# Patient Record
Sex: FEMALE | Race: BLACK OR AFRICAN AMERICAN | NOT HISPANIC OR LATINO | URBAN - METROPOLITAN AREA
[De-identification: names, ages, dates, MRNs, and addresses within clinical notes are randomized per-mention and may not be internally consistent; named-entity substitution may affect disease eponyms.]

---

## 2017-10-01 ENCOUNTER — OUTPATIENT (OUTPATIENT)
Dept: OUTPATIENT SERVICES | Facility: HOSPITAL | Age: 67
LOS: 1 days | End: 2017-10-01
Payer: MEDICAID

## 2017-10-01 DIAGNOSIS — N20.0 CALCULUS OF KIDNEY: Chronic | ICD-10-CM

## 2017-10-01 PROCEDURE — G9001: CPT

## 2017-10-09 ENCOUNTER — INPATIENT (INPATIENT)
Facility: HOSPITAL | Age: 67
LOS: 2 days | Discharge: HOME HEALTH | End: 2017-10-12
Attending: INTERNAL MEDICINE | Admitting: INTERNAL MEDICINE
Payer: COMMERCIAL

## 2017-10-09 VITALS
OXYGEN SATURATION: 100 % | RESPIRATION RATE: 18 BRPM | TEMPERATURE: 98 F | HEIGHT: 66 IN | HEART RATE: 65 BPM | SYSTOLIC BLOOD PRESSURE: 125 MMHG | DIASTOLIC BLOOD PRESSURE: 61 MMHG | WEIGHT: 130.07 LBS

## 2017-10-09 DIAGNOSIS — N20.0 CALCULUS OF KIDNEY: Chronic | ICD-10-CM

## 2017-10-09 LAB
BASOPHILS # BLD AUTO: 0.1 K/UL — SIGNIFICANT CHANGE UP (ref 0–0.2)
BASOPHILS NFR BLD AUTO: 0.6 % — SIGNIFICANT CHANGE UP (ref 0–2)
EOSINOPHIL # BLD AUTO: 0 K/UL — SIGNIFICANT CHANGE UP (ref 0–0.5)
EOSINOPHIL NFR BLD AUTO: 0.1 % — SIGNIFICANT CHANGE UP (ref 0–6)
HCT VFR BLD CALC: 38.1 % — SIGNIFICANT CHANGE UP (ref 34.5–45)
HGB BLD-MCNC: 12.6 G/DL — SIGNIFICANT CHANGE UP (ref 11.5–15.5)
LYMPHOCYTES # BLD AUTO: 1.2 K/UL — SIGNIFICANT CHANGE UP (ref 1–3.3)
LYMPHOCYTES # BLD AUTO: 10.4 % — LOW (ref 13–44)
MCHC RBC-ENTMCNC: 30.3 PG — SIGNIFICANT CHANGE UP (ref 27–34)
MCHC RBC-ENTMCNC: 32.9 GM/DL — SIGNIFICANT CHANGE UP (ref 32–36)
MCV RBC AUTO: 92 FL — SIGNIFICANT CHANGE UP (ref 80–100)
MONOCYTES # BLD AUTO: 0.6 K/UL — SIGNIFICANT CHANGE UP (ref 0–0.9)
MONOCYTES NFR BLD AUTO: 5.3 % — SIGNIFICANT CHANGE UP (ref 2–14)
NEUTROPHILS # BLD AUTO: 9.9 K/UL — HIGH (ref 1.8–7.4)
NEUTROPHILS NFR BLD AUTO: 83.6 % — HIGH (ref 43–77)
PLATELET # BLD AUTO: 209 K/UL — SIGNIFICANT CHANGE UP (ref 150–400)
RBC # BLD: 4.14 M/UL — SIGNIFICANT CHANGE UP (ref 3.8–5.2)
RBC # FLD: 11.6 % — SIGNIFICANT CHANGE UP (ref 11–15)
WBC # BLD: 11.8 K/UL — HIGH (ref 3.8–10.5)
WBC # FLD AUTO: 11.8 K/UL — HIGH (ref 3.8–10.5)

## 2017-10-09 PROCEDURE — 99285 EMERGENCY DEPT VISIT HI MDM: CPT

## 2017-10-09 PROCEDURE — 71010: CPT | Mod: 26

## 2017-10-09 RX ORDER — SODIUM CHLORIDE 9 MG/ML
500 INJECTION INTRAMUSCULAR; INTRAVENOUS; SUBCUTANEOUS ONCE
Qty: 0 | Refills: 0 | Status: COMPLETED | OUTPATIENT
Start: 2017-10-09 | End: 2017-10-09

## 2017-10-09 RX ADMIN — SODIUM CHLORIDE 500 MILLILITER(S): 9 INJECTION INTRAMUSCULAR; INTRAVENOUS; SUBCUTANEOUS at 23:11

## 2017-10-09 NOTE — ED ADULT NURSE NOTE - OBJECTIVE STATEMENT
pt brought in with c/o syncope episode , witnessed by  her aide, denies head trauma , now c/o generalized abd. pain tender to touch , not in any distress, pt with hx of multiple strokes with deficits, still slurred speech and slight weakness to right hand, but able to make needs known ,

## 2017-10-09 NOTE — ED PROVIDER NOTE - OBJECTIVE STATEMENT
Pertinent PMH/PSH/FHx/SHx and Review of Systems contained within:  Patient presents to the ED for   syncope at home.  No shaking or incontinence was seen.  Accompanied by sister.  Patient had been eating when she passed out for a brief period.  After event patient wanted to use the bathroom, had difficulty walking and was tremulous.  Patient is back to her baseline, but unable to provide history since she has dementia.  Per sister, patient has been off all her medications for 1 week now because of "some mix up at the pharmacy."  Patient is now awake and alert, following simple commands.      Relevant PMHx/SHx/SOCHx/FAMH:  HTN, HLD, Dementia, seizures, CHF  Patient denies EtOH/tobacco/illicit substance use.  PMD: sister marie recall

## 2017-10-09 NOTE — ED PROVIDER NOTE - PMH
Asthma    CVA (cerebral infarction)  right side weakness  Diabetes    Gout    Hypertension    OA (osteoarthritis)  bautista knees, low back  and  bautista shoulders  Seizure disorder    Urinary incontinence    Vision changes  lt eye

## 2017-10-09 NOTE — ED PROVIDER NOTE - MEDICAL DECISION MAKING DETAILS
Patient with syncope at home.  Lab and imaging studies were obtained in accordance with patient's chief complaint and reviewed.  Cause of syncope is currently unclear.  UA is still pending.  BNP elevated, increased pulmonary vascularity.  Needs cardiology and neuro.  Patient is to be admitted to the hospital and the case was discussed with the admitting physician.  Any changes in plan, additional imaging/labs, and further work up will be at the discretion of the admitting physician. Patient with syncope at home.  Lab and imaging studies were obtained in accordance with patient's chief complaint and reviewed.  Cause of syncope is currently unclear.  UA is still pending.  BNP elevated, increased pulmonary vascularity, but no worse than previous CXR.  Needs cardiology and neuro.  Patient is to be admitted to the hospital and the case was discussed with the admitting physician.  Any changes in plan, additional imaging/labs, and further work up will be at the discretion of the admitting physician.

## 2017-10-09 NOTE — ED ADULT TRIAGE NOTE - CHIEF COMPLAINT QUOTE
biba , syncope , at home , witnessed by family , (+)loc x 2minutes. denies chest pain , denies trouble breathing or any weakness   Generalized abdominal pain ,  nausea, vomiting

## 2017-10-10 DIAGNOSIS — R55 SYNCOPE AND COLLAPSE: ICD-10-CM

## 2017-10-10 DIAGNOSIS — J45.909 UNSPECIFIED ASTHMA, UNCOMPLICATED: ICD-10-CM

## 2017-10-10 DIAGNOSIS — M19.90 UNSPECIFIED OSTEOARTHRITIS, UNSPECIFIED SITE: ICD-10-CM

## 2017-10-10 DIAGNOSIS — G40.909 EPILEPSY, UNSPECIFIED, NOT INTRACTABLE, WITHOUT STATUS EPILEPTICUS: ICD-10-CM

## 2017-10-10 DIAGNOSIS — I10 ESSENTIAL (PRIMARY) HYPERTENSION: ICD-10-CM

## 2017-10-10 DIAGNOSIS — E11.9 TYPE 2 DIABETES MELLITUS WITHOUT COMPLICATIONS: ICD-10-CM

## 2017-10-10 LAB
ALBUMIN SERPL ELPH-MCNC: 3.7 G/DL — SIGNIFICANT CHANGE UP (ref 3.3–5)
ALP SERPL-CCNC: 72 U/L — SIGNIFICANT CHANGE UP (ref 40–120)
ALT FLD-CCNC: 22 U/L — SIGNIFICANT CHANGE UP (ref 12–78)
ANION GAP SERPL CALC-SCNC: 8 MMOL/L — SIGNIFICANT CHANGE UP (ref 5–17)
ANION GAP SERPL CALC-SCNC: 9 MMOL/L — SIGNIFICANT CHANGE UP (ref 5–17)
APPEARANCE UR: CLEAR — SIGNIFICANT CHANGE UP
AST SERPL-CCNC: 9 U/L — LOW (ref 15–37)
BACTERIA # UR AUTO: ABNORMAL
BILIRUB SERPL-MCNC: 0.6 MG/DL — SIGNIFICANT CHANGE UP (ref 0.2–1.2)
BILIRUB UR-MCNC: NEGATIVE — SIGNIFICANT CHANGE UP
BUN SERPL-MCNC: 22 MG/DL — SIGNIFICANT CHANGE UP (ref 7–23)
BUN SERPL-MCNC: 30 MG/DL — HIGH (ref 7–23)
CALCIUM SERPL-MCNC: 8.7 MG/DL — SIGNIFICANT CHANGE UP (ref 8.5–10.1)
CALCIUM SERPL-MCNC: 8.9 MG/DL — SIGNIFICANT CHANGE UP (ref 8.5–10.1)
CHLORIDE SERPL-SCNC: 108 MMOL/L — SIGNIFICANT CHANGE UP (ref 96–108)
CHLORIDE SERPL-SCNC: 110 MMOL/L — HIGH (ref 96–108)
CK MB BLD-MCNC: <1.1 % — SIGNIFICANT CHANGE UP (ref 0–3.5)
CK MB CFR SERPL CALC: <0.5 NG/ML — SIGNIFICANT CHANGE UP (ref 0.5–3.6)
CK SERPL-CCNC: 46 U/L — SIGNIFICANT CHANGE UP (ref 26–192)
CO2 SERPL-SCNC: 26 MMOL/L — SIGNIFICANT CHANGE UP (ref 22–31)
CO2 SERPL-SCNC: 26 MMOL/L — SIGNIFICANT CHANGE UP (ref 22–31)
COLOR SPEC: YELLOW — SIGNIFICANT CHANGE UP
CREAT SERPL-MCNC: 1.04 MG/DL — SIGNIFICANT CHANGE UP (ref 0.5–1.3)
CREAT SERPL-MCNC: 1.14 MG/DL — SIGNIFICANT CHANGE UP (ref 0.5–1.3)
DIFF PNL FLD: ABNORMAL
EPI CELLS # UR: SIGNIFICANT CHANGE UP
GLUCOSE BLDC GLUCOMTR-MCNC: 115 MG/DL — HIGH (ref 70–99)
GLUCOSE BLDC GLUCOMTR-MCNC: 173 MG/DL — HIGH (ref 70–99)
GLUCOSE SERPL-MCNC: 137 MG/DL — HIGH (ref 70–99)
GLUCOSE SERPL-MCNC: 204 MG/DL — HIGH (ref 70–99)
GLUCOSE UR QL: NEGATIVE MG/DL — SIGNIFICANT CHANGE UP
HCT VFR BLD CALC: 39.4 % — SIGNIFICANT CHANGE UP (ref 34.5–45)
HGB BLD-MCNC: 13 G/DL — SIGNIFICANT CHANGE UP (ref 11.5–15.5)
KETONES UR-MCNC: NEGATIVE — SIGNIFICANT CHANGE UP
LEUKOCYTE ESTERASE UR-ACNC: ABNORMAL
MCHC RBC-ENTMCNC: 30.5 PG — SIGNIFICANT CHANGE UP (ref 27–34)
MCHC RBC-ENTMCNC: 33 GM/DL — SIGNIFICANT CHANGE UP (ref 32–36)
MCV RBC AUTO: 92.4 FL — SIGNIFICANT CHANGE UP (ref 80–100)
NITRITE UR-MCNC: NEGATIVE — SIGNIFICANT CHANGE UP
NT-PROBNP SERPL-SCNC: 489 PG/ML — HIGH (ref 0–125)
PH UR: 6 — SIGNIFICANT CHANGE UP (ref 5–8)
PLATELET # BLD AUTO: 211 K/UL — SIGNIFICANT CHANGE UP (ref 150–400)
POTASSIUM SERPL-MCNC: 3.3 MMOL/L — LOW (ref 3.5–5.3)
POTASSIUM SERPL-MCNC: 3.8 MMOL/L — SIGNIFICANT CHANGE UP (ref 3.5–5.3)
POTASSIUM SERPL-SCNC: 3.3 MMOL/L — LOW (ref 3.5–5.3)
POTASSIUM SERPL-SCNC: 3.8 MMOL/L — SIGNIFICANT CHANGE UP (ref 3.5–5.3)
PROLACTIN SERPL-MCNC: 14.7 NG/ML — SIGNIFICANT CHANGE UP (ref 3.4–24.1)
PROT SERPL-MCNC: 7.7 GM/DL — SIGNIFICANT CHANGE UP (ref 6–8.3)
PROT UR-MCNC: 100 MG/DL
RBC # BLD: 4.26 M/UL — SIGNIFICANT CHANGE UP (ref 3.8–5.2)
RBC # FLD: 11.4 % — SIGNIFICANT CHANGE UP (ref 11–15)
RBC CASTS # UR COMP ASSIST: >50 /HPF (ref 0–4)
SODIUM SERPL-SCNC: 143 MMOL/L — SIGNIFICANT CHANGE UP (ref 135–145)
SODIUM SERPL-SCNC: 144 MMOL/L — SIGNIFICANT CHANGE UP (ref 135–145)
SP GR SPEC: 1.01 — SIGNIFICANT CHANGE UP (ref 1.01–1.02)
TROPONIN I SERPL-MCNC: <.015 NG/ML — SIGNIFICANT CHANGE UP (ref 0.01–0.04)
UROBILINOGEN FLD QL: NEGATIVE MG/DL — SIGNIFICANT CHANGE UP
WBC # BLD: 11 K/UL — HIGH (ref 3.8–10.5)
WBC # FLD AUTO: 11 K/UL — HIGH (ref 3.8–10.5)
WBC UR QL: ABNORMAL

## 2017-10-10 PROCEDURE — 70450 CT HEAD/BRAIN W/O DYE: CPT | Mod: 26

## 2017-10-10 PROCEDURE — 93010 ELECTROCARDIOGRAM REPORT: CPT

## 2017-10-10 RX ORDER — HYDRALAZINE HCL 50 MG
50 TABLET ORAL EVERY 8 HOURS
Qty: 0 | Refills: 0 | Status: DISCONTINUED | OUTPATIENT
Start: 2017-10-10 | End: 2017-10-12

## 2017-10-10 RX ORDER — SODIUM CHLORIDE 9 MG/ML
1000 INJECTION, SOLUTION INTRAVENOUS
Qty: 0 | Refills: 0 | Status: DISCONTINUED | OUTPATIENT
Start: 2017-10-10 | End: 2017-10-12

## 2017-10-10 RX ORDER — METOPROLOL TARTRATE 50 MG
100 TABLET ORAL EVERY 12 HOURS
Qty: 0 | Refills: 0 | Status: DISCONTINUED | OUTPATIENT
Start: 2017-10-10 | End: 2017-10-12

## 2017-10-10 RX ORDER — ENOXAPARIN SODIUM 100 MG/ML
40 INJECTION SUBCUTANEOUS DAILY
Qty: 0 | Refills: 0 | Status: DISCONTINUED | OUTPATIENT
Start: 2017-10-10 | End: 2017-10-12

## 2017-10-10 RX ORDER — FUROSEMIDE 40 MG
20 TABLET ORAL DAILY
Qty: 0 | Refills: 0 | Status: DISCONTINUED | OUTPATIENT
Start: 2017-10-10 | End: 2017-10-12

## 2017-10-10 RX ORDER — SIMVASTATIN 20 MG/1
20 TABLET, FILM COATED ORAL AT BEDTIME
Qty: 0 | Refills: 0 | Status: DISCONTINUED | OUTPATIENT
Start: 2017-10-10 | End: 2017-10-12

## 2017-10-10 RX ORDER — DEXTROSE 50 % IN WATER 50 %
12.5 SYRINGE (ML) INTRAVENOUS ONCE
Qty: 0 | Refills: 0 | Status: DISCONTINUED | OUTPATIENT
Start: 2017-10-10 | End: 2017-10-12

## 2017-10-10 RX ORDER — GLUCAGON INJECTION, SOLUTION 0.5 MG/.1ML
1 INJECTION, SOLUTION SUBCUTANEOUS ONCE
Qty: 0 | Refills: 0 | Status: DISCONTINUED | OUTPATIENT
Start: 2017-10-10 | End: 2017-10-12

## 2017-10-10 RX ORDER — DEXTROSE 50 % IN WATER 50 %
25 SYRINGE (ML) INTRAVENOUS ONCE
Qty: 0 | Refills: 0 | Status: DISCONTINUED | OUTPATIENT
Start: 2017-10-10 | End: 2017-10-12

## 2017-10-10 RX ORDER — LEVETIRACETAM 250 MG/1
750 TABLET, FILM COATED ORAL EVERY 12 HOURS
Qty: 0 | Refills: 0 | Status: DISCONTINUED | OUTPATIENT
Start: 2017-10-10 | End: 2017-10-12

## 2017-10-10 RX ORDER — POTASSIUM CHLORIDE 20 MEQ
40 PACKET (EA) ORAL ONCE
Qty: 0 | Refills: 0 | Status: COMPLETED | OUTPATIENT
Start: 2017-10-10 | End: 2017-10-10

## 2017-10-10 RX ORDER — INSULIN LISPRO 100/ML
VIAL (ML) SUBCUTANEOUS AT BEDTIME
Qty: 0 | Refills: 0 | Status: DISCONTINUED | OUTPATIENT
Start: 2017-10-10 | End: 2017-10-12

## 2017-10-10 RX ORDER — DEXTROSE 50 % IN WATER 50 %
1 SYRINGE (ML) INTRAVENOUS ONCE
Qty: 0 | Refills: 0 | Status: DISCONTINUED | OUTPATIENT
Start: 2017-10-10 | End: 2017-10-12

## 2017-10-10 RX ORDER — CLOPIDOGREL BISULFATE 75 MG/1
75 TABLET, FILM COATED ORAL DAILY
Qty: 0 | Refills: 0 | Status: DISCONTINUED | OUTPATIENT
Start: 2017-10-10 | End: 2017-10-12

## 2017-10-10 RX ORDER — INSULIN LISPRO 100/ML
VIAL (ML) SUBCUTANEOUS
Qty: 0 | Refills: 0 | Status: DISCONTINUED | OUTPATIENT
Start: 2017-10-10 | End: 2017-10-12

## 2017-10-10 RX ORDER — FUROSEMIDE 40 MG
20 TABLET ORAL EVERY 12 HOURS
Qty: 0 | Refills: 0 | Status: DISCONTINUED | OUTPATIENT
Start: 2017-10-10 | End: 2017-10-10

## 2017-10-10 RX ADMIN — Medication 100 MILLIGRAM(S): at 06:45

## 2017-10-10 RX ADMIN — SODIUM CHLORIDE 70 MILLILITER(S): 9 INJECTION, SOLUTION INTRAVENOUS at 10:09

## 2017-10-10 RX ADMIN — Medication 40 MILLIEQUIVALENT(S): at 22:44

## 2017-10-10 RX ADMIN — LEVETIRACETAM 750 MILLIGRAM(S): 250 TABLET, FILM COATED ORAL at 06:45

## 2017-10-10 RX ADMIN — Medication 100 MILLIGRAM(S): at 17:49

## 2017-10-10 RX ADMIN — Medication 50 MILLIGRAM(S): at 22:45

## 2017-10-10 RX ADMIN — Medication 50 MILLIGRAM(S): at 06:45

## 2017-10-10 RX ADMIN — CLOPIDOGREL BISULFATE 75 MILLIGRAM(S): 75 TABLET, FILM COATED ORAL at 11:22

## 2017-10-10 RX ADMIN — LEVETIRACETAM 750 MILLIGRAM(S): 250 TABLET, FILM COATED ORAL at 17:49

## 2017-10-10 RX ADMIN — SIMVASTATIN 20 MILLIGRAM(S): 20 TABLET, FILM COATED ORAL at 22:45

## 2017-10-10 RX ADMIN — ENOXAPARIN SODIUM 40 MILLIGRAM(S): 100 INJECTION SUBCUTANEOUS at 11:23

## 2017-10-10 RX ADMIN — Medication 20 MILLIGRAM(S): at 06:45

## 2017-10-10 RX ADMIN — Medication 50 MILLIGRAM(S): at 13:30

## 2017-10-10 NOTE — H&P ADULT - NSHPPHYSICALEXAM_GEN_ALL_CORE
Constitutional: NAD, well-groomed, well-developed  HEENT: PERRLA, EOMI, Normal Hearing, MMM  Neck: No LAD, No JVD  Back: Normal spine flexure, No CVA tenderness  Respiratory: CTAB/L   Cardiovascular: S1 and S2, RRR, no M/G/R  Gastrointestinal: BS+, soft, NT/ND  Extremities: No peripheral edema  Vascular: 2+ peripheral pulses  Neurological: A/O x 1, no focal deficits  Skin: No rashes

## 2017-10-10 NOTE — CONSULT NOTE ADULT - ASSESSMENT
consult dict s/p syncope confsed ct head no acute path encephaklopathy  for tsh b12 rpr mri brain eeg will foll ow echo doppler asa 81 mg

## 2017-10-10 NOTE — CHART NOTE - NSCHARTNOTEFT_GEN_A_CORE
House medicine PA    Called by Dr. Munroe to place admit orders for Dr. Vasquez. 65 YO F with a sig PMHx of seizures, HTN, DM, gout, CVA, OA and asthma. Patient is currently being admitted for Syncope. Patient was seen and examined at bedside. Unable to obtain ROS as patient is confused at baseline.     HPI Objective Statement:  	Patient presents to the ED for  	 syncope at home.  No shaking or incontinence was seen.  Accompanied by sister.  Patient had been eating when she passed out for a brief period.  After event patient wanted to use the bathroom, had difficulty walking and was tremulous.  Patient is back to her baseline, but unable to provide history since she has dementia.  Per sister, patient has been off all her medications for 1 week now because of "some mix up at the pharmacy."  Patient is now awake and alert, following simple commands.    Vital Signs Last 24 Hrs  T(C): 36.7 (09 Oct 2017 20:41), Max: 36.7 (09 Oct 2017 20:41)  T(F): 98 (09 Oct 2017 20:41), Max: 98 (09 Oct 2017 20:41)  HR: 65 (09 Oct 2017 20:41) (65 - 65)  BP: 125/61 (09 Oct 2017 20:41) (125/61 - 125/61)  RR: 18 (09 Oct 2017 20:41) (18 - 18)  SpO2: 100% (09 Oct 2017 20:41) (100% - 100%)    PHYSICAL EXAM:  GENERAL: NAD, well-groomed, well-developed  HEAD:  Atraumatic, Normocephalic  EYES: EOMI, PERRL, conjunctiva and sclera clear  ENMT: No tonsillar erythema, exudates, or enlargement; Moist mucous membranes, Good dentition, No lesions  NECK: Supple, No JVD, Normal thyroid  NERVOUS SYSTEM:  Alert & Oriented X1; Motor Strength 5/5 B/L upper and lower extremities  CHEST/LUNG: Clear to auscultation bilaterally; No rales, rhonchi, wheezing, or rubs  HEART: Regular rate and rhythm; No murmurs appreciated  ABDOMEN: Soft, Nontender, Nondistended; Bowel sounds present  MSK: ROM intact in all extremities, 5/5 strength in upper and lower extremities, B/L.  EXTREMITIES:  2+ Peripheral Pulses, No clubbing, cyanosis, or edema    A/P  65 YO F with a sig PMHx of seizures, HTN, DM, gout, CVA, OA and asthma. Patient is currently being admitted for Syncope  -Admit to telemetry  -Continue home medications  -Put patient on insulin sliding scale  -EEG in AM  -Continue to monitor

## 2017-10-10 NOTE — H&P ADULT - ASSESSMENT
weakness followed by vomiting  back to baseline  probable gastritis  probable seizure (off seizure meds x 1 week)  discussed with sister  probable dc in am

## 2017-10-10 NOTE — H&P ADULT - PROBLEM SELECTOR PROBLEM 2
Asthma, unspecified asthma severity, unspecified whether complicated, unspecified whether persistent

## 2017-10-10 NOTE — H&P ADULT - HISTORY OF PRESENT ILLNESS
Patient presents to the ED after syncope at home. question of shaking , no incontinence was seen.  Accompanied by sister.  Patient had been eating when she passed out for a brief period.  After event patient wanted to use the bathroom, had difficulty walking and was tremulous.  Patient is back to her baseline, but unable to provide history since she has dementia.  Per sister, patient has been off all her medications for 1 week now because of "some mix up at the pharmacy."  Patient is now awake and alert, following simple commands. Patient presents to the ED after syncope at home. question of shaking , no incontinence was seen.  Accompanied by sister.  Patient had been eating when she passed out for a brief period.  After event patient wanted to use the bathroom, had difficulty walking and was tremulous.  Patient is back to her baseline, but unable to provide history since she has dementia.  Per sister, patient has been off all her medications for 1 week now because of "some mix up at the pharmacy."  sister says patient was responsive during episode of weakness. No tonic clonic episodes., no fever. Patient is now awake and alert, following simple commands.

## 2017-10-10 NOTE — H&P ADULT - NSHPLABSRESULTS_GEN_ALL_CORE
12.6   11.8  )-----------( 209      ( 09 Oct 2017 23:47 )             38.1     10-09    143  |  108  |  30<H>  ----------------------------<  204<H>  3.8   |  26  |  1.14    Ca    8.7      09 Oct 2017 23:45    TPro  7.7  /  Alb  3.7  /  TBili  0.6  /  DBili  x   /  AST  9<L>  /  ALT  22  /  AlkPhos  72  10-09      Urinalysis Basic - ( 10 Oct 2017 07:23 )    Color: Yellow / Appearance: Clear / S.010 / pH: x  Gluc: x / Ketone: Negative  / Bili: Negative / Urobili: Negative mg/dL   Blood: x / Protein: 100 mg/dL / Nitrite: Negative   Leuk Esterase: Small / RBC: >50 /HPF / WBC 6-10   Sq Epi: x / Non Sq Epi: Few / Bacteria: Few        MICROBIOLOGY:  RECENT CULTURES:

## 2017-10-11 ENCOUNTER — TRANSCRIPTION ENCOUNTER (OUTPATIENT)
Age: 67
End: 2017-10-11

## 2017-10-11 LAB
CULTURE RESULTS: SIGNIFICANT CHANGE UP
GLUCOSE BLDC GLUCOMTR-MCNC: 124 MG/DL — HIGH (ref 70–99)
GLUCOSE BLDC GLUCOMTR-MCNC: 130 MG/DL — HIGH (ref 70–99)
GLUCOSE BLDC GLUCOMTR-MCNC: 143 MG/DL — HIGH (ref 70–99)
GLUCOSE BLDC GLUCOMTR-MCNC: 236 MG/DL — HIGH (ref 70–99)
HBA1C BLD-MCNC: 5.9 % — HIGH (ref 4–5.6)
LEVETIRACETAM SERPL-MCNC: <2 MCG/ML — LOW (ref 12–46)
SPECIMEN SOURCE: SIGNIFICANT CHANGE UP

## 2017-10-11 RX ORDER — FUROSEMIDE 40 MG
1 TABLET ORAL
Qty: 0 | Refills: 0 | COMMUNITY
Start: 2017-10-11

## 2017-10-11 RX ORDER — INFLUENZA VIRUS VACCINE 15; 15; 15; 15 UG/.5ML; UG/.5ML; UG/.5ML; UG/.5ML
0.5 SUSPENSION INTRAMUSCULAR ONCE
Qty: 0 | Refills: 0 | Status: COMPLETED | OUTPATIENT
Start: 2017-10-11 | End: 2017-10-11

## 2017-10-11 RX ORDER — LEVETIRACETAM 250 MG/1
1 TABLET, FILM COATED ORAL
Qty: 0 | Refills: 0 | COMMUNITY
Start: 2017-10-11

## 2017-10-11 RX ADMIN — Medication 50 MILLIGRAM(S): at 13:56

## 2017-10-11 RX ADMIN — Medication 50 MILLIGRAM(S): at 22:00

## 2017-10-11 RX ADMIN — CLOPIDOGREL BISULFATE 75 MILLIGRAM(S): 75 TABLET, FILM COATED ORAL at 11:38

## 2017-10-11 RX ADMIN — SIMVASTATIN 20 MILLIGRAM(S): 20 TABLET, FILM COATED ORAL at 22:00

## 2017-10-11 RX ADMIN — Medication 20 MILLIGRAM(S): at 06:06

## 2017-10-11 RX ADMIN — Medication 100 MILLIGRAM(S): at 16:58

## 2017-10-11 RX ADMIN — LEVETIRACETAM 750 MILLIGRAM(S): 250 TABLET, FILM COATED ORAL at 06:06

## 2017-10-11 RX ADMIN — ENOXAPARIN SODIUM 40 MILLIGRAM(S): 100 INJECTION SUBCUTANEOUS at 11:36

## 2017-10-11 RX ADMIN — Medication 2: at 11:39

## 2017-10-11 RX ADMIN — LEVETIRACETAM 750 MILLIGRAM(S): 250 TABLET, FILM COATED ORAL at 16:58

## 2017-10-11 RX ADMIN — INFLUENZA VIRUS VACCINE 0.5 MILLILITER(S): 15; 15; 15; 15 SUSPENSION INTRAMUSCULAR at 11:36

## 2017-10-11 NOTE — DISCHARGE NOTE ADULT - HOSPITAL COURSE
Patient presents to the ED after syncope at home. question of shaking , no incontinence was seen.  Accompanied by sister.  Patient had been eating when she passed out for a brief period.  After event patient wanted to use the bathroom, had difficulty walking and was tremulous.  Patient is back to her baseline, but unable to provide history since she has dementia.  Per sister, patient has been off all her medications for 1 week now because of "some mix up at the pharmacy."  sister says patient was responsive during episode of weakness. No tonic clonic episodes., no fever. Patient is now awake and alert, following simple commands.  Clinically stable since admit. lasix dose reduced for mild azotemia  dc home to outpatient follow up

## 2017-10-11 NOTE — DISCHARGE NOTE ADULT - MEDICATION SUMMARY - MEDICATIONS TO TAKE
I will START or STAY ON the medications listed below when I get home from the hospital:    levETIRAcetam 750 mg oral tablet  -- 1 tab(s) by mouth every 12 hours  -- Indication: For Seizure disorder    HumaLOG 100 units/mL subcutaneous solution  -- 4 unit(s) subcutaneous 3 times a day (before meals)  -- Indication: For Type 2 diabetes mellitus without complication, unspecified long term insulin use status    Zocor 20 mg oral tablet  -- 1 tab(s) by mouth once a day (at bedtime)  -- Indication: For hyperlipidemia    clopidogrel 75 mg oral tablet  -- 1 tab(s) by mouth once a day  -- Indication: For cad    Lopressor 100 mg oral tablet  -- 1 tab(s) by mouth 2 times a day  -- Indication: For Essential hypertension    furosemide 20 mg oral tablet  -- 1 tab(s) by mouth once a day  -- Indication: For Essential hypertension    hydrALAZINE 50 mg oral tablet  -- 1 tab(s) by mouth 3 times a day  -- Indication: For Essential hypertension I will START or STAY ON the medications listed below when I get home from the hospital:    levETIRAcetam 750 mg oral tablet  -- 1 tab(s) by mouth every 12 hours  -- Indication: For Seizure disorder    Zocor 20 mg oral tablet  -- 1 tab(s) by mouth once a day (at bedtime)  -- Indication: For hyperlipidemia    clopidogrel 75 mg oral tablet  -- 1 tab(s) by mouth once a day  -- Indication: For cad    Lopressor 100 mg oral tablet  -- 1 tab(s) by mouth 2 times a day  -- Indication: For Essential hypertension    furosemide 20 mg oral tablet  -- 1 tab(s) by mouth once a day  -- Indication: For Essential hypertension    hydrALAZINE 50 mg oral tablet  -- 1 tab(s) by mouth 3 times a day  -- Indication: For Essential hypertension

## 2017-10-11 NOTE — DISCHARGE NOTE ADULT - SECONDARY DIAGNOSIS.
Type 2 diabetes mellitus without complication, unspecified long term insulin use status Osteoarthritis, unspecified osteoarthritis type, unspecified site Essential hypertension Asthma, unspecified asthma severity, unspecified whether complicated, unspecified whether persistent Late onset Alzheimer's disease without behavioral disturbance

## 2017-10-11 NOTE — DISCHARGE NOTE ADULT - MEDICATION SUMMARY - MEDICATIONS TO CHANGE
I will SWITCH the dose or number of times a day I take the medications listed below when I get home from the hospital:    Lasix 20 mg oral tablet  --  by mouth 2 times a day

## 2017-10-11 NOTE — DISCHARGE NOTE ADULT - PLAN OF CARE
prevent recurrence Probably dementia related Vs vasovagal stable controlled unchanged stable, please follow up with Primary MD for continued management of DM,  At this time no insulin is prescribed

## 2017-10-11 NOTE — DISCHARGE NOTE ADULT - CARE PLAN
Principal Discharge DX:	Syncope, unspecified syncope type  Goal:	prevent recurrence  Instructions for follow-up, activity and diet:	Probably dementia related Vs vasovagal  Secondary Diagnosis:	Type 2 diabetes mellitus without complication, unspecified long term insulin use status  Instructions for follow-up, activity and diet:	stable  Secondary Diagnosis:	Osteoarthritis, unspecified osteoarthritis type, unspecified site  Instructions for follow-up, activity and diet:	stable  Secondary Diagnosis:	Essential hypertension  Instructions for follow-up, activity and diet:	controlled  Secondary Diagnosis:	Asthma, unspecified asthma severity, unspecified whether complicated, unspecified whether persistent  Instructions for follow-up, activity and diet:	stable  Secondary Diagnosis:	Late onset Alzheimer's disease without behavioral disturbance  Instructions for follow-up, activity and diet:	unchanged Principal Discharge DX:	Syncope, unspecified syncope type  Goal:	prevent recurrence  Instructions for follow-up, activity and diet:	Probably dementia related Vs vasovagal  Secondary Diagnosis:	Type 2 diabetes mellitus without complication, unspecified long term insulin use status  Instructions for follow-up, activity and diet:	stable, please follow up with Primary MD for continued management of DM,  At this time no insulin is prescribed  Secondary Diagnosis:	Osteoarthritis, unspecified osteoarthritis type, unspecified site  Instructions for follow-up, activity and diet:	stable  Secondary Diagnosis:	Essential hypertension  Instructions for follow-up, activity and diet:	controlled  Secondary Diagnosis:	Asthma, unspecified asthma severity, unspecified whether complicated, unspecified whether persistent  Instructions for follow-up, activity and diet:	stable  Secondary Diagnosis:	Late onset Alzheimer's disease without behavioral disturbance  Instructions for follow-up, activity and diet:	unchanged

## 2017-10-11 NOTE — DISCHARGE NOTE ADULT - CARE PROVIDER_API CALL
Jacob Vasquez (ROZ), Internal Medicine  [address] [place] [address]  Phone: [phone]  Fax: [phone]

## 2017-10-11 NOTE — PHYSICAL THERAPY INITIAL EVALUATION ADULT - GENERAL OBSERVATIONS, REHAB EVAL
Found supine, alert, cooperative, follow directions, not in distress, on room air, no pain , no dizziness, no lightheadedness.

## 2017-10-11 NOTE — DISCHARGE NOTE ADULT - MEDICATION SUMMARY - MEDICATIONS TO STOP TAKING
I will STOP taking the medications listed below when I get home from the hospital:  None I will STOP taking the medications listed below when I get home from the hospital:    HumaLOG 100 units/mL subcutaneous solution  -- 4 unit(s) subcutaneous 3 times a day (before meals)

## 2017-10-11 NOTE — DISCHARGE NOTE ADULT - PATIENT PORTAL LINK FT
“You can access the Follow[facility] Patient Portal, offered by [facility], by registering with the following website: [email or web address]”

## 2017-10-12 VITALS
SYSTOLIC BLOOD PRESSURE: 167 MMHG | RESPIRATION RATE: 18 BRPM | TEMPERATURE: 98 F | HEART RATE: 70 BPM | DIASTOLIC BLOOD PRESSURE: 76 MMHG | OXYGEN SATURATION: 100 %

## 2017-10-12 LAB — GLUCOSE BLDC GLUCOMTR-MCNC: 137 MG/DL — HIGH (ref 70–99)

## 2017-10-12 RX ORDER — AMLODIPINE BESYLATE 2.5 MG/1
5 TABLET ORAL ONCE
Qty: 0 | Refills: 0 | Status: COMPLETED | OUTPATIENT
Start: 2017-10-12 | End: 2017-10-12

## 2017-10-12 RX ADMIN — Medication 20 MILLIGRAM(S): at 05:54

## 2017-10-12 RX ADMIN — Medication 50 MILLIGRAM(S): at 05:54

## 2017-10-12 RX ADMIN — LEVETIRACETAM 750 MILLIGRAM(S): 250 TABLET, FILM COATED ORAL at 05:54

## 2017-10-12 RX ADMIN — Medication 100 MILLIGRAM(S): at 05:53

## 2017-10-12 RX ADMIN — AMLODIPINE BESYLATE 5 MILLIGRAM(S): 2.5 TABLET ORAL at 09:43

## 2017-10-12 NOTE — CHART NOTE - NSCHARTNOTEFT_GEN_A_CORE
per family patient no  longer takes insulin at home,   It was stopped by [patient primary MD in the community about 1 year ago.  At this time patient will be discharged with out insulin and will follow up with MD in community.  Discussed with family present at bedside

## 2017-10-12 NOTE — PROGRESS NOTE ADULT - ASSESSMENT
weakness followed by vomiting  back to baseline  probable gastritis  probable seizure (off seizure meds x 1 week)  discussed with sister  discharge today

## 2017-10-12 NOTE — PROGRESS NOTE ADULT - SUBJECTIVE AND OBJECTIVE BOX
Patient is a 66y old  Female who presents with a chief complaint of presyncope (11 Oct 2017 09:34)      INTERVAL HPI/OVERNIGHT EVENTS:  Awaiting dc home  dc papers completed yesterday  today noted with elevated bp    MEDICATIONS  (STANDING):  amLODIPine   Tablet 5 milliGRAM(s) Oral once  clopidogrel Tablet 75 milliGRAM(s) Oral daily  dextrose 5% + sodium chloride 0.45%. 1000 milliLiter(s) (70 mL/Hr) IV Continuous <Continuous>  dextrose 5%. 1000 milliLiter(s) (50 mL/Hr) IV Continuous <Continuous>  dextrose 50% Injectable 12.5 Gram(s) IV Push once  dextrose 50% Injectable 25 Gram(s) IV Push once  dextrose 50% Injectable 25 Gram(s) IV Push once  enoxaparin Injectable 40 milliGRAM(s) SubCutaneous daily  furosemide    Tablet 20 milliGRAM(s) Oral daily  hydrALAZINE 50 milliGRAM(s) Oral every 8 hours  insulin lispro (HumaLOG) corrective regimen sliding scale   SubCutaneous three times a day before meals  insulin lispro (HumaLOG) corrective regimen sliding scale   SubCutaneous at bedtime  levETIRAcetam 750 milliGRAM(s) Oral every 12 hours  metoprolol 100 milliGRAM(s) Oral every 12 hours  simvastatin 20 milliGRAM(s) Oral at bedtime    MEDICATIONS  (PRN):  dextrose Gel 1 Dose(s) Oral once PRN Blood Glucose LESS THAN 70 milliGRAM(s)/deciliter  glucagon  Injectable 1 milliGRAM(s) IntraMuscular once PRN Glucose LESS THAN 70 milligrams/deciliter        REVIEW OF SYSTEMS:  CONSTITUTIONAL: No fever, weight loss, or fatigue  EYES: No eye pain, visual disturbances, or discharge  ENMT:  No difficulty hearing, tinnitus, vertigo; No sinus or throat pain  NECK: No pain or stiffness  RESPIRATORY: No cough, wheezing, chills or hemoptysis; No shortness of breath  CARDIOVASCULAR: No chest pain, palpitations, dizziness, or leg swelling  GASTROINTESTINAL: No abdominal or epigastric pain. No nausea, vomiting, or hematemesis; No diarrhea or constipation. No melena or hematochezia.  GENITOURINARY: No dysuria, frequency, hematuria, or incontinence  NEUROLOGICAL: No headaches, memory loss, loss of strength, numbness, or tremors  SKIN: No itching, burning, rashes, or lesions      Vital Signs Last 24 Hrs  T(C): 36.4 (12 Oct 2017 05:34), Max: 37.1 (11 Oct 2017 12:21)  T(F): 97.6 (12 Oct 2017 05:34), Max: 98.8 (11 Oct 2017 12:21)  HR: 65 (12 Oct 2017 05:34) (61 - 74)  BP: 185/71 (12 Oct 2017 05:34) (125/68 - 185/71)  BP(mean): --  RR: 18 (12 Oct 2017 05:34) (17 - 18)  SpO2: 98% (12 Oct 2017 05:34) (97% - 99%)    PHYSICAL EXAM:  GENERAL: NAD, well-groomed, well-developed  HEAD:  Atraumatic, Normocephalic  EYES: EOMI, PERRLA, conjunctiva and sclera clear  ENMT: No tonsillar erythema, exudates, or enlargement; Moist mucous membranes   NECK: Supple, No JVD   NERVOUS SYSTEM:  Alert & Oriented X3, Good concentration; Motor Strength 5/5 B/L upper and lower extremities; DTRs 2+ intact and symmetric  CHEST/LUNG: Clear to percussion bilaterally; No rales, rhonchi, wheezing, or rubs  HEART: Regular rate and rhythm; No murmurs, rubs, or gallops  ABDOMEN: Soft, Nontender, Nondistended; Bowel sounds present  EXTREMITIES:  2+ Peripheral Pulses, No clubbing, cyanosis, or edema  LYMPH: No lymphadenopathy noted  SKIN: No rashes or lesions    LABS:                        13.0   11.0  )-----------( 211      ( 10 Oct 2017 10:43 )             39.4     10-10    144  |  110<H>  |  22  ----------------------------<  137<H>  3.3<L>   |  26  |  1.04    Ca    8.9      10 Oct 2017 10:43          CAPILLARY BLOOD GLUCOSE  137 (12 Oct 2017 08:24)      POCT Blood Glucose.: 124 mg/dL (11 Oct 2017 21:55)  POCT Blood Glucose.: 143 mg/dL (11 Oct 2017 16:55)  POCT Blood Glucose.: 236 mg/dL (11 Oct 2017 11:33)      RADIOLOGY & ADDITIONAL TESTS:    Imaging Personally Reviewed:  [ ] YES  [ ] NO    Consultant(s) Notes Reviewed:  [ ] YES  [ ] NO    Care Discussed with Consultants/Other Providers [ ] YES  [ ] NO

## 2017-10-13 DIAGNOSIS — R69 ILLNESS, UNSPECIFIED: ICD-10-CM

## 2017-10-16 DIAGNOSIS — R55 SYNCOPE AND COLLAPSE: ICD-10-CM

## 2017-10-16 DIAGNOSIS — M19.90 UNSPECIFIED OSTEOARTHRITIS, UNSPECIFIED SITE: ICD-10-CM

## 2017-10-16 DIAGNOSIS — E11.9 TYPE 2 DIABETES MELLITUS WITHOUT COMPLICATIONS: ICD-10-CM

## 2017-10-16 DIAGNOSIS — I10 ESSENTIAL (PRIMARY) HYPERTENSION: ICD-10-CM

## 2017-10-16 DIAGNOSIS — J45.909 UNSPECIFIED ASTHMA, UNCOMPLICATED: ICD-10-CM

## 2017-10-16 DIAGNOSIS — Z87.442 PERSONAL HISTORY OF URINARY CALCULI: ICD-10-CM

## 2017-10-16 DIAGNOSIS — I25.10 ATHEROSCLEROTIC HEART DISEASE OF NATIVE CORONARY ARTERY WITHOUT ANGINA PECTORIS: ICD-10-CM

## 2017-10-16 DIAGNOSIS — E78.5 HYPERLIPIDEMIA, UNSPECIFIED: ICD-10-CM

## 2017-10-16 DIAGNOSIS — I69.353 HEMIPLEGIA AND HEMIPARESIS FOLLOWING CEREBRAL INFARCTION AFFECTING RIGHT NON-DOMINANT SIDE: ICD-10-CM

## 2017-10-16 DIAGNOSIS — G30.1 ALZHEIMER'S DISEASE WITH LATE ONSET: ICD-10-CM

## 2017-10-16 DIAGNOSIS — G40.909 EPILEPSY, UNSPECIFIED, NOT INTRACTABLE, WITHOUT STATUS EPILEPTICUS: ICD-10-CM

## 2017-10-16 DIAGNOSIS — Z79.4 LONG TERM (CURRENT) USE OF INSULIN: ICD-10-CM

## 2017-10-16 DIAGNOSIS — F02.80 DEMENTIA IN OTHER DISEASES CLASSIFIED ELSEWHERE, UNSPECIFIED SEVERITY, WITHOUT BEHAVIORAL DISTURBANCE, PSYCHOTIC DISTURBANCE, MOOD DISTURBANCE, AND ANXIETY: ICD-10-CM

## 2017-10-16 DIAGNOSIS — G93.40 ENCEPHALOPATHY, UNSPECIFIED: ICD-10-CM

## 2018-01-21 ENCOUNTER — INPATIENT (INPATIENT)
Facility: HOSPITAL | Age: 68
LOS: 1 days | Discharge: HOME HEALTH | End: 2018-01-23
Attending: HOSPITALIST | Admitting: HOSPITALIST
Payer: COMMERCIAL

## 2018-01-21 VITALS
SYSTOLIC BLOOD PRESSURE: 150 MMHG | OXYGEN SATURATION: 100 % | HEIGHT: 62 IN | DIASTOLIC BLOOD PRESSURE: 93 MMHG | WEIGHT: 160.06 LBS | RESPIRATION RATE: 17 BRPM | TEMPERATURE: 98 F | HEART RATE: 54 BPM

## 2018-01-21 DIAGNOSIS — N20.0 CALCULUS OF KIDNEY: Chronic | ICD-10-CM

## 2018-01-21 LAB
ALBUMIN SERPL ELPH-MCNC: 3.5 G/DL — SIGNIFICANT CHANGE UP (ref 3.3–5)
ALP SERPL-CCNC: 73 U/L — SIGNIFICANT CHANGE UP (ref 40–120)
ALT FLD-CCNC: 16 U/L — SIGNIFICANT CHANGE UP (ref 12–78)
ANION GAP SERPL CALC-SCNC: 9 MMOL/L — SIGNIFICANT CHANGE UP (ref 5–17)
APPEARANCE UR: CLEAR — SIGNIFICANT CHANGE UP
APTT BLD: 27 SEC — LOW (ref 27.5–37.4)
AST SERPL-CCNC: 6 U/L — LOW (ref 15–37)
BASOPHILS # BLD AUTO: 0.1 K/UL — SIGNIFICANT CHANGE UP (ref 0–0.2)
BASOPHILS NFR BLD AUTO: 1.2 % — SIGNIFICANT CHANGE UP (ref 0–2)
BILIRUB SERPL-MCNC: 0.7 MG/DL — SIGNIFICANT CHANGE UP (ref 0.2–1.2)
BILIRUB UR-MCNC: NEGATIVE — SIGNIFICANT CHANGE UP
BUN SERPL-MCNC: 24 MG/DL — HIGH (ref 7–23)
CALCIUM SERPL-MCNC: 8.2 MG/DL — LOW (ref 8.5–10.1)
CHLORIDE SERPL-SCNC: 107 MMOL/L — SIGNIFICANT CHANGE UP (ref 96–108)
CK MB CFR SERPL CALC: <0.5 NG/ML — SIGNIFICANT CHANGE UP (ref 0.5–3.6)
CO2 SERPL-SCNC: 25 MMOL/L — SIGNIFICANT CHANGE UP (ref 22–31)
COLOR SPEC: YELLOW — SIGNIFICANT CHANGE UP
CREAT SERPL-MCNC: 1.13 MG/DL — SIGNIFICANT CHANGE UP (ref 0.5–1.3)
DIFF PNL FLD: ABNORMAL
EOSINOPHIL # BLD AUTO: 0.1 K/UL — SIGNIFICANT CHANGE UP (ref 0–0.5)
EOSINOPHIL NFR BLD AUTO: 1.7 % — SIGNIFICANT CHANGE UP (ref 0–6)
GLUCOSE BLDC GLUCOMTR-MCNC: 181 MG/DL — HIGH (ref 70–99)
GLUCOSE SERPL-MCNC: 184 MG/DL — HIGH (ref 70–99)
GLUCOSE UR QL: NEGATIVE MG/DL — SIGNIFICANT CHANGE UP
HCT VFR BLD CALC: 39.8 % — SIGNIFICANT CHANGE UP (ref 34.5–45)
HGB BLD-MCNC: 13.7 G/DL — SIGNIFICANT CHANGE UP (ref 11.5–15.5)
INR BLD: 1.05 RATIO — SIGNIFICANT CHANGE UP (ref 0.88–1.16)
KETONES UR-MCNC: NEGATIVE — SIGNIFICANT CHANGE UP
LEUKOCYTE ESTERASE UR-ACNC: ABNORMAL
LYMPHOCYTES # BLD AUTO: 2.9 K/UL — SIGNIFICANT CHANGE UP (ref 1–3.3)
LYMPHOCYTES # BLD AUTO: 40.1 % — SIGNIFICANT CHANGE UP (ref 13–44)
MCHC RBC-ENTMCNC: 30.8 PG — SIGNIFICANT CHANGE UP (ref 27–34)
MCHC RBC-ENTMCNC: 34.4 GM/DL — SIGNIFICANT CHANGE UP (ref 32–36)
MCV RBC AUTO: 89.3 FL — SIGNIFICANT CHANGE UP (ref 80–100)
MONOCYTES # BLD AUTO: 0.4 K/UL — SIGNIFICANT CHANGE UP (ref 0–0.9)
MONOCYTES NFR BLD AUTO: 5.8 % — SIGNIFICANT CHANGE UP (ref 2–14)
NEUTROPHILS # BLD AUTO: 3.7 K/UL — SIGNIFICANT CHANGE UP (ref 1.8–7.4)
NEUTROPHILS NFR BLD AUTO: 51.2 % — SIGNIFICANT CHANGE UP (ref 43–77)
NITRITE UR-MCNC: NEGATIVE — SIGNIFICANT CHANGE UP
PH UR: 6 — SIGNIFICANT CHANGE UP (ref 5–8)
PLATELET # BLD AUTO: 229 K/UL — SIGNIFICANT CHANGE UP (ref 150–400)
POTASSIUM SERPL-MCNC: 3.7 MMOL/L — SIGNIFICANT CHANGE UP (ref 3.5–5.3)
POTASSIUM SERPL-SCNC: 3.7 MMOL/L — SIGNIFICANT CHANGE UP (ref 3.5–5.3)
PROT SERPL-MCNC: 7.6 GM/DL — SIGNIFICANT CHANGE UP (ref 6–8.3)
PROT UR-MCNC: 15 MG/DL
PROTHROM AB SERPL-ACNC: 11.5 SEC — SIGNIFICANT CHANGE UP (ref 9.8–12.7)
RBC # BLD: 4.46 M/UL — SIGNIFICANT CHANGE UP (ref 3.8–5.2)
RBC # FLD: 11.8 % — SIGNIFICANT CHANGE UP (ref 11–15)
SODIUM SERPL-SCNC: 141 MMOL/L — SIGNIFICANT CHANGE UP (ref 135–145)
SP GR SPEC: 1.01 — SIGNIFICANT CHANGE UP (ref 1.01–1.02)
TROPONIN I SERPL-MCNC: <.015 NG/ML — SIGNIFICANT CHANGE UP (ref 0.01–0.04)
UROBILINOGEN FLD QL: NEGATIVE MG/DL — SIGNIFICANT CHANGE UP
WBC # BLD: 7.2 K/UL — SIGNIFICANT CHANGE UP (ref 3.8–10.5)
WBC # FLD AUTO: 7.2 K/UL — SIGNIFICANT CHANGE UP (ref 3.8–10.5)

## 2018-01-21 PROCEDURE — 99223 1ST HOSP IP/OBS HIGH 75: CPT | Mod: AI

## 2018-01-21 PROCEDURE — 99285 EMERGENCY DEPT VISIT HI MDM: CPT

## 2018-01-21 PROCEDURE — 70450 CT HEAD/BRAIN W/O DYE: CPT | Mod: 26

## 2018-01-21 PROCEDURE — 93010 ELECTROCARDIOGRAM REPORT: CPT

## 2018-01-21 PROCEDURE — 71045 X-RAY EXAM CHEST 1 VIEW: CPT | Mod: 26

## 2018-01-21 RX ORDER — SODIUM CHLORIDE 9 MG/ML
3 INJECTION INTRAMUSCULAR; INTRAVENOUS; SUBCUTANEOUS ONCE
Qty: 0 | Refills: 0 | Status: COMPLETED | OUTPATIENT
Start: 2018-01-21 | End: 2018-01-21

## 2018-01-21 NOTE — ED ADULT NURSE NOTE - CHPI ED SYMPTOMS NEG
no nausea/no numbness/no pain/no tingling/no dizziness/no decreased eating/drinking/no vomiting/no fever

## 2018-01-21 NOTE — H&P ADULT - PROBLEM SELECTOR PROBLEM 2
Asthma, unspecified asthma severity, unspecified whether complicated, unspecified whether persistent Essential hypertension

## 2018-01-21 NOTE — CONSULT NOTE ADULT - ASSESSMENT
Subjective Complaints:  Historian:       Consult requested by ER doctor:                  Attending:     HPI:    FATOU MCBRIDE.. · Chief Complaint: The patient is a 67y Female complaining of code: stroke.  · HPI Objective Statement: 66 y/o female hx seizure disorder on keppra, CVA in past (unclear residual deficits), htn, dm, with 24/7 home health aid per pt's sister Nalini who lives in apartment attached to pt's apartment brought in for AMS/?seizure with ?resolving facial droop and slightly slurred/slower speech. Per EMS, on their arrival, pt had completely drooping left side of face, thought speech was a little slurred, feels slur improved. Pt was ?starring off/not as responsive briefly per ems from home health aid. Pt poor historian. Per pt's sister who is present, pt with slower sometimes slurred speech in general, has slight facial droop at baseline, believes is compliant with meds.    	no ros given pt's dementia.      PAST MEDICAL & SURGICAL HISTORY:  CVA (cerebral infarction): right side weakness  Vision changes: lt eye  Urinary incontinence  Seizure disorder  Gout  OA (osteoarthritis): bautista knees, low back  and  bautista shoulders  Asthma  Diabetes  Hypertension  Kidney stone  67yFemale    MEDICATIONS  (STANDING):    MEDICATIONS  (PRN):      Allergies    No Known Allergies    Intolerances      FAMILY HISTORY:  No pertinent family history in first degree relatives    Vital Signs Last 24 Hrs  T(C): 36.6 (21 Jan 2018 19:43), Max: 37 (21 Jan 2018 18:49)  T(F): 97.8 (21 Jan 2018 19:43), Max: 98.6 (21 Jan 2018 18:49)  HR: 60 (21 Jan 2018 19:43) (54 - 60)  BP: 158/66 (21 Jan 2018 19:43) (141/65 - 158/66)  BP(mean): --  RR: 15 (21 Jan 2018 19:43) (14 - 17)  SpO2: 98% (21 Jan 2018 19:43) (98% - 100%)    NEUROLOGICAL EXAM:  HENT:  Normocephalic head; atraumatic head.  Neck supple.  ENT: normal looking.  Mental State:    Alert.  Fully oriented to person, place and date.  Coherent.  Speech clear and intact.  Cooperative.  Responds appropriately.    Cranial Nerves:  II-XII:   Pupils round and reactive to light and accommodation.  Extraocular movements full.  Visual fields full (no homonymous hemianopsia).  Visual acuity wnl.  Facial symmetry intact.  Tongue midline.  Motor Functions:  Moves all extremities.  No pronator drift of UE.  Claps hand well.  Hand  intact bilaterally.  Ambulatory.    Sensory Functions:   Intact to touch and pinprick to face and extremities.    Reflexes:  Deep tendon reflexes normoactive to biceps, knees and ankles.  Babinski absent (present).  Cerebellar Testing:    Finger to nose intact.  Nystagmus absent.  Neurovascular: Carotid auscultation full without bruits.      LABS:                        13.7   7.2   )-----------( 229      ( 21 Jan 2018 17:12 )             39.8     01-21    141  |  107  |  24<H>  ----------------------------<  184<H>  3.7   |  25  |  1.13    Ca    8.2<L>      21 Jan 2018 17:12    TPro  7.6  /  Alb  3.5  /  TBili  0.7  /  DBili  x   /  AST  6<L>  /  ALT  16  /  AlkPhos  73  01-21    PT/INR - ( 21 Jan 2018 17:12 )   PT: 11.5 sec;   INR: 1.05 ratio         PTT - ( 21 Jan 2018 17:12 )  PTT:27.0 sec        RADIOLOGY & ADDITIONAL STUDIES:    POCT  Blood Glucose: 16:41 (01-21 @ 16:42)  CT Brain Stroke Protocol: Urgent   Indication: hx of stroke. slurred speech  Transport: Stretcher-Crib  Exam Completed  Provider's Contact #: [phone] (01-21 @ 16:44)  Complete Blood Count + Automated Diff: STAT (01-21 @ 16:51)  Comprehensive Metabolic Panel: STAT (01-21 @ 16:51)  Prothrombin Time and INR, Plasma:  Start Date:21-Jan-2018. STAT (01-21 @ 16:51)  Activated Partial Thromboplastin Time:  Start Date:21-Jan-2018. STAT (01-21 @ 16:51)  Type + Screen: Routine (01-21 @ 16:51)  ABO Rh Confirmatory Specimen: Routine  Addl Info: Conditional: ABO Rh Confirmatory Specimen (01-21 @ 16:51)  Troponin I, Serum: STAT (01-21 @ 16:51)  CKMB Mass Assay: STAT (01-21 @ 16:51)  12 Lead ECG:   Provider's Contact #: [phone] (01-21 @ 16:51)  Cardiac Monitor Bedside:     Time/Priority:  STAT (01-21 @ 16:51)  Urinalysis: STAT (01-21 @ 16:51)  Culture - Urine: Routine  Specimen Source: Clean Catch (Midstream) (01-21 @ 16:51)  Levetiracetam Level, Serum: STAT (01-21 @ 16:51)  Antibody Screen: 17:10 (01-21 @ 17:13)  Xray Chest 1 View AP- PORTABLE-Urgent: Urgent   Indication: altered mental status? r/o pna  Transport: Portable  Exam Completed  Provider's Contact #: [phone] (01-21 @ 17:21)  Straight Cath for Residual Urine:     Time/Priority:  STAT (01-21 @ 20:21)      Assessment & Opinion:    Recommendations:  Brain MRI.  Carotid doppler.  Echocardiogram.  EEG.   DVT prophylaxis as ordered.  Medications:

## 2018-01-21 NOTE — ED ADULT TRIAGE NOTE - CHIEF COMPLAINT QUOTE
brought in by home became AMS at 15:20 on ems arrival right facial droop with slurred speech was moving all extremities.  md to triage for evaluation code stroke called patient to ct

## 2018-01-21 NOTE — H&P ADULT - NSHPPHYSICALEXAM_GEN_ALL_CORE
Vital Signs Last 24 Hrs  T(C): 36.6 (22 Jan 2018 04:26), Max: 37 (21 Jan 2018 18:49)  T(F): 97.9 (22 Jan 2018 04:26), Max: 98.6 (21 Jan 2018 18:49)  HR: 62 (22 Jan 2018 04:26) (54 - 62)  BP: 155/62 (22 Jan 2018 04:26) (141/65 - 170/72)  BP(mean): --  RR: 16 (22 Jan 2018 04:26) (14 - 17)  SpO2: 100% (22 Jan 2018 04:26) (98% - 100%)    PHYSICAL EXAM:    GENERAL: NAD, well-groomed, well-developed  HEAD:  Atraumatic, Normocephalic  EYES: EOMI, PERRLA, conjunctiva and sclera clear  ENMT: No tonsillar erythema, exudates, or enlargement; Moist mucous membranes, No lesions  NECK: Supple, No JVD, Normal thyroid  NERVOUS SYSTEM:  Alert to self, slow speech  CHEST/LUNG: Clear to percussion bilaterally; No rales, rhonchi, wheezing, or rubs  HEART: Regular rate and rhythm; No rubs, or gallops, +S1,S2  ABDOMEN: Soft, Nontender, Nondistended; Bowel sounds present  EXTREMITIES:  2+ Peripheral Pulses, No clubbing, cyanosis, or edema  LYMPH: No cervical adenopathy  RECTAL: deferred  BREAST: pt declined   : deferred  SKIN: No rashes or lesions    IMPROVE VTE Individual Risk Assessment          RISK                                                          Points  [  ] Previous VTE                                                3  [  ] Thrombophilia                                             2  [  ] Lower limb paralysis                                    2        (unable to hold up >15 seconds)    [  ] Current Cancer                                             2         (within 6 months)  [ x ] Immobilization > 24 hrs                              1  [  ] ICU/CCU stay > 24 hours                            1  [ x ] Age > 60                                                    1  IMPROVE VTE Score __2_______

## 2018-01-21 NOTE — H&P ADULT - HISTORY OF PRESENT ILLNESS
Pt is a 68 y/o female w/pmhx of cva, seizure disorder on keppra,  htn, dm2, has 24hr hha, and [facility] and ?sz pt was not seen w/sz likely activity but after when pt was not very responsive and after seen to have possible facial droop per ems and slightly slurred speech.  per sister pt has slight facial droop at baseline.  pt is a Pt is a 66 y/o female w/pmhx of cva, seizure disorder on keppra,  htn, dm2, has 24hr hha, and sister lives near [facility] for ams and ?sz pt was not seen w/sz likely activity but after when pt was not very responsive and after seen to have possible facial droop per ems and slightly slurred speech that improved over time.  per sister pt has slight facial droop at baseline.  pt is not reliable historian, but per family no fever, chills, sob, cp, palpitatios, n/v/d/c, no sick contacts or travels

## 2018-01-21 NOTE — H&P ADULT - PROBLEM SELECTOR PROBLEM 3
Essential hypertension Type 2 diabetes mellitus without complication, unspecified long term insulin use status

## 2018-01-21 NOTE — ED PROVIDER NOTE - PHYSICAL EXAMINATION
NIHSS: 2 (mild slurred speech, mild facial droop)  Gen: awake, alert, slow speech  HEENT: Mucous membranes moist, pink conjunctivae, EOMI  CV: RRR, nl s1/s2.  Resp: CTAB, normal rate and effort  GI: Abdomen soft, NT, ND. No rebound, no guarding  : No CVAT  Neuro: A&O x2, following commands. moves all extremities. very mild lose of nasolabial fold on left, slower speech with very mild dysarthria, not significantly impaired. strength and sensation othewise intact.  MSK: No spine or joint tenderness to palpation  Skin: No rashes. intact and perfused.

## 2018-01-21 NOTE — ED PROVIDER NOTE - MEDICAL DECISION MAKING DETAILS
ams/seizure/syncopal/tia/cva type episode. no drift/deficits below neck, very mild lose of left nasolabial fold and slower speech/dysarthria though not significant. Event happened 50 minutes PTA. Sister feels pt with these slight residual bases and pt is at baseline just maybe a little slower. called daughter, unable to speak through bad connection and multiple calls. Discussed with sister who is present about no TPA given no significant acute neuro deficit that would be worth risk of bleed, pt also with possible seizure during this event and slowness could be some post ictal. consult for neuro, ekg, labs, admit for mri.

## 2018-01-21 NOTE — H&P ADULT - PROBLEM SELECTOR PROBLEM 4
Type 2 diabetes mellitus without complication, unspecified long term insulin use status Seizure disorder

## 2018-01-21 NOTE — ED ADULT NURSE NOTE - OBJECTIVE STATEMENT
As per ems pt has right sided facial droop, pt  sister states  pt was unresponsive at home aid call ems  as per sister pt back to base line there is no changes or dropping to the face

## 2018-01-21 NOTE — H&P ADULT - PROBLEM SELECTOR PLAN 1
probable presyncope vs seizures  check prolactin  observe on telemetry admit to tele  neuro checks  asa  plavix  check mri brain, tte, carotid dopplers  appreciated neuro consult

## 2018-01-21 NOTE — ED ADULT TRIAGE NOTE - PAIN RATING/NUMBER SCALE (0-10): ACTIVITY
0 pt amb to ST1, A&Ox3, skin w/d/i, c/o l leg pain x 2 days worsening since onset, described as shooting pain down leg, no neruo deficits noted on presentatuion, meds as per orders, pt moved to  for further monitoring.

## 2018-01-21 NOTE — H&P ADULT - NSHPLABSRESULTS_GEN_ALL_CORE
LABS:                        13.7   7.2   )-----------( 229      ( 2018 17:12 )             39.8         141  |  107  |  24<H>  ----------------------------<  184<H>  3.7   |  25  |  1.13    Ca    8.2<L>      2018 17:12    TPro  7.6  /  Alb  3.5  /  TBili  0.7  /  DBili  x   /  AST  6<L>  /  ALT  16  /  AlkPhos  73      PT/INR - ( 2018 17:12 )   PT: 11.5 sec;   INR: 1.05 ratio         PTT - ( 2018 17:12 )  PTT:27.0 sec  Urinalysis Basic - ( 2018 21:57 )    Color: Yellow / Appearance: Clear / S.010 / pH: x  Gluc: x / Ketone: Negative  / Bili: Negative / Urobili: Negative mg/dL   Blood: x / Protein: 15 mg/dL / Nitrite: Negative   Leuk Esterase: Trace / RBC: 11-25 /HPF / WBC 6-10   Sq Epi: x / Non Sq Epi: Few / Bacteria: Occasional      CAPILLARY BLOOD GLUCOSE      POCT Blood Glucose.: 181 mg/dL (2018 16:41)      RADIOLOGY & ADDITIONAL TESTS:    Imaging Personally Reviewed:  [ X] YES  [ ] NO

## 2018-01-21 NOTE — ED PROVIDER NOTE - OBJECTIVE STATEMENT
68 y/o female hx seizure disorder on keppra, CVA in past (unclear residual deficits), htn, dm, with 24/7 home health aid per pt's sister Nalini who lives in apartment attached to pt's apartment brought in for AMS/?seizure with ?resolving facial droop and slightly slurred/slower speech. Per EMS, on their arrival, pt had completely drooping left side of face, thought speech was a little slurred, feels slur improved. Pt was ?starring off/not as responsive briefly per ems from home health aid. Pt poor historian. Per pt's sister who is present, pt with slower sometimes slurred speech in general, has slight facial droop at baseline, believes is compliant with meds.    no ros given pt's dementia.

## 2018-01-22 DIAGNOSIS — I63.9 CEREBRAL INFARCTION, UNSPECIFIED: ICD-10-CM

## 2018-01-22 DIAGNOSIS — G45.9 TRANSIENT CEREBRAL ISCHEMIC ATTACK, UNSPECIFIED: ICD-10-CM

## 2018-01-22 DIAGNOSIS — Z29.9 ENCOUNTER FOR PROPHYLACTIC MEASURES, UNSPECIFIED: ICD-10-CM

## 2018-01-22 DIAGNOSIS — G40.909 EPILEPSY, UNSPECIFIED, NOT INTRACTABLE, WITHOUT STATUS EPILEPTICUS: ICD-10-CM

## 2018-01-22 DIAGNOSIS — I10 ESSENTIAL (PRIMARY) HYPERTENSION: ICD-10-CM

## 2018-01-22 LAB
ANION GAP SERPL CALC-SCNC: 9 MMOL/L — SIGNIFICANT CHANGE UP (ref 5–17)
BUN SERPL-MCNC: 23 MG/DL — SIGNIFICANT CHANGE UP (ref 7–23)
CALCIUM SERPL-MCNC: 8.8 MG/DL — SIGNIFICANT CHANGE UP (ref 8.5–10.1)
CHLORIDE SERPL-SCNC: 109 MMOL/L — HIGH (ref 96–108)
CHOLEST SERPL-MCNC: 244 MG/DL — HIGH (ref 10–199)
CO2 SERPL-SCNC: 25 MMOL/L — SIGNIFICANT CHANGE UP (ref 22–31)
CREAT SERPL-MCNC: 1.07 MG/DL — SIGNIFICANT CHANGE UP (ref 0.5–1.3)
GLUCOSE BLDC GLUCOMTR-MCNC: 133 MG/DL — HIGH (ref 70–99)
GLUCOSE BLDC GLUCOMTR-MCNC: 154 MG/DL — HIGH (ref 70–99)
GLUCOSE BLDC GLUCOMTR-MCNC: 174 MG/DL — HIGH (ref 70–99)
GLUCOSE BLDC GLUCOMTR-MCNC: 176 MG/DL — HIGH (ref 70–99)
GLUCOSE SERPL-MCNC: 130 MG/DL — HIGH (ref 70–99)
HBA1C BLD-MCNC: 6.4 % — HIGH (ref 4–5.6)
HCT VFR BLD CALC: 34.7 % — SIGNIFICANT CHANGE UP (ref 34.5–45)
HDLC SERPL-MCNC: 43 MG/DL — SIGNIFICANT CHANGE UP (ref 40–125)
HGB BLD-MCNC: 12.1 G/DL — SIGNIFICANT CHANGE UP (ref 11.5–15.5)
LIPID PNL WITH DIRECT LDL SERPL: 180 MG/DL — HIGH
MCHC RBC-ENTMCNC: 31.2 PG — SIGNIFICANT CHANGE UP (ref 27–34)
MCHC RBC-ENTMCNC: 34.9 GM/DL — SIGNIFICANT CHANGE UP (ref 32–36)
MCV RBC AUTO: 89.3 FL — SIGNIFICANT CHANGE UP (ref 80–100)
PLATELET # BLD AUTO: 211 K/UL — SIGNIFICANT CHANGE UP (ref 150–400)
POTASSIUM SERPL-MCNC: 3.6 MMOL/L — SIGNIFICANT CHANGE UP (ref 3.5–5.3)
POTASSIUM SERPL-SCNC: 3.6 MMOL/L — SIGNIFICANT CHANGE UP (ref 3.5–5.3)
RBC # BLD: 3.88 M/UL — SIGNIFICANT CHANGE UP (ref 3.8–5.2)
RBC # FLD: 11.5 % — SIGNIFICANT CHANGE UP (ref 11–15)
SODIUM SERPL-SCNC: 143 MMOL/L — SIGNIFICANT CHANGE UP (ref 135–145)
TOTAL CHOLESTEROL/HDL RATIO MEASUREMENT: 5.7 RATIO — SIGNIFICANT CHANGE UP (ref 3.3–7.1)
TRIGL SERPL-MCNC: 105 MG/DL — SIGNIFICANT CHANGE UP (ref 10–149)
WBC # BLD: 11.4 K/UL — HIGH (ref 3.8–10.5)
WBC # FLD AUTO: 11.4 K/UL — HIGH (ref 3.8–10.5)

## 2018-01-22 PROCEDURE — 70551 MRI BRAIN STEM W/O DYE: CPT | Mod: 26

## 2018-01-22 PROCEDURE — 93880 EXTRACRANIAL BILAT STUDY: CPT | Mod: 26

## 2018-01-22 PROCEDURE — 99233 SBSQ HOSP IP/OBS HIGH 50: CPT

## 2018-01-22 PROCEDURE — 93306 TTE W/DOPPLER COMPLETE: CPT | Mod: 26

## 2018-01-22 RX ORDER — SODIUM CHLORIDE 9 MG/ML
1000 INJECTION, SOLUTION INTRAVENOUS
Qty: 0 | Refills: 0 | Status: DISCONTINUED | OUTPATIENT
Start: 2018-01-22 | End: 2018-01-23

## 2018-01-22 RX ORDER — DEXTROSE 50 % IN WATER 50 %
25 SYRINGE (ML) INTRAVENOUS ONCE
Qty: 0 | Refills: 0 | Status: DISCONTINUED | OUTPATIENT
Start: 2018-01-22 | End: 2018-01-23

## 2018-01-22 RX ORDER — HEPARIN SODIUM 5000 [USP'U]/ML
5000 INJECTION INTRAVENOUS; SUBCUTANEOUS EVERY 12 HOURS
Qty: 0 | Refills: 0 | Status: DISCONTINUED | OUTPATIENT
Start: 2018-01-22 | End: 2018-01-23

## 2018-01-22 RX ORDER — DEXTROSE 50 % IN WATER 50 %
12.5 SYRINGE (ML) INTRAVENOUS ONCE
Qty: 0 | Refills: 0 | Status: DISCONTINUED | OUTPATIENT
Start: 2018-01-22 | End: 2018-01-23

## 2018-01-22 RX ORDER — LEVETIRACETAM 250 MG/1
750 TABLET, FILM COATED ORAL
Qty: 0 | Refills: 0 | Status: DISCONTINUED | OUTPATIENT
Start: 2018-01-22 | End: 2018-01-23

## 2018-01-22 RX ORDER — DEXTROSE 50 % IN WATER 50 %
1 SYRINGE (ML) INTRAVENOUS ONCE
Qty: 0 | Refills: 0 | Status: DISCONTINUED | OUTPATIENT
Start: 2018-01-22 | End: 2018-01-23

## 2018-01-22 RX ORDER — METOPROLOL TARTRATE 50 MG
100 TABLET ORAL
Qty: 0 | Refills: 0 | Status: DISCONTINUED | OUTPATIENT
Start: 2018-01-22 | End: 2018-01-23

## 2018-01-22 RX ORDER — GLUCAGON INJECTION, SOLUTION 0.5 MG/.1ML
1 INJECTION, SOLUTION SUBCUTANEOUS ONCE
Qty: 0 | Refills: 0 | Status: DISCONTINUED | OUTPATIENT
Start: 2018-01-22 | End: 2018-01-23

## 2018-01-22 RX ORDER — SIMVASTATIN 20 MG/1
20 TABLET, FILM COATED ORAL AT BEDTIME
Qty: 0 | Refills: 0 | Status: DISCONTINUED | OUTPATIENT
Start: 2018-01-22 | End: 2018-01-23

## 2018-01-22 RX ORDER — HYDRALAZINE HCL 50 MG
50 TABLET ORAL THREE TIMES A DAY
Qty: 0 | Refills: 0 | Status: DISCONTINUED | OUTPATIENT
Start: 2018-01-22 | End: 2018-01-23

## 2018-01-22 RX ORDER — INSULIN LISPRO 100/ML
VIAL (ML) SUBCUTANEOUS
Qty: 0 | Refills: 0 | Status: DISCONTINUED | OUTPATIENT
Start: 2018-01-22 | End: 2018-01-23

## 2018-01-22 RX ORDER — CLOPIDOGREL BISULFATE 75 MG/1
75 TABLET, FILM COATED ORAL DAILY
Qty: 0 | Refills: 0 | Status: DISCONTINUED | OUTPATIENT
Start: 2018-01-22 | End: 2018-01-23

## 2018-01-22 RX ORDER — ASPIRIN/CALCIUM CARB/MAGNESIUM 324 MG
81 TABLET ORAL DAILY
Qty: 0 | Refills: 0 | Status: DISCONTINUED | OUTPATIENT
Start: 2018-01-22 | End: 2018-01-23

## 2018-01-22 RX ADMIN — Medication 100 MILLIGRAM(S): at 06:26

## 2018-01-22 RX ADMIN — SODIUM CHLORIDE 3 MILLILITER(S): 9 INJECTION INTRAMUSCULAR; INTRAVENOUS; SUBCUTANEOUS at 00:14

## 2018-01-22 RX ADMIN — SIMVASTATIN 20 MILLIGRAM(S): 20 TABLET, FILM COATED ORAL at 21:15

## 2018-01-22 RX ADMIN — Medication 2: at 16:47

## 2018-01-22 RX ADMIN — Medication 100 MILLIGRAM(S): at 18:28

## 2018-01-22 RX ADMIN — Medication 81 MILLIGRAM(S): at 12:48

## 2018-01-22 RX ADMIN — Medication 50 MILLIGRAM(S): at 21:15

## 2018-01-22 RX ADMIN — Medication 50 MILLIGRAM(S): at 06:26

## 2018-01-22 RX ADMIN — HEPARIN SODIUM 5000 UNIT(S): 5000 INJECTION INTRAVENOUS; SUBCUTANEOUS at 17:28

## 2018-01-22 RX ADMIN — CLOPIDOGREL BISULFATE 75 MILLIGRAM(S): 75 TABLET, FILM COATED ORAL at 12:47

## 2018-01-22 RX ADMIN — Medication 50 MILLIGRAM(S): at 14:39

## 2018-01-22 RX ADMIN — Medication 2: at 12:50

## 2018-01-22 RX ADMIN — LEVETIRACETAM 750 MILLIGRAM(S): 250 TABLET, FILM COATED ORAL at 18:28

## 2018-01-22 RX ADMIN — LEVETIRACETAM 750 MILLIGRAM(S): 250 TABLET, FILM COATED ORAL at 06:26

## 2018-01-22 NOTE — OCCUPATIONAL THERAPY INITIAL EVALUATION ADULT - RANGE OF MOTION EXAMINATION, LOWER EXTREMITY
Left LE Active ROM was WFL (within functional limits)/Left LE Passive ROM was WFL (w/i functional limits)/Right LE Active ROM was WNL(within normal limits)/Right LE Passive ROM was WNL (within normal limits)

## 2018-01-22 NOTE — OCCUPATIONAL THERAPY INITIAL EVALUATION ADULT - GENERAL OBSERVATIONS, REHAB EVAL
Pt was seen for initial OT consult encountered OOB to chair on telemetry monitoring; Pt's sister Nalini was at bedside ; pt  AA&Ox2, cooperative & followed commands. Pt's speech is dysartric but she is able to make needs & wants known. Pt presents with mild left side weakness due to CVA. Pt also exhibits deficits with executive function, cognition , memory, balance , ADL and functional mobility.; pt  is right hand dominant. Dexterity and fine motor skills are diminished in both hands , but is more pronounced with  left hand due to weakness

## 2018-01-22 NOTE — PROGRESS NOTE ADULT - SUBJECTIVE AND OBJECTIVE BOX
INTERVAL HPI/OVERNIGHT EVENTS:  Subjective Complaints:     RECENT RADIOLOGY & ADDITIONAL TESTS:  Brain MRI:  small infarcts at right frontal lobe; right cerebellum and right mariajose.    NEUROLOGICAL EXAM (Pertinent):  Vital Signs Last 24 Hrs  T(C): 35.6 (2018 12:41), Max: 37 (2018 18:49)  T(F): 96.1 (2018 12:41), Max: 98.6 (2018 18:49)  HR: 76 (2018 13:45) (54 - 76)  BP: 175/76 (2018 13:45) (110/83 - 175/76)  BP(mean): --  RR: 16 (2018 13:45) (14 - 17)  SpO2: 100% (2018 13:45) (98% - 100%)    MEDICATIONS  (STANDING):  aspirin enteric coated 81 milliGRAM(s) Oral daily  clopidogrel Tablet 75 milliGRAM(s) Oral daily  dextrose 5%. 1000 milliLiter(s) (50 mL/Hr) IV Continuous <Continuous>  dextrose 50% Injectable 12.5 Gram(s) IV Push once  dextrose 50% Injectable 25 Gram(s) IV Push once  dextrose 50% Injectable 25 Gram(s) IV Push once  heparin  Injectable 5000 Unit(s) SubCutaneous every 12 hours  hydrALAZINE 50 milliGRAM(s) Oral three times a day  insulin lispro (HumaLOG) corrective regimen sliding scale   SubCutaneous three times a day before meals  levETIRAcetam 750 milliGRAM(s) Oral two times a day  metoprolol     tartrate 100 milliGRAM(s) Oral two times a day  simvastatin 20 milliGRAM(s) Oral at bedtime    MEDICATIONS  (PRN):  dextrose Gel 1 Dose(s) Oral once PRN Blood Glucose LESS THAN 70 milliGRAM(s)/deciliter  glucagon  Injectable 1 milliGRAM(s) IntraMuscular once PRN Glucose LESS THAN 70 milligrams/deciliter    LABS:                        12.1   11.4  )-----------( 211      ( 2018 08:22 )             34.7         143  |  109<H>  |  23  ----------------------------<  130<H>  3.6   |  25  |  1.07    Ca    8.8      2018 08:22    TPro  7.6  /  Alb  3.5  /  TBili  0.7  /  DBili  x   /  AST  6<L>  /  ALT  16  /  AlkPhos  73  -    PT/INR - ( 2018 17:12 )   PT: 11.5 sec;   INR: 1.05 ratio         PTT - ( 2018 17:12 )  PTT:27.0 sec  Urinalysis Basic - ( 2018 21:57 )    Color: Yellow / Appearance: Clear / S.010 / pH: x  Gluc: x / Ketone: Negative  / Bili: Negative / Urobili: Negative mg/dL   Blood: x / Protein: 15 mg/dL / Nitrite: Negative   Leuk Esterase: Trace / RBC: 11-25 /HPF / WBC 6-10   Sq Epi: x / Non Sq Epi: Few / Bacteria: Occasional        ASSESSMENT & OPINION:  RECOMMENDATIONS:

## 2018-01-22 NOTE — OCCUPATIONAL THERAPY INITIAL EVALUATION ADULT - PERTINENT HX OF CURRENT PROBLEM, REHAB EVAL
Pt presented to ER on due to acute onset of neurological deficit. Pt is diagnosed with transient cerebral ischemia . MRI on1/22/18 results confirm small  acute  infarct  right  frontal lobe .

## 2018-01-22 NOTE — OCCUPATIONAL THERAPY INITIAL EVALUATION ADULT - BALANCE DISTURBANCE, IDENTIFIED IMPAIRMENT CONTRIBUTE, REHAB EVAL
impaired sensory feedback/abnormal muscle tone/decreased strength/impaired coordination/impaired motor control/decreased sensation/impaired postural control/decreased ROM

## 2018-01-22 NOTE — OCCUPATIONAL THERAPY INITIAL EVALUATION ADULT - RANGE OF MOTION EXAMINATION, UPPER EXTREMITY
Left UE Active ROM was WFL (within functional limits)/Right UE Active ROM was WNL (within normal limits)/Right UE Passive ROM was WNL (within normal limits)

## 2018-01-22 NOTE — OCCUPATIONAL THERAPY INITIAL EVALUATION ADULT - PLANNED THERAPY INTERVENTIONS, OT EVAL
parent/caregiver training.../transfer training/fine motor coordination training/joint mobilization/neuromuscular re-education/ROM/stretching/IADL retraining/bed mobility training/strengthening/balance training/cognitive, visual perceptual/ADL retraining/motor coordination training/visual scanning  and energy conservation techniques

## 2018-01-22 NOTE — OCCUPATIONAL THERAPY INITIAL EVALUATION ADULT - TRANSFER SAFETY CONCERNS NOTED: BED/CHAIR, REHAB EVAL
squat pivot/decreased weight-shifting ability/decreased safety awareness/losing balance/stand pivot/decreased balance during turns/decreased sequencing ability/decreased step length/decreased proprioception

## 2018-01-22 NOTE — OCCUPATIONAL THERAPY INITIAL EVALUATION ADULT - SOCIAL CONCERNS
Complex psychosocial needs/coping issues/Pt  is more confused due to new surroundings and caregivers .

## 2018-01-22 NOTE — OCCUPATIONAL THERAPY INITIAL EVALUATION ADULT - LIVES WITH, PROFILE
family in a private house with 2 steps to enter with bilateral  hand rails and 1 flight to  get to the  bedroom and bathroom on the second floor  with 1 hand rails. Pt's bathroom  has a tub/ combination ,standard toilet with commode, shower chair and grab bars .

## 2018-01-22 NOTE — PHYSICAL THERAPY INITIAL EVALUATION ADULT - CRITERIA FOR SKILLED THERAPEUTIC INTERVENTIONS
risk reduction/prevention/Home with home PT. Pt has rolling walker and straight cane./predicted duration of therapy intervention/anticipated discharge recommendation/impairments found/therapy frequency/functional limitations in following categories

## 2018-01-22 NOTE — PHYSICAL THERAPY INITIAL EVALUATION ADULT - GAIT DEVIATIONS NOTED, PT EVAL
decreased velocity of limb motion/decreased step length/decreased stride length/decreased bronson/increased time in double stance

## 2018-01-22 NOTE — OCCUPATIONAL THERAPY INITIAL EVALUATION ADULT - IMPAIRED TRANSFERS: BED/CHAIR, REHAB EVAL
impaired balance/cognition/decreased ROM/impaired sensory feedback/decreased strength/ataxic/impaired coordination/abnormal muscle tone/decreased sensation/impaired postural control/scissoring/impaired motor control/decreased flexibility

## 2018-01-22 NOTE — OCCUPATIONAL THERAPY INITIAL EVALUATION ADULT - ADDITIONAL COMMENTS
Prior to admission, pt was ambulating independently with a straight cane with supervision. Pt has HHA  service for 24 hours. Pt is able to reach out of base of support in sitting without loss of balance but unable to do it in standing. Pt spontaneously uses left  hand as a functional assist and maintains gross grasp on rolling walker. Pt is able to incorporate both sides of her body. The scale below depicts a picture of the pt's current level of functioning. Barthel Index: Feeding Score___5___, Bathing Score___0___, Grooming Score__0___, Dressing Score___0__, Bowel Score___0__, Bladder Score__0____, Toilet Score__0___, Transfer Score____10__, Mobility Score___10__, Stairs Score___0__, Total Score__25/100___.

## 2018-01-22 NOTE — PHYSICAL THERAPY INITIAL EVALUATION ADULT - MODIFIED CLINICAL TEST OF SENSORY INTEGRATION IN BALANCE TEST
Barthel Index: Feeding Score _10__, Bathing Score _0__, Grooming Score 0___, Dressing Score _5__, Bowels Score ___10, Bladder Score __10_, Toilet Score ___10, Transfers Score _10__, Mobility Score ___0, Stairs Score __5_,     Total Score _60__

## 2018-01-22 NOTE — OCCUPATIONAL THERAPY INITIAL EVALUATION ADULT - IMPAIRMENTS CONTRIBUTING IMPAIRED BED MOBILITY, REHAB EVAL
impaired balance/decreased ROM/decreased sensation/impaired sensory feedback/narrow base of support/decreased strength/impaired coordination/decreased flexibility/abnormal muscle tone/impaired postural control

## 2018-01-22 NOTE — PROGRESS NOTE ADULT - ASSESSMENT
68 y/o female w/pmhx of cva, seizure disorder on keppra,  htn, dm2, has 24hr hha, and [facility] and ?sz pt was not seen w/sz likely activity but after when pt was not very responsive and after seen to have possible facial droop per ems and slightly slurred speech that improved over time.  per sister pt has slight facial droop at baseline

## 2018-01-22 NOTE — OCCUPATIONAL THERAPY INITIAL EVALUATION ADULT - ANTICIPATED DISCHARGE DISPOSITION, OT EVAL
home w/ OT/with  resumption of all prior service and  to assist with return to prior level of function.

## 2018-01-22 NOTE — OCCUPATIONAL THERAPY INITIAL EVALUATION ADULT - ORIENTATION, REHAB EVAL
place/person/Pt is functioning at level 2 on the [place] Cognitive Continuum.  Pt. initiated attention but has difficulty sustaining attention. Pt. is able to follow one-step commands but inconsistently. Pt. may functional automatically in over-learned behaviors. Pt. does not initiate activities and may wander if left unsupervised.

## 2018-01-22 NOTE — PROGRESS NOTE ADULT - SUBJECTIVE AND OBJECTIVE BOX
Patient is a 67y old  Female who presents with a chief complaint of slurred speech (2018 23:54)       OVERNIGHT EVENTS:    MEDICATIONS  (STANDING):  aspirin enteric coated 81 milliGRAM(s) Oral daily  clopidogrel Tablet 75 milliGRAM(s) Oral daily  dextrose 5%. 1000 milliLiter(s) (50 mL/Hr) IV Continuous <Continuous>  dextrose 50% Injectable 12.5 Gram(s) IV Push once  dextrose 50% Injectable 25 Gram(s) IV Push once  dextrose 50% Injectable 25 Gram(s) IV Push once  heparin  Injectable 5000 Unit(s) SubCutaneous every 12 hours  hydrALAZINE 50 milliGRAM(s) Oral three times a day  insulin lispro (HumaLOG) corrective regimen sliding scale   SubCutaneous three times a day before meals  levETIRAcetam 750 milliGRAM(s) Oral two times a day  metoprolol     tartrate 100 milliGRAM(s) Oral two times a day  simvastatin 20 milliGRAM(s) Oral at bedtime    MEDICATIONS  (PRN):  dextrose Gel 1 Dose(s) Oral once PRN Blood Glucose LESS THAN 70 milliGRAM(s)/deciliter  glucagon  Injectable 1 milliGRAM(s) IntraMuscular once PRN Glucose LESS THAN 70 milligrams/deciliter      Vital Signs Last 24 Hrs  T(C): 35.6 (2018 12:41), Max: 37 (2018 18:49)  T(F): 96.1 (2018 12:41), Max: 98.6 (2018 18:49)  HR: 75 (2018 15:13) (54 - 76)  BP: 174/72 (2018 15:13) (110/83 - 175/76)  BP(mean): --  RR: 16 (2018 13:45) (14 - 17)  SpO2: 100% (2018 15:13) (98% - 100%)    PHYSICAL EXAM:  GENERAL: NAD, well-groomed, well-developed  HEAD:  Atraumatic, Normocephalic  EYES: EOMI, PERRLA, conjunctiva and sclera clear  ENMT: No tonsillar erythema, exudates, or enlargement; Moist mucous membranes   NECK: Supple, No JVD   NERVOUS SYSTEM:  Alert ,awake, oriented to herself, sister at bedside   CHEST/LUNG: Clear to auscultation  bilaterally; No rales, rhonchi, wheezing, or rubs  HEART: Regular rate and rhythm; No murmurs, rubs, or gallops  ABDOMEN: Soft, Nontender, Nondistended; Bowel sounds present  EXTREMITIES:  2+ Peripheral Pulses, No clubbing, cyanosis, or edema  LYMPH: No lymphadenopathy noted  SKIN: No rashes or lesions    LABS:                        12.1   11.4  )-----------( 211      ( 2018 08:22 )             34.7         143  |  109<H>  |  23  ----------------------------<  130<H>  3.6   |  25  |  1.07    Ca    8.8      2018 08:22    TPro  7.6  /  Alb  3.5  /  TBili  0.7  /  DBili  x   /  AST  6<L>  /  ALT  16  /  AlkPhos  73      PT/INR - ( 2018 17:12 )   PT: 11.5 sec;   INR: 1.05 ratio         PTT - ( 2018 17:12 )  PTT:27.0 sec   cardiac markers Troponin <.015  CK --    Urinalysis Basic - ( 2018 21:57 )    Color: Yellow / Appearance: Clear / S.010 / pH: x  Gluc: x / Ketone: Negative  / Bili: Negative / Urobili: Negative mg/dL   Blood: x / Protein: 15 mg/dL / Nitrite: Negative   Leuk Esterase: Trace / RBC: 11-25 /HPF / WBC 6-10   Sq Epi: x / Non Sq Epi: Few / Bacteria: Occasional      CAPILLARY BLOOD GLUCOSE      POCT Blood Glucose.: 154 mg/dL (2018 12:28)  POCT Blood Glucose.: 133 mg/dL (2018 07:49)  POCT Blood Glucose.: 181 mg/dL (2018 16:41)    Cultures    RADIOLOGY & ADDITIONAL TESTS:  < from: MR Head No Cont (18 @ 10:42) >  Small acute infarct in the right frontal lobe.    Old small infarcts, in the right frontal lobe, right cerebellum, and   right mariajose.    Nonspecific white matter signal changes likely due to small vessel   chronic microvascular ischemic changes, progressed since prior brain MRI   dated 2014.           Imaging Personally Reviewed:  [ ] YES  [ ] NO    Consultant(s) Notes Reviewed:  [ ] YES  [ ] NO    Care Discussed with Consultants/Other Providers [ ] YES  [ ] NO

## 2018-01-22 NOTE — PROGRESS NOTE ADULT - PROBLEM SELECTOR PLAN 1
- MRI brain =Small acute infarct in the right frontal lobe.  - on ASA , plavix, statin  - neuro on board  - PT/OT/ TTE   -Telemetry monitoring

## 2018-01-22 NOTE — PHYSICAL THERAPY INITIAL EVALUATION ADULT - ADDITIONAL COMMENTS
Pt has straight cane and rolling walker. Pt has 24hr HHA to assist c ADL's. Pt has 18 steps c R railing up/L railing down.

## 2018-01-23 ENCOUNTER — TRANSCRIPTION ENCOUNTER (OUTPATIENT)
Age: 68
End: 2018-01-23

## 2018-01-23 VITALS
DIASTOLIC BLOOD PRESSURE: 84 MMHG | SYSTOLIC BLOOD PRESSURE: 243 MMHG | RESPIRATION RATE: 18 BRPM | OXYGEN SATURATION: 97 % | HEART RATE: 94 BPM | TEMPERATURE: 99 F

## 2018-01-23 LAB
ANION GAP SERPL CALC-SCNC: 9 MMOL/L — SIGNIFICANT CHANGE UP (ref 5–17)
BUN SERPL-MCNC: 25 MG/DL — HIGH (ref 7–23)
CALCIUM SERPL-MCNC: 8.7 MG/DL — SIGNIFICANT CHANGE UP (ref 8.5–10.1)
CHLORIDE SERPL-SCNC: 108 MMOL/L — SIGNIFICANT CHANGE UP (ref 96–108)
CO2 SERPL-SCNC: 26 MMOL/L — SIGNIFICANT CHANGE UP (ref 22–31)
CREAT SERPL-MCNC: 1 MG/DL — SIGNIFICANT CHANGE UP (ref 0.5–1.3)
GLUCOSE BLDC GLUCOMTR-MCNC: 154 MG/DL — HIGH (ref 70–99)
GLUCOSE BLDC GLUCOMTR-MCNC: 207 MG/DL — HIGH (ref 70–99)
GLUCOSE SERPL-MCNC: 130 MG/DL — HIGH (ref 70–99)
HCT VFR BLD CALC: 33.7 % — LOW (ref 34.5–45)
HGB BLD-MCNC: 11.5 G/DL — SIGNIFICANT CHANGE UP (ref 11.5–15.5)
LEVETIRACETAM SERPL-MCNC: 23.3 MCG/ML — SIGNIFICANT CHANGE UP (ref 12–46)
LEVETIRACETAM SERPL-MCNC: 27 MCG/ML — SIGNIFICANT CHANGE UP (ref 12–46)
MCHC RBC-ENTMCNC: 30.6 PG — SIGNIFICANT CHANGE UP (ref 27–34)
MCHC RBC-ENTMCNC: 34.2 GM/DL — SIGNIFICANT CHANGE UP (ref 32–36)
MCV RBC AUTO: 89.4 FL — SIGNIFICANT CHANGE UP (ref 80–100)
PLATELET # BLD AUTO: 187 K/UL — SIGNIFICANT CHANGE UP (ref 150–400)
POTASSIUM SERPL-MCNC: 3.5 MMOL/L — SIGNIFICANT CHANGE UP (ref 3.5–5.3)
POTASSIUM SERPL-SCNC: 3.5 MMOL/L — SIGNIFICANT CHANGE UP (ref 3.5–5.3)
RBC # BLD: 3.77 M/UL — LOW (ref 3.8–5.2)
RBC # FLD: 11.9 % — SIGNIFICANT CHANGE UP (ref 11–15)
SODIUM SERPL-SCNC: 143 MMOL/L — SIGNIFICANT CHANGE UP (ref 135–145)
WBC # BLD: 12.2 K/UL — HIGH (ref 3.8–10.5)
WBC # FLD AUTO: 12.2 K/UL — HIGH (ref 3.8–10.5)

## 2018-01-23 PROCEDURE — 99239 HOSP IP/OBS DSCHRG MGMT >30: CPT

## 2018-01-23 RX ORDER — LEVETIRACETAM 250 MG/1
1 TABLET, FILM COATED ORAL
Qty: 0 | Refills: 0 | COMMUNITY
Start: 2018-01-23

## 2018-01-23 RX ORDER — ASPIRIN/CALCIUM CARB/MAGNESIUM 324 MG
1 TABLET ORAL
Qty: 30 | Refills: 0
Start: 2018-01-23 | End: 2018-02-21

## 2018-01-23 RX ADMIN — HEPARIN SODIUM 5000 UNIT(S): 5000 INJECTION INTRAVENOUS; SUBCUTANEOUS at 06:47

## 2018-01-23 RX ADMIN — Medication 50 MILLIGRAM(S): at 14:43

## 2018-01-23 RX ADMIN — CLOPIDOGREL BISULFATE 75 MILLIGRAM(S): 75 TABLET, FILM COATED ORAL at 11:44

## 2018-01-23 RX ADMIN — Medication 2: at 08:38

## 2018-01-23 RX ADMIN — LEVETIRACETAM 750 MILLIGRAM(S): 250 TABLET, FILM COATED ORAL at 06:46

## 2018-01-23 RX ADMIN — Medication 4: at 11:43

## 2018-01-23 RX ADMIN — Medication 100 MILLIGRAM(S): at 06:47

## 2018-01-23 RX ADMIN — Medication 50 MILLIGRAM(S): at 06:47

## 2018-01-23 RX ADMIN — Medication 81 MILLIGRAM(S): at 11:44

## 2018-01-23 NOTE — SWALLOW BEDSIDE ASSESSMENT ADULT - SWALLOW EVAL: DIAGNOSIS
pt presented with oropharyngeal phases of swallow grossly WNL except slightly increased mastication time- may be 2/2 pt edentulous. no overt signs of aspiration

## 2018-01-23 NOTE — SWALLOW BEDSIDE ASSESSMENT ADULT - SLP PERTINENT HISTORY OF CURRENT PROBLEM
68 y/o female w/pmhx of cva, seizure disorder on keppra,  htn, dm2, has 24hr hha, and [facility] and ?sz pt was not seen w/sz likely activity but after when pt was not very responsive and after seen to have possible facial droop per ems and slightly slurred speech that improved over time.  per sister pt has slight facial droop at baseline

## 2018-01-23 NOTE — DISCHARGE NOTE ADULT - CARE PLAN
Principal Discharge DX:	Acute CVA (cerebrovascular accident)  Goal:	stable  Assessment and plan of treatment:	continue with Aspirin, plavix, statin  Secondary Diagnosis:	Essential hypertension  Secondary Diagnosis:	Seizure disorder

## 2018-01-23 NOTE — SWALLOW BEDSIDE ASSESSMENT ADULT - SWALLOW EVAL: PATIENT/FAMILY GOALS STATEMENT
sister denied pt having difficulty swallowing and reported the pt "eats everything" prior to admission

## 2018-01-23 NOTE — SWALLOW BEDSIDE ASSESSMENT ADULT - COMMENTS
MRI head 1/22/2018 IMPRESSION:Small acute infarct in the right frontal lobe.Old small infarcts, in the right frontal lobe, right cerebellum, and right mariajose.Nonspecific white matter signal changes likely due to small vessel chronic microvascular ischemic changes, progressed since prior brain MRI   dated 6/12/2014.    CXR 1/21/2018 IMPRESSION:No focal air space opacities.

## 2018-01-23 NOTE — DISCHARGE NOTE ADULT - CARE PROVIDER_API CALL
Bassam Pak (MD), Neurology  [address] [place] [address]  Phone: [phone]  Fax: [phone]

## 2018-01-23 NOTE — SWALLOW BEDSIDE ASSESSMENT ADULT - SWALLOW EVAL: RECOMMENDED FEEDING/EATING TECHNIQUES
small sips/bites/maintain upright posture during/after eating for 30 mins/check mouth frequently for oral residue/pocketing

## 2018-01-23 NOTE — DISCHARGE NOTE ADULT - HOSPITAL COURSE
66 y/o female w/pmhx of cva, seizure disorder on keppra,  htn, dm2, has 24hr hha, and [facility] and ?sz pt was not seen w/sz likely activity but after when pt was not very responsive and after seen to have possible facial droop per ems and slightly slurred speech that improved over time.  per sister pt has slight facial droop at baseline   Acute CVA (cerebrovascular accident).  Plan: - MRI brain =Small acute infarct in the right frontal lobe.  - on ASA , plavix, statin  - neuro consulted  - PT/OT/ TTE done  -Telemetry monitoring. done    Plan discussed with pt's sister at bedside who lives in the same house w/ pt , daughter Gali [phone] in length.

## 2018-01-23 NOTE — SWALLOW BEDSIDE ASSESSMENT ADULT - SLP GENERAL OBSERVATIONS
pt seen sitting in the chair alert and oriented x1-2. pt responded to questions and verbalized wants with noted confusion. she was able to follow one step directions with repetition. speech dysarthric -marked by decreased articulatory precision

## 2018-01-23 NOTE — DISCHARGE NOTE ADULT - MEDICATION SUMMARY - MEDICATIONS TO TAKE
I will START or STAY ON the medications listed below when I get home from the hospital:    aspirin 81 mg oral delayed release tablet  -- 1 tab(s) by mouth once a day  -- Indication: For Acute CVA (cerebrovascular accident)    levETIRAcetam 750 mg oral tablet  -- 1 tab(s) by mouth 2 times a day  -- Indication: For Seizure disorder    Zocor 20 mg oral tablet  -- 1 tab(s) by mouth once a day (at bedtime)  -- Indication: For Acute CVA (cerebrovascular accident)    clopidogrel 75 mg oral tablet  -- 1 tab(s) by mouth once a day  -- Indication: For Acute CVA (cerebrovascular accident)    Lopressor 100 mg oral tablet  -- 1 tab(s) by mouth 2 times a day  -- Indication: For Essential hypertension    hydrALAZINE 50 mg oral tablet  -- 1 tab(s) by mouth 3 times a day  -- Indication: For Essential hypertension

## 2018-01-25 DIAGNOSIS — J45.909 UNSPECIFIED ASTHMA, UNCOMPLICATED: ICD-10-CM

## 2018-01-25 DIAGNOSIS — E11.9 TYPE 2 DIABETES MELLITUS WITHOUT COMPLICATIONS: ICD-10-CM

## 2018-01-25 DIAGNOSIS — I63.9 CEREBRAL INFARCTION, UNSPECIFIED: ICD-10-CM

## 2018-01-25 DIAGNOSIS — Z87.442 PERSONAL HISTORY OF URINARY CALCULI: ICD-10-CM

## 2018-01-25 DIAGNOSIS — M19.011 PRIMARY OSTEOARTHRITIS, RIGHT SHOULDER: ICD-10-CM

## 2018-01-25 DIAGNOSIS — R29.810 FACIAL WEAKNESS: ICD-10-CM

## 2018-01-25 DIAGNOSIS — G40.909 EPILEPSY, UNSPECIFIED, NOT INTRACTABLE, WITHOUT STATUS EPILEPTICUS: ICD-10-CM

## 2018-01-25 DIAGNOSIS — M19.012 PRIMARY OSTEOARTHRITIS, LEFT SHOULDER: ICD-10-CM

## 2018-01-25 DIAGNOSIS — M17.0 BILATERAL PRIMARY OSTEOARTHRITIS OF KNEE: ICD-10-CM

## 2018-01-25 DIAGNOSIS — G81.91 HEMIPLEGIA, UNSPECIFIED AFFECTING RIGHT DOMINANT SIDE: ICD-10-CM

## 2018-01-25 DIAGNOSIS — I10 ESSENTIAL (PRIMARY) HYPERTENSION: ICD-10-CM

## 2018-01-25 DIAGNOSIS — R47.81 SLURRED SPEECH: ICD-10-CM

## 2018-02-01 ENCOUNTER — OUTPATIENT (OUTPATIENT)
Dept: OUTPATIENT SERVICES | Facility: HOSPITAL | Age: 68
LOS: 1 days | End: 2018-02-01
Payer: MEDICAID

## 2018-02-01 DIAGNOSIS — N20.0 CALCULUS OF KIDNEY: Chronic | ICD-10-CM

## 2018-02-01 PROCEDURE — G9001: CPT

## 2018-02-13 DIAGNOSIS — R69 ILLNESS, UNSPECIFIED: ICD-10-CM

## 2018-04-02 ENCOUNTER — INPATIENT (INPATIENT)
Facility: HOSPITAL | Age: 68
LOS: 3 days | Discharge: NURSING, REHAB OR LONG-TERM CARE | End: 2018-04-06
Attending: HOSPITALIST | Admitting: HOSPITALIST
Payer: COMMERCIAL

## 2018-04-02 VITALS
HEIGHT: 64 IN | HEART RATE: 64 BPM | RESPIRATION RATE: 18 BRPM | DIASTOLIC BLOOD PRESSURE: 75 MMHG | SYSTOLIC BLOOD PRESSURE: 139 MMHG | WEIGHT: 149.91 LBS

## 2018-04-02 DIAGNOSIS — N20.0 CALCULUS OF KIDNEY: Chronic | ICD-10-CM

## 2018-04-02 LAB
ALBUMIN SERPL ELPH-MCNC: 3.7 G/DL — SIGNIFICANT CHANGE UP (ref 3.3–5)
ALP SERPL-CCNC: 88 U/L — SIGNIFICANT CHANGE UP (ref 40–120)
ALT FLD-CCNC: 14 U/L — SIGNIFICANT CHANGE UP (ref 12–78)
ANION GAP SERPL CALC-SCNC: 7 MMOL/L — SIGNIFICANT CHANGE UP (ref 5–17)
APTT BLD: 29 SEC — SIGNIFICANT CHANGE UP (ref 27.5–37.4)
AST SERPL-CCNC: 12 U/L — LOW (ref 15–37)
BASOPHILS # BLD AUTO: 0.04 K/UL — SIGNIFICANT CHANGE UP (ref 0–0.2)
BASOPHILS NFR BLD AUTO: 0.6 % — SIGNIFICANT CHANGE UP (ref 0–2)
BILIRUB SERPL-MCNC: 0.8 MG/DL — SIGNIFICANT CHANGE UP (ref 0.2–1.2)
BUN SERPL-MCNC: 22 MG/DL — SIGNIFICANT CHANGE UP (ref 7–23)
CALCIUM SERPL-MCNC: 8.4 MG/DL — LOW (ref 8.5–10.1)
CHLORIDE SERPL-SCNC: 109 MMOL/L — HIGH (ref 96–108)
CK MB BLD-MCNC: 1.4 % — SIGNIFICANT CHANGE UP (ref 0–3.5)
CK MB CFR SERPL CALC: 0.8 NG/ML — SIGNIFICANT CHANGE UP (ref 0.5–3.6)
CK SERPL-CCNC: 57 U/L — SIGNIFICANT CHANGE UP (ref 26–192)
CO2 SERPL-SCNC: 26 MMOL/L — SIGNIFICANT CHANGE UP (ref 22–31)
CREAT SERPL-MCNC: 1.06 MG/DL — SIGNIFICANT CHANGE UP (ref 0.5–1.3)
EOSINOPHIL # BLD AUTO: 0.01 K/UL — SIGNIFICANT CHANGE UP (ref 0–0.5)
EOSINOPHIL NFR BLD AUTO: 0.1 % — SIGNIFICANT CHANGE UP (ref 0–6)
GLUCOSE SERPL-MCNC: 202 MG/DL — HIGH (ref 70–99)
HCT VFR BLD CALC: 38.3 % — SIGNIFICANT CHANGE UP (ref 34.5–45)
HGB BLD-MCNC: 13.1 G/DL — SIGNIFICANT CHANGE UP (ref 11.5–15.5)
IMM GRANULOCYTES NFR BLD AUTO: 0.4 % — SIGNIFICANT CHANGE UP (ref 0–1.5)
INR BLD: 1.07 RATIO — SIGNIFICANT CHANGE UP (ref 0.88–1.16)
LACTATE SERPL-SCNC: 2 MMOL/L — SIGNIFICANT CHANGE UP (ref 0.7–2)
LYMPHOCYTES # BLD AUTO: 2.24 K/UL — SIGNIFICANT CHANGE UP (ref 1–3.3)
LYMPHOCYTES # BLD AUTO: 32.5 % — SIGNIFICANT CHANGE UP (ref 13–44)
MCHC RBC-ENTMCNC: 29.2 PG — SIGNIFICANT CHANGE UP (ref 27–34)
MCHC RBC-ENTMCNC: 34.2 GM/DL — SIGNIFICANT CHANGE UP (ref 32–36)
MCV RBC AUTO: 85.5 FL — SIGNIFICANT CHANGE UP (ref 80–100)
MONOCYTES # BLD AUTO: 0.58 K/UL — SIGNIFICANT CHANGE UP (ref 0–0.9)
MONOCYTES NFR BLD AUTO: 8.4 % — SIGNIFICANT CHANGE UP (ref 2–14)
NEUTROPHILS # BLD AUTO: 4 K/UL — SIGNIFICANT CHANGE UP (ref 1.8–7.4)
NEUTROPHILS NFR BLD AUTO: 58 % — SIGNIFICANT CHANGE UP (ref 43–77)
NRBC # BLD: 0 /100 WBCS — SIGNIFICANT CHANGE UP (ref 0–0)
PLATELET # BLD AUTO: 248 K/UL — SIGNIFICANT CHANGE UP (ref 150–400)
POTASSIUM SERPL-MCNC: 3.5 MMOL/L — SIGNIFICANT CHANGE UP (ref 3.5–5.3)
POTASSIUM SERPL-SCNC: 3.5 MMOL/L — SIGNIFICANT CHANGE UP (ref 3.5–5.3)
PROT SERPL-MCNC: 7.8 GM/DL — SIGNIFICANT CHANGE UP (ref 6–8.3)
PROTHROM AB SERPL-ACNC: 11.7 SEC — SIGNIFICANT CHANGE UP (ref 9.8–12.7)
RBC # BLD: 4.48 M/UL — SIGNIFICANT CHANGE UP (ref 3.8–5.2)
RBC # FLD: 12.6 % — SIGNIFICANT CHANGE UP (ref 10.3–14.5)
SODIUM SERPL-SCNC: 142 MMOL/L — SIGNIFICANT CHANGE UP (ref 135–145)
TROPONIN I SERPL-MCNC: <.015 NG/ML — SIGNIFICANT CHANGE UP (ref 0.01–0.04)
WBC # BLD: 6.9 K/UL — SIGNIFICANT CHANGE UP (ref 3.8–10.5)
WBC # FLD AUTO: 6.9 K/UL — SIGNIFICANT CHANGE UP (ref 3.8–10.5)

## 2018-04-02 PROCEDURE — 70450 CT HEAD/BRAIN W/O DYE: CPT | Mod: 26

## 2018-04-02 PROCEDURE — 99285 EMERGENCY DEPT VISIT HI MDM: CPT

## 2018-04-02 NOTE — ED ADULT NURSE NOTE - CHIEF COMPLAINT QUOTE
AS PER AID " pt was having a conversation with son when she collapsed. Pt did not hit the floor as son caught her. pt was unresponsive to voice for 15 min and right arm was shaking." pt had a mini stroke in January and has left sided deficit. pt is htn, dm, and a hx of seizures. last seizure was in 1/18.

## 2018-04-02 NOTE — ED ADULT NURSE NOTE - OBJECTIVE STATEMENT
67yr old female BIBA 2/2 s/p syncope . as per aid " the patient collapsed while having conversation with son, patient did not hit the floor as son caught her. pt was unresponsive to voice for 15 min and right arm was shaking.

## 2018-04-02 NOTE — ED ADULT TRIAGE NOTE - CHIEF COMPLAINT QUOTE
AS PER AID AS PER AID " pt was having a conversation with son when she collapsed. Pt did not hit the floor as son caught her. pt was unresponsive to voice for 15 min and right arm was shaking." pt had a mini stroke in January and has left sided deficit. pt is htn, dm, and a hx of seizures. last seizure was in 1/18.

## 2018-04-03 DIAGNOSIS — R56.9 UNSPECIFIED CONVULSIONS: ICD-10-CM

## 2018-04-03 DIAGNOSIS — R21 RASH AND OTHER NONSPECIFIC SKIN ERUPTION: ICD-10-CM

## 2018-04-03 DIAGNOSIS — I10 ESSENTIAL (PRIMARY) HYPERTENSION: ICD-10-CM

## 2018-04-03 LAB
ANION GAP SERPL CALC-SCNC: 9 MMOL/L — SIGNIFICANT CHANGE UP (ref 5–17)
BASOPHILS # BLD AUTO: 0.04 K/UL — SIGNIFICANT CHANGE UP (ref 0–0.2)
BASOPHILS NFR BLD AUTO: 0.3 % — SIGNIFICANT CHANGE UP (ref 0–2)
BUN SERPL-MCNC: 18 MG/DL — SIGNIFICANT CHANGE UP (ref 7–23)
CALCIUM SERPL-MCNC: 8.9 MG/DL — SIGNIFICANT CHANGE UP (ref 8.5–10.1)
CHLORIDE SERPL-SCNC: 110 MMOL/L — HIGH (ref 96–108)
CHOLEST SERPL-MCNC: 208 MG/DL — HIGH (ref 10–199)
CO2 SERPL-SCNC: 24 MMOL/L — SIGNIFICANT CHANGE UP (ref 22–31)
CREAT SERPL-MCNC: 0.94 MG/DL — SIGNIFICANT CHANGE UP (ref 0.5–1.3)
EOSINOPHIL # BLD AUTO: 0 K/UL — SIGNIFICANT CHANGE UP (ref 0–0.5)
EOSINOPHIL NFR BLD AUTO: 0 % — SIGNIFICANT CHANGE UP (ref 0–6)
GLUCOSE SERPL-MCNC: 183 MG/DL — HIGH (ref 70–99)
HBA1C BLD-MCNC: 6.4 % — HIGH (ref 4–5.6)
HCT VFR BLD CALC: 38.8 % — SIGNIFICANT CHANGE UP (ref 34.5–45)
HDLC SERPL-MCNC: 53 MG/DL — SIGNIFICANT CHANGE UP (ref 40–125)
HGB BLD-MCNC: 13.5 G/DL — SIGNIFICANT CHANGE UP (ref 11.5–15.5)
IMM GRANULOCYTES NFR BLD AUTO: 0.3 % — SIGNIFICANT CHANGE UP (ref 0–1.5)
LIPID PNL WITH DIRECT LDL SERPL: 142 MG/DL — HIGH
LYMPHOCYTES # BLD AUTO: 16.7 % — SIGNIFICANT CHANGE UP (ref 13–44)
LYMPHOCYTES # BLD AUTO: 2.35 K/UL — SIGNIFICANT CHANGE UP (ref 1–3.3)
MCHC RBC-ENTMCNC: 29.7 PG — SIGNIFICANT CHANGE UP (ref 27–34)
MCHC RBC-ENTMCNC: 34.8 GM/DL — SIGNIFICANT CHANGE UP (ref 32–36)
MCV RBC AUTO: 85.5 FL — SIGNIFICANT CHANGE UP (ref 80–100)
MONOCYTES # BLD AUTO: 1.09 K/UL — HIGH (ref 0–0.9)
MONOCYTES NFR BLD AUTO: 7.7 % — SIGNIFICANT CHANGE UP (ref 2–14)
NEUTROPHILS # BLD AUTO: 10.58 K/UL — HIGH (ref 1.8–7.4)
NEUTROPHILS NFR BLD AUTO: 75 % — SIGNIFICANT CHANGE UP (ref 43–77)
NRBC # BLD: 0 /100 WBCS — SIGNIFICANT CHANGE UP (ref 0–0)
PLATELET # BLD AUTO: 267 K/UL — SIGNIFICANT CHANGE UP (ref 150–400)
POTASSIUM SERPL-MCNC: 3.2 MMOL/L — LOW (ref 3.5–5.3)
POTASSIUM SERPL-SCNC: 3.2 MMOL/L — LOW (ref 3.5–5.3)
RBC # BLD: 4.54 M/UL — SIGNIFICANT CHANGE UP (ref 3.8–5.2)
RBC # FLD: 12.6 % — SIGNIFICANT CHANGE UP (ref 10.3–14.5)
SODIUM SERPL-SCNC: 143 MMOL/L — SIGNIFICANT CHANGE UP (ref 135–145)
TOTAL CHOLESTEROL/HDL RATIO MEASUREMENT: 3.9 RATIO — SIGNIFICANT CHANGE UP (ref 3.3–7.1)
TRIGL SERPL-MCNC: 63 MG/DL — SIGNIFICANT CHANGE UP (ref 10–149)
WBC # BLD: 14.1 K/UL — HIGH (ref 3.8–10.5)
WBC # FLD AUTO: 14.1 K/UL — HIGH (ref 3.8–10.5)

## 2018-04-03 PROCEDURE — 93010 ELECTROCARDIOGRAM REPORT: CPT

## 2018-04-03 PROCEDURE — 99223 1ST HOSP IP/OBS HIGH 75: CPT

## 2018-04-03 RX ORDER — METOPROLOL TARTRATE 50 MG
200 TABLET ORAL
Qty: 0 | Refills: 0 | Status: DISCONTINUED | OUTPATIENT
Start: 2018-04-03 | End: 2018-04-03

## 2018-04-03 RX ORDER — CLOPIDOGREL BISULFATE 75 MG/1
75 TABLET, FILM COATED ORAL DAILY
Qty: 0 | Refills: 0 | Status: DISCONTINUED | OUTPATIENT
Start: 2018-04-03 | End: 2018-04-06

## 2018-04-03 RX ORDER — LEVETIRACETAM 250 MG/1
750 TABLET, FILM COATED ORAL
Qty: 0 | Refills: 0 | Status: DISCONTINUED | OUTPATIENT
Start: 2018-04-03 | End: 2018-04-06

## 2018-04-03 RX ORDER — HYDRALAZINE HCL 50 MG
50 TABLET ORAL EVERY 8 HOURS
Qty: 0 | Refills: 0 | Status: DISCONTINUED | OUTPATIENT
Start: 2018-04-03 | End: 2018-04-04

## 2018-04-03 RX ORDER — SIMVASTATIN 20 MG/1
20 TABLET, FILM COATED ORAL AT BEDTIME
Qty: 0 | Refills: 0 | Status: DISCONTINUED | OUTPATIENT
Start: 2018-04-03 | End: 2018-04-06

## 2018-04-03 RX ORDER — HEPARIN SODIUM 5000 [USP'U]/ML
5000 INJECTION INTRAVENOUS; SUBCUTANEOUS EVERY 12 HOURS
Qty: 0 | Refills: 0 | Status: DISCONTINUED | OUTPATIENT
Start: 2018-04-03 | End: 2018-04-06

## 2018-04-03 RX ORDER — POTASSIUM CHLORIDE 20 MEQ
40 PACKET (EA) ORAL EVERY 4 HOURS
Qty: 0 | Refills: 0 | Status: COMPLETED | OUTPATIENT
Start: 2018-04-03 | End: 2018-04-03

## 2018-04-03 RX ORDER — METOPROLOL TARTRATE 50 MG
50 TABLET ORAL
Qty: 0 | Refills: 0 | Status: DISCONTINUED | OUTPATIENT
Start: 2018-04-03 | End: 2018-04-03

## 2018-04-03 RX ORDER — ASPIRIN/CALCIUM CARB/MAGNESIUM 324 MG
81 TABLET ORAL DAILY
Qty: 0 | Refills: 0 | Status: DISCONTINUED | OUTPATIENT
Start: 2018-04-03 | End: 2018-04-06

## 2018-04-03 RX ORDER — TRAZODONE HCL 50 MG
50 TABLET ORAL AT BEDTIME
Qty: 0 | Refills: 0 | Status: DISCONTINUED | OUTPATIENT
Start: 2018-04-03 | End: 2018-04-06

## 2018-04-03 RX ORDER — METOPROLOL TARTRATE 50 MG
100 TABLET ORAL
Qty: 0 | Refills: 0 | Status: DISCONTINUED | OUTPATIENT
Start: 2018-04-03 | End: 2018-04-06

## 2018-04-03 RX ADMIN — Medication 40 MILLIEQUIVALENT(S): at 21:00

## 2018-04-03 RX ADMIN — Medication 50 MILLIGRAM(S): at 21:01

## 2018-04-03 RX ADMIN — LEVETIRACETAM 750 MILLIGRAM(S): 250 TABLET, FILM COATED ORAL at 06:31

## 2018-04-03 RX ADMIN — CLOPIDOGREL BISULFATE 75 MILLIGRAM(S): 75 TABLET, FILM COATED ORAL at 11:12

## 2018-04-03 RX ADMIN — Medication 50 MILLIGRAM(S): at 14:15

## 2018-04-03 RX ADMIN — HEPARIN SODIUM 5000 UNIT(S): 5000 INJECTION INTRAVENOUS; SUBCUTANEOUS at 06:31

## 2018-04-03 RX ADMIN — Medication 81 MILLIGRAM(S): at 11:12

## 2018-04-03 RX ADMIN — LEVETIRACETAM 750 MILLIGRAM(S): 250 TABLET, FILM COATED ORAL at 17:12

## 2018-04-03 RX ADMIN — Medication 50 MILLIGRAM(S): at 06:31

## 2018-04-03 RX ADMIN — Medication 100 MILLIGRAM(S): at 17:13

## 2018-04-03 RX ADMIN — HEPARIN SODIUM 5000 UNIT(S): 5000 INJECTION INTRAVENOUS; SUBCUTANEOUS at 17:12

## 2018-04-03 RX ADMIN — Medication 40 MILLIEQUIVALENT(S): at 17:13

## 2018-04-03 RX ADMIN — SIMVASTATIN 20 MILLIGRAM(S): 20 TABLET, FILM COATED ORAL at 21:01

## 2018-04-03 NOTE — H&P ADULT - NSHPLABSRESULTS_GEN_ALL_CORE
LABS:                        13.1   6.90  )-----------( 248      ( 02 Apr 2018 22:26 )             38.3     04-02    142  |  109<H>  |  22  ----------------------------<  202<H>  3.5   |  26  |  1.06    Ca    8.4<L>      02 Apr 2018 22:26    TPro  7.8  /  Alb  3.7  /  TBili  0.8  /  DBili  x   /  AST  12<L>  /  ALT  14  /  AlkPhos  88  04-02    PT/INR - ( 02 Apr 2018 23:22 )   PT: 11.7 sec;   INR: 1.07 ratio         PTT - ( 02 Apr 2018 23:22 )  PTT:29.0 sec    CAPILLARY BLOOD GLUCOSE      RADIOLOGY & ADDITIONAL TESTS:  < from: CT Head No Cont (04.02.18 @ 23:00) >    No hemorrhage or mass effect. Old bilateral infarcts.  CXR: no infiltrate    < from: TTE Echo Doppler w/o Cont (01.22.18 @ 11:20) >     1. Left ventricular ejection fraction, by visual estimation, is 55 to   60%.   2. Mild mitral annular calcification.   3. Trace mitral valve regurgitation.   4. Mild aortic, pulmonic and tricuspid regurgitation.   5. Spectral Doppler shows impaired relaxation pattern of left   ventricular myocardial filling (Grade I diastolic dysfunction).    < from: MR Head No Cont (01.22.18 @ 10:42) >    Small acute infarct in the right frontal lobe.    Old small infarcts, in the right frontal lobe, right cerebellum, and   right mariajose.    Nonspecific white matter signal changes likely due to small vessel   chronic microvascular ischemic changes, progressed since prior brain MRI   dated 6/12/2014.    < end of copied text >          Imaging Personally Reviewed:  [x ] YES  [ ] NO  EKG: pending

## 2018-04-03 NOTE — H&P ADULT - NSHPPHYSICALEXAM_GEN_ALL_CORE
T(C): 36.6 (03 Apr 2018 04:01), Max: 37.1 (02 Apr 2018 22:18)  T(F): 97.8 (03 Apr 2018 04:01), Max: 98.7 (02 Apr 2018 22:18)  HR: 82 (03 Apr 2018 04:01) (64 - 82)  BP: 189/91 (03 Apr 2018 04:01) (134/69 - 189/91)  BP(mean): --  RR: 17 (03 Apr 2018 04:01) (15 - 18)  SpO2: 100% (03 Apr 2018 04:01) (97% - 100%)    PHYSICAL EXAM:  GENERAL: NAD, well-groomed, well-developed  HEAD:  Atraumatic, Normocephalic  EYES: EOMI, PERRLA, conjunctiva and sclera clear  ENMT: No tonsillar erythema, exudates, or enlargement; Moist mucous membranes, No lesions  NECK: Supple, No JVD, Normal thyroid  NERVOUS SYSTEM:  Alert & Oriented X2, CN 2-12 intact; no motor/sensory deficits  CHEST/LUNG: Clear to percussion bilaterally; No rales, rhonchi, wheezing, or rubs  HEART: Regular rate and rhythm; No murmurs, rubs, or gallops  ABDOMEN: Soft, Nontender, Nondistended; Bowel sounds present  EXTREMITIES:  2+ Peripheral Pulses, No clubbing, cyanosis, or edema  LYMPH: No lymphadenopathy noted  SKIN: plaques noted on extremities, neck and back

## 2018-04-03 NOTE — ED PROVIDER NOTE - OBJECTIVE STATEMENT
Pertinent PMH/PSH/FHx/SHx and Review of Systems contained within:  68 yo f with PMH of seizures, multiple strokes and dementia BIBEMS when found unresponsive by son with rue shaking. As per HHA, patient back to baseline mental status in ED. No aggravating or relieving factors, No fever/chills, No photophobia/eye pain/changes in vision, No ear pain/sore throat/dysphagia, No chest pain/palpitations, no SOB/cough/wheeze/stridor, No abdominal pain, No N/V/D, no dysuria/frequency/discharge, No neck/back pain, no rash

## 2018-04-03 NOTE — H&P ADULT - ASSESSMENT
66 y/o woman PMH multiple CVA and seizures presents after LOC with right sided shaking; keppra level sent; no acute findings on CT.  IMPROVE VTE Individual Risk Assessment          RISK                                                          Points    [  ] Previous VTE                                                3    [  ] Thrombophilia                                             2    [  ] Lower limb paralysis                                    2        (unable to hold up >15 seconds)      [  ] Current Cancer                                             2         (within 6 months)    [  ] Immobilization > 24 hrs                              1    [  ] ICU/CCU stay > 24 hours                            1    [ x ] Age > 60                                                    1    IMPROVE VTE Score ______1___

## 2018-04-03 NOTE — ED PROVIDER NOTE - MEDICAL DECISION MAKING DETAILS
seizure vs. syncope. labs and imaging reviewed. patient received ivfs. patient now admitted to medicine for further management.

## 2018-04-03 NOTE — H&P ADULT - NSHPREVIEWOFSYSTEMS_GEN_ALL_CORE
REVIEW OF SYSTEMS:  CONSTITUTIONAL: No fever, weight loss, or fatigue  EYES: No eye pain, visual disturbances, or discharge  ENMT:  No difficulty hearing, tinnitus, vertigo; No sinus or throat pain  NECK: No pain or stiffness  BREASTS: No pain, masses, or nipple discharge  RESPIRATORY: No cough, wheezing, chills or hemoptysis; No shortness of breath  CARDIOVASCULAR: No chest pain, palpitations, dizziness, or leg swelling  GASTROINTESTINAL: No abdominal or epigastric pain. No nausea, vomiting, or hematemesis; No diarrhea or constipation. No melena or hematochezia.  GENITOURINARY: No dysuria, frequency, hematuria, or incontinence  NEUROLOGICAL: No headaches, memory loss, loss of strength, numbness, or tremors  SKIN: developing plaques on extremities  LYMPH NODES: No enlarged glands  ENDOCRINE: No heat or cold intolerance; No hair loss  MUSCULOSKELETAL: No joint pain or swelling; No muscle, back, or extremity pain  PSYCHIATRIC: No depression, anxiety, mood swings, or difficulty sleeping  HEME/LYMPH: No easy bruising, or bleeding gums  ALLERGY AND IMMUNOLOGIC: No hives or eczema

## 2018-04-03 NOTE — H&P ADULT - HISTORY OF PRESENT ILLNESS
68 y/o female PMH of HTN, seizures, multiple strokes and dementia BIBEMS when found unresponsive by son with RUE shaking. As per HHA, patient back to baseline mental status in ED. Pt has been taking her seizure medications per Aide.  Pt denies CP, SOB, palpitation, cough, abd pain, N/V/diarrhea, no blood instools.

## 2018-04-04 DIAGNOSIS — Z29.9 ENCOUNTER FOR PROPHYLACTIC MEASURES, UNSPECIFIED: ICD-10-CM

## 2018-04-04 DIAGNOSIS — F03.90 UNSPECIFIED DEMENTIA WITHOUT BEHAVIORAL DISTURBANCE: ICD-10-CM

## 2018-04-04 LAB
ANION GAP SERPL CALC-SCNC: 8 MMOL/L — SIGNIFICANT CHANGE UP (ref 5–17)
BUN SERPL-MCNC: 25 MG/DL — HIGH (ref 7–23)
CALCIUM SERPL-MCNC: 8.6 MG/DL — SIGNIFICANT CHANGE UP (ref 8.5–10.1)
CHLORIDE SERPL-SCNC: 113 MMOL/L — HIGH (ref 96–108)
CO2 SERPL-SCNC: 23 MMOL/L — SIGNIFICANT CHANGE UP (ref 22–31)
CREAT SERPL-MCNC: 1.04 MG/DL — SIGNIFICANT CHANGE UP (ref 0.5–1.3)
GLUCOSE SERPL-MCNC: 148 MG/DL — HIGH (ref 70–99)
HCT VFR BLD CALC: 35.7 % — SIGNIFICANT CHANGE UP (ref 34.5–45)
HGB BLD-MCNC: 12.1 G/DL — SIGNIFICANT CHANGE UP (ref 11.5–15.5)
LEVETIRACETAM SERPL-MCNC: 30.5 MCG/ML — SIGNIFICANT CHANGE UP (ref 12–46)
MAGNESIUM SERPL-MCNC: 2.2 MG/DL — SIGNIFICANT CHANGE UP (ref 1.6–2.6)
MCHC RBC-ENTMCNC: 29.6 PG — SIGNIFICANT CHANGE UP (ref 27–34)
MCHC RBC-ENTMCNC: 33.9 GM/DL — SIGNIFICANT CHANGE UP (ref 32–36)
MCV RBC AUTO: 87.3 FL — SIGNIFICANT CHANGE UP (ref 80–100)
NRBC # BLD: 0 /100 WBCS — SIGNIFICANT CHANGE UP (ref 0–0)
PHOSPHATE SERPL-MCNC: 3.3 MG/DL — SIGNIFICANT CHANGE UP (ref 2.5–4.5)
PLATELET # BLD AUTO: 229 K/UL — SIGNIFICANT CHANGE UP (ref 150–400)
POTASSIUM SERPL-MCNC: 3.9 MMOL/L — SIGNIFICANT CHANGE UP (ref 3.5–5.3)
POTASSIUM SERPL-SCNC: 3.9 MMOL/L — SIGNIFICANT CHANGE UP (ref 3.5–5.3)
RBC # BLD: 4.09 M/UL — SIGNIFICANT CHANGE UP (ref 3.8–5.2)
RBC # FLD: 12.7 % — SIGNIFICANT CHANGE UP (ref 10.3–14.5)
SODIUM SERPL-SCNC: 144 MMOL/L — SIGNIFICANT CHANGE UP (ref 135–145)
WBC # BLD: 8.5 K/UL — SIGNIFICANT CHANGE UP (ref 3.8–10.5)
WBC # FLD AUTO: 8.5 K/UL — SIGNIFICANT CHANGE UP (ref 3.8–10.5)

## 2018-04-04 PROCEDURE — 99233 SBSQ HOSP IP/OBS HIGH 50: CPT

## 2018-04-04 RX ORDER — AMLODIPINE BESYLATE 2.5 MG/1
10 TABLET ORAL DAILY
Qty: 0 | Refills: 0 | Status: DISCONTINUED | OUTPATIENT
Start: 2018-04-04 | End: 2018-04-06

## 2018-04-04 RX ORDER — HYDRALAZINE HCL 50 MG
50 TABLET ORAL THREE TIMES A DAY
Qty: 0 | Refills: 0 | Status: DISCONTINUED | OUTPATIENT
Start: 2018-04-04 | End: 2018-04-06

## 2018-04-04 RX ORDER — HYDRALAZINE HCL 50 MG
50 TABLET ORAL EVERY 8 HOURS
Qty: 0 | Refills: 0 | Status: DISCONTINUED | OUTPATIENT
Start: 2018-04-04 | End: 2018-04-04

## 2018-04-04 RX ADMIN — HEPARIN SODIUM 5000 UNIT(S): 5000 INJECTION INTRAVENOUS; SUBCUTANEOUS at 17:31

## 2018-04-04 RX ADMIN — LEVETIRACETAM 750 MILLIGRAM(S): 250 TABLET, FILM COATED ORAL at 17:31

## 2018-04-04 RX ADMIN — Medication 0.1 MILLIGRAM(S): at 18:30

## 2018-04-04 RX ADMIN — Medication 50 MILLIGRAM(S): at 23:00

## 2018-04-04 RX ADMIN — Medication 100 MILLIGRAM(S): at 17:31

## 2018-04-04 RX ADMIN — SIMVASTATIN 20 MILLIGRAM(S): 20 TABLET, FILM COATED ORAL at 22:59

## 2018-04-04 RX ADMIN — AMLODIPINE BESYLATE 10 MILLIGRAM(S): 2.5 TABLET ORAL at 17:51

## 2018-04-04 RX ADMIN — Medication 100 MILLIGRAM(S): at 05:13

## 2018-04-04 RX ADMIN — CLOPIDOGREL BISULFATE 75 MILLIGRAM(S): 75 TABLET, FILM COATED ORAL at 11:32

## 2018-04-04 RX ADMIN — Medication 81 MILLIGRAM(S): at 11:32

## 2018-04-04 RX ADMIN — LEVETIRACETAM 750 MILLIGRAM(S): 250 TABLET, FILM COATED ORAL at 05:13

## 2018-04-04 RX ADMIN — HEPARIN SODIUM 5000 UNIT(S): 5000 INJECTION INTRAVENOUS; SUBCUTANEOUS at 05:12

## 2018-04-04 RX ADMIN — Medication 50 MILLIGRAM(S): at 22:59

## 2018-04-04 RX ADMIN — Medication 50 MILLIGRAM(S): at 05:13

## 2018-04-04 RX ADMIN — Medication 50 MILLIGRAM(S): at 14:19

## 2018-04-04 NOTE — PHYSICAL THERAPY INITIAL EVALUATION ADULT - CRITERIA FOR SKILLED THERAPEUTIC INTERVENTIONS
impairments found/functional limitations in following categories/risk reduction/prevention/anticipated discharge recommendation

## 2018-04-04 NOTE — PROGRESS NOTE ADULT - ASSESSMENT
68 y/o woman PMH multiple CVA and seizures presents after LOC with right sided shaking; keppra level sent; no acute findings on CT.  IMPROVE VTE Individual Risk Assessment         appears to be seizure  PT eval done will  go to rehab

## 2018-04-04 NOTE — PHYSICAL THERAPY INITIAL EVALUATION ADULT - PERTINENT HX OF CURRENT PROBLEM, REHAB EVAL
Pt admitted with episode of being unresponsive, h/o seizures recent CT: no hemorrhage or mass effect

## 2018-04-04 NOTE — PROGRESS NOTE ADULT - SUBJECTIVE AND OBJECTIVE BOX
Patient is a 67y old  Female who presents with a chief complaint of LOC probable seizure. (03 Apr 2018 05:09)      INTERVAL HPI/OVERNIGHT EVENTS: nio acute events overnight more awake today     MEDICATIONS  (STANDING):  amLODIPine   Tablet 10 milliGRAM(s) Oral daily  aspirin enteric coated 81 milliGRAM(s) Oral daily  clopidogrel Tablet 75 milliGRAM(s) Oral daily  heparin  Injectable 5000 Unit(s) SubCutaneous every 12 hours  levETIRAcetam 750 milliGRAM(s) Oral two times a day  metoprolol tartrate 100 milliGRAM(s) Oral two times a day  simvastatin 20 milliGRAM(s) Oral at bedtime  traZODone 50 milliGRAM(s) Oral at bedtime    MEDICATIONS  (PRN):      Allergies    No Known Allergies    Intolerances        REVIEW OF SYSTEMS:  CONSTITUTIONAL: No fever, weight loss, or fatigue  EYES: No eye pain, visual disturbances, or discharge  ENMT:  No difficulty hearing, tinnitus, vertigo; No sinus or throat pain  NECK: No pain or stiffness  BREASTS: No pain, masses, or nipple discharge  RESPIRATORY: No cough, wheezing, chills or hemoptysis; No shortness of breath  CARDIOVASCULAR: No chest pain, palpitations, dizziness, or leg swelling  GASTROINTESTINAL: No abdominal or epigastric pain. No nausea, vomiting, or hematemesis; No diarrhea or constipation. No melena or hematochezia.  GENITOURINARY: No dysuria, frequency, hematuria, or incontinence  NEUROLOGICAL: No headaches, memory loss, loss of strength, numbness, or tremors  SKIN: No itching, burning, rashes, or lesions   LYMPH NODES: No enlarged glands  ENDOCRINE: No heat or cold intolerance; No hair loss  MUSCULOSKELETAL: No joint pain or swelling; No muscle, back, or extremity pain  PSYCHIATRIC: No depression, anxiety, mood swings, or difficulty sleeping  HEME/LYMPH: No easy bruising, or bleeding gums  ALLERGY AND IMMUNOLOGIC: No hives or eczema    Vital Signs Last 24 Hrs  T(C): 37.2 (04 Apr 2018 16:40), Max: 37.2 (04 Apr 2018 16:40)  T(F): 98.9 (04 Apr 2018 16:40), Max: 98.9 (04 Apr 2018 16:40)  HR: 72 (04 Apr 2018 16:40) (62 - 72)  BP: 186/86 (04 Apr 2018 16:40) (144/68 - 186/86)  BP(mean): --  RR: 17 (04 Apr 2018 16:40) (17 - 18)  SpO2: 98% (04 Apr 2018 16:40) (95% - 98%)    PHYSICAL EXAM:  GENERAL: NAD, well-groomed, well-developed  HEAD:  Atraumatic, Normocephalic  EYES: EOMI, PERRLA, conjunctiva and sclera clear  ENMT: No tonsillar erythema, exudates, or enlargement; Moist mucous membranes, Good dentition, No lesions  NECK: Supple, No JVD, Normal thyroid  NERVOUS SYSTEM:  Alert & Oriented X3, Good concentration; Motor Strength 5/5 B/L upper and lower extremities; DTRs 2+ intact and symmetric  CHEST/LUNG: Clear to percussion bilaterally; No rales, rhonchi, wheezing, or rubs  HEART: Regular rate and rhythm; No murmurs, rubs, or gallops  ABDOMEN: Soft, Nontender, Nondistended; Bowel sounds present  EXTREMITIES:  2+ Peripheral Pulses, No clubbing, cyanosis, or edema  LYMPH: No lymphadenopathy noted  SKIN: No rashes or lesions    LABS:                        12.1   8.50  )-----------( 229      ( 04 Apr 2018 08:27 )             35.7     04-04    144  |  113<H>  |  25<H>  ----------------------------<  148<H>  3.9   |  23  |  1.04    Ca    8.6      04 Apr 2018 08:27  Phos  3.3     04-04  Mg     2.2     04-04    TPro  7.8  /  Alb  3.7  /  TBili  0.8  /  DBili  x   /  AST  12<L>  /  ALT  14  /  AlkPhos  88  04-02    PT/INR - ( 02 Apr 2018 23:22 )   PT: 11.7 sec;   INR: 1.07 ratio         PTT - ( 02 Apr 2018 23:22 )  PTT:29.0 sec    CAPILLARY BLOOD GLUCOSE          RADIOLOGY & ADDITIONAL TESTS:    Imaging Personally Reviewed:  [ ] YES  [ ] NO    Consultant(s) Notes Reviewed:  [ ] YES  [ ] NO    Care Discussed with Consultants/Other Providers [ ] YES  [ ] NO Patient is a 67y old  Female who presents with a chief complaint of LOC probable seizure. (03 Apr 2018 05:09)      INTERVAL HPI/OVERNIGHT EVENTS: nio acute events overnight more awake today     MEDICATIONS  (STANDING):  amLODIPine   Tablet 10 milliGRAM(s) Oral daily  aspirin enteric coated 81 milliGRAM(s) Oral daily  cloNIDine 0.1 milliGRAM(s) Oral two times a day  clopidogrel Tablet 75 milliGRAM(s) Oral daily  heparin  Injectable 5000 Unit(s) SubCutaneous every 12 hours  hydrALAZINE 50 milliGRAM(s) Oral three times a day  levETIRAcetam 750 milliGRAM(s) Oral two times a day  metoprolol tartrate 100 milliGRAM(s) Oral two times a day  simvastatin 20 milliGRAM(s) Oral at bedtime  traZODone 50 milliGRAM(s) Oral at bedtime    MEDICATIONS  (PRN):    MEDICATIONS  (PRN):      Allergies    No Known Allergies    Intolerances        REVIEW OF SYSTEMS:  unrelianble given patient condition   Vital Signs Last 24 Hrs  T(C): 37.2 (04 Apr 2018 16:40), Max: 37.2 (04 Apr 2018 16:40)  T(F): 98.9 (04 Apr 2018 16:40), Max: 98.9 (04 Apr 2018 16:40)  HR: 72 (04 Apr 2018 16:40) (62 - 72)  BP: 186/86 (04 Apr 2018 16:40) (144/68 - 186/86)  BP(mean): --  RR: 17 (04 Apr 2018 16:40) (17 - 18)  SpO2: 98% (04 Apr 2018 16:40) (95% - 98%)    PHYSICAL EXAM:  GENERAL: NAD, well-groomed, well-developed  HEAD:  Atraumatic, Normocephalic  EYES: EOMI, PERRLA, conjunctiva and sclera clear  ENMT: No tonsillar erythema, exudates, or enlargement; Moist mucous membranes, Good dentition, No lesions  NECK: Supple, No JVD, Normal thyroid  NERVOUS SYSTEM:  Alert & Oriented concentration; Motor Strength 5/5 B/L upper and lower extremities; DTRs 2+ intact and symmetric  CHEST/LUNG: Clear to percussion bilaterally; No rales, rhonchi, wheezing, or rubs  HEART: Regular rate and rhythm; No murmurs, rubs, or gallops  ABDOMEN: Soft, Nontender, Nondistended; Bowel sounds present  EXTREMITIES:  2+ Peripheral Pulses, No clubbing, cyanosis, or edema  LYMPH: No lymphadenopathy noted  SKIN:skin plaques   LABS:                        12.1   8.50  )-----------( 229      ( 04 Apr 2018 08:27 )             35.7     04-04    144  |  113<H>  |  25<H>  ----------------------------<  148<H>  3.9   |  23  |  1.04    Ca    8.6      04 Apr 2018 08:27  Phos  3.3     04-04  Mg     2.2     04-04    TPro  7.8  /  Alb  3.7  /  TBili  0.8  /  DBili  x   /  AST  12<L>  /  ALT  14  /  AlkPhos  88  04-02    PT/INR - ( 02 Apr 2018 23:22 )   PT: 11.7 sec;   INR: 1.07 ratio         PTT - ( 02 Apr 2018 23:22 )  PTT:29.0 sec    CAPILLARY BLOOD GLUCOSE          RADIOLOGY & ADDITIONAL TESTS:    Imaging Personally Reviewed:  [ X] YES  [ ] NO    Consultant(s) Notes Reviewed:  [X ] YES  [ ] NO    Care Discussed with Consultants/Other Providers [X ] YES  [ ] NO

## 2018-04-04 NOTE — PHYSICAL THERAPY INITIAL EVALUATION ADULT - PLANNED THERAPY INTERVENTIONS, PT EVAL
bed mobility training/balance training/gait training/strengthening/transfer training/postural re-education

## 2018-04-05 LAB
ANION GAP SERPL CALC-SCNC: 7 MMOL/L — SIGNIFICANT CHANGE UP (ref 5–17)
BUN SERPL-MCNC: 30 MG/DL — HIGH (ref 7–23)
CALCIUM SERPL-MCNC: 8.4 MG/DL — LOW (ref 8.5–10.1)
CHLORIDE SERPL-SCNC: 111 MMOL/L — HIGH (ref 96–108)
CO2 SERPL-SCNC: 24 MMOL/L — SIGNIFICANT CHANGE UP (ref 22–31)
CREAT SERPL-MCNC: 1.23 MG/DL — SIGNIFICANT CHANGE UP (ref 0.5–1.3)
GLUCOSE SERPL-MCNC: 173 MG/DL — HIGH (ref 70–99)
HCT VFR BLD CALC: 33.6 % — LOW (ref 34.5–45)
HGB BLD-MCNC: 11.3 G/DL — LOW (ref 11.5–15.5)
MAGNESIUM SERPL-MCNC: 2.1 MG/DL — SIGNIFICANT CHANGE UP (ref 1.6–2.6)
MCHC RBC-ENTMCNC: 29.8 PG — SIGNIFICANT CHANGE UP (ref 27–34)
MCHC RBC-ENTMCNC: 33.6 GM/DL — SIGNIFICANT CHANGE UP (ref 32–36)
MCV RBC AUTO: 88.7 FL — SIGNIFICANT CHANGE UP (ref 80–100)
NRBC # BLD: 0 /100 WBCS — SIGNIFICANT CHANGE UP (ref 0–0)
PHOSPHATE SERPL-MCNC: 3.9 MG/DL — SIGNIFICANT CHANGE UP (ref 2.5–4.5)
PLATELET # BLD AUTO: 225 K/UL — SIGNIFICANT CHANGE UP (ref 150–400)
POTASSIUM SERPL-MCNC: 3.7 MMOL/L — SIGNIFICANT CHANGE UP (ref 3.5–5.3)
POTASSIUM SERPL-SCNC: 3.7 MMOL/L — SIGNIFICANT CHANGE UP (ref 3.5–5.3)
RBC # BLD: 3.79 M/UL — LOW (ref 3.8–5.2)
RBC # FLD: 12.6 % — SIGNIFICANT CHANGE UP (ref 10.3–14.5)
SODIUM SERPL-SCNC: 142 MMOL/L — SIGNIFICANT CHANGE UP (ref 135–145)
T PALLIDUM AB TITR SER: NEGATIVE — SIGNIFICANT CHANGE UP
TSH SERPL-MCNC: 1.42 UU/ML — SIGNIFICANT CHANGE UP (ref 0.36–3.74)
WBC # BLD: 5.86 K/UL — SIGNIFICANT CHANGE UP (ref 3.8–10.5)
WBC # FLD AUTO: 5.86 K/UL — SIGNIFICANT CHANGE UP (ref 3.8–10.5)

## 2018-04-05 PROCEDURE — 99233 SBSQ HOSP IP/OBS HIGH 50: CPT

## 2018-04-05 RX ADMIN — Medication 0.1 MILLIGRAM(S): at 18:25

## 2018-04-05 RX ADMIN — Medication 0.1 MILLIGRAM(S): at 05:35

## 2018-04-05 RX ADMIN — HEPARIN SODIUM 5000 UNIT(S): 5000 INJECTION INTRAVENOUS; SUBCUTANEOUS at 18:24

## 2018-04-05 RX ADMIN — HEPARIN SODIUM 5000 UNIT(S): 5000 INJECTION INTRAVENOUS; SUBCUTANEOUS at 05:35

## 2018-04-05 RX ADMIN — SIMVASTATIN 20 MILLIGRAM(S): 20 TABLET, FILM COATED ORAL at 21:16

## 2018-04-05 RX ADMIN — Medication 100 MILLIGRAM(S): at 05:35

## 2018-04-05 RX ADMIN — Medication 100 MILLIGRAM(S): at 18:24

## 2018-04-05 RX ADMIN — CLOPIDOGREL BISULFATE 75 MILLIGRAM(S): 75 TABLET, FILM COATED ORAL at 13:06

## 2018-04-05 RX ADMIN — LEVETIRACETAM 750 MILLIGRAM(S): 250 TABLET, FILM COATED ORAL at 18:24

## 2018-04-05 RX ADMIN — LEVETIRACETAM 750 MILLIGRAM(S): 250 TABLET, FILM COATED ORAL at 05:36

## 2018-04-05 RX ADMIN — Medication 50 MILLIGRAM(S): at 21:16

## 2018-04-05 RX ADMIN — Medication 81 MILLIGRAM(S): at 13:06

## 2018-04-05 RX ADMIN — Medication 50 MILLIGRAM(S): at 05:35

## 2018-04-05 RX ADMIN — AMLODIPINE BESYLATE 10 MILLIGRAM(S): 2.5 TABLET ORAL at 05:45

## 2018-04-05 NOTE — PROGRESS NOTE ADULT - PROBLEM SELECTOR PLAN 1
possible late effect of her CVA history POA     can dc telemetry   neurology consult  eeg done
can dc telemetry   neurology consult  eeg done

## 2018-04-05 NOTE — OCCUPATIONAL THERAPY INITIAL EVALUATION ADULT - PLANNED THERAPY INTERVENTIONS, OT EVAL
ROM/strengthening/balance training/motor coordination training/ADL retraining/bed mobility training/fine motor coordination training/massage/cognitive, visual perceptual/joint mobilization/neuromuscular re-education/visual scanning and energy conservation techniques/parent/caregiver training.../stretching/transfer training/IADL retraining

## 2018-04-05 NOTE — OCCUPATIONAL THERAPY INITIAL EVALUATION ADULT - RANGE OF MOTION EXAMINATION, UPPER EXTREMITY
bilateral UE Active ROM was WFL  (within functional limits)/bilateral UE Passive ROM was WFL  (within functional limits) Pt has decreased concentric & eccentric control  noted in LUE/Left UE Passive ROM was WFL  (within functional limits)/Right UE Active ROM was WNL (within normal limits)/Right UE Passive ROM was WNL (within normal limits)/Left UE Active Assistive ROM was WFL  (within functional limits)

## 2018-04-05 NOTE — OCCUPATIONAL THERAPY INITIAL EVALUATION ADULT - IMPAIRMENTS CONTRIBUTING IMPAIRED BED MOBILITY, REHAB EVAL
impaired coordination/decreased strength/cognition/impaired balance/decreased ROM decreased ROM/narrow base of support/decreased strength/impaired balance/cognition/impaired coordination/decreased flexibility

## 2018-04-05 NOTE — PROGRESS NOTE ADULT - SUBJECTIVE AND OBJECTIVE BOX
Patient is a 67y old  Female who presents with a chief complaint of LOC probable seizure. (03 Apr 2018 05:09)      INTERVAL HPI/OVERNIGHT EVENTS: no acute events     MEDICATIONS  (STANDING):  amLODIPine   Tablet 10 milliGRAM(s) Oral daily  aspirin enteric coated 81 milliGRAM(s) Oral daily  cloNIDine 0.1 milliGRAM(s) Oral two times a day  clopidogrel Tablet 75 milliGRAM(s) Oral daily  heparin  Injectable 5000 Unit(s) SubCutaneous every 12 hours  hydrALAZINE 50 milliGRAM(s) Oral three times a day  levETIRAcetam 750 milliGRAM(s) Oral two times a day  metoprolol tartrate 100 milliGRAM(s) Oral two times a day  simvastatin 20 milliGRAM(s) Oral at bedtime  traZODone 50 milliGRAM(s) Oral at bedtime    MEDICATIONS  (PRN):      Allergies    No Known Allergies    Intolerances        REVIEW OF SYSTEMS: gives no cno complaints   CONSTITUTIONAL: No fever, weight loss, or fatigue  EYES: No eye pain, visual disturbances, or discharge  ENMT:  No difficulty hearing, tinnitus, vertigo; No sinus or throat pain  NECK: No pain or stiffness  BREASTS: No pain, masses, or nipple discharge  RESPIRATORY: No cough, wheezing, chills or hemoptysis; No shortness of breath  CARDIOVASCULAR: No chest pain, palpitations, dizziness, or leg swelling  GASTROINTESTINAL: No abdominal or epigastric pain. No nausea, vomiting, or hematemesis; No diarrhea or constipation. No melena or hematochezia.  GENITOURINARY: No dysuria, frequency, hematuria, or incontinence  NEUROLOGICAL: No headaches, memory loss, loss of strength, numbness, or tremors  SKIN: No itching, burning, rashes, or lesions   LYMPH NODES: No enlarged glands  ENDOCRINE: No heat or cold intolerance; No hair loss  MUSCULOSKELETAL: No joint pain or swelling; No muscle, back, or extremity pain  PSYCHIATRIC: No depression, anxiety, mood swings, or difficulty sleeping  HEME/LYMPH: No easy bruising, or bleeding gums  ALLERGY AND IMMUNOLOGIC: No hives or eczema    Vital Signs Last 24 Hrs  T(C): 36.6 (05 Apr 2018 16:25), Max: 37 (05 Apr 2018 05:12)  T(F): 97.9 (05 Apr 2018 16:25), Max: 98.6 (05 Apr 2018 05:12)  HR: 61 (05 Apr 2018 16:25) (53 - 67)  BP: 154/60 (05 Apr 2018 16:25) (112/61 - 164/65)  BP(mean): --  RR: 16 (05 Apr 2018 16:25) (16 - 17)  SpO2: 96% (05 Apr 2018 16:25) (96% - 100%)    PHYSICAL EXAM:  GENERAL: NAD, well-groomed, well-developed  HEAD:  Atraumatic, Normocephalic  EYES: EOMI, PERRLA, conjunctiva and sclera clear  ENMT: No tonsillar erythema, exudates, or enlargement; Moist mucous membranes, Good dentition, No lesions  NECK: Supple, No JVD, Normal thyroid  NERVOUS SYSTEM:  Alert & awake   CHEST/LUNG: Clear to percussion bilaterally; No rales, rhonchi, wheezing, or rubs  HEART: Regular rate and rhythm; No murmurs, rubs, or gallops  ABDOMEN: Soft, Nontender, Nondistended; Bowel sounds present  EXTREMITIES:  2+ Peripheral Pulses, No clubbing, cyanosis, or edema  LYMPH: No lymphadenopathy noted  SKIN: plaques    LABS:                        11.3   5.86  )-----------( 225      ( 05 Apr 2018 08:27 )             33.6     04-05    142  |  111<H>  |  30<H>  ----------------------------<  173<H>  3.7   |  24  |  1.23    Ca    8.4<L>      05 Apr 2018 08:30  Phos  3.9     04-05  Mg     2.1     04-05          CAPILLARY BLOOD GLUCOSE          RADIOLOGY & ADDITIONAL TESTS:    Imaging Personally Reviewed:  [X ] YES  [ ] NO    Consultant(s) Notes Reviewed:  [ X] YES  [ ] NO    Care Discussed with Consultants/Other Providers [ X] YES  [ ] NO

## 2018-04-05 NOTE — OCCUPATIONAL THERAPY INITIAL EVALUATION ADULT - PERSONAL SAFETY AND JUDGMENT, REHAB EVAL
impaired Pt needs verbal cues for proper hand placement during transfer and safely maneuver rolling walker/impaired/at risk behaviors demonstrated

## 2018-04-05 NOTE — OCCUPATIONAL THERAPY INITIAL EVALUATION ADULT - PERTINENT HX OF CURRENT PROBLEM, REHAB EVAL
Pt was admitted due to unresponsiveness (LOC-probable seizure). CT results confirm no hemorrhage and no mass effect. Pt was admitted due to unresponsiveness (LOC-probable seizure). CT results confirm no hemorrhage and no mass effect, oral bilateral infarcts.

## 2018-04-05 NOTE — OCCUPATIONAL THERAPY INITIAL EVALUATION ADULT - IMPAIRED TRANSFERS: BED/CHAIR, REHAB EVAL
impaired coordination/decreased strength/decreased ROM/decreased sensation/impaired balance/cognition/decreased flexibility

## 2018-04-05 NOTE — PROGRESS NOTE ADULT - PROBLEM SELECTOR PLAN 4
no official diagnosis from neurology but current activity suggests   chinyere   aimee rpr
no official diagnosis from neurology but current activity suggests   chinyere   aimee rpr

## 2018-04-05 NOTE — OCCUPATIONAL THERAPY INITIAL EVALUATION ADULT - ADDITIONAL COMMENTS
Prior to admission, pt was functioning in her roles, self sufficient & ambulating with a single-axis cane independently. Presently, pt needs assistance with functional mobility and ADL management. The scale below depicts a picture of the pt's current level of functioning. Barthel Index: Feeding score:5  Bathing Score: 0Grooming Score:0  Dressing Score:5  Bowel Score:5  Bladder Score:5  Toilet Score: 5 Transfer Score:  5 Mobility Score: 10 Stairs Score: 0 Total Score: 40/100.

## 2018-04-05 NOTE — OCCUPATIONAL THERAPY INITIAL EVALUATION ADULT - SOCIAL CONCERNS
Pt is concerned about return to home following LOC. Pt voiced concerns about loss of ability to care for herself at home.

## 2018-04-05 NOTE — SWALLOW BEDSIDE ASSESSMENT ADULT - SWALLOW EVAL: RECOMMENDED FEEDING/EATING TECHNIQUES
maintain upright posture during/after eating for 30 mins/crush medication (when feasible)/small sips/bites

## 2018-04-05 NOTE — SWALLOW BEDSIDE ASSESSMENT ADULT - COMMENTS
HEAD CT 4/2/18 IMPRESSION: No hemorrhage or mass effect. Oral bilateral infarcts. Correlate with neurologic evaluation to determine if further evaluation with MRI is clinically warranted, if there are no contraindications.

## 2018-04-05 NOTE — OCCUPATIONAL THERAPY INITIAL EVALUATION ADULT - TRANSFER SAFETY CONCERNS NOTED: BED/CHAIR, REHAB EVAL
squat pivot/decreased weight-shifting ability/decreased sequencing ability/decreased safety awareness/decreased balance during turns/stand pivot/decreased proprioception/decreased step length

## 2018-04-05 NOTE — SWALLOW BEDSIDE ASSESSMENT ADULT - SLP PERTINENT HISTORY OF CURRENT PROBLEM
Per H&P 68 y/o female PMH of HTN, seizures, multiple strokes and dementia BIBEMS when found unresponsive by son with RUE shaking. As per HHA, patient back to baseline mental status in ED. Pt has been taking her seizure medications per Aide. As per H&P 68 y/o female PMH of HTN, seizures, multiple strokes and dementia BIBEMS when found unresponsive by son with RUE shaking. As per HHA, patient back to baseline mental status in ED. Pt has been taking her seizure medications per Aide.

## 2018-04-05 NOTE — OCCUPATIONAL THERAPY INITIAL EVALUATION ADULT - BALANCE DISTURBANCE, IDENTIFIED IMPAIRMENT CONTRIBUTE, REHAB EVAL
abnormal muscle tone/decreased strength/impaired coordination/impaired motor control/impaired postural control

## 2018-04-05 NOTE — SWALLOW BEDSIDE ASSESSMENT ADULT - ORAL PHASE
Decreased anterior-posterior movement of the bolus/Delayed oral transit time Delayed oral transit time/Decreased anterior-posterior movement of the bolus Delayed oral transit time

## 2018-04-05 NOTE — OCCUPATIONAL THERAPY INITIAL EVALUATION ADULT - PATIENT/FAMILY/SIGNIFICANT OTHER GOALS STATEMENT, OT EVAL
Pt would like to be restored to prior level of function. Pt has multiple comorbidities. Pt would like to be restored to prior level of function.

## 2018-04-05 NOTE — OCCUPATIONAL THERAPY INITIAL EVALUATION ADULT - PRECAUTIONS/LIMITATIONS, REHAB EVAL
fall precautions/Pt has a history of multiple comorbidities diminished reaction time due to age related changes Pt has a history of multiple comorbidities diminished reaction time due to age related changes/seizure precautions/fall precautions

## 2018-04-05 NOTE — SWALLOW BEDSIDE ASSESSMENT ADULT - SWALLOW EVAL: DIAGNOSIS
Patient presented with reduced cognition therefore difficult to assess bedside, however noted reduced oral grading to the spoon, slow oral transit posterior, decreased mastication ?2/2 edentulous, and suspect timely trigger of the pharyngeal swallow with good laryngeal elevation. No clinical signs of aspiration across consistencies trialed. Patient presented with reduced cognition and delay in response therefore difficult to assess bedside, however noted reduced oral grading to the spoon, slow oral transit posterior, decreased mastication ?2/2 edentulous, and suspect timely trigger of the pharyngeal swallow with good laryngeal elevation. No clinical signs of aspiration across consistencies trialed.

## 2018-04-05 NOTE — PROGRESS NOTE ADULT - PROBLEM SELECTOR PLAN 3
appears like plaques  check chinyere  outside derm consult derm consult
appears like plaques  check chinyere  outside derm consult derm consult

## 2018-04-05 NOTE — OCCUPATIONAL THERAPY INITIAL EVALUATION ADULT - LEVEL OF CONSCIOUSNESS, OT EVAL
Initially lethargic, but became alert during progression of session confused/Initially lethargic, but became alert during progression of session

## 2018-04-05 NOTE — OCCUPATIONAL THERAPY INITIAL EVALUATION ADULT - LIVES WITH, PROFILE
family members in an apt on the second floor with 14 steps and a railing (from chart review) Chart review reveals pt lives with family members in an apt on the second floor with 14 steps and a railing.

## 2018-04-05 NOTE — PROGRESS NOTE ADULT - ASSESSMENT
68 y/o woman PMH multiple CVA and seizures presents after LOC with right sided shaking; keppra level sent; no acute findings on CT.  IMPROVE VTE Individual Risk Assessment         appears to be seizure  PT eval done will  go to rehab 68 y/o woman PMH multiple CVA and seizures presents after LOC with right sided shaking; keppra level sent; no acute findings on CT.  IMPROVE VTE Individual Risk Assessment         appears to be seizure  PT eval done will  go to rehab much improved

## 2018-04-05 NOTE — OCCUPATIONAL THERAPY INITIAL EVALUATION ADULT - GENERAL OBSERVATIONS, REHAB EVAL
Pt was seen for initial OT eval and encountered supine in bed sleeping. Pt became alert as evaluation progressed. Pt initially did not open her eyes but was later able to follow commands with verbal cues. Pt was able to make needs and wants known but speaks in a very low tone.  Pt exhibits deficits with activity tolerance, coordination, functional mobility, ADL management, strength, ROM, endurance & balance. Pt had NAD and followed the session with Physical Therapy. Pt was seen for initial OT eval and encountered supine in bed sleepy but easily aroused. Pt became alert as evaluation progressed. Pt initially opened her eyes intermittently and later followed commands with verbal cues for sequencing tasks. Pt exhibits deficits with activity tolerance, coordination, functional mobility, ADL management, strength, ROM, endurance & balance. Pt was able to make needs and wants known but speaks in a very low tone.  Pt exhibits deficits with activity tolerance, coordination, functional mobility, ADL management, strength, ROM, endurance & balance. Pt had NAD and followed the session with Physical Therapy. Pt was seen for initial OT eval and encountered supine in bed sleepy but easily aroused. Pt became alert as evaluation progressed. Pt initially opened her eyes intermittently and later followed commands with verbal cues for sequencing tasks. Pt exhibits deficits with activity tolerance, coordination, functional mobility, ADL management, strength, ROM, endurance & balance. Pt was able to make needs and wants known but speaks in a very low tone.  Pt exhibits deficits with activity tolerance, coordination, functional mobility, ADL management, strength, ROM, endurance & balance. Pt was seen for initial OT eval and encountered supine in bed sleepy but easily aroused. Pt became alert as evaluation progressed. Pt initially opened her eyes intermittently and later followed commands with verbal cues for sequencing tasks. Pt exhibits deficits with activity tolerance, coordination, functional mobility, ADL management, strength, ROM, endurance & balance. Pt was able to make needs and wants known but speaks in a very low tone. Pt exhibits deficits with activity tolerance, coordination, functional mobility, ADL management, strength, ROM, endurance & balance. Pt was seen for initial OT eval and encountered supine in bed sleepy but  became alert as evaluation progressed. Pt initially opened her eyes intermittently and later followed commands with verbal cues for sequencing tasks. Pt exhibits deficits with activity tolerance, coordination, functional mobility, ADL management, strength, ROM, endurance & balance. Pt was able to make needs and wants known but speaks in a very low tone. Pt exhibits deficits with activity tolerance, coordination, functional mobility, ADL management, strength, ROM, endurance & balance.

## 2018-04-06 ENCOUNTER — TRANSCRIPTION ENCOUNTER (OUTPATIENT)
Age: 68
End: 2018-04-06

## 2018-04-06 VITALS
HEART RATE: 98 BPM | OXYGEN SATURATION: 94 % | SYSTOLIC BLOOD PRESSURE: 141 MMHG | DIASTOLIC BLOOD PRESSURE: 72 MMHG | TEMPERATURE: 100 F | RESPIRATION RATE: 17 BRPM

## 2018-04-06 LAB
ANA PAT FLD IF-IMP: ABNORMAL
ANA TITR SER: ABNORMAL
ANION GAP SERPL CALC-SCNC: 6 MMOL/L — SIGNIFICANT CHANGE UP (ref 5–17)
BUN SERPL-MCNC: 26 MG/DL — HIGH (ref 7–23)
CALCIUM SERPL-MCNC: 8.5 MG/DL — SIGNIFICANT CHANGE UP (ref 8.5–10.1)
CHLORIDE SERPL-SCNC: 112 MMOL/L — HIGH (ref 96–108)
CO2 SERPL-SCNC: 24 MMOL/L — SIGNIFICANT CHANGE UP (ref 22–31)
CREAT SERPL-MCNC: 1.06 MG/DL — SIGNIFICANT CHANGE UP (ref 0.5–1.3)
GLUCOSE SERPL-MCNC: 158 MG/DL — HIGH (ref 70–99)
HCT VFR BLD CALC: 34.3 % — LOW (ref 34.5–45)
HGB BLD-MCNC: 11.4 G/DL — LOW (ref 11.5–15.5)
MCHC RBC-ENTMCNC: 29.2 PG — SIGNIFICANT CHANGE UP (ref 27–34)
MCHC RBC-ENTMCNC: 33.2 GM/DL — SIGNIFICANT CHANGE UP (ref 32–36)
MCV RBC AUTO: 87.9 FL — SIGNIFICANT CHANGE UP (ref 80–100)
NRBC # BLD: 0 /100 WBCS — SIGNIFICANT CHANGE UP (ref 0–0)
PLATELET # BLD AUTO: 228 K/UL — SIGNIFICANT CHANGE UP (ref 150–400)
POTASSIUM SERPL-MCNC: 3.8 MMOL/L — SIGNIFICANT CHANGE UP (ref 3.5–5.3)
POTASSIUM SERPL-SCNC: 3.8 MMOL/L — SIGNIFICANT CHANGE UP (ref 3.5–5.3)
RBC # BLD: 3.9 M/UL — SIGNIFICANT CHANGE UP (ref 3.8–5.2)
RBC # FLD: 12.5 % — SIGNIFICANT CHANGE UP (ref 10.3–14.5)
SODIUM SERPL-SCNC: 142 MMOL/L — SIGNIFICANT CHANGE UP (ref 135–145)
WBC # BLD: 6.48 K/UL — SIGNIFICANT CHANGE UP (ref 3.8–10.5)
WBC # FLD AUTO: 6.48 K/UL — SIGNIFICANT CHANGE UP (ref 3.8–10.5)

## 2018-04-06 PROCEDURE — 99239 HOSP IP/OBS DSCHRG MGMT >30: CPT

## 2018-04-06 RX ORDER — HYDRALAZINE HCL 50 MG
1 TABLET ORAL
Qty: 0 | Refills: 0 | DISCHARGE
Start: 2018-04-06

## 2018-04-06 RX ORDER — HYDRALAZINE HCL 50 MG
5 TABLET ORAL ONCE
Qty: 0 | Refills: 0 | Status: COMPLETED | OUTPATIENT
Start: 2018-04-06 | End: 2018-04-06

## 2018-04-06 RX ORDER — LEVETIRACETAM 250 MG/1
1 TABLET, FILM COATED ORAL
Qty: 0 | Refills: 0 | DISCHARGE
Start: 2018-04-06

## 2018-04-06 RX ORDER — AMLODIPINE BESYLATE 2.5 MG/1
1 TABLET ORAL
Qty: 0 | Refills: 0 | DISCHARGE
Start: 2018-04-06

## 2018-04-06 RX ADMIN — Medication 5 MILLIGRAM(S): at 00:48

## 2018-04-06 RX ADMIN — Medication 0.1 MILLIGRAM(S): at 05:52

## 2018-04-06 RX ADMIN — HEPARIN SODIUM 5000 UNIT(S): 5000 INJECTION INTRAVENOUS; SUBCUTANEOUS at 05:51

## 2018-04-06 RX ADMIN — Medication 50 MILLIGRAM(S): at 07:23

## 2018-04-06 RX ADMIN — LEVETIRACETAM 750 MILLIGRAM(S): 250 TABLET, FILM COATED ORAL at 17:15

## 2018-04-06 RX ADMIN — Medication 100 MILLIGRAM(S): at 17:15

## 2018-04-06 RX ADMIN — CLOPIDOGREL BISULFATE 75 MILLIGRAM(S): 75 TABLET, FILM COATED ORAL at 11:52

## 2018-04-06 RX ADMIN — LEVETIRACETAM 750 MILLIGRAM(S): 250 TABLET, FILM COATED ORAL at 05:52

## 2018-04-06 RX ADMIN — HEPARIN SODIUM 5000 UNIT(S): 5000 INJECTION INTRAVENOUS; SUBCUTANEOUS at 17:15

## 2018-04-06 RX ADMIN — Medication 100 MILLIGRAM(S): at 05:52

## 2018-04-06 RX ADMIN — AMLODIPINE BESYLATE 10 MILLIGRAM(S): 2.5 TABLET ORAL at 11:52

## 2018-04-06 RX ADMIN — Medication 0.1 MILLIGRAM(S): at 17:15

## 2018-04-06 RX ADMIN — Medication 81 MILLIGRAM(S): at 11:52

## 2018-04-06 NOTE — SPEECH LANGUAGE PATHOLOGY EVALUATION - SLP PERTINENT HISTORY OF CURRENT PROBLEM
As per H&P 68 y/o female PMH of HTN, seizures, multiple strokes and dementia BIBEMS when found unresponsive by son with RUE shaking. As per HHA, patient back to baseline mental status in ED. Pt has been taking her seizure medications per Aide.

## 2018-04-06 NOTE — DISCHARGE NOTE ADULT - MEDICATION SUMMARY - MEDICATIONS TO TAKE
I will START or STAY ON the medications listed below when I get home from the hospital:    aspirin 81 mg oral delayed release tablet  -- 1 tab(s) by mouth once a day  -- Indication: For Preventive measure    cloNIDine 0.1 mg oral tablet  -- 1 tab(s) by mouth 2 times a day  -- Indication: For Essential hypertension    levETIRAcetam 750 mg oral tablet  -- 1 tab(s) by mouth 2 times a day  -- Indication: For Seizure    traZODone 50 mg oral tablet  -- 50 milligram(s) by mouth once a day (at bedtime)  -- Indication: For Dementia without behavioral disturbance, unspecified dementia type    Zocor 20 mg oral tablet  -- 1 tab(s) by mouth once a day (at bedtime)  -- Indication: For Preventive measure    clopidogrel 75 mg oral tablet  -- 1 tab(s) by mouth once a day  -- Indication: For Preventive measure    Lopressor 100 mg oral tablet  -- 1 tab(s) by mouth 2 times a day  -- Indication: For Essential hypertension    amLODIPine 10 mg oral tablet  -- 1 tab(s) by mouth once a day  -- Indication: For Essential hypertension    hydrALAZINE 50 mg oral tablet  -- 1 tab(s) by mouth 3 times a day  -- Indication: For Essential hypertension

## 2018-04-06 NOTE — DISCHARGE NOTE ADULT - HOSPITAL COURSE
History of Present Illness:  Reason for Admission: LOC probable seizure.	  History of Present Illness: 	  66 y/o female PMH of HTN, seizures, multiple strokes and dementia BIBEMS when found unresponsive by son with RUE shaking. As per HHA, patient back to baseline mental status in ED. Pt has been taking her seizure medications per Aide.  Pt denies CP, SOB, palpitation, cough, abd pain, N/V/diarrhea, no blood instools    6 y/o woman PMH multiple CVA and seizures presents after LOC with right sided shaking; keppra level sent; no acute findings on CT.  IMPROVE VTE Individual Risk Assessment         appears to be seizure  PT eval done will  go to rehab much improved      Problem/Plan - 1:  ·  Problem: Seizure.  Plan: possible late effect of her CVA history POA     can dc telemetry   neurology consult  eeg done.      Problem/Plan - 2:  ·  Problem: Essential hypertension.  Plan: uncontrolled at moment.      Problem/Plan - 3:  ·  Problem: Skin rash.  Plan: appears like plaques  check chinyere  outside derm consult derm consult.      Problem/Plan - 4:  ·  Problem: Dementia without behavioral disturbance, unspecified dementia type.  Plan: no official diagnosis from neurology but current activity suggests   chinyere   aimee rpr.      Problem/Plan - 5:  ·  Problem: Preventive measure.  Plan: heparin sub Q.     Attending Attestation:   40 minutes spent on discharge than 50% of the visit was spent counseling and/or coordinating care by the attending physician.     Plan discussed with patient and family daughter at [phone].

## 2018-04-06 NOTE — DISCHARGE NOTE ADULT - CARE PLAN
Principal Discharge DX:	Seizure  Goal:	continue keppra seizure medications  Assessment and plan of treatment:	follow up with neurology  Secondary Diagnosis:	Hypertension, unspecified type  Goal:	controlled

## 2018-04-06 NOTE — SPEECH LANGUAGE PATHOLOGY EVALUATION - COMMENTS
Dysphagia eval completed 4/5/18 rec puree with thin liquids, with a f/u 4/6/18 rec Dysphagia 2 mechanical soft consistency with thin liquids.    HEAD CT 4/2/18 IMPRESSION: No hemorrhage or mass effect. Oral bilateral infarcts. Correlate with neurologic evaluation to determine if further evaluation with MRI is clinically warranted, if there are no contraindications Patient produced an organized description of the cookie theft picture, however she lacked detail and displayed perseveration for this task as well as others. Patient took breaths at inappropriate junctures ?2/2 decreased breath support. Patient produced an organized description of the cookie theft picture, however she lacked detail and displayed perseveration for this task as well as others. She typically produced 5-6 word utterances. Patient produced an organized description of the cookie theft picture, however she lacked detail and displayed perseveration for this task as well as others. She didn't sequence a story with beginning, middle, and end. She typically produced 5-6 word utterances. Patient took breaths at inappropriate junctures, decreased breath support.

## 2018-04-06 NOTE — SPEECH LANGUAGE PATHOLOGY EVALUATION - SLP GENERAL OBSERVATIONS
Alert and cooperative to eval. Patient needed max auditory cues (repetition, redirection) to participate and maintain attention to eval. Adm subtests from the Fults Diagnostic Aphasia Examination, short form.

## 2018-04-06 NOTE — DISCHARGE NOTE ADULT - CARE PROVIDER_API CALL
Lottie Mendoza), Neurology  [address] [place] [address]  Phone: [phone]  Fax: [phone]

## 2018-04-06 NOTE — SPEECH LANGUAGE PATHOLOGY EVALUATION - SLP DIAGNOSIS
Patient presented with deficits in receptive language marked by reduced picture identification, yes/no questions, oral reading at the word level, sentence comprehension, picture/word matching and deficits in expressive language marked by reduced automatics, confrontational naming, and repetition at the word and sentence level. Noted dysarthric speech characterized by decreased articulatory precision, low vocal volume ?2/2 reduced breath support, and impaired prosody. Patient presented with deficits in receptive language marked by reduced picture identification, yes/no questions, oral reading at the word level, sentence comprehension, picture/word matching and deficits in expressive language marked by reduced automatics, confrontational naming, and repetition at the word and sentence level. Noted dysarthric speech characterized by decreased articulatory precision, low vocal volume ?2/2 reduced breath support, and impaired prosody. SLP was able to understand patient <50% with context.

## 2018-04-06 NOTE — DISCHARGE NOTE ADULT - PATIENT PORTAL LINK FT
You can access the [employer] Patient Portal, offered by [facility], by registering with the following website: [email or web address]

## 2018-04-10 DIAGNOSIS — R32 UNSPECIFIED URINARY INCONTINENCE: ICD-10-CM

## 2018-04-10 DIAGNOSIS — I69.351 HEMIPLEGIA AND HEMIPARESIS FOLLOWING CEREBRAL INFARCTION AFFECTING RIGHT DOMINANT SIDE: ICD-10-CM

## 2018-04-10 DIAGNOSIS — R21 RASH AND OTHER NONSPECIFIC SKIN ERUPTION: ICD-10-CM

## 2018-04-10 DIAGNOSIS — F03.90 UNSPECIFIED DEMENTIA WITHOUT BEHAVIORAL DISTURBANCE: ICD-10-CM

## 2018-04-10 DIAGNOSIS — M17.0 BILATERAL PRIMARY OSTEOARTHRITIS OF KNEE: ICD-10-CM

## 2018-04-10 DIAGNOSIS — M10.9 GOUT, UNSPECIFIED: ICD-10-CM

## 2018-04-10 DIAGNOSIS — E11.9 TYPE 2 DIABETES MELLITUS WITHOUT COMPLICATIONS: ICD-10-CM

## 2018-04-10 DIAGNOSIS — I69.398 OTHER SEQUELAE OF CEREBRAL INFARCTION: ICD-10-CM

## 2018-04-10 DIAGNOSIS — M19.012 PRIMARY OSTEOARTHRITIS, LEFT SHOULDER: ICD-10-CM

## 2018-04-10 DIAGNOSIS — Z79.82 LONG TERM (CURRENT) USE OF ASPIRIN: ICD-10-CM

## 2018-04-10 DIAGNOSIS — G40.909 EPILEPSY, UNSPECIFIED, NOT INTRACTABLE, WITHOUT STATUS EPILEPTICUS: ICD-10-CM

## 2018-04-10 DIAGNOSIS — I69.998 OTHER SEQUELAE FOLLOWING UNSPECIFIED CEREBROVASCULAR DISEASE: ICD-10-CM

## 2018-04-10 DIAGNOSIS — J45.909 UNSPECIFIED ASTHMA, UNCOMPLICATED: ICD-10-CM

## 2018-04-10 DIAGNOSIS — M19.011 PRIMARY OSTEOARTHRITIS, RIGHT SHOULDER: ICD-10-CM

## 2018-04-10 DIAGNOSIS — Z87.442 PERSONAL HISTORY OF URINARY CALCULI: ICD-10-CM

## 2018-04-10 DIAGNOSIS — I10 ESSENTIAL (PRIMARY) HYPERTENSION: ICD-10-CM

## 2019-02-14 ENCOUNTER — EMERGENCY (EMERGENCY)
Facility: HOSPITAL | Age: 69
LOS: 0 days | Discharge: HOME | End: 2019-02-14
Attending: EMERGENCY MEDICINE
Payer: COMMERCIAL

## 2019-02-14 VITALS
OXYGEN SATURATION: 100 % | SYSTOLIC BLOOD PRESSURE: 188 MMHG | WEIGHT: 139.99 LBS | TEMPERATURE: 99 F | HEART RATE: 81 BPM | DIASTOLIC BLOOD PRESSURE: 86 MMHG | HEIGHT: 63 IN | RESPIRATION RATE: 16 BRPM

## 2019-02-14 VITALS
TEMPERATURE: 99 F | DIASTOLIC BLOOD PRESSURE: 81 MMHG | OXYGEN SATURATION: 99 % | RESPIRATION RATE: 17 BRPM | HEART RATE: 80 BPM | SYSTOLIC BLOOD PRESSURE: 166 MMHG

## 2019-02-14 DIAGNOSIS — Z04.3 ENCOUNTER FOR EXAMINATION AND OBSERVATION FOLLOWING OTHER ACCIDENT: ICD-10-CM

## 2019-02-14 DIAGNOSIS — T14.90XA INJURY, UNSPECIFIED, INITIAL ENCOUNTER: ICD-10-CM

## 2019-02-14 DIAGNOSIS — M54.2 CERVICALGIA: ICD-10-CM
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
## 2019-02-14 DIAGNOSIS — N20.0 CALCULUS OF KIDNEY: Chronic | ICD-10-CM
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
## 2019-02-14 DIAGNOSIS — Z79.82 LONG TERM (CURRENT) USE OF ASPIRIN: ICD-10-CM

## 2019-02-14 LAB
ALBUMIN SERPL ELPH-MCNC: 4 G/DL — SIGNIFICANT CHANGE UP (ref 3.3–5)
ALP SERPL-CCNC: 79 U/L — SIGNIFICANT CHANGE UP (ref 40–120)
ALT FLD-CCNC: 20 U/L — SIGNIFICANT CHANGE UP (ref 12–78)
ANION GAP SERPL CALC-SCNC: 8 MMOL/L — SIGNIFICANT CHANGE UP (ref 5–17)
APTT BLD: 30.8 SEC — SIGNIFICANT CHANGE UP (ref 28.5–37)
AST SERPL-CCNC: 17 U/L — SIGNIFICANT CHANGE UP (ref 15–37)
BILIRUB SERPL-MCNC: 0.8 MG/DL — SIGNIFICANT CHANGE UP (ref 0.2–1.2)
BUN SERPL-MCNC: 20 MG/DL — SIGNIFICANT CHANGE UP (ref 7–23)
CALCIUM SERPL-MCNC: 9 MG/DL — SIGNIFICANT CHANGE UP (ref 8.5–10.1)
CHLORIDE SERPL-SCNC: 110 MMOL/L — HIGH (ref 96–108)
CK SERPL-CCNC: 53 U/L — SIGNIFICANT CHANGE UP (ref 26–192)
CO2 SERPL-SCNC: 28 MMOL/L — SIGNIFICANT CHANGE UP (ref 22–31)
CREAT SERPL-MCNC: 1.18 MG/DL — SIGNIFICANT CHANGE UP (ref 0.5–1.3)
GLUCOSE BLDC GLUCOMTR-MCNC: 156 MG/DL — HIGH (ref 70–99)
GLUCOSE SERPL-MCNC: 148 MG/DL — HIGH (ref 70–99)
HCT VFR BLD CALC: 39.1 % — SIGNIFICANT CHANGE UP (ref 34.5–45)
HGB BLD-MCNC: 12.9 G/DL — SIGNIFICANT CHANGE UP (ref 11.5–15.5)
INR BLD: 1.11 RATIO — SIGNIFICANT CHANGE UP (ref 0.88–1.16)
MCHC RBC-ENTMCNC: 29.3 PG — SIGNIFICANT CHANGE UP (ref 27–34)
MCHC RBC-ENTMCNC: 33 GM/DL — SIGNIFICANT CHANGE UP (ref 32–36)
MCV RBC AUTO: 88.9 FL — SIGNIFICANT CHANGE UP (ref 80–100)
NRBC # BLD: 0 /100 WBCS — SIGNIFICANT CHANGE UP (ref 0–0)
PLATELET # BLD AUTO: 295 K/UL — SIGNIFICANT CHANGE UP (ref 150–400)
POTASSIUM SERPL-MCNC: 4.1 MMOL/L — SIGNIFICANT CHANGE UP (ref 3.5–5.3)
POTASSIUM SERPL-SCNC: 4.1 MMOL/L — SIGNIFICANT CHANGE UP (ref 3.5–5.3)
PROT SERPL-MCNC: 8.1 GM/DL — SIGNIFICANT CHANGE UP (ref 6–8.3)
PROTHROM AB SERPL-ACNC: 12.5 SEC — SIGNIFICANT CHANGE UP (ref 10–12.9)
RBC # BLD: 4.4 M/UL — SIGNIFICANT CHANGE UP (ref 3.8–5.2)
RBC # FLD: 12.3 % — SIGNIFICANT CHANGE UP (ref 10.3–14.5)
SODIUM SERPL-SCNC: 146 MMOL/L — HIGH (ref 135–145)
TROPONIN I SERPL-MCNC: <.015 NG/ML — SIGNIFICANT CHANGE UP (ref 0.01–0.04)
TROPONIN I SERPL-MCNC: <.015 NG/ML — SIGNIFICANT CHANGE UP (ref 0.01–0.04)
WBC # BLD: 5.83 K/UL — SIGNIFICANT CHANGE UP (ref 3.8–10.5)
WBC # FLD AUTO: 5.83 K/UL — SIGNIFICANT CHANGE UP (ref 3.8–10.5)

## 2019-02-14 PROCEDURE — 72125 CT NECK SPINE W/O DYE: CPT | Mod: 26

## 2019-02-14 PROCEDURE — 76377 3D RENDER W/INTRP POSTPROCES: CPT | Mod: 26

## 2019-02-14 PROCEDURE — 99285 EMERGENCY DEPT VISIT HI MDM: CPT

## 2019-02-14 PROCEDURE — 70450 CT HEAD/BRAIN W/O DYE: CPT | Mod: 26

## 2019-02-14 PROCEDURE — 71045 X-RAY EXAM CHEST 1 VIEW: CPT | Mod: 26

## 2019-02-14 RX ORDER — SODIUM CHLORIDE 9 MG/ML
500 INJECTION INTRAMUSCULAR; INTRAVENOUS; SUBCUTANEOUS ONCE
Qty: 0 | Refills: 0 | Status: COMPLETED | OUTPATIENT
Start: 2019-02-14 | End: 2019-02-14

## 2019-02-14 RX ADMIN — SODIUM CHLORIDE 500 MILLILITER(S): 9 INJECTION INTRAMUSCULAR; INTRAVENOUS; SUBCUTANEOUS at 13:50

## 2019-02-14 NOTE — ED PROVIDER NOTE - OBJECTIVE STATEMENT
69yo female with pmh dementia, htn, seizure, borderline DM, h/o cva presents s/p fall. Pt was seen in bed at 430am in bed. Found by son at 5am right outside bathroom, awake and alert. Pt just c/o of mild neck pain. pt is AOX2 and at baseline per aids. Pt ambulates with cane in the house and was not found with cane. Per aid, pt does walk without cane and falls occasionally.    ROS: No fever/chills. No photophobia/eye pain/changes in vision, No ear pain/sore throat/dysphagia, No chest pain/palpitations. No SOB/cough/stridor. No abdominal pain, N/V/D, no black/bloody bm. No dysuria/frequency/discharge, No headache. No Dizziness.  No rash.  No numbness/tingling/weakness.

## 2019-02-14 NOTE — ED PROVIDER NOTE - NS_EDPROVIDERDISPOUSERTYPE_ED_A_ED
Attending Attestation (For Attendings USE Only)...
no cough/no wheezing/no pleuritic chest pain/no hemoptysis/no dyspnea

## 2019-02-14 NOTE — ED PROVIDER NOTE - CLINICAL SUMMARY MEDICAL DECISION MAKING FREE TEXT BOX
Pt with fall, likely from dementia and ambulating without cane. CE neg x 2. CT scan demonstrates no acute pathology. Pt nontoxic and well appearing. Discussed results and outcome of testing with the patient.  Patient given copy of available results. Patient advised to please follow up with their PMD within the next 24 hours and return to the Emergency Department for worsening symptoms or any other concerns.

## 2019-02-14 NOTE — ED PROVIDER NOTE - PHYSICAL EXAMINATION
Gen: Alert, Well appearing. NAD    Head: NC, AT, PERRL, EOMI, normal lids/conjunctiva   ENT: Bilateral TM WNL, normal hearing, patent oropharynx without erythema/exudate, uvula midline  Neck: supple, no tenderness/meningismus/JVD   Pulm: Bilateral clear BS, normal resp effort, no wheeze/stridor/retractions  CV: RRR, no M/R/G, +dist pulses   Abd: soft, NT/ND, +BS, no guarding/rebound tenderness  Mskel: no edema/erythema/cyanosis   Skin: no rash   Neuro: AAOx2, no sensory/motor deficits, CN 2-12 intact

## 2019-02-14 NOTE — ED ADULT TRIAGE NOTE - CHIEF COMPLAINT QUOTE
Per home aide pt was found sitting on the floor outside bathroom c/o lower back and back of head pain, unsure of any loc. Pt have hx of dementia

## 2019-02-14 NOTE — ED ADULT NURSE NOTE - INTERVENTIONS DEFINITIONS
Stretcher in lowest position, wheels locked, appropriate side rails in place/Non-slip footwear when patient is off stretcher

## 2019-03-01 ENCOUNTER — OUTPATIENT (OUTPATIENT)
Dept: OUTPATIENT SERVICES | Facility: HOSPITAL | Age: 69
LOS: 1 days | End: 2019-03-01
Payer: MEDICARE

## 2019-03-01 DIAGNOSIS — N20.0 CALCULUS OF KIDNEY: Chronic | ICD-10-CM

## 2019-03-01 PROCEDURE — G9001: CPT

## 2019-03-06 DIAGNOSIS — Z71.89 OTHER SPECIFIED COUNSELING: ICD-10-CM

## 2020-01-01 NOTE — SWALLOW BEDSIDE ASSESSMENT ADULT - SLP GENERAL OBSERVATIONS
Patient:   JEMMA BOUCHER            MRN: [id number]            FIN: [id number]              Age:   8 hours     Sex:  FEMALE     :  19   Associated Diagnoses:    affected by delivery by vacuum extractor [ventouse];  affected by premature rupture of membranes; Single liveborn infant, delivered vaginally  Author:   TAE SAUNDERS     Basic Information   Patient Information:  Date of admission  2020.         Age assessment: Current age: ( 0  days, 39  weeks ).         Growth parameters: Birth Measurements   20 00:05 CDT Birth Length 20.5 cm    Birth Weight 3.330 kg    Birth Head Circumference 33 cm    Birth Chest Circumference 34.5 cm    Birth Abdominal Circumference 33 cm    Size For Gestational Age (or GA) Appropriate For Gestational Age     .    Present at bedside:  Mother, Medical personnel.      Health Status   Allergies:    Allergic Reactions (All)  NKA   Current medications:    Medications (1) Active  Scheduled: (1)  Hepatitis B pediatric VACCINE 10 mcg/0.5 mL IM inj SDV  10 mcg 0.5 mL, IM, On Call  Continuous: (0)  PRN: (0)      Histories     Maternal History   Include maternal history : OB DOCUMENTATION FLOWSHEET   20 00:19 CDT Date/Time of Birth 20 23:47    Delivery Type Birth Vaginal, Vacuum Assist     Presentation at Delivery Occiput Posterior (OP)    EGA at Birth 39 weeks    Maternal Delivery Complications Prolonged Labor (Greater than 20 Hours)   Delivery Vacuum Type Manual Pump by Provider    Delivery Vacuum Number of Pop Offs 1    Umbilical Cord Description 3 Vessel Cord    Maternal Premature Rupture of Membranes No    Maternal Prolonged Rupture of Membranes Yes    Maternal Precipitous Labor No    Maternal Prolonged Labor Yes    Rupture Membranes To Deliv Hours Calc 12.45   20 00:05 CDT Gender Female    Birth Length 20.5 cm    Birth Weight 3.330 kg    Birth Head Circumference 33 cm    Birth Chest Circumference 34.5 cm    Birth Abdominal  Circumference 33 cm    Size For Gestational Age (or GA) Appropriate For Gestational Age   Delivery Complications Fetus Prolonged Rupture of Membranes, Other: thin meconium, nochal times 1    Complications Per Provider None    Resuscitation at Birth Routine Stimulation, Suction    Apgar 1 Minute by History 9    Apgar 5 Minute by History 9   20 11:14 CDT Maternal Anesthesia Type Epidural   20 20:47 CDT Maternal Labor Onset Methods Induced    Maternal Induction Methods Dinoprostone   20 19:01 CDT Maternal Rupture Of Membranes Type Spontaneous Rupture   Maternal Rupture Of Membranes Date/Time 20 08:00    Maternal Amniotic Fluid Color Clear   20 17:39 CDT Maternal Risk Factors in Utero Other: GALLSTONES   Maternal  1   20 23:05 CDT Maternal Blood Type Transcribed AB Positive    Maternal HBsAg Transcribed Negative    Maternal RPR Transcribed Non Reactive    Maternal Rubella Transcribed Immune    Maternal HIV Transcribed Negative    Maternal HIV Transcribed 2 Negative        History     Physical Examination   VS/Measurements   Measurements from flowsheet : Height and Weight   20 00:25 CDT CLINICALWEIGHT 3.330 kg    Weight Method Measured    MEDDOSEWT 3.330 kg    CLINICALHEIGHT 52 cm    Height Method Measured    Weight Scale Bed    BSA - Medical History 0.21    BMI-Medical History 12.3 kg/m2     ,    Vitals between:   2020 05:51:58   TO   2020 05:51:58                   LAST RESULT MINIMUM MAXIMUM  Temperature 36.9 36.9 37.3  Heart Rate 142 136 160  Respiratory Rate 46 46 60    General:  No acute distress.    Eye:  Normal conjunctiva.         Red reflex: Bilaterally.    HENT:  Normocephalic, Anterior fontanelle open/soft/flat, Ears normally set and rotated, Nares patent, Palate intact.   Neck:  Supple, Full range of motion, Clavicles intact.    Respiratory:  Lungs are clear to auscultation, Respirations are non-labored, Breath sounds are equal,  Symmetrical chest wall expansion.   Cardiovascular:  Normal rate, Regular rhythm, No murmur, Good pulses equal in all extremities, Normal peripheral perfusion.   Gastrointestinal:  Soft, Non-distended, Normal bowel sounds, No organomegaly, 3 vessel umbilical cord, Anus patent.   Genitourinary:  Normal genitalia for age and sex.    Musculoskeletal     Normal range of motion.     Normal strength.     No deformity.     No hip clicks.     Normal Lemus's.     Normal Ortolani's.     Integumentary:  Warm, Dry, Pink, Intact, No rash.    Neurologic:  Alert, Normal sensory, Normal motor function, Moves all extremities appropriately, Sergey, rooting, sucking reflexes are normal, No focal deficits, Gag reflex normal.      Review / Management   Results review:     No Qualifying Labs are resulted on this patient in the last 24 hours  .      Health Maintenance   Medication Administered:  Medication administered Hepatitis B vaccine (Ordered), Phytonadione, and erythromycin 0.5% ophthalmic ointment applied in both eyes.   Care Practices/ Screens   Hearing screen: prior to discharge.    state screen.     Routine  Care  1. Anticipatory guidance provided for parents at bedside.  2. Breastfeed ad gema. Lactation consultation as needed.  3. Hepatitis B vaccine ordered.  4. Hearing screen prior to discharge.  5. Call Screen to be done at 24 hours  6. Bilirubin at 24 hours   7. Cardiac Screen prior to discharge  8. PCP: Dr. Leach  --will talk to mom regarding making appt with pediatrician 1-4 days after discharge.  9. Spoken to RNs.         Impression and Plan   Diagnosis     Call affected by delivery by vacuum extractor [ventouse] (FVT32-XN P03.3, Diagnosis, Hospitalized, Medical).    Call affected by premature rupture of membranes (WMN70-BQ P01.1, Diagnosis, Hospitalized, Medical).    Single liveborn infant, delivered vaginally (DUK81-OF Z38.00, Diagnosis, Hospitalized, Medical).    Course:  Progressing as  expected.    Education and Follow-up:       Discharge Planning: Plan to discharge ( In  2-3  days ).      Discharge Information   Disposition   Discharge to: home, with mother.     Diet/Nutrition   Breastfeed  Ad gema.   Formula  Similac 19 saúl asneeded.   Schedule: ad gema on demand.     Follow Up Appointments   Pediatrician  Dr. Leach Follow up in 2 days post discharge. Mom advised to make appointment prior to discharge..   Patient seen bedside initially lethargic however alertness increased as eval progressed, oriented to self and place. Patient inconsistently answered simple yes/no and want questions and followed one step directions and needed max auditory cues throughout eval. Noted speech dysarthric marked by decreased articulatory precision, low vocal volume ?2/2 decreased respiratory support and decreased rate of speech. Patient seen bedside initially lethargic however alertness increased as eval progressed, oriented to self and place. Patient inconsistently answered simple yes/no questions and want questions given repetition and cues. She inconsistently followed one step directions despite repetition and prompts. Noted speech dysarthric marked by decreased articulatory precision, low vocal volume ?2/2 reduced respiratory support and slow rate of speech.

## 2020-01-17 ENCOUNTER — INPATIENT (INPATIENT)
Facility: HOSPITAL | Age: 70
LOS: 16 days | Discharge: NURSING, REHAB OR LONG-TERM CARE | End: 2020-02-03
Attending: INTERNAL MEDICINE | Admitting: INTERNAL MEDICINE
Payer: MEDICARE

## 2020-01-17 VITALS
DIASTOLIC BLOOD PRESSURE: 90 MMHG | SYSTOLIC BLOOD PRESSURE: 145 MMHG | HEIGHT: 63 IN | OXYGEN SATURATION: 99 % | HEART RATE: 63 BPM | TEMPERATURE: 100 F | RESPIRATION RATE: 16 BRPM | WEIGHT: 139.99 LBS

## 2020-01-17 DIAGNOSIS — N20.0 CALCULUS OF KIDNEY: Chronic | ICD-10-CM

## 2020-01-17 LAB
ALBUMIN SERPL ELPH-MCNC: 3.1 G/DL — LOW (ref 3.3–5)
ALP SERPL-CCNC: 54 U/L — SIGNIFICANT CHANGE UP (ref 40–120)
ALT FLD-CCNC: 16 U/L — SIGNIFICANT CHANGE UP (ref 12–78)
ANION GAP SERPL CALC-SCNC: 5 MMOL/L — SIGNIFICANT CHANGE UP (ref 5–17)
APTT BLD: 26.7 SEC — LOW (ref 27.5–36.3)
AST SERPL-CCNC: 20 U/L — SIGNIFICANT CHANGE UP (ref 15–37)
BASOPHILS # BLD AUTO: 0.04 K/UL — SIGNIFICANT CHANGE UP (ref 0–0.2)
BASOPHILS NFR BLD AUTO: 0.5 % — SIGNIFICANT CHANGE UP (ref 0–2)
BILIRUB SERPL-MCNC: 0.9 MG/DL — SIGNIFICANT CHANGE UP (ref 0.2–1.2)
BUN SERPL-MCNC: 25 MG/DL — HIGH (ref 7–23)
CALCIUM SERPL-MCNC: 7.7 MG/DL — LOW (ref 8.5–10.1)
CHLORIDE SERPL-SCNC: 114 MMOL/L — HIGH (ref 96–108)
CO2 SERPL-SCNC: 23 MMOL/L — SIGNIFICANT CHANGE UP (ref 22–31)
CREAT SERPL-MCNC: 0.88 MG/DL — SIGNIFICANT CHANGE UP (ref 0.5–1.3)
EOSINOPHIL # BLD AUTO: 0 K/UL — SIGNIFICANT CHANGE UP (ref 0–0.5)
EOSINOPHIL NFR BLD AUTO: 0 % — SIGNIFICANT CHANGE UP (ref 0–6)
FLU A RESULT: SIGNIFICANT CHANGE UP
FLU A RESULT: SIGNIFICANT CHANGE UP
FLUAV AG NPH QL: SIGNIFICANT CHANGE UP
FLUBV AG NPH QL: SIGNIFICANT CHANGE UP
GLUCOSE SERPL-MCNC: 141 MG/DL — HIGH (ref 70–99)
HCT VFR BLD CALC: 40.5 % — SIGNIFICANT CHANGE UP (ref 34.5–45)
HGB BLD-MCNC: 13.5 G/DL — SIGNIFICANT CHANGE UP (ref 11.5–15.5)
IMM GRANULOCYTES NFR BLD AUTO: 0.2 % — SIGNIFICANT CHANGE UP (ref 0–1.5)
INR BLD: 1.11 RATIO — SIGNIFICANT CHANGE UP (ref 0.88–1.16)
LACTATE SERPL-SCNC: 0.9 MMOL/L — SIGNIFICANT CHANGE UP (ref 0.7–2)
LYMPHOCYTES # BLD AUTO: 2.25 K/UL — SIGNIFICANT CHANGE UP (ref 1–3.3)
LYMPHOCYTES # BLD AUTO: 26.1 % — SIGNIFICANT CHANGE UP (ref 13–44)
MAGNESIUM SERPL-MCNC: 2 MG/DL — SIGNIFICANT CHANGE UP (ref 1.6–2.6)
MCHC RBC-ENTMCNC: 29 PG — SIGNIFICANT CHANGE UP (ref 27–34)
MCHC RBC-ENTMCNC: 33.3 GM/DL — SIGNIFICANT CHANGE UP (ref 32–36)
MCV RBC AUTO: 87.1 FL — SIGNIFICANT CHANGE UP (ref 80–100)
MONOCYTES # BLD AUTO: 0.87 K/UL — SIGNIFICANT CHANGE UP (ref 0–0.9)
MONOCYTES NFR BLD AUTO: 10.1 % — SIGNIFICANT CHANGE UP (ref 2–14)
NEUTROPHILS # BLD AUTO: 5.43 K/UL — SIGNIFICANT CHANGE UP (ref 1.8–7.4)
NEUTROPHILS NFR BLD AUTO: 63.1 % — SIGNIFICANT CHANGE UP (ref 43–77)
NRBC # BLD: 0 /100 WBCS — SIGNIFICANT CHANGE UP (ref 0–0)
PLATELET # BLD AUTO: 250 K/UL — SIGNIFICANT CHANGE UP (ref 150–400)
POTASSIUM SERPL-MCNC: 3.1 MMOL/L — LOW (ref 3.5–5.3)
POTASSIUM SERPL-SCNC: 3.1 MMOL/L — LOW (ref 3.5–5.3)
PROT SERPL-MCNC: 7 GM/DL — SIGNIFICANT CHANGE UP (ref 6–8.3)
PROTHROM AB SERPL-ACNC: 12.5 SEC — SIGNIFICANT CHANGE UP (ref 10–12.9)
RBC # BLD: 4.65 M/UL — SIGNIFICANT CHANGE UP (ref 3.8–5.2)
RBC # FLD: 12.6 % — SIGNIFICANT CHANGE UP (ref 10.3–14.5)
RSV RESULT: SIGNIFICANT CHANGE UP
RSV RNA RESP QL NAA+PROBE: SIGNIFICANT CHANGE UP
SODIUM SERPL-SCNC: 142 MMOL/L — SIGNIFICANT CHANGE UP (ref 135–145)
TROPONIN I SERPL-MCNC: 0.02 NG/ML — SIGNIFICANT CHANGE UP (ref 0.01–0.04)
WBC # BLD: 8.61 K/UL — SIGNIFICANT CHANGE UP (ref 3.8–10.5)
WBC # FLD AUTO: 8.61 K/UL — SIGNIFICANT CHANGE UP (ref 3.8–10.5)

## 2020-01-17 PROCEDURE — 99285 EMERGENCY DEPT VISIT HI MDM: CPT

## 2020-01-17 NOTE — ED PROVIDER NOTE - OBJECTIVE STATEMENT
Pertinent PMH/PSH/FHx/SHx and Review of Systems contained within:      70 y/o F coming from home with daughter at bedside for fall on Saturday with generalized weakness since then with no advanced dementia. Prior to fall PT was verbal and could walk with assistance but since she fell has been laying in bed with reduced PO intake. Pt was noted to be shaking today and wont ambulate with assistance anymore. PT has no dyspnea, n/v, or paralysis and is moving all extremities. Pertinent PMH/PSH/FHx/SHx and Review of Systems contained within:      68 y/o F coming from home with daughter at bedside for fall on Saturday with generalized weakness since then. Prior to fall PT was verbal and could walk with assistance but since she fell has been laying in bed with reduced PO intake. Pt was noted to be shaking today and wont ambulate with assistance anymore. PT has no dyspnea, n/v, or paralysis and is moving all extremities.

## 2020-01-17 NOTE — ED PROVIDER NOTE - PHYSICAL EXAMINATION
Gen: Alert, NAD  Head: NC, AT, PERRL, EOMI, normal lids/conjunctiva  ENT: normal hearing, patent oropharynx without erythema/exudate, uvula midline  Neck: +supple, no tenderness/meningismus/JVD, +Trachea midline  Pulm: Bilateral BS, normal resp effort, no wheeze/stridor/retractions  CV: RRR, no M/R/G, +dist pulses  Abd: soft, NT/ND, Negative [place] signs, +BS, no palpable masses  Mskel: no edema/erythema/cyanosis  Skin: no rash, warm/dry  Neuro: AAOx1, no apparent sensory/motor deficits, coordination intact Gen: Alert, NAD, nonverbal  Head: NC, AT, PERRL, EOMI, normal lids/conjunctiva  ENT: normal hearing, patent oropharynx without erythema/exudate, uvula midline  Neck: +supple, no tenderness/meningismus/JVD, +Trachea midline  Pulm: Bilateral BS, normal resp effort, no wheeze/stridor/retractions  CV: RRR, no M/R/G, +dist pulses  Abd: soft, NT/ND, Negative [place]or signs, +BS, no palpable masses  Mskel: no edema/erythema/cyanosis, no joint swelling, good extremity tone  Skin: no rash, warm/dry  Neuro: AAOx1, no apparent sensory/motor deficits, coordination intact

## 2020-01-17 NOTE — ED PROVIDER NOTE - CLINICAL SUMMARY MEDICAL DECISION MAKING FREE TEXT BOX
Patient with weakness since fall last weak.  VSS.  Based on patient's work up today, definitive cause of patient's symptoms was not found, but no life threatening cause is elucidated at this time.  UA pending.  Patient is to be admitted to the hospital and the case was discussed with the admitting physician.  Any changes in plan, additional imaging/labs, and further work up will be at the discretion of the admitting physician. Patient with weakness since fall last weak.  VSS.  Based on patient's work up today, definitive cause of patient's symptoms was not found, but no life threatening cause is elucidated at this time.  UA pending.  CXR with calcification of aortic knob, no noted pelvic fractures, no signs of LE injury.  Patient is to be admitted to the hospital and the case was discussed with the admitting physician.  Any changes in plan, additional imaging/labs, and further work up will be at the discretion of the admitting physician.

## 2020-01-17 NOTE — ED PROVIDER NOTE - NS ED ROS FT
Gen: Alert, NAD, + general weakness  Head: NC, AT   Eyes: PERRL, EOMI, normal lids/conjunctiva  ENT: normal hearing, patent oropharynx without erythema/exudate, uvula midline  Neck: supple, no tenderness, Trachea midline  Pulm: Bilateral BS, normal resp effort, no wheeze/stridor/retractions  CV: RRR, no M/R/G, 2+ radial and dp pulses bl, no edema  Abd: soft, NT/ND, +BS, no hepatosplenomegaly  Mskel: extremities x4 with normal ROM and no joint effusions. no ctl spine ttp.   Skin: no rash, no bruising   Neuro: AAOx3, no sensory/motor deficits, CN 2-12 intact

## 2020-01-17 NOTE — ED ADULT TRIAGE NOTE - CHIEF COMPLAINT QUOTE
BIBA as per ems sister called, patient fell one week ago, unwitnessed,  now patient is not walking and not talking more than normal Hx CVA. on baby aspirin and plavix.

## 2020-01-17 NOTE — ED ADULT NURSE NOTE - NSIMPLEMENTINTERV_GEN_ALL_ED
Implemented All Fall Risk Interventions:  [place] to call system. Call bell, personal items and telephone within reach. Instruct patient to call for assistance. Room bathroom lighting operational. Non-slip footwear when patient is off stretcher. Physically safe environment: no spills, clutter or unnecessary equipment. Stretcher in lowest position, wheels locked, appropriate side rails in place. Provide visual cue, wrist band, yellow gown, etc. Monitor gait and stability. Monitor for mental status changes and reorient to person, place, and time. Review medications for side effects contributing to fall risk. Reinforce activity limits and safety measures with patient and family.

## 2020-01-18 DIAGNOSIS — E11.65 TYPE 2 DIABETES MELLITUS WITH HYPERGLYCEMIA: ICD-10-CM

## 2020-01-18 DIAGNOSIS — R53.1 WEAKNESS: ICD-10-CM

## 2020-01-18 DIAGNOSIS — I10 ESSENTIAL (PRIMARY) HYPERTENSION: ICD-10-CM

## 2020-01-18 DIAGNOSIS — G40.909 EPILEPSY, UNSPECIFIED, NOT INTRACTABLE, WITHOUT STATUS EPILEPTICUS: ICD-10-CM

## 2020-01-18 DIAGNOSIS — Z29.9 ENCOUNTER FOR PROPHYLACTIC MEASURES, UNSPECIFIED: ICD-10-CM

## 2020-01-18 LAB
APPEARANCE UR: ABNORMAL
BACTERIA # UR AUTO: ABNORMAL
BILIRUB UR-MCNC: NEGATIVE — SIGNIFICANT CHANGE UP
COLOR SPEC: YELLOW — SIGNIFICANT CHANGE UP
DIFF PNL FLD: ABNORMAL
EPI CELLS # UR: SIGNIFICANT CHANGE UP
GLUCOSE UR QL: NEGATIVE MG/DL — SIGNIFICANT CHANGE UP
GRAN CASTS # UR COMP ASSIST: ABNORMAL /LPF
KETONES UR-MCNC: ABNORMAL
LEUKOCYTE ESTERASE UR-ACNC: ABNORMAL
NITRITE UR-MCNC: NEGATIVE — SIGNIFICANT CHANGE UP
PH UR: 5 — SIGNIFICANT CHANGE UP (ref 5–8)
PROT UR-MCNC: 500 MG/DL
RBC CASTS # UR COMP ASSIST: >50 /HPF (ref 0–4)
SP GR SPEC: 1.02 — SIGNIFICANT CHANGE UP (ref 1.01–1.02)
UROBILINOGEN FLD QL: NEGATIVE MG/DL — SIGNIFICANT CHANGE UP
WBC UR QL: SIGNIFICANT CHANGE UP

## 2020-01-18 PROCEDURE — 99223 1ST HOSP IP/OBS HIGH 75: CPT

## 2020-01-18 PROCEDURE — 71045 X-RAY EXAM CHEST 1 VIEW: CPT | Mod: 26

## 2020-01-18 PROCEDURE — 70450 CT HEAD/BRAIN W/O DYE: CPT | Mod: 26

## 2020-01-18 PROCEDURE — 72170 X-RAY EXAM OF PELVIS: CPT | Mod: 26

## 2020-01-18 PROCEDURE — 72125 CT NECK SPINE W/O DYE: CPT | Mod: 26

## 2020-01-18 RX ORDER — HYDRALAZINE HCL 50 MG
50 TABLET ORAL THREE TIMES A DAY
Refills: 0 | Status: DISCONTINUED | OUTPATIENT
Start: 2020-01-18 | End: 2020-01-31

## 2020-01-18 RX ORDER — AMLODIPINE BESYLATE 2.5 MG/1
10 TABLET ORAL DAILY
Refills: 0 | Status: DISCONTINUED | OUTPATIENT
Start: 2020-01-18 | End: 2020-02-03

## 2020-01-18 RX ORDER — ASPIRIN/CALCIUM CARB/MAGNESIUM 324 MG
81 TABLET ORAL DAILY
Refills: 0 | Status: DISCONTINUED | OUTPATIENT
Start: 2020-01-18 | End: 2020-02-03

## 2020-01-18 RX ORDER — CLOPIDOGREL BISULFATE 75 MG/1
75 TABLET, FILM COATED ORAL DAILY
Refills: 0 | Status: DISCONTINUED | OUTPATIENT
Start: 2020-01-18 | End: 2020-02-03

## 2020-01-18 RX ORDER — SIMVASTATIN 20 MG/1
20 TABLET, FILM COATED ORAL AT BEDTIME
Refills: 0 | Status: DISCONTINUED | OUTPATIENT
Start: 2020-01-18 | End: 2020-01-22

## 2020-01-18 RX ORDER — LEVETIRACETAM 250 MG/1
750 TABLET, FILM COATED ORAL
Refills: 0 | Status: DISCONTINUED | OUTPATIENT
Start: 2020-01-18 | End: 2020-02-03

## 2020-01-18 RX ORDER — TRAZODONE HCL 50 MG
50 TABLET ORAL AT BEDTIME
Refills: 0 | Status: DISCONTINUED | OUTPATIENT
Start: 2020-01-18 | End: 2020-02-03

## 2020-01-18 RX ORDER — METOPROLOL TARTRATE 50 MG
100 TABLET ORAL
Refills: 0 | Status: DISCONTINUED | OUTPATIENT
Start: 2020-01-18 | End: 2020-01-21

## 2020-01-18 RX ORDER — POTASSIUM CHLORIDE 20 MEQ
10 PACKET (EA) ORAL
Refills: 0 | Status: COMPLETED | OUTPATIENT
Start: 2020-01-18 | End: 2020-01-18

## 2020-01-18 RX ADMIN — CLOPIDOGREL BISULFATE 75 MILLIGRAM(S): 75 TABLET, FILM COATED ORAL at 11:47

## 2020-01-18 RX ADMIN — Medication 100 MILLIGRAM(S): at 17:54

## 2020-01-18 RX ADMIN — Medication 81 MILLIGRAM(S): at 11:47

## 2020-01-18 RX ADMIN — Medication 100 MILLIEQUIVALENT(S): at 06:33

## 2020-01-18 RX ADMIN — Medication 100 MILLIEQUIVALENT(S): at 08:03

## 2020-01-18 RX ADMIN — Medication 50 MILLIGRAM(S): at 21:54

## 2020-01-18 RX ADMIN — Medication 50 MILLIGRAM(S): at 14:02

## 2020-01-18 RX ADMIN — LEVETIRACETAM 750 MILLIGRAM(S): 250 TABLET, FILM COATED ORAL at 17:54

## 2020-01-18 RX ADMIN — AMLODIPINE BESYLATE 10 MILLIGRAM(S): 2.5 TABLET ORAL at 11:47

## 2020-01-18 RX ADMIN — SIMVASTATIN 20 MILLIGRAM(S): 20 TABLET, FILM COATED ORAL at 21:54

## 2020-01-18 RX ADMIN — Medication 0.1 MILLIGRAM(S): at 17:54

## 2020-01-18 NOTE — PATIENT PROFILE ADULT - EQUIPMENT CURRENTLY USED AT HOME
pt confused, unable to provide accurate answer/s pt confused, unable to provide accurate answer/s/yes

## 2020-01-18 NOTE — H&P ADULT - ASSESSMENT
68 y/o female w/ PMH of HTN, epilepsy, multiple strokes and dementia w/recent fall and increased fatigue

## 2020-01-18 NOTE — CHART NOTE - NSCHARTNOTEFT_GEN_A_CORE
Patient was seen and examined, in NAD.  She has no pain when asked.    A/P  AMS.  Dementia  Follow up all cultures.  AM labs

## 2020-01-18 NOTE — PATIENT PROFILE ADULT - PRIMARY SOURCE OF SUPPORT/COMFORT
pt confused, unable to provide accurate answer/s pt confused, unable to provide accurate answer/s/child(guillermo)

## 2020-01-18 NOTE — H&P ADULT - NSHPPHYSICALEXAM_GEN_ALL_CORE
Vital Signs Last 24 Hrs  T(C): 36.9 (18 Jan 2020 04:49), Max: 37.6 (17 Jan 2020 19:35)  T(F): 98.4 (18 Jan 2020 04:49), Max: 99.6 (17 Jan 2020 19:35)  HR: 66 (18 Jan 2020 04:49) (63 - 72)  BP: 153/89 (18 Jan 2020 04:49) (145/90 - 181/77)  BP(mean): --  RR: 16 (18 Jan 2020 04:49) (12 - 17)  SpO2: 97% (18 Jan 2020 04:49) (97% - 99%)    PHYSICAL EXAM:  GENERAL: NAD, well-groomed, thin elderly female  HEAD:  Atraumatic, Normocephalic  EYES: EOMI, PERRLA, conjunctiva and sclera clear  ENMT: No tonsillar erythema, exudates, or enlargement; Moist mucous membranes, No lesions  NECK: Supple, No JVD, Normal thyroid  NERVOUS SYSTEM:  Alert & Oriented X1, CN 2-12 intact; no motor/sensory deficits  CHEST/LUNG: Clear to percussion bilaterally; No rales, rhonchi, wheezing, or rubs  HEART: Regular rate and rhythm; No murmurs, rubs, or gallops  ABDOMEN: Soft, Nontender, Nondistended; Bowel sounds present  EXTREMITIES:  2+ Peripheral Pulses, No clubbing, cyanosis, or edema  LYMPH: No lymphadenopathy noted    IMPROVE VTE Individual Risk Assessment        RISK                                                          Points  [  ] Previous VTE                                                3  [  ] Thrombophilia                                             2  [  ] Lower limb paralysis                                    2        (unable to hold up >15 seconds)    [  ] Current Cancer                                             2         (within 6 months)  [ x ] Immobilization > 24 hrs                              1  [  ] ICU/CCU stay > 24 hours                            1  [ x ] Age > 60                                                    1  IMPROVE VTE Score ____2_____

## 2020-01-18 NOTE — H&P ADULT - HISTORY OF PRESENT ILLNESS
Pt is a 68 y/o female w/ PMH of HTN, epilepsy, multiple strokes and dementia pt reported by daughter to have fallen last week(6 days ago) no injury but since pt has been eating less, more fatigued, pt w/o specific complians, no fever, chills, sob,cp, palpitations, n/v/d/c, no travels.  pt normally ambulates w/assistance but over last week doesnt want to try

## 2020-01-18 NOTE — H&P ADULT - NSICDXPASTMEDICALHX_GEN_ALL_CORE_FT
PAST MEDICAL HISTORY:  Asthma     CVA (cerebral infarction) right side weakness    Diabetes     Gout     Hypertension     OA (osteoarthritis) bautista knees, low back  and  bautista shoulders    Seizure disorder     Urinary incontinence     Vision changes lt eye

## 2020-01-18 NOTE — ED ADULT NURSE REASSESSMENT NOTE - NS ED NURSE REASSESS COMMENT FT1
patient received, alert and oriented to name only, patient has dementia, able to nod yes when asked questions, unable to talk, when spoke to md maria, she stated that that's her baseline, md aldana paged for potassium and calcium level, waiting for call back. report given to nurse chapman. vitals stable.

## 2020-01-18 NOTE — PATIENT PROFILE ADULT - NSPROPTRIGHTBILLOFRIGHTS_GEN_A_NUR
pt confused, unable to provide accurate answer/s pt confused, unable to provide accurate answer/s/patient representative

## 2020-01-18 NOTE — H&P ADULT - NSHPLABSRESULTS_GEN_ALL_CORE
LABS:                        13.5   8.61  )-----------( 250      ( 2020 22:59 )             40.5         142  |  114<H>  |  25<H>  ----------------------------<  141<H>  3.1<L>   |  23  |  0.88    Ca    7.7<L>      2020 23:25  Mg     2.0         TPro  7.0  /  Alb  3.1<L>  /  TBili  0.9  /  DBili  x   /  AST  20  /  ALT  16  /  AlkPhos  54      PT/INR - ( 2020 22:59 )   PT: 12.5 sec;   INR: 1.11 ratio         PTT - ( 2020 22:59 )  PTT:26.7 sec  Urinalysis Basic - ( 2020 01:50 )    Color: Yellow / Appearance: Slightly Turbid / S.025 / pH: x  Gluc: x / Ketone: Trace  / Bili: Negative / Urobili: Negative mg/dL   Blood: x / Protein: 500 mg/dL / Nitrite: Negative   Leuk Esterase: Trace / RBC: >50 /HPF / WBC 3-5   Sq Epi: x / Non Sq Epi: Occasional / Bacteria: Few      CAPILLARY BLOOD GLUCOSE            RADIOLOGY & ADDITIONAL TESTS:    Imaging Personally Reviewed:  [ X] YES  [ ] NO

## 2020-01-18 NOTE — PATIENT PROFILE ADULT - PRIMARY ROLES/RESPONSIBILITIES
pt confused, unable to provide accurate answer/s pt confused, unable to provide accurate answer/s/none

## 2020-01-19 LAB
ANION GAP SERPL CALC-SCNC: 8 MMOL/L — SIGNIFICANT CHANGE UP (ref 5–17)
BUN SERPL-MCNC: 26 MG/DL — HIGH (ref 7–23)
CALCIUM SERPL-MCNC: 9 MG/DL — SIGNIFICANT CHANGE UP (ref 8.5–10.1)
CHLORIDE SERPL-SCNC: 108 MMOL/L — SIGNIFICANT CHANGE UP (ref 96–108)
CO2 SERPL-SCNC: 27 MMOL/L — SIGNIFICANT CHANGE UP (ref 22–31)
CREAT SERPL-MCNC: 1.11 MG/DL — SIGNIFICANT CHANGE UP (ref 0.5–1.3)
CULTURE RESULTS: NO GROWTH — SIGNIFICANT CHANGE UP
GLUCOSE SERPL-MCNC: 163 MG/DL — HIGH (ref 70–99)
HCT VFR BLD CALC: 39 % — SIGNIFICANT CHANGE UP (ref 34.5–45)
HCV AB S/CO SERPL IA: 0.12 S/CO — SIGNIFICANT CHANGE UP (ref 0–0.99)
HCV AB SERPL-IMP: SIGNIFICANT CHANGE UP
HGB BLD-MCNC: 13.1 G/DL — SIGNIFICANT CHANGE UP (ref 11.5–15.5)
MCHC RBC-ENTMCNC: 28.9 PG — SIGNIFICANT CHANGE UP (ref 27–34)
MCHC RBC-ENTMCNC: 33.6 GM/DL — SIGNIFICANT CHANGE UP (ref 32–36)
MCV RBC AUTO: 86.1 FL — SIGNIFICANT CHANGE UP (ref 80–100)
NRBC # BLD: 0 /100 WBCS — SIGNIFICANT CHANGE UP (ref 0–0)
PLATELET # BLD AUTO: 260 K/UL — SIGNIFICANT CHANGE UP (ref 150–400)
POTASSIUM SERPL-MCNC: 3.4 MMOL/L — LOW (ref 3.5–5.3)
POTASSIUM SERPL-SCNC: 3.4 MMOL/L — LOW (ref 3.5–5.3)
RBC # BLD: 4.53 M/UL — SIGNIFICANT CHANGE UP (ref 3.8–5.2)
RBC # FLD: 12.2 % — SIGNIFICANT CHANGE UP (ref 10.3–14.5)
SODIUM SERPL-SCNC: 143 MMOL/L — SIGNIFICANT CHANGE UP (ref 135–145)
SPECIMEN SOURCE: SIGNIFICANT CHANGE UP
WBC # BLD: 7.4 K/UL — SIGNIFICANT CHANGE UP (ref 3.8–10.5)
WBC # FLD AUTO: 7.4 K/UL — SIGNIFICANT CHANGE UP (ref 3.8–10.5)

## 2020-01-19 PROCEDURE — 99233 SBSQ HOSP IP/OBS HIGH 50: CPT

## 2020-01-19 RX ORDER — ENOXAPARIN SODIUM 100 MG/ML
40 INJECTION SUBCUTANEOUS DAILY
Refills: 0 | Status: DISCONTINUED | OUTPATIENT
Start: 2020-01-19 | End: 2020-02-03

## 2020-01-19 RX ORDER — POTASSIUM CHLORIDE 20 MEQ
40 PACKET (EA) ORAL ONCE
Refills: 0 | Status: COMPLETED | OUTPATIENT
Start: 2020-01-19 | End: 2020-01-19

## 2020-01-19 RX ADMIN — LEVETIRACETAM 750 MILLIGRAM(S): 250 TABLET, FILM COATED ORAL at 05:54

## 2020-01-19 RX ADMIN — Medication 50 MILLIGRAM(S): at 22:54

## 2020-01-19 RX ADMIN — Medication 0.1 MILLIGRAM(S): at 19:46

## 2020-01-19 RX ADMIN — Medication 0.1 MILLIGRAM(S): at 05:54

## 2020-01-19 RX ADMIN — Medication 50 MILLIGRAM(S): at 05:54

## 2020-01-19 RX ADMIN — ENOXAPARIN SODIUM 40 MILLIGRAM(S): 100 INJECTION SUBCUTANEOUS at 14:36

## 2020-01-19 RX ADMIN — CLOPIDOGREL BISULFATE 75 MILLIGRAM(S): 75 TABLET, FILM COATED ORAL at 11:36

## 2020-01-19 RX ADMIN — AMLODIPINE BESYLATE 10 MILLIGRAM(S): 2.5 TABLET ORAL at 05:54

## 2020-01-19 RX ADMIN — Medication 40 MILLIEQUIVALENT(S): at 11:39

## 2020-01-19 RX ADMIN — LEVETIRACETAM 750 MILLIGRAM(S): 250 TABLET, FILM COATED ORAL at 17:09

## 2020-01-19 RX ADMIN — SIMVASTATIN 20 MILLIGRAM(S): 20 TABLET, FILM COATED ORAL at 22:54

## 2020-01-19 RX ADMIN — Medication 81 MILLIGRAM(S): at 11:39

## 2020-01-19 RX ADMIN — Medication 100 MILLIGRAM(S): at 05:54

## 2020-01-19 RX ADMIN — Medication 50 MILLIGRAM(S): at 14:37

## 2020-01-19 RX ADMIN — Medication 100 MILLIGRAM(S): at 17:08

## 2020-01-19 NOTE — PROGRESS NOTE ADULT - ASSESSMENT
70 y/o woman PMH multiple CVA and seizures presents after LOC with right sided shaking; keppra level sent; no acute findings on CT.      A/P    Generalized weakness.    -PT consult  - blood and urine cult: negative so far.  -no source for infection.      Seizure disorder.    - on  keppra.   - neurology eval    Type 2 diabetes mellitus with hyperglycemia, without long-term current use of insulin.  - on insulin coverage.      Essential hypertension.    - norvasc, clonidine, metoprolol, hydralazine.  - continue to monitor    Hypokalemia  - replete potassium  - AM labs     Preventive measure.    - heparin sub Q    Follow up all cultures final reports.  Blood and urine cult - negative so far..    PT eval, recs rehab    D/c planning, patient at her baseline.

## 2020-01-20 LAB
TSH SERPL-MCNC: 2.21 UIU/ML — SIGNIFICANT CHANGE UP (ref 0.36–3.74)
VIT B12 SERPL-MCNC: 552 PG/ML — SIGNIFICANT CHANGE UP (ref 232–1245)

## 2020-01-20 PROCEDURE — 99232 SBSQ HOSP IP/OBS MODERATE 35: CPT

## 2020-01-20 RX ADMIN — Medication 50 MILLIGRAM(S): at 13:39

## 2020-01-20 RX ADMIN — ENOXAPARIN SODIUM 40 MILLIGRAM(S): 100 INJECTION SUBCUTANEOUS at 11:34

## 2020-01-20 RX ADMIN — CLOPIDOGREL BISULFATE 75 MILLIGRAM(S): 75 TABLET, FILM COATED ORAL at 11:34

## 2020-01-20 RX ADMIN — Medication 81 MILLIGRAM(S): at 11:36

## 2020-01-20 RX ADMIN — Medication 50 MILLIGRAM(S): at 06:49

## 2020-01-20 RX ADMIN — Medication 100 MILLIGRAM(S): at 17:12

## 2020-01-20 RX ADMIN — Medication 50 MILLIGRAM(S): at 22:43

## 2020-01-20 RX ADMIN — SIMVASTATIN 20 MILLIGRAM(S): 20 TABLET, FILM COATED ORAL at 22:44

## 2020-01-20 RX ADMIN — Medication 0.1 MILLIGRAM(S): at 06:50

## 2020-01-20 RX ADMIN — LEVETIRACETAM 750 MILLIGRAM(S): 250 TABLET, FILM COATED ORAL at 06:49

## 2020-01-20 RX ADMIN — Medication 50 MILLIGRAM(S): at 22:45

## 2020-01-20 RX ADMIN — LEVETIRACETAM 750 MILLIGRAM(S): 250 TABLET, FILM COATED ORAL at 17:11

## 2020-01-20 RX ADMIN — Medication 0.1 MILLIGRAM(S): at 17:11

## 2020-01-20 RX ADMIN — AMLODIPINE BESYLATE 10 MILLIGRAM(S): 2.5 TABLET ORAL at 06:48

## 2020-01-20 NOTE — PROGRESS NOTE ADULT - ASSESSMENT
70 y/o woman PMH multiple CVA and seizures presents after LOC with right sided shaking; keppra level sent; no acute findings on CT.      A/P    Generalized weakness.    -PT consult- [facility] placement   - blood and urine cult: negative so far.  -no source for infection.      Seizure disorder.    - on  keppra.   - neurology eval    Type 2 diabetes mellitus with hyperglycemia, without long-term current use of insulin.  - on insulin coverage.      Essential hypertension.    - norvasc, clonidine, metoprolol, hydralazine.  - continue to monitor    Hypokalemia  - replete potassium  - AM labs     Preventive measure.    - heparin sub Q    Follow up all cultures final reports.  Blood and urine cult - negative so far..    PT eval, recs rehab    D/c planning, patient at her baseline.

## 2020-01-20 NOTE — PROGRESS NOTE ADULT - ASSESSMENT
Subjective Complaints:  Historian:             Vital Signs Last 24 Hrs  T(C): 36.3 (20 Jan 2020 17:25), Max: 37.1 (19 Jan 2020 23:40)  T(F): 97.3 (20 Jan 2020 17:25), Max: 98.7 (19 Jan 2020 23:40)  HR: 56 (20 Jan 2020 22:21) (56 - 97)  BP: 144/54 (20 Jan 2020 22:21) (139/51 - 162/52)  BP(mean): --  RR: 17 (20 Jan 2020 17:25) (16 - 18)  SpO2: 99% (20 Jan 2020 17:25) (97% - 100%)    GENERAL PHYSICAL EXAM:  General:  Appears stated age, well-groomed, well-nourished, no distress  HEENT:  NC/AT, patent nares w/ pink mucosa, OP clear w/o lesions, PERRL, EOMI, conjunctivae clear, no thyromegaly, nodules, adenopathy, no JVD  Chest:  Full & symmetric excursion, no increased effort, breath sounds clear  Cardiovascular:  Regular rhythm, S1, S2, no murmur/rub/S3/S4, no carotid/femoral/abdominal bruit, radial/pedal pulses 2+, no edema  Abdomen:  Soft, non-tender, non-distended, normoactive bowel sounds, no HSM  Extremities:  Gait & station:   Digits:   Nails:   Joints, Bones, Muscles:   ROM:   Stability:  Skin:  No rash/erythema/ecchymoses/petechiae/wounds/abscess/warm/dry  Musculoskeletal:  Full ROM in all joints w/o swelling/tenderness/effusion        LABS:                        13.1   7.40  )-----------( 260      ( 19 Jan 2020 07:18 )             39.0     01-19    143  |  108  |  26<H>  ----------------------------<  163<H>  3.4<L>   |  27  |  1.11    Ca    9.0      19 Jan 2020 07:18            RADIOLOGY & ADDITIONAL STUDIES:        Neurology Progress Note:      Mental Status: awake   alert   speech fluent   follows commands       Cranial Nerves: 2 1/2intact      Motor:   arm leg 3/4         Sensory:intact      Cerebellar: defrd      Gait:  unsteady gait       Assesment/Plan:  hx of seziure dementia pooe memory poor historian  for [facility]  will follow

## 2020-01-21 ENCOUNTER — TRANSCRIPTION ENCOUNTER (OUTPATIENT)
Age: 70
End: 2020-01-21

## 2020-01-21 LAB
ALBUMIN SERPL ELPH-MCNC: 3.4 G/DL — SIGNIFICANT CHANGE UP (ref 3.3–5)
ALP SERPL-CCNC: 68 U/L — SIGNIFICANT CHANGE UP (ref 40–120)
ALT FLD-CCNC: 17 U/L — SIGNIFICANT CHANGE UP (ref 12–78)
ANION GAP SERPL CALC-SCNC: 7 MMOL/L — SIGNIFICANT CHANGE UP (ref 5–17)
ANION GAP SERPL CALC-SCNC: 8 MMOL/L — SIGNIFICANT CHANGE UP (ref 5–17)
APTT BLD: 32.6 SEC — SIGNIFICANT CHANGE UP (ref 27.5–36.3)
AST SERPL-CCNC: 13 U/L — LOW (ref 15–37)
BASOPHILS # BLD AUTO: 0.06 K/UL — SIGNIFICANT CHANGE UP (ref 0–0.2)
BASOPHILS NFR BLD AUTO: 0.8 % — SIGNIFICANT CHANGE UP (ref 0–2)
BILIRUB SERPL-MCNC: 0.5 MG/DL — SIGNIFICANT CHANGE UP (ref 0.2–1.2)
BLD GP AB SCN SERPL QL: SIGNIFICANT CHANGE UP
BUN SERPL-MCNC: 25 MG/DL — HIGH (ref 7–23)
BUN SERPL-MCNC: 28 MG/DL — HIGH (ref 7–23)
CALCIUM SERPL-MCNC: 8.8 MG/DL — SIGNIFICANT CHANGE UP (ref 8.5–10.1)
CALCIUM SERPL-MCNC: 9.4 MG/DL — SIGNIFICANT CHANGE UP (ref 8.5–10.1)
CHLORIDE SERPL-SCNC: 104 MMOL/L — SIGNIFICANT CHANGE UP (ref 96–108)
CHLORIDE SERPL-SCNC: 106 MMOL/L — SIGNIFICANT CHANGE UP (ref 96–108)
CK MB BLD-MCNC: 2.8 % — SIGNIFICANT CHANGE UP (ref 0–3.5)
CK MB CFR SERPL CALC: 1.4 NG/ML — SIGNIFICANT CHANGE UP (ref 0.5–3.6)
CK SERPL-CCNC: 50 U/L — SIGNIFICANT CHANGE UP (ref 26–192)
CO2 SERPL-SCNC: 24 MMOL/L — SIGNIFICANT CHANGE UP (ref 22–31)
CO2 SERPL-SCNC: 28 MMOL/L — SIGNIFICANT CHANGE UP (ref 22–31)
CREAT SERPL-MCNC: 1.17 MG/DL — SIGNIFICANT CHANGE UP (ref 0.5–1.3)
CREAT SERPL-MCNC: 1.48 MG/DL — HIGH (ref 0.5–1.3)
EOSINOPHIL # BLD AUTO: 0 K/UL — SIGNIFICANT CHANGE UP (ref 0–0.5)
EOSINOPHIL NFR BLD AUTO: 0 % — SIGNIFICANT CHANGE UP (ref 0–6)
GLUCOSE SERPL-MCNC: 184 MG/DL — HIGH (ref 70–99)
GLUCOSE SERPL-MCNC: 207 MG/DL — HIGH (ref 70–99)
HCT VFR BLD CALC: 38.7 % — SIGNIFICANT CHANGE UP (ref 34.5–45)
HGB BLD-MCNC: 12.7 G/DL — SIGNIFICANT CHANGE UP (ref 11.5–15.5)
IMM GRANULOCYTES NFR BLD AUTO: 0.3 % — SIGNIFICANT CHANGE UP (ref 0–1.5)
INR BLD: 1.12 RATIO — SIGNIFICANT CHANGE UP (ref 0.88–1.16)
LACTATE SERPL-SCNC: 2.3 MMOL/L — HIGH (ref 0.7–2)
LYMPHOCYTES # BLD AUTO: 3.43 K/UL — HIGH (ref 1–3.3)
LYMPHOCYTES # BLD AUTO: 45.7 % — HIGH (ref 13–44)
MCHC RBC-ENTMCNC: 29 PG — SIGNIFICANT CHANGE UP (ref 27–34)
MCHC RBC-ENTMCNC: 32.8 GM/DL — SIGNIFICANT CHANGE UP (ref 32–36)
MCV RBC AUTO: 88.4 FL — SIGNIFICANT CHANGE UP (ref 80–100)
MONOCYTES # BLD AUTO: 0.75 K/UL — SIGNIFICANT CHANGE UP (ref 0–0.9)
MONOCYTES NFR BLD AUTO: 10 % — SIGNIFICANT CHANGE UP (ref 2–14)
NEUTROPHILS # BLD AUTO: 3.25 K/UL — SIGNIFICANT CHANGE UP (ref 1.8–7.4)
NEUTROPHILS NFR BLD AUTO: 43.2 % — SIGNIFICANT CHANGE UP (ref 43–77)
NRBC # BLD: 0 /100 WBCS — SIGNIFICANT CHANGE UP (ref 0–0)
PLATELET # BLD AUTO: 265 K/UL — SIGNIFICANT CHANGE UP (ref 150–400)
POTASSIUM SERPL-MCNC: 3.4 MMOL/L — LOW (ref 3.5–5.3)
POTASSIUM SERPL-MCNC: 3.7 MMOL/L — SIGNIFICANT CHANGE UP (ref 3.5–5.3)
POTASSIUM SERPL-SCNC: 3.4 MMOL/L — LOW (ref 3.5–5.3)
POTASSIUM SERPL-SCNC: 3.7 MMOL/L — SIGNIFICANT CHANGE UP (ref 3.5–5.3)
PROT SERPL-MCNC: 7.4 GM/DL — SIGNIFICANT CHANGE UP (ref 6–8.3)
PROTHROM AB SERPL-ACNC: 12.6 SEC — SIGNIFICANT CHANGE UP (ref 10–12.9)
RBC # BLD: 4.38 M/UL — SIGNIFICANT CHANGE UP (ref 3.8–5.2)
RBC # FLD: 12.1 % — SIGNIFICANT CHANGE UP (ref 10.3–14.5)
SODIUM SERPL-SCNC: 138 MMOL/L — SIGNIFICANT CHANGE UP (ref 135–145)
SODIUM SERPL-SCNC: 139 MMOL/L — SIGNIFICANT CHANGE UP (ref 135–145)
TROPONIN I SERPL-MCNC: <.015 NG/ML — SIGNIFICANT CHANGE UP (ref 0.01–0.04)
WBC # BLD: 7.51 K/UL — SIGNIFICANT CHANGE UP (ref 3.8–10.5)
WBC # FLD AUTO: 7.51 K/UL — SIGNIFICANT CHANGE UP (ref 3.8–10.5)

## 2020-01-21 PROCEDURE — 99232 SBSQ HOSP IP/OBS MODERATE 35: CPT

## 2020-01-21 PROCEDURE — 70496 CT ANGIOGRAPHY HEAD: CPT | Mod: 26

## 2020-01-21 PROCEDURE — 70498 CT ANGIOGRAPHY NECK: CPT | Mod: 26

## 2020-01-21 PROCEDURE — 99291 CRITICAL CARE FIRST HOUR: CPT

## 2020-01-21 PROCEDURE — 71045 X-RAY EXAM CHEST 1 VIEW: CPT | Mod: 26

## 2020-01-21 RX ORDER — METOPROLOL TARTRATE 50 MG
1 TABLET ORAL
Qty: 0 | Refills: 0 | DISCHARGE
Start: 2020-01-21

## 2020-01-21 RX ORDER — METOPROLOL TARTRATE 50 MG
50 TABLET ORAL
Refills: 0 | Status: DISCONTINUED | OUTPATIENT
Start: 2020-01-21 | End: 2020-02-03

## 2020-01-21 RX ORDER — ASPIRIN/CALCIUM CARB/MAGNESIUM 324 MG
1 TABLET ORAL
Qty: 0 | Refills: 0 | DISCHARGE
Start: 2020-01-21

## 2020-01-21 RX ADMIN — Medication 50 MILLIGRAM(S): at 05:47

## 2020-01-21 RX ADMIN — LEVETIRACETAM 750 MILLIGRAM(S): 250 TABLET, FILM COATED ORAL at 05:47

## 2020-01-21 RX ADMIN — Medication 0.1 MILLIGRAM(S): at 17:15

## 2020-01-21 RX ADMIN — ENOXAPARIN SODIUM 40 MILLIGRAM(S): 100 INJECTION SUBCUTANEOUS at 11:56

## 2020-01-21 RX ADMIN — LEVETIRACETAM 750 MILLIGRAM(S): 250 TABLET, FILM COATED ORAL at 17:15

## 2020-01-21 RX ADMIN — Medication 100 MILLIGRAM(S): at 17:15

## 2020-01-21 RX ADMIN — AMLODIPINE BESYLATE 10 MILLIGRAM(S): 2.5 TABLET ORAL at 05:47

## 2020-01-21 RX ADMIN — CLOPIDOGREL BISULFATE 75 MILLIGRAM(S): 75 TABLET, FILM COATED ORAL at 11:56

## 2020-01-21 RX ADMIN — Medication 0.1 MILLIGRAM(S): at 05:47

## 2020-01-21 RX ADMIN — Medication 81 MILLIGRAM(S): at 11:56

## 2020-01-21 NOTE — DISCHARGE NOTE PROVIDER - NSDCMRMEDTOKEN_GEN_ALL_CORE_FT
amLODIPine 10 mg oral tablet: 1 tab(s) orally once a day  aspirin 81 mg oral delayed release tablet: 1 tab(s) orally once a day  cloNIDine 0.1 mg oral tablet: 1 tab(s) orally 2 times a day  clopidogrel 75 mg oral tablet: 1 tab(s) orally once a day  hydrALAZINE 50 mg oral tablet: 1 tab(s) orally 3 times a day  levETIRAcetam 750 mg oral tablet: 1 tab(s) orally 2 times a day  metoprolol tartrate 100 mg oral tablet: 1 tab(s) orally 2 times a day  traZODone 50 mg oral tablet: 50 milligram(s) orally once a day (at bedtime)  Zocor 20 mg oral tablet: 1 tab(s) orally once a day (at bedtime) amLODIPine 10 mg oral tablet: 1 tab(s) orally once a day  aspirin 81 mg oral delayed release tablet: 1 tab(s) orally once a day  cloNIDine 0.1 mg oral tablet: 1 tab(s) orally 2 times a day  clopidogrel 75 mg oral tablet: 1 tab(s) orally once a day  hydrALAZINE 25 mg oral tablet: 3 tab(s) orally 3 times a day  levETIRAcetam 750 mg oral tablet: 1 tab(s) orally 2 times a day  metoprolol tartrate 50 mg oral tablet: 1 tab(s) orally 2 times a day  traZODone 50 mg oral tablet: 50 milligram(s) orally once a day (at bedtime)  Zocor 20 mg oral tablet: 1 tab(s) orally once a day (at bedtime)

## 2020-01-21 NOTE — DISCHARGE NOTE PROVIDER - NSDCHC_MEDRECSTATUS_GEN_ALL_CORE
Call regarding: Pt has been experiencing cough for a few days, along with fever of 96.3 last taken today around 1:30pm. Made aware provider is out of office today. Please contact pt regarding symptoms.    Okay to leave detailed voice mail?  Yes    Pharmacy information verified:  Yes        Admission Reconciliation is Not Complete  Discharge Reconciliation is Completed Admission Reconciliation is Completed  Discharge Reconciliation is Not Complete Admission Reconciliation is Completed  Discharge Reconciliation is Completed

## 2020-01-21 NOTE — CHART NOTE - NSCHARTNOTEFT_GEN_A_CORE
RRT Called to evaluate patient for unresponsiveness.  As per nurse tele showed sinus bradycardia into the 40-50s, went into the room to investigate and found patient slumped to the side passed out.  +LOC.  Patient baseline, alert does not follow commands, non-verbal, patient now w/ left sided weakness.  RRT changed to code stroke.    HPI:  Pt is a 68 y/o female w/ PMH of HTN, epilepsy, multiple strokes and dementia pt reported by daughter to have fallen last week(6 days ago) no injury but since pt has been eating less, more fatigued, pt w/o specific complians, no fever, chills, sob,cp, palpitations, n/v/d/c, no travels.  pt normally ambulates w/assistance but over last week doesnt want to try (18 Jan 2020 03:17)      amLODIPine   Tablet 10 milliGRAM(s) Oral daily  aspirin enteric coated 81 milliGRAM(s) Oral daily  cloNIDine 0.1 milliGRAM(s) Oral two times a day  clopidogrel Tablet 75 milliGRAM(s) Oral daily  enoxaparin Injectable 40 milliGRAM(s) SubCutaneous daily  hydrALAZINE 50 milliGRAM(s) Oral three times a day  levETIRAcetam 750 milliGRAM(s) Oral two times a day  metoprolol tartrate 100 milliGRAM(s) Oral two times a day  simvastatin 20 milliGRAM(s) Oral at bedtime  traZODone 50 milliGRAM(s) Oral at bedtime      T(F): 98.6 (01-21-20 @ 16:11), Max: 98.6 (01-21-20 @ 12:12)  HR: 72 (01-21-20 @ 16:11) (55 - 80)  BP: 137/62 (01-21-20 @ 16:11) (134/56 - 197/73)  RR: 18 (01-21-20 @ 16:11) (16 - 18)  SpO2: 100% (01-21-20 @ 16:11) (97% - 100%)  Wt(kg): --    PHYSICAL EXAM:  General: NAD, WDWN.   Neuro:  Alert & oriented x 3  HEENT: NCAT, EOMI, conjunctiva clear  CV: +S1+S2 regular rate and rhythm  Lung: clear to ausculation bilaterally, respirations nonlabored, good inspiratory effort  Abdomen: soft, NTND. Normactive BS  Extremities: no pedal edema or calf tenderness noted     LABS:                        12.7   7.51  )-----------( 265      ( 21 Jan 2020 19:06 )             38.7     01-21    139  |  104  |  25<H>  ----------------------------<  184<H>  3.4<L>   |  28  |  1.17    Ca    9.4      21 Jan 2020 15:42      PT/INR - ( 21 Jan 2020 19:06 )   PT: 12.6 sec;   INR: 1.12 ratio         PTT - ( 21 Jan 2020 19:06 )  PTT:32.6 sec      I&O's Detail    20 Jan 2020 07:01  -  21 Jan 2020 07:00  --------------------------------------------------------  IN:    Oral Fluid: 360 mL  Total IN: 360 mL    OUT:  Total OUT: 0 mL    Total NET: 360 mL      21 Jan 2020 07:01  -  21 Jan 2020 19:17  --------------------------------------------------------  IN:    Oral Fluid: 120 mL  Total IN: 120 mL    OUT:  Total OUT: 0 mL    Total NET: 120 mL            Assessment:  HPI:  Pt is a 68 y/o female w/ PMH of HTN, epilepsy, multiple strokes and dementia pt reported by daughter to have fallen last week(6 days ago) no injury but since pt has been eating less, more fatigued, pt w/o specific complians, no fever, chills, sob,cp, palpitations, n/v/d/c, no travels.  pt normally ambulates w/assistance but over last week doesnt want to try (18 Jan 2020 03:17)    Syncope vasovagal vs Seizure  CVA (cerebral infarction)  Vision changes  Urinary incontinence  Seizure disorder  Gout  OA (osteoarthritis)  Asthma  Diabetes  Hypertension      Plan:  - CT Brain and Neck pending official results  - Dr. Mcdonald informed RRT Called to evaluate patient for unresponsiveness.  As per nurse tele showed sinus bradycardia into the 40-50s, went into the room to investigate and found patient slumped to the side passed out.  +LOC.  Patient baseline, alert does not follow commands, non-verbal, patient now w/ left sided weakness.  RRT changed to code stroke.    HPI:  Pt is a 68 y/o female w/ PMH of HTN, epilepsy, multiple strokes and dementia pt reported by daughter to have fallen last week(6 days ago) no injury but since pt has been eating less, more fatigued, pt w/o specific complians, no fever, chills, sob,cp, palpitations, n/v/d/c, no travels.  pt normally ambulates w/assistance but over last week doesnt want to try (18 Jan 2020 03:17)      amLODIPine   Tablet 10 milliGRAM(s) Oral daily  aspirin enteric coated 81 milliGRAM(s) Oral daily  cloNIDine 0.1 milliGRAM(s) Oral two times a day  clopidogrel Tablet 75 milliGRAM(s) Oral daily  enoxaparin Injectable 40 milliGRAM(s) SubCutaneous daily  hydrALAZINE 50 milliGRAM(s) Oral three times a day  levETIRAcetam 750 milliGRAM(s) Oral two times a day  metoprolol tartrate 100 milliGRAM(s) Oral two times a day  simvastatin 20 milliGRAM(s) Oral at bedtime  traZODone 50 milliGRAM(s) Oral at bedtime      T(F): 98.6 (01-21-20 @ 16:11), Max: 98.6 (01-21-20 @ 12:12)  HR: 72 (01-21-20 @ 16:11) (55 - 80)  BP: 137/62 (01-21-20 @ 16:11) (134/56 - 197/73)  RR: 18 (01-21-20 @ 16:11) (16 - 18)  SpO2: 100% (01-21-20 @ 16:11) (97% - 100%)  Wt(kg): --    PHYSICAL EXAM:  General: NAD, WDWN.   Neuro:  confused, awake, non-verbal  HEENT: NCAT, EOMI, conjunctiva clear  CV: +S1+S2 regular rate and rhythm  Lung: clear to ausculation bilaterally, respirations nonlabored, good inspiratory effort  Abdomen: soft, NTND. Normactive BS  Extremities: Left sided weakness, no pedal edema or calf tenderness noted     LABS:                        12.7   7.51  )-----------( 265      ( 21 Jan 2020 19:06 )             38.7     01-21    139  |  104  |  25<H>  ----------------------------<  184<H>  3.4<L>   |  28  |  1.17    Ca    9.4      21 Jan 2020 15:42      PT/INR - ( 21 Jan 2020 19:06 )   PT: 12.6 sec;   INR: 1.12 ratio         PTT - ( 21 Jan 2020 19:06 )  PTT:32.6 sec      I&O's Detail    20 Jan 2020 07:01  -  21 Jan 2020 07:00  --------------------------------------------------------  IN:    Oral Fluid: 360 mL  Total IN: 360 mL    OUT:  Total OUT: 0 mL    Total NET: 360 mL      21 Jan 2020 07:01  -  21 Jan 2020 19:17  --------------------------------------------------------  IN:    Oral Fluid: 120 mL  Total IN: 120 mL    OUT:  Total OUT: 0 mL    Total NET: 120 mL            Assessment:  HPI:  Pt is a 68 y/o female w/ PMH of HTN, epilepsy, multiple strokes and dementia pt reported by daughter to have fallen last week(6 days ago) no injury but since pt has been eating less, more fatigued, pt w/o specific complians, no fever, chills, sob,cp, palpitations, n/v/d/c, no travels.  pt normally ambulates w/assistance but over last week doesnt want to try (18 Jan 2020 03:17)    Syncope vasovagal vs Seizure r/o CVA  CVA (cerebral infarction)  Vision changes  Urinary incontinence  Seizure disorder  Gout  OA (osteoarthritis)  Asthma  Diabetes  Hypertension      Plan:  - CT Brain and Neck pending official results  - Dr. Mcdonald informed

## 2020-01-21 NOTE — DISCHARGE NOTE PROVIDER - CARE PROVIDER_API CALL
PMD,   Follow up with PMD  Phone: (   )    -  Fax: (   )    -  Follow Up Time: 1 week    Anais Mcdonald)  Neurology  [address] [place] [id number]  Phone: [phone]  Fax: [phone]  Follow Up Time: 1 week

## 2020-01-21 NOTE — CHART NOTE - NSCHARTNOTEFT_GEN_A_CORE
Called with critical value    < from: CT Angio Head w/ IV Cont (01.21.20 @ 19:10) >    CTA head:    1. 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 Evidence of some collateralization of the distal right MCA branches, which appears sparse when compared to the contralateral side.    2. Similar-appearing multifocal areas of stenoses involving the bilateral anterior cerebral arteries.    3. Atherosclerosis involving the posterior circulation with mild focal stenosis of the mid basilar segment.    Dr. Dileep Cruz discussed findings with HALLIE Burnett on 1/21/2020 at 7:27 PM    < end of copied text >    Plan:   ICU consult for possible TPA/ transfer   tele neuro- to evaluate

## 2020-01-21 NOTE — DISCHARGE NOTE PROVIDER - PROVIDER TOKENS
FREE:[LAST:[PMD],PHONE:[(   )    -],FAX:[(   )    -],ADDRESS:[Follow up with PMD],FOLLOWUP:[1 week]],PROVIDER:[TOKEN:[[id number]],FOLLOWUP:[1 week]]

## 2020-01-21 NOTE — DISCHARGE NOTE PROVIDER - NSDCCPCAREPLAN_GEN_ALL_CORE_FT
PRINCIPAL DISCHARGE DIAGNOSIS  Diagnosis: Generalized weakness  Assessment and Plan of Treatment: infectious work up negative, d/c planning, [facility]      SECONDARY DISCHARGE DIAGNOSES  Diagnosis: Essential hypertension  Assessment and Plan of Treatment: Essential hypertension    Diagnosis: Type 2 diabetes mellitus with hyperglycemia, without long-term current use of insulin  Assessment and Plan of Treatment: Type 2 diabetes mellitus with hyperglycemia, without long-term current use of insulin Diet control PRINCIPAL DISCHARGE DIAGNOSIS  Diagnosis: Generalized weakness  Assessment and Plan of Treatment: 69 YOF w/ PMH of HTN, epilepsy, multiple strokes and dementia pt reported by daughter to have fallen last week, no injury but since pt has been eating less, more fatigued,   Infectious work up negative. Was initially stable for dc to [facility] but had RRT for syncope and then found to have an acute CVA w/CTA concerning for M1 occlusion. Seen by neuro. Continue ASA, Plavix, statin. Pt now discharge to Home with PT. Continue meds. Follow up with PMD and Neuro 1 week.         SECONDARY DISCHARGE DIAGNOSES  Diagnosis: Essential hypertension  Assessment and Plan of Treatment: Essential hypertension    Diagnosis: Type 2 diabetes mellitus with hyperglycemia, without long-term current use of insulin  Assessment and Plan of Treatment: Type 2 diabetes mellitus with hyperglycemia, without long-term current use of insulin Diet control PRINCIPAL DISCHARGE DIAGNOSIS  Diagnosis: Generalized weakness  Assessment and Plan of Treatment: acute CVA w/CTA concerning for M1 occlusion. Seen by neuro.   Continue ASA, Plavix, statin.   Follow up with PMD and Neuro 1 week.         SECONDARY DISCHARGE DIAGNOSES  Diagnosis: Essential hypertension  Assessment and Plan of Treatment: continue with Norvascs, Clonidine, metoprolol, hydralazine    Diagnosis: Seizure disorder  Assessment and Plan of Treatment: Continue with Keppra    Diagnosis: Alzheimer's dementia, late onset  Assessment and Plan of Treatment: continue supportive care.

## 2020-01-21 NOTE — CONSULT NOTE ADULT - ASSESSMENT
Subjective Complaints:      Consult requested by ER doctor:                  Attending:     History of Present Illness:  Chief Complaint/Reason for Admission:  History of Present Illness:  HPI:  Pt is a 68 y/o female w/ PMH of HTN, epilepsy, multiple strokes and dementia pt reported by daughter to have fallen last week(6 days ago) no injury but since pt has been eating less, more fatigued, pt w/o specific complians, no fever, chills, sob,cp, palpitations, n/v/d/c, no travels.  pt normally ambulates w/assistance but over last week doesnt want to try (2020 03:17)        PAST MEDICAL & SURGICAL HISTORY:  CVA (cerebral infarction): right side weakness  Vision changes: lt eye  Urinary incontinence  Seizure disorder  Gout  OA (osteoarthritis): bautista knees, low back  and  bautista shoulders  Asthma  Diabetes  Hypertension  Kidney stone  69yFemale    MEDICATIONS  (STANDING):  amLODIPine   Tablet 10 milliGRAM(s) Oral daily  aspirin enteric coated 81 milliGRAM(s) Oral daily  cloNIDine 0.1 milliGRAM(s) Oral two times a day  clopidogrel Tablet 75 milliGRAM(s) Oral daily  enoxaparin Injectable 40 milliGRAM(s) SubCutaneous daily  hydrALAZINE 50 milliGRAM(s) Oral three times a day  levETIRAcetam 750 milliGRAM(s) Oral two times a day  metoprolol tartrate 100 milliGRAM(s) Oral two times a day  simvastatin 20 milliGRAM(s) Oral at bedtime  traZODone 50 milliGRAM(s) Oral at bedtime    MEDICATIONS  (PRN):      Allergies    No Known Allergies    Intolerances      FAMILY HISTORY:  No pertinent family history in first degree relatives      REVIEW OF SYSTEMS:  General:  No wt loss, fevers, chills, night sweats  Eyes:  Good vision, no reported pain  ENT:  No sore throat, pain, runny nose, dysphagia  CV:  No pain, palpitatioins, hypo/hypertension  Resp:  No dyspnea, cough, tachypnea, wheezing  GI:  No pain, nausea, vomiting, diarrhea, constipatiion  :  No pain, bleeding, incontinence, nocturia  Muscle:  No pain, weakness  Breast:  No pain, abscess, mass, discharge  Neuro:  No weakness, tingling, memory problems  Psych:  No fatigue, insomnia, mood problems, depression  Endocrine:  No polyuria, polydypsia, cold/heat intolerance  Heme:  No petechiae, ecchymosis, easy bruisability  Skin:  No rash, tattoos, scars, edema      Vital Signs Last 24 Hrs  T(C): 37.1 (2020 16:34), Max: 37.1 (2020 16:34)  T(F): 98.7 (2020 16:34), Max: 98.7 (2020 16:34)  HR: 69 (2020 16:34) (55 - 91)  BP: 125/58 (2020 16:34) (120/55 - 174/79)  BP(mean): --  RR: 18 (2020 16:34) (1 - 18)  SpO2: 99% (2020 16:34) (97% - 100%)    GENERAL PHYSICAL EXAM:  General:  Appears stated age, well-groomed, well-nourished, no distress  HEENT:  NC/AT, patent nares w/ pink mucosa, OP clear w/o lesions, PERRL, EOMI, conjunctivae clear, no thyromegaly, nodules, adenopathy, no JVD  Chest:  Full & symmetric excursion, no increased effort, breath sounds clear  Cardiovascular:  Regular rhythm, S1, S2, no murmur/rub/S3/S4, no carotid/femoral/abdominal bruit, radial/pedal pulses 2+, no edema  Abdomen:  Soft, non-tender, non-distended, normoactive bowel sounds, no HSM  Extremities:  Gait & station:   Digits:   Nails:   Joints, Bones, Muscles:   ROM:   Stability:  Skin:  No rash/erythema/ecchymoses/petechiae/wounds/abscess/warm/dry  Musculoskeletal:  Full ROM in all joints w/o swelling/tenderness/effusion    NEUROLOGICAL EXAM:  HENT:  Normocephalic head; atraumatic head.  Neck supple.  ENT: normal looking.  Mental State:    Alert.   open eyes does not   folows commands face equal  arm 3/5 legs weakness  sensory iontact  poor memory poor historian  no charisma                       13.1   7.40  )-----------( 260      ( 2020 07:18 )             39.0     01-    143  |  108  |  26<H>  ----------------------------<  163<H>  3.4<L>   |  27  |  1.11    Ca    9.0      2020 07:18  Mg     2.0         TPro  7.0  /  Alb  3.1<L>  /  TBili  0.9  /  DBili  x   /  AST  20  /  ALT  16  /  AlkPhos  54  -    PT/INR - ( 2020 22:59 )   PT: 12.5 sec;   INR: 1.11 ratio         PTT - ( 2020 22:59 )  PTT:26.7 sec    Urinalysis Basic - ( 2020 01:50 )    Color: Yellow / Appearance: Slightly Turbid / S.025 / pH: x  Gluc: x / Ketone: Trace  / Bili: Negative / Urobili: Negative mg/dL   Blood: x / Protein: 500 mg/dL / Nitrite: Negative   Leuk Esterase: Trace / RBC: >50 /HPF / WBC 3-5   Sq Epi: x / Non Sq Epi: Occasional / Bacteria: Few        RADIOLOGY & ADDITIONAL STUDIES:      Assessment & Opinion:  events noted hx of old cva seziure wake open eyes does not follows comnads a rm3/5 leg weakenss old  dementia  on leppra  for tsh b12 eeg for pt sub acute rehab will follow     Recommendations:  Brain MRI.  Carotid doppler.  Echocardiogram.  EEG.   DVT prophylaxis as ordered.  Medications:
69 year old with left sided weakness, rapidly improving.    Plan  -discussed case with following neuroogist Dr. Anglin, who recommended contacting telestroke/for possible neuro interventions.  Spoke with Dr. Flores from [facility]. discussed case with her, who recommended no intervention at this time as patient's symptoms were resolved.    -will monitor in ICU for recurrence of symptoms. if symptoms recur will recontact for transfer and intervention  -will obtain MRA  -continue DAPT  -dvt ppx  -neuro check q1hour for 3 hours  then q2 hours for 12 hours    I have decided to review and order of clinical lab tests  Relevant radiology studies were reviewed  Decision made to obtain available old records  Discussion of test results with performing physician  Review and summarization of old records obtaining history from EMR  discussion of case with another health care provider Dr. Flores, Cosme Anglin  I have visualized independent imaging, EKGs and radiologies studies available but not otherwise officially read  Patient is critically ill and could decompensate imminently.  The patient required immediate attention  The medically necessary treatment decisions were required due to possibility of imminent threat to the patient  the patient is unable  to participate in giving history and/or making treatment decisions;     ccm 60 min

## 2020-01-21 NOTE — DISCHARGE NOTE PROVIDER - HOSPITAL COURSE
70 y/o female w/ PMH of HTN, epilepsy, multiple strokes and dementia pt reported by daughter to have fallen last week(6 days ago) no injury but since pt has been eating less, more fatigued,     Infectious work up negative, neuro consulted, PT recommend [facility] placement. 69 YOF w/ PMH of HTN, epilepsy, multiple strokes and dementia pt reported by daughter to have fallen last week, no injury but since pt has been eating less, more fatigued,     Infectious work up negative. Was initially stable for dc to [facility] but had RRT for syncope and then found to have an acute CVA w/CTA concerning for M1 occlusion. Seen by neuro. Continue ASA, Plavix, statin. Pt now discharge to Home with PT. Continue meds. Follow up with PMD and Neuro 1 week.

## 2020-01-21 NOTE — PROGRESS NOTE ADULT - ASSESSMENT
70 y/o woman PMH multiple CVA and seizures presents after LOC with right sided shaking; keppra level sent; no acute findings on CT.      A/P    Generalized weakness.    -PT consult- [facility] placement   - blood and urine cult: negative so far.  -no source for infection.      Seizure disorder.    - on  keppra.   - neurology eval appreciated     Type 2 diabetes mellitus with hyperglycemia, without long-term current use of insulin.  - on insulin coverage.      Essential hypertension.    - norvasc, clonidine, metoprolol, hydralazine.  - continue to monitor    Hypokalemia  - replete potassium  - AM labs     Preventive measure.    - heparin sub Q    Follow up all cultures final reports.  Blood and urine cult - negative so far..    PT mary, recs rehab    D/c planning, patient at her baseline.

## 2020-01-21 NOTE — PROGRESS NOTE ADULT - ASSESSMENT
Subjective Complaints:  Historian:             Vital Signs Last 24 Hrs  T(C): 37 (21 Jan 2020 16:11), Max: 37 (21 Jan 2020 12:12)  T(F): 98.6 (21 Jan 2020 16:11), Max: 98.6 (21 Jan 2020 12:12)  HR: 72 (21 Jan 2020 16:11) (55 - 97)  BP: 137/62 (21 Jan 2020 16:11) (134/56 - 197/73)  BP(mean): --  RR: 18 (21 Jan 2020 16:11) (16 - 18)  SpO2: 100% (21 Jan 2020 16:11) (97% - 100%)    GENERAL PHYSICAL EXAM:  General:  Appears stated age, well-groomed, well-nourished, no distress  HEENT:  NC/AT, patent nares w/ pink mucosa, OP clear w/o lesions, PERRL, EOMI, conjunctivae clear, no thyromegaly, nodules, adenopathy, no JVD  Chest:  Full & symmetric excursion, no increased effort, breath sounds clear  Cardiovascular:  Regular rhythm, S1, S2, no murmur/rub/S3/S4, no carotid/femoral/abdominal bruit, radial/pedal pulses 2+, no edema  Abdomen:  Soft, non-tender, non-distended, normoactive bowel sounds, no HSM  Extremities:  Gait & station:   Digits:   Nails:   Joints, Bones, Muscles:   ROM:   Stability:  Skin:  No rash/erythema/ecchymoses/petechiae/wounds/abscess/warm/dry  Musculoskeletal:  Full ROM in all joints w/o swelling/tenderness/effusion        LABS:    01-21    139  |  104  |  25<H>  ----------------------------<  184<H>  3.4<L>   |  28  |  1.17    Ca    9.4      21 Jan 2020 15:42            RADIOLOGY & ADDITIONAL STUDIES:        Neurology Progress Note:      Mental Status: awake alert  speech fluent  sitting in chair       Cranial Nerves: 2 /12intact      Motor:   arm leg 4/5         Sensory:intact      Cerebellar: defrd      Gait: unsteady       Assesment/Plan: dementia unsteady gait no sezizure reported  discuss with family for sub acute rehab

## 2020-01-22 LAB
ALBUMIN SERPL ELPH-MCNC: 3.2 G/DL — LOW (ref 3.3–5)
ALP SERPL-CCNC: 61 U/L — SIGNIFICANT CHANGE UP (ref 40–120)
ALT FLD-CCNC: 17 U/L — SIGNIFICANT CHANGE UP (ref 12–78)
ANION GAP SERPL CALC-SCNC: 6 MMOL/L — SIGNIFICANT CHANGE UP (ref 5–17)
AST SERPL-CCNC: 30 U/L — SIGNIFICANT CHANGE UP (ref 15–37)
BILIRUB SERPL-MCNC: 0.7 MG/DL — SIGNIFICANT CHANGE UP (ref 0.2–1.2)
BUN SERPL-MCNC: 24 MG/DL — HIGH (ref 7–23)
CALCIUM SERPL-MCNC: 8.8 MG/DL — SIGNIFICANT CHANGE UP (ref 8.5–10.1)
CHLORIDE SERPL-SCNC: 107 MMOL/L — SIGNIFICANT CHANGE UP (ref 96–108)
CO2 SERPL-SCNC: 24 MMOL/L — SIGNIFICANT CHANGE UP (ref 22–31)
CREAT SERPL-MCNC: 1.14 MG/DL — SIGNIFICANT CHANGE UP (ref 0.5–1.3)
GLUCOSE SERPL-MCNC: 179 MG/DL — HIGH (ref 70–99)
HCT VFR BLD CALC: 37.2 % — SIGNIFICANT CHANGE UP (ref 34.5–45)
HGB BLD-MCNC: 12.1 G/DL — SIGNIFICANT CHANGE UP (ref 11.5–15.5)
MAGNESIUM SERPL-MCNC: 2.3 MG/DL — SIGNIFICANT CHANGE UP (ref 1.6–2.6)
MCHC RBC-ENTMCNC: 28.6 PG — SIGNIFICANT CHANGE UP (ref 27–34)
MCHC RBC-ENTMCNC: 32.5 GM/DL — SIGNIFICANT CHANGE UP (ref 32–36)
MCV RBC AUTO: 87.9 FL — SIGNIFICANT CHANGE UP (ref 80–100)
NRBC # BLD: 0 /100 WBCS — SIGNIFICANT CHANGE UP (ref 0–0)
PHOSPHATE SERPL-MCNC: 3.9 MG/DL — SIGNIFICANT CHANGE UP (ref 2.5–4.5)
PLATELET # BLD AUTO: 244 K/UL — SIGNIFICANT CHANGE UP (ref 150–400)
POTASSIUM SERPL-MCNC: 4.7 MMOL/L — SIGNIFICANT CHANGE UP (ref 3.5–5.3)
POTASSIUM SERPL-SCNC: 4.7 MMOL/L — SIGNIFICANT CHANGE UP (ref 3.5–5.3)
PROT SERPL-MCNC: 7.1 GM/DL — SIGNIFICANT CHANGE UP (ref 6–8.3)
RBC # BLD: 4.23 M/UL — SIGNIFICANT CHANGE UP (ref 3.8–5.2)
RBC # FLD: 12.3 % — SIGNIFICANT CHANGE UP (ref 10.3–14.5)
SODIUM SERPL-SCNC: 137 MMOL/L — SIGNIFICANT CHANGE UP (ref 135–145)
WBC # BLD: 7.32 K/UL — SIGNIFICANT CHANGE UP (ref 3.8–10.5)
WBC # FLD AUTO: 7.32 K/UL — SIGNIFICANT CHANGE UP (ref 3.8–10.5)

## 2020-01-22 PROCEDURE — 93010 ELECTROCARDIOGRAM REPORT: CPT

## 2020-01-22 PROCEDURE — 99291 CRITICAL CARE FIRST HOUR: CPT

## 2020-01-22 RX ORDER — ACETAMINOPHEN 500 MG
650 TABLET ORAL ONCE
Refills: 0 | Status: COMPLETED | OUTPATIENT
Start: 2020-01-22 | End: 2020-01-22

## 2020-01-22 RX ORDER — ATORVASTATIN CALCIUM 80 MG/1
40 TABLET, FILM COATED ORAL AT BEDTIME
Refills: 0 | Status: DISCONTINUED | OUTPATIENT
Start: 2020-01-22 | End: 2020-02-03

## 2020-01-22 RX ADMIN — Medication 81 MILLIGRAM(S): at 11:44

## 2020-01-22 RX ADMIN — ENOXAPARIN SODIUM 40 MILLIGRAM(S): 100 INJECTION SUBCUTANEOUS at 11:44

## 2020-01-22 RX ADMIN — LEVETIRACETAM 750 MILLIGRAM(S): 250 TABLET, FILM COATED ORAL at 17:06

## 2020-01-22 RX ADMIN — CLOPIDOGREL BISULFATE 75 MILLIGRAM(S): 75 TABLET, FILM COATED ORAL at 11:44

## 2020-01-22 RX ADMIN — Medication 50 MILLIGRAM(S): at 21:06

## 2020-01-22 RX ADMIN — Medication 650 MILLIGRAM(S): at 22:18

## 2020-01-22 RX ADMIN — ATORVASTATIN CALCIUM 40 MILLIGRAM(S): 80 TABLET, FILM COATED ORAL at 21:06

## 2020-01-22 RX ADMIN — Medication 650 MILLIGRAM(S): at 23:18

## 2020-01-22 RX ADMIN — LEVETIRACETAM 750 MILLIGRAM(S): 250 TABLET, FILM COATED ORAL at 06:09

## 2020-01-22 RX ADMIN — Medication 50 MILLIGRAM(S): at 20:37

## 2020-01-22 NOTE — PROGRESS NOTE ADULT - ASSESSMENT
68 y/o female w/ PMH of HTN, epilepsy, multiple CVA and dementia who had RRT for syncope and then found to have poss acute CVA w/ CTA concerning for M1 occlusion.    Neuro  pt w/ episode that is concerning for recanalized CVA vs TIA  f/u Neuro reccs  cont asa and plavix and statin  check MRI  doing well after bedside swallow  cont keppra    CVS  permissive hypertension post CVA  will hold ordered BP meds for 24h post cva unless >220

## 2020-01-22 NOTE — PROGRESS NOTE ADULT - ASSESSMENT
Subjective Complaints:  Historian:             Vital Signs Last 24 Hrs  T(C): 38 (22 Jan 2020 21:44), Max: 38 (22 Jan 2020 21:44)  T(F): 100.4 (22 Jan 2020 21:44), Max: 100.4 (22 Jan 2020 21:44)  HR: 92 (22 Jan 2020 21:44) (61 - 122)  BP: 188/80 (22 Jan 2020 21:44) (104/72 - 204/76)  BP(mean): 116 (22 Jan 2020 21:00) (63 - 170)  RR: 17 (22 Jan 2020 21:44) (13 - 29)  SpO2: 98% (22 Jan 2020 21:44) (90% - 100%)    GENERAL PHYSICAL EXAM:  General:  Appears stated age, well-groomed, well-nourished, no distress  HEENT:  NC/AT, patent nares w/ pink mucosa, OP clear w/o lesions, PERRL, EOMI, conjunctivae clear, no thyromegaly, nodules, adenopathy, no JVD  Chest:  Full & symmetric excursion, no increased effort, breath sounds clear  Cardiovascular:  Regular rhythm, S1, S2, no murmur/rub/S3/S4, no carotid/femoral/abdominal bruit, radial/pedal pulses 2+, no edema  Abdomen:  Soft, non-tender, non-distended, normoactive bowel sounds, no HSM  Extremities:  Gait & station:   Digits:   Nails:   Joints, Bones, Muscles:   ROM:   Stability:  Skin:  No rash/erythema/ecchymoses/petechiae/wounds/abscess/warm/dry  Musculoskeletal:  Full ROM in all joints w/o swelling/tenderness/effusion        LABS:                        12.1   7.32  )-----------( 244      ( 22 Jan 2020 04:00 )             37.2     01-22    137  |  107  |  24<H>  ----------------------------<  179<H>  4.7   |  24  |  1.14    Ca    8.8      22 Jan 2020 04:00  Phos  3.9     01-22  Mg     2.3     01-22    TPro  7.1  /  Alb  3.2<L>  /  TBili  0.7  /  DBili  x   /  AST  30  /  ALT  17  /  AlkPhos  61  01-22    PT/INR - ( 21 Jan 2020 19:06 )   PT: 12.6 sec;   INR: 1.12 ratio         PTT - ( 21 Jan 2020 19:06 )  PTT:32.6 sec      RADIOLOGY & ADDITIONAL STUDIES:        Neurology Progress Note:      Mental Status: awaek alert speech fluent       Cranial Nerves:2 /12intact      Motor:    arm 3/5 leg weakness old         Sensory:intact      Cerebellar: defrd      Gait:unsteady      Assesment/Plan:  events noted yesterday ct head no acute path cta brain r mca mi occluded  no intercention as per icu  no seziure on keppra for pt rehab on asa 81 mg  for mri racquel lozano

## 2020-01-22 NOTE — CHART NOTE - NSCHARTNOTEFT_GEN_A_CORE
Ms. Diego is a 69 year old female with a PMH of HTN, epilepsy, multiple strokes and dementia pt reported by daughter to have fallen last week(6 days ago) no injury but since pt has been eating less, more fatigued, pt w/o specific complians, no fever, chills, sob,cp, palpitations, n/v/d/c, no travels.  pt normally ambulates w/assistance but over last week doesnt want to try (18 Jan 2020 03:17)    Orginally called rrt for syncope. During eval, noticed that left side was flacid which was change from baseline. code stroke called. M1 right sided occlusion found, transferred to ICU.  left side paralysis resolved. Ms. Diego is a 69 year old female with a PMH of HTN, epilepsy, multiple strokes and dementia (baseline non-verbal) who was brought to hospital s/p fall one week ago with no injuries sustained, but since pt has been eating less, more fatigued/lethargic but pt w/o specific complaints, however, pt normally ambulates w/assistance but over last week didn't want to try.  Yesterday, 1/21 RRT was called for syncope. During eval, pt was noted with flaccid left side, now new from baseline. Pt found to have M1 right sided occlusion, was brought to ICU with resolution of left-sided paralysis. Since that time pt has remained stable without neurological changes (per her baseline non-verbal state). She has been seen/examined by neuro, and is pending some additional workup to include hgba1c, lipid panel, MRI and speech and swallow evaluation. ICU attending has seen/examined this patient and has deemed pt stable for transfer to med/surg. Ms. Diego is a 69 year old female with a PMH of HTN, epilepsy, multiple strokes and dementia (baseline non-verbal) who was brought to hospital s/p fall one week ago with no injuries sustained, but since pt has been eating less, more fatigued/lethargic but pt w/o specific complaints, however, pt normally ambulates w/assistance but over last week didn't want to try.  Yesterday, 1/21 RRT was called for syncope. During eval, pt was noted with flaccid left side, now new from baseline. Pt found to have M1 right sided occlusion, was brought to ICU with resolution of left-sided paralysis. Since that time pt has remained stable without neurological changes (per her baseline non-verbal state). She has been seen/examined by neuro, and is pending some additional workup to include hgba1c, lipid panel, MRI and speech and swallow evaluation. Her antihypertensive regimen has been held until this evening ~1900. ICU attending has seen/examined this patient and has deemed pt stable for transfer to med/surg. Ms. Diego is a 69 year old female with a PMH of HTN, epilepsy, multiple strokes and dementia (baseline non-verbal) who was brought to hospital s/p fall one week ago with no injuries sustained, but since pt has been eating less, more fatigued/lethargic but pt w/o specific complaints, however, pt normally ambulates w/assistance but over last week didn't want to try.  Yesterday, 1/21 RRT was called for syncope. During eval, pt was noted with flaccid left side, now new from baseline. Pt found to have M1 right sided occlusion, was brought to ICU with resolution of left-sided paralysis. Since that time pt has remained stable without neurological changes (per her baseline non-verbal state). She has been seen/examined by neuro, and is pending some additional workup to include hgba1c, lipid panel, MRI/MRA and speech and swallow evaluation. Her antihypertensive regimen has been held until this evening ~1900. ICU attending has seen/examined this patient and has deemed pt stable for transfer to med/surg. Ms. Diego is a 69 year old female with a PMH of HTN, epilepsy, multiple strokes and dementia (baseline non-verbal) who was brought to hospital s/p fall one week ago with no injuries sustained, but since pt has been eating less, more fatigued/lethargic but pt w/o specific complaints, however, pt normally ambulates w/assistance but over last week didn't want to try.  Yesterday, 1/21 RRT was called for syncope. During eval, pt was noted with flaccid left side, now new from baseline. Pt found to have M1 right sided occlusion, was brought to ICU with resolution of left-sided paralysis. Since that time pt has remained stable without neurological changes (per her baseline non-verbal state). She has been seen/examined by neuro, and is pending some additional workup to include hgba1c, lipid panel, MRI/MRA and speech and swallow evaluation. Her antihypertensive regimen has been held until this evening ~1900. ICU attending has seen/examined this patient and has deemed pt stable for transfer to med/surg. Discussed case with Dr. Bautista hospitalist physican and pt to be placed under hospitalist service under Dr. Narvaez.

## 2020-01-23 LAB
ANION GAP SERPL CALC-SCNC: 6 MMOL/L — SIGNIFICANT CHANGE UP (ref 5–17)
BUN SERPL-MCNC: 13 MG/DL — SIGNIFICANT CHANGE UP (ref 7–23)
CALCIUM SERPL-MCNC: 8.8 MG/DL — SIGNIFICANT CHANGE UP (ref 8.5–10.1)
CHLORIDE SERPL-SCNC: 108 MMOL/L — SIGNIFICANT CHANGE UP (ref 96–108)
CHOLEST SERPL-MCNC: 146 MG/DL — SIGNIFICANT CHANGE UP (ref 10–199)
CO2 SERPL-SCNC: 27 MMOL/L — SIGNIFICANT CHANGE UP (ref 22–31)
CREAT SERPL-MCNC: 1.05 MG/DL — SIGNIFICANT CHANGE UP (ref 0.5–1.3)
CULTURE RESULTS: SIGNIFICANT CHANGE UP
CULTURE RESULTS: SIGNIFICANT CHANGE UP
GLUCOSE SERPL-MCNC: 181 MG/DL — HIGH (ref 70–99)
HBA1C BLD-MCNC: 6.1 % — HIGH (ref 4–5.6)
HCT VFR BLD CALC: 37.1 % — SIGNIFICANT CHANGE UP (ref 34.5–45)
HDLC SERPL-MCNC: 36 MG/DL — LOW
HGB BLD-MCNC: 12.4 G/DL — SIGNIFICANT CHANGE UP (ref 11.5–15.5)
LIPID PNL WITH DIRECT LDL SERPL: 98 MG/DL — SIGNIFICANT CHANGE UP
MAGNESIUM SERPL-MCNC: 2.2 MG/DL — SIGNIFICANT CHANGE UP (ref 1.6–2.6)
MCHC RBC-ENTMCNC: 29.6 PG — SIGNIFICANT CHANGE UP (ref 27–34)
MCHC RBC-ENTMCNC: 33.4 GM/DL — SIGNIFICANT CHANGE UP (ref 32–36)
MCV RBC AUTO: 88.5 FL — SIGNIFICANT CHANGE UP (ref 80–100)
NRBC # BLD: 0 /100 WBCS — SIGNIFICANT CHANGE UP (ref 0–0)
PHOSPHATE SERPL-MCNC: 3 MG/DL — SIGNIFICANT CHANGE UP (ref 2.5–4.5)
PLATELET # BLD AUTO: 224 K/UL — SIGNIFICANT CHANGE UP (ref 150–400)
POTASSIUM SERPL-MCNC: 3.3 MMOL/L — LOW (ref 3.5–5.3)
POTASSIUM SERPL-SCNC: 3.3 MMOL/L — LOW (ref 3.5–5.3)
RBC # BLD: 4.19 M/UL — SIGNIFICANT CHANGE UP (ref 3.8–5.2)
RBC # FLD: 12.3 % — SIGNIFICANT CHANGE UP (ref 10.3–14.5)
SODIUM SERPL-SCNC: 141 MMOL/L — SIGNIFICANT CHANGE UP (ref 135–145)
SPECIMEN SOURCE: SIGNIFICANT CHANGE UP
SPECIMEN SOURCE: SIGNIFICANT CHANGE UP
TOTAL CHOLESTEROL/HDL RATIO MEASUREMENT: 4.1 RATIO — SIGNIFICANT CHANGE UP (ref 3.3–7.1)
TRIGL SERPL-MCNC: 63 MG/DL — SIGNIFICANT CHANGE UP (ref 10–149)
WBC # BLD: 5.51 K/UL — SIGNIFICANT CHANGE UP (ref 3.8–10.5)
WBC # FLD AUTO: 5.51 K/UL — SIGNIFICANT CHANGE UP (ref 3.8–10.5)

## 2020-01-23 PROCEDURE — 70551 MRI BRAIN STEM W/O DYE: CPT | Mod: 26

## 2020-01-23 PROCEDURE — 70544 MR ANGIOGRAPHY HEAD W/O DYE: CPT | Mod: 26,59

## 2020-01-23 PROCEDURE — 99233 SBSQ HOSP IP/OBS HIGH 50: CPT

## 2020-01-23 RX ORDER — POTASSIUM CHLORIDE 20 MEQ
40 PACKET (EA) ORAL EVERY 4 HOURS
Refills: 0 | Status: COMPLETED | OUTPATIENT
Start: 2020-01-23 | End: 2020-01-24

## 2020-01-23 RX ADMIN — CLOPIDOGREL BISULFATE 75 MILLIGRAM(S): 75 TABLET, FILM COATED ORAL at 11:32

## 2020-01-23 RX ADMIN — LEVETIRACETAM 750 MILLIGRAM(S): 250 TABLET, FILM COATED ORAL at 17:20

## 2020-01-23 RX ADMIN — Medication 50 MILLIGRAM(S): at 17:21

## 2020-01-23 RX ADMIN — LEVETIRACETAM 750 MILLIGRAM(S): 250 TABLET, FILM COATED ORAL at 05:31

## 2020-01-23 RX ADMIN — AMLODIPINE BESYLATE 10 MILLIGRAM(S): 2.5 TABLET ORAL at 05:31

## 2020-01-23 RX ADMIN — Medication 50 MILLIGRAM(S): at 05:31

## 2020-01-23 RX ADMIN — Medication 50 MILLIGRAM(S): at 14:16

## 2020-01-23 RX ADMIN — Medication 0.1 MILLIGRAM(S): at 17:21

## 2020-01-23 RX ADMIN — ENOXAPARIN SODIUM 40 MILLIGRAM(S): 100 INJECTION SUBCUTANEOUS at 11:32

## 2020-01-23 RX ADMIN — Medication 0.1 MILLIGRAM(S): at 05:31

## 2020-01-23 RX ADMIN — Medication 81 MILLIGRAM(S): at 11:32

## 2020-01-23 RX ADMIN — Medication 40 MILLIEQUIVALENT(S): at 17:35

## 2020-01-23 NOTE — PHYSICAL THERAPY INITIAL EVALUATION ADULT - LEVEL OF CONSCIOUSNESS, REHAB EVAL
alert/Follows 1 step commands inconsistently and requires manual cues
awake, confused, non verbal and non communicative
Yes

## 2020-01-23 NOTE — PHYSICAL THERAPY INITIAL EVALUATION ADULT - NAME OF DISCHARGE PLANNER
ADA Livingston
Lorna Grey RN and d/c manager informed d/c recommendation is home PT and previous home services

## 2020-01-23 NOTE — PHYSICAL THERAPY INITIAL EVALUATION ADULT - FOLLOWS COMMANDS/ANSWERS QUESTIONS, REHAB EVAL
50% of the time
not following directions, needs constant combination of verbal, visual and tactile cues for guidance in doing tasks.

## 2020-01-23 NOTE — PHYSICAL THERAPY INITIAL EVALUATION ADULT - ADDITIONAL COMMENTS
Pt has dementia and is a poor historian. Pt ambulated with assistance prior to admission and sustained a fall as per chart review.
as per EMR records pt has 24/7 HHA prior to this admission

## 2020-01-23 NOTE — PHYSICAL THERAPY INITIAL EVALUATION ADULT - DIAGNOSIS, PT EVAL
R26.2 Difficulty in Gait
Pt is awake, confused, not following directions, needs constant verbal visual and tactile cues, min assist and rolling walker during ambulation.

## 2020-01-23 NOTE — PHYSICAL THERAPY INITIAL EVALUATION ADULT - MUSCLE TONE ASSESSMENT, REHAB EVAL
bilateral lower extremities/normal/bilateral upper extremities
bilateral lower extremities/bilateral upper extremities/normal

## 2020-01-23 NOTE — PHYSICAL THERAPY INITIAL EVALUATION ADULT - PLANNED THERAPY INTERVENTIONS, PT EVAL
gait training/LTG 2 weeks/bed mobility training/strengthening/transfer training/balance training
gait training/balance training/bed mobility training/strengthening/transfer training

## 2020-01-23 NOTE — PHYSICAL THERAPY INITIAL EVALUATION ADULT - RANGE OF MOTION EXAMINATION, REHAB EVAL
AAROM B UE/LE within available range/no ROM deficits were identified
no ROM deficits were identified

## 2020-01-23 NOTE — PHYSICAL THERAPY INITIAL EVALUATION ADULT - GENERAL OBSERVATIONS, REHAB EVAL
Pt found supine with HOB elevated +telemetry
Pt is awake, alert, not in distress, non verbal, non engaging in any  communication, on room air, no signs of discomfort.

## 2020-01-23 NOTE — PHYSICAL THERAPY INITIAL EVALUATION ADULT - PRECAUTIONS/LIMITATIONS, REHAB EVAL
no known precautions/limitations
pt needs constant verbal, visual and tactile cues, min assist and rolling walker

## 2020-01-23 NOTE — PHYSICAL THERAPY INITIAL EVALUATION ADULT - STRENGTHENING, PT EVAL
Increase 1 grade
Improve strength in the UE and LE up to 4+/5 and be able to perform functional tasks-bed mobility, sitting, standing, transfers and ambulate using assistive device with one person assist  and prevent falls.

## 2020-01-23 NOTE — PROGRESS NOTE ADULT - ASSESSMENT
68 y/o female w/ PMH of HTN, epilepsy, multiple CVA and dementia initially presented with generalized wkness, decreased PO intake, was stable for dc to [facility], had RRT for syncope and then found to have  acute CVA w/ CTA concerning for M1 occlusion.    CVA:  - MRI: in addition to an old right MCA infarct, there are now new areas of  ischemia in the right MCA distribution, with diminished the flow void in the right distal M1 and proximal M2 segments of the middle cerebral artery.  - MRA: Abrupt cut off and  severe stenosis of the proximal M1 segment of the right middle cerebral artery with a thin residual lumen beyond the stenosis  - Neuro following  - Continue ASA, Plavix  - Continue statin     HTN:  -     Dementia/ generalized functional decline:      Seizure:    DM:    Hypokalemia: 68 y/o female w/ PMH of HTN, epilepsy, multiple CVA and dementia initially presented with generalized wkness, decreased PO intake, was stable for dc to [facility], had RRT for syncope and then found to have  acute CVA w/ CTA concerning for M1 occlusion.    CVA:  - MRI: in addition to an old right MCA infarct, there are now new areas of  ischemia in the right MCA distribution, with diminished the flow void in the right distal M1 and proximal M2 segments of the middle cerebral artery.  - MRA: Abrupt cut off and  severe stenosis of the proximal M1 segment of the right middle cerebral artery with a thin residual lumen beyond the stenosis  - Neuro following  - Continue ASA, Plavix  - Continue statin     HTN:  - BP acceptable  - Continue current meds    Dementia/ generalized functional decline:  - Supportive care  - Initial PT eval: [facility], re eval PT: home PT.    Seizure:  - Continue Keppra     DM:  - Hb A1c 6.1  - Continue current insulin     Hypokalemia:  - Being replaced.    Discussed with daughter, she prefers her mom go to rehab. will follow up with CM/ Sw/ PT in am

## 2020-01-23 NOTE — PHYSICAL THERAPY INITIAL EVALUATION ADULT - BALANCE TRAINING, PT EVAL
Increase 1 grade
Improve sitting , standing and ambulation balance to fair or good using assistive walking device with contact guard to min assist of 1 person and prevent falls.

## 2020-01-23 NOTE — PHYSICAL THERAPY INITIAL EVALUATION ADULT - GAIT TRAINING, PT EVAL
Ambulates 100ft with RW CGAx1
Contact guard to min assist of 1 in ambulation with rolling walker up to 100 feet or more around the house.

## 2020-01-23 NOTE — PHYSICAL THERAPY INITIAL EVALUATION ADULT - TRANSFER TRAINING, PT EVAL
CGAx1 with all Transfers with RW
Contact guard to min assist transfers bed<>chair, toilet transfers with rolling walker with assist of 1 person.

## 2020-01-23 NOTE — PHYSICAL THERAPY INITIAL EVALUATION ADULT - PERTINENT HX OF CURRENT PROBLEM, REHAB EVAL
Recent radiological studies: MRI 1/23: Old R MCA  with new areas of ischemia in the R MCA distribution; MR Angio heae 1/23: severe stenosis of the prox M1 segment of R MCA

## 2020-01-24 PROCEDURE — 99233 SBSQ HOSP IP/OBS HIGH 50: CPT

## 2020-01-24 PROCEDURE — 71045 X-RAY EXAM CHEST 1 VIEW: CPT | Mod: 26

## 2020-01-24 RX ORDER — POTASSIUM CHLORIDE 20 MEQ
10 PACKET (EA) ORAL
Refills: 0 | Status: COMPLETED | OUTPATIENT
Start: 2020-01-24 | End: 2020-01-24

## 2020-01-24 RX ORDER — LEVETIRACETAM 250 MG/1
750 TABLET, FILM COATED ORAL ONCE
Refills: 0 | Status: COMPLETED | OUTPATIENT
Start: 2020-01-24 | End: 2020-01-24

## 2020-01-24 RX ORDER — HALOPERIDOL DECANOATE 100 MG/ML
0.5 INJECTION INTRAMUSCULAR ONCE
Refills: 0 | Status: COMPLETED | OUTPATIENT
Start: 2020-01-24 | End: 2020-01-24

## 2020-01-24 RX ORDER — POTASSIUM CHLORIDE 20 MEQ
20 PACKET (EA) ORAL
Refills: 0 | Status: DISCONTINUED | OUTPATIENT
Start: 2020-01-24 | End: 2020-01-24

## 2020-01-24 RX ORDER — ACETAMINOPHEN 500 MG
650 TABLET ORAL ONCE
Refills: 0 | Status: COMPLETED | OUTPATIENT
Start: 2020-01-24 | End: 2020-01-24

## 2020-01-24 RX ADMIN — CLOPIDOGREL BISULFATE 75 MILLIGRAM(S): 75 TABLET, FILM COATED ORAL at 11:04

## 2020-01-24 RX ADMIN — Medication 650 MILLIGRAM(S): at 17:08

## 2020-01-24 RX ADMIN — Medication 100 MILLIEQUIVALENT(S): at 04:58

## 2020-01-24 RX ADMIN — Medication 81 MILLIGRAM(S): at 11:04

## 2020-01-24 RX ADMIN — Medication 0.1 MILLIGRAM(S): at 17:08

## 2020-01-24 RX ADMIN — ATORVASTATIN CALCIUM 40 MILLIGRAM(S): 80 TABLET, FILM COATED ORAL at 21:08

## 2020-01-24 RX ADMIN — Medication 50 MILLIGRAM(S): at 17:08

## 2020-01-24 RX ADMIN — Medication 50 MILLIGRAM(S): at 21:08

## 2020-01-24 RX ADMIN — Medication 50 MILLIGRAM(S): at 13:44

## 2020-01-24 RX ADMIN — ENOXAPARIN SODIUM 40 MILLIGRAM(S): 100 INJECTION SUBCUTANEOUS at 11:04

## 2020-01-24 RX ADMIN — Medication 100 MILLIEQUIVALENT(S): at 03:14

## 2020-01-24 RX ADMIN — Medication 100 MILLIEQUIVALENT(S): at 02:00

## 2020-01-24 RX ADMIN — LEVETIRACETAM 400 MILLIGRAM(S): 250 TABLET, FILM COATED ORAL at 08:09

## 2020-01-24 RX ADMIN — LEVETIRACETAM 750 MILLIGRAM(S): 250 TABLET, FILM COATED ORAL at 17:08

## 2020-01-24 NOTE — DIETITIAN INITIAL EVALUATION ADULT. - PERTINENT LABORATORY DATA
01-23 Na141 mmol/L Glu 181 mg/dL<H> K+ 3.3 mmol/L<L> Cr  1.05 mg/dL BUN 13 mg/dL 01-23 Phos 3.0 mg/dL 01-22 Alb 3.2 g/dL<L> 01-23 [id number] 6.1 %<H> 01-23 Chol 146 mg/dL LDL 98 mg/dL HDL 36 mg/dL<L> Trig 63 mg/dL01-22 ALT 17 U/L AST 30 U/L Alkaline Phosphatase 61 U/L

## 2020-01-24 NOTE — DIETITIAN INITIAL EVALUATION ADULT. - OTHER INFO
Pt is confused, family was not present when pt seen. Pt lived alone & HHA PTA.  No diabetic meds PTA noted.

## 2020-01-24 NOTE — DIETITIAN INITIAL EVALUATION ADULT. - PERTINENT MEDS FT
MEDICATIONS  (STANDING):  amLODIPine   Tablet 10 milliGRAM(s) Oral daily  aspirin enteric coated 81 milliGRAM(s) Oral daily  atorvastatin 40 milliGRAM(s) Oral at bedtime  cloNIDine 0.1 milliGRAM(s) Oral two times a day  clopidogrel Tablet 75 milliGRAM(s) Oral daily  enoxaparin Injectable 40 milliGRAM(s) SubCutaneous daily  hydrALAZINE 50 milliGRAM(s) Oral three times a day  levETIRAcetam 750 milliGRAM(s) Oral two times a day  metoprolol tartrate 50 milliGRAM(s) Oral two times a day  traZODone 50 milliGRAM(s) Oral at bedtime    MEDICATIONS  (PRN):

## 2020-01-24 NOTE — SWALLOW BEDSIDE ASSESSMENT ADULT - SWALLOW EVAL: RECOMMENDED FEEDING/EATING TECHNIQUES
maintain upright posture during/after eating for 30 mins/no straws/small sips/bites/allow for swallow between intakes/oral hygiene/crush medication (when feasible)

## 2020-01-24 NOTE — PROGRESS NOTE ADULT - ASSESSMENT
Subjective Complaints:  Historian:             Vital Signs Last 24 Hrs  T(C): 37.9 (24 Jan 2020 16:29), Max: 37.9 (24 Jan 2020 16:29)  T(F): 100.2 (24 Jan 2020 16:29), Max: 100.2 (24 Jan 2020 16:29)  HR: 120 (24 Jan 2020 16:29) (67 - 120)  BP: 140/90 (24 Jan 2020 16:29) (139/64 - 156/72)  BP(mean): --  RR: 18 (24 Jan 2020 16:29) (16 - 18)  SpO2: 100% (24 Jan 2020 16:29) (97% - 100%)    GENERAL PHYSICAL EXAM:  General:  Appears stated age, well-groomed, well-nourished, no distress  HEENT:  NC/AT, patent nares w/ pink mucosa, OP clear w/o lesions, PERRL, EOMI, conjunctivae clear, no thyromegaly, nodules, adenopathy, no JVD  Chest:  Full & symmetric excursion, no increased effort, breath sounds clear  Cardiovascular:  Regular rhythm, S1, S2, no murmur/rub/S3/S4, no carotid/femoral/abdominal bruit, radial/pedal pulses 2+, no edema  Abdomen:  Soft, non-tender, non-distended, normoactive bowel sounds, no HSM  Extremities:  Gait & station:   Digits:   Nails:   Joints, Bones, Muscles:   ROM:   Stability:  Skin:  No rash/erythema/ecchymoses/petechiae/wounds/abscess/warm/dry  Musculoskeletal:  Full ROM in all joints w/o swelling/tenderness/effusion        LABS:                        12.4   5.51  )-----------( 224      ( 23 Jan 2020 06:42 )             37.1     01-23    141  |  108  |  13  ----------------------------<  181<H>  3.3<L>   |  27  |  1.05    Ca    8.8      23 Jan 2020 06:42  Phos  3.0     01-23  Mg     2.2     01-23            RADIOLOGY & ADDITIONAL STUDIES:        Neurology Progress Note:      Mental Status: awake open eyes does not folows commands       Cranial Nerves: 2/12 intact      Motor:  arm 3/5 leg moves to stimuli          Sensory:intact      Cerebellar defrd:       Gait: unsteady       Assesment/Plan:  mri racquel noted r cva  no seziure on keppra deemntia mra brain noted for sub acute rehab on asa 81 mg

## 2020-01-24 NOTE — DIETITIAN INITIAL EVALUATION ADULT. - PROBLEM/PLAN-1
Subjective   Patient ID: Maritza is a 74 year old female.    Maritza Declined a chaperone.    Chief Complaint   Patient presents with   • Gyn Exam     vaginal irritation     Maritza Cornejo is a 74 year old  who presents today for annual well woman exam.  She is a postmenopausal female without acute concerns today.  She denies history of high-grade abnormal Pap smears.  She reported having Pap smears up until age 64.  She is up-to-date on her screening colonoscopy.  She denies any postmenopausal vasomotor symptoms.  She denies any other bladder or bowel dysfunction.  She is not sexually active.  She declines influenza vaccination today.  No other acute concerns.      Patient's medications, allergies, past medical, surgical, social and family histories were reviewed and updated as appropriate.    Review of Systems   Constitutional: Negative for chills, fatigue and fever.   Eyes: Negative for pain and visual disturbance.   Respiratory: Negative for chest tightness and shortness of breath.    Cardiovascular: Negative for chest pain, palpitations and leg swelling.   Gastrointestinal: Negative for abdominal pain, constipation, diarrhea, nausea and vomiting.   Endocrine: Negative for cold intolerance and heat intolerance.   Genitourinary: Negative for dysuria, flank pain, frequency, hematuria, urgency, vaginal bleeding, vaginal discharge and vaginal pain.   Musculoskeletal: Negative for back pain and joint swelling.   Neurological: Negative for dizziness, weakness, light-headedness and headaches.   Hematological: Does not bruise/bleed easily.   Psychiatric/Behavioral: Negative for suicidal ideas. The patient is not nervous/anxious.        Objective   Physical Exam  Constitutional:       Appearance: Normal appearance. She is well-developed.   Genitourinary:      Rectum normal.      No inguinal adenopathy present in the right or left side.     No vaginal discharge, erythema, tenderness or bleeding.      No  cervical motion tenderness or discharge.      Uterus is mobile.      Uterus is not enlarged or tender.      No uterine mass detected.     No right or left adnexal mass present.      Right adnexa not tender or full.      Left adnexa not tender or full.   HENT:      Head: Normocephalic and atraumatic.   Neck:      Musculoskeletal: Neck supple.      Trachea: Trachea normal. No tracheal deviation.   Cardiovascular:      Rate and Rhythm: Normal rate.   Chest:      Breasts:         Right: No inverted nipple, mass, nipple discharge, skin change or tenderness.         Left: No inverted nipple, mass, nipple discharge, skin change or tenderness.   Abdominal:      General: There is no distension.      Palpations: Abdomen is soft. There is no mass.      Tenderness: There is no tenderness. There is no guarding or rebound.   Musculoskeletal: Normal range of motion.   Lymphadenopathy:      Cervical: No cervical adenopathy.      Lower Body: No right inguinal adenopathy. No left inguinal adenopathy.   Neurological:      Mental Status: She is alert.   Skin:     General: Skin is warm and dry.      Findings: No bruising or rash.   Psychiatric:         Speech: Speech normal.         Behavior: Behavior normal. Behavior is cooperative.         Judgment: Judgment normal.   Vitals signs reviewed.       Visit Vitals  /66   Pulse 90   Temp 97.7 °F (36.5 °C)   Resp 18   Ht 5' 3\" (1.6 m)   Wt 77.6 kg (170 lb 15.5 oz)   LMP  (LMP Unknown)   SpO2 98%   BMI 30.29 kg/m²     Assessment   Problem List Items Addressed This Visit     None      Visit Diagnoses     Encounter for gynecological examination without abnormal finding    -  Primary    Breast cancer screening        Relevant Orders    MAMMO SCREENING BILATERAL    Screening breast examination        Relevant Orders    MAMMO SCREENING BILATERAL        Plan:  1. Complete physical exam including clinical breast exam, pelvic exam performed today.   2. Patient has no prior history of high-grade  cervical dysplasia.  Cervical cancer screening with Pap smear is normal longer recommended.  Recommend annual well woman exam with clinical breast exam and pelvic exam.  3. Discussed post menopausal precautions for bleeding, vaginal atrophy, and vasomotor symptoms  4. Patient is up-to-date on mammogram.  Self breast exam/breast awareness discussed and taught today.    5. Recommend daily multivitamin with calcium and vitamin D.  6. Follow up annually for pelvic exam, or as needed for concerns.     DISPLAY PLAN FREE TEXT

## 2020-01-24 NOTE — SWALLOW BEDSIDE ASSESSMENT ADULT - SWALLOW EVAL: PATIENT/FAMILY GOALS STATEMENT
cousin reported pt is almost at her baseline- pt "does not speech much" "the doctor told me about the stages of dementia". cousin stated pt able to feed herself- wants her to continue "just set up the tray" "cut it up for her" and may be more motivated to eat the food "if she knows what it is".

## 2020-01-24 NOTE — SWALLOW BEDSIDE ASSESSMENT ADULT - PHARYNGEAL PHASE
Within functional limits Cough post oral intake/Delayed pharyngeal swallow/inconsistent cough with straw

## 2020-01-24 NOTE — SWALLOW BEDSIDE ASSESSMENT ADULT - H & P REVIEW
Ms. Diego is a 69 year old female with a PMH of HTN, epilepsy, multiple strokes and dementia (baseline non-verbal) who was brought to hospital s/p fall one week ago with no injuries sustained, but since pt has been eating less, more fatigued/lethargic but pt w/o specific complaints, however, pt normally ambulates w/assistance but over last week didn't want to try.  hospital course complicated by RRT 1/21/2020 for syncope. During eval, pt was noted with flaccid left side, now new from baseline. Pt found to have M1 right sided occlusion, was brought to ICU with resolution of left-sided paralysis./yes

## 2020-01-24 NOTE — DIETITIAN INITIAL EVALUATION ADULT. - DIET TYPE
low sodium/consistent carbohydrate (evening snack)/+ Glucerna Shake 3 x daily =660 calories, 30 grams protein/dysphagia 2, mechanical soft, thin liquids

## 2020-01-24 NOTE — SWALLOW BEDSIDE ASSESSMENT ADULT - COMMENTS
MRI head no contrast 1/23/2020 IMPRESSION:    1)  in addition to an old right MCA infarct, there are now new areas of  ischemia in the right MCA distribution, with diminished the flow void in the right distal M1 and proximal M2 segments of the middle cerebral artery.  2)  also noted are chronic ischemic changes throughout both hemispheres as well as within the posterior fossa in conjunction with volume loss    CXR 1/18/2020INTERPRETATION:  AP semierect chest on January 18, 2020 12:16 AM. Patient had a fall.  Heart is normal for projection.Slight scarring left apex is seen.There is no sense of active lung or pleural finding.  No fractures are appreciated.  Chest is similar to February 14, 2019.  IMPRESSION: No acute finding or change.

## 2020-01-24 NOTE — PROGRESS NOTE ADULT - ASSESSMENT
70 y/o female w/ PMH of HTN, epilepsy, multiple CVA and dementia initially presented with generalized wkness, decreased PO intake, was stable for dc to [facility], had RRT for syncope and then found to have  acute CVA w/ CTA concerning for M1 occlusion.    CVA:  - MRI: in addition to an old right MCA infarct, there are now new areas of  ischemia in the right MCA distribution, with diminished the flow void in the right distal M1 and proximal M2 segments of the middle cerebral artery.  - MRA: Abrupt cut off and  severe stenosis of the proximal M1 segment of the right middle cerebral artery with a thin residual lumen beyond the stenosis  - Neuro following  - Continue ASA, Plavix  - Continue statin     HTN:  - BP acceptable  - Continue current meds    Dementia/ generalized functional decline:  - Supportive care  - DC to [facility]    Seizure:  - Continue Keppra     DM:  - Hb A1c 6.1  - Continue current insulin     Hypokalemia:  - Being replaced.

## 2020-01-24 NOTE — DIETITIAN INITIAL EVALUATION ADULT. - ENERGY NEEDS
Height (cm): 160.02 (01-17)  Weight (kg): 57.2 (01-18)  BMI (kg/m2): 22.3 (01-18)  IBW: 52.1 kg       % IBW:  109%           UBW: ?          %UBW: ?

## 2020-01-24 NOTE — CHART NOTE - NSCHARTNOTEFT_GEN_A_CORE
Upon Nutritional Assessment by the Registered Dietitian your patient was determined to meet criteria / has evidence of the following diagnosis/diagnoses:          [ ]  Mild Protein Calorie Malnutrition        [ x]  Moderate Protein Calorie Malnutrition        [ ] Severe Protein Calorie Malnutrition        [ ] Unspecified Protein Calorie Malnutrition        [ ] Underweight / BMI <19        [ ] Morbid Obesity / BMI > 40      Findings as based on:  •  Comprehensive nutrition assessment and consultation  •  Calorie counts (nutrient intake analysis)  •  Food acceptance and intake status from observations by staff  •  Follow up  •  Patient education  •  Intervention secondary to interdisciplinary rounds  •   concerns      Treatment:    The following diet has been recommended:  Dysphagia 2 Mechanical Soft - Thin Liquids , Low sodium , consistent carbohydrate c evening snack ,  Glucerna Shake 2 x daily= 440 calories, 20 grams protein daily       PROVIDER Section:     By signing this assessment you are acknowledging and agree with the diagnosis/diagnoses assigned by the Registered Dietitian    Comments:

## 2020-01-24 NOTE — DIETITIAN INITIAL EVALUATION ADULT. - FACTORS AFF FOOD INTAKE
difficulty chewing/01/24, swallow evaluation recommended Dysphagia 3 Soft - Thin Liquids or downgrade to mechanical soft as per MD's discretion, pt is edentulous/change in mental status/difficulty swallowing/other (specify)

## 2020-01-24 NOTE — SWALLOW BEDSIDE ASSESSMENT ADULT - SLP GENERAL OBSERVATIONS
pt seen bedside during lunch, alert and oriented to self. pt did not respond to simple want questions, initiate needs or follow one step directions. pt nonverbal, essentially noncommunicative except 1-2 utterances "yes' to name

## 2020-01-24 NOTE — DIETITIAN INITIAL EVALUATION ADULT. - PHYSICAL APPEARANCE
BMI=22.3(01/18/20), no edema noted/other (specify) Nutrition focused physical exam conducted; Subcutaneous fat Exam;  [ Mild  ]  Orbital fat pads region,  [ Unable  ]Buccal fat region,  [ Moderate  ]triceps region, [ WNL  ]ribs region.  Muscle Exam; [ Mild   ]temples region, [ Moderate ]clavicle region, [ Moderate   ]shoulder region, [ Unable  ]Scapula region, Moderate ]Interosseous region, [ Unable  ]thigh region, [ Unable  ]Calf region

## 2020-01-24 NOTE — SWALLOW BEDSIDE ASSESSMENT ADULT - SLP PERTINENT HISTORY OF CURRENT PROBLEM
pts cousin denied pt had difficulty eating and swallowing prior to admission.  RN tayler reported pt had trouble with medication

## 2020-01-24 NOTE — SWALLOW BEDSIDE ASSESSMENT ADULT - ORAL PHASE
Within functional limits ? large bolus, consecutive sip swallow with straw and possible premature spill

## 2020-01-25 PROCEDURE — 99233 SBSQ HOSP IP/OBS HIGH 50: CPT

## 2020-01-25 RX ADMIN — Medication 0.1 MILLIGRAM(S): at 17:31

## 2020-01-25 RX ADMIN — LEVETIRACETAM 750 MILLIGRAM(S): 250 TABLET, FILM COATED ORAL at 17:31

## 2020-01-25 RX ADMIN — Medication 0.1 MILLIGRAM(S): at 05:09

## 2020-01-25 RX ADMIN — Medication 50 MILLIGRAM(S): at 14:28

## 2020-01-25 RX ADMIN — AMLODIPINE BESYLATE 10 MILLIGRAM(S): 2.5 TABLET ORAL at 05:09

## 2020-01-25 RX ADMIN — CLOPIDOGREL BISULFATE 75 MILLIGRAM(S): 75 TABLET, FILM COATED ORAL at 11:09

## 2020-01-25 RX ADMIN — ATORVASTATIN CALCIUM 40 MILLIGRAM(S): 80 TABLET, FILM COATED ORAL at 21:31

## 2020-01-25 RX ADMIN — ENOXAPARIN SODIUM 40 MILLIGRAM(S): 100 INJECTION SUBCUTANEOUS at 11:09

## 2020-01-25 RX ADMIN — Medication 50 MILLIGRAM(S): at 21:31

## 2020-01-25 RX ADMIN — Medication 50 MILLIGRAM(S): at 17:31

## 2020-01-25 RX ADMIN — LEVETIRACETAM 750 MILLIGRAM(S): 250 TABLET, FILM COATED ORAL at 05:09

## 2020-01-25 RX ADMIN — Medication 81 MILLIGRAM(S): at 11:09

## 2020-01-25 RX ADMIN — Medication 50 MILLIGRAM(S): at 05:09

## 2020-01-25 NOTE — PROGRESS NOTE ADULT - ASSESSMENT
70 y/o female w/ PMH of HTN, epilepsy, multiple CVA and dementia initially presented with generalized wkness, decreased PO intake, was stable for dc to [facility], had RRT for syncope and then found to have  acute CVA w/ CTA concerning for M1 occlusion.    CVA:  - MRI: in addition to an old right MCA infarct, there are now new areas of  ischemia in the right MCA distribution, with diminished the flow void in the right distal M1 and proximal M2 segments of the middle cerebral artery.  - MRA: Abrupt cut off and  severe stenosis of the proximal M1 segment of the right middle cerebral artery with a thin residual lumen beyond the stenosis  - Neuro following  - Continue ASA, Plavix  - Continue statin     HTN:  - BP acceptable  - Continue current meds    Dementia/ generalized functional decline:  - Supportive care  - DC to [facility]    Seizure:  - Continue Keppra     DM:  - Hb A1c 6.1  - Continue current insulin     Hypokalemia:  - Being replaced.  - Repeat lab in am    Medically stable for dc.

## 2020-01-25 NOTE — PROGRESS NOTE ADULT - ASSESSMENT
Subjective Complaints:  Historian:             Vital Signs Last 24 Hrs  T(C): 37.1 (25 Jan 2020 17:03), Max: 37.3 (24 Jan 2020 23:38)  T(F): 98.7 (25 Jan 2020 17:03), Max: 99.2 (24 Jan 2020 23:38)  HR: 113 (25 Jan 2020 17:03) (65 - 113)  BP: 176/93 (25 Jan 2020 17:03) (130/60 - 176/93)  BP(mean): --  RR: 19 (25 Jan 2020 17:03) (16 - 19)  SpO2: 99% (25 Jan 2020 17:03) (98% - 100%)    GENERAL PHYSICAL EXAM:  General:  Appears stated age, well-groomed, well-nourished, no distress  HEENT:  NC/AT, patent nares w/ pink mucosa, OP clear w/o lesions, PERRL, EOMI, conjunctivae clear, no thyromegaly, nodules, adenopathy, no JVD  Chest:  Full & symmetric excursion, no increased effort, breath sounds clear  Cardiovascular:  Regular rhythm, S1, S2, no murmur/rub/S3/S4, no carotid/femoral/abdominal bruit, radial/pedal pulses 2+, no edema  Abdomen:  Soft, non-tender, non-distended, normoactive bowel sounds, no HSM  Extremities:  Gait & station:   Digits:   Nails:   Joints, Bones, Muscles:   ROM:   Stability:  Skin:  No rash/erythema/ecchymoses/petechiae/wounds/abscess/warm/dry  Musculoskeletal:  Full ROM in all joints w/o swelling/tenderness/effusion        LABS:                RADIOLOGY & ADDITIONAL STUDIES:        Neurology Progress Note:      Mental Status: awaek alert  speech few words       Cranial Nerves: 2 /12intact      Motor:   arm 3/5 legs moves to stimuli         Sensory:intact      Cerebellar: defrd      Gait: unsteasty       Assesment/Plan:  r cva seziure dementia  confused  for sub acute rehab on asa on nkeppra

## 2020-01-26 LAB
ANION GAP SERPL CALC-SCNC: 8 MMOL/L — SIGNIFICANT CHANGE UP (ref 5–17)
BUN SERPL-MCNC: 20 MG/DL — SIGNIFICANT CHANGE UP (ref 7–23)
CALCIUM SERPL-MCNC: 9 MG/DL — SIGNIFICANT CHANGE UP (ref 8.5–10.1)
CHLORIDE SERPL-SCNC: 111 MMOL/L — HIGH (ref 96–108)
CO2 SERPL-SCNC: 25 MMOL/L — SIGNIFICANT CHANGE UP (ref 22–31)
CREAT SERPL-MCNC: 1.06 MG/DL — SIGNIFICANT CHANGE UP (ref 0.5–1.3)
GLUCOSE SERPL-MCNC: 160 MG/DL — HIGH (ref 70–99)
POTASSIUM SERPL-MCNC: 4 MMOL/L — SIGNIFICANT CHANGE UP (ref 3.5–5.3)
POTASSIUM SERPL-SCNC: 4 MMOL/L — SIGNIFICANT CHANGE UP (ref 3.5–5.3)
SODIUM SERPL-SCNC: 144 MMOL/L — SIGNIFICANT CHANGE UP (ref 135–145)

## 2020-01-26 PROCEDURE — 99232 SBSQ HOSP IP/OBS MODERATE 35: CPT

## 2020-01-26 RX ADMIN — Medication 0.1 MILLIGRAM(S): at 17:04

## 2020-01-26 RX ADMIN — Medication 0.1 MILLIGRAM(S): at 05:26

## 2020-01-26 RX ADMIN — Medication 50 MILLIGRAM(S): at 05:26

## 2020-01-26 RX ADMIN — LEVETIRACETAM 750 MILLIGRAM(S): 250 TABLET, FILM COATED ORAL at 05:26

## 2020-01-26 RX ADMIN — Medication 50 MILLIGRAM(S): at 21:21

## 2020-01-26 RX ADMIN — ENOXAPARIN SODIUM 40 MILLIGRAM(S): 100 INJECTION SUBCUTANEOUS at 11:04

## 2020-01-26 RX ADMIN — ATORVASTATIN CALCIUM 40 MILLIGRAM(S): 80 TABLET, FILM COATED ORAL at 21:21

## 2020-01-26 RX ADMIN — Medication 50 MILLIGRAM(S): at 17:04

## 2020-01-26 RX ADMIN — Medication 81 MILLIGRAM(S): at 11:04

## 2020-01-26 RX ADMIN — CLOPIDOGREL BISULFATE 75 MILLIGRAM(S): 75 TABLET, FILM COATED ORAL at 11:04

## 2020-01-26 RX ADMIN — AMLODIPINE BESYLATE 10 MILLIGRAM(S): 2.5 TABLET ORAL at 05:26

## 2020-01-26 RX ADMIN — LEVETIRACETAM 750 MILLIGRAM(S): 250 TABLET, FILM COATED ORAL at 17:04

## 2020-01-27 LAB
ALBUMIN SERPL ELPH-MCNC: 2.9 G/DL — LOW (ref 3.3–5)
ALP SERPL-CCNC: 56 U/L — SIGNIFICANT CHANGE UP (ref 40–120)
ALT FLD-CCNC: 21 U/L — SIGNIFICANT CHANGE UP (ref 12–78)
ANION GAP SERPL CALC-SCNC: 7 MMOL/L — SIGNIFICANT CHANGE UP (ref 5–17)
AST SERPL-CCNC: 14 U/L — LOW (ref 15–37)
BILIRUB SERPL-MCNC: 0.5 MG/DL — SIGNIFICANT CHANGE UP (ref 0.2–1.2)
BUN SERPL-MCNC: 19 MG/DL — SIGNIFICANT CHANGE UP (ref 7–23)
CALCIUM SERPL-MCNC: 8.8 MG/DL — SIGNIFICANT CHANGE UP (ref 8.5–10.1)
CHLORIDE SERPL-SCNC: 110 MMOL/L — HIGH (ref 96–108)
CO2 SERPL-SCNC: 25 MMOL/L — SIGNIFICANT CHANGE UP (ref 22–31)
CREAT SERPL-MCNC: 0.96 MG/DL — SIGNIFICANT CHANGE UP (ref 0.5–1.3)
GLUCOSE SERPL-MCNC: 167 MG/DL — HIGH (ref 70–99)
HCT VFR BLD CALC: 32.1 % — LOW (ref 34.5–45)
HCT VFR BLD CALC: 33 % — LOW (ref 34.5–45)
HGB BLD-MCNC: 10.6 G/DL — LOW (ref 11.5–15.5)
HGB BLD-MCNC: 10.8 G/DL — LOW (ref 11.5–15.5)
MCHC RBC-ENTMCNC: 28.7 PG — SIGNIFICANT CHANGE UP (ref 27–34)
MCHC RBC-ENTMCNC: 29.2 PG — SIGNIFICANT CHANGE UP (ref 27–34)
MCHC RBC-ENTMCNC: 32.7 GM/DL — SIGNIFICANT CHANGE UP (ref 32–36)
MCHC RBC-ENTMCNC: 33 GM/DL — SIGNIFICANT CHANGE UP (ref 32–36)
MCV RBC AUTO: 87.8 FL — SIGNIFICANT CHANGE UP (ref 80–100)
MCV RBC AUTO: 88.4 FL — SIGNIFICANT CHANGE UP (ref 80–100)
NRBC # BLD: 0 /100 WBCS — SIGNIFICANT CHANGE UP (ref 0–0)
NRBC # BLD: 0 /100 WBCS — SIGNIFICANT CHANGE UP (ref 0–0)
PLATELET # BLD AUTO: 236 K/UL — SIGNIFICANT CHANGE UP (ref 150–400)
PLATELET # BLD AUTO: 237 K/UL — SIGNIFICANT CHANGE UP (ref 150–400)
POTASSIUM SERPL-MCNC: 3.5 MMOL/L — SIGNIFICANT CHANGE UP (ref 3.5–5.3)
POTASSIUM SERPL-SCNC: 3.5 MMOL/L — SIGNIFICANT CHANGE UP (ref 3.5–5.3)
PROT SERPL-MCNC: 6.3 GM/DL — SIGNIFICANT CHANGE UP (ref 6–8.3)
RBC # BLD: 3.63 M/UL — LOW (ref 3.8–5.2)
RBC # BLD: 3.76 M/UL — LOW (ref 3.8–5.2)
RBC # FLD: 12.1 % — SIGNIFICANT CHANGE UP (ref 10.3–14.5)
RBC # FLD: 12.2 % — SIGNIFICANT CHANGE UP (ref 10.3–14.5)
SODIUM SERPL-SCNC: 142 MMOL/L — SIGNIFICANT CHANGE UP (ref 135–145)
WBC # BLD: 4.81 K/UL — SIGNIFICANT CHANGE UP (ref 3.8–10.5)
WBC # BLD: 5.53 K/UL — SIGNIFICANT CHANGE UP (ref 3.8–10.5)
WBC # FLD AUTO: 4.81 K/UL — SIGNIFICANT CHANGE UP (ref 3.8–10.5)
WBC # FLD AUTO: 5.53 K/UL — SIGNIFICANT CHANGE UP (ref 3.8–10.5)

## 2020-01-27 PROCEDURE — 99233 SBSQ HOSP IP/OBS HIGH 50: CPT

## 2020-01-27 RX ADMIN — ATORVASTATIN CALCIUM 40 MILLIGRAM(S): 80 TABLET, FILM COATED ORAL at 21:03

## 2020-01-27 RX ADMIN — AMLODIPINE BESYLATE 10 MILLIGRAM(S): 2.5 TABLET ORAL at 05:39

## 2020-01-27 RX ADMIN — Medication 50 MILLIGRAM(S): at 14:45

## 2020-01-27 RX ADMIN — Medication 50 MILLIGRAM(S): at 05:39

## 2020-01-27 RX ADMIN — CLOPIDOGREL BISULFATE 75 MILLIGRAM(S): 75 TABLET, FILM COATED ORAL at 11:48

## 2020-01-27 RX ADMIN — LEVETIRACETAM 750 MILLIGRAM(S): 250 TABLET, FILM COATED ORAL at 17:05

## 2020-01-27 RX ADMIN — ENOXAPARIN SODIUM 40 MILLIGRAM(S): 100 INJECTION SUBCUTANEOUS at 11:48

## 2020-01-27 RX ADMIN — Medication 0.1 MILLIGRAM(S): at 05:39

## 2020-01-27 RX ADMIN — Medication 0.1 MILLIGRAM(S): at 17:05

## 2020-01-27 RX ADMIN — LEVETIRACETAM 750 MILLIGRAM(S): 250 TABLET, FILM COATED ORAL at 05:39

## 2020-01-27 RX ADMIN — Medication 50 MILLIGRAM(S): at 21:03

## 2020-01-27 RX ADMIN — Medication 81 MILLIGRAM(S): at 11:48

## 2020-01-27 NOTE — PROGRESS NOTE ADULT - ASSESSMENT
70 y/o female w/ PMH of HTN, epilepsy, multiple CVA and dementia initially presented with generalized wkness, decreased PO intake, was stable for dc to [facility], had RRT for syncope and then found to have  acute CVA w/ CTA concerning for M1 occlusion.    CVA:  - MRI: in addition to an old right MCA infarct, there are now new areas of  ischemia in the right MCA distribution, with diminished the flow void in the right distal M1 and proximal M2 segments of the middle cerebral artery.  - MRA: Abrupt cut off and  severe stenosis of the proximal M1 segment of the right middle cerebral artery with a thin residual lumen beyond the stenosis  - Neuro following  - Continue ASA, Plavix  - Continue statin     HTN:  - BP acceptable  - Continue current meds    Dementia/ generalized functional decline:  - Supportive care  - DC to [facility]    Seizure:  - Continue Keppra     Pre- DM:  - Hb A1c 6.1  - Continue to monitor    Hypokalemia:  - Being replaced.  - Repeat lab in am      Hb drop:  - Unclear etiology  - Pt on ASA/lasix/ AC  - No overt bleeding  - Check anemia work up, occult blood in stool      PT eval: [facility], denied by insurance. Discussed with daughter, she doesn't want her mother to return home and doesn't want LT facility and want to re assess. I discussed with SW and will re send updated PT note from today.

## 2020-01-27 NOTE — PROGRESS NOTE ADULT - ASSESSMENT
Subjective Complaints:  Historian:             Vital Signs Last 24 Hrs  T(C): 37.1 (27 Jan 2020 16:25), Max: 37.2 (27 Jan 2020 11:30)  T(F): 98.7 (27 Jan 2020 16:25), Max: 98.9 (27 Jan 2020 11:30)  HR: 65 (27 Jan 2020 21:02) (59 - 75)  BP: 124/57 (27 Jan 2020 21:02) (124/57 - 198/78)  BP(mean): --  RR: 15 (27 Jan 2020 16:25) (15 - 18)  SpO2: 97% (27 Jan 2020 16:25) (97% - 98%)    GENERAL PHYSICAL EXAM:  General:  Appears stated age, well-groomed, well-nourished, no distress  HEENT:  NC/AT, patent nares w/ pink mucosa, OP clear w/o lesions, PERRL, EOMI, conjunctivae clear, no thyromegaly, nodules, adenopathy, no JVD  Chest:  Full & symmetric excursion, no increased effort, breath sounds clear  Cardiovascular:  Regular rhythm, S1, S2, no murmur/rub/S3/S4, no carotid/femoral/abdominal bruit, radial/pedal pulses 2+, no edema  Abdomen:  Soft, non-tender, non-distended, normoactive bowel sounds, no HSM  Extremities:  Gait & station:   Digits:   Nails:   Joints, Bones, Muscles:   ROM:   Stability:  Skin:  No rash/erythema/ecchymoses/petechiae/wounds/abscess/warm/dry  Musculoskeletal:  Full ROM in all joints w/o swelling/tenderness/effusion        LABS:                        10.6   4.81  )-----------( 236      ( 27 Jan 2020 11:51 )             32.1     01-27    142  |  110<H>  |  19  ----------------------------<  167<H>  3.5   |  25  |  0.96    Ca    8.8      27 Jan 2020 07:44    TPro  6.3  /  Alb  2.9<L>  /  TBili  0.5  /  DBili  x   /  AST  14<L>  /  ALT  21  /  AlkPhos  56  01-27          RADIOLOGY & ADDITIONAL STUDIES:        Neurology Progress Note:      Mental Status: awake   alert speech fluent  flllows commands       Cranial Nerves:2 /12intact      Motor:  arm 3/5 leg weakenss moves to stimuli          Sensory:intact      Cerebellar: defrd      Gait:unsteady       Assesment/Plan:  dementia r cva seziure  poor memory  for sub acute rehab  will follow

## 2020-01-28 LAB
ALBUMIN SERPL ELPH-MCNC: 2.9 G/DL — LOW (ref 3.3–5)
ALP SERPL-CCNC: 62 U/L — SIGNIFICANT CHANGE UP (ref 40–120)
ALT FLD-CCNC: 29 U/L — SIGNIFICANT CHANGE UP (ref 12–78)
ANION GAP SERPL CALC-SCNC: 7 MMOL/L — SIGNIFICANT CHANGE UP (ref 5–17)
AST SERPL-CCNC: 21 U/L — SIGNIFICANT CHANGE UP (ref 15–37)
BILIRUB SERPL-MCNC: 0.4 MG/DL — SIGNIFICANT CHANGE UP (ref 0.2–1.2)
BUN SERPL-MCNC: 23 MG/DL — SIGNIFICANT CHANGE UP (ref 7–23)
CALCIUM SERPL-MCNC: 8.6 MG/DL — SIGNIFICANT CHANGE UP (ref 8.5–10.1)
CHLORIDE SERPL-SCNC: 110 MMOL/L — HIGH (ref 96–108)
CO2 SERPL-SCNC: 25 MMOL/L — SIGNIFICANT CHANGE UP (ref 22–31)
CREAT SERPL-MCNC: 1.06 MG/DL — SIGNIFICANT CHANGE UP (ref 0.5–1.3)
FERRITIN SERPL-MCNC: 227 NG/ML — HIGH (ref 15–150)
FOLATE SERPL-MCNC: 4.8 NG/ML — SIGNIFICANT CHANGE UP
GLUCOSE SERPL-MCNC: 136 MG/DL — HIGH (ref 70–99)
HCT VFR BLD CALC: 32.4 % — LOW (ref 34.5–45)
HGB BLD-MCNC: 10.7 G/DL — LOW (ref 11.5–15.5)
IRON SATN MFR SERPL: 17 % — SIGNIFICANT CHANGE UP (ref 14–50)
IRON SATN MFR SERPL: 35 UG/DL — SIGNIFICANT CHANGE UP (ref 30–160)
MCHC RBC-ENTMCNC: 28.8 PG — SIGNIFICANT CHANGE UP (ref 27–34)
MCHC RBC-ENTMCNC: 33 GM/DL — SIGNIFICANT CHANGE UP (ref 32–36)
MCV RBC AUTO: 87.3 FL — SIGNIFICANT CHANGE UP (ref 80–100)
NRBC # BLD: 0 /100 WBCS — SIGNIFICANT CHANGE UP (ref 0–0)
OB PNL STL: NEGATIVE — SIGNIFICANT CHANGE UP
PLATELET # BLD AUTO: 249 K/UL — SIGNIFICANT CHANGE UP (ref 150–400)
POTASSIUM SERPL-MCNC: 3.4 MMOL/L — LOW (ref 3.5–5.3)
POTASSIUM SERPL-SCNC: 3.4 MMOL/L — LOW (ref 3.5–5.3)
PROT SERPL-MCNC: 6.6 GM/DL — SIGNIFICANT CHANGE UP (ref 6–8.3)
RBC # BLD: 3.71 M/UL — LOW (ref 3.8–5.2)
RBC # FLD: 12.1 % — SIGNIFICANT CHANGE UP (ref 10.3–14.5)
SODIUM SERPL-SCNC: 142 MMOL/L — SIGNIFICANT CHANGE UP (ref 135–145)
TIBC SERPL-MCNC: 201 UG/DL — LOW (ref 220–430)
UIBC SERPL-MCNC: 166 UG/DL — SIGNIFICANT CHANGE UP (ref 110–370)
VIT B12 SERPL-MCNC: 436 PG/ML — SIGNIFICANT CHANGE UP (ref 232–1245)
WBC # BLD: 6.38 K/UL — SIGNIFICANT CHANGE UP (ref 3.8–10.5)
WBC # FLD AUTO: 6.38 K/UL — SIGNIFICANT CHANGE UP (ref 3.8–10.5)

## 2020-01-28 PROCEDURE — 99233 SBSQ HOSP IP/OBS HIGH 50: CPT

## 2020-01-28 RX ORDER — POTASSIUM CHLORIDE 20 MEQ
20 PACKET (EA) ORAL ONCE
Refills: 0 | Status: COMPLETED | OUTPATIENT
Start: 2020-01-28 | End: 2020-01-28

## 2020-01-28 RX ADMIN — Medication 0.1 MILLIGRAM(S): at 05:19

## 2020-01-28 RX ADMIN — Medication 0.1 MILLIGRAM(S): at 17:37

## 2020-01-28 RX ADMIN — Medication 81 MILLIGRAM(S): at 11:14

## 2020-01-28 RX ADMIN — LEVETIRACETAM 750 MILLIGRAM(S): 250 TABLET, FILM COATED ORAL at 05:19

## 2020-01-28 RX ADMIN — Medication 50 MILLIGRAM(S): at 17:37

## 2020-01-28 RX ADMIN — LEVETIRACETAM 750 MILLIGRAM(S): 250 TABLET, FILM COATED ORAL at 17:37

## 2020-01-28 RX ADMIN — Medication 50 MILLIGRAM(S): at 05:20

## 2020-01-28 RX ADMIN — Medication 50 MILLIGRAM(S): at 05:19

## 2020-01-28 RX ADMIN — CLOPIDOGREL BISULFATE 75 MILLIGRAM(S): 75 TABLET, FILM COATED ORAL at 11:11

## 2020-01-28 RX ADMIN — ATORVASTATIN CALCIUM 40 MILLIGRAM(S): 80 TABLET, FILM COATED ORAL at 21:12

## 2020-01-28 RX ADMIN — Medication 20 MILLIEQUIVALENT(S): at 11:11

## 2020-01-28 RX ADMIN — Medication 50 MILLIGRAM(S): at 21:12

## 2020-01-28 RX ADMIN — AMLODIPINE BESYLATE 10 MILLIGRAM(S): 2.5 TABLET ORAL at 05:20

## 2020-01-28 RX ADMIN — ENOXAPARIN SODIUM 40 MILLIGRAM(S): 100 INJECTION SUBCUTANEOUS at 11:11

## 2020-01-28 NOTE — PROGRESS NOTE ADULT - ASSESSMENT
70 y/o female w/ PMH of HTN, epilepsy, multiple CVA and dementia initially presented with generalized wkness, decreased PO intake, was stable for dc to [facility], had RRT for syncope and then found to have  acute CVA w/ CTA concerning for M1 occlusion.    CVA:  - MRI: in addition to an old right MCA infarct, there are now new areas of  ischemia in the right MCA distribution, with diminished the flow void in the right distal M1 and proximal M2 segments of the middle cerebral artery.  - MRA: Abrupt cut off and  severe stenosis of the proximal M1 segment of the right middle cerebral artery with a thin residual lumen beyond the stenosis  - Neuro following  - Continue ASA, Plavix  - Continue statin     HTN:  - BP acceptable  - Continue current meds    Dementia/ generalized functional decline:  - Supportive care  - DC to [facility]    Seizure:  - Continue Keppra     Pre- DM:  - Hb A1c 6.1  - Continue to monitor    Hypokalemia:  - Being replaced.  - Repeat lab in am      Hb drop:  - Unclear etiology  - Pt on ASA/lasix/ AC  - No overt bleeding  - Check anemia work up, occult blood in stool      PT eval: [facility], denied by insurance. Discussed with daughter, she doesn't want her mother to return home and doesn't want LT facility and want to re assess. I discussed with SW and will re send updated PT note from today. awaiting response.

## 2020-01-28 NOTE — PROGRESS NOTE ADULT - ASSESSMENT
Subjective Complaints:  Historian:             Vital Signs Last 24 Hrs  T(C): 36.1 (28 Jan 2020 16:33), Max: 37 (27 Jan 2020 23:37)  T(F): 97 (28 Jan 2020 16:33), Max: 98.6 (27 Jan 2020 23:37)  HR: 69 (28 Jan 2020 21:06) (60 - 82)  BP: 161/65 (28 Jan 2020 21:06) (100/60 - 171/74)  BP(mean): --  RR: 18 (28 Jan 2020 16:33) (16 - 18)  SpO2: 98% (28 Jan 2020 16:33) (97% - 100%)    GENERAL PHYSICAL EXAM:  General:  Appears stated age, well-groomed, well-nourished, no distress  HEENT:  NC/AT, patent nares w/ pink mucosa, OP clear w/o lesions, PERRL, EOMI, conjunctivae clear, no thyromegaly, nodules, adenopathy, no JVD  Chest:  Full & symmetric excursion, no increased effort, breath sounds clear  Cardiovascular:  Regular rhythm, S1, S2, no murmur/rub/S3/S4, no carotid/femoral/abdominal bruit, radial/pedal pulses 2+, no edema  Abdomen:  Soft, non-tender, non-distended, normoactive bowel sounds, no HSM  Extremities:  Gait & station:   Digits:   Nails:   Joints, Bones, Muscles:   ROM:   Stability:  Skin:  No rash/erythema/ecchymoses/petechiae/wounds/abscess/warm/dry  Musculoskeletal:  Full ROM in all joints w/o swelling/tenderness/effusion        LABS:                        10.7   6.38  )-----------( 249      ( 28 Jan 2020 06:16 )             32.4     01-28    142  |  110<H>  |  23  ----------------------------<  136<H>  3.4<L>   |  25  |  1.06    Ca    8.6      28 Jan 2020 06:16    TPro  6.6  /  Alb  2.9<L>  /  TBili  0.4  /  DBili  x   /  AST  21  /  ALT  29  /  AlkPhos  62  01-28          RADIOLOGY & ADDITIONAL STUDIES:        Neurology Progress Note:      Mental Status: awake alert speech fuent       Cranial Nerves:  2/12intact      Motor:  arm 3/5 leg 3/5          Sensory:intact      Cerebellar: defrd      Gait:defrd      Assesment/Plan:  r cva deemntia no seziure on keppra for sub acute rehab

## 2020-01-29 LAB
ALBUMIN SERPL ELPH-MCNC: 2.6 G/DL — LOW (ref 3.3–5)
ALP SERPL-CCNC: 61 U/L — SIGNIFICANT CHANGE UP (ref 40–120)
ALT FLD-CCNC: 22 U/L — SIGNIFICANT CHANGE UP (ref 12–78)
ANION GAP SERPL CALC-SCNC: 8 MMOL/L — SIGNIFICANT CHANGE UP (ref 5–17)
AST SERPL-CCNC: 14 U/L — LOW (ref 15–37)
BILIRUB SERPL-MCNC: 0.3 MG/DL — SIGNIFICANT CHANGE UP (ref 0.2–1.2)
BUN SERPL-MCNC: 24 MG/DL — HIGH (ref 7–23)
CALCIUM SERPL-MCNC: 8.4 MG/DL — LOW (ref 8.5–10.1)
CHLORIDE SERPL-SCNC: 112 MMOL/L — HIGH (ref 96–108)
CO2 SERPL-SCNC: 25 MMOL/L — SIGNIFICANT CHANGE UP (ref 22–31)
CREAT SERPL-MCNC: 0.98 MG/DL — SIGNIFICANT CHANGE UP (ref 0.5–1.3)
GLUCOSE SERPL-MCNC: 199 MG/DL — HIGH (ref 70–99)
HCT VFR BLD CALC: 31.4 % — LOW (ref 34.5–45)
HGB BLD-MCNC: 10.4 G/DL — LOW (ref 11.5–15.5)
MCHC RBC-ENTMCNC: 29.1 PG — SIGNIFICANT CHANGE UP (ref 27–34)
MCHC RBC-ENTMCNC: 33.1 GM/DL — SIGNIFICANT CHANGE UP (ref 32–36)
MCV RBC AUTO: 87.7 FL — SIGNIFICANT CHANGE UP (ref 80–100)
NRBC # BLD: 0 /100 WBCS — SIGNIFICANT CHANGE UP (ref 0–0)
PLATELET # BLD AUTO: 239 K/UL — SIGNIFICANT CHANGE UP (ref 150–400)
POTASSIUM SERPL-MCNC: 3.6 MMOL/L — SIGNIFICANT CHANGE UP (ref 3.5–5.3)
POTASSIUM SERPL-SCNC: 3.6 MMOL/L — SIGNIFICANT CHANGE UP (ref 3.5–5.3)
PROT SERPL-MCNC: 6.1 GM/DL — SIGNIFICANT CHANGE UP (ref 6–8.3)
RBC # BLD: 3.58 M/UL — LOW (ref 3.8–5.2)
RBC # FLD: 12.2 % — SIGNIFICANT CHANGE UP (ref 10.3–14.5)
SODIUM SERPL-SCNC: 145 MMOL/L — SIGNIFICANT CHANGE UP (ref 135–145)
WBC # BLD: 8.33 K/UL — SIGNIFICANT CHANGE UP (ref 3.8–10.5)
WBC # FLD AUTO: 8.33 K/UL — SIGNIFICANT CHANGE UP (ref 3.8–10.5)

## 2020-01-29 PROCEDURE — 99232 SBSQ HOSP IP/OBS MODERATE 35: CPT

## 2020-01-29 RX ADMIN — Medication 50 MILLIGRAM(S): at 05:12

## 2020-01-29 RX ADMIN — LEVETIRACETAM 750 MILLIGRAM(S): 250 TABLET, FILM COATED ORAL at 17:14

## 2020-01-29 RX ADMIN — Medication 0.1 MILLIGRAM(S): at 05:12

## 2020-01-29 RX ADMIN — Medication 0.1 MILLIGRAM(S): at 17:13

## 2020-01-29 RX ADMIN — Medication 50 MILLIGRAM(S): at 17:13

## 2020-01-29 RX ADMIN — LEVETIRACETAM 750 MILLIGRAM(S): 250 TABLET, FILM COATED ORAL at 05:12

## 2020-01-29 RX ADMIN — Medication 50 MILLIGRAM(S): at 13:29

## 2020-01-29 RX ADMIN — ATORVASTATIN CALCIUM 40 MILLIGRAM(S): 80 TABLET, FILM COATED ORAL at 21:45

## 2020-01-29 RX ADMIN — CLOPIDOGREL BISULFATE 75 MILLIGRAM(S): 75 TABLET, FILM COATED ORAL at 11:10

## 2020-01-29 RX ADMIN — Medication 50 MILLIGRAM(S): at 21:45

## 2020-01-29 RX ADMIN — Medication 81 MILLIGRAM(S): at 11:11

## 2020-01-29 RX ADMIN — ENOXAPARIN SODIUM 40 MILLIGRAM(S): 100 INJECTION SUBCUTANEOUS at 11:10

## 2020-01-29 RX ADMIN — AMLODIPINE BESYLATE 10 MILLIGRAM(S): 2.5 TABLET ORAL at 05:12

## 2020-01-29 NOTE — PROGRESS NOTE ADULT - ASSESSMENT
70 y/o female w/ PMH of HTN, epilepsy, multiple CVA and dementia initially presented with generalized wkness, decreased PO intake, was stable for dc to [facility], had RRT for syncope and then found to have  acute CVA w/ CTA concerning for M1 occlusion.    CVA:  - MRI: in addition to an old right MCA infarct, there are now new areas of  ischemia in the right MCA distribution, with diminished the flow void in the right distal M1 and proximal M2 segments of the middle cerebral artery.  - MRA: Abrupt cut off and  severe stenosis of the proximal M1 segment of the right middle cerebral artery with a thin residual lumen beyond the stenosis  - Neuro following  - Continue ASA, Plavix  - Continue statin     HTN:  - BP acceptable  - Continue current meds    Dementia/ generalized functional decline:  - Supportive care  - DC to [facility]    Seizure:  - Continue Keppra     Pre- DM:  - Hb A1c 6.1  - Continue to monitor    Hypokalemia:  - Being replaced.    Hb drop:  - Unclear etiology  - Pt on ASA/lasix/ AC  - No overt bleeding  - Check anemia work up, occult blood in stool    PT eval: [facility], denied by insurance. Discussed with daughter, she doesn't want her mother to return home and doesn't want LT facility and want to re assess. I discussed with SW and will re send updated PT note from today. awaiting response.

## 2020-01-29 NOTE — PROGRESS NOTE ADULT - ASSESSMENT
Subjective Complaints:  Historian:             Vital Signs Last 24 Hrs  T(C): 37.2 (29 Jan 2020 16:37), Max: 37.3 (28 Jan 2020 23:55)  T(F): 99 (29 Jan 2020 16:37), Max: 99.2 (28 Jan 2020 23:55)  HR: 86 (29 Jan 2020 17:12) (67 - 86)  BP: 175/90 (29 Jan 2020 17:12) (135/66 - 175/90)  BP(mean): --  RR: 18 (29 Jan 2020 16:37) (16 - 18)  SpO2: 100% (29 Jan 2020 16:37) (95% - 100%)    GENERAL PHYSICAL EXAM:  General:  Appears stated age, well-groomed, well-nourished, no distress  HEENT:  NC/AT, patent nares w/ pink mucosa, OP clear w/o lesions, PERRL, EOMI, conjunctivae clear, no thyromegaly, nodules, adenopathy, no JVD  Chest:  Full & symmetric excursion, no increased effort, breath sounds clear  Cardiovascular:  Regular rhythm, S1, S2, no murmur/rub/S3/S4, no carotid/femoral/abdominal bruit, radial/pedal pulses 2+, no edema  Abdomen:  Soft, non-tender, non-distended, normoactive bowel sounds, no HSM  Extremities:  Gait & station:   Digits:   Nails:   Joints, Bones, Muscles:   ROM:   Stability:  Skin:  No rash/erythema/ecchymoses/petechiae/wounds/abscess/warm/dry  Musculoskeletal:  Full ROM in all joints w/o swelling/tenderness/effusion        LABS:                        10.4   8.33  )-----------( 239      ( 29 Jan 2020 07:23 )             31.4     01-29    145  |  112<H>  |  24<H>  ----------------------------<  199<H>  3.6   |  25  |  0.98    Ca    8.4<L>      29 Jan 2020 07:23    TPro  6.1  /  Alb  2.6<L>  /  TBili  0.3  /  DBili  x   /  AST  14<L>  /  ALT  22  /  AlkPhos  61  01-29          RADIOLOGY & ADDITIONAL STUDIES:        Neurology Progress Note:      Mental Status: awaek alert  speech fluent       Cranial Nerves: 2 1/2i tact      Motor:   arm leg 3/5         Sensory:intact      Cerebellar: defrd      Gait:  unsteady       Assesment/Plan: iold cva r cva dementia   seizure on keppra for sub  acute rehab

## 2020-01-30 PROCEDURE — 99232 SBSQ HOSP IP/OBS MODERATE 35: CPT

## 2020-01-30 RX ADMIN — Medication 50 MILLIGRAM(S): at 21:21

## 2020-01-30 RX ADMIN — ATORVASTATIN CALCIUM 40 MILLIGRAM(S): 80 TABLET, FILM COATED ORAL at 21:21

## 2020-01-30 RX ADMIN — Medication 0.1 MILLIGRAM(S): at 06:11

## 2020-01-30 RX ADMIN — Medication 50 MILLIGRAM(S): at 12:57

## 2020-01-30 RX ADMIN — Medication 50 MILLIGRAM(S): at 17:21

## 2020-01-30 RX ADMIN — LEVETIRACETAM 750 MILLIGRAM(S): 250 TABLET, FILM COATED ORAL at 17:20

## 2020-01-30 RX ADMIN — Medication 0.1 MILLIGRAM(S): at 17:21

## 2020-01-30 RX ADMIN — CLOPIDOGREL BISULFATE 75 MILLIGRAM(S): 75 TABLET, FILM COATED ORAL at 11:19

## 2020-01-30 RX ADMIN — AMLODIPINE BESYLATE 10 MILLIGRAM(S): 2.5 TABLET ORAL at 06:11

## 2020-01-30 RX ADMIN — ENOXAPARIN SODIUM 40 MILLIGRAM(S): 100 INJECTION SUBCUTANEOUS at 11:23

## 2020-01-30 RX ADMIN — LEVETIRACETAM 750 MILLIGRAM(S): 250 TABLET, FILM COATED ORAL at 06:11

## 2020-01-30 RX ADMIN — Medication 81 MILLIGRAM(S): at 11:19

## 2020-01-30 RX ADMIN — Medication 50 MILLIGRAM(S): at 06:11

## 2020-01-30 NOTE — PROGRESS NOTE ADULT - ASSESSMENT
70 y/o female w/ PMH of HTN, epilepsy, multiple CVA and dementia initially presented with generalized wkness, decreased PO intake, was stable for dc to [facility], had RRT for syncope and then found to have  acute CVA w/ CTA concerning for M1 occlusion.    CVA:  - MRI: in addition to an old right MCA infarct, there are now new areas of  ischemia in the right MCA distribution, with diminished the flow void in the right distal M1 and proximal M2 segments of the middle cerebral artery.  - MRA: Abrupt cut off and  severe stenosis of the proximal M1 segment of the right middle cerebral artery with a thin residual lumen beyond the stenosis  - Neuro following  - Continue ASA, Plavix  - Continue statin     HTN:  - BP acceptable  - Continue current meds    Dementia/ generalized functional decline:  - Supportive care  - DC to [facility]    Seizure:  - Continue Keppra     Pre- DM:  - Hb A1c 6.1  - Continue to monitor    Hypokalemia:  - Being replaced.    Anemia of Chronic Disease   - cont to monitor h/h as needed     PT eval: [facility], denied by insurance. Discussed with daughter, she doesn't want her mother to return home and doesn't want LT facility and want to re assess.

## 2020-01-30 NOTE — CHART NOTE - NSCHARTNOTEFT_GEN_A_CORE
Assessment: Pt lethargic, confused, nonverbal to RD. Pt seen for and remains with moderate malnutrition. Pt admitted with general weakness. Pt c seizure disorder, type II DM, HTN, s/p CVA, OA, asthma. Spoke with RN. Per RN no GI distress noted. Pt with good PO intake with meals and supplements.     Factors impacting intake: [x ] none [ ] nausea  [ ] vomiting [ ] diarrhea [ ] constipation  [ ]chewing problems [ ] swallowing issues  [ ] other:     Diet Prescription: Diet, Dysphagia 3 Soft-Thin Liquids:   Consistent Carbohydrate {Evening Snack}  Low Sodium  Supplement Feeding Modality:  Oral  Glucerna Shake Cans or Servings Per Day:  2       Frequency:  Daily (01-27-20 @ 13:26)    Intake: 75-90% of meals and supplements     Current Weight:  62.8 (01-30)    ;   60.4  (01-24)   % Weight Change: 3% intentional increase    Physical appearance: BMI 22.4; no edema noted;  Nutrition focused physical exam conducted; Subcutaneous fat Exam;  [ Mild  ]  Orbital fat pads region,  [ Unable  ]Buccal fat region,  [ Moderate  ]triceps region, [ WNL  ]ribs region.  Muscle Exam; [ Mild-moderate   ]temples region, [ Moderate ]clavicle region, [ Moderate ]shoulder region, [ Unable  ]Scapula region, Moderate ]Interosseous region, [ Unable  ]thigh region, [ Unable  ]Calf region    Pertinent Medications: MEDICATIONS  (STANDING):  amLODIPine   Tablet 10 milliGRAM(s) Oral daily  aspirin enteric coated 81 milliGRAM(s) Oral daily  atorvastatin 40 milliGRAM(s) Oral at bedtime  cloNIDine 0.1 milliGRAM(s) Oral two times a day  clopidogrel Tablet 75 milliGRAM(s) Oral daily  enoxaparin Injectable 40 milliGRAM(s) SubCutaneous daily  hydrALAZINE 50 milliGRAM(s) Oral three times a day  levETIRAcetam 750 milliGRAM(s) Oral two times a day  metoprolol tartrate 50 milliGRAM(s) Oral two times a day  traZODone 50 milliGRAM(s) Oral at bedtime    MEDICATIONS  (PRN):    Pertinent Labs: 01-29 Na 145 mmol/L Glu 199 mg/dL<H> K+ 3.6 mmol/L Cr 0.98 mg/dL BUN 24 mg/dL<H> Phos n/a   Alb 2.6 g/dL<L> PAB n/a   Hgb 10.4 g/dL<L> Hct 31.4 %<L> HgA1C n/a     24Hr FS:  Skin:  intact     Estimated Needs:   [x ] no change since previous assessment (01-)  [ ] recalculated:     Previous Nutrition Diagnosis:   Nutrition Diagnostic Terminology #1 Malnutrition...   Malnutrition moderate malnutrition in context of chronic illness.   Etiology inadequate protein-energy intake in setting of hx of CVA, AMS.     Signs/Symptoms physical findings of mild/moderate fat/muscle loss.     Goal/Expected Outcome pt to consume >75% of meals & supplement. (met-> continuing previous goal )       Nutrition Diagnosis is [ x] ongoing  [ ] resolved  [ ] improved  [ ] not applicable       New Nutrition Diagnosis: [ ] not applicable       Interventions:   Recommend  [x ] Continue: Current diet Rx   [ ] Change Diet To:  [ ] Nutrition Supplement:  [ ] Nutrition Support:  [ x] Other: continue full-assistance; provide encouragement with meals/supplements     Monitoring and Evaluation:   [ ] PO intake [ x ] Tolerance to diet prescription [ x ] weights [ x ] labs[ x ] follow up per protocol  [ ] other:

## 2020-01-30 NOTE — CHART NOTE - NSCHARTNOTESELECT_GEN_ALL_CORE
Event Note/critical value
Event Note
Nutrition Services
Nutrition Services
Transfer Note/Critical Care
second touch

## 2020-01-30 NOTE — PROGRESS NOTE ADULT - ASSESSMENT
Subjective Complaints:  Historian:             Vital Signs Last 24 Hrs  T(C): 37.1 (30 Jan 2020 16:48), Max: 37.1 (30 Jan 2020 16:48)  T(F): 98.8 (30 Jan 2020 16:48), Max: 98.8 (30 Jan 2020 16:48)  HR: 60 (30 Jan 2020 21:16) (60 - 68)  BP: 141/64 (30 Jan 2020 21:16) (126/50 - 173/62)  BP(mean): --  RR: 18 (30 Jan 2020 17:57) (16 - 18)  SpO2: 94% (30 Jan 2020 17:57) (94% - 99%)    GENERAL PHYSICAL EXAM:  General:  Appears stated age, well-groomed, well-nourished, no distress  HEENT:  NC/AT, patent nares w/ pink mucosa, OP clear w/o lesions, PERRL, EOMI, conjunctivae clear, no thyromegaly, nodules, adenopathy, no JVD  Chest:  Full & symmetric excursion, no increased effort, breath sounds clear  Cardiovascular:  Regular rhythm, S1, S2, no murmur/rub/S3/S4, no carotid/femoral/abdominal bruit, radial/pedal pulses 2+, no edema  Abdomen:  Soft, non-tender, non-distended, normoactive bowel sounds, no HSM  Extremities:  Gait & station:   Digits:   Nails:   Joints, Bones, Muscles:   ROM:   Stability:  Skin:  No rash/erythema/ecchymoses/petechiae/wounds/abscess/warm/dry  Musculoskeletal:  Full ROM in all joints w/o swelling/tenderness/effusion        LABS:                        10.4   8.33  )-----------( 239      ( 29 Jan 2020 07:23 )             31.4     01-29    145  |  112<H>  |  24<H>  ----------------------------<  199<H>  3.6   |  25  |  0.98    Ca    8.4<L>      29 Jan 2020 07:23    TPro  6.1  /  Alb  2.6<L>  /  TBili  0.3  /  DBili  x   /  AST  14<L>  /  ALT  22  /  AlkPhos  61  01-29          RADIOLOGY & ADDITIONAL STUDIES:        Neurology Progress Note:      Mental Status: awake   alert speech fluent  folows commands       Cranial Nerves: 2 /12intact      Motor:   arm leg 3/5         Sensory:intact      Cerebellar: defrd      Gait:unsteast       Assesment/Plan:   old cva r cva  seziure dementia for sub  acute rehab

## 2020-01-31 PROCEDURE — 99232 SBSQ HOSP IP/OBS MODERATE 35: CPT

## 2020-01-31 RX ORDER — HYDRALAZINE HCL 50 MG
75 TABLET ORAL THREE TIMES A DAY
Refills: 0 | Status: DISCONTINUED | OUTPATIENT
Start: 2020-01-31 | End: 2020-02-03

## 2020-01-31 RX ADMIN — Medication 50 MILLIGRAM(S): at 17:15

## 2020-01-31 RX ADMIN — Medication 75 MILLIGRAM(S): at 21:42

## 2020-01-31 RX ADMIN — Medication 0.1 MILLIGRAM(S): at 05:59

## 2020-01-31 RX ADMIN — LEVETIRACETAM 750 MILLIGRAM(S): 250 TABLET, FILM COATED ORAL at 17:15

## 2020-01-31 RX ADMIN — Medication 0.1 MILLIGRAM(S): at 17:15

## 2020-01-31 RX ADMIN — ATORVASTATIN CALCIUM 40 MILLIGRAM(S): 80 TABLET, FILM COATED ORAL at 21:42

## 2020-01-31 RX ADMIN — Medication 50 MILLIGRAM(S): at 05:59

## 2020-01-31 RX ADMIN — AMLODIPINE BESYLATE 10 MILLIGRAM(S): 2.5 TABLET ORAL at 05:59

## 2020-01-31 RX ADMIN — ENOXAPARIN SODIUM 40 MILLIGRAM(S): 100 INJECTION SUBCUTANEOUS at 11:28

## 2020-01-31 RX ADMIN — Medication 50 MILLIGRAM(S): at 21:41

## 2020-01-31 RX ADMIN — Medication 81 MILLIGRAM(S): at 11:31

## 2020-01-31 RX ADMIN — CLOPIDOGREL BISULFATE 75 MILLIGRAM(S): 75 TABLET, FILM COATED ORAL at 11:28

## 2020-01-31 RX ADMIN — Medication 50 MILLIGRAM(S): at 12:42

## 2020-01-31 RX ADMIN — LEVETIRACETAM 750 MILLIGRAM(S): 250 TABLET, FILM COATED ORAL at 05:59

## 2020-01-31 NOTE — PROGRESS NOTE ADULT - ASSESSMENT
Subjective Complaints:  Historian:             Vital Signs Last 24 Hrs  T(C): 36.4 (31 Jan 2020 17:05), Max: 36.9 (30 Jan 2020 23:26)  T(F): 97.6 (31 Jan 2020 17:05), Max: 98.4 (30 Jan 2020 23:26)  HR: 67 (31 Jan 2020 17:05) (60 - 77)  BP: 149/62 (31 Jan 2020 17:05) (141/64 - 173/63)  BP(mean): --  RR: 18 (31 Jan 2020 17:05) (16 - 18)  SpO2: 96% (31 Jan 2020 17:05) (96% - 99%)    GENERAL PHYSICAL EXAM:  General:  Appears stated age, well-groomed, well-nourished, no distress  HEENT:  NC/AT, patent nares w/ pink mucosa, OP clear w/o lesions, PERRL, EOMI, conjunctivae clear, no thyromegaly, nodules, adenopathy, no JVD  Chest:  Full & symmetric excursion, no increased effort, breath sounds clear  Cardiovascular:  Regular rhythm, S1, S2, no murmur/rub/S3/S4, no carotid/femoral/abdominal bruit, radial/pedal pulses 2+, no edema  Abdomen:  Soft, non-tender, non-distended, normoactive bowel sounds, no HSM  Extremities:  Gait & station:   Digits:   Nails:   Joints, Bones, Muscles:   ROM:   Stability:  Skin:  No rash/erythema/ecchymoses/petechiae/wounds/abscess/warm/dry  Musculoskeletal:  Full ROM in all joints w/o swelling/tenderness/effusion        LABS:                RADIOLOGY & ADDITIONAL STUDIES:        Neurology Progress Note:      Mental Status: awake   alert open eyes follows commands       Cranial Nerves:2/12intact      Motor:   arm leg 3/5         Sensory:intact      Cerebellar: defrd      Gait:defrd      Assesment/Plan:  old cva seziure r cva  dementia unsteady gait foir sub acute rehab

## 2020-01-31 NOTE — PROGRESS NOTE ADULT - ASSESSMENT
70 y/o female w/ PMH of HTN, epilepsy, multiple CVA and dementia initially presented with generalized wkness, decreased PO intake, was stable for dc to [facility], had RRT for syncope and then found to have  acute CVA w/ CTA concerning for M1 occlusion.    CVA:  - MRI: in addition to an old right MCA infarct, there are now new areas of  ischemia in the right MCA distribution, with diminished the flow void in the right distal M1 and proximal M2 segments of the middle cerebral artery.  - MRA: Abrupt cut off and  severe stenosis of the proximal M1 segment of the right middle cerebral artery with a thin residual lumen beyond the stenosis  - Neuro following  - Continue ASA, Plavix  - Continue statin     HTN:  - BP acceptable  - Continue current meds    Dementia/ generalized functional decline:  - Supportive care  - DC to [facility]    Seizure:  - Continue Keppra     Pre- DM:  - Hb A1c 6.1  - Continue to monitor    Hypokalemia:  - Being replaced.    Anemia of Chronic Disease   - cont to monitor h/h as needed     Disposition: pt is medically cleared, Accepted to [facility] facility; waiting from Daughter to consent discharge to [facility].

## 2020-02-01 PROCEDURE — 99232 SBSQ HOSP IP/OBS MODERATE 35: CPT

## 2020-02-01 RX ADMIN — Medication 75 MILLIGRAM(S): at 13:17

## 2020-02-01 RX ADMIN — CLOPIDOGREL BISULFATE 75 MILLIGRAM(S): 75 TABLET, FILM COATED ORAL at 13:17

## 2020-02-01 RX ADMIN — Medication 50 MILLIGRAM(S): at 21:41

## 2020-02-01 RX ADMIN — Medication 50 MILLIGRAM(S): at 05:45

## 2020-02-01 RX ADMIN — AMLODIPINE BESYLATE 10 MILLIGRAM(S): 2.5 TABLET ORAL at 05:45

## 2020-02-01 RX ADMIN — Medication 75 MILLIGRAM(S): at 05:44

## 2020-02-01 RX ADMIN — LEVETIRACETAM 750 MILLIGRAM(S): 250 TABLET, FILM COATED ORAL at 17:31

## 2020-02-01 RX ADMIN — ATORVASTATIN CALCIUM 40 MILLIGRAM(S): 80 TABLET, FILM COATED ORAL at 21:41

## 2020-02-01 RX ADMIN — LEVETIRACETAM 750 MILLIGRAM(S): 250 TABLET, FILM COATED ORAL at 05:45

## 2020-02-01 RX ADMIN — Medication 50 MILLIGRAM(S): at 17:31

## 2020-02-01 RX ADMIN — Medication 75 MILLIGRAM(S): at 21:44

## 2020-02-01 RX ADMIN — Medication 0.1 MILLIGRAM(S): at 17:31

## 2020-02-01 RX ADMIN — Medication 81 MILLIGRAM(S): at 13:17

## 2020-02-01 RX ADMIN — Medication 0.1 MILLIGRAM(S): at 05:45

## 2020-02-01 RX ADMIN — ENOXAPARIN SODIUM 40 MILLIGRAM(S): 100 INJECTION SUBCUTANEOUS at 13:17

## 2020-02-01 NOTE — PROGRESS NOTE ADULT - ASSESSMENT
70 y/o female w/ PMH of HTN, epilepsy, multiple CVA and dementia initially presented with generalized wkness, decreased PO intake, was stable for dc to [facility], had RRT for syncope and then found to have  acute CVA w/ CTA concerning for M1 occlusion.    CVA:  - MRI: in addition to an old right MCA infarct, there are now new areas of  ischemia in the right MCA distribution, with diminished the flow void in the right distal M1 and proximal M2 segments of the middle cerebral artery.  - MRA: Abrupt cut off and  severe stenosis of the proximal M1 segment of the right middle cerebral artery with a thin residual lumen beyond the stenosis  - Neuro following  - Continue ASA, Plavix  - Continue statin     HTN:  - BP acceptable  - Continue current meds    Dementia/ generalized functional decline:  - Supportive care  - DC to [facility]    Seizure:  - Continue Keppra     Pre- DM:  - Hb A1c 6.1  - Continue to monitor    Hypokalemia:  - Being replaced.    Anemia of Chronic Disease   - cont to monitor h/h as needed     Disposition: pt is medically cleared, Accepted to [facility] facility; waiting from Daughter to consent discharge to [facility].

## 2020-02-01 NOTE — PROGRESS NOTE ADULT - ASSESSMENT
Subjective Complaints:  Historian:             Vital Signs Last 24 Hrs  T(C): 36.9 (01 Feb 2020 16:38), Max: 37 (01 Feb 2020 05:00)  T(F): 98.4 (01 Feb 2020 16:38), Max: 98.6 (01 Feb 2020 05:00)  HR: 70 (01 Feb 2020 16:38) (65 - 73)  BP: 140/66 (01 Feb 2020 16:38) (117/43 - 159/69)  BP(mean): --  RR: 18 (01 Feb 2020 16:38) (18 - 18)  SpO2: 98% (01 Feb 2020 16:38) (97% - 98%)    GENERAL PHYSICAL EXAM:  General:  Appears stated age, well-groomed, well-nourished, no distress  HEENT:  NC/AT, patent nares w/ pink mucosa, OP clear w/o lesions, PERRL, EOMI, conjunctivae clear, no thyromegaly, nodules, adenopathy, no JVD  Chest:  Full & symmetric excursion, no increased effort, breath sounds clear  Cardiovascular:  Regular rhythm, S1, S2, no murmur/rub/S3/S4, no carotid/femoral/abdominal bruit, radial/pedal pulses 2+, no edema  Abdomen:  Soft, non-tender, non-distended, normoactive bowel sounds, no HSM  Extremities:  Gait & station:   Digits:   Nails:   Joints, Bones, Muscles:   ROM:   Stability:  Skin:  No rash/erythema/ecchymoses/petechiae/wounds/abscess/warm/dry  Musculoskeletal:  Full ROM in all joints w/o swelling/tenderness/effusion        LABS:                RADIOLOGY & ADDITIONAL STUDIES:        Neurology Progress Note:      Mental Status: awake  alert  speech fluent  folows commands       Cranial Nerves:2 /12intact      Motor:   arm leg 3/5         Sensory:intact      Cerebellar: defrd      Gait:unsteady       Assesment/Plan: old cva r cva seziure deemntia for sub acute rehab

## 2020-02-02 PROCEDURE — 99232 SBSQ HOSP IP/OBS MODERATE 35: CPT

## 2020-02-02 RX ADMIN — Medication 0.1 MILLIGRAM(S): at 17:18

## 2020-02-02 RX ADMIN — Medication 50 MILLIGRAM(S): at 22:47

## 2020-02-02 RX ADMIN — AMLODIPINE BESYLATE 10 MILLIGRAM(S): 2.5 TABLET ORAL at 05:09

## 2020-02-02 RX ADMIN — ENOXAPARIN SODIUM 40 MILLIGRAM(S): 100 INJECTION SUBCUTANEOUS at 12:04

## 2020-02-02 RX ADMIN — Medication 50 MILLIGRAM(S): at 17:18

## 2020-02-02 RX ADMIN — Medication 75 MILLIGRAM(S): at 22:47

## 2020-02-02 RX ADMIN — Medication 75 MILLIGRAM(S): at 14:10

## 2020-02-02 RX ADMIN — Medication 81 MILLIGRAM(S): at 12:05

## 2020-02-02 RX ADMIN — LEVETIRACETAM 750 MILLIGRAM(S): 250 TABLET, FILM COATED ORAL at 17:17

## 2020-02-02 RX ADMIN — LEVETIRACETAM 750 MILLIGRAM(S): 250 TABLET, FILM COATED ORAL at 05:10

## 2020-02-02 RX ADMIN — Medication 0.1 MILLIGRAM(S): at 05:10

## 2020-02-02 RX ADMIN — ATORVASTATIN CALCIUM 40 MILLIGRAM(S): 80 TABLET, FILM COATED ORAL at 22:47

## 2020-02-02 RX ADMIN — Medication 75 MILLIGRAM(S): at 05:09

## 2020-02-02 RX ADMIN — CLOPIDOGREL BISULFATE 75 MILLIGRAM(S): 75 TABLET, FILM COATED ORAL at 12:04

## 2020-02-02 NOTE — PROGRESS NOTE ADULT - ASSESSMENT
68 y/o female w/ PMH of HTN, epilepsy, multiple CVA and dementia initially presented with generalized wkness, decreased PO intake, was stable for dc to [facility], had RRT for syncope and then found to have  acute CVA w/ CTA concerning for M1 occlusion.    CVA:  - MRI: in addition to an old right MCA infarct, there are now new areas of  ischemia in the right MCA distribution, with diminished the flow void in the right distal M1 and proximal M2 segments of the middle cerebral artery.  - MRA: Abrupt cut off and  severe stenosis of the proximal M1 segment of the right middle cerebral artery with a thin residual lumen beyond the stenosis  - Neuro following  - Continue ASA, Plavix  - Continue statin     HTN:  - BP acceptable  - Continue current meds    Dementia/ generalized functional decline:  - Supportive care  - DC to [facility]    Seizure:  - Continue Keppra     Pre- DM:  - Hb A1c 6.1  - Continue to monitor    Hypokalemia:  - Being replaced.    Anemia of Chronic Disease   - cont to monitor h/h as needed     Disposition: pt is medically cleared, Accepted to [facility] facility; waiting from Daughter to consent discharge to [facility].

## 2020-02-02 NOTE — PROGRESS NOTE ADULT - SUBJECTIVE AND OBJECTIVE BOX
Patient is a 69y old  Female who presents with a chief complaint of fatigue (29 Jan 2020 21:06)      INTERVAL HPI/ OVERNIGHT EVENTS: Pt was seen and examined at bedside today, No significant overnight events, pt essentially non-verbal, unable to interview     MEDICATIONS  (STANDING):  amLODIPine   Tablet 10 milliGRAM(s) Oral daily  aspirin enteric coated 81 milliGRAM(s) Oral daily  atorvastatin 40 milliGRAM(s) Oral at bedtime  cloNIDine 0.1 milliGRAM(s) Oral two times a day  clopidogrel Tablet 75 milliGRAM(s) Oral daily  enoxaparin Injectable 40 milliGRAM(s) SubCutaneous daily  hydrALAZINE 50 milliGRAM(s) Oral three times a day  levETIRAcetam 750 milliGRAM(s) Oral two times a day  metoprolol tartrate 50 milliGRAM(s) Oral two times a day  traZODone 50 milliGRAM(s) Oral at bedtime    MEDICATIONS  (PRN):      Allergies    No Known Allergies    Intolerances        REVIEW OF SYSTEMS:    Unable to examine due to [ ] Encephalopathy [x ] Advanced Dementia [ ] Expressive Aphasia [x ] Non-verbal patient        Vital Signs Last 24 Hrs  T(C): 36.9 (30 Jan 2020 11:49), Max: 37.2 (29 Jan 2020 16:37)  T(F): 98.5 (30 Jan 2020 11:49), Max: 99 (29 Jan 2020 16:37)  HR: 64 (30 Jan 2020 11:49) (60 - 86)  BP: 139/59 (30 Jan 2020 11:49) (126/50 - 175/90)  BP(mean): --  RR: 18 (30 Jan 2020 11:49) (16 - 18)  SpO2: 99% (30 Jan 2020 11:49) (98% - 100%)    PHYSICAL EXAM:  GENERAL: NAD, well-developed, well-groomed  HEAD:  Atraumatic, Normocephalic  EYES: conjunctiva and sclera clear  ENMT: Moist mucous membranes  NECK: Supple, No JVD, Normal thyroid  CHEST/LUNG: Clear to Auscultation bilaterally; No rales, rhonchi, wheezing, or rubs  HEART: Regular rate and rhythm; No murmurs, rubs, or gallops  ABDOMEN: Soft, Nontender, Nondistended; Bowel sounds present  EXTREMITIES:  No clubbing, cyanosis, or edema  NERVOUS SYSTEM:  Confused, non-verbal; does not follow commands.     LABS:                        10.4   8.33  )-----------( 239      ( 29 Jan 2020 07:23 )             31.4     01-29    145  |  112<H>  |  24<H>  ----------------------------<  199<H>  3.6   |  25  |  0.98    Ca    8.4<L>      29 Jan 2020 07:23    TPro  6.1  /  Alb  2.6<L>  /  TBili  0.3  /  DBili  x   /  AST  14<L>  /  ALT  22  /  AlkPhos  61  01-29        CAPILLARY BLOOD GLUCOSE              RADIOLOGY & ADDITIONAL TESTS:          Imaging Personally Reviewed:  [ ] YES  [ ] NO    Consultant(s) Notes Reviewed:  [x ] YES  [ ] NO    Care Discussed with Consultants/Other Providers [x ] YES  [ ] NO
70 y/o female w/ PMH of HTN, epilepsy, multiple CVA and dementia who had RRT for syncope and then found to have poss acute CVA w/ CTA concerning for M1 occlusion.    24h Events:   Pt w/ improvement back to baseline motor strength    ROS:  Pt unable due to dementia                          12.1   7.32  )-----------( 244      ( 22 Jan 2020 04:00 )             37.2     01-22    137  |  107  |  24<H>  ----------------------------<  179<H>  4.7   |  24  |  1.14    Ca    8.8      22 Jan 2020 04:00  Phos  3.9     01-22  Mg     2.3     01-22    TPro  7.1  /  Alb  3.2<L>  /  TBili  0.7  /  DBili  x   /  AST  30  /  ALT  17  /  AlkPhos  61  01-22    MEDICATIONS  (STANDING):  amLODIPine   Tablet 10 milliGRAM(s) Oral daily  aspirin enteric coated 81 milliGRAM(s) Oral daily  atorvastatin 40 milliGRAM(s) Oral at bedtime  cloNIDine 0.1 milliGRAM(s) Oral two times a day  clopidogrel Tablet 75 milliGRAM(s) Oral daily  enoxaparin Injectable 40 milliGRAM(s) SubCutaneous daily  hydrALAZINE 50 milliGRAM(s) Oral three times a day  levETIRAcetam 750 milliGRAM(s) Oral two times a day  metoprolol tartrate 50 milliGRAM(s) Oral two times a day  traZODone 50 milliGRAM(s) Oral at bedtime    ICU Vital Signs Last 24 Hrs  T(C): 37.3 (22 Jan 2020 15:35), Max: 37.5 (22 Jan 2020 07:32)  T(F): 99.1 (22 Jan 2020 15:35), Max: 99.5 (22 Jan 2020 07:32)  HR: 81 (22 Jan 2020 16:09) (61 - 113)  BP: 180/69 (22 Jan 2020 16:09) (104/72 - 180/69)  BP(mean): 98 (22 Jan 2020 16:09) (63 - 170)  ABP: --  ABP(mean): --  RR: 14 (22 Jan 2020 16:09) (13 - 27)  SpO2: 98% (22 Jan 2020 16:09) (90% - 100%)    NAD  sanjay  b/l cta  s1s2 reg no murmurs  soft nontender +BS  no edema  5/5 b/l LE and U 4/5 UE b/l
Patient is a 69y old  Female who presents with a chief complaint of fatigue (01 Feb 2020 18:03)      INTERVAL HPI/ OVERNIGHT EVENTS: Pt was seen and examined at bedside today, No significant overnight events, pt unable to participate in interview.     MEDICATIONS  (STANDING):  amLODIPine   Tablet 10 milliGRAM(s) Oral daily  aspirin enteric coated 81 milliGRAM(s) Oral daily  atorvastatin 40 milliGRAM(s) Oral at bedtime  cloNIDine 0.1 milliGRAM(s) Oral two times a day  clopidogrel Tablet 75 milliGRAM(s) Oral daily  enoxaparin Injectable 40 milliGRAM(s) SubCutaneous daily  hydrALAZINE 75 milliGRAM(s) Oral three times a day  levETIRAcetam 750 milliGRAM(s) Oral two times a day  metoprolol tartrate 50 milliGRAM(s) Oral two times a day  traZODone 50 milliGRAM(s) Oral at bedtime    MEDICATIONS  (PRN):      Allergies    No Known Allergies    Intolerances        REVIEW OF SYSTEMS:    Unable to examine due to [ ] Encephalopathy [x ] Advanced Dementia [ ] Expressive Aphasia [ ] Non-verbal patient        Vital Signs Last 24 Hrs  T(C): 36.4 (02 Feb 2020 04:32), Max: 37.1 (01 Feb 2020 23:26)  T(F): 97.6 (02 Feb 2020 04:32), Max: 98.8 (01 Feb 2020 23:26)  HR: 62 (02 Feb 2020 04:32) (62 - 71)  BP: 173/62 (02 Feb 2020 04:32) (139/71 - 173/62)  BP(mean): --  RR: 16 (02 Feb 2020 04:32) (16 - 18)  SpO2: 98% (02 Feb 2020 04:32) (97% - 98%)    PHYSICAL EXAM:  GENERAL: NAD, well-developed, well-groomed  HEAD:  Atraumatic, Normocephalic  EYES: conjunctiva and sclera clear  ENMT: Moist mucous membranes  NECK: Supple, No JVD, Normal thyroid  CHEST/LUNG: Clear to Auscultation bilaterally; No rales, rhonchi, wheezing, or rubs  HEART: Regular rate and rhythm; No murmurs, rubs, or gallops  ABDOMEN: Soft, Nontender, Nondistended; Bowel sounds present  EXTREMITIES:  No clubbing, cyanosis, or edema  NERVOUS SYSTEM:  Confused, non-verbal; does not follow commands.     LABS:              CAPILLARY BLOOD GLUCOSE              RADIOLOGY & ADDITIONAL TESTS:          Imaging Personally Reviewed:  [ ] YES  [ ] NO    Consultant(s) Notes Reviewed:  [x ] YES  [ ] NO    Care Discussed with Consultants/Other Providers [x ] YES  [ ] NO
Patient is a 69y old  Female who presents with a chief complaint of fatigue (18 Jan 2020 03:17), has no pain.    OVERNIGHT EVENTS: No events over night       T(C): 36.8 (01-20-20 @ 11:17), Max: 36.8 (01-20-20 @ 11:17)  HR: 64 (01-20-20 @ 11:17) (59 - 64)  BP: 139/51 (01-20-20 @ 11:17) (139/51 - 162/52)  RR: 17 (01-20-20 @ 11:17) (16 - 17)  SpO2: 100% (01-20-20 @ 11:17) (98% - 100%)      MEDICATIONS  (STANDING):  amLODIPine   Tablet 10 milliGRAM(s) Oral daily  aspirin enteric coated 81 milliGRAM(s) Oral daily  cloNIDine 0.1 milliGRAM(s) Oral two times a day  clopidogrel Tablet 75 milliGRAM(s) Oral daily  enoxaparin Injectable 40 milliGRAM(s) SubCutaneous daily  hydrALAZINE 50 milliGRAM(s) Oral three times a day  levETIRAcetam 750 milliGRAM(s) Oral two times a day  metoprolol tartrate 100 milliGRAM(s) Oral two times a day  simvastatin 20 milliGRAM(s) Oral at bedtime  traZODone 50 milliGRAM(s) Oral at bedtime    MEDICATIONS  (PRN):      REVIEW OF SYSTEMS:  unable to obtain, patient has dementia.     PHYSICAL EXAM:  GENERAL: NAD,  well-developed  HEAD:  Atraumatic, Normocephalic  EYES: conjunctiva and sclera clear  ENMT:   Moist mucous membranes   NECK: Supple, No JVD   NERVOUS SYSTEM:  Alert & awake, but confused.  CHEST/LUNG:  good air entry bilaterally; No rales, rhonchi, wheezing, or rubs  HEART: S1,S2; No murmurs, rubs, or gallops  ABDOMEN: Soft, Nontender, Nondistended; Bowel sounds present  EXTREMITIES:  2+ Peripheral Pulses, No clubbing, cyanosis, or edema  LYMPH: No lymphadenopathy noted  SKIN: No petechiae                                    13.1   7.40  )-----------( 260      ( 19 Jan 2020 07:18 )             39.0                   CAPILLARY BLOOD GLUCOSE        Cultures    RADIOLOGY & ADDITIONAL TESTS:    Imaging Personally Reviewed:  [ ] YES  [ ] NO    Consultant(s) Notes Reviewed:  [ x ] YES  [ ] NO    Care Discussed with Consultants/Other Providers [ ] YES  [ ] NO
Patient is a 69y old  Female who presents with a chief complaint of fatigue (18 Jan 2020 03:17), has no pain.    OVERNIGHT EVENTS: No events over night     T(C): 37 (01-21-20 @ 16:11), Max: 37 (01-21-20 @ 12:12)  HR: 72 (01-21-20 @ 16:11) (65 - 72)  BP: 137/62 (01-21-20 @ 16:11) (134/56 - 142/61)  RR: 18 (01-21-20 @ 16:11) (18 - 18)  SpO2: 100% (01-21-20 @ 16:11) (97% - 100%)      MEDICATIONS  (STANDING):  amLODIPine   Tablet 10 milliGRAM(s) Oral daily  aspirin enteric coated 81 milliGRAM(s) Oral daily  cloNIDine 0.1 milliGRAM(s) Oral two times a day  clopidogrel Tablet 75 milliGRAM(s) Oral daily  enoxaparin Injectable 40 milliGRAM(s) SubCutaneous daily  hydrALAZINE 50 milliGRAM(s) Oral three times a day  levETIRAcetam 750 milliGRAM(s) Oral two times a day  metoprolol tartrate 100 milliGRAM(s) Oral two times a day  simvastatin 20 milliGRAM(s) Oral at bedtime  traZODone 50 milliGRAM(s) Oral at bedtime    MEDICATIONS  (PRN):        REVIEW OF SYSTEMS:  unable to obtain, patient has dementia.     PHYSICAL EXAM:  GENERAL: NAD,  well-developed  HEAD:  Atraumatic, Normocephalic  EYES: conjunctiva and sclera clear  ENMT:   Moist mucous membranes   NECK: Supple, No JVD   NERVOUS SYSTEM:  Alert & awake, but confused.  CHEST/LUNG:  good air entry bilaterally; No rales, rhonchi, wheezing, or rubs  HEART: S1,S2; No murmurs, rubs, or gallops  ABDOMEN: Soft, Nontender, Nondistended; Bowel sounds present  EXTREMITIES:  2+ Peripheral Pulses, No clubbing, cyanosis, or edema  SKIN: No petechiae                          12.7   7.51  )-----------( 265      ( 21 Jan 2020 19:06 )             38.7       CARDIAC MARKERS ( 21 Jan 2020 19:06 )  <.015 ng/mL / x     / 50 U/L / x     / 1.4 ng/mL      LIVER FUNCTIONS - ( 21 Jan 2020 19:06 )  Alb: 3.4 g/dL / Pro: x     / ALK PHOS: x     / ALT: 17 U/L / AST: 13 U/L / GGT: x           PT/INR - ( 21 Jan 2020 19:06 )   PT: 12.6 sec;   INR: 1.12 ratio         PTT - ( 21 Jan 2020 19:06 )  PTT:32.6 sec  138|106|28<207  3.7|24|1.48  8.8,--,--  01-21 @ 19:06  139|104|25<184  3.4|28|1.17  9.4,--,--  01-21 @ 15:42      Cultures    RADIOLOGY & ADDITIONAL TESTS:    Imaging Personally Reviewed:  [ ] YES  [ ] NO    Consultant(s) Notes Reviewed:  [ x ] YES  [ ] NO    Care Discussed with Consultants/Other Providers [ ] YES  [ ] NO
Patient is a 69y old  Female who presents with a chief complaint of fatigue (2020 03:17), has no pain.    OVERNIGHT EVENTS: none    MEDICATIONS  (STANDING):  amLODIPine   Tablet 10 milliGRAM(s) Oral daily  aspirin enteric coated 81 milliGRAM(s) Oral daily  cloNIDine 0.1 milliGRAM(s) Oral two times a day  clopidogrel Tablet 75 milliGRAM(s) Oral daily  hydrALAZINE 50 milliGRAM(s) Oral three times a day  levETIRAcetam 750 milliGRAM(s) Oral two times a day  metoprolol tartrate 100 milliGRAM(s) Oral two times a day  simvastatin 20 milliGRAM(s) Oral at bedtime  traZODone 50 milliGRAM(s) Oral at bedtime    MEDICATIONS  (PRN):    REVIEW OF SYSTEMS:  unable to obtain, patient has dementia.     Vital Signs Last 24 Hrs  T(C): 36.5 (2020 05:30), Max: 36.7 (2020 17:09)  T(F): 97.7 (2020 05:30), Max: 98 (2020 17:09)  HR: 70 (2020 05:30) (55 - 93)  BP: 168/65 (2020 05:30) (120/55 - 194/79)  BP(mean): --  RR: 18 (2020 05:30) (1 - 18)  SpO2: 100% (2020 05:30) (97% - 100%)    PHYSICAL EXAM:  GENERAL: NAD,  well-developed  HEAD:  Atraumatic, Normocephalic  EYES: conjunctiva and sclera clear  ENMT:   Moist mucous membranes   NECK: Supple, No JVD   NERVOUS SYSTEM:  Alert & awake, but confused.  CHEST/LUNG:  good air entry bilaterally; No rales, rhonchi, wheezing, or rubs  HEART: S1,S2; No murmurs, rubs, or gallops  ABDOMEN: Soft, Nontender, Nondistended; Bowel sounds present  EXTREMITIES:  2+ Peripheral Pulses, No clubbing, cyanosis, or edema  LYMPH: No lymphadenopathy noted  SKIN: No petechiae    LABS:                        13.1   7.40  )-----------( 260      ( 2020 07:18 )             39.0     01-19    143  |  108  |  26<H>  ----------------------------<  163<H>  3.4<L>   |  27  |  1.11    Ca    9.0      2020 07:18  Mg     2.0         TPro  7.0  /  Alb  3.1<L>  /  TBili  0.9  /  DBili  x   /  AST  20  /  ALT  16  /  AlkPhos  54      PT/INR - ( 2020 22:59 )   PT: 12.5 sec;   INR: 1.11 ratio         PTT - ( 2020 22:59 )  PTT:26.7 sec   cardiac markers   Urinalysis Basic - ( 2020 01:50 )    Color: Yellow / Appearance: Slightly Turbid / S.025 / pH: x  Gluc: x / Ketone: Trace  / Bili: Negative / Urobili: Negative mg/dL   Blood: x / Protein: 500 mg/dL / Nitrite: Negative   Leuk Esterase: Trace / RBC: >50 /HPF / WBC 3-5   Sq Epi: x / Non Sq Epi: Occasional / Bacteria: Few      CAPILLARY BLOOD GLUCOSE        Cultures    RADIOLOGY & ADDITIONAL TESTS:    Imaging Personally Reviewed:  [ ] YES  [ ] NO    Consultant(s) Notes Reviewed:  [ x ] YES  [ ] NO    Care Discussed with Consultants/Other Providers [ ] YES  [ ] NO
Patient is a 69y old  Female who presents with a chief complaint of fatigue (22 Jan 2020 21:53)      INTERVAL HPI/OVERNIGHT EVENTS:  Pt was seen and examined, no acute events.    MEDICATIONS  (STANDING):  amLODIPine   Tablet 10 milliGRAM(s) Oral daily  aspirin enteric coated 81 milliGRAM(s) Oral daily  atorvastatin 40 milliGRAM(s) Oral at bedtime  cloNIDine 0.1 milliGRAM(s) Oral two times a day  clopidogrel Tablet 75 milliGRAM(s) Oral daily  enoxaparin Injectable 40 milliGRAM(s) SubCutaneous daily  hydrALAZINE 50 milliGRAM(s) Oral three times a day  levETIRAcetam 750 milliGRAM(s) Oral two times a day  metoprolol tartrate 50 milliGRAM(s) Oral two times a day  potassium chloride    Tablet ER 40 milliEquivalent(s) Oral every 4 hours  traZODone 50 milliGRAM(s) Oral at bedtime    MEDICATIONS  (PRN):      Allergies  No Known Allergies        Vital Signs Last 24 Hrs  T(C): 36.6 (23 Jan 2020 16:45), Max: 38 (22 Jan 2020 21:44)  T(F): 97.9 (23 Jan 2020 16:45), Max: 100.4 (22 Jan 2020 21:44)  HR: 86 (23 Jan 2020 16:45) (60 - 122)  BP: 155/78 (23 Jan 2020 16:45) (114/57 - 204/76)  BP(mean): 116 (22 Jan 2020 21:00) (108 - 116)  RR: 18 (23 Jan 2020 16:45) (16 - 29)  SpO2: 99% (23 Jan 2020 16:45) (98% - 100%)      PHYSICAL EXAM:  GENERAL: NAD  HEAD:  Atraumatic   EYES: PERRLA  NERVOUS SYSTEM:  Awake, alert  CHEST/LUNG: Clear  HEART: RRR  ABDOMEN: Soft, non tender  EXTREMITIES:  no edema      LABS:                        12.4   5.51  )-----------( 224      ( 23 Jan 2020 06:42 )             37.1     01-23    141  |  108  |  13  ----------------------------<  181<H>  3.3<L>   |  27  |  1.05    Ca    8.8      23 Jan 2020 06:42  Phos  3.0     01-23  Mg     2.2     01-23    TPro  7.1  /  Alb  3.2<L>  /  TBili  0.7  /  DBili  x   /  AST  30  /  ALT  17  /  AlkPhos  61  01-22    PT/INR - ( 21 Jan 2020 19:06 )   PT: 12.6 sec;   INR: 1.12 ratio         PTT - ( 21 Jan 2020 19:06 )  PTT:32.6 sec    CAPILLARY BLOOD GLUCOSE          RADIOLOGY & ADDITIONAL TESTS:    Imaging Personally Reviewed:  [ ] YES  [ ] NO    Consultant(s) Notes Reviewed:  [ ] YES  [ ] NO    Care Discussed with Consultants/Other Providers [ ] YES  [ ] NO
Patient is a 69y old  Female who presents with a chief complaint of fatigue (23 Jan 2020 17:23)      INTERVAL HPI/OVERNIGHT EVENTS:  Pt was seen and examined, no acute events.    MEDICATIONS  (STANDING):  amLODIPine   Tablet 10 milliGRAM(s) Oral daily  aspirin enteric coated 81 milliGRAM(s) Oral daily  atorvastatin 40 milliGRAM(s) Oral at bedtime  cloNIDine 0.1 milliGRAM(s) Oral two times a day  clopidogrel Tablet 75 milliGRAM(s) Oral daily  enoxaparin Injectable 40 milliGRAM(s) SubCutaneous daily  hydrALAZINE 50 milliGRAM(s) Oral three times a day  levETIRAcetam 750 milliGRAM(s) Oral two times a day  metoprolol tartrate 50 milliGRAM(s) Oral two times a day  traZODone 50 milliGRAM(s) Oral at bedtime    MEDICATIONS  (PRN):      Allergies  No Known Allergies        Vital Signs Last 24 Hrs  T(C): 37.1 (24 Jan 2020 11:55), Max: 37.1 (23 Jan 2020 23:21)  T(F): 98.7 (24 Jan 2020 11:55), Max: 98.8 (23 Jan 2020 23:21)  HR: 77 (24 Jan 2020 11:55) (67 - 86)  BP: 150/72 (24 Jan 2020 11:55) (139/64 - 156/72)  BP(mean): --  RR: 17 (24 Jan 2020 11:55) (16 - 18)  SpO2: 98% (24 Jan 2020 11:55) (97% - 99%)      PHYSICAL EXAM:  GENERAL: NAD  HEAD:  Atraumatic  EYES: PERRLA  NERVOUS SYSTEM:  Awake, follow command  CHEST/LUNG: Clear  HEART: RRR  ABDOMEN: Soft, non tender  EXTREMITIES:  no edema        LABS:                        12.4   5.51  )-----------( 224      ( 23 Jan 2020 06:42 )             37.1     01-23    141  |  108  |  13  ----------------------------<  181<H>  3.3<L>   |  27  |  1.05    Ca    8.8      23 Jan 2020 06:42  Phos  3.0     01-23  Mg     2.2     01-23          CAPILLARY BLOOD GLUCOSE          RADIOLOGY & ADDITIONAL TESTS:    Imaging Personally Reviewed:  [ ] YES  [ ] NO    Consultant(s) Notes Reviewed:  [ ] YES  [ ] NO    Care Discussed with Consultants/Other Providers [ ] YES  [ ] NO
Patient is a 69y old  Female who presents with a chief complaint of fatigue (24 Jan 2020 19:11)      INTERVAL HPI/OVERNIGHT EVENTS:  Pt was seen and examined, no acute events.    MEDICATIONS  (STANDING):  amLODIPine   Tablet 10 milliGRAM(s) Oral daily  aspirin enteric coated 81 milliGRAM(s) Oral daily  atorvastatin 40 milliGRAM(s) Oral at bedtime  cloNIDine 0.1 milliGRAM(s) Oral two times a day  clopidogrel Tablet 75 milliGRAM(s) Oral daily  enoxaparin Injectable 40 milliGRAM(s) SubCutaneous daily  hydrALAZINE 50 milliGRAM(s) Oral three times a day  levETIRAcetam 750 milliGRAM(s) Oral two times a day  metoprolol tartrate 50 milliGRAM(s) Oral two times a day  traZODone 50 milliGRAM(s) Oral at bedtime    MEDICATIONS  (PRN):      Allergies  No Known Allergies      Vital Signs Last 24 Hrs  T(C): 36.8 (25 Jan 2020 11:08), Max: 37.9 (24 Jan 2020 16:29)  T(F): 98.3 (25 Jan 2020 11:08), Max: 100.2 (24 Jan 2020 16:29)  HR: 78 (25 Jan 2020 11:08) (65 - 120)  BP: 167/64 (25 Jan 2020 11:08) (130/60 - 173/66)  BP(mean): --  RR: 19 (25 Jan 2020 11:08) (16 - 19)  SpO2: 99% (25 Jan 2020 11:08) (98% - 100%)      PHYSICAL EXAM:  GENERAL: NAD  HEAD:  Atruamtic  EYES: PERRLA  NERVOUS SYSTEM:  Awake, non verbal  CHEST/LUNG: Clear  HEART: RRR  ABDOMEN: Soft, non tender  EXTREMITIES:  no edema      LABS:      CAPILLARY BLOOD GLUCOSE      RADIOLOGY & ADDITIONAL TESTS:    Imaging Personally Reviewed:  [ ] YES  [ ] NO    Consultant(s) Notes Reviewed:  [ ] YES  [ ] NO    Care Discussed with Consultants/Other Providers [ ] YES  [ ] NO
Patient is a 69y old  Female who presents with a chief complaint of fatigue (25 Jan 2020 18:13)      INTERVAL HPI/OVERNIGHT EVENTS:  Pt was seen and examined, no acute events.    MEDICATIONS  (STANDING):  amLODIPine   Tablet 10 milliGRAM(s) Oral daily  aspirin enteric coated 81 milliGRAM(s) Oral daily  atorvastatin 40 milliGRAM(s) Oral at bedtime  cloNIDine 0.1 milliGRAM(s) Oral two times a day  clopidogrel Tablet 75 milliGRAM(s) Oral daily  enoxaparin Injectable 40 milliGRAM(s) SubCutaneous daily  hydrALAZINE 50 milliGRAM(s) Oral three times a day  levETIRAcetam 750 milliGRAM(s) Oral two times a day  metoprolol tartrate 50 milliGRAM(s) Oral two times a day  traZODone 50 milliGRAM(s) Oral at bedtime    MEDICATIONS  (PRN):      Allergies  No Known Allergies      Vital Signs Last 24 Hrs  T(C): 37.3 (26 Jan 2020 11:38), Max: 37.3 (26 Jan 2020 11:38)  T(F): 99.1 (26 Jan 2020 11:38), Max: 99.1 (26 Jan 2020 11:38)  HR: 58 (26 Jan 2020 11:38) (58 - 113)  BP: 115/50 (26 Jan 2020 11:38) (106/44 - 176/93)  BP(mean): --  RR: 18 (26 Jan 2020 11:38) (18 - 19)  SpO2: 93% (26 Jan 2020 11:38) (93% - 99%)      PHYSICAL EXAM:  GENERAL: NAd  HEAD:  Atraumatic  EYES: PERRLA  NERVOUS SYSTEM:  Awake, non verbal  CHEST/LUNG: Clear  HEART: RRR  ABDOMEN: Soft, non tender  EXTREMITIES:  no edema      LABS:    01-26    144  |  111<H>  |  20  ----------------------------<  160<H>  4.0   |  25  |  1.06    Ca    9.0      26 Jan 2020 06:34          CAPILLARY BLOOD GLUCOSE          RADIOLOGY & ADDITIONAL TESTS:    Imaging Personally Reviewed:  [ ] YES  [ ] NO    Consultant(s) Notes Reviewed:  [ ] YES  [ ] NO    Care Discussed with Consultants/Other Providers [ ] YES  [ ] NO
Patient is a 69y old  Female who presents with a chief complaint of fatigue (26 Jan 2020 22:25)      INTERVAL HPI/OVERNIGHT EVENTS:  Pt was seen and examined, no acute events.    MEDICATIONS  (STANDING):  amLODIPine   Tablet 10 milliGRAM(s) Oral daily  aspirin enteric coated 81 milliGRAM(s) Oral daily  atorvastatin 40 milliGRAM(s) Oral at bedtime  cloNIDine 0.1 milliGRAM(s) Oral two times a day  clopidogrel Tablet 75 milliGRAM(s) Oral daily  enoxaparin Injectable 40 milliGRAM(s) SubCutaneous daily  hydrALAZINE 50 milliGRAM(s) Oral three times a day  levETIRAcetam 750 milliGRAM(s) Oral two times a day  metoprolol tartrate 50 milliGRAM(s) Oral two times a day  traZODone 50 milliGRAM(s) Oral at bedtime    MEDICATIONS  (PRN):    Allergies  No Known Allergies      Vital Signs Last 24 Hrs  T(C): 37.1 (27 Jan 2020 16:25), Max: 37.2 (27 Jan 2020 11:30)  T(F): 98.7 (27 Jan 2020 16:25), Max: 98.9 (27 Jan 2020 11:30)  HR: 65 (27 Jan 2020 16:25) (59 - 75)  BP: 135/66 (27 Jan 2020 16:25) (131/57 - 198/78)  BP(mean): --  RR: 15 (27 Jan 2020 16:25) (15 - 18)  SpO2: 97% (27 Jan 2020 16:25) (97% - 98%)      PHYSICAL EXAM:  GENERAL: NAD  HEAD:  Atraumatic  EYES: PERRLA  NERVOUS SYSTEM:  Awake, Alert, non verbal.  CHEST/LUNG: Clear  HEART: RRR  ABDOMEN: Soft, non tender  EXTREMITIES:  no edema      LABS:                        10.6   4.81  )-----------( 236      ( 27 Jan 2020 11:51 )             32.1     01-27    142  |  110<H>  |  19  ----------------------------<  167<H>  3.5   |  25  |  0.96    Ca    8.8      27 Jan 2020 07:44    TPro  6.3  /  Alb  2.9<L>  /  TBili  0.5  /  DBili  x   /  AST  14<L>  /  ALT  21  /  AlkPhos  56  01-27        CAPILLARY BLOOD GLUCOSE          RADIOLOGY & ADDITIONAL TESTS:    Imaging Personally Reviewed:  [ ] YES  [ ] NO    Consultant(s) Notes Reviewed:  [ ] YES  [ ] NO    Care Discussed with Consultants/Other Providers [ ] YES  [ ] NOprogress
Patient is a 69y old  Female who presents with a chief complaint of fatigue (27 Jan 2020 21:19)      INTERVAL HPI/OVERNIGHT EVENTS:  Pt was seen and examined, no acute events.    MEDICATIONS  (STANDING):  amLODIPine   Tablet 10 milliGRAM(s) Oral daily  aspirin enteric coated 81 milliGRAM(s) Oral daily  atorvastatin 40 milliGRAM(s) Oral at bedtime  cloNIDine 0.1 milliGRAM(s) Oral two times a day  clopidogrel Tablet 75 milliGRAM(s) Oral daily  enoxaparin Injectable 40 milliGRAM(s) SubCutaneous daily  hydrALAZINE 50 milliGRAM(s) Oral three times a day  levETIRAcetam 750 milliGRAM(s) Oral two times a day  metoprolol tartrate 50 milliGRAM(s) Oral two times a day  traZODone 50 milliGRAM(s) Oral at bedtime    MEDICATIONS  (PRN):      Allergies  No Known Allergies      Vital Signs Last 24 Hrs  T(C): 36.1 (28 Jan 2020 16:33), Max: 37 (27 Jan 2020 23:37)  T(F): 97 (28 Jan 2020 16:33), Max: 98.6 (27 Jan 2020 23:37)  HR: 82 (28 Jan 2020 16:33) (60 - 82)  BP: 171/74 (28 Jan 2020 16:33) (100/60 - 171/74)  BP(mean): --  RR: 18 (28 Jan 2020 16:33) (16 - 18)  SpO2: 98% (28 Jan 2020 16:33) (97% - 100%)      PHYSICAL EXAM:  GENERAL: NAD  HEAD:  Atraumatic  EYES: PERRLA  NERVOUS SYSTEM:  Awake, alert  CHEST/LUNG: Clear  HEART: RRR  ABDOMEN: Soft, non tender  EXTREMITIES:  no edema      LABS:                        10.7   6.38  )-----------( 249      ( 28 Jan 2020 06:16 )             32.4     01-28    142  |  110<H>  |  23  ----------------------------<  136<H>  3.4<L>   |  25  |  1.06    Ca    8.6      28 Jan 2020 06:16    TPro  6.6  /  Alb  2.9<L>  /  TBili  0.4  /  DBili  x   /  AST  21  /  ALT  29  /  AlkPhos  62  01-28        CAPILLARY BLOOD GLUCOSE          RADIOLOGY & ADDITIONAL TESTS:    Imaging Personally Reviewed:  [ ] YES  [ ] NO    Consultant(s) Notes Reviewed:  [ ] YES  [ ] NO    Care Discussed with Consultants/Other Providers [ ] YES  [ ] NO
Patient is a 69y old  Female who presents with a chief complaint of fatigue (28 Jan 2020 21:08)      INTERVAL HPI/ OVERNIGHT EVENTS: Pt was seen and examined at bedside today, No significant overnight events, pt not able to participate in interview      MEDICATIONS  (STANDING):  amLODIPine   Tablet 10 milliGRAM(s) Oral daily  aspirin enteric coated 81 milliGRAM(s) Oral daily  atorvastatin 40 milliGRAM(s) Oral at bedtime  cloNIDine 0.1 milliGRAM(s) Oral two times a day  clopidogrel Tablet 75 milliGRAM(s) Oral daily  enoxaparin Injectable 40 milliGRAM(s) SubCutaneous daily  hydrALAZINE 50 milliGRAM(s) Oral three times a day  levETIRAcetam 750 milliGRAM(s) Oral two times a day  metoprolol tartrate 50 milliGRAM(s) Oral two times a day  traZODone 50 milliGRAM(s) Oral at bedtime    MEDICATIONS  (PRN):      Allergies    No Known Allergies    Intolerances        REVIEW OF SYSTEMS:    Unable to examine due to [ ] Encephalopathy [ x] Advanced Dementia [ ] Expressive Aphasia [ x] Non-verbal patient      Vital Signs Last 24 Hrs  T(C): 36.6 (29 Jan 2020 12:55), Max: 37.3 (28 Jan 2020 23:55)  T(F): 97.9 (29 Jan 2020 12:55), Max: 99.2 (28 Jan 2020 23:55)  HR: 67 (29 Jan 2020 12:55) (67 - 82)  BP: 136/62 (29 Jan 2020 12:55) (136/62 - 171/74)  BP(mean): --  RR: 18 (29 Jan 2020 12:55) (16 - 18)  SpO2: 95% (29 Jan 2020 12:55) (95% - 100%)    PHYSICAL EXAM:  GENERAL: NAD, well-developed, well-groomed  HEAD:  Atraumatic, Normocephalic  EYES: conjunctiva and sclera clear  ENMT: Moist mucous membranes  NECK: Supple, No JVD, Normal thyroid  CHEST/LUNG: Clear to Auscultation bilaterally; No rales, rhonchi, wheezing, or rubs  HEART: Regular rate and rhythm; No murmurs, rubs, or gallops  ABDOMEN: Soft, Nontender, Nondistended; Bowel sounds present  EXTREMITIES:  No clubbing, cyanosis, or edema  NERVOUS SYSTEM:  Confused, non-verbal; does not follow commands.     LABS:                        10.4   8.33  )-----------( 239      ( 29 Jan 2020 07:23 )             31.4     01-29    145  |  112<H>  |  24<H>  ----------------------------<  199<H>  3.6   |  25  |  0.98    Ca    8.4<L>      29 Jan 2020 07:23    TPro  6.1  /  Alb  2.6<L>  /  TBili  0.3  /  DBili  x   /  AST  14<L>  /  ALT  22  /  AlkPhos  61  01-29        CAPILLARY BLOOD GLUCOSE              RADIOLOGY & ADDITIONAL TESTS:          Imaging Personally Reviewed:  [ ] YES  [ ] NO    Consultant(s) Notes Reviewed:  [x ] YES  [ ] NO    Care Discussed with Consultants/Other Providers [x ] YES  [ ] NO
Patient is a 69y old  Female who presents with a chief complaint of fatigue (30 Jan 2020 21:21)      INTERVAL HPI/ OVERNIGHT EVENTS: Pt was seen and examined at bedside today, No significant overnight events, pt unable to participate in interview.      MEDICATIONS  (STANDING):  amLODIPine   Tablet 10 milliGRAM(s) Oral daily  aspirin enteric coated 81 milliGRAM(s) Oral daily  atorvastatin 40 milliGRAM(s) Oral at bedtime  cloNIDine 0.1 milliGRAM(s) Oral two times a day  clopidogrel Tablet 75 milliGRAM(s) Oral daily  enoxaparin Injectable 40 milliGRAM(s) SubCutaneous daily  hydrALAZINE 50 milliGRAM(s) Oral three times a day  levETIRAcetam 750 milliGRAM(s) Oral two times a day  metoprolol tartrate 50 milliGRAM(s) Oral two times a day  traZODone 50 milliGRAM(s) Oral at bedtime    MEDICATIONS  (PRN):      Allergies    No Known Allergies    Intolerances        REVIEW OF SYSTEMS:    Unable to examine due to [ ] Encephalopathy [x ] Advanced Dementia [ ] Expressive Aphasia [ ] Non-verbal patient          Vital Signs Last 24 Hrs  T(C): 36.9 (31 Jan 2020 11:27), Max: 37.1 (30 Jan 2020 16:48)  T(F): 98.4 (31 Jan 2020 11:27), Max: 98.8 (30 Jan 2020 16:48)  HR: 64 (31 Jan 2020 11:27) (60 - 77)  BP: 173/63 (31 Jan 2020 11:27) (141/64 - 173/63)  BP(mean): --  RR: 18 (31 Jan 2020 11:27) (16 - 18)  SpO2: 98% (31 Jan 2020 11:27) (94% - 99%)    PHYSICAL EXAM:  GENERAL: NAD, well-developed, well-groomed  HEAD:  Atraumatic, Normocephalic  EYES: conjunctiva and sclera clear  ENMT: Moist mucous membranes  NECK: Supple, No JVD, Normal thyroid  CHEST/LUNG: Clear to Auscultation bilaterally; No rales, rhonchi, wheezing, or rubs  HEART: Regular rate and rhythm; No murmurs, rubs, or gallops  ABDOMEN: Soft, Nontender, Nondistended; Bowel sounds present  EXTREMITIES:  No clubbing, cyanosis, or edema  NERVOUS SYSTEM:  Confused, non-verbal; does not follow commands.     LABS:              CAPILLARY BLOOD GLUCOSE              RADIOLOGY & ADDITIONAL TESTS:          Imaging Personally Reviewed:  [ ] YES  [ ] NO    Consultant(s) Notes Reviewed:  [ ] YES  [ ] NO    Care Discussed with Consultants/Other Providers [x ] YES  [ ] NO
Patient is a 69y old  Female who presents with a chief complaint of fatigue (31 Jan 2020 19:48)      INTERVAL HPI/ OVERNIGHT EVENTS: Pt was seen and examined at bedside today, No significant overnight events, pt confused      MEDICATIONS  (STANDING):  amLODIPine   Tablet 10 milliGRAM(s) Oral daily  aspirin enteric coated 81 milliGRAM(s) Oral daily  atorvastatin 40 milliGRAM(s) Oral at bedtime  cloNIDine 0.1 milliGRAM(s) Oral two times a day  clopidogrel Tablet 75 milliGRAM(s) Oral daily  enoxaparin Injectable 40 milliGRAM(s) SubCutaneous daily  hydrALAZINE 75 milliGRAM(s) Oral three times a day  levETIRAcetam 750 milliGRAM(s) Oral two times a day  metoprolol tartrate 50 milliGRAM(s) Oral two times a day  traZODone 50 milliGRAM(s) Oral at bedtime    MEDICATIONS  (PRN):      Allergies    No Known Allergies    Intolerances        REVIEW OF SYSTEMS:    Unable to examine due to [ ] Encephalopathy [x ] Advanced Dementia [ ] Expressive Aphasia [ ] Non-verbal patient        Vital Signs Last 24 Hrs  T(C): 36.7 (01 Feb 2020 11:22), Max: 37 (01 Feb 2020 05:00)  T(F): 98 (01 Feb 2020 11:22), Max: 98.6 (01 Feb 2020 05:00)  HR: 71 (01 Feb 2020 11:22) (65 - 73)  BP: 148/59 (01 Feb 2020 11:22) (117/43 - 159/69)  BP(mean): --  RR: 18 (01 Feb 2020 11:22) (18 - 18)  SpO2: 97% (01 Feb 2020 11:22) (96% - 98%)    PHYSICAL EXAM:  GENERAL: NAD, well-developed, well-groomed  HEAD:  Atraumatic, Normocephalic  EYES: conjunctiva and sclera clear  ENMT: Moist mucous membranes  NECK: Supple, No JVD, Normal thyroid  CHEST/LUNG: Clear to Auscultation bilaterally; No rales, rhonchi, wheezing, or rubs  HEART: Regular rate and rhythm; No murmurs, rubs, or gallops  ABDOMEN: Soft, Nontender, Nondistended; Bowel sounds present  EXTREMITIES:  No clubbing, cyanosis, or edema  NERVOUS SYSTEM:  Confused, non-verbal; does not follow commands.     LABS:              CAPILLARY BLOOD GLUCOSE              RADIOLOGY & ADDITIONAL TESTS:          Imaging Personally Reviewed:  [ ] YES  [ ] NO    Consultant(s) Notes Reviewed:  [ ] YES  [ ] NO    Care Discussed with Consultants/Other Providers [x ] YES  [ ] NO

## 2020-02-02 NOTE — PROGRESS NOTE ADULT - PROVIDER SPECIALTY LIST ADULT
Critical Care
Hospitalist
Neurology
Hospitalist

## 2020-02-02 NOTE — PROGRESS NOTE ADULT - REASON FOR ADMISSION
fatigue

## 2020-02-03 ENCOUNTER — TRANSCRIPTION ENCOUNTER (OUTPATIENT)
Age: 70
End: 2020-02-03

## 2020-02-03 VITALS
RESPIRATION RATE: 16 BRPM | SYSTOLIC BLOOD PRESSURE: 162 MMHG | DIASTOLIC BLOOD PRESSURE: 65 MMHG | TEMPERATURE: 98 F | OXYGEN SATURATION: 96 % | HEART RATE: 79 BPM

## 2020-02-03 PROCEDURE — 99239 HOSP IP/OBS DSCHRG MGMT >30: CPT

## 2020-02-03 RX ORDER — HYDRALAZINE HCL 50 MG
3 TABLET ORAL
Qty: 0 | Refills: 0 | DISCHARGE
Start: 2020-02-03

## 2020-02-03 RX ORDER — ATORVASTATIN CALCIUM 80 MG/1
1 TABLET, FILM COATED ORAL
Qty: 30 | Refills: 0
Start: 2020-02-03 | End: 2020-03-03

## 2020-02-03 RX ORDER — METOPROLOL TARTRATE 50 MG
1 TABLET ORAL
Qty: 0 | Refills: 0 | DISCHARGE
Start: 2020-02-03

## 2020-02-03 RX ADMIN — CLOPIDOGREL BISULFATE 75 MILLIGRAM(S): 75 TABLET, FILM COATED ORAL at 11:49

## 2020-02-03 RX ADMIN — Medication 75 MILLIGRAM(S): at 15:55

## 2020-02-03 RX ADMIN — ENOXAPARIN SODIUM 40 MILLIGRAM(S): 100 INJECTION SUBCUTANEOUS at 11:49

## 2020-02-03 RX ADMIN — LEVETIRACETAM 750 MILLIGRAM(S): 250 TABLET, FILM COATED ORAL at 05:39

## 2020-02-03 RX ADMIN — Medication 81 MILLIGRAM(S): at 11:49

## 2020-02-03 RX ADMIN — Medication 0.1 MILLIGRAM(S): at 05:39

## 2020-02-03 RX ADMIN — Medication 75 MILLIGRAM(S): at 05:39

## 2020-02-03 RX ADMIN — AMLODIPINE BESYLATE 10 MILLIGRAM(S): 2.5 TABLET ORAL at 05:39

## 2020-02-03 NOTE — DISCHARGE NOTE NURSING/CASE MANAGEMENT/SOCIAL WORK - PATIENT PORTAL LINK FT
You can access the FollowMyHealth Patient Portal offered by [facility] by registering at the following website: [email or web address]. By joining [facility]’s FollowMyHealth portal, you will also be able to view your health information using other applications (apps) compatible with our system.

## 2020-02-07 DIAGNOSIS — F02.80 DEMENTIA IN OTHER DISEASES CLASSIFIED ELSEWHERE, UNSPECIFIED SEVERITY, WITHOUT BEHAVIORAL DISTURBANCE, PSYCHOTIC DISTURBANCE, MOOD DISTURBANCE, AND ANXIETY: ICD-10-CM

## 2020-02-07 DIAGNOSIS — I10 ESSENTIAL (PRIMARY) HYPERTENSION: ICD-10-CM

## 2020-02-07 DIAGNOSIS — I63.50 CEREBRAL INFARCTION DUE TO UNSPECIFIED OCCLUSION OR STENOSIS OF UNSPECIFIED CEREBRAL ARTERY: ICD-10-CM

## 2020-02-07 DIAGNOSIS — G30.9 ALZHEIMER'S DISEASE, UNSPECIFIED: ICD-10-CM

## 2020-02-07 DIAGNOSIS — G81.94 HEMIPLEGIA, UNSPECIFIED AFFECTING LEFT NONDOMINANT SIDE: ICD-10-CM

## 2020-02-07 DIAGNOSIS — E44.0 MODERATE PROTEIN-CALORIE MALNUTRITION: ICD-10-CM

## 2020-02-07 DIAGNOSIS — Z91.81 HISTORY OF FALLING: ICD-10-CM

## 2020-02-07 DIAGNOSIS — E11.65 TYPE 2 DIABETES MELLITUS WITH HYPERGLYCEMIA: ICD-10-CM

## 2020-02-07 DIAGNOSIS — E87.6 HYPOKALEMIA: ICD-10-CM

## 2020-02-07 DIAGNOSIS — R53.1 WEAKNESS: ICD-10-CM

## 2020-02-07 DIAGNOSIS — D63.8 ANEMIA IN OTHER CHRONIC DISEASES CLASSIFIED ELSEWHERE: ICD-10-CM

## 2020-02-07 DIAGNOSIS — G40.909 EPILEPSY, UNSPECIFIED, NOT INTRACTABLE, WITHOUT STATUS EPILEPTICUS: ICD-10-CM

## 2020-05-09 NOTE — PATIENT PROFILE ADULT - NSASFALLATTEMPTOOB_GEN_A_NUR
Bedside and Verbal shift change report given to [address] [place] (oncoming nurse) by [facility] RN (offgoing nurse). Report included the following information SBAR, Kardex, OR Summary, Procedure Summary, Intake/Output and MAR. I have reviewed discharge instructions with the patient. The patient verbalized understanding. Discharge medications reviewed with patient and appropriate educational materials and side effects teaching were provided. no

## 2020-05-27 NOTE — OCCUPATIONAL THERAPY INITIAL EVALUATION ADULT - BED MOBILITY/TRANSFERS, PREVIOUS LEVEL OF FUNCTION, OT EVAL
Faxed 2nd request for a [facility] update to [facility]. Will await return fax. If no fax received a phone call will be placed to the NH.   needs device

## 2020-07-02 NOTE — ED ADULT TRIAGE NOTE - RESPIRATORY RATE (BREATHS/MIN)
Pt received discharge instructions. Pt had no additional questions at this time. Was able to walk out of ER with a steady gait, a&o x3 and with spouse.   16

## 2020-07-15 ENCOUNTER — EMERGENCY (EMERGENCY)
Facility: HOSPITAL | Age: 70
LOS: 0 days | Discharge: HOME | End: 2020-07-16
Attending: EMERGENCY MEDICINE
Payer: MEDICARE

## 2020-07-15 VITALS
DIASTOLIC BLOOD PRESSURE: 100 MMHG | SYSTOLIC BLOOD PRESSURE: 134 MMHG | WEIGHT: 130.07 LBS | OXYGEN SATURATION: 100 % | RESPIRATION RATE: 18 BRPM | TEMPERATURE: 100 F | HEART RATE: 82 BPM | HEIGHT: 63 IN

## 2020-07-15 DIAGNOSIS — N20.0 CALCULUS OF KIDNEY: Chronic | ICD-10-CM

## 2020-07-15 PROCEDURE — 99285 EMERGENCY DEPT VISIT HI MDM: CPT

## 2020-07-15 NOTE — ED PROVIDER NOTE - PATIENT PORTAL LINK FT
You can access the FollowMyHealth Patient Portal offered by [facility] by registering at the following website: [email or web address]. By joining [email or web address]’s FollowMyHealth portal, you will also be able to view your health information using other applications (apps) compatible with our system.

## 2020-07-15 NOTE — ED ADULT NURSE NOTE - OBJECTIVE STATEMENT
patient received, with sling on left shoulder and arm, as per external rn, patient came from urgent care with fracture somewhere on the left shoulder/arm, family/aide unsure where exactly. patient is nonverbal although alert, unable to give information

## 2020-07-15 NOTE — ED PROVIDER NOTE - PROGRESS NOTE DETAILS
Pt has humerus fracture, cleared and splinted by ortho for d/c to fu with ortho in 1 week. Pt sister and aide aware.

## 2020-07-15 NOTE — ED ADULT NURSE NOTE - NSIMPLEMENTINTERV_GEN_ALL_ED
Implemented All Fall Risk Interventions:  [place] to call system. Call bell, personal items and telephone within reach. Instruct patient to call for assistance. Room bathroom lighting operational. Non-slip footwear when patient is off stretcher. Physically safe environment: no spills, clutter or unnecessary equipment. Stretcher in lowest position, wheels locked, appropriate side rails in place. Provide visual cue, wrist band, yellow gown, etc. Monitor gait and stability. Monitor for mental status changes and reorient to person, place, and time. Review medications for side effects contributing to fall risk. Reinforce activity limits and safety measures with patient and family.

## 2020-07-15 NOTE — ED ADULT NURSE NOTE - CHIEF COMPLAINT QUOTE
c/o l shoulder pain l eyebrow injury s/p fell on monday morning per home aide pt was trying to get out of bed has difficulty ambulating aide denies loc denies anticoagulation use sent from urgent care after xrays + fx per pts sister

## 2020-07-15 NOTE — ED PROVIDER NOTE - PRINCIPAL DIAGNOSIS
Closed displaced fracture of surgical neck of left humerus, unspecified fracture morphology, initial encounter

## 2020-07-15 NOTE — ED PROVIDER NOTE - OBJECTIVE STATEMENT
70 y/o F with pmhx dementia, non verbal at baseline, HTN, HLD, comes in to ED because of left shoulder pain after fall she had fall last night. Pt's aid heard her fall and was able to pick her up. Pt is unsure if she hit head, but reports L shoulder pain. Pt went to urgent care and got x-ray, Pt's L shoulder was reportedly broken. Pt is baseline mental status in ED. She denies fevers, cough, AMS, or prolonged downtime.

## 2020-07-15 NOTE — ED PROVIDER NOTE - MUSCULOSKELETAL, MLM
Spine appears normal, range of motion is not limited, no muscle or joint tenderness + Pt's left shoulder has tenderness, Pt is already in shoulder sling, no other signs of trauma.

## 2020-07-15 NOTE — ED PROVIDER NOTE - CLINICAL SUMMARY MEDICAL DECISION MAKING FREE TEXT BOX
DDX: r/o ICH, r/o shoulder fracture.  PLAN: Ct head, Ct shoulder, x-ray shoulder/chest/pelvis. Ortho consult and DC. Pt has 24/7 aid and family is comfortable with DC.

## 2020-07-15 NOTE — ED ADULT TRIAGE NOTE - CHIEF COMPLAINT QUOTE
c/o l shoulder pain l eyebrow injury s/p fell on monday morning per home aide pt was trying to get out of bed has difficulty ambulating aide denies loc denies anticoagulation use c/o l shoulder pain l eyebrow injury s/p fell on monday morning per home aide pt was trying to get out of bed has difficulty ambulating aide denies loc denies anticoagulation use sent from urgent care after xrays + fx per pts sister

## 2020-07-15 NOTE — ED PROVIDER NOTE - CARE PLAN
Principal Discharge DX:	Closed displaced fracture of surgical neck of left humerus, unspecified fracture morphology, initial encounter

## 2020-07-16 VITALS
DIASTOLIC BLOOD PRESSURE: 65 MMHG | RESPIRATION RATE: 16 BRPM | OXYGEN SATURATION: 99 % | SYSTOLIC BLOOD PRESSURE: 160 MMHG | TEMPERATURE: 99 F | HEART RATE: 79 BPM
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
## 2020-07-16 DIAGNOSIS — F03.90 UNSPECIFIED DEMENTIA, UNSPECIFIED SEVERITY, WITHOUT BEHAVIORAL DISTURBANCE, PSYCHOTIC DISTURBANCE, MOOD DISTURBANCE, AND ANXIETY: ICD-10-CM
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
## 2020-07-16 DIAGNOSIS — Z87.442 PERSONAL HISTORY OF URINARY CALCULI: ICD-10-CM

## 2020-07-16 DIAGNOSIS — M19.90 UNSPECIFIED OSTEOARTHRITIS, UNSPECIFIED SITE: ICD-10-CM

## 2020-07-16 DIAGNOSIS — M25.512 PAIN IN LEFT SHOULDER: ICD-10-CM

## 2020-07-16 DIAGNOSIS — M10.9 GOUT, UNSPECIFIED: ICD-10-CM

## 2020-07-16 DIAGNOSIS — Z79.82 LONG TERM (CURRENT) USE OF ASPIRIN: ICD-10-CM

## 2020-07-16 DIAGNOSIS — J45.909 UNSPECIFIED ASTHMA, UNCOMPLICATED: ICD-10-CM

## 2020-07-16 DIAGNOSIS — R32 UNSPECIFIED URINARY INCONTINENCE: ICD-10-CM

## 2020-07-16 DIAGNOSIS — S42.212A UNSPECIFIED DISPLACED FRACTURE OF SURGICAL NECK OF LEFT HUMERUS, INITIAL ENCOUNTER FOR CLOSED FRACTURE: ICD-10-CM

## 2020-07-16 DIAGNOSIS — Z79.02 LONG TERM (CURRENT) USE OF ANTITHROMBOTICS/ANTIPLATELETS: ICD-10-CM

## 2020-07-16 DIAGNOSIS — Z86.73 PERSONAL HISTORY OF TRANSIENT ISCHEMIC ATTACK (TIA), AND CEREBRAL INFARCTION WITHOUT RESIDUAL DEFICITS: ICD-10-CM

## 2020-07-16 DIAGNOSIS — I10 ESSENTIAL (PRIMARY) HYPERTENSION: ICD-10-CM

## 2020-07-16 DIAGNOSIS — G40.909 EPILEPSY, UNSPECIFIED, NOT INTRACTABLE, WITHOUT STATUS EPILEPTICUS: ICD-10-CM

## 2020-07-16 DIAGNOSIS — E11.9 TYPE 2 DIABETES MELLITUS WITHOUT COMPLICATIONS: ICD-10-CM

## 2020-07-16 PROCEDURE — 73030 X-RAY EXAM OF SHOULDER: CPT | Mod: 26,LT

## 2020-07-16 PROCEDURE — 73200 CT UPPER EXTREMITY W/O DYE: CPT | Mod: 26,LT

## 2020-07-16 PROCEDURE — 70450 CT HEAD/BRAIN W/O DYE: CPT | Mod: 26

## 2020-07-16 PROCEDURE — 73060 X-RAY EXAM OF HUMERUS: CPT | Mod: 26,LT

## 2020-07-16 PROCEDURE — 72170 X-RAY EXAM OF PELVIS: CPT | Mod: 26

## 2020-07-16 PROCEDURE — 71045 X-RAY EXAM CHEST 1 VIEW: CPT | Mod: 26

## 2020-07-16 NOTE — CONSULT NOTE ADULT - SUBJECTIVE AND OBJECTIVE BOX
Orthopaedic Surgery Consult Note    Pt is a R-hand dominant 69y female presenting with L shoulder pain s/p mechanical fall. Pt denies numbness, tingling, paresthesias in affected limb. Pt denies headstrike or LOC and denies any other orthopaedic injuries at this time. Pt on ASA 81 and plavix for anticoagulation. Denies fevers, dizziness, CP, SOB, N/V, calf pain.    PMHx:  FALL SSHOULDER INJURY  No pertinent family history in first degree relatives  No pertinent family history in first degree relatives  MEWS Score  CVA (cerebral infarction)  Vision changes  Urinary incontinence  Seizure disorder  Gout  OA (osteoarthritis)  Asthma  Diabetes  Hypertension  Kidney stone  FALL SSHOULDER INJURY  90+    PSHx:    Allergies:  No Known Allergies    Medications:    Social: Denies tobacco, EtOH, or illicit drug use.    Labs:  N/A            Imaging: XR L shoulder - impacted proximal humerus fracture  CT L shoulder - proximal humerus fracture, no shoulder dislocation    Vitals:  T(C): 37.5 (07-15-20 @ 21:34), Max: 37.5 (07-15-20 @ 21:34)  HR: 82 (07-15-20 @ 21:34) (82 - 82)  BP: 134/100 (07-15-20 @ 21:34) (134/100 - 134/100)  RR: 18 (07-15-20 @ 21:34) (18 - 18)  SpO2: 100% (07-15-20 @ 21:34) (100% - 100%)    Physical Exam:  Gen: NAD, AAOx3    LUE:   Skin intact  Gross deformity of proximal humerus  +TTP about proximal humerus  + edema, ecchymosis, erythema about shoulder  No bony TTP of Elbow/Wrist/Hand/Fingers  NT with AROM/PROM of Elbow/Wrist/Hand/Fingers  +AIN/PIN/Med/Rad/Uln/Msc/Ax  SILT C5-T1  2+ Rad/Uln Pulse   Brisk cap refill  Compartments soft and compressible    Secondary Assessment:  NC/AT, NTTP of clavicles, NTTP of C-,T-,L-Spine, NTTP of Pelvis  RUE: NTTP of Shoulder, Elbow, Wrist, Hand; NT with AROM/PROM of Shoulder, Elbow, Wrist, Hand; AIN/PIN/Med/Uln/Msc/Rad/Ax intact  LEs: Able to SLR, NT with Log Roll, NT with Heel Strike, NTTP of Hips, Knees, Ankles, Feet; NT with AROM/PROM of Hips, Knees, Ankles, Feet; Q/H/Gsc/TA/EHL/FHL intact    A/P: Pt is a 69y female with a L Proximal Humerus Fracture    -Closed Fracture, NV Intact  -Pain control  -Pt placed in sling.  -Rest, ice, elevate affected extremity.  -NWB affected extremity in sling.  -Discussed signs and symptoms of compartment syndrome with patient. Pt informed to follow up immediately should these signs or symptoms develop. Pt expressed understanding and all questions were answered.  -Pt informed to remove all jewelry from affected limb.  -Discussed possible need for surgical fixation.  -Please follow up in office within 5-7 days of discharge from ED with Dr. Corral. Call office for appointment.  -Ortho stable for discharge.    Riley Causey D.O.  PGY-2 Orthopaedic Surgery Orthopaedic Surgery Consult Note    Pt is a R-hand dominant 69y female presenting with L shoulder pain s/p mechanical fall. Pt is demented and nonverbal at baseline, history obtained from sister at bedside. Pt fell while getting out of bed Tuesday morning, family took her to urgent care today who subsequently sent her to ED for further evaluation. Pt unable to cooperate with history and physical due to mental status. Pt on ASA 81 and plavix for anticoagulation.    PMHx:  FALL SSHOULDER INJURY  No pertinent family history in first degree relatives  No pertinent family history in first degree relatives  MEWS Score  CVA (cerebral infarction)  Vision changes  Urinary incontinence  Seizure disorder  Gout  OA (osteoarthritis)  Asthma  Diabetes  Hypertension  Kidney stone  FALL SSHOULDER INJURY  90+    PSHx:    Allergies:  No Known Allergies    Medications:    Social: Denies tobacco, EtOH, or illicit drug use.    Labs:  N/A            Imaging: XR L shoulder - impacted proximal humerus fracture  CT L shoulder - proximal humerus fracture, no shoulder dislocation    Vitals:  T(C): 37.5 (07-15-20 @ 21:34), Max: 37.5 (07-15-20 @ 21:34)  HR: 82 (07-15-20 @ 21:34) (82 - 82)  BP: 134/100 (07-15-20 @ 21:34) (134/100 - 134/100)  RR: 18 (07-15-20 @ 21:34) (18 - 18)  SpO2: 100% (07-15-20 @ 21:34) (100% - 100%)    Physical Exam:  Gen: Demented, nonverbal    LUE:   Skin intact  Gross deformity of proximal humerus  +TTP about proximal humerus  No bony TTP of Elbow/Wrist/Hand/Fingers  NT with AROM/PROM of Elbow/Wrist/Hand/Fingers  Unable to obtain motor/sensory due to mental status but moving extremity spontaneously  2+ Rad/Uln Pulse   Brisk cap refill  Compartments soft and compressible    Secondary Assessment:  NC/AT, NTTP of clavicles, NTTP of C-,T-,L-Spine, NTTP of Pelvis  RUE: NTTP of Shoulder, Elbow, Wrist, Hand; NT with AROM/PROM of Shoulder, Elbow, Wrist, Hand; AIN/PIN/Med/Uln/Msc/Rad/Ax intact  LEs: Able to SLR, NT with Log Roll, NT with Heel Strike, NTTP of Hips, Knees, Ankles, Feet; NT with AROM/PROM of Hips, Knees, Ankles, Feet; Q/H/Gsc/TA/EHL/FHL intact    A/P: Pt is a 69y female with a L Proximal Humerus Fracture    -Closed Fracture, NV Intact  -Pain control  -Pt placed in sling.  -Rest, ice, elevate affected extremity.  -NWB affected extremity in sling.  -Discussed signs and symptoms of compartment syndrome with patient. Pt informed to follow up immediately should these signs or symptoms develop. Pt expressed understanding and all questions were answered.  -Pt informed to remove all jewelry from affected limb.  -Low likelihood of surgical fixation needed given injury pattern and patient baseline functional status  -Please follow up in office within 5-7 days of discharge from ED with Dr. Corral. Call office for appointment.  -Discussed plan with sister at bedside  -Ortho stable for discharge.    Riley Causey D.O.  PGY-2 Orthopaedic Surgery

## 2020-07-22 DIAGNOSIS — Z00.00 ENCOUNTER FOR GENERAL ADULT MEDICAL EXAMINATION W/OUT ABNORMAL FINDINGS: ICD-10-CM

## 2020-07-24 ENCOUNTER — APPOINTMENT (OUTPATIENT)
Dept: ORTHOPEDIC SURGERY | Facility: CLINIC | Age: 70
End: 2020-07-24

## 2020-07-24 NOTE — PHYSICAL EXAM
[de-identified] : General Exam\par \par Well developed, well nourished\par No apparent distress\par Oriented to person, place, and time\par Mood: Normal\par Affect: Normal\par Balance and coordination: Normal\par Gait: Normal\par \par Left shoulder exam\par \par Inspection: No swelling, ecchymosis or gross deformity.\par Skin: No masses, No lesions\par Tenderness: No bicipital tenderness, no tenderness to the greater tuberosity/RTC insertion, no anterior shoulder/lesser tuberosity tenderness. No tenderness SC joint, clavicle, AC joint.\par ROM: 155/60/T6. symmetric er/ir w arm in 90 deg abd to other side\par Impingement tests: neg Garcia\par instability: neg post load and shift neg génesis/jerk\par AC Joint: no pain with cross arm testing\par Biceps: Negative speed\par Strength: 5/5 abduction, external rotation, and internal rotation\par Neuro: AIN, PIN, Ulnar nerve motor intact\par Sensation: Intact to light touch in radial, median, ulnar, and axillary nerve distributions\par Vasc: 2+ radial pulse\par  [de-identified] : EXAM: CT SHOULDER ONLY LT \par \par PROCEDURE DATE: 07/16/2020 \par \par INTERPRETATION: Clinical indication: Status post fall. Pain. Fracture on \par outside radiograph.. \par \par Technique: CT axial images of the left shoulder were obtained without \par intravenous contrast. Coronal and sagittal reformatted images were also \par obtained. 3-D volume rendering images were obtained from a separate \par workstation.\par \par Comparison: Earlier radiographs of the same date. \par \par Findings: \par \par Bones: Osteopenia. There is a comminuted fracture in the surgical neck of \par the left humerus with impaction and angulation. The fracture line extends to \par the greater and lesser tuberosities. \par There is no intra-articular osseous fragments. \par \par Soft tissue: Soft tissue density around the joint likely indicating small \par hemarthrosis. There is a short curvilinear calcific density adjacent to the \par greater tuberosity, likely a calcific tendinitis. There is no large \par intra-articular or intramuscular hematoma. \par \par Impression: \par \par Comminuted fracture of the surgical neck of left humerus with impaction and \par angulation, with partially extending to the greater and lesser tuberosities. \par \par COLETTE SAMAYOA M.D., ATTENDING RADIOLOGIST \par This document has been electronically signed. Jul 16 2020 12:49AM

## 2020-07-24 NOTE — HISTORY OF PRESENT ILLNESS
[de-identified] : Ms. FATOU MCBRIDE is a 69 year female who presents to office complaining of left shoulder pain.\par \par All review of systems, family history, social history, surgical history, past medical history, medications, and allergies not previously stated as positive are negative. They were reviewed by me today with the patient and documented accordingly.

## 2020-07-28 ENCOUNTER — INPATIENT (INPATIENT)
Facility: HOSPITAL | Age: 70
LOS: 12 days | Discharge: HOME HEALTH | End: 2020-08-10
Attending: HOSPITALIST | Admitting: HOSPITALIST
Payer: MEDICARE

## 2020-07-28 VITALS
SYSTOLIC BLOOD PRESSURE: 136 MMHG | OXYGEN SATURATION: 100 % | WEIGHT: 175.05 LBS | RESPIRATION RATE: 20 BRPM | HEIGHT: 64 IN | DIASTOLIC BLOOD PRESSURE: 88 MMHG | TEMPERATURE: 99 F | HEART RATE: 78 BPM

## 2020-07-28 DIAGNOSIS — N30.90 CYSTITIS, UNSPECIFIED WITHOUT HEMATURIA: ICD-10-CM

## 2020-07-28 DIAGNOSIS — G40.909 EPILEPSY, UNSPECIFIED, NOT INTRACTABLE, WITHOUT STATUS EPILEPTICUS: ICD-10-CM

## 2020-07-28 DIAGNOSIS — N17.9 ACUTE KIDNEY FAILURE, UNSPECIFIED: ICD-10-CM

## 2020-07-28 DIAGNOSIS — N13.2 HYDRONEPHROSIS WITH RENAL AND URETERAL CALCULOUS OBSTRUCTION: ICD-10-CM

## 2020-07-28 DIAGNOSIS — E87.5 HYPERKALEMIA: ICD-10-CM

## 2020-07-28 DIAGNOSIS — R33.9 RETENTION OF URINE, UNSPECIFIED: ICD-10-CM

## 2020-07-28 DIAGNOSIS — N20.0 CALCULUS OF KIDNEY: Chronic | ICD-10-CM

## 2020-07-28 LAB
ALBUMIN SERPL ELPH-MCNC: 2.9 G/DL — LOW (ref 3.3–5)
ALP SERPL-CCNC: 96 U/L — SIGNIFICANT CHANGE UP (ref 40–120)
ALT FLD-CCNC: 14 U/L — SIGNIFICANT CHANGE UP (ref 12–78)
ANION GAP SERPL CALC-SCNC: 14 MMOL/L — SIGNIFICANT CHANGE UP (ref 5–17)
ANION GAP SERPL CALC-SCNC: 15 MMOL/L — SIGNIFICANT CHANGE UP (ref 5–17)
APPEARANCE UR: CLEAR — SIGNIFICANT CHANGE UP
APTT BLD: 23.9 SEC — LOW (ref 27.5–35.5)
AST SERPL-CCNC: 14 U/L — LOW (ref 15–37)
BACTERIA # UR AUTO: ABNORMAL
BASOPHILS # BLD AUTO: 0.04 K/UL — SIGNIFICANT CHANGE UP (ref 0–0.2)
BASOPHILS NFR BLD AUTO: 0.2 % — SIGNIFICANT CHANGE UP (ref 0–2)
BILIRUB SERPL-MCNC: 0.6 MG/DL — SIGNIFICANT CHANGE UP (ref 0.2–1.2)
BILIRUB UR-MCNC: NEGATIVE — SIGNIFICANT CHANGE UP
BUN SERPL-MCNC: 117 MG/DL — HIGH (ref 7–23)
BUN SERPL-MCNC: 128 MG/DL — HIGH (ref 7–23)
CALCIUM SERPL-MCNC: 8.5 MG/DL — SIGNIFICANT CHANGE UP (ref 8.5–10.1)
CALCIUM SERPL-MCNC: 8.6 MG/DL — SIGNIFICANT CHANGE UP (ref 8.5–10.1)
CHLORIDE SERPL-SCNC: 107 MMOL/L — SIGNIFICANT CHANGE UP (ref 96–108)
CHLORIDE SERPL-SCNC: 110 MMOL/L — HIGH (ref 96–108)
CO2 SERPL-SCNC: 16 MMOL/L — LOW (ref 22–31)
CO2 SERPL-SCNC: 18 MMOL/L — LOW (ref 22–31)
COLOR SPEC: YELLOW — SIGNIFICANT CHANGE UP
CREAT SERPL-MCNC: 7.41 MG/DL — HIGH (ref 0.5–1.3)
CREAT SERPL-MCNC: 8.6 MG/DL — HIGH (ref 0.5–1.3)
DIFF PNL FLD: ABNORMAL
EOSINOPHIL # BLD AUTO: 0 K/UL — SIGNIFICANT CHANGE UP (ref 0–0.5)
EOSINOPHIL NFR BLD AUTO: 0 % — SIGNIFICANT CHANGE UP (ref 0–6)
EPI CELLS # UR: ABNORMAL
GLUCOSE BLDC GLUCOMTR-MCNC: 372 MG/DL — HIGH (ref 70–99)
GLUCOSE BLDC GLUCOMTR-MCNC: 404 MG/DL — HIGH (ref 70–99)
GLUCOSE BLDC GLUCOMTR-MCNC: 436 MG/DL — HIGH (ref 70–99)
GLUCOSE BLDC GLUCOMTR-MCNC: 445 MG/DL — HIGH (ref 70–99)
GLUCOSE BLDC GLUCOMTR-MCNC: 458 MG/DL — CRITICAL HIGH (ref 70–99)
GLUCOSE SERPL-MCNC: 365 MG/DL — HIGH (ref 70–99)
GLUCOSE SERPL-MCNC: 427 MG/DL — HIGH (ref 70–99)
GLUCOSE UR QL: 1000 MG/DL
HCT VFR BLD CALC: 33.2 % — LOW (ref 34.5–45)
HGB BLD-MCNC: 10.7 G/DL — LOW (ref 11.5–15.5)
IMM GRANULOCYTES NFR BLD AUTO: 0.9 % — SIGNIFICANT CHANGE UP (ref 0–1.5)
INR BLD: 1.3 RATIO — HIGH (ref 0.88–1.16)
KETONES UR-MCNC: NEGATIVE — SIGNIFICANT CHANGE UP
LACTATE SERPL-SCNC: 1.6 MMOL/L — SIGNIFICANT CHANGE UP (ref 0.7–2)
LEUKOCYTE ESTERASE UR-ACNC: ABNORMAL
LYMPHOCYTES # BLD AUTO: 0.89 K/UL — LOW (ref 1–3.3)
LYMPHOCYTES # BLD AUTO: 4.2 % — LOW (ref 13–44)
MCHC RBC-ENTMCNC: 29.2 PG — SIGNIFICANT CHANGE UP (ref 27–34)
MCHC RBC-ENTMCNC: 32.2 GM/DL — SIGNIFICANT CHANGE UP (ref 32–36)
MCV RBC AUTO: 90.7 FL — SIGNIFICANT CHANGE UP (ref 80–100)
MONOCYTES # BLD AUTO: 1.69 K/UL — HIGH (ref 0–0.9)
MONOCYTES NFR BLD AUTO: 8 % — SIGNIFICANT CHANGE UP (ref 2–14)
NEUTROPHILS # BLD AUTO: 18.24 K/UL — HIGH (ref 1.8–7.4)
NEUTROPHILS NFR BLD AUTO: 86.7 % — HIGH (ref 43–77)
NITRITE UR-MCNC: NEGATIVE — SIGNIFICANT CHANGE UP
NRBC # BLD: 0 /100 WBCS — SIGNIFICANT CHANGE UP (ref 0–0)
PH UR: 5 — SIGNIFICANT CHANGE UP (ref 5–8)
PLATELET # BLD AUTO: 247 K/UL — SIGNIFICANT CHANGE UP (ref 150–400)
POTASSIUM SERPL-MCNC: 5.4 MMOL/L — HIGH (ref 3.5–5.3)
POTASSIUM SERPL-MCNC: 6.4 MMOL/L — CRITICAL HIGH (ref 3.5–5.3)
POTASSIUM SERPL-SCNC: 5.4 MMOL/L — HIGH (ref 3.5–5.3)
POTASSIUM SERPL-SCNC: 6.4 MMOL/L — CRITICAL HIGH (ref 3.5–5.3)
PROT SERPL-MCNC: 7.8 GM/DL — SIGNIFICANT CHANGE UP (ref 6–8.3)
PROT UR-MCNC: 100 MG/DL
PROTHROM AB SERPL-ACNC: 14.3 SEC — HIGH (ref 10.6–13.6)
RBC # BLD: 3.66 M/UL — LOW (ref 3.8–5.2)
RBC # FLD: 13.2 % — SIGNIFICANT CHANGE UP (ref 10.3–14.5)
RBC CASTS # UR COMP ASSIST: ABNORMAL /HPF (ref 0–4)
SARS-COV-2 RNA SPEC QL NAA+PROBE: SIGNIFICANT CHANGE UP
SODIUM SERPL-SCNC: 138 MMOL/L — SIGNIFICANT CHANGE UP (ref 135–145)
SODIUM SERPL-SCNC: 142 MMOL/L — SIGNIFICANT CHANGE UP (ref 135–145)
SP GR SPEC: 1.01 — SIGNIFICANT CHANGE UP (ref 1.01–1.02)
TROPONIN I SERPL-MCNC: <.015 NG/ML — SIGNIFICANT CHANGE UP (ref 0.01–0.04)
UROBILINOGEN FLD QL: NEGATIVE MG/DL — SIGNIFICANT CHANGE UP
WBC # BLD: 21.04 K/UL — HIGH (ref 3.8–10.5)
WBC # FLD AUTO: 21.04 K/UL — HIGH (ref 3.8–10.5)
WBC UR QL: ABNORMAL

## 2020-07-28 PROCEDURE — 71046 X-RAY EXAM CHEST 2 VIEWS: CPT | Mod: 26

## 2020-07-28 PROCEDURE — 99223 1ST HOSP IP/OBS HIGH 75: CPT

## 2020-07-28 PROCEDURE — 99285 EMERGENCY DEPT VISIT HI MDM: CPT

## 2020-07-28 PROCEDURE — 93010 ELECTROCARDIOGRAM REPORT: CPT

## 2020-07-28 PROCEDURE — 70450 CT HEAD/BRAIN W/O DYE: CPT | Mod: 26

## 2020-07-28 PROCEDURE — 76775 US EXAM ABDO BACK WALL LIM: CPT | Mod: 26

## 2020-07-28 RX ORDER — CEFTRIAXONE 500 MG/1
1000 INJECTION, POWDER, FOR SOLUTION INTRAMUSCULAR; INTRAVENOUS ONCE
Refills: 0 | Status: COMPLETED | OUTPATIENT
Start: 2020-07-28 | End: 2020-07-28

## 2020-07-28 RX ORDER — ALBUTEROL 90 UG/1
2.5 AEROSOL, METERED ORAL EVERY 6 HOURS
Refills: 0 | Status: DISCONTINUED | OUTPATIENT
Start: 2020-07-28 | End: 2020-08-05

## 2020-07-28 RX ORDER — ALBUTEROL 90 UG/1
3 AEROSOL, METERED ORAL EVERY 4 HOURS
Refills: 0 | Status: DISCONTINUED | OUTPATIENT
Start: 2020-07-28 | End: 2020-08-10

## 2020-07-28 RX ORDER — DEXTROSE 50 % IN WATER 50 %
12.5 SYRINGE (ML) INTRAVENOUS ONCE
Refills: 0 | Status: DISCONTINUED | OUTPATIENT
Start: 2020-07-28 | End: 2020-08-10

## 2020-07-28 RX ORDER — INSULIN LISPRO 100/ML
VIAL (ML) SUBCUTANEOUS AT BEDTIME
Refills: 0 | Status: DISCONTINUED | OUTPATIENT
Start: 2020-07-28 | End: 2020-08-10

## 2020-07-28 RX ORDER — AMLODIPINE BESYLATE 2.5 MG/1
10 TABLET ORAL DAILY
Refills: 0 | Status: DISCONTINUED | OUTPATIENT
Start: 2020-07-28 | End: 2020-08-10

## 2020-07-28 RX ORDER — SIMVASTATIN 20 MG/1
20 TABLET, FILM COATED ORAL AT BEDTIME
Refills: 0 | Status: DISCONTINUED | OUTPATIENT
Start: 2020-07-28 | End: 2020-08-10

## 2020-07-28 RX ORDER — LEVETIRACETAM 250 MG/1
750 TABLET, FILM COATED ORAL
Refills: 0 | Status: DISCONTINUED | OUTPATIENT
Start: 2020-07-28 | End: 2020-07-28

## 2020-07-28 RX ORDER — METOPROLOL TARTRATE 50 MG
50 TABLET ORAL
Refills: 0 | Status: DISCONTINUED | OUTPATIENT
Start: 2020-07-28 | End: 2020-07-28

## 2020-07-28 RX ORDER — DEXTROSE 50 % IN WATER 50 %
25 SYRINGE (ML) INTRAVENOUS ONCE
Refills: 0 | Status: DISCONTINUED | OUTPATIENT
Start: 2020-07-28 | End: 2020-08-10

## 2020-07-28 RX ORDER — TRAZODONE HCL 50 MG
50 TABLET ORAL AT BEDTIME
Refills: 0 | Status: DISCONTINUED | OUTPATIENT
Start: 2020-07-28 | End: 2020-08-06

## 2020-07-28 RX ORDER — CEFTRIAXONE 500 MG/1
1000 INJECTION, POWDER, FOR SOLUTION INTRAMUSCULAR; INTRAVENOUS EVERY 24 HOURS
Refills: 0 | Status: DISCONTINUED | OUTPATIENT
Start: 2020-07-28 | End: 2020-07-31

## 2020-07-28 RX ORDER — GLUCAGON INJECTION, SOLUTION 0.5 MG/.1ML
1 INJECTION, SOLUTION SUBCUTANEOUS ONCE
Refills: 0 | Status: DISCONTINUED | OUTPATIENT
Start: 2020-07-28 | End: 2020-08-10

## 2020-07-28 RX ORDER — CLOPIDOGREL BISULFATE 75 MG/1
75 TABLET, FILM COATED ORAL DAILY
Refills: 0 | Status: DISCONTINUED | OUTPATIENT
Start: 2020-07-28 | End: 2020-08-10

## 2020-07-28 RX ORDER — SODIUM CHLORIDE 9 MG/ML
1000 INJECTION, SOLUTION INTRAVENOUS
Refills: 0 | Status: COMPLETED | OUTPATIENT
Start: 2020-07-28 | End: 2020-07-28

## 2020-07-28 RX ORDER — METOPROLOL TARTRATE 50 MG
5 TABLET ORAL EVERY 6 HOURS
Refills: 0 | Status: DISCONTINUED | OUTPATIENT
Start: 2020-07-28 | End: 2020-07-30

## 2020-07-28 RX ORDER — SODIUM CHLORIDE 9 MG/ML
1000 INJECTION INTRAMUSCULAR; INTRAVENOUS; SUBCUTANEOUS ONCE
Refills: 0 | Status: COMPLETED | OUTPATIENT
Start: 2020-07-28 | End: 2020-07-28

## 2020-07-28 RX ORDER — ASPIRIN/CALCIUM CARB/MAGNESIUM 324 MG
81 TABLET ORAL DAILY
Refills: 0 | Status: DISCONTINUED | OUTPATIENT
Start: 2020-07-28 | End: 2020-08-05

## 2020-07-28 RX ORDER — DEXTROSE 50 % IN WATER 50 %
15 SYRINGE (ML) INTRAVENOUS ONCE
Refills: 0 | Status: DISCONTINUED | OUTPATIENT
Start: 2020-07-28 | End: 2020-08-10

## 2020-07-28 RX ORDER — SODIUM CHLORIDE 9 MG/ML
1000 INJECTION, SOLUTION INTRAVENOUS
Refills: 0 | Status: DISCONTINUED | OUTPATIENT
Start: 2020-07-28 | End: 2020-08-10

## 2020-07-28 RX ORDER — HYDRALAZINE HCL 50 MG
10 TABLET ORAL ONCE
Refills: 0 | Status: COMPLETED | OUTPATIENT
Start: 2020-07-28 | End: 2020-07-28

## 2020-07-28 RX ORDER — INSULIN HUMAN 100 [IU]/ML
10 INJECTION, SOLUTION SUBCUTANEOUS ONCE
Refills: 0 | Status: DISCONTINUED | OUTPATIENT
Start: 2020-07-28 | End: 2020-07-28

## 2020-07-28 RX ORDER — LEVETIRACETAM 250 MG/1
750 TABLET, FILM COATED ORAL EVERY 12 HOURS
Refills: 0 | Status: DISCONTINUED | OUTPATIENT
Start: 2020-07-28 | End: 2020-07-30

## 2020-07-28 RX ORDER — HYDRALAZINE HCL 50 MG
25 TABLET ORAL THREE TIMES A DAY
Refills: 0 | Status: DISCONTINUED | OUTPATIENT
Start: 2020-07-28 | End: 2020-08-05

## 2020-07-28 RX ORDER — INSULIN LISPRO 100/ML
VIAL (ML) SUBCUTANEOUS
Refills: 0 | Status: DISCONTINUED | OUTPATIENT
Start: 2020-07-28 | End: 2020-08-10

## 2020-07-28 RX ORDER — INSULIN HUMAN 100 [IU]/ML
10 INJECTION, SOLUTION SUBCUTANEOUS ONCE
Refills: 0 | Status: COMPLETED | OUTPATIENT
Start: 2020-07-28 | End: 2020-07-28

## 2020-07-28 RX ADMIN — Medication 8: at 22:43

## 2020-07-28 RX ADMIN — LEVETIRACETAM 400 MILLIGRAM(S): 250 TABLET, FILM COATED ORAL at 20:56

## 2020-07-28 RX ADMIN — Medication 10 MILLIGRAM(S): at 19:42

## 2020-07-28 RX ADMIN — CEFTRIAXONE 100 MILLIGRAM(S): 500 INJECTION, POWDER, FOR SOLUTION INTRAMUSCULAR; INTRAVENOUS at 19:44

## 2020-07-28 RX ADMIN — SODIUM CHLORIDE 100 MILLILITER(S): 9 INJECTION, SOLUTION INTRAVENOUS at 20:40

## 2020-07-28 RX ADMIN — CEFTRIAXONE 100 MILLIGRAM(S): 500 INJECTION, POWDER, FOR SOLUTION INTRAMUSCULAR; INTRAVENOUS at 13:56

## 2020-07-28 RX ADMIN — SODIUM CHLORIDE 1000 MILLILITER(S): 9 INJECTION INTRAMUSCULAR; INTRAVENOUS; SUBCUTANEOUS at 13:28

## 2020-07-28 RX ADMIN — INSULIN HUMAN 10 UNIT(S): 100 INJECTION, SOLUTION SUBCUTANEOUS at 13:56

## 2020-07-28 NOTE — ED ADULT NURSE REASSESSMENT NOTE - NS ED NURSE REASSESS COMMENT FT1
me and one other RN attempted to straight cath patient unsuccessful, un able to visualize opening. DR. Brittney MÉNDEZ made aware. asking for one more RN to attempt

## 2020-07-28 NOTE — H&P ADULT - NSHPPHYSICALEXAM_GEN_ALL_CORE
ICU Vital Signs Last 24 Hrs  T(C): 36.9 (28 Jul 2020 17:28), Max: 37.1 (28 Jul 2020 10:52)  T(F): 98.5 (28 Jul 2020 17:28), Max: 98.7 (28 Jul 2020 10:52)  HR: 71 (28 Jul 2020 17:28) (71 - 78)  BP: 137/59 (28 Jul 2020 17:28) (119/46 - 150/70)  BP(mean): --  ABP: --  ABP(mean): --  RR: 16 (28 Jul 2020 17:28) (16 - 20)  SpO2: 99% (28 Jul 2020 17:28) (99% - 100%) ICU Vital Signs Last 24 Hrs  T(C): 36.9 (28 Jul 2020 17:28), Max: 37.1 (28 Jul 2020 10:52)  T(F): 98.5 (28 Jul 2020 17:28), Max: 98.7 (28 Jul 2020 10:52)  HR: 71 (28 Jul 2020 17:28) (71 - 78)  BP: 137/59 (28 Jul 2020 17:28) (119/46 - 150/70)  BP(mean): --  ABP: --  ABP(mean): --  RR: 16 (28 Jul 2020 17:28) (16 - 20)  SpO2: 99% (28 Jul 2020 17:28) (99% - 100%)      GENERAL: NAD well-developed  HEAD:  Atraumatic, Normocephalic  EYES: EOMI, PERRLA, conjunctiva and sclera clear  ENMT: No tonsillar erythema, exudates, or enlargement; Moist mucous membranes, Good dentition, No lesions  NECK: Supple, No JVD, Normal thyroid  NERVOUS SYSTEM:  Alert & Oriented X1Good concentration; Motor Strength 4/5 B/L upper and lower extremities; DTRs 2+ intact and symmetric  CHEST/LUNG: Clear to percussion bilaterally; No rales, rhonchi, wheezing, or rubs  HEART: Regular rate and rhythm; No murmurs, rubs, or gallops  ABDOMEN: Soft, Nontender, Nondistended; Bowel sounds present  EXTREMITIES:  2+ Peripheral Pulses, No clubbing, cyanosis, or edema  LYMPH: No lymphadenopathy   SKIN: No rashes or lesions

## 2020-07-28 NOTE — ED PROVIDER NOTE - PROGRESS NOTE DETAILS
D/w medicine (Dr Robledo) Renal US ordered to w/u [facility] (Dr Hernandez) placed Mayberry, 1700mL OUP.

## 2020-07-28 NOTE — ED PROVIDER NOTE - OBJECTIVE STATEMENT
70yo F w/ PMH HTN, DM, asthma, CVA, seizure disorder BIBA d/t found unresponsive by family at home this AM. BS was reportedly high. VSS. Pt awoke on ED arrival, looks to voice, non verbal.     Family -     Lower abd firm, distended, no LE edema, no resp distress, somulent but responds to voice 68yo F w/ PMH HTN, DM, CVA, seizure disorder BIBA d/t found unresponsive by family at home this AM. BS was reportedly high. VSS. Pt awoke on ED arrival, looks to voice, non verbal.     Family - Pt lives w/ aide, aide noted pt to be unresponsive while feeding this AM. Pt had episode unresponsiveness, slumped, in chair yesterday, family noted bedsore BL buttocks, abscess at that time. Pt w/ recent fall 2wks ago (7/14) seen at [facility] VS ED, L humerus fx. Pt previously ambulatory, verbal, progressive decline, family unsure since when exactly not walking / not talking.     Lower abd firm, distended, no LE edema, no resp distress, somulent but responds to voice. 68yo F w/ PMH HTN, DM, CVA, seizure disorder BIBA d/t found unresponsive by family at home this AM. BS was reportedly high. VSS. Pt awoke on ED arrival, looks to voice, non verbal.     Per daughter - Pt lives w/ aide, aide noted pt to be unresponsive while feeding this AM. Pt had episode unresponsiveness, slumped, in chair yesterday, family noted bedsore BL buttocks, abscess at that time. Pt w/ recent fall 2wks ago (7/14) seen at [facility] VS ED, L humerus fx. Pt previously ambulatory, verbal, progressive decline, family unsure since when exactly not walking / not talking.

## 2020-07-28 NOTE — ED ADULT NURSE NOTE - OBJECTIVE STATEMENT
pt received by EMS, pt nonverbal, eyes on and reactive, patient response to pain at time of assessment, 5 ulcers noted on the saccum. stage 2  for all five. pt presents with a sling to the left arm. as per daughter left arm is broken.

## 2020-07-28 NOTE — ED ADULT NURSE REASSESSMENT NOTE - NS ED NURSE REASSESS COMMENT FT1
Report given to the hold nurse Shaye, report includes IV access, pts current status as well as a detailed medical history, pt understands need for admission and had questions answered, reviewed patient concerns with the holding RN, VS recorded and placed in the EMR. Education for patient deemed successful with teach back demonstration.

## 2020-07-28 NOTE — CONSULT NOTE ADULT - ASSESSMENT
69 years old female, unresponsive found to have acute kidney injury and UR with b/l hydronephrosis   Problem/Plan - 1:  ·  Problem: AMADOU (acute kidney injury).  Plan: intravenous bicarb as per renal, Dr. Oakes following     Problem/Plan - 2:  ·  Problem: Urinary retention.  Plan: Cont milner cath, monitor output, replace fluids if pt has post obstructive diuresis, follow-up Ucx     Problem/Plan - 3:  ·  Problem: Hydronephrosis with urinary obstruction due to renal calculus.  Plan: Milner   CT A/P to further evaluate for stones     Problem/Plan - 4:  ·  Problem: Seizure disorder.  Plan: continue medical management     Problem/Plan - 5:  ·  Problem: Hyperkalemia.  Plan: intravenous bicarb , trend eleoctrolytes

## 2020-07-28 NOTE — ED ADULT NURSE NOTE - NSIMPLEMENTINTERV_GEN_ALL_ED
Implemented All Fall Risk Interventions:  [place] to call system. Call bell, personal items and telephone within reach. Instruct patient to call for assistance. Room bathroom lighting operational. Non-slip footwear when patient is off stretcher. Physically safe environment: no spills, clutter or unnecessary equipment. Stretcher in lowest position, wheels locked, appropriate side rails in place. Provide visual cue, wrist band, yellow gown, etc. Monitor gait and stability. Monitor for mental status changes and reorient to person, place, and time. Review medications for side effects contributing to fall risk. Reinforce activity limits and safety measures with patient and family.

## 2020-07-28 NOTE — ED ADULT TRIAGE NOTE - CHIEF COMPLAINT QUOTE
patient BIBA as per EMS family found the patient nonverbal in the morning, unresponsive  , patient responding to tactile stimuli by opening the eyes ,and moving upper extremities

## 2020-07-28 NOTE — CONSULT NOTE ADULT - SUBJECTIVE AND OBJECTIVE BOX
General Surgery Consult    HPI:  70yo F w/ PMH HTN, DM, CVA, seizure disorder BIBA d/t found unresponsive by family at home this AM. BS was reportedly high. VSS. Pt awoke on ED arrival, looks to voice, non verbal. Family - Pt lives w/ aide, aide noted pt to be unresponsive while feeding this AM. Pt had episode unresponsiveness, slumped, in chair yesterday, family noted bedsore BL buttocks, abscess at that time. Pt w/ recent fall 2wks ago (7/14) seen at [facility] VS ED, L humerus fx. Pt previously ambulatory, verbal, progressive decline, family unsure since when exactly not walking / not talking.     Called by ER attending for UR and multiple failed attempts at Milner placement. Pt seen laying in bed, nonverbal, calm, in no apparent distress.     Review of Systems:  Review of Systems: pt nonverbal,    PAST MEDICAL HISTORY:  CVA (cerebral infarction)  Vision changes  Urinary incontinence  Seizure disorder  Gout  OA (osteoarthritis)  Asthma  Diabetes  Hypertension      PAST SURGICAL HISTORY:  Kidney stone    Social Hx: Lives with aide, no smoking or alcohol    MEDICATIONS:  ALBUTerol    0.083% 2.5 milliGRAM(s) Nebulizer every 6 hours  ALBUTerol    90 MICROgram(s) HFA Inhaler 3 Puff(s) Inhalation every 4 hours  amLODIPine   Tablet 10 milliGRAM(s) Oral daily  aspirin enteric coated 81 milliGRAM(s) Oral daily  cloNIDine 0.1 milliGRAM(s) Oral two times a day  clopidogrel Tablet 75 milliGRAM(s) Oral daily  hydrALAZINE 25 milliGRAM(s) Oral three times a day  levETIRAcetam 750 milliGRAM(s) Oral two times a day  metoprolol tartrate 50 milliGRAM(s) Oral two times a day  simvastatin 20 milliGRAM(s) Oral at bedtime  traZODone 50 milliGRAM(s) Oral at bedtime      ALLERGIES:  No Known Allergies      VITALS & I/Os:  Vital Signs Last 24 Hrs  T(C): 36.9 (28 Jul 2020 17:28), Max: 37.1 (28 Jul 2020 10:52)  T(F): 98.5 (28 Jul 2020 17:28), Max: 98.7 (28 Jul 2020 10:52)  HR: 71 (28 Jul 2020 17:28) (71 - 78)  BP: 137/59 (28 Jul 2020 17:28) (119/46 - 150/70)  BP(mean): --  RR: 16 (28 Jul 2020 17:28) (16 - 20)  SpO2: 99% (28 Jul 2020 17:28) (99% - 100%)    I&O's Summary    28 Jul 2020 07:01  -  28 Jul 2020 18:36  --------------------------------------------------------  IN: 0 mL / OUT: 1700 mL / NET: -1700 mL      GENERAL: NAD , nonverbal  HEAD:  Atraumatic, Normocephalic  EYES: EOMI, PERRLA, conjunctiva and sclera clear  ENMT: No tonsillar erythema, exudates, or enlargement; Moist mucous membranes, Good dentition, No lesions  NECK: Supple, No JVD, Normal thyroid  NERVOUS SYSTEM:  Awake,  Motor Strength 4/5 B/L upper and lower extremities  CHEST/LUNG: CTAB, nonlabored breathing  HEART: Regular rate and rhythm; No murmurs, rubs, or gallops  ABDOMEN: Soft, Bowel sounds present  : bladder distended, palpable to umbilicus, 14f milner cath placed, 1750cc dark yellow urine return, pt tolerated well  EXTREMITIES:  2+ Peripheral Pulses, No clubbing, cyanosis, or edema  LYMPH: No lymphadenopathy   SKIN: No rashes or lesions    LABS:                        10.7   21.04 )-----------( 247      ( 28 Jul 2020 12:18 )             33.2     07-28    138  |  107  |  128<H>  ----------------------------<  427<H>  6.4<HH>   |  16<L>  |  8.60<H>    Ca    8.6      28 Jul 2020 12:18    TPro  7.8  /  Alb  2.9<L>  /  TBili  0.6  /  DBili  x   /  AST  14<L>  /  ALT  14  /  AlkPhos  96  07-28    Lactate: Lactate, Blood: 1.6 mmol/L (07-28 @ 12:18)     PT/INR - ( 28 Jul 2020 12:18 )   PT: 14.3 sec;   INR: 1.30 ratio         PTT - ( 28 Jul 2020 12:18 )  PTT:23.9 sec    CARDIAC MARKERS ( 28 Jul 2020 12:18 )  <.015 ng/mL / x     / x     / x     / x          IMAGING:  < from: US Renal (07.28.20 @ 16:20) >  FINDINGS:    Thereis bilateral increased renal cortical echogenicity    Right kidney:  11.7 cm. Moderate hydronephrosis. Renal cysts measuring up to 2.9 cm in the interpolar region    Left kidney:  11.6  cm. Moderate hydronephrosis. Large shadowing structure in the left renal pelvis measuring up to 3.1 cm.    Urinary bladder: Markedly distended with a prevoid volume of 2172 cc. Patient was unable to void.    IMPRESSION:    Increased renal cortical echogenicity compatible with medical renal disease.    Moderate bilateral hydronephrosis with suspected 3.1 cm shadowing calculus in the left renal pelvis. Consider CT for further evaluation.    Marked distention of the urinary bladder. The patient was unable to void.

## 2020-07-28 NOTE — ED ADULT NURSE REASSESSMENT NOTE - NS ED NURSE REASSESS COMMENT FT1
QUINTEN Santana attempted to [facility] patient unsuccessful. Dr Brittney MÉNDEZ made aware. stating no more attempts are needed at this time. pt is in renal failure as per Dr. Brittney MÉNDEZ made aware

## 2020-07-28 NOTE — H&P ADULT - ASSESSMENT
69 years old female with unresponsive found too have acute kidney injury and hydro       IMPROVE VTE Individual Risk Assessment          RISK                                                          Points  [  ] Previous VTE                                                3  [  ] Thrombophilia                                             2  [  ] Lower limb paralysis                                   2        (unable to hold up >15 seconds)    [  ] Current Cancer                                             2         (within 6 months)  [ x ] Immobilization > 24 hrs                              1  [  ] ICU/CCU stay > 24 hours                             1  [ x ] Age > 60                                                         1    IMPROVE VTE Score: 2

## 2020-07-28 NOTE — ED PROVIDER NOTE - NS ED ROS FT
+ decline in functioning, + pressure ulcer / abscess BL buttocks, unresponsive episodes  - F/C, vomiting, diarrhea, SOB, CP

## 2020-07-28 NOTE — ED PROVIDER NOTE - CLINICAL SUMMARY MEDICAL DECISION MAKING FREE TEXT BOX
admit to medicine 68yo F w/ complex PMH pw unresponsive episode. AFVSS. On exam, non verbal, awake, looks to voice, no LE edema, no resp distress, lower abd firm, distended. Obtain lactate, CBC, CMP, UA/UC, CXR, BC to r/o infectious process. CT brain r/o ICH, CVA. Obtain trop, EKG to r/o ACS. CT brain w/o acute pathology. CXR, EKG, trop unremarkable. WBC > 21, lactate WNL. BUN/Cr 128/8.6 -> AMADOU. K+ 6.4, glucose > 400. Tx for HyperK+ initiated (insulin, albuterol). Renal US shows B/L moderate hydro, marked urinary bladder distension. ED team tried x 3 w/o success to place Mayberry. Uro consulted, placed Mayberry successfully. UA w/ evidence of infection. Pt admitted to medicine (Dr Robledo). Plan d/w pt family, they understand and agree w/ this plan.

## 2020-07-28 NOTE — ED PROVIDER NOTE - PHYSICAL EXAMINATION
GEN: Awake, somulent but responds to voice.  HEAD AND NECK: NC/AT. Airway patent. Neck supple.   EYES:  Clear b/l. EOMI. PERRL.   ENT: Moist mucus membranes.   CARDIAC: Regular rate, regular rhythm. No evident pedal edema.    RESP/CHEST: Normal respiratory effort with no use of accessory muscles or retractions. Clear throughout on auscultation.  ABD: Lower abd firm, distended, non-tender. No rebound, no guarding.   BACK: No midline spinal TTP. No CVAT.   EXTREMITIES: Moving all extremities with no apparent deformities.   SKIN: Warm, dry, normal color. + pressure ulcer BL buttocks.   NEURO: No focal deficits.

## 2020-07-28 NOTE — H&P ADULT - HISTORY OF PRESENT ILLNESS
68yo F w/ PMH HTN, DM, CVA, seizure disorder BIBA d/t found unresponsive by family at home this AM. BS was reportedly high. VSS. Pt awoke on ED arrival, looks to voice, non verbal.     	Family - Pt lives w/ aide, aide noted pt to be unresponsive while feeding this AM. Pt had episode unresponsiveness, slumped, in chair yesterday, family noted bedsore BL buttocks, abscess at that time. Pt w/ recent fall 2wks ago (7/14) seen at [facility] VS ED, L humerus fx. Pt previously ambulatory, verbal, progressive decline, family unsure since when exactly not walking / not talking. 68yo F w/ PMH HTN, DM, CVA, seizure disorder BIBA d/t found unresponsive by family at home this AM. BS was reportedly high. VSS. Pt awoke on ED arrival, looks to voice, non verbal. 	Family - Pt lives w/ aide, aide noted pt to be unresponsive while feeding this AM. Pt had episode unresponsiveness, slumped, in chair yesterday, family noted bedsore BL buttocks, abscess at that time. Pt w/ recent fall 2wks ago (7/14) seen at [facility] VS ED, L humerus fx. Pt previously ambulatory, verbal, progressive decline, family unsure since when exactly not walking / not talking.       after a milner was placed 1750 ccof urine came out

## 2020-07-28 NOTE — CONSULT NOTE ADULT - SUBJECTIVE AND OBJECTIVE BOX
Patient chart reviewed.   AMADOU hyperkalemia, appears to be post renal, bladder outlet obstruction, discussed with Dr. Robledo  Mayberry drainage, medical rx K     Stat basic panel    Full consult to follow.     Thank you for the courtesy of this consultation. [facility] NEPHROLOGY [facility], [facility]  NEPHROLOGY AND HYPERTENSION  [address]  [address]  [place] [address]  [phone]    MD YECENIA CRAVEN MD ANDREY GONCHARUK, MD MADHU KORRAPATI, MD YELENA ROSENBERG, MD BINNY KOSHY, MD CHRISTOPHER CAPUTO, MD EDWARD BOVER, MD      Information from chart:  "Patient is a 69y old  Female who presents with a chief complaint of retention (2020 09:38)    HPI:  70yo F w/ PMH HTN, DM, CVA, seizure disorder BIBA d/t found unresponsive by family at home this AM. BS was reportedly high. VSS. Pt awoke on ED arrival, looks to voice, non verbal. 	Family - Pt lives w/ aide, aide noted pt to be unresponsive while feeding this AM. Pt had episode unresponsiveness, slumped, in chair yesterday, family noted bedsore BL buttocks, abscess at that time. Pt w/ recent fall 2wks ago () seen at [facility] VS ED, L humerus fx. Pt previously ambulatory, verbal, progressive decline, family unsure since when exactly not walking / not talking.       after a milner was placed 1750 ccof urine came out (2020 17:40)   "    PAST MEDICAL & SURGICAL HISTORY:  CVA (cerebral infarction): right side weakness  Vision changes: lt eye  Urinary incontinence  Seizure disorder  Gout  OA (osteoarthritis): bautista knees, low back  and  bautista shoulders  Asthma  Diabetes  Hypertension  Kidney stone    FAMILY HISTORY:  No pertinent family history in first degree relatives    Allergies    No Known Allergies    Intolerances      Home Medications:  amLODIPine 10 mg oral tablet: 1 tab(s) orally once a day (15 Jul 2020 22:01)  aspirin 81 mg oral delayed release tablet: 1 tab(s) orally once a day (15 Jul 2020 22:01)  cloNIDine 0.1 mg oral tablet: 1 tab(s) orally 2 times a day (15 Jul 2020 22:01)  clopidogrel 75 mg oral tablet: 1 tab(s) orally once a day (15 Jul 2020 22:01)  hydrALAZINE 25 mg oral tablet: 3 tab(s) orally 3 times a day (15 Jul 2020 22:01)  levETIRAcetam 750 mg oral tablet: 1 tab(s) orally 2 times a day (15 Jul 2020 22:01)  metoprolol tartrate 50 mg oral tablet: 1 tab(s) orally 2 times a day (2020 13:11)  traZODone 50 mg oral tablet: 50 milligram(s) orally once a day (at bedtime) (2020 20:21)  Zocor 20 mg oral tablet: 1 tab(s) orally once a day (at bedtime) (2020 20:21)    MEDICATIONS  (STANDING):  ALBUTerol    0.083% 2.5 milliGRAM(s) Nebulizer every 6 hours  ALBUTerol    90 MICROgram(s) HFA Inhaler 3 Puff(s) Inhalation every 4 hours  amLODIPine   Tablet 10 milliGRAM(s) Oral daily  aspirin enteric coated 81 milliGRAM(s) Oral daily  cefTRIAXone   IVPB 1000 milliGRAM(s) IV Intermittent every 24 hours  cloNIDine Patch 0.1 mG/24Hr(s) 1 patch Transdermal every 7 days  clopidogrel Tablet 75 milliGRAM(s) Oral daily  dextrose 5%. 1000 milliLiter(s) (50 mL/Hr) IV Continuous <Continuous>  dextrose 50% Injectable 12.5 Gram(s) IV Push once  dextrose 50% Injectable 25 Gram(s) IV Push once  dextrose 50% Injectable 25 Gram(s) IV Push once  heparin   Injectable 5000 Unit(s) SubCutaneous every 12 hours  hydrALAZINE 25 milliGRAM(s) Oral three times a day  insulin lispro (HumaLOG) corrective regimen sliding scale   SubCutaneous three times a day before meals  insulin lispro (HumaLOG) corrective regimen sliding scale   SubCutaneous at bedtime  levETIRAcetam  IVPB 750 milliGRAM(s) IV Intermittent every 12 hours  metoprolol tartrate Injectable 5 milliGRAM(s) IV Push every 6 hours  simvastatin 20 milliGRAM(s) Oral at bedtime  sodium chloride 0.45%. 1000 milliLiter(s) (100 mL/Hr) IV Continuous <Continuous>  traZODone 50 milliGRAM(s) Oral at bedtime    MEDICATIONS  (PRN):  dextrose 40% Gel 15 Gram(s) Oral once PRN Blood Glucose LESS THAN 70 milliGRAM(s)/deciliter  glucagon  Injectable 1 milliGRAM(s) IntraMuscular once PRN Glucose LESS THAN 70 milligrams/deciliter  haloperidol    Injectable 1 milliGRAM(s) IV Push three times a day PRN agitation    Vital Signs Last 24 Hrs  T(C): 37.8 (2020 16:50), Max: 37.8 (2020 16:50)  T(F): 100.1 (2020 16:50), Max: 100.1 (2020 16:50)  HR: 90 (2020 17:17) (62 - 103)  BP: 106/52 (2020 16:50) (106/52 - 152/79)  BP(mean): --  RR: 18 (2020 16:50) (16 - 18)  SpO2: 97% (2020 17:17) (95% - 98%)    Daily     Daily Weight in k.7 (2020 04:43)    20 @ 07:01  -  20 @ 07:00  --------------------------------------------------------  IN: 0 mL / OUT: 3000 mL / NET: -3000 mL    20 @ 07:01  -  20 @ 21:24  --------------------------------------------------------  IN: 125 mL / OUT: 1000 mL / NET: -875 mL      CAPILLARY BLOOD GLUCOSE      POCT Blood Glucose.: 275 mg/dL (2020 15:37)  POCT Blood Glucose.: 242 mg/dL (2020 11:29)  POCT Blood Glucose.: 354 mg/dL (2020 06:23)  POCT Blood Glucose.: 404 mg/dL (2020 22:02)    PHYSICAL EXAM:      T(C): 37.8 (20 @ 16:50), Max: 37.8 (20 @ 16:50)  HR: 90 (20 @ 17:17) (62 - 103)  BP: 106/52 (20 @ 16:50) (106/52 - 152/79)  RR: 18 (20 @ 16:50) (16 - 18)  SpO2: 97% (20 @ 17:17) (95% - 98%)  Wt(kg): --  Lungs clear  Heart S1S2  Abd soft NT ND  Extremities:   tr edema                  152<H>  |  116<H>  |  69<H>  ----------------------------<  223<H>  3.6   |  31  |  2.94<H>    Ca    8.5      2020 10:41    TPro  7.8  /  Alb  2.9<L>  /  TBili  0.6  /  DBili  x   /  AST  14<L>  /  ALT  14  /  AlkPhos  96                            9.8    12.45 )-----------( 233      ( 2020 09:30 )             29.4     Creatinine Trend: 2.94<--, 7.41<--, 8.60<--  Urinalysis Basic - ( 2020 18:54 )    Color: Yellow / Appearance: Clear / S.015 / pH: x  Gluc: x / Ketone: Negative  / Bili: Negative / Urobili: Negative mg/dL   Blood: x / Protein: 100 mg/dL / Nitrite: Negative   Leuk Esterase: Moderate / RBC: 3-5 /HPF / WBC 26-50   Sq Epi: x / Non Sq Epi: Moderate / Bacteria: Moderate        Assessment  AMADOU hyperkalemia, appears to be post renal, bladder outlet obstruction, discussed with Dr. Venkat Milner drainage, medical rx K     Plan  Stat basic panel        Thank you for the courtesy of this consultation.

## 2020-07-29 ENCOUNTER — APPOINTMENT (OUTPATIENT)
Dept: ORTHOPEDIC SURGERY | Facility: CLINIC | Age: 70
End: 2020-07-29

## 2020-07-29 DIAGNOSIS — Z29.9 ENCOUNTER FOR PROPHYLACTIC MEASURES, UNSPECIFIED: ICD-10-CM

## 2020-07-29 DIAGNOSIS — S42.309A UNSPECIFIED FRACTURE OF SHAFT OF HUMERUS, UNSPECIFIED ARM, INITIAL ENCOUNTER FOR CLOSED FRACTURE: ICD-10-CM

## 2020-07-29 LAB
-  COAGULASE NEGATIVE STAPHYLOCOCCUS: SIGNIFICANT CHANGE UP
A1C WITH ESTIMATED AVERAGE GLUCOSE RESULT: 7.7 % — HIGH (ref 4–5.6)
ANION GAP SERPL CALC-SCNC: 5 MMOL/L — SIGNIFICANT CHANGE UP (ref 5–17)
BUN SERPL-MCNC: 69 MG/DL — HIGH (ref 7–23)
CALCIUM SERPL-MCNC: 8.5 MG/DL — SIGNIFICANT CHANGE UP (ref 8.5–10.1)
CHLORIDE SERPL-SCNC: 116 MMOL/L — HIGH (ref 96–108)
CO2 SERPL-SCNC: 31 MMOL/L — SIGNIFICANT CHANGE UP (ref 22–31)
CREAT SERPL-MCNC: 2.94 MG/DL — HIGH (ref 0.5–1.3)
ESTIMATED AVERAGE GLUCOSE: 174 MG/DL — HIGH (ref 68–114)
GLUCOSE BLDC GLUCOMTR-MCNC: 242 MG/DL — HIGH (ref 70–99)
GLUCOSE BLDC GLUCOMTR-MCNC: 252 MG/DL — HIGH (ref 70–99)
GLUCOSE BLDC GLUCOMTR-MCNC: 275 MG/DL — HIGH (ref 70–99)
GLUCOSE BLDC GLUCOMTR-MCNC: 354 MG/DL — HIGH (ref 70–99)
GLUCOSE SERPL-MCNC: 223 MG/DL — HIGH (ref 70–99)
HCT VFR BLD CALC: 29.4 % — LOW (ref 34.5–45)
HGB BLD-MCNC: 9.8 G/DL — LOW (ref 11.5–15.5)
MCHC RBC-ENTMCNC: 29.1 PG — SIGNIFICANT CHANGE UP (ref 27–34)
MCHC RBC-ENTMCNC: 33.3 GM/DL — SIGNIFICANT CHANGE UP (ref 32–36)
MCV RBC AUTO: 87.2 FL — SIGNIFICANT CHANGE UP (ref 80–100)
METHOD TYPE: SIGNIFICANT CHANGE UP
NRBC # BLD: 0 /100 WBCS — SIGNIFICANT CHANGE UP (ref 0–0)
PLATELET # BLD AUTO: 233 K/UL — SIGNIFICANT CHANGE UP (ref 150–400)
POTASSIUM SERPL-MCNC: 3.6 MMOL/L — SIGNIFICANT CHANGE UP (ref 3.5–5.3)
POTASSIUM SERPL-SCNC: 3.6 MMOL/L — SIGNIFICANT CHANGE UP (ref 3.5–5.3)
RBC # BLD: 3.37 M/UL — LOW (ref 3.8–5.2)
RBC # FLD: 13 % — SIGNIFICANT CHANGE UP (ref 10.3–14.5)
SARS-COV-2 IGG SERPL QL IA: NEGATIVE — SIGNIFICANT CHANGE UP
SARS-COV-2 IGM SERPL IA-ACNC: 0.09 INDEX — SIGNIFICANT CHANGE UP
SODIUM SERPL-SCNC: 152 MMOL/L — HIGH (ref 135–145)
WBC # BLD: 12.45 K/UL — HIGH (ref 3.8–10.5)
WBC # FLD AUTO: 12.45 K/UL — HIGH (ref 3.8–10.5)

## 2020-07-29 PROCEDURE — 99232 SBSQ HOSP IP/OBS MODERATE 35: CPT

## 2020-07-29 PROCEDURE — 99233 SBSQ HOSP IP/OBS HIGH 50: CPT

## 2020-07-29 RX ORDER — HALOPERIDOL DECANOATE 100 MG/ML
1 INJECTION INTRAMUSCULAR THREE TIMES A DAY
Refills: 0 | Status: DISCONTINUED | OUTPATIENT
Start: 2020-07-29 | End: 2020-08-10

## 2020-07-29 RX ORDER — HEPARIN SODIUM 5000 [USP'U]/ML
5000 INJECTION INTRAVENOUS; SUBCUTANEOUS EVERY 12 HOURS
Refills: 0 | Status: DISCONTINUED | OUTPATIENT
Start: 2020-07-29 | End: 2020-08-10

## 2020-07-29 RX ORDER — ACETAMINOPHEN 500 MG
650 TABLET ORAL EVERY 6 HOURS
Refills: 0 | Status: DISCONTINUED | OUTPATIENT
Start: 2020-07-29 | End: 2020-08-10

## 2020-07-29 RX ORDER — VANCOMYCIN HCL 1 G
500 VIAL (EA) INTRAVENOUS ONCE
Refills: 0 | Status: COMPLETED | OUTPATIENT
Start: 2020-07-29 | End: 2020-07-29

## 2020-07-29 RX ORDER — SODIUM CHLORIDE 9 MG/ML
1000 INJECTION, SOLUTION INTRAVENOUS
Refills: 0 | Status: DISCONTINUED | OUTPATIENT
Start: 2020-07-29 | End: 2020-08-06

## 2020-07-29 RX ADMIN — LEVETIRACETAM 400 MILLIGRAM(S): 250 TABLET, FILM COATED ORAL at 18:01

## 2020-07-29 RX ADMIN — CEFTRIAXONE 100 MILLIGRAM(S): 500 INJECTION, POWDER, FOR SOLUTION INTRAMUSCULAR; INTRAVENOUS at 18:04

## 2020-07-29 RX ADMIN — Medication 5 MILLIGRAM(S): at 23:34

## 2020-07-29 RX ADMIN — Medication 2: at 22:38

## 2020-07-29 RX ADMIN — Medication 100 MILLIGRAM(S): at 18:01

## 2020-07-29 RX ADMIN — HEPARIN SODIUM 5000 UNIT(S): 5000 INJECTION INTRAVENOUS; SUBCUTANEOUS at 17:23

## 2020-07-29 RX ADMIN — ALBUTEROL 2.5 MILLIGRAM(S): 90 AEROSOL, METERED ORAL at 05:40

## 2020-07-29 RX ADMIN — Medication 25 MILLIGRAM(S): at 13:59

## 2020-07-29 RX ADMIN — ALBUTEROL 2.5 MILLIGRAM(S): 90 AEROSOL, METERED ORAL at 00:13

## 2020-07-29 RX ADMIN — Medication 4: at 12:17

## 2020-07-29 RX ADMIN — Medication 50 MILLIGRAM(S): at 22:37

## 2020-07-29 RX ADMIN — Medication 5 MILLIGRAM(S): at 06:18

## 2020-07-29 RX ADMIN — Medication 6: at 16:15

## 2020-07-29 RX ADMIN — LEVETIRACETAM 400 MILLIGRAM(S): 250 TABLET, FILM COATED ORAL at 06:18

## 2020-07-29 RX ADMIN — ALBUTEROL 2.5 MILLIGRAM(S): 90 AEROSOL, METERED ORAL at 17:00

## 2020-07-29 RX ADMIN — SODIUM CHLORIDE 100 MILLILITER(S): 9 INJECTION, SOLUTION INTRAVENOUS at 22:37

## 2020-07-29 RX ADMIN — Medication 650 MILLIGRAM(S): at 23:33

## 2020-07-29 RX ADMIN — ALBUTEROL 2.5 MILLIGRAM(S): 90 AEROSOL, METERED ORAL at 11:27

## 2020-07-29 RX ADMIN — Medication 81 MILLIGRAM(S): at 12:19

## 2020-07-29 RX ADMIN — Medication 25 MILLIGRAM(S): at 22:37

## 2020-07-29 RX ADMIN — Medication 1 PATCH: at 19:40

## 2020-07-29 RX ADMIN — Medication 650 MILLIGRAM(S): at 23:34

## 2020-07-29 RX ADMIN — Medication 1 PATCH: at 01:46

## 2020-07-29 RX ADMIN — SIMVASTATIN 20 MILLIGRAM(S): 20 TABLET, FILM COATED ORAL at 22:37

## 2020-07-29 RX ADMIN — Medication 10: at 06:25

## 2020-07-29 RX ADMIN — Medication 5 MILLIGRAM(S): at 01:26

## 2020-07-29 RX ADMIN — CLOPIDOGREL BISULFATE 75 MILLIGRAM(S): 75 TABLET, FILM COATED ORAL at 12:20

## 2020-07-29 RX ADMIN — Medication 5 MILLIGRAM(S): at 12:23

## 2020-07-29 NOTE — PROGRESS NOTE ADULT - SUBJECTIVE AND OBJECTIVE BOX
Patient is a 69y old  Female who presents with a chief complaint of retention (2020 09:38), she is in NAD.       MEDICATIONS  (STANDING):  ALBUTerol    0.083% 2.5 milliGRAM(s) Nebulizer every 6 hours  ALBUTerol    90 MICROgram(s) HFA Inhaler 3 Puff(s) Inhalation every 4 hours  amLODIPine   Tablet 10 milliGRAM(s) Oral daily  aspirin enteric coated 81 milliGRAM(s) Oral daily  cefTRIAXone   IVPB 1000 milliGRAM(s) IV Intermittent every 24 hours  cloNIDine Patch 0.1 mG/24Hr(s) 1 patch Transdermal every 7 days  clopidogrel Tablet 75 milliGRAM(s) Oral daily  dextrose 5%. 1000 milliLiter(s) (50 mL/Hr) IV Continuous <Continuous>  dextrose 50% Injectable 12.5 Gram(s) IV Push once  dextrose 50% Injectable 25 Gram(s) IV Push once  dextrose 50% Injectable 25 Gram(s) IV Push once  hydrALAZINE 25 milliGRAM(s) Oral three times a day  insulin lispro (HumaLOG) corrective regimen sliding scale   SubCutaneous three times a day before meals  insulin lispro (HumaLOG) corrective regimen sliding scale   SubCutaneous at bedtime  levETIRAcetam  IVPB 750 milliGRAM(s) IV Intermittent every 12 hours  metoprolol tartrate Injectable 5 milliGRAM(s) IV Push every 6 hours  simvastatin 20 milliGRAM(s) Oral at bedtime  sodium chloride 0.45%. 1000 milliLiter(s) (100 mL/Hr) IV Continuous <Continuous>  traZODone 50 milliGRAM(s) Oral at bedtime    MEDICATIONS  (PRN):  dextrose 40% Gel 15 Gram(s) Oral once PRN Blood Glucose LESS THAN 70 milliGRAM(s)/deciliter  glucagon  Injectable 1 milliGRAM(s) IntraMuscular once PRN Glucose LESS THAN 70 milligrams/deciliter  LORazepam    IVPB 0.5 milliGRAM(s) IV Intermittent three times a day PRN Agitation        REVIEW OF SYSTEMS: unable to obtain ROS d/t AMS.     Vital Signs Last 24 Hrs  T(C): 37.4 (2020 04:43), Max: 37.7 (2020 19:50)  T(F): 99.4 (2020 04:43), Max: 99.9 (2020 19:50)  HR: 78 (2020 05:40) (71 - 94)  BP: 152/79 (2020 04:43) (119/46 - 153/69)  BP(mean): --  RR: 18 (2020 04:43) (16 - 19)  SpO2: 97% (2020 05:40) (96% - 100%)    PHYSICAL EXAM:  GENERAL: NAD, w well-developed  HEAD:  Atraumatic, Normocephalic  EYES: EOMI, PERRLA, conjunctiva and sclera clear  ENMT:   Moist mucous membranes   NECK: Supple, No JVD   NERVOUS SYSTEM:  Alert & awake  CHEST/LUNG: Clear to auscultation  bilaterally; No rales, rhonchi, wheezing, or rubs  HEART: Regular rate and rhythm; No murmurs, rubs, or gallops  ABDOMEN: Soft, Nontender, Nondistended; Bowel sounds present  EXTREMITIES:  2+ Peripheral Pulses, No clubbing, cyanosis, or edema, recent left humerus fx  LYMPH: No lymphadenopathy noted  SKIN: No rashes or lesions    LABS:                        9.8    12.45 )-----------( 233      ( 2020 09:30 )             29.4     07-29    152<H>  |  116<H>  |  69<H>  ----------------------------<  223<H>  3.6   |  31  |  2.94<H>    Ca    8.5      2020 10:41    TPro  7.8  /  Alb  2.9<L>  /  TBili  0.6  /  DBili  x   /  AST  14<L>  /  ALT  14  /  AlkPhos  96  07-28    PT/INR - ( 2020 12:18 )   PT: 14.3 sec;   INR: 1.30 ratio         PTT - ( 2020 12:18 )  PTT:23.9 sec   cardiac markers Troponin <.015  CK --    Urinalysis Basic - ( 2020 18:54 )    Color: Yellow / Appearance: Clear / S.015 / pH: x  Gluc: x / Ketone: Negative  / Bili: Negative / Urobili: Negative mg/dL   Blood: x / Protein: 100 mg/dL / Nitrite: Negative   Leuk Esterase: Moderate / RBC: 3-5 /HPF / WBC 26-50   Sq Epi: x / Non Sq Epi: Moderate / Bacteria: Moderate      CAPILLARY BLOOD GLUCOSE      POCT Blood Glucose.: 354 mg/dL (2020 06:23)  POCT Blood Glucose.: 404 mg/dL (2020 22:02)  POCT Blood Glucose.: 372 mg/dL (2020 17:45)  POCT Blood Glucose.: 436 mg/dL (2020 13:47)  POCT Blood Glucose.: 458 mg/dL (2020 13:46)    Cultures    RADIOLOGY & ADDITIONAL TESTS:    Imaging Personally Reviewed:  [ ] YES  [ ] NO    Consultant(s) Notes Reviewed:  [ x ] YES  [ ] NO    Care Discussed with Consultants/Other Providers [ ] YES  [ ] NO

## 2020-07-29 NOTE — SWALLOW BEDSIDE ASSESSMENT ADULT - ORAL PREPARATORY PHASE
Reduced oral grading/fair oral opening Refuses to accept bolus into oral cavity/Lateral loss of bolus Reduced oral grading/Refuses to accept bolus into oral cavity

## 2020-07-29 NOTE — SWALLOW BEDSIDE ASSESSMENT ADULT - COMMENTS
CXR7/28/2020 IMPRESSION:No acute infiltrate.Reidentified left humeral neck fracture.    CThead no contrast 7/28/2020IMPRESSION: No change since 7/15/2020. Moderate atrophy. Old right frontal cortical infarct. Small vessel white matter ischemic changes. No acute hemorrhage.

## 2020-07-29 NOTE — SWALLOW BEDSIDE ASSESSMENT ADULT - ORAL PHASE
Delayed oral transit time/increased oral transit time and munching inconsistent and mild lateral residue in the right sulcus, and loss/Delayed oral transit time/Stasis in lateral sulci Delayed oral transit time

## 2020-07-29 NOTE — PROGRESS NOTE ADULT - ASSESSMENT
68 yo  with marked hydro ad retnetion  creatinine is coming down  coontinue to observe  chronic milner likely needed

## 2020-07-29 NOTE — PROGRESS NOTE ADULT - SUBJECTIVE AND OBJECTIVE BOX
The patient is currently afeb-non verbal    Vital Signs Last 24 Hrs  T(C): 37.4 (2020 04:43), Max: 37.7 (2020 19:50)  T(F): 99.4 (2020 04:43), Max: 99.9 (2020 19:50)  HR: 78 (2020 05:40) (71 - 94)  BP: 152/79 (2020 04:43) (119/46 - 153/69)  BP(mean): --  RR: 18 (2020 04:43) (16 - 20)  SpO2: 97% (2020 05:40) (96% - 100%)    PHYSICAL EXAM:    non verbal  disheveled  Urine: clearing    I&O's Summary    2020 07:01  -  2020 07:00  --------------------------------------------------------  IN: 0 mL / OUT: 3000 mL / NET: -3000 mL        LABS:                        9.8    12.45 )-----------( 233      ( 2020 09:30 )             29.4     07-28    142  |  110<H>  |  117<H>  ----------------------------<  365<H>  5.4<H>   |  18<L>  |  7.41<H>    Ca    8.5      2020 20:46    TPro  7.8  /  Alb  2.9<L>  /  TBili  0.6  /  DBili  x   /  AST  14<L>  /  ALT  14  /  AlkPhos  96  07-28    Urinalysis Basic - ( 2020 18:54 )    Color: Yellow / Appearance: Clear / S.015 / pH: x  Gluc: x / Ketone: Negative  / Bili: Negative / Urobili: Negative mg/dL   Blood: x / Protein: 100 mg/dL / Nitrite: Negative   Leuk Esterase: Moderate / RBC: 3-5 /HPF / WBC 26-50   Sq Epi: x / Non Sq Epi: Moderate / Bacteria: Moderate      Urine Culture: pending    Prior notes/chart reviewed.

## 2020-07-29 NOTE — SWALLOW BEDSIDE ASSESSMENT ADULT - MODE OF PRESENTATION
fed by clinician/spoon spoon/fed by clinician/straw/intermittent ability to draw liquid into straw fed by clinician/spoon/straw/intermittent ability to draw liquid into straw

## 2020-07-30 DIAGNOSIS — E87.0 HYPEROSMOLALITY AND HYPERNATREMIA: ICD-10-CM

## 2020-07-30 DIAGNOSIS — G93.41 METABOLIC ENCEPHALOPATHY: ICD-10-CM

## 2020-07-30 DIAGNOSIS — R78.81 BACTEREMIA: ICD-10-CM

## 2020-07-30 LAB
ANION GAP SERPL CALC-SCNC: 6 MMOL/L — SIGNIFICANT CHANGE UP (ref 5–17)
BUN SERPL-MCNC: 31 MG/DL — HIGH (ref 7–23)
CALCIUM SERPL-MCNC: 7.8 MG/DL — LOW (ref 8.5–10.1)
CHLORIDE SERPL-SCNC: 117 MMOL/L — HIGH (ref 96–108)
CO2 SERPL-SCNC: 27 MMOL/L — SIGNIFICANT CHANGE UP (ref 22–31)
CREAT SERPL-MCNC: 1.25 MG/DL — SIGNIFICANT CHANGE UP (ref 0.5–1.3)
CULTURE RESULTS: NO GROWTH — SIGNIFICANT CHANGE UP
CULTURE RESULTS: SIGNIFICANT CHANGE UP
GLUCOSE BLDC GLUCOMTR-MCNC: 179 MG/DL — HIGH (ref 70–99)
GLUCOSE BLDC GLUCOMTR-MCNC: 195 MG/DL — HIGH (ref 70–99)
GLUCOSE BLDC GLUCOMTR-MCNC: 256 MG/DL — HIGH (ref 70–99)
GLUCOSE BLDC GLUCOMTR-MCNC: 300 MG/DL — HIGH (ref 70–99)
GLUCOSE SERPL-MCNC: 233 MG/DL — HIGH (ref 70–99)
GRAM STN FLD: SIGNIFICANT CHANGE UP
HCT VFR BLD CALC: 27.7 % — LOW (ref 34.5–45)
HGB BLD-MCNC: 8.9 G/DL — LOW (ref 11.5–15.5)
LEVETIRACETAM SERPL-MCNC: 54.1 MCG/ML — HIGH (ref 12–46)
MCHC RBC-ENTMCNC: 28.4 PG — SIGNIFICANT CHANGE UP (ref 27–34)
MCHC RBC-ENTMCNC: 32.1 GM/DL — SIGNIFICANT CHANGE UP (ref 32–36)
MCV RBC AUTO: 88.5 FL — SIGNIFICANT CHANGE UP (ref 80–100)
NRBC # BLD: 0 /100 WBCS — SIGNIFICANT CHANGE UP (ref 0–0)
ORGANISM # SPEC MICROSCOPIC CNT: SIGNIFICANT CHANGE UP
ORGANISM # SPEC MICROSCOPIC CNT: SIGNIFICANT CHANGE UP
PLATELET # BLD AUTO: 232 K/UL — SIGNIFICANT CHANGE UP (ref 150–400)
POTASSIUM SERPL-MCNC: 3.4 MMOL/L — LOW (ref 3.5–5.3)
POTASSIUM SERPL-SCNC: 3.4 MMOL/L — LOW (ref 3.5–5.3)
RBC # BLD: 3.13 M/UL — LOW (ref 3.8–5.2)
RBC # FLD: 12.8 % — SIGNIFICANT CHANGE UP (ref 10.3–14.5)
SODIUM SERPL-SCNC: 150 MMOL/L — HIGH (ref 135–145)
SPECIMEN SOURCE: SIGNIFICANT CHANGE UP
SPECIMEN SOURCE: SIGNIFICANT CHANGE UP
WBC # BLD: 11.59 K/UL — HIGH (ref 3.8–10.5)
WBC # FLD AUTO: 11.59 K/UL — HIGH (ref 3.8–10.5)

## 2020-07-30 PROCEDURE — 99232 SBSQ HOSP IP/OBS MODERATE 35: CPT

## 2020-07-30 RX ORDER — METOPROLOL TARTRATE 50 MG
50 TABLET ORAL
Refills: 0 | Status: DISCONTINUED | OUTPATIENT
Start: 2020-07-30 | End: 2020-08-10

## 2020-07-30 RX ORDER — POTASSIUM CHLORIDE 20 MEQ
10 PACKET (EA) ORAL ONCE
Refills: 0 | Status: COMPLETED | OUTPATIENT
Start: 2020-07-30 | End: 2020-07-30

## 2020-07-30 RX ORDER — LEVETIRACETAM 250 MG/1
500 TABLET, FILM COATED ORAL
Refills: 0 | Status: DISCONTINUED | OUTPATIENT
Start: 2020-07-30 | End: 2020-08-10

## 2020-07-30 RX ORDER — LEVETIRACETAM 250 MG/1
500 TABLET, FILM COATED ORAL
Refills: 0 | Status: DISCONTINUED | OUTPATIENT
Start: 2020-07-30 | End: 2020-07-30

## 2020-07-30 RX ADMIN — SIMVASTATIN 20 MILLIGRAM(S): 20 TABLET, FILM COATED ORAL at 21:37

## 2020-07-30 RX ADMIN — ALBUTEROL 2.5 MILLIGRAM(S): 90 AEROSOL, METERED ORAL at 23:41

## 2020-07-30 RX ADMIN — Medication 25 MILLIGRAM(S): at 06:05

## 2020-07-30 RX ADMIN — Medication 25 MILLIGRAM(S): at 21:37

## 2020-07-30 RX ADMIN — Medication 5 MILLIGRAM(S): at 06:05

## 2020-07-30 RX ADMIN — Medication 50 MILLIGRAM(S): at 18:10

## 2020-07-30 RX ADMIN — ALBUTEROL 2.5 MILLIGRAM(S): 90 AEROSOL, METERED ORAL at 11:02

## 2020-07-30 RX ADMIN — Medication 1 PATCH: at 07:55

## 2020-07-30 RX ADMIN — Medication 50 MILLIGRAM(S): at 21:37

## 2020-07-30 RX ADMIN — Medication 6: at 11:43

## 2020-07-30 RX ADMIN — ALBUTEROL 2.5 MILLIGRAM(S): 90 AEROSOL, METERED ORAL at 17:18

## 2020-07-30 RX ADMIN — CLOPIDOGREL BISULFATE 75 MILLIGRAM(S): 75 TABLET, FILM COATED ORAL at 11:43

## 2020-07-30 RX ADMIN — Medication 6: at 08:17

## 2020-07-30 RX ADMIN — Medication 100 MILLIEQUIVALENT(S): at 14:03

## 2020-07-30 RX ADMIN — HEPARIN SODIUM 5000 UNIT(S): 5000 INJECTION INTRAVENOUS; SUBCUTANEOUS at 18:10

## 2020-07-30 RX ADMIN — SODIUM CHLORIDE 100 MILLILITER(S): 9 INJECTION, SOLUTION INTRAVENOUS at 06:02

## 2020-07-30 RX ADMIN — ALBUTEROL 2.5 MILLIGRAM(S): 90 AEROSOL, METERED ORAL at 00:00

## 2020-07-30 RX ADMIN — ALBUTEROL 2.5 MILLIGRAM(S): 90 AEROSOL, METERED ORAL at 05:26

## 2020-07-30 RX ADMIN — Medication 5 MILLIGRAM(S): at 11:48

## 2020-07-30 RX ADMIN — CEFTRIAXONE 100 MILLIGRAM(S): 500 INJECTION, POWDER, FOR SOLUTION INTRAMUSCULAR; INTRAVENOUS at 21:36

## 2020-07-30 RX ADMIN — Medication 25 MILLIGRAM(S): at 14:03

## 2020-07-30 RX ADMIN — HEPARIN SODIUM 5000 UNIT(S): 5000 INJECTION INTRAVENOUS; SUBCUTANEOUS at 06:04

## 2020-07-30 RX ADMIN — AMLODIPINE BESYLATE 10 MILLIGRAM(S): 2.5 TABLET ORAL at 06:05

## 2020-07-30 RX ADMIN — Medication 2: at 16:41

## 2020-07-30 RX ADMIN — LEVETIRACETAM 400 MILLIGRAM(S): 250 TABLET, FILM COATED ORAL at 06:02

## 2020-07-30 RX ADMIN — Medication 81 MILLIGRAM(S): at 11:43

## 2020-07-30 RX ADMIN — SODIUM CHLORIDE 100 MILLILITER(S): 9 INJECTION, SOLUTION INTRAVENOUS at 11:43

## 2020-07-30 RX ADMIN — LEVETIRACETAM 500 MILLIGRAM(S): 250 TABLET, FILM COATED ORAL at 18:10

## 2020-07-30 RX ADMIN — Medication 1 PATCH: at 19:45

## 2020-07-30 NOTE — CHART NOTE - NSCHARTNOTEFT_GEN_A_CORE
Upon Nutritional Assessment by the Registered Dietitian your patient was determined to meet criteria / has evidence of the following diagnosis/diagnoses:          [ ]  Mild Protein Calorie Malnutrition        [ ]  Moderate Protein Calorie Malnutrition        [ x] Severe Protein Calorie Malnutrition( acute)        [ ] Unspecified Protein Calorie Malnutrition        [ ] Underweight / BMI <19        [ ] Morbid Obesity / BMI > 40      Findings as based on:  •  Comprehensive nutrition assessment and consultation  •  Calorie counts (nutrient intake analysis)  •  Food acceptance and intake status from observations by staff  •  Follow up  •  Patient education  •  Intervention secondary to interdisciplinary rounds  •   concerns      Treatment:    The following diet has been recommended:  Dysphagia 1 Pureed - Nectar Consistency Fluid , Low sodium , consistent carbohydrate c evening snack , Ensure Pudding 4 oz 3x/day (510 carol, 12 gm pro) , No Carb Prosource 1 pkg daily=60 calories, 15 grams protein per pkg   Recommend multivitamin c minerals daily       PROVIDER Section:     By signing this assessment you are acknowledging and agree with the diagnosis/diagnoses assigned by the Registered Dietitian    Comments:

## 2020-07-30 NOTE — PROGRESS NOTE ADULT - SUBJECTIVE AND OBJECTIVE BOX
Patient is a 69y old  Female who presents with a chief complaint of retention (2020 09:38), in NAD.       MEDICATIONS  (STANDING):  ALBUTerol    0.083% 2.5 milliGRAM(s) Nebulizer every 6 hours  ALBUTerol    90 MICROgram(s) HFA Inhaler 3 Puff(s) Inhalation every 4 hours  amLODIPine   Tablet 10 milliGRAM(s) Oral daily  aspirin enteric coated 81 milliGRAM(s) Oral daily  cefTRIAXone   IVPB 1000 milliGRAM(s) IV Intermittent every 24 hours  cloNIDine Patch 0.1 mG/24Hr(s) 1 patch Transdermal every 7 days  clopidogrel Tablet 75 milliGRAM(s) Oral daily  dextrose 5%. 1000 milliLiter(s) (50 mL/Hr) IV Continuous <Continuous>  dextrose 50% Injectable 12.5 Gram(s) IV Push once  dextrose 50% Injectable 25 Gram(s) IV Push once  dextrose 50% Injectable 25 Gram(s) IV Push once  heparin   Injectable 5000 Unit(s) SubCutaneous every 12 hours  hydrALAZINE 25 milliGRAM(s) Oral three times a day  insulin lispro (HumaLOG) corrective regimen sliding scale   SubCutaneous three times a day before meals  insulin lispro (HumaLOG) corrective regimen sliding scale   SubCutaneous at bedtime  levETIRAcetam  Solution 500 milliGRAM(s) Oral two times a day  metoprolol tartrate 50 milliGRAM(s) Oral two times a day  potassium chloride  10 mEq/100 mL IVPB 10 milliEquivalent(s) IV Intermittent once  simvastatin 20 milliGRAM(s) Oral at bedtime  sodium chloride 0.45%. 1000 milliLiter(s) (100 mL/Hr) IV Continuous <Continuous>  traZODone 50 milliGRAM(s) Oral at bedtime    MEDICATIONS  (PRN):  acetaminophen   Tablet .. 650 milliGRAM(s) Oral every 6 hours PRN Temp greater or equal to 38C (100.4F), Mild Pain (1 - 3)  dextrose 40% Gel 15 Gram(s) Oral once PRN Blood Glucose LESS THAN 70 milliGRAM(s)/deciliter  glucagon  Injectable 1 milliGRAM(s) IntraMuscular once PRN Glucose LESS THAN 70 milligrams/deciliter  haloperidol    Injectable 1 milliGRAM(s) IV Push three times a day PRN agitation    REVIEW OF SYSTEMS: unable to obtain, patient is confused.     Vital Signs Last 24 Hrs  T(C): 37.7 (2020 11:13), Max: 38.2 (2020 22:40)  T(F): 99.9 (2020 11:13), Max: 100.7 (2020 22:40)  HR: 104 (2020 11:13) (62 - 111)  BP: 136/70 (2020 11:13) (106/52 - 150/65)  BP(mean): --  RR: 17 (2020 11:13) (17 - 18)  SpO2: 100% (2020 11:13) (97% - 100%)    PHYSICAL EXAM:  GENERAL: NAD, well-developed  HEAD:  Atraumatic, Normocephalic  EYES:  conjunctiva and sclera clear  ENMT:   Moist mucous membranes   NECK: Supple, No JVD   NERVOUS SYSTEM:  Alert & awake, not following commands.  CHEST/LUNG:  good air entry bilaterally;    HEART: Regular rate and rhythm; No murmurs, rubs, or gallops  ABDOMEN: Soft, Nontender, Nondistended; Bowel sounds present  EXTREMITIES:  2+ Peripheral Pulses, No clubbing, cyanosis, or edema  LYMPH: No lymphadenopathy noted  SKIN: No petechiae  LABS:                        8.9    11.59 )-----------( 232      ( 2020 06:27 )             27.7     07-30    150<H>  |  117<H>  |  31<H>  ----------------------------<  233<H>  3.4<L>   |  27  |  1.25    Ca    7.8<L>      2020 06:27         cardiac markers   Urinalysis Basic - ( 2020 18:54 )    Color: Yellow / Appearance: Clear / S.015 / pH: x  Gluc: x / Ketone: Negative  / Bili: Negative / Urobili: Negative mg/dL   Blood: x / Protein: 100 mg/dL / Nitrite: Negative   Leuk Esterase: Moderate / RBC: 3-5 /HPF / WBC 26-50   Sq Epi: x / Non Sq Epi: Moderate / Bacteria: Moderate      CAPILLARY BLOOD GLUCOSE      POCT Blood Glucose.: 300 mg/dL (2020 11:17)  POCT Blood Glucose.: 256 mg/dL (2020 08:10)  POCT Blood Glucose.: 252 mg/dL (2020 22:25)  POCT Blood Glucose.: 275 mg/dL (2020 15:37)    Cultures    RADIOLOGY & ADDITIONAL TESTS:    Imaging Personally Reviewed:  [ ] YES  [ ] NO    Consultant(s) Notes Reviewed:  [ x ] YES  [ ] NO    Care Discussed with Consultants/Other Providers [ ] YES  [ ] NO

## 2020-07-30 NOTE — DIETITIAN INITIAL EVALUATION ADULT. - OTHER INFO
Pt is non verbal, RD spoke c pt's daughter via phone. Pt c depressed ora intake since the end of June/beginning of July.   Daughter stated that pt had a new aide who was not taking care of pt and feeding her adequately with a balanced diet, lacking protein foods, s/p fall on 07/14/20, development of pressure ulcers & muscle loss PTA.   Pt lived c HHA upstairs of home and pt's sister lived downstairs.  Pt's daughter did the shopping, HHA did some of the cooking & also was getting food from [employer].  Pt was eating solid foods @ home, as per daughter pt was placed on puree diet during previous adm c poor acceptance to it.  Pt c hx of diabetes, was off diabetic meds c A1C% of 6.4%.  Pt's blood sugar was not being monitored regularly PTA. Pt is non verbal, RD spoke c pt's daughter via phone. Pt c depressed ora intake since the end of June/beginning of July c <50-75% usual intake.  Daughter stated that pt had a new aide who was not taking care of pt and feeding her adequately with a balanced diet, lacking protein foods, s/p fall on 07/14/20, development of pressure ulcers & muscle loss PTA.   Pt lived c [facility]HA upstairs of home and pt's sister lived downstairs.  Pt's daughter did the shopping, HHA did some of the cooking & also was getting food from [employer].  Pt was eating solid foods @ home, as per daughter pt was placed on puree diet during previous adm c poor acceptance to it.  Pt c hx of diabetes, was off diabetic meds c A1C% of 6.4%.  Pt's blood sugar was not being monitored regularly PTA.

## 2020-07-30 NOTE — DIETITIAN INITIAL EVALUATION ADULT. - ADD RECOMMEND
Pt arrived to ED via EMS from [facility]. Pt went to [facility] c/o chest pain and shortness of breath. Pt c/o chest pain, especially when she takes a breath that started an hour ago.     Per EMS  156/102   to 90   Endocrine consult, swallow re-evaluation for possible upgrade c improvement in medical condition

## 2020-07-30 NOTE — PROGRESS NOTE ADULT - SUBJECTIVE AND OBJECTIVE BOX
Patient seen and examined bedside resting comfortably.  Pt is nonverbal    T(F): 99.9 (07-30-20 @ 11:13), Max: 100.7 (07-29-20 @ 22:40)  HR: 104 (07-30-20 @ 11:13) (62 - 111)  BP: 136/70 (07-30-20 @ 11:13) (106/52 - 150/65)  RR: 17 (07-30-20 @ 11:13) (17 - 18)  SpO2: 100% (07-30-20 @ 11:13) (97% - 100%)    CAPILLARY BLOOD GLUCOSE  POCT Blood Glucose.: 300 mg/dL (30 Jul 2020 11:17)  POCT Blood Glucose.: 256 mg/dL (30 Jul 2020 08:10)  POCT Blood Glucose.: 252 mg/dL (29 Jul 2020 22:25)  POCT Blood Glucose.: 275 mg/dL (29 Jul 2020 15:37)      PHYSICAL EXAM:  General: NAD  Neuro:  Alert , nonverbal  Abdomen: soft, NTND. Normactive BS  : Mayberry in place. Drained 1800 ml of urine yesterday.      LABS:                        8.9    11.59 )-----------( 232      ( 30 Jul 2020 06:27 )             27.7     07-30    150<H>  |  117<H>  |  31<H>  ----------------------------<  233<H>  3.4<L>   |  27  |  1.25    Ca    7.8<L>      30 Jul 2020 06:27        I&O's Detail    29 Jul 2020 07:01  -  30 Jul 2020 07:00  --------------------------------------------------------  IN:    IV PiggyBack: 300 mL    Oral Fluid: 125 mL    sodium chloride 0.45%.: 1900 mL  Total IN: 2325 mL    OUT:    Indwelling Catheter - Urethral: 1800 mL  Total OUT: 1800 mL    Total NET: 525 mL      30 Jul 2020 07:01  -  30 Jul 2020 13:32  --------------------------------------------------------  IN:    Oral Fluid: 80 mL  Total IN: 80 mL    OUT:  Total OUT: 0 mL    Total NET: 80 mL          Impression:  Urinary retention  AMS  h/o seizure disorder  DM  HTN  gout   left renal stone  BUN/CR trending down      Plan:  -continue VTE prophylaxis   - continue IVAB   - continue medical f/u  - continue Mayberry  - Per Dr. Caicedo, will probably need chronic Mayberry

## 2020-07-30 NOTE — PROGRESS NOTE ADULT - PROBLEM SELECTOR PLAN 1
- improved significantly,  - creat 8.6 on admission - - > 1.25 now.  - BMP in AM.  - Renal is following..  - IVF hydration.

## 2020-07-30 NOTE — DIETITIAN INITIAL EVALUATION ADULT. - ENERGY NEEDS
Height (cm): 160 (07-30)  Weight (kg): 59.7 (07-28)  BMI (kg/m2): 23.3 (07-30)  IBW: 52.1 kg          % IBW: 114%            UBW: 104.3 kg             %UBW: 57%

## 2020-07-30 NOTE — DIETITIAN INITIAL EVALUATION ADULT. - PERTINENT MEDS FT
MEDICATIONS  (STANDING):  ALBUTerol    0.083% 2.5 milliGRAM(s) Nebulizer every 6 hours  ALBUTerol    90 MICROgram(s) HFA Inhaler 3 Puff(s) Inhalation every 4 hours  amLODIPine   Tablet 10 milliGRAM(s) Oral daily  aspirin enteric coated 81 milliGRAM(s) Oral daily  cefTRIAXone   IVPB 1000 milliGRAM(s) IV Intermittent every 24 hours  cloNIDine Patch 0.1 mG/24Hr(s) 1 patch Transdermal every 7 days  clopidogrel Tablet 75 milliGRAM(s) Oral daily  dextrose 5%. 1000 milliLiter(s) (50 mL/Hr) IV Continuous <Continuous>  dextrose 50% Injectable 12.5 Gram(s) IV Push once  dextrose 50% Injectable 25 Gram(s) IV Push once  dextrose 50% Injectable 25 Gram(s) IV Push once  heparin   Injectable 5000 Unit(s) SubCutaneous every 12 hours  hydrALAZINE 25 milliGRAM(s) Oral three times a day  insulin lispro (HumaLOG) corrective regimen sliding scale   SubCutaneous three times a day before meals  insulin lispro (HumaLOG) corrective regimen sliding scale   SubCutaneous at bedtime  levETIRAcetam  Solution 500 milliGRAM(s) Oral two times a day  metoprolol tartrate 50 milliGRAM(s) Oral two times a day  simvastatin 20 milliGRAM(s) Oral at bedtime  sodium chloride 0.45%. 1000 milliLiter(s) (100 mL/Hr) IV Continuous <Continuous>  traZODone 50 milliGRAM(s) Oral at bedtime    MEDICATIONS  (PRN):  acetaminophen   Tablet .. 650 milliGRAM(s) Oral every 6 hours PRN Temp greater or equal to 38C (100.4F), Mild Pain (1 - 3)  dextrose 40% Gel 15 Gram(s) Oral once PRN Blood Glucose LESS THAN 70 milliGRAM(s)/deciliter  glucagon  Injectable 1 milliGRAM(s) IntraMuscular once PRN Glucose LESS THAN 70 milligrams/deciliter  haloperidol    Injectable 1 milliGRAM(s) IV Push three times a day PRN agitation

## 2020-07-30 NOTE — DIETITIAN INITIAL EVALUATION ADULT. - PERTINENT LABORATORY DATA
07-30 Na150 mmol/L<H> Glu 233 mg/dL<H> K+ 3.4 mmol/L<L> Cr  1.25 mg/dL BUN 31 mg/dL<H> 07-28 Alb 2.9 g/dL<L>07-28 ALT 14 U/L AST 14 U/L<L> Alkaline Phosphatase 96 U/L  07/30/20 POCT blood Glucose range 256-300, 07/28 Glucose 427 mg/dL < H> bun 128 mg/dL <H> Cr  8.60  mg/dL <H> K 6.4  mmol/L <H>  07-29-20 @ 11:09 a1c 7.7<H>

## 2020-07-30 NOTE — DIETITIAN INITIAL EVALUATION ADULT. - FACTORS AFF FOOD INTAKE
07/29/20, swallow evaluation c recommendation for Dysphagia 1 Pureed - Nectar Consistency Fluid/change in mental status/difficulty feeding self/difficulty swallowing/other (specify)

## 2020-07-30 NOTE — DIETITIAN INITIAL EVALUATION ADULT. - PHYSICAL APPEARANCE
other (specify)/BMI=23.3(07/30/20) for height of 5'3" & wt. of 59.7 kg, 07/29, 1+ edema of feet, left shoulder fracture c sling in place Pt c visible moderate depletion of orbital, moderate depletion of temple & thigh areas.

## 2020-07-30 NOTE — PHYSICAL THERAPY INITIAL EVALUATION ADULT - ADDITIONAL COMMENTS
PT attempted to contact pt's daughter Gali ([phone]. [phone]) as pt is non-verbal at baseline & currently unable to follow commands or communicate --> no answer & no name on voicemail at either number therefore message was not left to ensure pt privacy. Per care coordination assessment in EMR pt is baseline dependent, 24/7 home health aide services. PT notes pt was in [facility] ED from 7/15-7/16 after aide found pt on floor s/p unwitnessed fall stating pt was attempting to get OOB on her own --> + L humerus fx, splinted by ortho & recommendation was for outpatient follow up in 1 week. PT notes scheduled outpatient follow up at 2 weeks --> pt was already admitted here. PT spoke with RN Case Manager Jackie & NICHOLAS Moore --> PT would like to speak with family to get more information. D/C plan as of now based on care coordination assessment.

## 2020-07-30 NOTE — PHYSICAL THERAPY INITIAL EVALUATION ADULT - PERTINENT HX OF CURRENT PROBLEM, REHAB EVAL
Pt is a 68yo F admitted 2/2 being found unresponsive. Pt is being treated for AMADOU, urinary retention (with milner placement), hydronephrosis with urinary obstruction 2/2 renal calculus, hyperkalemia, hypernatremia, metabolic encephalopathy, cystitis, & bacteremia.

## 2020-07-30 NOTE — CONSULT NOTE ADULT - ASSESSMENT
bacteremia   is a contaminant  no need for vanco   urinary tract infection   urinary retension   bilateral mod hydro  will get ctabd as per radiology ? Moderate bilateral hydronephrosis with suspected 3.1 cm shadowing calculus in the left renal pelvis  gu eval needed   will follow with you thanks

## 2020-07-30 NOTE — PHYSICAL THERAPY INITIAL EVALUATION ADULT - ORIENTATION, REHAB EVAL
pt opens her eyes to her name being called but was non-verbal during assessment & did not follow any commands or attempt to communicate/person

## 2020-07-30 NOTE — CONSULT NOTE ADULT - SUBJECTIVE AND OBJECTIVE BOX
HPI:  70yo F w/ PMH HTN, DM, CVA, seizure disorder BIBA d/t found unresponsive by family at home this AM. BS was reportedly high. VSS. Pt awoke on ED arrival, looks to voice, non verbal. 	Family - Pt lives w/ aide, aide noted pt to be unresponsive while feeding this AM. Pt had episode unresponsiveness, slumped, in chair yesterday, family noted bedsore BL buttocks, abscess at that time. Pt w/ recent fall 2wks ago () seen at [facility] VS ED, L humerus fx. Pt previously ambulatory, verbal, progressive decline, family unsure since when exactly not walking / not talking.       after a milner was placed 1750 ccof urine came out (2020 17:40)      PAST MEDICAL & SURGICAL HISTORY:  CVA (cerebral infarction): right side weakness  Vision changes: lt eye  Urinary incontinence  Seizure disorder  Gout  OA (osteoarthritis): bautista knees, low back  and  bautista shoulders  Asthma  Diabetes  Hypertension  Kidney stone      SOCHX:   tobacco,  -  alcohol    FMHX: FA/MO  - contributory       Recent Travel:    Immunizations:    Allergies    No Known Allergies    Intolerances        MEDICATIONS  (STANDING):  ALBUTerol    0.083% 2.5 milliGRAM(s) Nebulizer every 6 hours  ALBUTerol    90 MICROgram(s) HFA Inhaler 3 Puff(s) Inhalation every 4 hours  amLODIPine   Tablet 10 milliGRAM(s) Oral daily  aspirin enteric coated 81 milliGRAM(s) Oral daily  cefTRIAXone   IVPB 1000 milliGRAM(s) IV Intermittent every 24 hours  cloNIDine Patch 0.1 mG/24Hr(s) 1 patch Transdermal every 7 days  clopidogrel Tablet 75 milliGRAM(s) Oral daily  dextrose 5%. 1000 milliLiter(s) (50 mL/Hr) IV Continuous <Continuous>  dextrose 50% Injectable 12.5 Gram(s) IV Push once  dextrose 50% Injectable 25 Gram(s) IV Push once  dextrose 50% Injectable 25 Gram(s) IV Push once  heparin   Injectable 5000 Unit(s) SubCutaneous every 12 hours  hydrALAZINE 25 milliGRAM(s) Oral three times a day  insulin lispro (HumaLOG) corrective regimen sliding scale   SubCutaneous three times a day before meals  insulin lispro (HumaLOG) corrective regimen sliding scale   SubCutaneous at bedtime  levETIRAcetam  Solution 500 milliGRAM(s) Oral two times a day  metoprolol tartrate 50 milliGRAM(s) Oral two times a day  simvastatin 20 milliGRAM(s) Oral at bedtime  sodium chloride 0.45%. 1000 milliLiter(s) (100 mL/Hr) IV Continuous <Continuous>  traZODone 50 milliGRAM(s) Oral at bedtime    MEDICATIONS  (PRN):  acetaminophen   Tablet .. 650 milliGRAM(s) Oral every 6 hours PRN Temp greater or equal to 38C (100.4F), Mild Pain (1 - 3)  dextrose 40% Gel 15 Gram(s) Oral once PRN Blood Glucose LESS THAN 70 milliGRAM(s)/deciliter  glucagon  Injectable 1 milliGRAM(s) IntraMuscular once PRN Glucose LESS THAN 70 milligrams/deciliter  haloperidol    Injectable 1 milliGRAM(s) IV Push three times a day PRN agitation      REVIEW OF SYSTEMS:  CONSTITUTIONAL: No fever, weight loss, or fatigue  EYES: No eye pain, visual disturbances, or discharge  ENMT:  No difficulty hearing, tinnitus, vertigo; No sinus or throat pain  NECK: No pain or stiffness  BREASTS: No pain, masses, or nipple discharge  RESPIRATORY: No cough, wheezing, chills or hemoptysis; No shortness of breath  CARDIOVASCULAR: No chest pain, palpitations, dizziness, or leg swelling  GASTROINTESTINAL: No abdominal or epigastric pain. No nausea, vomiting, or hematemesis; No diarrhea or constipation. No melena or hematochezia.  GENITOURINARY: No dysuria, frequency, hematuria, or incontinence  NEUROLOGICAL: No headaches, memory loss, loss of strength, numbness, or tremors  SKIN: No itching, burning, rashes, or lesions   LYMPH NODES: No enlarged glands  ENDOCRINE: No heat or cold intolerance; No hair loss  MUSCULOSKELETAL: No joint pain or swelling; No muscle, back, or extremity pain  PSYCHIATRIC: No depression, anxiety, mood swings, or difficulty sleeping  HEME/LYMPH: No easy bruising, or bleeding gums  ALLERGY AND IMMUNOLOGIC: No hives or eczema    VITAL SIGNS:    T(C): 37.7 (20 @ 11:13), Max: 38.2 (20 @ 22:40)  T(F): 99.9 (20 @ 11:13), Max: 100.7 (20 @ 22:40)  HR: 89 (20 @ 15:23) (70 - 111)  BP: 140/80 (20 @ 15:23) (136/70 - 150/65)  RR: 18 (20 @ 15:23) (17 - 18)  SpO2: 98% (20 @ 15:23) (98% - 100%)    PHYSICAL EXAM:    GENERAL: NAD, well-groomed, well-developed  HEAD:  Atraumatic, Normocephalic  EYES: EOMI, PERRLA, conjunctiva and sclera clear  ENMT: No tonsillar erythema, exudates, or enlargement; Moist mucous membranes, Good dentition, No lesions  NECK: Supple, No JVD, Normal thyroid  NERVOUS SYSTEM:  Alert & Oriented X3, Good concentration; Motor Strength 5/5 B/L upper and lower extremities; DTRs 2+ intact and symmetric  CHEST/LUNG: Clear to auscultation bilaterally; No rales, rhonchi, wheezing bilaterally  HEART: Regular rate and rhythm; No murmurs, rubs, or gallops  ABDOMEN: Soft, Nontender, Nondistended; Bowel sounds present  EXTREMITIES:  2+ Peripheral Pulses, No clubbing, cyanosis, or edema  LYMPH: No lymphadenopathy noted  SKIN: No rashes or lesions  BACK: no pressor sore     LABS:                         8.9    11.59 )-----------( 232      ( 2020 06:27 )             27.7         150<H>  |  117<H>  |  31<H>  ----------------------------<  233<H>  3.4<L>   |  27  |  1.25    Ca    7.8<L>      2020 06:27          Urinalysis Basic - ( 2020 18:54 )    Color: Yellow / Appearance: Clear / S.015 / pH: x  Gluc: x / Ketone: Negative  / Bili: Negative / Urobili: Negative mg/dL   Blood: x / Protein: 100 mg/dL / Nitrite: Negative   Leuk Esterase: Moderate / RBC: 3-5 /HPF / WBC 26-50   Sq Epi: x / Non Sq Epi: Moderate / Bacteria: Moderate                          Culture Results:   No growth ( @ 00:23)  Culture Results:   Growth in aerobic bottle: Staphylococcus hominis  Coag Negative Staphylococcus  Single set isolate, possible contaminant. Contact  Microbiology if susceptibility testing clinically  indicated.  "Due to technical problems, Proteus sp. will Not be reported as part of  the BCID panel until further notice"  ***Blood Panel PCR results on this specimen are available  approximately 3 hours after the Gram stain result.***  Gram stain, PCR, and/or culture results may not always  correspond due to difference in methodologies.  ************************************************************  This PCR assay was performed using [employer].  The following targets are tested for: Enterococcus,  vancomycin resistant enterococci, Listeria monocytogenes,  coagulase negative staphylococci, S. aureus,  methicillin resistant S. aureus, Streptococcus agalactiae  (Group B), S. pneumoniae, S. pyogenes (Group A),  Acinetobacter baumannii, Enterobacter cloacae, E. coli,  Klebsiella oxytoca, K. pneumoniae, Proteus sp.,  Serratia marcescens, Haemophilus influenzae,  Neisseria meningitidis, Pseudomonas aeruginosa, Candida  albicans, C. glabrata, C krusei, C parapsilosis,  C. tropicalis and the KPC resistance gene. ( @ 15:01)  Culture Results:   No growth to date. ( @ 15:01)                Radiology: HPI:  68yo F w/ PMH HTN, DM, CVA, seizure disorder BIBA d/t found unresponsive by family at home this AM. BS was reportedly high. VSS. Pt awoke on ED arrival, looks to voice, non verbal. 	Family - Pt lives w/ aide, aide noted pt to be unresponsive while feeding this AM. Pt had episode unresponsiveness, slumped, in chair yesterday, family noted bedsore BL buttocks, abscess at that time. Pt w/ recent fall 2wks ago () seen at [facility] VS ED, L humerus fx. Pt previously ambulatory, verbal, progressive decline, family unsure since when exactly not walking / not talking.       after a milner was placed 1750 ccof urine came out (2020 17:40)  no info from patient     PAST MEDICAL & SURGICAL HISTORY:  CVA (cerebral infarction): right side weakness  Vision changes: lt eye  Urinary incontinence  Seizure disorder  Gout  OA (osteoarthritis): bautista knees, low back  and  bautista shoulders  Asthma  Diabetes  Hypertension  Kidney stone      SOCHX:  unable to assess    tobacco,  -  alcohol    FMHX: FA/MO  - non contributory       Recent Travel:    Immunizations:    Allergies    No Known Allergies    Intolerances        MEDICATIONS  (STANDING):  ALBUTerol    0.083% 2.5 milliGRAM(s) Nebulizer every 6 hours  ALBUTerol    90 MICROgram(s) HFA Inhaler 3 Puff(s) Inhalation every 4 hours  amLODIPine   Tablet 10 milliGRAM(s) Oral daily  aspirin enteric coated 81 milliGRAM(s) Oral daily  cefTRIAXone   IVPB 1000 milliGRAM(s) IV Intermittent every 24 hours  cloNIDine Patch 0.1 mG/24Hr(s) 1 patch Transdermal every 7 days  clopidogrel Tablet 75 milliGRAM(s) Oral daily  dextrose 5%. 1000 milliLiter(s) (50 mL/Hr) IV Continuous <Continuous>  dextrose 50% Injectable 12.5 Gram(s) IV Push once  dextrose 50% Injectable 25 Gram(s) IV Push once  dextrose 50% Injectable 25 Gram(s) IV Push once  heparin   Injectable 5000 Unit(s) SubCutaneous every 12 hours  hydrALAZINE 25 milliGRAM(s) Oral three times a day  insulin lispro (HumaLOG) corrective regimen sliding scale   SubCutaneous three times a day before meals  insulin lispro (HumaLOG) corrective regimen sliding scale   SubCutaneous at bedtime  levETIRAcetam  Solution 500 milliGRAM(s) Oral two times a day  metoprolol tartrate 50 milliGRAM(s) Oral two times a day  simvastatin 20 milliGRAM(s) Oral at bedtime  sodium chloride 0.45%. 1000 milliLiter(s) (100 mL/Hr) IV Continuous <Continuous>  traZODone 50 milliGRAM(s) Oral at bedtime    MEDICATIONS  (PRN):  acetaminophen   Tablet .. 650 milliGRAM(s) Oral every 6 hours PRN Temp greater or equal to 38C (100.4F), Mild Pain (1 - 3)  dextrose 40% Gel 15 Gram(s) Oral once PRN Blood Glucose LESS THAN 70 milliGRAM(s)/deciliter  glucagon  Injectable 1 milliGRAM(s) IntraMuscular once PRN Glucose LESS THAN 70 milligrams/deciliter  haloperidol    Injectable 1 milliGRAM(s) IV Push three times a day PRN agitation      REVIEW OF SYSTEMS:  unable to assess   VITAL SIGNS:    T(C): 37.7 (20 @ 11:13), Max: 38.2 (20 @ 22:40)  T(F): 99.9 (20 @ 11:13), Max: 100.7 (-20 @ 22:40)  HR: 89 (20 @ 15:23) (70 - 111)  BP: 140/80 (20 @ 15:23) (136/70 - 150/65)  RR: 18 (20 @ 15:23) (17 - 18)  SpO2: 98% (20 @ 15:23) (98% - 100%)    PHYSICAL EXAM:  lethargic arousable   non verbal  GENERAL: NAD, well-groomed, well-developed  HEAD:  Atraumatic, Normocephalic  EYES: EOMI, PERRLA, conjunctiva and sclera clear  ENMT:  Moist mucous membranes, Good dentition, No lesions  NECK: Supple, No JVD, Normal thyroid  NERVOUS SYSTEM:  arousable   CHEST/LUNG: Clear to auscultation bilaterally; No rales, rhonchi, wheezing bilaterally  HEART: Regular rate and rhythm; No murmurs, rubs, or gallops  ABDOMEN: Soft, Nontender, Nondistended; Bowel sounds present  EXTREMITIES:  2+ Peripheral Pulses, No clubbing, cyanosis, or edema  LYMPH: No lymphadenopathy noted  SKIN: No rashes or lesions  BACK: dti   milner plus   LABS:                         8.9    11.59 )-----------( 232      ( 2020 06:27 )             27.7     07-30    150<H>  |  117<H>  |  31<H>  ----------------------------<  233<H>  3.4<L>   |  27  |  1.25    Ca    7.8<L>      2020 06:27          Urinalysis Basic - ( 2020 18:54 )    Color: Yellow / Appearance: Clear / S.015 / pH: x  Gluc: x / Ketone: Negative  / Bili: Negative / Urobili: Negative mg/dL   Blood: x / Protein: 100 mg/dL / Nitrite: Negative   Leuk Esterase: Moderate / RBC: 3-5 /HPF / WBC 26-50   Sq Epi: x / Non Sq Epi: Moderate / Bacteria: Moderate                          Culture Results:   No growth ( @ 00:23)  Culture Results:   Growth in aerobic bottle: Staphylococcus hominis  Coag Negative Staphylococcus  Single set isolate, possible contaminant. Contact  Microbiology if susceptibility testing clinically  indicated.  "Due to technical problems, Proteus sp. will Not be reported as part of  the BCID panel until further notice"  ***Blood Panel PCR results on this specimen are available  approximately 3 hours after the Gram stain result.***  Gram stain, PCR, and/or culture results may not always  correspond due to difference in methodologies.  ************************************************************  This PCR assay was performed using [employer].  The following targets are tested for: Enterococcus,  vancomycin resistant enterococci, Listeria monocytogenes,  coagulase negative staphylococci, S. aureus,  methicillin resistant S. aureus, Streptococcus agalactiae  (Group B), S. pneumoniae, S. pyogenes (Group A),  Acinetobacter baumannii, Enterobacter cloacae, E. coli,  Klebsiella oxytoca, K. pneumoniae, Proteus sp.,  Serratia marcescens, Haemophilus influenzae,  Neisseria meningitidis, Pseudomonas aeruginosa, Candida  albicans, C. glabrata, C krusei, C parapsilosis,  C. tropicalis and the KPC resistance gene. ( @ 15:01)  Culture Results:   No growth to date. ( @ 15:01)                Radiology:    < from: US Renal (20 @ 16:20) >  IMPRESSION:    Increased renal cortical echogenicity compatible with medical renal disease.    Moderate bilateral hydronephrosis with suspected 3.1 cm shadowing calculus in the left renal pelvis. Consider CT for further evaluation.    Marked distention of the urinary bladder. The patient was unable to void.                ASTRID STAPLETON M.D., ATTENDING RADIOLOGIST  This document has been electronically signed. 2020  4:29PM                < end of copied text >

## 2020-07-30 NOTE — PROGRESS NOTE ADULT - SUBJECTIVE AND OBJECTIVE BOX
NEPHROLOGY PROGRESS NOTE    CHIEF COMPLAINT:  AMDAOU    HPI:  Renal function better.  Urine 1.8 L yesterday.  Mild hypernatremia better.      EXAM:  T(F): 99.9 (07-30-20 @ 11:13)  HR: 104 (07-30-20 @ 11:13)  BP: 136/70 (07-30-20 @ 11:13)  RR: 17 (07-30-20 @ 11:13)  SpO2: 100% (07-30-20 @ 11:13)    Conversant, in no apparent distress  Normal respiratory effort, lungs clear bilaterally  Heart RRR with no murmur, no peripheral edema    LABS                             8.9    11.59 )-----------( 232      ( 30 Jul 2020 06:27 )             27.7          07-30    150<H>  |  117<H>  |  31<H>  ----------------------------<  233<H>  3.4<L>   |  27  |  1.25    Ca    7.8<L>      30 Jul 2020 06:27      ASSESSMENT  AMADOU hyperkalemia, appears to be post renal, bladder outlet obstruction  Mild hypernatremia due to post obstructive diuresis  [place]berry drainage    PLAN  Continue hypotonic IVF

## 2020-07-30 NOTE — PHYSICAL THERAPY INITIAL EVALUATION ADULT - MODALITIES TREATMENT COMMENTS
Pt is on a [employer] P500 low air loss surface. + palpable DP/PT pulses B/L. Pt presents with IAD over B/L buttocks, perineum, groin, & posterior thighs. Area has full thickness tissue loss. Beginning signs of necrosis noted over wound base of R buttock. No visible drainage

## 2020-07-31 LAB
ALBUMIN SERPL ELPH-MCNC: 2.5 G/DL — LOW (ref 3.3–5)
ALP SERPL-CCNC: 80 U/L — SIGNIFICANT CHANGE UP (ref 40–120)
ALT FLD-CCNC: 9 U/L — LOW (ref 12–78)
ANION GAP SERPL CALC-SCNC: 6 MMOL/L — SIGNIFICANT CHANGE UP (ref 5–17)
AST SERPL-CCNC: 20 U/L — SIGNIFICANT CHANGE UP (ref 15–37)
BILIRUB SERPL-MCNC: 0.5 MG/DL — SIGNIFICANT CHANGE UP (ref 0.2–1.2)
BUN SERPL-MCNC: 13 MG/DL — SIGNIFICANT CHANGE UP (ref 7–23)
CALCIUM SERPL-MCNC: 7.4 MG/DL — LOW (ref 8.5–10.1)
CHLORIDE SERPL-SCNC: 113 MMOL/L — HIGH (ref 96–108)
CO2 SERPL-SCNC: 26 MMOL/L — SIGNIFICANT CHANGE UP (ref 22–31)
CREAT SERPL-MCNC: 0.77 MG/DL — SIGNIFICANT CHANGE UP (ref 0.5–1.3)
GLUCOSE BLDC GLUCOMTR-MCNC: 166 MG/DL — HIGH (ref 70–99)
GLUCOSE BLDC GLUCOMTR-MCNC: 186 MG/DL — HIGH (ref 70–99)
GLUCOSE BLDC GLUCOMTR-MCNC: 199 MG/DL — HIGH (ref 70–99)
GLUCOSE BLDC GLUCOMTR-MCNC: 211 MG/DL — HIGH (ref 70–99)
GLUCOSE SERPL-MCNC: 189 MG/DL — HIGH (ref 70–99)
HCT VFR BLD CALC: 29.9 % — LOW (ref 34.5–45)
HGB BLD-MCNC: 9.3 G/DL — LOW (ref 11.5–15.5)
MCHC RBC-ENTMCNC: 28.7 PG — SIGNIFICANT CHANGE UP (ref 27–34)
MCHC RBC-ENTMCNC: 31.1 GM/DL — LOW (ref 32–36)
MCV RBC AUTO: 92.3 FL — SIGNIFICANT CHANGE UP (ref 80–100)
NRBC # BLD: 0 /100 WBCS — SIGNIFICANT CHANGE UP (ref 0–0)
PLATELET # BLD AUTO: 243 K/UL — SIGNIFICANT CHANGE UP (ref 150–400)
POTASSIUM SERPL-MCNC: 3.2 MMOL/L — LOW (ref 3.5–5.3)
POTASSIUM SERPL-SCNC: 3.2 MMOL/L — LOW (ref 3.5–5.3)
PROT SERPL-MCNC: 6.4 GM/DL — SIGNIFICANT CHANGE UP (ref 6–8.3)
RBC # BLD: 3.24 M/UL — LOW (ref 3.8–5.2)
RBC # FLD: 12.5 % — SIGNIFICANT CHANGE UP (ref 10.3–14.5)
SODIUM SERPL-SCNC: 145 MMOL/L — SIGNIFICANT CHANGE UP (ref 135–145)
WBC # BLD: 11.21 K/UL — HIGH (ref 3.8–10.5)
WBC # FLD AUTO: 11.21 K/UL — HIGH (ref 3.8–10.5)

## 2020-07-31 PROCEDURE — 99232 SBSQ HOSP IP/OBS MODERATE 35: CPT

## 2020-07-31 RX ORDER — POTASSIUM CHLORIDE 20 MEQ
10 PACKET (EA) ORAL
Refills: 0 | Status: COMPLETED | OUTPATIENT
Start: 2020-07-31 | End: 2020-07-31

## 2020-07-31 RX ADMIN — SODIUM CHLORIDE 100 MILLILITER(S): 9 INJECTION, SOLUTION INTRAVENOUS at 05:00

## 2020-07-31 RX ADMIN — CLOPIDOGREL BISULFATE 75 MILLIGRAM(S): 75 TABLET, FILM COATED ORAL at 11:17

## 2020-07-31 RX ADMIN — HEPARIN SODIUM 5000 UNIT(S): 5000 INJECTION INTRAVENOUS; SUBCUTANEOUS at 05:01

## 2020-07-31 RX ADMIN — ALBUTEROL 2.5 MILLIGRAM(S): 90 AEROSOL, METERED ORAL at 23:34

## 2020-07-31 RX ADMIN — Medication 2: at 07:57

## 2020-07-31 RX ADMIN — Medication 2: at 11:17

## 2020-07-31 RX ADMIN — LEVETIRACETAM 500 MILLIGRAM(S): 250 TABLET, FILM COATED ORAL at 05:00

## 2020-07-31 RX ADMIN — Medication 25 MILLIGRAM(S): at 05:01

## 2020-07-31 RX ADMIN — Medication 100 MILLIEQUIVALENT(S): at 17:29

## 2020-07-31 RX ADMIN — ALBUTEROL 2.5 MILLIGRAM(S): 90 AEROSOL, METERED ORAL at 05:03

## 2020-07-31 RX ADMIN — SODIUM CHLORIDE 100 MILLILITER(S): 9 INJECTION, SOLUTION INTRAVENOUS at 14:36

## 2020-07-31 RX ADMIN — Medication 50 MILLIGRAM(S): at 21:37

## 2020-07-31 RX ADMIN — AMLODIPINE BESYLATE 10 MILLIGRAM(S): 2.5 TABLET ORAL at 05:01

## 2020-07-31 RX ADMIN — Medication 1 PATCH: at 07:35

## 2020-07-31 RX ADMIN — SIMVASTATIN 20 MILLIGRAM(S): 20 TABLET, FILM COATED ORAL at 21:36

## 2020-07-31 RX ADMIN — HEPARIN SODIUM 5000 UNIT(S): 5000 INJECTION INTRAVENOUS; SUBCUTANEOUS at 17:30

## 2020-07-31 RX ADMIN — Medication 100 MILLIEQUIVALENT(S): at 15:24

## 2020-07-31 RX ADMIN — LEVETIRACETAM 500 MILLIGRAM(S): 250 TABLET, FILM COATED ORAL at 17:29

## 2020-07-31 RX ADMIN — Medication 50 MILLIGRAM(S): at 17:29

## 2020-07-31 RX ADMIN — Medication 50 MILLIGRAM(S): at 05:04

## 2020-07-31 RX ADMIN — Medication 25 MILLIGRAM(S): at 14:36

## 2020-07-31 RX ADMIN — Medication 25 MILLIGRAM(S): at 21:37

## 2020-07-31 RX ADMIN — Medication 4: at 16:49

## 2020-07-31 RX ADMIN — Medication 1 PATCH: at 20:51

## 2020-07-31 RX ADMIN — Medication 81 MILLIGRAM(S): at 11:17

## 2020-07-31 RX ADMIN — ALBUTEROL 2.5 MILLIGRAM(S): 90 AEROSOL, METERED ORAL at 11:44

## 2020-07-31 RX ADMIN — ALBUTEROL 2.5 MILLIGRAM(S): 90 AEROSOL, METERED ORAL at 17:11

## 2020-07-31 RX ADMIN — Medication 100 MILLIEQUIVALENT(S): at 19:39

## 2020-07-31 NOTE — PROGRESS NOTE ADULT - ASSESSMENT
pt seen with leukocytosis and pyuria  with obstructive uropathy   blood culture likely a contaminant ( CNS)  urinary retention   Moderate bilateral hydronephrosis   uc neg status post antibiotics now dc   gu on the case will likely need chronic milner   stable off antibiotics   will observe pt seen with leukocytosis and pyuria  with obstructive uropathy   blood culture likely a contaminant ( CNS)  urinary retention   Moderate bilateral hydronephrosis   uc neg status post antibiotics now dc   gu on the case will likely need chronic milner   stable off antibiotics  clinically stable from infectious disease standpoint. Will sign off please reconsult if needed.

## 2020-07-31 NOTE — PROGRESS NOTE ADULT - SUBJECTIVE AND OBJECTIVE BOX
HPI:  68yo F w/ PMH HTN, DM, CVA, seizure disorder BIBA d/t found unresponsive by family at home this AM.    Allergies    No Known Allergies    Intolerances        MEDICATIONS  (STANDING):  ALBUTerol    0.083% 2.5 milliGRAM(s) Nebulizer every 6 hours  ALBUTerol    90 MICROgram(s) HFA Inhaler 3 Puff(s) Inhalation every 4 hours  amLODIPine   Tablet 10 milliGRAM(s) Oral daily  aspirin enteric coated 81 milliGRAM(s) Oral daily  cloNIDine Patch 0.1 mG/24Hr(s) 1 patch Transdermal every 7 days  clopidogrel Tablet 75 milliGRAM(s) Oral daily  dextrose 5%. 1000 milliLiter(s) (50 mL/Hr) IV Continuous <Continuous>  dextrose 50% Injectable 12.5 Gram(s) IV Push once  dextrose 50% Injectable 25 Gram(s) IV Push once  dextrose 50% Injectable 25 Gram(s) IV Push once  heparin   Injectable 5000 Unit(s) SubCutaneous every 12 hours  hydrALAZINE 25 milliGRAM(s) Oral three times a day  insulin lispro (HumaLOG) corrective regimen sliding scale   SubCutaneous three times a day before meals  insulin lispro (HumaLOG) corrective regimen sliding scale   SubCutaneous at bedtime  levETIRAcetam  Solution 500 milliGRAM(s) Oral two times a day  metoprolol tartrate 50 milliGRAM(s) Oral two times a day  simvastatin 20 milliGRAM(s) Oral at bedtime  sodium chloride 0.45%. 1000 milliLiter(s) (100 mL/Hr) IV Continuous <Continuous>  traZODone 50 milliGRAM(s) Oral at bedtime    MEDICATIONS  (PRN):  acetaminophen   Tablet .. 650 milliGRAM(s) Oral every 6 hours PRN Temp greater or equal to 38C (100.4F), Mild Pain (1 - 3)  dextrose 40% Gel 15 Gram(s) Oral once PRN Blood Glucose LESS THAN 70 milliGRAM(s)/deciliter  glucagon  Injectable 1 milliGRAM(s) IntraMuscular once PRN Glucose LESS THAN 70 milligrams/deciliter  haloperidol    Injectable 1 milliGRAM(s) IV Push three times a day PRN agitation      REVIEW OF SYSTEMS:    CONSTITUTIONAL: No fever, chills, weight loss, or fatigue  HEENT: No sore throat, runny nose, ear ache  RESPIRATORY: No cough, wheezing, No shortness of breath  CARDIOVASCULAR: No chest pain, palpitations, dizziness  GASTROINTESTINAL: No abdominal pain. No nausea, vomiting, diarrhea  GENITOURINARY: No dysuria, increase frequency, hematuria, or incontinence  NEUROLOGICAL: No headaches, memory loss, loss of strength, numbness, or tremors, no weakness  EXTREMITY: No pedal edema BLE  SKIN: No itching, burning, rashes, or lesions     VITAL SIGNS:  T(C): 36.9 (07-31-20 @ 11:48), Max: 37.7 (07-30-20 @ 17:31)  T(F): 98.4 (07-31-20 @ 11:48), Max: 99.9 (07-30-20 @ 17:31)  HR: 92 (07-31-20 @ 11:48) (89 - 109)  BP: 163/72 (07-31-20 @ 11:48) (140/80 - 171/80)  RR: 16 (07-31-20 @ 11:48) (16 - 18)  SpO2: 100% (07-31-20 @ 11:48) (96% - 100%)  Wt(kg): --    PHYSICAL EXAM:    GENERAL: not in any distress  HEENT: Neck is supple, normocephalic, atraumatic   CHEST/LUNG: Clear to percussion bilaterally; No rales, rhonchi, wheezing  HEART: Regular rate and rhythm; No murmurs, rubs, or gallops  ABDOMEN: Soft, Nontender, Nondistended; Bowel sounds present, no rebound   EXTREMITIES:  2+ Peripheral Pulses, No clubbing, cyanosis, or edema  GENITOURINARY:   SKIN: No rashes or lesions  BACK: no pressor sore   NERVOUS SYSTEM:  Alert & Oriented X3, Good concentration  PSYCH: normal affect     LABS:                         9.3    11.21 )-----------( 243      ( 31 Jul 2020 08:03 )             29.9     07-31    145  |  113<H>  |  13  ----------------------------<  189<H>  3.2<L>   |  26  |  0.77    Ca    7.4<L>      31 Jul 2020 08:03    TPro  6.4  /  Alb  2.5<L>  /  TBili  0.5  /  DBili  x   /  AST  20  /  ALT  9<L>  /  AlkPhos  80  07-31    LIVER FUNCTIONS - ( 31 Jul 2020 08:03 )  Alb: 2.5 g/dL / Pro: 6.4 gm/dL / ALK PHOS: 80 U/L / ALT: 9 U/L / AST: 20 U/L / GGT: x                                   Culture Results:   No growth to date. (07-30 @ 09:01)  Culture Results:   No growth to date. (07-30 @ 00:27)  Culture Results:   No growth (07-29 @ 00:23)  Culture Results:   Growth in aerobic bottle: Staphylococcus hominis  Coag Negative Staphylococcus  Single set isolate, possible contaminant. Contact  Microbiology if susceptibility testing clinically  indicated.  "Due to technical problems, Proteus sp. will Not be reported as part of  the BCID panel until further notice"  ***Blood Panel PCR results on this specimen are available  approximately 3 hours after the Gram stain result.***  Gram stain, PCR, and/or culture results may not always  correspond due to difference in methodologies.  ************************************************************  This PCR assay was performed using [employer].  The following targets are tested for: Enterococcus,  vancomycin resistant enterococci, Listeria monocytogenes,  coagulase negative staphylococci, S. aureus,  methicillin resistant S. aureus, Streptococcus agalactiae  (Group B), S. pneumoniae, S. pyogenes (Group A),  Acinetobacter baumannii, Enterobacter cloacae, E. coli,  Klebsiella oxytoca, K. pneumoniae, Proteus sp.,  Serratia marcescens, Haemophilus influenzae,  Neisseria meningitidis, Pseudomonas aeruginosa, Candida  albicans, C. glabrata, C krusei, C parapsilosis,  C. tropicalis and the KPC resistance gene. (07-28 @ 15:01)  Culture Results:   No growth to date. (07-28 @ 15:01) HPI:  70yo F w/ PMH HTN, DM, CVA, seizure disorder BIBA d/t found unresponsive by family at home this AM.    Allergies    No Known Allergies    Intolerances        MEDICATIONS  (STANDING):  ALBUTerol    0.083% 2.5 milliGRAM(s) Nebulizer every 6 hours  ALBUTerol    90 MICROgram(s) HFA Inhaler 3 Puff(s) Inhalation every 4 hours  amLODIPine   Tablet 10 milliGRAM(s) Oral daily  aspirin enteric coated 81 milliGRAM(s) Oral daily  cloNIDine Patch 0.1 mG/24Hr(s) 1 patch Transdermal every 7 days  clopidogrel Tablet 75 milliGRAM(s) Oral daily  dextrose 5%. 1000 milliLiter(s) (50 mL/Hr) IV Continuous <Continuous>  dextrose 50% Injectable 12.5 Gram(s) IV Push once  dextrose 50% Injectable 25 Gram(s) IV Push once  dextrose 50% Injectable 25 Gram(s) IV Push once  heparin   Injectable 5000 Unit(s) SubCutaneous every 12 hours  hydrALAZINE 25 milliGRAM(s) Oral three times a day  insulin lispro (HumaLOG) corrective regimen sliding scale   SubCutaneous three times a day before meals  insulin lispro (HumaLOG) corrective regimen sliding scale   SubCutaneous at bedtime  levETIRAcetam  Solution 500 milliGRAM(s) Oral two times a day  metoprolol tartrate 50 milliGRAM(s) Oral two times a day  simvastatin 20 milliGRAM(s) Oral at bedtime  sodium chloride 0.45%. 1000 milliLiter(s) (100 mL/Hr) IV Continuous <Continuous>  traZODone 50 milliGRAM(s) Oral at bedtime    MEDICATIONS  (PRN):  acetaminophen   Tablet .. 650 milliGRAM(s) Oral every 6 hours PRN Temp greater or equal to 38C (100.4F), Mild Pain (1 - 3)  dextrose 40% Gel 15 Gram(s) Oral once PRN Blood Glucose LESS THAN 70 milliGRAM(s)/deciliter  glucagon  Injectable 1 milliGRAM(s) IntraMuscular once PRN Glucose LESS THAN 70 milligrams/deciliter  haloperidol    Injectable 1 milliGRAM(s) IV Push three times a day PRN agitation      REVIEW OF SYSTEMS:    CONSTITUTIONAL: No fever, chills, weight loss, or fatigue  HEENT: No sore throat, runny nose, ear ache  RESPIRATORY: No cough, wheezing, No shortness of breath  CARDIOVASCULAR: No chest pain, palpitations, dizziness  GASTROINTESTINAL: No abdominal pain. No nausea, vomiting, diarrhea  GENITOURINARY: No dysuria, increase frequency, hematuria, or incontinence  NEUROLOGICAL: No headaches, memory loss, loss of strength, numbness, or tremors, no weakness  EXTREMITY: No pedal edema BLE  SKIN: No itching, burning, rashes, or lesions     VITAL SIGNS:  T(C): 36.9 (07-31-20 @ 11:48), Max: 37.7 (07-30-20 @ 17:31)  T(F): 98.4 (07-31-20 @ 11:48), Max: 99.9 (07-30-20 @ 17:31)  HR: 92 (07-31-20 @ 11:48) (89 - 109)  BP: 163/72 (07-31-20 @ 11:48) (140/80 - 171/80)  RR: 16 (07-31-20 @ 11:48) (16 - 18)  SpO2: 100% (07-31-20 @ 11:48) (96% - 100%)  Wt(kg): --    PHYSICAL EXAM:    GENERAL: not in any distress  HEENT: Neck is supple, normocephalic, atraumatic   CHEST/LUNG: Clear to percussion bilaterally; No rales, rhonchi, wheezing  HEART: Regular rate and rhythm; No murmurs, rubs, or gallops  ABDOMEN: Soft, Nontender, Nondistended; Bowel sounds present, no rebound   EXTREMITIES:  2+ Peripheral Pulses, No clubbing, cyanosis, or edema  GENITOURINARY:   SKIN: No rashes or lesions      LABS:                         9.3    11.21 )-----------( 243      ( 31 Jul 2020 08:03 )             29.9     07-31    145  |  113<H>  |  13  ----------------------------<  189<H>  3.2<L>   |  26  |  0.77    Ca    7.4<L>      31 Jul 2020 08:03    TPro  6.4  /  Alb  2.5<L>  /  TBili  0.5  /  DBili  x   /  AST  20  /  ALT  9<L>  /  AlkPhos  80  07-31    LIVER FUNCTIONS - ( 31 Jul 2020 08:03 )  Alb: 2.5 g/dL / Pro: 6.4 gm/dL / ALK PHOS: 80 U/L / ALT: 9 U/L / AST: 20 U/L / GGT: x                                   Culture Results:   No growth to date. (07-30 @ 09:01)  Culture Results:   No growth to date. (07-30 @ 00:27)  Culture Results:   No growth (07-29 @ 00:23)  Culture Results:   Growth in aerobic bottle: Staphylococcus hominis  Coag Negative Staphylococcus  Single set isolate, possible contaminant. Contact  Microbiology if susceptibility testing clinically  indicated.  "Due to technical problems, Proteus sp. will Not be reported as part of  the BCID panel until further notice"  ***Blood Panel PCR results on this specimen are available  approximately 3 hours after the Gram stain result.***  Gram stain, PCR, and/or culture results may not always  correspond due to difference in methodologies.  ************************************************************  This PCR assay was performed using [employer].  The following targets are tested for: Enterococcus,  vancomycin resistant enterococci, Listeria monocytogenes,  coagulase negative staphylococci, S. aureus,  methicillin resistant S. aureus, Streptococcus agalactiae  (Group B), S. pneumoniae, S. pyogenes (Group A),  Acinetobacter baumannii, Enterobacter cloacae, E. coli,  Klebsiella oxytoca, K. pneumoniae, Proteus sp.,  Serratia marcescens, Haemophilus influenzae,  Neisseria meningitidis, Pseudomonas aeruginosa, Candida  albicans, C. glabrata, C krusei, C parapsilosis,  C. tropicalis and the KPC resistance gene. (07-28 @ 15:01)  Culture Results:   No growth to date. (07-28 @ 15:01)

## 2020-07-31 NOTE — CONSULT NOTE ADULT - SUBJECTIVE AND OBJECTIVE BOX
Subjective Complaints:  Historian:       Consult requested by ER doctor:                  Attending: DR MALAVE    HPI:  68yo F w/ PMH HTN, DM, CVA, seizure disorder BIBA d/t found unresponsive by family at home this AM. BS was reportedly high. VSS. Pt awoke on ED arrival, looks to voice, non verbal. 	Family - Pt lives w/ aide, aide noted pt to be unresponsive while feeding this AM. Pt had episode unresponsiveness, slumped, in chair yesterday, family noted bedsore BL buttocks, abscess at that time. Pt w/ recent fall 2wks ago (7/14) seen at [facility] ED, L humerus fx. Pt previously ambulatory, verbal, progressive decline, family unsure since when exactly not walking / not talking.       after a milner was placed 1750 ccof urine came out (28 Jul 2020 17:40)    FATOU MCBRIDE    PAST MEDICAL & SURGICAL HISTORY:  CVA (cerebral infarction): right side weakness  Vision changes: lt eye  Urinary incontinence  Seizure disorder  Gout  OA (osteoarthritis): bautista knees, low back  and  bautista shoulders  Asthma  Diabetes  Hypertension  Kidney stone  69yFemale  DEMENTIA  MEDICATIONS  (STANDING):  ALBUTerol    0.083% 2.5 milliGRAM(s) Nebulizer every 6 hours  ALBUTerol    90 MICROgram(s) HFA Inhaler 3 Puff(s) Inhalation every 4 hours  amLODIPine   Tablet 10 milliGRAM(s) Oral daily  aspirin enteric coated 81 milliGRAM(s) Oral daily  cloNIDine Patch 0.1 mG/24Hr(s) 1 patch Transdermal every 7 days  clopidogrel Tablet 75 milliGRAM(s) Oral daily  dextrose 5%. 1000 milliLiter(s) (50 mL/Hr) IV Continuous <Continuous>  dextrose 50% Injectable 12.5 Gram(s) IV Push once  dextrose 50% Injectable 25 Gram(s) IV Push once  dextrose 50% Injectable 25 Gram(s) IV Push once  heparin   Injectable 5000 Unit(s) SubCutaneous every 12 hours  hydrALAZINE 25 milliGRAM(s) Oral three times a day  insulin lispro (HumaLOG) corrective regimen sliding scale   SubCutaneous three times a day before meals  insulin lispro (HumaLOG) corrective regimen sliding scale   SubCutaneous at bedtime  levETIRAcetam  Solution 500 milliGRAM(s) Oral two times a day  metoprolol tartrate 50 milliGRAM(s) Oral two times a day  potassium chloride  10 mEq/100 mL IVPB 10 milliEquivalent(s) IV Intermittent every 2 hours  simvastatin 20 milliGRAM(s) Oral at bedtime  sodium chloride 0.45%. 1000 milliLiter(s) (100 mL/Hr) IV Continuous <Continuous>  traZODone 50 milliGRAM(s) Oral at bedtime    MEDICATIONS  (PRN):  acetaminophen   Tablet .. 650 milliGRAM(s) Oral every 6 hours PRN Temp greater or equal to 38C (100.4F), Mild Pain (1 - 3)  dextrose 40% Gel 15 Gram(s) Oral once PRN Blood Glucose LESS THAN 70 milliGRAM(s)/deciliter  glucagon  Injectable 1 milliGRAM(s) IntraMuscular once PRN Glucose LESS THAN 70 milligrams/deciliter  haloperidol    Injectable 1 milliGRAM(s) IV Push three times a day PRN agitation      Allergies    No Known Allergies    Intolerances      FAMILY HISTORY:  No pertinent family history in first degree relatives      REVIEW OF SYSTEMS:  General:  No wt loss, fevers, chills, night sweats  Eyes:  Good vision, no reported pain  ENT:  No sore throat, pain, runny nose, dysphagia  CV:  No pain, palpitatioins, hypo/hypertension  Resp:  No dyspnea, cough, tachypnea, wheezing  GI:  No pain, nausea, vomiting, diarrhea, constipatiion  :  No pain, bleeding, incontinence, nocturia  Muscle:  No pain, weakness  Breast:  No pain, abscess, mass, discharge  Neuro:  No weakness, tingling, memory problems  Psych:  No fatigue, insomnia, mood problems, depression  Endocrine:  No polyuria, polydypsia, cold/heat intolerance  Heme:  No petechiae, ecchymosis, easy bruisability  Skin:  No rash, tattoos, scars, edema      Vital Signs Last 24 Hrs  T(C): 36.9 (31 Jul 2020 11:48), Max: 37.7 (30 Jul 2020 17:31)  T(F): 98.4 (31 Jul 2020 11:48), Max: 99.9 (30 Jul 2020 17:31)  HR: 92 (31 Jul 2020 11:48) (91 - 109)  BP: 163/72 (31 Jul 2020 11:48) (156/75 - 171/80)  BP(mean): --  RR: 16 (31 Jul 2020 11:48) (16 - 17)  SpO2: 100% (31 Jul 2020 11:48) (96% - 100%)    GENERAL PHYSICAL EXAM:  General:  Appears stated age, well-groomed, well-nourished, no distress  HEENT:  NC/AT, patent nares w/ pink mucosa, OP clear w/o lesions, PERRL, EOMI, conjunctivae clear, no thyromegaly, nodules, adenopathy, no JVD  Chest:  Full & symmetric excursion, no increased effort, breath sounds clear  Cardiovascular:  Regular rhythm, S1, S2, no murmur/rub/S3/S4, no carotid/femoral/abdominal bruit, radial/pedal pulses 2+, no edema  Abdomen:  Soft, non-tender, non-distended, normoactive bowel sounds, no HSM  Extremities:  Gait & station:   Digits:   Nails:   Joints, Bones, Muscles:   ROM:   Stability:  Skin:  No rash/erythema/ecchymoses/petechiae/wounds/abscess/warm/dry  Musculoskeletal:  Full ROM in all joints w/o swelling/tenderness/effusion    NEUROLOGICAL EXAM:  HENT:  Normocephalic head; atraumatic head.  Neck supple.  ENT: normal looking.  Mental State:    Awake no speech production   Cranial Nerves:  II-XII:   Pupils round and reactive to light and accommodation.  Extraocular movements full.  Visual fields can not be assessed due to mental status  .  Facial symmetry intact.  Tongue midline.  Motor Functions:  Moves all extremities.  No pronator drift of UE.  Claps hand well.  Hand  intact bilaterally.  Ambulatory.  move all extremities with a strength of 2/5   Sensory Functions: can not be assessed   Reflexes:  Deep tendon reflexes normoactive to biceps, knees and ankles. plantar responses are flexor   Cerebellar Testing:  can not be assessed  Gait : need assistance to stand       LABS:                        9.3    11.21 )-----------( 243      ( 31 Jul 2020 08:03 )             29.9     07-31    145  |  113<H>  |  13  ----------------------------<  189<H>  3.2<L>   |  26  |  0.77    Ca    7.4<L>      31 Jul 2020 08:03    TPro  6.4  /  Alb  2.5<L>  /  TBili  0.5  /  DBili  x   /  AST  20  /  ALT  9<L>  /  AlkPhos  80  07-31            RADIOLOGY & ADDITIONAL STUDIES:    Complete Blood Count: AM Sched. Collection: 31-Jul-2020 05:00 (07-30 @ 18:54)  Comprehensive Metabolic Panel: AM Sched. Collection: 31-Jul-2020 05:00 (07-30 @ 18:54)  POCT  Blood Glucose: 21:18 (07-30 @ 21:20)  POCT  Blood Glucose: 07:37 (07-31 @ 07:37)  POCT  Blood Glucose: 10:36 (07-31 @ 10:37)  Provider to RN:       LIBAN (07-31 @ 11:00)  potassium chloride  10 mEq/100 mL IVPB:   10 milliEquivalent(s) in IV Solution 100 milliLiter(s), IV Intermittent, every 2 hours, infuse over 60 Minute(s), Stop After 3 Doses  Provider's Contact #: [phone] (07-31 @ 14:39)  POCT  Blood Glucose: 16:02 (07-31 @ 16:04)      Assessment & Opinion: 69 y.o woman with h/o dementia ,HTN ,SEIZURE DISORDER admitted for change in mental status is found to have possible sepsis   DX SEPSIS     Recommendations:   EEG.   DVT prophylaxis as ordered.  Medications:  CONTINUE AED AND IV ANTIBIOTICS

## 2020-07-31 NOTE — PROGRESS NOTE ADULT - SUBJECTIVE AND OBJECTIVE BOX
Patient is a 69y old  Female who presents with a chief complaint of Altered Mental Status (30 Jul 2020 17:31), she is in NAD.    MEDICATIONS  (STANDING):  ALBUTerol    0.083% 2.5 milliGRAM(s) Nebulizer every 6 hours  ALBUTerol    90 MICROgram(s) HFA Inhaler 3 Puff(s) Inhalation every 4 hours  amLODIPine   Tablet 10 milliGRAM(s) Oral daily  aspirin enteric coated 81 milliGRAM(s) Oral daily  cefTRIAXone   IVPB 1000 milliGRAM(s) IV Intermittent every 24 hours  cloNIDine Patch 0.1 mG/24Hr(s) 1 patch Transdermal every 7 days  clopidogrel Tablet 75 milliGRAM(s) Oral daily  dextrose 5%. 1000 milliLiter(s) (50 mL/Hr) IV Continuous <Continuous>  dextrose 50% Injectable 12.5 Gram(s) IV Push once  dextrose 50% Injectable 25 Gram(s) IV Push once  dextrose 50% Injectable 25 Gram(s) IV Push once  heparin   Injectable 5000 Unit(s) SubCutaneous every 12 hours  hydrALAZINE 25 milliGRAM(s) Oral three times a day  insulin lispro (HumaLOG) corrective regimen sliding scale   SubCutaneous three times a day before meals  insulin lispro (HumaLOG) corrective regimen sliding scale   SubCutaneous at bedtime  levETIRAcetam  Solution 500 milliGRAM(s) Oral two times a day  metoprolol tartrate 50 milliGRAM(s) Oral two times a day  simvastatin 20 milliGRAM(s) Oral at bedtime  sodium chloride 0.45%. 1000 milliLiter(s) (100 mL/Hr) IV Continuous <Continuous>  traZODone 50 milliGRAM(s) Oral at bedtime    MEDICATIONS  (PRN):  acetaminophen   Tablet .. 650 milliGRAM(s) Oral every 6 hours PRN Temp greater or equal to 38C (100.4F), Mild Pain (1 - 3)  dextrose 40% Gel 15 Gram(s) Oral once PRN Blood Glucose LESS THAN 70 milliGRAM(s)/deciliter  glucagon  Injectable 1 milliGRAM(s) IntraMuscular once PRN Glucose LESS THAN 70 milligrams/deciliter  haloperidol    Injectable 1 milliGRAM(s) IV Push three times a day PRN agitation    REVIEW OF SYSTEMS: unable to obtain,      Vital Signs Last 24 Hrs  T(C): 36.9 (31 Jul 2020 11:48), Max: 37.7 (30 Jul 2020 17:31)  T(F): 98.4 (31 Jul 2020 11:48), Max: 99.9 (30 Jul 2020 17:31)  HR: 92 (31 Jul 2020 11:48) (89 - 109)  BP: 163/72 (31 Jul 2020 11:48) (140/80 - 171/80)  BP(mean): --  RR: 16 (31 Jul 2020 11:48) (16 - 18)  SpO2: 100% (31 Jul 2020 11:48) (96% - 100%)    PHYSICAL EXAM:  GENERAL: NAD, well-developed  HEAD:  Atraumatic, Normocephalic  EYES:  conjunctiva and sclera clear  ENMT:   Moist mucous membranes   NECK: Supple, No JVD   NERVOUS SYSTEM:  Alert & awake, not following commands.  CHEST/LUNG:  good air entry bilaterally;    HEART: Regular rate and rhythm; No murmurs, rubs, or gallops  ABDOMEN: Soft, Nontender, Nondistended; Bowel sounds present  EXTREMITIES:  2+ Peripheral Pulses, No  edema  LYMPH: No lymphadenopathy noted  SKIN: wounds to buttocks and groiin    LABS:                        9.3    11.21 )-----------( 243      ( 31 Jul 2020 08:03 )             29.9     07-31    145  |  113<H>  |  13  ----------------------------<  189<H>  3.2<L>   |  26  |  0.77    Ca    7.4<L>      31 Jul 2020 08:03    TPro  6.4  /  Alb  2.5<L>  /  TBili  0.5  /  DBili  x   /  AST  20  /  ALT  9<L>  /  AlkPhos  80  07-31       cardiac markers     CAPILLARY BLOOD GLUCOSE      POCT Blood Glucose.: 186 mg/dL (31 Jul 2020 10:36)  POCT Blood Glucose.: 199 mg/dL (31 Jul 2020 07:37)  POCT Blood Glucose.: 179 mg/dL (30 Jul 2020 21:18)  POCT Blood Glucose.: 195 mg/dL (30 Jul 2020 15:44)    Cultures    RADIOLOGY & ADDITIONAL TESTS:    Imaging Personally Reviewed:  [ ] YES  [ ] NO    Consultant(s) Notes Reviewed:  [ x ] YES  [ ] NO    Care Discussed with Consultants/Other Providers [ ] YES  [ ] NO

## 2020-07-31 NOTE — PROGRESS NOTE ADULT - SUBJECTIVE AND OBJECTIVE BOX
Patient seen and examined bedside   Milner was removed this am.  Per RN, patient has not voided yet  Pt without complaints.    T(F): 99.4 (07-31-20 @ 17:07), Max: 99.4 (07-31-20 @ 17:07)  HR: 83 (07-31-20 @ 17:07) (83 - 98)  BP: 129/72 (07-31-20 @ 17:07) (129/72 - 171/80)  RR: 18 (07-31-20 @ 17:07) (16 - 18)  SpO2: 98% (07-31-20 @ 17:07) (96% - 100%)  Wt(kg): --    POCT Blood Glucose.: 211 mg/dL (31 Jul 2020 16:02)  POCT Blood Glucose.: 186 mg/dL (31 Jul 2020 10:36)  POCT Blood Glucose.: 199 mg/dL (31 Jul 2020 07:37)  POCT Blood Glucose.: 179 mg/dL (30 Jul 2020 21:18)    PHYSICAL EXAM:  General: NAD  Chest: nonlabored respirations  Abdomen: soft, NTND.   : bladder not palpable, no suprapubic tenderness    LABS:                        9.3    11.21 )-----------( 243      ( 31 Jul 2020 08:03 )             29.9   07-31    145  |  113<H>  |  13  ----------------------------<  189<H>  3.2<L>   |  26  |  0.77    Ca    7.4<L>      31 Jul 2020 08:03    TPro  6.4  /  Alb  2.5<L>  /  TBili  0.5  /  DBili  x   /  AST  20  /  ALT  9<L>  /  AlkPhos  80  07-31    I&O's Detail    30 Jul 2020 07:01  -  31 Jul 2020 07:00  --------------------------------------------------------  IN:    Oral Fluid: 140 mL    sodium chloride 0.45%.: 1200 mL  Total IN: 1340 mL    OUT:    Indwelling Catheter - Urethral: 1950 mL    Stool: 1 mL  Total OUT: 1951 mL    Total NET: -611 mL    A/P: 69F with hydronephrosis, AMADOU improved s/p milner catheter decompression   leukocytosis improving  Ucx negative 7/29  milner removed per Dr Hernandez, awaiting void  continue medical management

## 2020-08-01 LAB
APPEARANCE UR: ABNORMAL
BACTERIA # UR AUTO: ABNORMAL
BILIRUB UR-MCNC: NEGATIVE — SIGNIFICANT CHANGE UP
COLOR SPEC: YELLOW — SIGNIFICANT CHANGE UP
COMMENT - URINE: SIGNIFICANT CHANGE UP
DIFF PNL FLD: ABNORMAL
GLUCOSE BLDC GLUCOMTR-MCNC: 138 MG/DL — HIGH (ref 70–99)
GLUCOSE BLDC GLUCOMTR-MCNC: 149 MG/DL — HIGH (ref 70–99)
GLUCOSE BLDC GLUCOMTR-MCNC: 199 MG/DL — HIGH (ref 70–99)
GLUCOSE BLDC GLUCOMTR-MCNC: 211 MG/DL — HIGH (ref 70–99)
GLUCOSE UR QL: NEGATIVE MG/DL — SIGNIFICANT CHANGE UP
KETONES UR-MCNC: NEGATIVE — SIGNIFICANT CHANGE UP
LEUKOCYTE ESTERASE UR-ACNC: ABNORMAL
NITRITE UR-MCNC: NEGATIVE — SIGNIFICANT CHANGE UP
PH UR: 7 — SIGNIFICANT CHANGE UP (ref 5–8)
PROT UR-MCNC: 100 MG/DL
RBC CASTS # UR COMP ASSIST: SIGNIFICANT CHANGE UP /HPF (ref 0–4)
SP GR SPEC: 1.01 — SIGNIFICANT CHANGE UP (ref 1.01–1.02)
UROBILINOGEN FLD QL: NEGATIVE MG/DL — SIGNIFICANT CHANGE UP
WBC UR QL: >50

## 2020-08-01 PROCEDURE — 99232 SBSQ HOSP IP/OBS MODERATE 35: CPT

## 2020-08-01 RX ORDER — NYSTATIN CREAM 100000 [USP'U]/G
1 CREAM TOPICAL
Refills: 0 | Status: DISCONTINUED | OUTPATIENT
Start: 2020-08-01 | End: 2020-08-10

## 2020-08-01 RX ADMIN — Medication 1 PATCH: at 19:12

## 2020-08-01 RX ADMIN — Medication 50 MILLIGRAM(S): at 22:29

## 2020-08-01 RX ADMIN — AMLODIPINE BESYLATE 10 MILLIGRAM(S): 2.5 TABLET ORAL at 05:29

## 2020-08-01 RX ADMIN — Medication 2: at 12:31

## 2020-08-01 RX ADMIN — SIMVASTATIN 20 MILLIGRAM(S): 20 TABLET, FILM COATED ORAL at 22:29

## 2020-08-01 RX ADMIN — ALBUTEROL 2.5 MILLIGRAM(S): 90 AEROSOL, METERED ORAL at 17:00

## 2020-08-01 RX ADMIN — Medication 25 MILLIGRAM(S): at 22:29

## 2020-08-01 RX ADMIN — HEPARIN SODIUM 5000 UNIT(S): 5000 INJECTION INTRAVENOUS; SUBCUTANEOUS at 17:52

## 2020-08-01 RX ADMIN — Medication 50 MILLIGRAM(S): at 05:29

## 2020-08-01 RX ADMIN — NYSTATIN CREAM 1 APPLICATION(S): 100000 CREAM TOPICAL at 17:52

## 2020-08-01 RX ADMIN — ALBUTEROL 2.5 MILLIGRAM(S): 90 AEROSOL, METERED ORAL at 11:20

## 2020-08-01 RX ADMIN — LEVETIRACETAM 500 MILLIGRAM(S): 250 TABLET, FILM COATED ORAL at 05:29

## 2020-08-01 RX ADMIN — Medication 4: at 08:06

## 2020-08-01 RX ADMIN — Medication 25 MILLIGRAM(S): at 05:29

## 2020-08-01 RX ADMIN — HEPARIN SODIUM 5000 UNIT(S): 5000 INJECTION INTRAVENOUS; SUBCUTANEOUS at 05:29

## 2020-08-01 RX ADMIN — Medication 25 MILLIGRAM(S): at 14:29

## 2020-08-01 RX ADMIN — Medication 81 MILLIGRAM(S): at 12:32

## 2020-08-01 RX ADMIN — Medication 50 MILLIGRAM(S): at 17:48

## 2020-08-01 RX ADMIN — ALBUTEROL 2.5 MILLIGRAM(S): 90 AEROSOL, METERED ORAL at 05:24

## 2020-08-01 RX ADMIN — ALBUTEROL 2.5 MILLIGRAM(S): 90 AEROSOL, METERED ORAL at 23:58

## 2020-08-01 RX ADMIN — CLOPIDOGREL BISULFATE 75 MILLIGRAM(S): 75 TABLET, FILM COATED ORAL at 12:32

## 2020-08-01 RX ADMIN — LEVETIRACETAM 500 MILLIGRAM(S): 250 TABLET, FILM COATED ORAL at 17:48

## 2020-08-01 NOTE — PROGRESS NOTE ADULT - ASSESSMENT
68 yo non verbal but responsive woman in rtn    suggest chronic indwellig milner  change q 4-6 weeks  no further  input at this time

## 2020-08-01 NOTE — PROGRESS NOTE ADULT - ASSESSMENT
69 years old female with unresponsive found too have acute kidney injury and hydro           Multiple skin wounds to buttocks/groin:  - local wound care 69 years old female with unresponsive found too have acute kidney injury and hydro                         Multiple skin wounds to buttocks and groin, likely incontinence-associated dermatitis (IAD).  - nystatin bid  - local wound care

## 2020-08-01 NOTE — PROGRESS NOTE ADULT - SUBJECTIVE AND OBJECTIVE BOX
The patient failoed tov-milner replaced    Vital Signs Last 24 Hrs  T(C): 36.8 (01 Aug 2020 11:53), Max: 37.6 (01 Aug 2020 05:06)  T(F): 98.2 (01 Aug 2020 11:53), Max: 99.6 (01 Aug 2020 05:06)  HR: 93 (01 Aug 2020 11:53) (83 - 115)  BP: 152/69 (01 Aug 2020 11:53) (128/70 - 152/69)  BP(mean): --  RR: 17 (01 Aug 2020 11:53) (17 - 18)  SpO2: 99% (01 Aug 2020 11:53) (97% - 99%)    PHYSICAL EXAM:    NAD, well-developed/non verbal  Abd: soft, NT/ND, No CVAT    Urine:cloudy     I&O's Summary    31 Jul 2020 07:01  -  01 Aug 2020 07:00  --------------------------------------------------------  IN: 1330 mL / OUT: 0 mL / NET: 1330 mL        LABS:                        9.3    11.21 )-----------( 243      ( 31 Jul 2020 08:03 )             29.9     07-31    145  |  113<H>  |  13  ----------------------------<  189<H>  3.2<L>   |  26  |  0.77    Ca    7.4<L>      31 Jul 2020 08:03    TPro  6.4  /  Alb  2.5<L>  /  TBili  0.5  /  DBili  x   /  AST  20  /  ALT  9<L>  /  AlkPhos  80  07-31      Urine Culture: neg    Prior notes/chart reviewed.

## 2020-08-01 NOTE — PROGRESS NOTE ADULT - SUBJECTIVE AND OBJECTIVE BOX
Patient is a 69y old  Female who presents with a chief complaint of Altered Mental Status (30 Jul 2020 17:31), she is in NAD.    MEDICATIONS  (STANDING):  ALBUTerol    0.083% 2.5 milliGRAM(s) Nebulizer every 6 hours  ALBUTerol    90 MICROgram(s) HFA Inhaler 3 Puff(s) Inhalation every 4 hours  amLODIPine   Tablet 10 milliGRAM(s) Oral daily  aspirin enteric coated 81 milliGRAM(s) Oral daily  cloNIDine Patch 0.1 mG/24Hr(s) 1 patch Transdermal every 7 days  clopidogrel Tablet 75 milliGRAM(s) Oral daily  dextrose 5%. 1000 milliLiter(s) (50 mL/Hr) IV Continuous <Continuous>  dextrose 50% Injectable 12.5 Gram(s) IV Push once  dextrose 50% Injectable 25 Gram(s) IV Push once  dextrose 50% Injectable 25 Gram(s) IV Push once  heparin   Injectable 5000 Unit(s) SubCutaneous every 12 hours  hydrALAZINE 25 milliGRAM(s) Oral three times a day  insulin lispro (HumaLOG) corrective regimen sliding scale   SubCutaneous three times a day before meals  insulin lispro (HumaLOG) corrective regimen sliding scale   SubCutaneous at bedtime  levETIRAcetam  Solution 500 milliGRAM(s) Oral two times a day  metoprolol tartrate 50 milliGRAM(s) Oral two times a day  simvastatin 20 milliGRAM(s) Oral at bedtime  sodium chloride 0.45%. 1000 milliLiter(s) (100 mL/Hr) IV Continuous <Continuous>  traZODone 50 milliGRAM(s) Oral at bedtime    MEDICATIONS  (PRN):  acetaminophen   Tablet .. 650 milliGRAM(s) Oral every 6 hours PRN Temp greater or equal to 38C (100.4F), Mild Pain (1 - 3)  dextrose 40% Gel 15 Gram(s) Oral once PRN Blood Glucose LESS THAN 70 milliGRAM(s)/deciliter  glucagon  Injectable 1 milliGRAM(s) IntraMuscular once PRN Glucose LESS THAN 70 milligrams/deciliter  haloperidol    Injectable 1 milliGRAM(s) IV Push three times a day PRN agitation        REVIEW OF SYSTEMS:  Unable to obtain 2/2 dementia.     Vital Signs Last 24 Hrs  T(C): 36.8 (01 Aug 2020 11:53), Max: 37.6 (01 Aug 2020 05:06)  T(F): 98.2 (01 Aug 2020 11:53), Max: 99.6 (01 Aug 2020 05:06)  HR: 93 (01 Aug 2020 11:53) (83 - 115)  BP: 152/69 (01 Aug 2020 11:53) (128/70 - 152/69)  BP(mean): --  RR: 17 (01 Aug 2020 11:53) (17 - 18)  SpO2: 99% (01 Aug 2020 11:53) (97% - 99%)    PHYSICAL EXAM:  GENERAL: NAD, well-developed  HEAD:  Atraumatic, Normocephalic  EYES:  conjunctiva and sclera clear  ENMT:   Moist mucous membranes   NECK: Supple, No JVD   NERVOUS SYSTEM:  Alert & awake, not following commands.  CHEST/LUNG:  good air entry bilaterally;    HEART: Regular rate and rhythm; No murmurs, rubs, or gallops  ABDOMEN: Soft, Nontender, Nondistended; Bowel sounds present  EXTREMITIES:  2+ Peripheral Pulses, No  edema  LYMPH: No lymphadenopathy noted  SKIN: wounds to buttocks and groin    LABS:                        9.3    11.21 )-----------( 243      ( 31 Jul 2020 08:03 )             29.9     07-31    145  |  113<H>  |  13  ----------------------------<  189<H>  3.2<L>   |  26  |  0.77    Ca    7.4<L>      31 Jul 2020 08:03    TPro  6.4  /  Alb  2.5<L>  /  TBili  0.5  /  DBili  x   /  AST  20  /  ALT  9<L>  /  AlkPhos  80  07-31       cardiac markers     CAPILLARY BLOOD GLUCOSE      POCT Blood Glucose.: 199 mg/dL (01 Aug 2020 11:25)  POCT Blood Glucose.: 211 mg/dL (01 Aug 2020 07:54)  POCT Blood Glucose.: 166 mg/dL (31 Jul 2020 20:45)  POCT Blood Glucose.: 211 mg/dL (31 Jul 2020 16:02)    Cultures    RADIOLOGY & ADDITIONAL TESTS:    Imaging Personally Reviewed:  [ ] YES  [ ] NO    Consultant(s) Notes Reviewed:  [ x ] YES  [ ] NO    Care Discussed with Consultants/Other Providers [ ] YES  [ ] NO Patient is a 69y old  Female who presents with a chief complaint of Altered Mental Status (30 Jul 2020 17:31), she is in NAD.    MEDICATIONS  (STANDING):  ALBUTerol    0.083% 2.5 milliGRAM(s) Nebulizer every 6 hours  ALBUTerol    90 MICROgram(s) HFA Inhaler 3 Puff(s) Inhalation every 4 hours  amLODIPine   Tablet 10 milliGRAM(s) Oral daily  aspirin enteric coated 81 milliGRAM(s) Oral daily  cloNIDine Patch 0.1 mG/24Hr(s) 1 patch Transdermal every 7 days  clopidogrel Tablet 75 milliGRAM(s) Oral daily  dextrose 5%. 1000 milliLiter(s) (50 mL/Hr) IV Continuous <Continuous>  dextrose 50% Injectable 12.5 Gram(s) IV Push once  dextrose 50% Injectable 25 Gram(s) IV Push once  dextrose 50% Injectable 25 Gram(s) IV Push once  heparin   Injectable 5000 Unit(s) SubCutaneous every 12 hours  hydrALAZINE 25 milliGRAM(s) Oral three times a day  insulin lispro (HumaLOG) corrective regimen sliding scale   SubCutaneous three times a day before meals  insulin lispro (HumaLOG) corrective regimen sliding scale   SubCutaneous at bedtime  levETIRAcetam  Solution 500 milliGRAM(s) Oral two times a day  metoprolol tartrate 50 milliGRAM(s) Oral two times a day  simvastatin 20 milliGRAM(s) Oral at bedtime  sodium chloride 0.45%. 1000 milliLiter(s) (100 mL/Hr) IV Continuous <Continuous>  traZODone 50 milliGRAM(s) Oral at bedtime    MEDICATIONS  (PRN):  acetaminophen   Tablet .. 650 milliGRAM(s) Oral every 6 hours PRN Temp greater or equal to 38C (100.4F), Mild Pain (1 - 3)  dextrose 40% Gel 15 Gram(s) Oral once PRN Blood Glucose LESS THAN 70 milliGRAM(s)/deciliter  glucagon  Injectable 1 milliGRAM(s) IntraMuscular once PRN Glucose LESS THAN 70 milligrams/deciliter  haloperidol    Injectable 1 milliGRAM(s) IV Push three times a day PRN agitation        REVIEW OF SYSTEMS:  Unable to obtain 2/2 dementia.     Vital Signs Last 24 Hrs  T(C): 36.8 (01 Aug 2020 11:53), Max: 37.6 (01 Aug 2020 05:06)  T(F): 98.2 (01 Aug 2020 11:53), Max: 99.6 (01 Aug 2020 05:06)  HR: 93 (01 Aug 2020 11:53) (83 - 115)  BP: 152/69 (01 Aug 2020 11:53) (128/70 - 152/69)  BP(mean): --  RR: 17 (01 Aug 2020 11:53) (17 - 18)  SpO2: 99% (01 Aug 2020 11:53) (97% - 99%)    PHYSICAL EXAM:  GENERAL: NAD, well-developed  HEAD:  Atraumatic, Normocephalic  EYES:  conjunctiva and sclera clear  ENMT:   Moist mucous membranes   NECK: Supple, No JVD   NERVOUS SYSTEM:  Alert & awake, not following commands.  CHEST/LUNG:  good air entry bilaterally;    HEART: Regular rate and rhythm; No murmurs, rubs, or gallops  ABDOMEN: Soft, Nontender, Nondistended; Bowel sounds present  EXTREMITIES:  2+ Peripheral Pulses, No  edema  LYMPH: No lymphadenopathy noted  SKIN: wounds to buttocks and groin, Incontinence-associated dermatitis (IAD)    LABS:                        9.3    11.21 )-----------( 243      ( 31 Jul 2020 08:03 )             29.9     07-31    145  |  113<H>  |  13  ----------------------------<  189<H>  3.2<L>   |  26  |  0.77    Ca    7.4<L>      31 Jul 2020 08:03    TPro  6.4  /  Alb  2.5<L>  /  TBili  0.5  /  DBili  x   /  AST  20  /  ALT  9<L>  /  AlkPhos  80  07-31       cardiac markers     CAPILLARY BLOOD GLUCOSE      POCT Blood Glucose.: 199 mg/dL (01 Aug 2020 11:25)  POCT Blood Glucose.: 211 mg/dL (01 Aug 2020 07:54)  POCT Blood Glucose.: 166 mg/dL (31 Jul 2020 20:45)  POCT Blood Glucose.: 211 mg/dL (31 Jul 2020 16:02)    Cultures    RADIOLOGY & ADDITIONAL TESTS:    Imaging Personally Reviewed:  [ ] YES  [ ] NO    Consultant(s) Notes Reviewed:  [ x ] YES  [ ] NO    Care Discussed with Consultants/Other Providers [ ] YES  [ ] NO

## 2020-08-02 LAB
ALBUMIN SERPL ELPH-MCNC: 2.1 G/DL — LOW (ref 3.3–5)
ALP SERPL-CCNC: 90 U/L — SIGNIFICANT CHANGE UP (ref 40–120)
ALT FLD-CCNC: 29 U/L — SIGNIFICANT CHANGE UP (ref 12–78)
ANION GAP SERPL CALC-SCNC: 5 MMOL/L — SIGNIFICANT CHANGE UP (ref 5–17)
AST SERPL-CCNC: 41 U/L — HIGH (ref 15–37)
BILIRUB SERPL-MCNC: 0.4 MG/DL — SIGNIFICANT CHANGE UP (ref 0.2–1.2)
BUN SERPL-MCNC: 7 MG/DL — SIGNIFICANT CHANGE UP (ref 7–23)
CALCIUM SERPL-MCNC: 7.5 MG/DL — LOW (ref 8.5–10.1)
CHLORIDE SERPL-SCNC: 112 MMOL/L — HIGH (ref 96–108)
CO2 SERPL-SCNC: 26 MMOL/L — SIGNIFICANT CHANGE UP (ref 22–31)
CREAT SERPL-MCNC: 0.87 MG/DL — SIGNIFICANT CHANGE UP (ref 0.5–1.3)
CULTURE RESULTS: SIGNIFICANT CHANGE UP
GLUCOSE BLDC GLUCOMTR-MCNC: 110 MG/DL — HIGH (ref 70–99)
GLUCOSE BLDC GLUCOMTR-MCNC: 144 MG/DL — HIGH (ref 70–99)
GLUCOSE BLDC GLUCOMTR-MCNC: 161 MG/DL — HIGH (ref 70–99)
GLUCOSE BLDC GLUCOMTR-MCNC: 166 MG/DL — HIGH (ref 70–99)
GLUCOSE SERPL-MCNC: 118 MG/DL — HIGH (ref 70–99)
HCT VFR BLD CALC: 23.3 % — LOW (ref 34.5–45)
HGB BLD-MCNC: 7.8 G/DL — LOW (ref 11.5–15.5)
MAGNESIUM SERPL-MCNC: 1.5 MG/DL — LOW (ref 1.6–2.6)
MCHC RBC-ENTMCNC: 29.2 PG — SIGNIFICANT CHANGE UP (ref 27–34)
MCHC RBC-ENTMCNC: 33.5 GM/DL — SIGNIFICANT CHANGE UP (ref 32–36)
MCV RBC AUTO: 87.3 FL — SIGNIFICANT CHANGE UP (ref 80–100)
NRBC # BLD: 0 /100 WBCS — SIGNIFICANT CHANGE UP (ref 0–0)
PHOSPHATE SERPL-MCNC: 2.5 MG/DL — SIGNIFICANT CHANGE UP (ref 2.5–4.5)
PLATELET # BLD AUTO: 263 K/UL — SIGNIFICANT CHANGE UP (ref 150–400)
POTASSIUM SERPL-MCNC: 3.3 MMOL/L — LOW (ref 3.5–5.3)
POTASSIUM SERPL-SCNC: 3.3 MMOL/L — LOW (ref 3.5–5.3)
PROT SERPL-MCNC: 5.5 GM/DL — LOW (ref 6–8.3)
RBC # BLD: 2.67 M/UL — LOW (ref 3.8–5.2)
RBC # FLD: 12.4 % — SIGNIFICANT CHANGE UP (ref 10.3–14.5)
SODIUM SERPL-SCNC: 143 MMOL/L — SIGNIFICANT CHANGE UP (ref 135–145)
SPECIMEN SOURCE: SIGNIFICANT CHANGE UP
WBC # BLD: 9.43 K/UL — SIGNIFICANT CHANGE UP (ref 3.8–10.5)
WBC # FLD AUTO: 9.43 K/UL — SIGNIFICANT CHANGE UP (ref 3.8–10.5)

## 2020-08-02 PROCEDURE — 99232 SBSQ HOSP IP/OBS MODERATE 35: CPT

## 2020-08-02 RX ADMIN — NYSTATIN CREAM 1 APPLICATION(S): 100000 CREAM TOPICAL at 06:07

## 2020-08-02 RX ADMIN — ALBUTEROL 2.5 MILLIGRAM(S): 90 AEROSOL, METERED ORAL at 17:19

## 2020-08-02 RX ADMIN — ALBUTEROL 2.5 MILLIGRAM(S): 90 AEROSOL, METERED ORAL at 05:40

## 2020-08-02 RX ADMIN — Medication 25 MILLIGRAM(S): at 06:07

## 2020-08-02 RX ADMIN — HEPARIN SODIUM 5000 UNIT(S): 5000 INJECTION INTRAVENOUS; SUBCUTANEOUS at 17:55

## 2020-08-02 RX ADMIN — Medication 1 PATCH: at 20:12

## 2020-08-02 RX ADMIN — Medication 2: at 07:55

## 2020-08-02 RX ADMIN — CLOPIDOGREL BISULFATE 75 MILLIGRAM(S): 75 TABLET, FILM COATED ORAL at 12:15

## 2020-08-02 RX ADMIN — NYSTATIN CREAM 1 APPLICATION(S): 100000 CREAM TOPICAL at 18:04

## 2020-08-02 RX ADMIN — Medication 81 MILLIGRAM(S): at 12:15

## 2020-08-02 RX ADMIN — HEPARIN SODIUM 5000 UNIT(S): 5000 INJECTION INTRAVENOUS; SUBCUTANEOUS at 06:07

## 2020-08-02 RX ADMIN — Medication 25 MILLIGRAM(S): at 22:23

## 2020-08-02 RX ADMIN — LEVETIRACETAM 500 MILLIGRAM(S): 250 TABLET, FILM COATED ORAL at 17:55

## 2020-08-02 RX ADMIN — LEVETIRACETAM 500 MILLIGRAM(S): 250 TABLET, FILM COATED ORAL at 06:07

## 2020-08-02 RX ADMIN — Medication 50 MILLIGRAM(S): at 22:23

## 2020-08-02 RX ADMIN — AMLODIPINE BESYLATE 10 MILLIGRAM(S): 2.5 TABLET ORAL at 06:07

## 2020-08-02 RX ADMIN — ALBUTEROL 2.5 MILLIGRAM(S): 90 AEROSOL, METERED ORAL at 11:35

## 2020-08-02 RX ADMIN — Medication 50 MILLIGRAM(S): at 06:07

## 2020-08-02 RX ADMIN — SODIUM CHLORIDE 100 MILLILITER(S): 9 INJECTION, SOLUTION INTRAVENOUS at 06:07

## 2020-08-02 RX ADMIN — Medication 25 MILLIGRAM(S): at 13:52

## 2020-08-02 RX ADMIN — Medication 50 MILLIGRAM(S): at 17:56

## 2020-08-02 RX ADMIN — SIMVASTATIN 20 MILLIGRAM(S): 20 TABLET, FILM COATED ORAL at 22:23

## 2020-08-02 NOTE — PROGRESS NOTE ADULT - PROBLEM SELECTOR PLAN 2
- Mayberry was removed.  -  is following. - resolved  - creat 8.6 on admission - - > 1.25 - - > 0.77  - Renal is following

## 2020-08-02 NOTE — PROGRESS NOTE ADULT - PROBLEM SELECTOR PLAN 1
- improved significantly,  - creat 8.6 on admission - - > 1.25 - - > 0.77  - Renal is following - poss uremia on admission  - patient is nonverbal

## 2020-08-02 NOTE — PROGRESS NOTE ADULT - ASSESSMENT
69 years old female with unresponsive found too have acute kidney injury and hydro                         Multiple skin wounds to buttocks and groin, likely incontinence-associated dermatitis (IAD).  - nystatin bid  - local wound care

## 2020-08-02 NOTE — PROGRESS NOTE ADULT - PROBLEM SELECTOR PLAN 8
- Contaminant as per IB  - repeat blood cultures.  - ID eval  noted.  - no antibiotics needed. - Contaminant as per ID.  - repeat blood cultures.  - ID following.  - no antibiotics needed.

## 2020-08-02 NOTE — PROGRESS NOTE ADULT - PROBLEM SELECTOR PLAN 3
- resolved.  -  is following.  - Follow up voiding. - failed TOV  - reinsert Milner catheter.  - change milner q4-6 week.

## 2020-08-02 NOTE — PROGRESS NOTE ADULT - PROBLEM SELECTOR PLAN 9
- keppra level: 54 (high).  - Neurology eval with Dr. Mendoza.  - Decrease Keppra to 500 mg bid. - keppra level: 54 (high).  - Neurology eval with Dr. Mendoza noted.  - Keppra was decreased to 500 mg bid.

## 2020-08-02 NOTE — PROGRESS NOTE ADULT - SUBJECTIVE AND OBJECTIVE BOX
Patient is a 69y old  Female who presents with a chief complaint of retention (01 Aug 2020 13:14), in NAD.       MEDICATIONS  (STANDING):  ALBUTerol    0.083% 2.5 milliGRAM(s) Nebulizer every 6 hours  ALBUTerol    90 MICROgram(s) HFA Inhaler 3 Puff(s) Inhalation every 4 hours  amLODIPine   Tablet 10 milliGRAM(s) Oral daily  aspirin enteric coated 81 milliGRAM(s) Oral daily  cloNIDine Patch 0.1 mG/24Hr(s) 1 patch Transdermal every 7 days  clopidogrel Tablet 75 milliGRAM(s) Oral daily  dextrose 5%. 1000 milliLiter(s) (50 mL/Hr) IV Continuous <Continuous>  dextrose 50% Injectable 12.5 Gram(s) IV Push once  dextrose 50% Injectable 25 Gram(s) IV Push once  dextrose 50% Injectable 25 Gram(s) IV Push once  heparin   Injectable 5000 Unit(s) SubCutaneous every 12 hours  hydrALAZINE 25 milliGRAM(s) Oral three times a day  insulin lispro (HumaLOG) corrective regimen sliding scale   SubCutaneous three times a day before meals  insulin lispro (HumaLOG) corrective regimen sliding scale   SubCutaneous at bedtime  levETIRAcetam  Solution 500 milliGRAM(s) Oral two times a day  metoprolol tartrate 50 milliGRAM(s) Oral two times a day  nystatin Powder 1 Application(s) Topical two times a day  simvastatin 20 milliGRAM(s) Oral at bedtime  sodium chloride 0.45%. 1000 milliLiter(s) (100 mL/Hr) IV Continuous <Continuous>  traZODone 50 milliGRAM(s) Oral at bedtime    MEDICATIONS  (PRN):  acetaminophen   Tablet .. 650 milliGRAM(s) Oral every 6 hours PRN Temp greater or equal to 38C (100.4F), Mild Pain (1 - 3)  dextrose 40% Gel 15 Gram(s) Oral once PRN Blood Glucose LESS THAN 70 milliGRAM(s)/deciliter  glucagon  Injectable 1 milliGRAM(s) IntraMuscular once PRN Glucose LESS THAN 70 milligrams/deciliter  haloperidol    Injectable 1 milliGRAM(s) IV Push three times a day PRN agitation    REVIEW OF SYSTEMS: unable to obtain, patient is nonverbal with severe dementia.     Vital Signs Last 24 Hrs  T(C): 37.6 (02 Aug 2020 05:31), Max: 37.6 (02 Aug 2020 05:31)  T(F): 99.7 (02 Aug 2020 05:31), Max: 99.7 (02 Aug 2020 05:31)  HR: 103 (02 Aug 2020 06:11) (82 - 107)  BP: 154/79 (02 Aug 2020 05:31) (127/67 - 154/79)  BP(mean): --  RR: 18 (02 Aug 2020 05:31) (17 - 18)  SpO2: 97% (02 Aug 2020 06:11) (97% - 100%)    PHYSICAL EXAM:  GENERAL: NAD, well-developed  HEAD:  Atraumatic, Normocephalic  EYES:  conjunctiva and sclera clear  ENMT:   Moist mucous membranes   NECK: Supple, No JVD, no thyromegaly  NERVOUS SYSTEM:  Alert & awake, not following commands.  CHEST/LUNG:  good air entry bilaterally;    HEART: Regular rate and rhythm; No murmurs, rubs, or gallops  ABDOMEN: Soft, Nontender, Nondistended; Bowel sounds present  EXTREMITIES:  2+ Peripheral Pulses,    LYMPH: No lymphadenopathy noted  SKIN: wounds to buttocks and groin, incontinence-associated dermatitis.    LABS:    cardiac markers   Urinalysis Basic - ( 01 Aug 2020 16:32 )    Color: Yellow / Appearance: very cloudy / S.010 / pH: x  Gluc: x / Ketone: Negative  / Bili: Negative / Urobili: Negative mg/dL   Blood: x / Protein: 100 mg/dL / Nitrite: Negative   Leuk Esterase: Moderate / RBC: 0-2 /HPF / WBC >50   Sq Epi: x / Non Sq Epi: x / Bacteria: Moderate      CAPILLARY BLOOD GLUCOSE      POCT Blood Glucose.: 166 mg/dL (02 Aug 2020 07:30)  POCT Blood Glucose.: 149 mg/dL (01 Aug 2020 21:42)  POCT Blood Glucose.: 138 mg/dL (01 Aug 2020 15:43)  POCT Blood Glucose.: 199 mg/dL (01 Aug 2020 11:25)    Cultures    RADIOLOGY & ADDITIONAL TESTS:    Imaging Personally Reviewed:  [ ] YES  [ ] NO    Consultant(s) Notes Reviewed:  [ x ] YES  [ ] NO    Care Discussed with Consultants/Other Providers [ ] YES  [ ] NO

## 2020-08-02 NOTE — CHART NOTE - NSCHARTNOTEFT_GEN_A_CORE
Assessment: Pt PMHx HTN, DM, CVA, sezisure disorder    Factors impacting intake: [ ] none [ ] nausea  [ ] vomiting [ ] diarrhea [ ] constipation  [ ]chewing problems [ ] swallowing issues  [ ] other:     Diet Prescription: Diet, Dysphagia 1 Pureed-Nectar Consistency Fluid:   Consistent Carbohydrate {Evening Snack}  Low Sodium  No Carb Prosource (1pkg = 15gms Protein)     Qty per Day:  1  Supplement Feeding Modality:  Oral  Ensure Pudding Cans or Servings Per Day:  3       Frequency:  Daily (07-30-20 @ 16:05)    Intake:     Current Weight: Weight (kg): 59.7 (07-28 @ 22:37)  % Weight Change    Pertinent Medications: MEDICATIONS  (STANDING):  ALBUTerol    0.083% 2.5 milliGRAM(s) Nebulizer every 6 hours  ALBUTerol    90 MICROgram(s) HFA Inhaler 3 Puff(s) Inhalation every 4 hours  amLODIPine   Tablet 10 milliGRAM(s) Oral daily  aspirin enteric coated 81 milliGRAM(s) Oral daily  cloNIDine Patch 0.1 mG/24Hr(s) 1 patch Transdermal every 7 days  clopidogrel Tablet 75 milliGRAM(s) Oral daily  dextrose 5%. 1000 milliLiter(s) (50 mL/Hr) IV Continuous <Continuous>  dextrose 50% Injectable 12.5 Gram(s) IV Push once  dextrose 50% Injectable 25 Gram(s) IV Push once  dextrose 50% Injectable 25 Gram(s) IV Push once  heparin   Injectable 5000 Unit(s) SubCutaneous every 12 hours  hydrALAZINE 25 milliGRAM(s) Oral three times a day  insulin lispro (HumaLOG) corrective regimen sliding scale   SubCutaneous three times a day before meals  insulin lispro (HumaLOG) corrective regimen sliding scale   SubCutaneous at bedtime  levETIRAcetam  Solution 500 milliGRAM(s) Oral two times a day  metoprolol tartrate 50 milliGRAM(s) Oral two times a day  nystatin Powder 1 Application(s) Topical two times a day  simvastatin 20 milliGRAM(s) Oral at bedtime  sodium chloride 0.45%. 1000 milliLiter(s) (100 mL/Hr) IV Continuous <Continuous>  traZODone 50 milliGRAM(s) Oral at bedtime    MEDICATIONS  (PRN):  acetaminophen   Tablet .. 650 milliGRAM(s) Oral every 6 hours PRN Temp greater or equal to 38C (100.4F), Mild Pain (1 - 3)  dextrose 40% Gel 15 Gram(s) Oral once PRN Blood Glucose LESS THAN 70 milliGRAM(s)/deciliter  glucagon  Injectable 1 milliGRAM(s) IntraMuscular once PRN Glucose LESS THAN 70 milligrams/deciliter  haloperidol    Injectable 1 milliGRAM(s) IV Push three times a day PRN agitation    Pertinent Labs: 08-02 Na143 mmol/L Glu 118 mg/dL<H> K+ 3.3 mmol/L<L> Cr  0.87 mg/dL BUN 7 mg/dL 08-02 Phos 2.5 mg/dL 08-02 Alb 2.1 g/dL<L>     CAPILLARY BLOOD GLUCOSE      POCT Blood Glucose.: 110 mg/dL (02 Aug 2020 11:02)  POCT Blood Glucose.: 166 mg/dL (02 Aug 2020 07:30)  POCT Blood Glucose.: 149 mg/dL (01 Aug 2020 21:42)  POCT Blood Glucose.: 138 mg/dL (01 Aug 2020 15:43)    Skin:     Estimated Needs:   [ ] no change since previous assessment  [ ] recalculated:     Previous Nutrition Diagnosis:   [ ] Inadequate Energy Intake [ ]Inadequate Oral Intake [ ] Excessive Energy Intake   [ ] Underweight [ ] Increased Nutrient Needs [ ] Overweight/Obesity   [ ] Altered GI Function [ ] Unintended Weight Loss [ ] Food & Nutrition Related Knowledge Deficit [ ] Malnutrition     Nutrition Diagnosis is [ ] ongoing  [ ] resolved [ ] not applicable     New Nutrition Diagnosis: [ ] not applicable       Interventions:   Recommend  [ ] Change Diet To:  [ ] Nutrition Supplement  [ ] Nutrition Support  [ ] Other:     Monitoring and Evaluation:   [ ] PO intake [ x ] Tolerance to diet prescription [ x ] weights [ x ] labs[ x ] follow up per protocol  [ ] other: Assessment: Pt PMHx HTN, DM, CVA, seizure disorder. Pt admitted with AMADOU (resolved); with metabolic encephalopathy, urinary retention, hydronephrosis with urinary obstruction d/t renal calculus (resolved), hyperkalemia (resolved), hypernatremia (resolved), bacteremia, humerus fx. Pt is nonverbal.    Factors impacting intake: [ ] none [ ] nausea  [ ] vomiting [ ] diarrhea [ ] constipation  [ ]chewing problems [ ] swallowing issues  [x] other: change in mental status; difficulty feeding self; difficulty swallowing    Diet Prescription: Diet, Dysphagia 1 Pureed-Nectar Consistency Fluid:   Consistent Carbohydrate {Evening Snack}  Low Sodium  No Carb Prosource (1pkg = 15gms Protein)     Qty per Day:  1  Supplement Feeding Modality:  Oral  Ensure Pudding Cans or Servings Per Day:  3       Frequency:  Daily (07-30-20 @ 16:05)    Intake: Po intake varies, 25-75%; Pt is total feed    Current Weight: 54.3 kg (8/1), 54.3 kg (7/30), 59.7 kg (7/28); ? accuracy of wt from 7/28 as pt likely did not lose 5.4 kg x 2 days  % Weight Change: Stable x 2 days    Physical appearance: 1+ edema L foot & R foot    Pertinent Medications: MEDICATIONS  (STANDING):  ALBUTerol    0.083% 2.5 milliGRAM(s) Nebulizer every 6 hours  ALBUTerol    90 MICROgram(s) HFA Inhaler 3 Puff(s) Inhalation every 4 hours  amLODIPine   Tablet 10 milliGRAM(s) Oral daily  aspirin enteric coated 81 milliGRAM(s) Oral daily  cloNIDine Patch 0.1 mG/24Hr(s) 1 patch Transdermal every 7 days  clopidogrel Tablet 75 milliGRAM(s) Oral daily  dextrose 5%. 1000 milliLiter(s) (50 mL/Hr) IV Continuous <Continuous>  dextrose 50% Injectable 12.5 Gram(s) IV Push once  dextrose 50% Injectable 25 Gram(s) IV Push once  dextrose 50% Injectable 25 Gram(s) IV Push once  heparin   Injectable 5000 Unit(s) SubCutaneous every 12 hours  hydrALAZINE 25 milliGRAM(s) Oral three times a day  insulin lispro (HumaLOG) corrective regimen sliding scale   SubCutaneous three times a day before meals  insulin lispro (HumaLOG) corrective regimen sliding scale   SubCutaneous at bedtime  levETIRAcetam  Solution 500 milliGRAM(s) Oral two times a day  metoprolol tartrate 50 milliGRAM(s) Oral two times a day  nystatin Powder 1 Application(s) Topical two times a day  simvastatin 20 milliGRAM(s) Oral at bedtime  sodium chloride 0.45%. 1000 milliLiter(s) (100 mL/Hr) IV Continuous <Continuous>  traZODone 50 milliGRAM(s) Oral at bedtime    MEDICATIONS  (PRN):  acetaminophen   Tablet .. 650 milliGRAM(s) Oral every 6 hours PRN Temp greater or equal to 38C (100.4F), Mild Pain (1 - 3)  dextrose 40% Gel 15 Gram(s) Oral once PRN Blood Glucose LESS THAN 70 milliGRAM(s)/deciliter  glucagon  Injectable 1 milliGRAM(s) IntraMuscular once PRN Glucose LESS THAN 70 milligrams/deciliter  haloperidol    Injectable 1 milliGRAM(s) IV Push three times a day PRN agitation    Pertinent Labs: 08-02 Na143 mmol/L Glu 118 mg/dL<H> K+ 3.3 mmol/L<L> Cr  0.87 mg/dL BUN 7 mg/dL 08-02 Phos 2.5 mg/dL 08-02 Alb 2.1 g/dL<L>    CAPILLARY BLOOD GLUCOSE    POCT Blood Glucose.: 110 mg/dL (02 Aug 2020 11:02)  POCT Blood Glucose.: 166 mg/dL (02 Aug 2020 07:30)  POCT Blood Glucose.: 149 mg/dL (01 Aug 2020 21:42)  POCT Blood Glucose.: 138 mg/dL (01 Aug 2020 15:43)    Skin: Skin without pressure ulcers    Estimated Needs:   [x] no change since previous assessment on 7/30/20  [ ] recalculated:     Previous Nutrition Diagnosis:   Nutrition Diagnostic Terminology #1 Malnutrition...     Malnutrition severe malnutrition in context of acute illness.     Etiology inadequate protein-energy intake in setting of hx of s/p fall, pressure ulcers, AMADOU,     Signs/Symptoms <50% nutrition needs > 5 days, physical findings of moderate muscle loss.     Goal/Expected Outcome pt to consume >50-75% of meals & supplement; pt not consistently met      Nutrition Diagnosis is [x] ongoing  [ ] resolved [ ] not applicable     New Nutrition Diagnosis: [x] not applicable       Interventions:   Recommend continue with current diet as rx'd  [ ] Change Diet To:  [ ] Nutrition Supplement  [ ] Nutrition Support  [x] Other: Encourage po intake    Monitoring and Evaluation:   [ x ] PO intake [ x ] Tolerance to diet prescription [ x ] weights [ x ] labs (fs, gluc) [ x ] follow up per protocol  [ ] other: Assessment: Pt PMHx HTN, DM, CVA, seizure disorder. Pt admitted with AMADOU (resolved); with metabolic encephalopathy, urinary retention, hydronephrosis with urinary obstruction d/t renal calculus (resolved), hyperkalemia (resolved), hypernatremia (resolved), bacteremia, humerus fx. Pt is nonverbal.    Factors impacting intake: [ ] none [ ] nausea  [ ] vomiting [ ] diarrhea [ ] constipation  [ ]chewing problems [x] swallowing issues  [x] other: change in mental status; difficulty feeding self    Diet Prescription: Diet, Dysphagia 1 Pureed-Nectar Consistency Fluid:   Consistent Carbohydrate {Evening Snack}  Low Sodium  No Carb Prosource (1pkg = 15gms Protein)     Qty per Day:  1  Supplement Feeding Modality:  Oral  Ensure Pudding Cans or Servings Per Day:  3       Frequency:  Daily (07-30-20 @ 16:05)    Intake: Po intake varies, 25-75%; Pt is total feed; Pt consuming Ensure Pudding    Current Weight: 54.3 kg (8/1), 54.3 kg (7/30), 59.7 kg (7/28); ? accuracy of wt from 7/28 as pt likely did not lose 5.4 kg x 2 days  % Weight Change: Stable x 2 days    Physical appearance: 1+ edema L foot & R foot    Pertinent Medications: MEDICATIONS  (STANDING):  ALBUTerol    0.083% 2.5 milliGRAM(s) Nebulizer every 6 hours  ALBUTerol    90 MICROgram(s) HFA Inhaler 3 Puff(s) Inhalation every 4 hours  amLODIPine   Tablet 10 milliGRAM(s) Oral daily  aspirin enteric coated 81 milliGRAM(s) Oral daily  cloNIDine Patch 0.1 mG/24Hr(s) 1 patch Transdermal every 7 days  clopidogrel Tablet 75 milliGRAM(s) Oral daily  dextrose 5%. 1000 milliLiter(s) (50 mL/Hr) IV Continuous <Continuous>  dextrose 50% Injectable 12.5 Gram(s) IV Push once  dextrose 50% Injectable 25 Gram(s) IV Push once  dextrose 50% Injectable 25 Gram(s) IV Push once  heparin   Injectable 5000 Unit(s) SubCutaneous every 12 hours  hydrALAZINE 25 milliGRAM(s) Oral three times a day  insulin lispro (HumaLOG) corrective regimen sliding scale   SubCutaneous three times a day before meals  insulin lispro (HumaLOG) corrective regimen sliding scale   SubCutaneous at bedtime  levETIRAcetam  Solution 500 milliGRAM(s) Oral two times a day  metoprolol tartrate 50 milliGRAM(s) Oral two times a day  nystatin Powder 1 Application(s) Topical two times a day  simvastatin 20 milliGRAM(s) Oral at bedtime  sodium chloride 0.45%. 1000 milliLiter(s) (100 mL/Hr) IV Continuous <Continuous>  traZODone 50 milliGRAM(s) Oral at bedtime    MEDICATIONS  (PRN):  acetaminophen   Tablet .. 650 milliGRAM(s) Oral every 6 hours PRN Temp greater or equal to 38C (100.4F), Mild Pain (1 - 3)  dextrose 40% Gel 15 Gram(s) Oral once PRN Blood Glucose LESS THAN 70 milliGRAM(s)/deciliter  glucagon  Injectable 1 milliGRAM(s) IntraMuscular once PRN Glucose LESS THAN 70 milligrams/deciliter  haloperidol    Injectable 1 milliGRAM(s) IV Push three times a day PRN agitation    Pertinent Labs: 08-02 Na143 mmol/L Glu 118 mg/dL<H> K+ 3.3 mmol/L<L> Cr  0.87 mg/dL BUN 7 mg/dL 08-02 Phos 2.5 mg/dL 08-02 Alb 2.1 g/dL<L>    CAPILLARY BLOOD GLUCOSE    POCT Blood Glucose.: 110 mg/dL (02 Aug 2020 11:02)  POCT Blood Glucose.: 166 mg/dL (02 Aug 2020 07:30)  POCT Blood Glucose.: 149 mg/dL (01 Aug 2020 21:42)  POCT Blood Glucose.: 138 mg/dL (01 Aug 2020 15:43)    Skin: Skin without pressure ulcers    Estimated Needs:   [x] no change since previous assessment on 7/30/20  [ ] recalculated:     Previous Nutrition Diagnosis:   Nutrition Diagnostic Terminology #1 Malnutrition...     Malnutrition severe malnutrition in context of acute illness.     Etiology inadequate protein-energy intake in setting of hx of s/p fall, pressure ulcers, AMADOU,     Signs/Symptoms <50% nutrition needs > 5 days, physical findings of moderate muscle loss.     Goal/Expected Outcome pt to consume >50-75% of meals & supplement; goal not consistently met      Nutrition Diagnosis is [x] ongoing  [ ] resolved [ ] not applicable     New Nutrition Diagnosis: [x] not applicable       Interventions:   Recommend continue with current diet as rx'd  [ ] Change Diet To:  [ ] Nutrition Supplement  [ ] Nutrition Support  [x] Other: Encourage po intake    Monitoring and Evaluation:   [ x ] PO intake [ x ] Tolerance to diet prescription [ x ] weights [ x ] labs (fs, gluc) [ x ] follow up per protocol  [ ] other:

## 2020-08-02 NOTE — PROGRESS NOTE ADULT - PROBLEM SELECTOR PLAN 7
- likely from dehydration - improving  - continue IVF hydration - likely from dehydration.  - resolved after IVF hydration

## 2020-08-03 LAB
ALBUMIN SERPL ELPH-MCNC: 2 G/DL — LOW (ref 3.3–5)
ALP SERPL-CCNC: 85 U/L — SIGNIFICANT CHANGE UP (ref 40–120)
ALT FLD-CCNC: 35 U/L — SIGNIFICANT CHANGE UP (ref 12–78)
ANION GAP SERPL CALC-SCNC: 4 MMOL/L — LOW (ref 5–17)
AST SERPL-CCNC: 34 U/L — SIGNIFICANT CHANGE UP (ref 15–37)
BILIRUB SERPL-MCNC: 0.4 MG/DL — SIGNIFICANT CHANGE UP (ref 0.2–1.2)
BUN SERPL-MCNC: 7 MG/DL — SIGNIFICANT CHANGE UP (ref 7–23)
CALCIUM SERPL-MCNC: 7.5 MG/DL — LOW (ref 8.5–10.1)
CHLORIDE SERPL-SCNC: 111 MMOL/L — HIGH (ref 96–108)
CO2 SERPL-SCNC: 26 MMOL/L — SIGNIFICANT CHANGE UP (ref 22–31)
CREAT SERPL-MCNC: 0.86 MG/DL — SIGNIFICANT CHANGE UP (ref 0.5–1.3)
GLUCOSE BLDC GLUCOMTR-MCNC: 105 MG/DL — HIGH (ref 70–99)
GLUCOSE BLDC GLUCOMTR-MCNC: 152 MG/DL — HIGH (ref 70–99)
GLUCOSE BLDC GLUCOMTR-MCNC: 152 MG/DL — HIGH (ref 70–99)
GLUCOSE BLDC GLUCOMTR-MCNC: 192 MG/DL — HIGH (ref 70–99)
GLUCOSE SERPL-MCNC: 124 MG/DL — HIGH (ref 70–99)
HCT VFR BLD CALC: 23.1 % — LOW (ref 34.5–45)
HGB BLD-MCNC: 7.7 G/DL — LOW (ref 11.5–15.5)
LDH SERPL L TO P-CCNC: 334 U/L — HIGH (ref 50–242)
MCHC RBC-ENTMCNC: 29.1 PG — SIGNIFICANT CHANGE UP (ref 27–34)
MCHC RBC-ENTMCNC: 33.3 GM/DL — SIGNIFICANT CHANGE UP (ref 32–36)
MCV RBC AUTO: 87.2 FL — SIGNIFICANT CHANGE UP (ref 80–100)
NRBC # BLD: 0 /100 WBCS — SIGNIFICANT CHANGE UP (ref 0–0)
PLATELET # BLD AUTO: 295 K/UL — SIGNIFICANT CHANGE UP (ref 150–400)
POTASSIUM SERPL-MCNC: 3.3 MMOL/L — LOW (ref 3.5–5.3)
POTASSIUM SERPL-SCNC: 3.3 MMOL/L — LOW (ref 3.5–5.3)
PROCALCITONIN SERPL-MCNC: 0.15 NG/ML — HIGH (ref 0.02–0.1)
PROCALCITONIN SERPL-MCNC: 0.16 NG/ML — HIGH (ref 0.02–0.1)
PROT SERPL-MCNC: 5.7 GM/DL — LOW (ref 6–8.3)
RBC # BLD: 2.65 M/UL — LOW (ref 3.8–5.2)
RBC # FLD: 12.6 % — SIGNIFICANT CHANGE UP (ref 10.3–14.5)
SODIUM SERPL-SCNC: 141 MMOL/L — SIGNIFICANT CHANGE UP (ref 135–145)
WBC # BLD: 13.64 K/UL — HIGH (ref 3.8–10.5)
WBC # FLD AUTO: 13.64 K/UL — HIGH (ref 3.8–10.5)

## 2020-08-03 PROCEDURE — 99232 SBSQ HOSP IP/OBS MODERATE 35: CPT

## 2020-08-03 PROCEDURE — 71046 X-RAY EXAM CHEST 2 VIEWS: CPT | Mod: 26

## 2020-08-03 RX ORDER — MAGNESIUM SULFATE 500 MG/ML
2 VIAL (ML) INJECTION ONCE
Refills: 0 | Status: COMPLETED | OUTPATIENT
Start: 2020-08-03 | End: 2020-08-03

## 2020-08-03 RX ORDER — POTASSIUM CHLORIDE 20 MEQ
10 PACKET (EA) ORAL
Refills: 0 | Status: COMPLETED | OUTPATIENT
Start: 2020-08-03 | End: 2020-08-03

## 2020-08-03 RX ORDER — CEFEPIME 1 G/1
1000 INJECTION, POWDER, FOR SOLUTION INTRAMUSCULAR; INTRAVENOUS ONCE
Refills: 0 | Status: COMPLETED | OUTPATIENT
Start: 2020-08-03 | End: 2020-08-03

## 2020-08-03 RX ORDER — CEFEPIME 1 G/1
INJECTION, POWDER, FOR SOLUTION INTRAMUSCULAR; INTRAVENOUS
Refills: 0 | Status: DISCONTINUED | OUTPATIENT
Start: 2020-08-03 | End: 2020-08-05

## 2020-08-03 RX ORDER — CEFEPIME 1 G/1
1000 INJECTION, POWDER, FOR SOLUTION INTRAMUSCULAR; INTRAVENOUS EVERY 8 HOURS
Refills: 0 | Status: DISCONTINUED | OUTPATIENT
Start: 2020-08-03 | End: 2020-08-05

## 2020-08-03 RX ADMIN — Medication 100 MILLIEQUIVALENT(S): at 13:12

## 2020-08-03 RX ADMIN — Medication 50 MILLIGRAM(S): at 05:26

## 2020-08-03 RX ADMIN — Medication 50 MILLIGRAM(S): at 17:39

## 2020-08-03 RX ADMIN — Medication 1 PATCH: at 19:25

## 2020-08-03 RX ADMIN — AMLODIPINE BESYLATE 10 MILLIGRAM(S): 2.5 TABLET ORAL at 05:26

## 2020-08-03 RX ADMIN — Medication 100 MILLIEQUIVALENT(S): at 17:39

## 2020-08-03 RX ADMIN — HEPARIN SODIUM 5000 UNIT(S): 5000 INJECTION INTRAVENOUS; SUBCUTANEOUS at 17:40

## 2020-08-03 RX ADMIN — LEVETIRACETAM 500 MILLIGRAM(S): 250 TABLET, FILM COATED ORAL at 05:26

## 2020-08-03 RX ADMIN — Medication 650 MILLIGRAM(S): at 15:13

## 2020-08-03 RX ADMIN — ALBUTEROL 2.5 MILLIGRAM(S): 90 AEROSOL, METERED ORAL at 11:25

## 2020-08-03 RX ADMIN — ALBUTEROL 2.5 MILLIGRAM(S): 90 AEROSOL, METERED ORAL at 17:22

## 2020-08-03 RX ADMIN — Medication 25 MILLIGRAM(S): at 14:44

## 2020-08-03 RX ADMIN — Medication 81 MILLIGRAM(S): at 12:16

## 2020-08-03 RX ADMIN — Medication 25 MILLIGRAM(S): at 05:26

## 2020-08-03 RX ADMIN — Medication 650 MILLIGRAM(S): at 05:26

## 2020-08-03 RX ADMIN — Medication 100 MILLIEQUIVALENT(S): at 15:11

## 2020-08-03 RX ADMIN — ALBUTEROL 2.5 MILLIGRAM(S): 90 AEROSOL, METERED ORAL at 00:04

## 2020-08-03 RX ADMIN — ALBUTEROL 2.5 MILLIGRAM(S): 90 AEROSOL, METERED ORAL at 23:45

## 2020-08-03 RX ADMIN — HEPARIN SODIUM 5000 UNIT(S): 5000 INJECTION INTRAVENOUS; SUBCUTANEOUS at 05:26

## 2020-08-03 RX ADMIN — CEFEPIME 100 MILLIGRAM(S): 1 INJECTION, POWDER, FOR SOLUTION INTRAMUSCULAR; INTRAVENOUS at 15:02

## 2020-08-03 RX ADMIN — CLOPIDOGREL BISULFATE 75 MILLIGRAM(S): 75 TABLET, FILM COATED ORAL at 12:16

## 2020-08-03 RX ADMIN — Medication 50 MILLIGRAM(S): at 21:18

## 2020-08-03 RX ADMIN — Medication 1 PATCH: at 07:24

## 2020-08-03 RX ADMIN — Medication 2: at 07:52

## 2020-08-03 RX ADMIN — Medication 50 GRAM(S): at 12:15

## 2020-08-03 RX ADMIN — Medication 2: at 16:15

## 2020-08-03 RX ADMIN — NYSTATIN CREAM 1 APPLICATION(S): 100000 CREAM TOPICAL at 17:40

## 2020-08-03 RX ADMIN — NYSTATIN CREAM 1 APPLICATION(S): 100000 CREAM TOPICAL at 05:26

## 2020-08-03 RX ADMIN — ALBUTEROL 2.5 MILLIGRAM(S): 90 AEROSOL, METERED ORAL at 05:50

## 2020-08-03 RX ADMIN — CEFEPIME 100 MILLIGRAM(S): 1 INJECTION, POWDER, FOR SOLUTION INTRAMUSCULAR; INTRAVENOUS at 21:18

## 2020-08-03 RX ADMIN — Medication 650 MILLIGRAM(S): at 06:26

## 2020-08-03 RX ADMIN — Medication 25 MILLIGRAM(S): at 21:18

## 2020-08-03 RX ADMIN — Medication 650 MILLIGRAM(S): at 14:44

## 2020-08-03 RX ADMIN — LEVETIRACETAM 500 MILLIGRAM(S): 250 TABLET, FILM COATED ORAL at 17:39

## 2020-08-03 RX ADMIN — SIMVASTATIN 20 MILLIGRAM(S): 20 TABLET, FILM COATED ORAL at 21:18

## 2020-08-03 NOTE — CONSULT NOTE ADULT - SUBJECTIVE AND OBJECTIVE BOX
Pt is a 68yo female who was admitted with a CC of urinary retention on 2020.  BIBA d/t found unresponsive by family at home. Family noted "bedsores" of buttocks on the day of admission      Past Medical History:  Asthma    CVA (cerebral infarction)  right side weakness  Diabetes    Gout    Hypertension    OA (osteoarthritis)  bautista knees, low back  and  bautista shoulders  Seizure disorder    Urinary incontinence    Vision changes  lt eye  s/p fall 2 weeks ago . Sustained fx of proximal left humerus    Vital Signs Last 24 Hrs  T(C): 37.3 (03 Aug 2020 14:44), Max: 38.4 (03 Aug 2020 04:34)  T(F): 99.1 (03 Aug 2020 14:44), Max: 101.1 (03 Aug 2020 04:34)  HR: 84 (03 Aug 2020 14:44) (79 - 105)  BP: 126/67 (03 Aug 2020 14:44) (126/67 - 184/73)  RR: 18 (03 Aug 2020 14:44) (18 - 18)  SpO2: 98% (03 Aug 2020 14:44) (95% - 100%)    PHYSICAL EXAM:  GENERAL: NAD, well-developed  HEAD:  Atraumatic, Normocephalic  EYES:  conjunctiva and sclera clear  ENMT:   Moist mucous membranes   NECK: Supple, No JVD, no thyromegaly  NERVOUS SYSTEM:  Alert & awake, not following commands. Nonverbal  CHEST/LUNG:  good air entry bilaterally;    HEART: Regular rate and rhythm; No murmurs, rubs, or gallops  ABDOMEN: Soft, Nontender, Nondistended; Bowel sounds present  SKIN: wounds to buttocks and groin: stage 2 ulcers of both sides of buttocks at the gluteal fold. No purulence or necrosis. Has a superficial ulceration of th inner aspect of the right thigh.  EXTREMITIES:  2+ Peripheral Pulses,  no cyanosis                          7.7    13.64 )-----------( 295      ( 03 Aug 2020 10:06 )             23.1   08-03    141  |  111<H>  |  7   ----------------------------<  124<H>  3.3<L>   |  26  |  0.86    Ca    7.5<L>      03 Aug 2020 10:06  Phos  2.5     08-02  Mg     1.5     08-02    TPro  5.7<L>  /  Alb  2.0<L>  /  TBili  0.4  /  DBili  x   /  AST  34  /  ALT  35  /  AlkPhos  85  08-    Urinalysis Basic - ( 01 Aug 2020 16:32 )  Color: Yellow / Appearance: very cloudy / S.010 / pH: x  Gluc: x / Ketone: Negative  / Bili: Negative / Urobili: Negative mg/dL   Blood: x / Protein: 100 mg/dL / Nitrite: Negative   Leuk Esterase: Moderate / RBC: 0-2 /HPF / WBC >50   Sq Epi: x / Non Sq Epi: x / Bacteria: Moderate      < from: Xray Chest 2 Views PA/Lat (20 @ 11:49) >  IMPRESSION:  No active pulmonary disease.        Pt was evaluated with Dr. Hodge.

## 2020-08-03 NOTE — PROGRESS NOTE ADULT - ASSESSMENT
69 years old female with unresponsive found too have acute kidney injury and hydro                     Fever, of unclear source.  - w-up in progress.  - follow up all cultures.    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  - not improving significantly.  - nystatin bid  - local wound care  - Vascular eval called.    Hypomagnesemia.  - replete Mg.  - Repeat Mg in AM

## 2020-08-03 NOTE — PROGRESS NOTE ADULT - SUBJECTIVE AND OBJECTIVE BOX
Patient is a 69y old  Female who presents with a chief complaint of retention (01 Aug 2020 13:14), she is in NAD, nonverbal       OVERNIGHT EVENTS: Fever    MEDICATIONS  (STANDING):  ALBUTerol    0.083% 2.5 milliGRAM(s) Nebulizer every 6 hours  ALBUTerol    90 MICROgram(s) HFA Inhaler 3 Puff(s) Inhalation every 4 hours  amLODIPine   Tablet 10 milliGRAM(s) Oral daily  aspirin enteric coated 81 milliGRAM(s) Oral daily  cloNIDine Patch 0.1 mG/24Hr(s) 1 patch Transdermal every 7 days  clopidogrel Tablet 75 milliGRAM(s) Oral daily  dextrose 5%. 1000 milliLiter(s) (50 mL/Hr) IV Continuous <Continuous>  dextrose 50% Injectable 12.5 Gram(s) IV Push once  dextrose 50% Injectable 25 Gram(s) IV Push once  dextrose 50% Injectable 25 Gram(s) IV Push once  heparin   Injectable 5000 Unit(s) SubCutaneous every 12 hours  hydrALAZINE 25 milliGRAM(s) Oral three times a day  insulin lispro (HumaLOG) corrective regimen sliding scale   SubCutaneous three times a day before meals  insulin lispro (HumaLOG) corrective regimen sliding scale   SubCutaneous at bedtime  levETIRAcetam  Solution 500 milliGRAM(s) Oral two times a day  metoprolol tartrate 50 milliGRAM(s) Oral two times a day  nystatin Powder 1 Application(s) Topical two times a day  simvastatin 20 milliGRAM(s) Oral at bedtime  sodium chloride 0.45%. 1000 milliLiter(s) (100 mL/Hr) IV Continuous <Continuous>  traZODone 50 milliGRAM(s) Oral at bedtime    MEDICATIONS  (PRN):  acetaminophen   Tablet .. 650 milliGRAM(s) Oral every 6 hours PRN Temp greater or equal to 38C (100.4F), Mild Pain (1 - 3)  dextrose 40% Gel 15 Gram(s) Oral once PRN Blood Glucose LESS THAN 70 milliGRAM(s)/deciliter  glucagon  Injectable 1 milliGRAM(s) IntraMuscular once PRN Glucose LESS THAN 70 milligrams/deciliter  haloperidol    Injectable 1 milliGRAM(s) IV Push three times a day PRN agitation        REVIEW OF SYSTEMS:  CONSTITUTIONAL:  fever,   Unable to obtain ROS due to dementia.     Vital Signs Last 24 Hrs  T(C): 37.9 (03 Aug 2020 06:26), Max: 38.4 (03 Aug 2020 04:34)  T(F): 100.2 (03 Aug 2020 06:26), Max: 101.1 (03 Aug 2020 04:34)  HR: 90 (03 Aug 2020 06:26) (81 - 105)  BP: 142/70 (03 Aug 2020 06:26) (142/70 - 184/73)  BP(mean): --  RR: 18 (03 Aug 2020 06:26) (18 - 19)  SpO2: 98% (03 Aug 2020 06:26) (95% - 100%)    PHYSICAL EXAM:  GENERAL: NAD, well-developed  HEAD:  Atraumatic, Normocephalic  EYES:  conjunctiva and sclera clear  ENMT:   Moist mucous membranes   NECK: Supple, No JVD, no thyromegaly  NERVOUS SYSTEM:  Alert & awake, not following commands.  CHEST/LUNG:  good air entry bilaterally;    HEART: Regular rate and rhythm; No murmurs, rubs, or gallops  ABDOMEN: Soft, Nontender, Nondistended; Bowel sounds present  SKIN: wounds to buttocks and groin, incontinence-associated dermatitis.   EXTREMITIES:  2+ Peripheral Pulses,  no cyanosis       LABS:                        7.7    13.64 )-----------( 295      ( 03 Aug 2020 10:06 )             23.1     08-03    141  |  111<H>  |  7   ----------------------------<  124<H>  3.3<L>   |  26  |  0.86    Ca    7.5<L>      03 Aug 2020 10:06  Phos  2.5     08-02  Mg     1.5     08-02    TPro  5.7<L>  /  Alb  2.0<L>  /  TBili  0.4  /  DBili  x   /  AST  34  /  ALT  35  /  AlkPhos  85  08-03       cardiac markers   Urinalysis Basic - ( 01 Aug 2020 16:32 )    Color: Yellow / Appearance: very cloudy / S.010 / pH: x  Gluc: x / Ketone: Negative  / Bili: Negative / Urobili: Negative mg/dL   Blood: x / Protein: 100 mg/dL / Nitrite: Negative   Leuk Esterase: Moderate / RBC: 0-2 /HPF / WBC >50   Sq Epi: x / Non Sq Epi: x / Bacteria: Moderate      CAPILLARY BLOOD GLUCOSE      POCT Blood Glucose.: 105 mg/dL (03 Aug 2020 10:35)  POCT Blood Glucose.: 152 mg/dL (03 Aug 2020 07:32)  POCT Blood Glucose.: 161 mg/dL (02 Aug 2020 21:16)  POCT Blood Glucose.: 144 mg/dL (02 Aug 2020 15:29)    Cultures    RADIOLOGY & ADDITIONAL TESTS:    Imaging Personally Reviewed:  [ ] YES  [ ] NO    Consultant(s) Notes Reviewed:  [ x ] YES  [ ] NO    Care Discussed with Consultants/Other Providers [ ] YES  [ ] NO

## 2020-08-03 NOTE — PROGRESS NOTE ADULT - PROBLEM SELECTOR PLAN 9
- keppra level: 54 (high).  - Neurology eval with Dr. Mendoza noted.  - Keppra was decreased to 500 mg bid.

## 2020-08-03 NOTE — PROGRESS NOTE ADULT - ASSESSMENT
fevers   increase in wbc   IAD not infected    plan   cxr r/o pna  blood culture r/o bacteremia   procalcitonin   concerning for developing sepsis   start cefepime   follow work up

## 2020-08-03 NOTE — CONSULT NOTE ADULT - ASSESSMENT
Impression:  Stage 2 decubitus ulcers of buttock  excoriation of left inner thigh    Plan:  -buttocks dressing: apply either  alleyene or duoderm dressings. Do daily. Or PRN when soiled.  -left inner thigh: after cleansing with NS; hydrogel cream applied to wound daily. Cover with sacha dressing. Do daily, Or PRN soiling.  - rotate pt per floor protocol to keep pressure off buttocks. Impression:  Stage 2 decubitus ulcers of buttock  excoriation of left inner thigh    Plan:  -buttocks dressing: apply either  alleyene or duoderm dressings. Do daily. Or PRN when soiled.  -left inner thigh: after cleansing with NS; hydrogel cream applied to wound daily. Cover with sacha dressing. Do daily, Or PRN soiling.  - rotate pt per floor protocol to keep pressure off buttocks.  - pt can be f/u in the wound care center here at [facility] upon discharge.

## 2020-08-03 NOTE — PROGRESS NOTE ADULT - SUBJECTIVE AND OBJECTIVE BOX
HPI:  pt seen with leukocytosis and pyuria  with obstructive uropathy   blood culture likely a contaminant ( CNS)  urinary retention   Moderate bilateral hydronephrosis   uc neg status post antibiotics now dc   gu on the case will likely need chronic milner  antibiotics were dc and we signed off   today pt febrile with increase in wbc         Allergies    No Known Allergies    Intolerances        MEDICATIONS  (STANDING):  ALBUTerol    0.083% 2.5 milliGRAM(s) Nebulizer every 6 hours  ALBUTerol    90 MICROgram(s) HFA Inhaler 3 Puff(s) Inhalation every 4 hours  amLODIPine   Tablet 10 milliGRAM(s) Oral daily  aspirin enteric coated 81 milliGRAM(s) Oral daily  cloNIDine Patch 0.1 mG/24Hr(s) 1 patch Transdermal every 7 days  clopidogrel Tablet 75 milliGRAM(s) Oral daily  dextrose 5%. 1000 milliLiter(s) (50 mL/Hr) IV Continuous <Continuous>  dextrose 50% Injectable 12.5 Gram(s) IV Push once  dextrose 50% Injectable 25 Gram(s) IV Push once  dextrose 50% Injectable 25 Gram(s) IV Push once  heparin   Injectable 5000 Unit(s) SubCutaneous every 12 hours  hydrALAZINE 25 milliGRAM(s) Oral three times a day  insulin lispro (HumaLOG) corrective regimen sliding scale   SubCutaneous three times a day before meals  insulin lispro (HumaLOG) corrective regimen sliding scale   SubCutaneous at bedtime  levETIRAcetam  Solution 500 milliGRAM(s) Oral two times a day  metoprolol tartrate 50 milliGRAM(s) Oral two times a day  nystatin Powder 1 Application(s) Topical two times a day  potassium chloride  10 mEq/100 mL IVPB 10 milliEquivalent(s) IV Intermittent every 2 hours  simvastatin 20 milliGRAM(s) Oral at bedtime  sodium chloride 0.45%. 1000 milliLiter(s) (100 mL/Hr) IV Continuous <Continuous>  traZODone 50 milliGRAM(s) Oral at bedtime    MEDICATIONS  (PRN):  acetaminophen   Tablet .. 650 milliGRAM(s) Oral every 6 hours PRN Temp greater or equal to 38C (100.4F), Mild Pain (1 - 3)  dextrose 40% Gel 15 Gram(s) Oral once PRN Blood Glucose LESS THAN 70 milliGRAM(s)/deciliter  glucagon  Injectable 1 milliGRAM(s) IntraMuscular once PRN Glucose LESS THAN 70 milligrams/deciliter  haloperidol    Injectable 1 milliGRAM(s) IV Push three times a day PRN agitation      REVIEW OF SYSTEMS:  unable to obtain     VITAL SIGNS:  T(C): 37.9 (20 @ 06:26), Max: 38.4 (20 @ 04:34)  T(F): 100.2 (20 @ 06:26), Max: 101.1 (20 @ 04:34)  HR: 79 (20 @ 11:40) (79 - 105)  BP: 142/70 (20 @ 06:26) (142/70 - 184/73)  RR: 18 (20 @ 06:26) (18 - 18)  SpO2: 98% (20 @ 11:40) (95% - 100%)  Wt(kg): --    PHYSICAL EXAM:    GENERAL: non verbal   HEENT: Neck is supple,   CHEST/LUNG: Clear   HEART: Regular rate and rhythm; No murmurs, rubs, or gallops  ABDOMEN: Soft, Nontender, Nondistended; EXTREMITIES:  2+ Peripheral Pulses, No clubbing, cyanosis, or edema  GENITOURINARY: milner  back likely IAD not clinically infected   LABS:                         7.7    13.64 )-----------( 295      ( 03 Aug 2020 10:06 )             23.1     08-03    141  |  111<H>  |  7   ----------------------------<  124<H>  3.3<L>   |  26  |  0.86    Ca    7.5<L>      03 Aug 2020 10:06  Phos  2.5     08-02  Mg     1.5     08-    TPro  5.7<L>  /  Alb  2.0<L>  /  TBili  0.4  /  DBili  x   /  AST  34  /  ALT  35  /  AlkPhos  85  08-03    LIVER FUNCTIONS - ( 03 Aug 2020 10:06 )  Alb: 2.0 g/dL / Pro: 5.7 gm/dL / ALK PHOS: 85 U/L / ALT: 35 U/L / AST: 34 U/L / GGT: x             Urinalysis Basic - ( 01 Aug 2020 16:32 )    Color: Yellow / Appearance: very cloudy / S.010 / pH: x  Gluc: x / Ketone: Negative  / Bili: Negative / Urobili: Negative mg/dL   Blood: x / Protein: 100 mg/dL / Nitrite: Negative   Leuk Esterase: Moderate / RBC: 0-2 /HPF / WBC >50   Sq Epi: x / Non Sq Epi: x / Bacteria: Moderate                          Culture Results:   No growth to date. ( @ 09:01)  Culture Results:   No growth to date. ( @ 00:27)  Culture Results:   No growth ( @ 00:23)  Culture Results:   Growth in aerobic bottle: Staphylococcus hominis  Coag Negative Staphylococcus  Single set isolate, possible contaminant. Contact  Microbiology if susceptibility testing clinically  indicated.  "Due to technical problems, Proteus sp. will Not be reported as part of  the BCID panel until further notice"  ***Blood Panel PCR results on this specimen are available  approximately 3 hours after the Gram stain result.***  Gram stain, PCR, and/or culture results may not always  correspond due to difference in methodologies.  ************************************************************  This PCR assay was performed using [employer].  The following targets are tested for: Enterococcus,  vancomycin resistant enterococci, Listeria monocytogenes,  coagulase negative staphylococci, S. aureus,  methicillin resistant S. aureus, Streptococcus agalactiae  (Group B), S. pneumoniae, S. pyogenes (Group A),  Acinetobacter baumannii, Enterobacter cloacae, E. coli,  Klebsiella oxytoca, K. pneumoniae, Proteus sp.,  Serratia marcescens, Haemophilus influenzae,  Neisseria meningitidis, Pseudomonas aeruginosa, Candida  albicans, C. glabrata, C krusei, C parapsilosis,  C. tropicalis and the KPC resistance gene. ( @ 15:01)  Culture Results:   No Growth Final ( @ 15:01)                Radiology:

## 2020-08-04 LAB
ANION GAP SERPL CALC-SCNC: 7 MMOL/L — SIGNIFICANT CHANGE UP (ref 5–17)
BUN SERPL-MCNC: 8 MG/DL — SIGNIFICANT CHANGE UP (ref 7–23)
CALCIUM SERPL-MCNC: 7.6 MG/DL — LOW (ref 8.5–10.1)
CHLORIDE SERPL-SCNC: 111 MMOL/L — HIGH (ref 96–108)
CO2 SERPL-SCNC: 24 MMOL/L — SIGNIFICANT CHANGE UP (ref 22–31)
CREAT SERPL-MCNC: 0.88 MG/DL — SIGNIFICANT CHANGE UP (ref 0.5–1.3)
CULTURE RESULTS: SIGNIFICANT CHANGE UP
GLUCOSE BLDC GLUCOMTR-MCNC: 135 MG/DL — HIGH (ref 70–99)
GLUCOSE BLDC GLUCOMTR-MCNC: 155 MG/DL — HIGH (ref 70–99)
GLUCOSE BLDC GLUCOMTR-MCNC: 164 MG/DL — HIGH (ref 70–99)
GLUCOSE BLDC GLUCOMTR-MCNC: 308 MG/DL — HIGH (ref 70–99)
GLUCOSE SERPL-MCNC: 160 MG/DL — HIGH (ref 70–99)
HCT VFR BLD CALC: 25.4 % — LOW (ref 34.5–45)
HGB BLD-MCNC: 8.1 G/DL — LOW (ref 11.5–15.5)
MAGNESIUM SERPL-MCNC: 1.7 MG/DL — SIGNIFICANT CHANGE UP (ref 1.6–2.6)
MCHC RBC-ENTMCNC: 28.6 PG — SIGNIFICANT CHANGE UP (ref 27–34)
MCHC RBC-ENTMCNC: 31.9 GM/DL — LOW (ref 32–36)
MCV RBC AUTO: 89.8 FL — SIGNIFICANT CHANGE UP (ref 80–100)
NRBC # BLD: 0 /100 WBCS — SIGNIFICANT CHANGE UP (ref 0–0)
PLATELET # BLD AUTO: 305 K/UL — SIGNIFICANT CHANGE UP (ref 150–400)
POTASSIUM SERPL-MCNC: 3.3 MMOL/L — LOW (ref 3.5–5.3)
POTASSIUM SERPL-SCNC: 3.3 MMOL/L — LOW (ref 3.5–5.3)
PROCALCITONIN SERPL-MCNC: 0.13 NG/ML — HIGH (ref 0.02–0.1)
RBC # BLD: 2.83 M/UL — LOW (ref 3.8–5.2)
RBC # FLD: 12.9 % — SIGNIFICANT CHANGE UP (ref 10.3–14.5)
SODIUM SERPL-SCNC: 142 MMOL/L — SIGNIFICANT CHANGE UP (ref 135–145)
SPECIMEN SOURCE: SIGNIFICANT CHANGE UP
WBC # BLD: 9.45 K/UL — SIGNIFICANT CHANGE UP (ref 3.8–10.5)
WBC # FLD AUTO: 9.45 K/UL — SIGNIFICANT CHANGE UP (ref 3.8–10.5)

## 2020-08-04 PROCEDURE — 99232 SBSQ HOSP IP/OBS MODERATE 35: CPT

## 2020-08-04 RX ORDER — POTASSIUM CHLORIDE 20 MEQ
40 PACKET (EA) ORAL ONCE
Refills: 0 | Status: COMPLETED | OUTPATIENT
Start: 2020-08-04 | End: 2020-08-04

## 2020-08-04 RX ADMIN — Medication 50 MILLIGRAM(S): at 21:39

## 2020-08-04 RX ADMIN — HEPARIN SODIUM 5000 UNIT(S): 5000 INJECTION INTRAVENOUS; SUBCUTANEOUS at 05:19

## 2020-08-04 RX ADMIN — SIMVASTATIN 20 MILLIGRAM(S): 20 TABLET, FILM COATED ORAL at 21:39

## 2020-08-04 RX ADMIN — ALBUTEROL 2.5 MILLIGRAM(S): 90 AEROSOL, METERED ORAL at 23:57

## 2020-08-04 RX ADMIN — CEFEPIME 100 MILLIGRAM(S): 1 INJECTION, POWDER, FOR SOLUTION INTRAMUSCULAR; INTRAVENOUS at 05:20

## 2020-08-04 RX ADMIN — Medication 8: at 11:32

## 2020-08-04 RX ADMIN — Medication 50 MILLIGRAM(S): at 05:19

## 2020-08-04 RX ADMIN — Medication 25 MILLIGRAM(S): at 21:39

## 2020-08-04 RX ADMIN — Medication 50 MILLIGRAM(S): at 17:10

## 2020-08-04 RX ADMIN — Medication 81 MILLIGRAM(S): at 11:32

## 2020-08-04 RX ADMIN — CEFEPIME 100 MILLIGRAM(S): 1 INJECTION, POWDER, FOR SOLUTION INTRAMUSCULAR; INTRAVENOUS at 14:14

## 2020-08-04 RX ADMIN — CEFEPIME 100 MILLIGRAM(S): 1 INJECTION, POWDER, FOR SOLUTION INTRAMUSCULAR; INTRAVENOUS at 21:38

## 2020-08-04 RX ADMIN — Medication 25 MILLIGRAM(S): at 05:19

## 2020-08-04 RX ADMIN — ALBUTEROL 2.5 MILLIGRAM(S): 90 AEROSOL, METERED ORAL at 05:10

## 2020-08-04 RX ADMIN — AMLODIPINE BESYLATE 10 MILLIGRAM(S): 2.5 TABLET ORAL at 05:19

## 2020-08-04 RX ADMIN — ALBUTEROL 2.5 MILLIGRAM(S): 90 AEROSOL, METERED ORAL at 17:00

## 2020-08-04 RX ADMIN — Medication 2: at 08:16

## 2020-08-04 RX ADMIN — SODIUM CHLORIDE 100 MILLILITER(S): 9 INJECTION, SOLUTION INTRAVENOUS at 14:14

## 2020-08-04 RX ADMIN — NYSTATIN CREAM 1 APPLICATION(S): 100000 CREAM TOPICAL at 17:11

## 2020-08-04 RX ADMIN — Medication 40 MILLIEQUIVALENT(S): at 11:32

## 2020-08-04 RX ADMIN — LEVETIRACETAM 500 MILLIGRAM(S): 250 TABLET, FILM COATED ORAL at 05:19

## 2020-08-04 RX ADMIN — Medication 25 MILLIGRAM(S): at 14:14

## 2020-08-04 RX ADMIN — Medication 1 PATCH: at 07:26

## 2020-08-04 RX ADMIN — HEPARIN SODIUM 5000 UNIT(S): 5000 INJECTION INTRAVENOUS; SUBCUTANEOUS at 17:11

## 2020-08-04 RX ADMIN — CLOPIDOGREL BISULFATE 75 MILLIGRAM(S): 75 TABLET, FILM COATED ORAL at 11:32

## 2020-08-04 RX ADMIN — Medication 1 PATCH: at 21:14

## 2020-08-04 RX ADMIN — LEVETIRACETAM 500 MILLIGRAM(S): 250 TABLET, FILM COATED ORAL at 17:10

## 2020-08-04 RX ADMIN — Medication 2: at 16:36

## 2020-08-04 RX ADMIN — ALBUTEROL 2.5 MILLIGRAM(S): 90 AEROSOL, METERED ORAL at 12:03

## 2020-08-04 RX ADMIN — NYSTATIN CREAM 1 APPLICATION(S): 100000 CREAM TOPICAL at 05:19

## 2020-08-04 NOTE — PROGRESS NOTE ADULT - ASSESSMENT
69 years old female with unresponsive found too have acute kidney injury and hydro                     Fever, of unclear source - subsided.  - urine cult: gram POSITIVE organisms.  - On cefepime.  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  - vascular eval noted.  - local wound care as per vascular order.    Hypokalemia.  - replete potassium    - hypomagnesemia: resolved with mag suppl

## 2020-08-04 NOTE — PROGRESS NOTE ADULT - ASSESSMENT
resolved sepsis   antibiotics were started and fevers and leukocytosis resolved   clinically improving   cxr no pna  discuss with RN  hopefully po switch in am   local care to skin

## 2020-08-04 NOTE — PROGRESS NOTE ADULT - SUBJECTIVE AND OBJECTIVE BOX
HPI:  70yo lady was found to have urinary retention with milner placement   developed fevers and leukocytosis   antibiotics were started and clinically improving     Allergies    No Known Allergies    Intolerances        MEDICATIONS  (STANDING):  ALBUTerol    0.083% 2.5 milliGRAM(s) Nebulizer every 6 hours  ALBUTerol    90 MICROgram(s) HFA Inhaler 3 Puff(s) Inhalation every 4 hours  amLODIPine   Tablet 10 milliGRAM(s) Oral daily  aspirin enteric coated 81 milliGRAM(s) Oral daily  cefepime   IVPB      cefepime   IVPB 1000 milliGRAM(s) IV Intermittent every 8 hours  cloNIDine Patch 0.1 mG/24Hr(s) 1 patch Transdermal every 7 days  clopidogrel Tablet 75 milliGRAM(s) Oral daily  dextrose 5%. 1000 milliLiter(s) (50 mL/Hr) IV Continuous <Continuous>  dextrose 50% Injectable 12.5 Gram(s) IV Push once  dextrose 50% Injectable 25 Gram(s) IV Push once  dextrose 50% Injectable 25 Gram(s) IV Push once  heparin   Injectable 5000 Unit(s) SubCutaneous every 12 hours  hydrALAZINE 25 milliGRAM(s) Oral three times a day  insulin lispro (HumaLOG) corrective regimen sliding scale   SubCutaneous three times a day before meals  insulin lispro (HumaLOG) corrective regimen sliding scale   SubCutaneous at bedtime  levETIRAcetam  Solution 500 milliGRAM(s) Oral two times a day  metoprolol tartrate 50 milliGRAM(s) Oral two times a day  nystatin Powder 1 Application(s) Topical two times a day  potassium chloride   Powder 40 milliEquivalent(s) Oral once  simvastatin 20 milliGRAM(s) Oral at bedtime  sodium chloride 0.45%. 1000 milliLiter(s) (100 mL/Hr) IV Continuous <Continuous>  traZODone 50 milliGRAM(s) Oral at bedtime    MEDICATIONS  (PRN):  acetaminophen   Tablet .. 650 milliGRAM(s) Oral every 6 hours PRN Temp greater or equal to 38C (100.4F), Mild Pain (1 - 3)  dextrose 40% Gel 15 Gram(s) Oral once PRN Blood Glucose LESS THAN 70 milliGRAM(s)/deciliter  glucagon  Injectable 1 milliGRAM(s) IntraMuscular once PRN Glucose LESS THAN 70 milligrams/deciliter  haloperidol    Injectable 1 milliGRAM(s) IV Push three times a day PRN agitation      REVIEW OF SYSTEMS:    CONSTITUTIONAL: No fever, chills, weight loss, or fatigue  HEENT: No sore throat, runny nose, ear ache  RESPIRATORY: No cough, wheezing, No shortness of breath  VITAL SIGNS:  T(C): 36.9 (08-04-20 @ 04:10), Max: 37.4 (08-03-20 @ 17:24)  T(F): 98.5 (08-04-20 @ 04:10), Max: 99.4 (08-03-20 @ 17:24)  HR: 105 (08-04-20 @ 04:10) (78 - 105)  BP: 171/74 (08-04-20 @ 04:10) (126/67 - 171/74)  RR: 18 (08-04-20 @ 04:10) (17 - 18)  SpO2: 100% (08-04-20 @ 04:10) (92% - 100%)  Wt(kg): --    PHYSICAL EXAM:    GENERAL: not in any distress  HEENT: Neck is supple, normocephalic, atraumatic   CHEST/LUNG: Clear to percussion bilaterally; No rales, rhonchi, wheezing  HEART: Regular rate and rhythm; No murmurs, rubs, or gallops  ABDOMEN: Soft, Nontender, Nondistended; Bowel sounds present, no rebound   EXTREMITIES:  2+ Peripheral Pulses, No clubbing, cyanosis, or edema  GENITOURINARY:   SKIN: raw skin ext vagina back   BACK: no pressor sore   NERVOUS SYSTEM:  functional quad       LABS:                         8.1    9.45  )-----------( 305      ( 04 Aug 2020 07:19 )             25.4     08-04    142  |  111<H>  |  8   ----------------------------<  160<H>  3.3<L>   |  24  |  0.88    Ca    7.6<L>      04 Aug 2020 07:19  Mg     1.7     08-04    TPro  5.7<L>  /  Alb  2.0<L>  /  TBili  0.4  /  DBili  x   /  AST  34  /  ALT  35  /  AlkPhos  85  08-03    LIVER FUNCTIONS - ( 03 Aug 2020 10:06 )  Alb: 2.0 g/dL / Pro: 5.7 gm/dL / ALK PHOS: 85 U/L / ALT: 35 U/L / AST: 34 U/L / GGT: x                                   Culture Results:   >100,000 CFU/ml Gram positive organisms (08-02 @ 02:34)  Culture Results:   No Growth Final (07-30 @ 09:01)  Culture Results:   No Growth Final (07-30 @ 00:27)  Culture Results:   No growth (07-29 @ 00:23)  Culture Results:   Growth in aerobic bottle: Staphylococcus hominis  Coag Negative Staphylococcus  Single set isolate, possible contaminant. Contact  Microbiology if susceptibility testing clinically  indicated.  "Due to technical problems, Proteus sp. will Not be reported as part of  the BCID panel until further notice"  ***Blood Panel PCR results on this specimen are available  approximately 3 hours after the Gram stain result.***  Gram stain, PCR, and/or culture results may not always  correspond due to difference in methodologies.  ************************************************************  This PCR assay was performed using [employer].  The following targets are tested for: Enterococcus,  vancomycin resistant enterococci, Listeria monocytogenes,  coagulase negative staphylococci, S. aureus,  methicillin resistant S. aureus, Streptococcus agalactiae  (Group B), S. pneumoniae, S. pyogenes (Group A),  Acinetobacter baumannii, Enterobacter cloacae, E. coli,  Klebsiella oxytoca, K. pneumoniae, Proteus sp.,  Serratia marcescens, Haemophilus influenzae,  Neisseria meningitidis, Pseudomonas aeruginosa, Candida  albicans, C. glabrata, C krusei, C parapsilosis,  C. tropicalis and the KPC resistance gene. (07-28 @ 15:01)  Culture Results:   No Growth Final (07-28 @ 15:01)

## 2020-08-04 NOTE — PROGRESS NOTE ADULT - SUBJECTIVE AND OBJECTIVE BOX
Patient is a 69y old  Female who presents with a chief complaint of fevers (04 Aug 2020 10:56), in NAD      MEDICATIONS  (STANDING):  ALBUTerol    0.083% 2.5 milliGRAM(s) Nebulizer every 6 hours  ALBUTerol    90 MICROgram(s) HFA Inhaler 3 Puff(s) Inhalation every 4 hours  amLODIPine   Tablet 10 milliGRAM(s) Oral daily  aspirin enteric coated 81 milliGRAM(s) Oral daily  cefepime   IVPB      cefepime   IVPB 1000 milliGRAM(s) IV Intermittent every 8 hours  cloNIDine Patch 0.1 mG/24Hr(s) 1 patch Transdermal every 7 days  clopidogrel Tablet 75 milliGRAM(s) Oral daily  dextrose 5%. 1000 milliLiter(s) (50 mL/Hr) IV Continuous <Continuous>  dextrose 50% Injectable 12.5 Gram(s) IV Push once  dextrose 50% Injectable 25 Gram(s) IV Push once  dextrose 50% Injectable 25 Gram(s) IV Push once  heparin   Injectable 5000 Unit(s) SubCutaneous every 12 hours  hydrALAZINE 25 milliGRAM(s) Oral three times a day  insulin lispro (HumaLOG) corrective regimen sliding scale   SubCutaneous three times a day before meals  insulin lispro (HumaLOG) corrective regimen sliding scale   SubCutaneous at bedtime  levETIRAcetam  Solution 500 milliGRAM(s) Oral two times a day  metoprolol tartrate 50 milliGRAM(s) Oral two times a day  nystatin Powder 1 Application(s) Topical two times a day  simvastatin 20 milliGRAM(s) Oral at bedtime  sodium chloride 0.45%. 1000 milliLiter(s) (100 mL/Hr) IV Continuous <Continuous>  traZODone 50 milliGRAM(s) Oral at bedtime    MEDICATIONS  (PRN):  acetaminophen   Tablet .. 650 milliGRAM(s) Oral every 6 hours PRN Temp greater or equal to 38C (100.4F), Mild Pain (1 - 3)  dextrose 40% Gel 15 Gram(s) Oral once PRN Blood Glucose LESS THAN 70 milliGRAM(s)/deciliter  glucagon  Injectable 1 milliGRAM(s) IntraMuscular once PRN Glucose LESS THAN 70 milligrams/deciliter  haloperidol    Injectable 1 milliGRAM(s) IV Push three times a day PRN agitation        REVIEW OF SYSTEMS:  Unable to obtain     Vital Signs Last 24 Hrs  T(C): 36.9 (04 Aug 2020 11:23), Max: 37.4 (03 Aug 2020 17:24)  T(F): 98.4 (04 Aug 2020 11:23), Max: 99.4 (03 Aug 2020 17:24)  HR: 80 (04 Aug 2020 11:23) (78 - 105)  BP: 126/67 (04 Aug 2020 11:23) (126/67 - 171/74)  BP(mean): --  RR: 16 (04 Aug 2020 11:23) (16 - 18)  SpO2: 100% (04 Aug 2020 11:23) (92% - 100%)    PHYSICAL EXAM:  GENERAL: NAD, well-developed  HEAD:  Atraumatic, Normocephalic  EYES:  conjunctiva and sclera clear  ENMT:   Moist mucous membranes   NECK: Supple, No JVD, no thyromegaly  NERVOUS SYSTEM:  She is more alert & awake now, not following commands.  CHEST/LUNG:  good air entry bilaterally;    HEART: Regular rate and rhythm; No murmurs, rubs, or gallops  ABDOMEN: Soft, Nontender, Nondistended; Bowel sounds present  EXTREMITIES:  2+ Peripheral Pulses,  no cyanosis  SKIN: wounds to buttocks and groin, incontinence-associated dermatitis.     LABS:                        8.1    9.45  )-----------( 305      ( 04 Aug 2020 07:19 )             25.4     08-04    142  |  111<H>  |  8   ----------------------------<  160<H>  3.3<L>   |  24  |  0.88    Ca    7.6<L>      04 Aug 2020 07:19  Mg     1.7     08-04    TPro  5.7<L>  /  Alb  2.0<L>  /  TBili  0.4  /  DBili  x   /  AST  34  /  ALT  35  /  AlkPhos  85  08-03       cardiac markers     CAPILLARY BLOOD GLUCOSE      POCT Blood Glucose.: 308 mg/dL (04 Aug 2020 11:08)  POCT Blood Glucose.: 164 mg/dL (04 Aug 2020 08:01)  POCT Blood Glucose.: 152 mg/dL (03 Aug 2020 20:43)  POCT Blood Glucose.: 192 mg/dL (03 Aug 2020 15:38)    Cultures    RADIOLOGY & ADDITIONAL TESTS:    Imaging Personally Reviewed:  [ ] YES  [ ] NO    Consultant(s) Notes Reviewed:  [ ] YES  [ ] NO    Care Discussed with Consultants/Other Providers [ ] YES  [ ] NO

## 2020-08-05 LAB
ANION GAP SERPL CALC-SCNC: 8 MMOL/L — SIGNIFICANT CHANGE UP (ref 5–17)
BUN SERPL-MCNC: 7 MG/DL — SIGNIFICANT CHANGE UP (ref 7–23)
CALCIUM SERPL-MCNC: 8.3 MG/DL — LOW (ref 8.5–10.1)
CHLORIDE SERPL-SCNC: 107 MMOL/L — SIGNIFICANT CHANGE UP (ref 96–108)
CO2 SERPL-SCNC: 24 MMOL/L — SIGNIFICANT CHANGE UP (ref 22–31)
CREAT SERPL-MCNC: 0.72 MG/DL — SIGNIFICANT CHANGE UP (ref 0.5–1.3)
GLUCOSE BLDC GLUCOMTR-MCNC: 115 MG/DL — HIGH (ref 70–99)
GLUCOSE BLDC GLUCOMTR-MCNC: 147 MG/DL — HIGH (ref 70–99)
GLUCOSE BLDC GLUCOMTR-MCNC: 156 MG/DL — HIGH (ref 70–99)
GLUCOSE BLDC GLUCOMTR-MCNC: 217 MG/DL — HIGH (ref 70–99)
GLUCOSE SERPL-MCNC: 122 MG/DL — HIGH (ref 70–99)
MAGNESIUM SERPL-MCNC: 1.7 MG/DL — SIGNIFICANT CHANGE UP (ref 1.6–2.6)
POTASSIUM SERPL-MCNC: 3.1 MMOL/L — LOW (ref 3.5–5.3)
POTASSIUM SERPL-SCNC: 3.1 MMOL/L — LOW (ref 3.5–5.3)
SODIUM SERPL-SCNC: 139 MMOL/L — SIGNIFICANT CHANGE UP (ref 135–145)

## 2020-08-05 PROCEDURE — 99233 SBSQ HOSP IP/OBS HIGH 50: CPT

## 2020-08-05 RX ORDER — CEFPODOXIME PROXETIL 100 MG
200 TABLET ORAL EVERY 12 HOURS
Refills: 0 | Status: DISCONTINUED | OUTPATIENT
Start: 2020-08-05 | End: 2020-08-10

## 2020-08-05 RX ORDER — ASPIRIN/CALCIUM CARB/MAGNESIUM 324 MG
81 TABLET ORAL DAILY
Refills: 0 | Status: DISCONTINUED | OUTPATIENT
Start: 2020-08-05 | End: 2020-08-10

## 2020-08-05 RX ORDER — POTASSIUM CHLORIDE 20 MEQ
40 PACKET (EA) ORAL EVERY 4 HOURS
Refills: 0 | Status: COMPLETED | OUTPATIENT
Start: 2020-08-05 | End: 2020-08-05

## 2020-08-05 RX ORDER — HYDRALAZINE HCL 50 MG
50 TABLET ORAL THREE TIMES A DAY
Refills: 0 | Status: DISCONTINUED | OUTPATIENT
Start: 2020-08-05 | End: 2020-08-10

## 2020-08-05 RX ADMIN — Medication 50 MILLIGRAM(S): at 22:01

## 2020-08-05 RX ADMIN — NYSTATIN CREAM 1 APPLICATION(S): 100000 CREAM TOPICAL at 17:44

## 2020-08-05 RX ADMIN — CLOPIDOGREL BISULFATE 75 MILLIGRAM(S): 75 TABLET, FILM COATED ORAL at 12:00

## 2020-08-05 RX ADMIN — ALBUTEROL 2.5 MILLIGRAM(S): 90 AEROSOL, METERED ORAL at 11:16

## 2020-08-05 RX ADMIN — AMLODIPINE BESYLATE 10 MILLIGRAM(S): 2.5 TABLET ORAL at 05:54

## 2020-08-05 RX ADMIN — Medication 4: at 12:00

## 2020-08-05 RX ADMIN — ALBUTEROL 2.5 MILLIGRAM(S): 90 AEROSOL, METERED ORAL at 05:07

## 2020-08-05 RX ADMIN — SODIUM CHLORIDE 100 MILLILITER(S): 9 INJECTION, SOLUTION INTRAVENOUS at 22:02

## 2020-08-05 RX ADMIN — Medication 25 MILLIGRAM(S): at 14:01

## 2020-08-05 RX ADMIN — Medication 40 MILLIEQUIVALENT(S): at 08:22

## 2020-08-05 RX ADMIN — Medication 200 MILLIGRAM(S): at 17:44

## 2020-08-05 RX ADMIN — NYSTATIN CREAM 1 APPLICATION(S): 100000 CREAM TOPICAL at 05:54

## 2020-08-05 RX ADMIN — HEPARIN SODIUM 5000 UNIT(S): 5000 INJECTION INTRAVENOUS; SUBCUTANEOUS at 05:52

## 2020-08-05 RX ADMIN — Medication 25 MILLIGRAM(S): at 05:53

## 2020-08-05 RX ADMIN — HEPARIN SODIUM 5000 UNIT(S): 5000 INJECTION INTRAVENOUS; SUBCUTANEOUS at 17:44

## 2020-08-05 RX ADMIN — Medication 1 PATCH: at 02:14

## 2020-08-05 RX ADMIN — Medication 81 MILLIGRAM(S): at 12:03

## 2020-08-05 RX ADMIN — CEFEPIME 100 MILLIGRAM(S): 1 INJECTION, POWDER, FOR SOLUTION INTRAMUSCULAR; INTRAVENOUS at 05:53

## 2020-08-05 RX ADMIN — SIMVASTATIN 20 MILLIGRAM(S): 20 TABLET, FILM COATED ORAL at 22:01

## 2020-08-05 RX ADMIN — SODIUM CHLORIDE 100 MILLILITER(S): 9 INJECTION, SOLUTION INTRAVENOUS at 05:51

## 2020-08-05 RX ADMIN — Medication 1 PATCH: at 08:19

## 2020-08-05 RX ADMIN — Medication 1 PATCH: at 02:52

## 2020-08-05 RX ADMIN — Medication 50 MILLIGRAM(S): at 17:45

## 2020-08-05 RX ADMIN — LEVETIRACETAM 500 MILLIGRAM(S): 250 TABLET, FILM COATED ORAL at 05:54

## 2020-08-05 RX ADMIN — Medication 1 PATCH: at 20:12

## 2020-08-05 RX ADMIN — LEVETIRACETAM 500 MILLIGRAM(S): 250 TABLET, FILM COATED ORAL at 17:44

## 2020-08-05 RX ADMIN — Medication 2: at 16:32

## 2020-08-05 RX ADMIN — Medication 50 MILLIGRAM(S): at 05:54

## 2020-08-05 RX ADMIN — Medication 40 MILLIEQUIVALENT(S): at 12:00

## 2020-08-05 NOTE — PROGRESS NOTE ADULT - SUBJECTIVE AND OBJECTIVE BOX
HPI:  68yo F w/ PMH HTN, DM, CVA, seizure disorder BIBA d/t found unresponsive by family at home this AM. BS was reportedly high. VSS. Pt awoke on ED arrival, looks to voice, non verbal, status post sepsis clinically improving after antibiotics were restarted unclear etiology   urine??       after a milner was placed 1750 ccof urine came out (28 Jul 2020 17:40)      Allergies    No Known Allergies    Intolerances        MEDICATIONS  (STANDING):  ALBUTerol    0.083% 2.5 milliGRAM(s) Nebulizer every 6 hours  ALBUTerol    90 MICROgram(s) HFA Inhaler 3 Puff(s) Inhalation every 4 hours  amLODIPine   Tablet 10 milliGRAM(s) Oral daily  aspirin enteric coated 81 milliGRAM(s) Oral daily  cefepime   IVPB      cefepime   IVPB 1000 milliGRAM(s) IV Intermittent every 8 hours  cloNIDine Patch 0.1 mG/24Hr(s) 1 patch Transdermal every 7 days  clopidogrel Tablet 75 milliGRAM(s) Oral daily  dextrose 5%. 1000 milliLiter(s) (50 mL/Hr) IV Continuous <Continuous>  dextrose 50% Injectable 12.5 Gram(s) IV Push once  dextrose 50% Injectable 25 Gram(s) IV Push once  dextrose 50% Injectable 25 Gram(s) IV Push once  heparin   Injectable 5000 Unit(s) SubCutaneous every 12 hours  hydrALAZINE 25 milliGRAM(s) Oral three times a day  insulin lispro (HumaLOG) corrective regimen sliding scale   SubCutaneous three times a day before meals  insulin lispro (HumaLOG) corrective regimen sliding scale   SubCutaneous at bedtime  levETIRAcetam  Solution 500 milliGRAM(s) Oral two times a day  metoprolol tartrate 50 milliGRAM(s) Oral two times a day  nystatin Powder 1 Application(s) Topical two times a day  potassium chloride   Powder 40 milliEquivalent(s) Oral every 4 hours  simvastatin 20 milliGRAM(s) Oral at bedtime  sodium chloride 0.45%. 1000 milliLiter(s) (100 mL/Hr) IV Continuous <Continuous>  traZODone 50 milliGRAM(s) Oral at bedtime    MEDICATIONS  (PRN):  acetaminophen   Tablet .. 650 milliGRAM(s) Oral every 6 hours PRN Temp greater or equal to 38C (100.4F), Mild Pain (1 - 3)  dextrose 40% Gel 15 Gram(s) Oral once PRN Blood Glucose LESS THAN 70 milliGRAM(s)/deciliter  glucagon  Injectable 1 milliGRAM(s) IntraMuscular once PRN Glucose LESS THAN 70 milligrams/deciliter  haloperidol    Injectable 1 milliGRAM(s) IV Push three times a day PRN agitation      REVIEW OF SYSTEMS:    CONSTITUTIONAL: No fever, chills, weight loss, or fatigue  HEENT: No sore throat, runny nose, ear ache  RESPIRATORY: No cough, wheezing, No shortness of breath  CARDIOVASCULAR: No chest pain, palpitations, dizziness  GASTROINTESTINAL: No abdominal pain. No nausea, vomiting, diarrhea  GENITOURINARY: No dysuria, increase frequency, hematuria, or incontinence  NEUROLOGICAL: No headaches, memory loss, loss of strength, numbness, or tremors, no weakness  EXTREMITY: No pedal edema BLE  SKIN: No itching, burning, rashes, or lesions     VITAL SIGNS:  T(C): 37.6 (08-05-20 @ 04:38), Max: 37.6 (08-05-20 @ 04:38)  T(F): 99.6 (08-05-20 @ 04:38), Max: 99.6 (08-05-20 @ 04:38)  HR: 91 (08-05-20 @ 05:07) (80 - 101)  BP: 160/67 (08-05-20 @ 04:38) (126/67 - 160/67)  RR: 18 (08-05-20 @ 04:38) (16 - 18)  SpO2: 97% (08-05-20 @ 05:07) (97% - 100%)  Wt(kg): --    PHYSICAL EXAM:    GENERAL: not in any distress  HEENT: Neck is supple, normocephalic, atraumatic   CHEST/LUNG: Clear to percussion bilaterally; No rales, rhonchi, wheezing  HEART: Regular rate and rhythm; No murmurs, rubs, or gallops  ABDOMEN: Soft, Nontender, Nondistended; Bowel sounds present, no rebound   EXTREMITIES:  2+ Peripheral Pulses, No clubbing, cyanosis, or edema  GENITOURINARY:   SKIN: raw skin from incontinence associated dermatitis   BACK: no pressor sore   NERVOUS SYSTEM:  non verbal     LABS:                         8.1    9.45  )-----------( 305      ( 04 Aug 2020 07:19 )             25.4     08-05    139  |  107  |  7   ----------------------------<  122<H>  3.1<L>   |  24  |  0.72    Ca    8.3<L>      05 Aug 2020 06:24  Mg     1.7     08-05        Culture Results:   No growth to date. (08-03 @ 14:21)  Culture Results:   >=3 organisms. Probable collection contamination. (08-02 @ 02:34)  Culture Results:   No Growth Final (07-30 @ 09:01)  Culture Results:   No Growth Final (07-30 @ 00:27)

## 2020-08-05 NOTE — PROGRESS NOTE ADULT - ASSESSMENT
resolved sepsis   antibiotics were started and fevers and leukocytosis resolved   clinically improving   cxr no pna  discuss with RN  switch to po x 7 more days   local care to skin

## 2020-08-05 NOTE — PROGRESS NOTE ADULT - ASSESSMENT
69 years old female with unresponsive found too have acute kidney injury and hydro                     Fever, of unclear source - subsided.  - urine cult: gram POSITIVE organisms.  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  - vascular eval noted.  - local wound care as per vascular order.    Hypokalemia.  - replete potassium    - hypomagnesemia: resolved with mag suppl

## 2020-08-05 NOTE — PROGRESS NOTE ADULT - SUBJECTIVE AND OBJECTIVE BOX
HPI:  70yo F w/ PMH HTN, DM, CVA, seizure disorder BIBA d/t found unresponsive by family at home this AM. BS was reportedly high. VSS. Pt awoke on ED arrival, looks to voice, non verbal. 	Family - Pt lives w/ aide, aide noted pt to be unresponsive while feeding this AM. Pt had episode unresponsiveness, slumped, in chair yesterday, family noted bedsore BL buttocks, abscess at that time. Pt w/ recent fall 2wks ago (7/14) seen at [facility] VS ED, L humerus fx. Pt previously ambulatory, verbal, progressive decline, family unsure since when exactly not walking / not talking.       after a milner was placed 1750 ccof urine came out (28 Jul 2020 17:40)  Patient is a 69y old  Female who presents with a chief complaint of AMS (04 Aug 2020 11:49)      INTERVAL HPI/OVERNIGHT EVENTS:    MEDICATIONS  (STANDING):  ALBUTerol    0.083% 2.5 milliGRAM(s) Nebulizer every 6 hours  ALBUTerol    90 MICROgram(s) HFA Inhaler 3 Puff(s) Inhalation every 4 hours  amLODIPine   Tablet 10 milliGRAM(s) Oral daily  aspirin  chewable 81 milliGRAM(s) Oral daily  cefpodoxime 200 milliGRAM(s) Oral every 12 hours  cloNIDine Patch 0.1 mG/24Hr(s) 1 patch Transdermal every 7 days  clopidogrel Tablet 75 milliGRAM(s) Oral daily  dextrose 5%. 1000 milliLiter(s) (50 mL/Hr) IV Continuous <Continuous>  dextrose 50% Injectable 12.5 Gram(s) IV Push once  dextrose 50% Injectable 25 Gram(s) IV Push once  dextrose 50% Injectable 25 Gram(s) IV Push once  heparin   Injectable 5000 Unit(s) SubCutaneous every 12 hours  hydrALAZINE 50 milliGRAM(s) Oral three times a day  insulin lispro (HumaLOG) corrective regimen sliding scale   SubCutaneous three times a day before meals  insulin lispro (HumaLOG) corrective regimen sliding scale   SubCutaneous at bedtime  levETIRAcetam  Solution 500 milliGRAM(s) Oral two times a day  metoprolol tartrate 50 milliGRAM(s) Oral two times a day  nystatin Powder 1 Application(s) Topical two times a day  simvastatin 20 milliGRAM(s) Oral at bedtime  sodium chloride 0.45%. 1000 milliLiter(s) (100 mL/Hr) IV Continuous <Continuous>  traZODone 50 milliGRAM(s) Oral at bedtime    MEDICATIONS  (PRN):  acetaminophen   Tablet .. 650 milliGRAM(s) Oral every 6 hours PRN Temp greater or equal to 38C (100.4F), Mild Pain (1 - 3)  dextrose 40% Gel 15 Gram(s) Oral once PRN Blood Glucose LESS THAN 70 milliGRAM(s)/deciliter  glucagon  Injectable 1 milliGRAM(s) IntraMuscular once PRN Glucose LESS THAN 70 milligrams/deciliter  haloperidol    Injectable 1 milliGRAM(s) IV Push three times a day PRN agitation      Allergies    No Known Allergies    Intolerances        REVIEW OF SYSTEMS:  non verbal     Vital Signs Last 24 Hrs  T(C): 37.3 (05 Aug 2020 11:15), Max: 37.6 (05 Aug 2020 04:38)  T(F): 99.2 (05 Aug 2020 11:15), Max: 99.6 (05 Aug 2020 04:38)  HR: 94 (05 Aug 2020 14:01) (85 - 101)  BP: 167/76 (05 Aug 2020 14:01) (135/67 - 167/76)  BP(mean): --  RR: 18 (05 Aug 2020 11:15) (17 - 18)  SpO2: 95% (05 Aug 2020 11:25) (95% - 98%)    PHYSICAL EXAM:  GENERAL: NAD, well-groomed, well-developed  HEAD:  Atraumatic, Normocephalic  EYES: EOMI, PERRLA, conjunctiva and sclera clear  ENMT: No tonsillar erythema, exudates, or enlargement; Moist mucous membranes, Good dentition, No lesions  NECK: Supple, No JVD, Normal thyroid  NERVOUS SYSTEM: awake non verbal   CHEST/LUNG: Clear to percussion bilaterally; No rales, rhonchi, wheezing, or rubs  HEART: Regular rate and rhythm; No murmurs, rubs, or gallops  ABDOMEN: Soft, Nontender, Nondistended; Bowel sounds present milner present   EXTREMITIES:  2+ Peripheral Pulses, No clubbing, cyanosis, or edema  LYMPH: No lymphadenopathy noted  SKIN: No rashes or lesions    LABS:                        8.1    9.45  )-----------( 305      ( 04 Aug 2020 07:19 )             25.4     08-05    139  |  107  |  7   ----------------------------<  122<H>  3.1<L>   |  24  |  0.72    Ca    8.3<L>      05 Aug 2020 06:24  Mg     1.7     08-05          CAPILLARY BLOOD GLUCOSE      POCT Blood Glucose.: 217 mg/dL (05 Aug 2020 11:18)  POCT Blood Glucose.: 147 mg/dL (05 Aug 2020 07:33)  POCT Blood Glucose.: 135 mg/dL (04 Aug 2020 21:23)  POCT Blood Glucose.: 155 mg/dL (04 Aug 2020 16:18)      RADIOLOGY & ADDITIONAL TESTS:    Imaging Personally Reviewed:  [ X] YES  [ ] NO    Consultant(s) Notes Reviewed:  [ X] YES  [ ] NO    Care Discussed with Consultants/Other Providers [ X] YES  [ ] NO

## 2020-08-06 LAB
ALBUMIN SERPL ELPH-MCNC: 1.9 G/DL — LOW (ref 3.3–5)
ALP SERPL-CCNC: 87 U/L — SIGNIFICANT CHANGE UP (ref 40–120)
ALT FLD-CCNC: 21 U/L — SIGNIFICANT CHANGE UP (ref 12–78)
ANION GAP SERPL CALC-SCNC: 8 MMOL/L — SIGNIFICANT CHANGE UP (ref 5–17)
AST SERPL-CCNC: 19 U/L — SIGNIFICANT CHANGE UP (ref 15–37)
BILIRUB SERPL-MCNC: 0.4 MG/DL — SIGNIFICANT CHANGE UP (ref 0.2–1.2)
BUN SERPL-MCNC: 7 MG/DL — SIGNIFICANT CHANGE UP (ref 7–23)
CALCIUM SERPL-MCNC: 7.2 MG/DL — LOW (ref 8.5–10.1)
CHLORIDE SERPL-SCNC: 102 MMOL/L — SIGNIFICANT CHANGE UP (ref 96–108)
CO2 SERPL-SCNC: 23 MMOL/L — SIGNIFICANT CHANGE UP (ref 22–31)
CREAT SERPL-MCNC: 0.57 MG/DL — SIGNIFICANT CHANGE UP (ref 0.5–1.3)
GLUCOSE BLDC GLUCOMTR-MCNC: 115 MG/DL — HIGH (ref 70–99)
GLUCOSE BLDC GLUCOMTR-MCNC: 117 MG/DL — HIGH (ref 70–99)
GLUCOSE BLDC GLUCOMTR-MCNC: 118 MG/DL — HIGH (ref 70–99)
GLUCOSE BLDC GLUCOMTR-MCNC: 171 MG/DL — HIGH (ref 70–99)
GLUCOSE SERPL-MCNC: 102 MG/DL — HIGH (ref 70–99)
HCT VFR BLD CALC: 26.9 % — LOW (ref 34.5–45)
HGB BLD-MCNC: 8.7 G/DL — LOW (ref 11.5–15.5)
MAGNESIUM SERPL-MCNC: 1.6 MG/DL — SIGNIFICANT CHANGE UP (ref 1.6–2.6)
MCHC RBC-ENTMCNC: 28.9 PG — SIGNIFICANT CHANGE UP (ref 27–34)
MCHC RBC-ENTMCNC: 32.3 GM/DL — SIGNIFICANT CHANGE UP (ref 32–36)
MCV RBC AUTO: 89.4 FL — SIGNIFICANT CHANGE UP (ref 80–100)
NRBC # BLD: 0 /100 WBCS — SIGNIFICANT CHANGE UP (ref 0–0)
PHOSPHATE SERPL-MCNC: 2.8 MG/DL — SIGNIFICANT CHANGE UP (ref 2.5–4.5)
PLATELET # BLD AUTO: 386 K/UL — SIGNIFICANT CHANGE UP (ref 150–400)
POTASSIUM SERPL-MCNC: 3 MMOL/L — LOW (ref 3.5–5.3)
POTASSIUM SERPL-SCNC: 3 MMOL/L — LOW (ref 3.5–5.3)
PROT SERPL-MCNC: 5.7 GM/DL — LOW (ref 6–8.3)
RBC # BLD: 3.01 M/UL — LOW (ref 3.8–5.2)
RBC # FLD: 13 % — SIGNIFICANT CHANGE UP (ref 10.3–14.5)
SODIUM SERPL-SCNC: 133 MMOL/L — LOW (ref 135–145)
WBC # BLD: 6.57 K/UL — SIGNIFICANT CHANGE UP (ref 3.8–10.5)
WBC # FLD AUTO: 6.57 K/UL — SIGNIFICANT CHANGE UP (ref 3.8–10.5)

## 2020-08-06 PROCEDURE — 99232 SBSQ HOSP IP/OBS MODERATE 35: CPT

## 2020-08-06 RX ORDER — POTASSIUM CHLORIDE 20 MEQ
20 PACKET (EA) ORAL ONCE
Refills: 0 | Status: COMPLETED | OUTPATIENT
Start: 2020-08-06 | End: 2020-08-06

## 2020-08-06 RX ORDER — POTASSIUM CHLORIDE 20 MEQ
10 PACKET (EA) ORAL
Refills: 0 | Status: COMPLETED | OUTPATIENT
Start: 2020-08-06 | End: 2020-08-06

## 2020-08-06 RX ADMIN — Medication 100 MILLIEQUIVALENT(S): at 14:32

## 2020-08-06 RX ADMIN — SODIUM CHLORIDE 100 MILLILITER(S): 9 INJECTION, SOLUTION INTRAVENOUS at 09:09

## 2020-08-06 RX ADMIN — Medication 20 MILLIEQUIVALENT(S): at 11:49

## 2020-08-06 RX ADMIN — Medication 1 PATCH: at 19:40

## 2020-08-06 RX ADMIN — Medication 50 MILLIGRAM(S): at 18:00

## 2020-08-06 RX ADMIN — Medication 200 MILLIGRAM(S): at 18:00

## 2020-08-06 RX ADMIN — LEVETIRACETAM 500 MILLIGRAM(S): 250 TABLET, FILM COATED ORAL at 05:36

## 2020-08-06 RX ADMIN — Medication 100 MILLIEQUIVALENT(S): at 11:49

## 2020-08-06 RX ADMIN — Medication 1 PATCH: at 07:17

## 2020-08-06 RX ADMIN — HEPARIN SODIUM 5000 UNIT(S): 5000 INJECTION INTRAVENOUS; SUBCUTANEOUS at 05:35

## 2020-08-06 RX ADMIN — Medication 50 MILLIGRAM(S): at 21:40

## 2020-08-06 RX ADMIN — CLOPIDOGREL BISULFATE 75 MILLIGRAM(S): 75 TABLET, FILM COATED ORAL at 11:49

## 2020-08-06 RX ADMIN — Medication 200 MILLIGRAM(S): at 05:36

## 2020-08-06 RX ADMIN — Medication 100 MILLIEQUIVALENT(S): at 13:28

## 2020-08-06 RX ADMIN — Medication 50 MILLIGRAM(S): at 14:34

## 2020-08-06 RX ADMIN — SIMVASTATIN 20 MILLIGRAM(S): 20 TABLET, FILM COATED ORAL at 21:40

## 2020-08-06 RX ADMIN — HEPARIN SODIUM 5000 UNIT(S): 5000 INJECTION INTRAVENOUS; SUBCUTANEOUS at 18:01

## 2020-08-06 RX ADMIN — NYSTATIN CREAM 1 APPLICATION(S): 100000 CREAM TOPICAL at 05:35

## 2020-08-06 RX ADMIN — LEVETIRACETAM 500 MILLIGRAM(S): 250 TABLET, FILM COATED ORAL at 18:15

## 2020-08-06 RX ADMIN — NYSTATIN CREAM 1 APPLICATION(S): 100000 CREAM TOPICAL at 18:00

## 2020-08-06 RX ADMIN — Medication 81 MILLIGRAM(S): at 11:48

## 2020-08-06 NOTE — PROGRESS NOTE ADULT - ASSESSMENT
resolved sepsis   antibiotics were started and fevers and leukocytosis resolved   clinically improving   cxr no pna  discuss with RN  switch to po x 7 more days   local care to skin resolved sepsis   antibiotics were started and fevers and leukocytosis resolved   clinically improving   cxr no pna  discuss with RN  switch to po x 7 more days   local care to skin   end date is by 7/12

## 2020-08-06 NOTE — SWALLOW BEDSIDE ASSESSMENT ADULT - SWALLOW EVAL: DIAGNOSIS
pt presented with oropharyngeal dysphagia marked by reduced cognition, fair oral opening, inconsistent mild anterior loss, increased oral transit time with suspected delay in swallow trigger and adequate laryngeal elevation. no overt signs of aspiration with consistencies trialed
Pt presented with oropharyngeal dysphagia marked by reduced cognition, fair oral opening, poor bolus formation/manipulation, decreased AP transport, delayed oral transit time with suspected delay in pharyngeal swallow trigger and adequate laryngeal elevation felt via palpation. No overt s/s of asp/pen with puree solids/nectar thick liquids. Pt with discoordinated swallow and poor bolus hold/buccal tension with thin liquid trials; reflexive cough s/p intake.

## 2020-08-06 NOTE — CHART NOTE - NSCHARTNOTEFT_GEN_A_CORE
Assessment:  Pt adm c acute kidney injury, encephalopathy, AMADOU, urinary retention, hydronephrosis, cystitis, hypernatremia, bacteremia, seizure disorder, humerus fracture, left UE sling.   PMH include DM( was not on diabetic meds PTA), HTN, CVA.   Pt's daughter called RD for discharge education, RD educated daughter on Dysphagia 1 Pureed - Nectar Consistency Fluid , CHO controlled diet, blood glucose goals explained,  nutrition education to be mailed to daughter.     Factors impacting intake: [ ] none [ ] nausea  [ ] vomiting [ ] diarrhea [ ] constipation  [ ]chewing problems [x ] swallowing issues; 07/29/20, swallow evaluation recommended Dysphagia 1 Pureed - Nectar Consistency Fluid   [ x] other: AMS, pt is very sleepy today     Diet Prescription: Diet, Dysphagia 1 Pureed-Nectar Consistency Fluid:   Consistent Carbohydrate {Evening Snack}  Low Sodium  No Carb Prosource (1pkg = 15gms Protein)     Qty per Day:  1  Supplement Feeding Modality:  Oral  Ensure Pudding Cans or Servings Per Day:  3       Frequency:  Daily (07-30-20 @ 16:05)    Intake: 0% today, previous documentation of improved oral intake noted     Current Weight: 08/06/20, 57.1 kg, 07/29/20, 59.7 kg, c wt. loss of 2.6 kg noted  % Weight Change: 4.35%  No edema noted   Pertinent Medications: MEDICATIONS  (STANDING):  ALBUTerol    90 MICROgram(s) HFA Inhaler 3 Puff(s) Inhalation every 4 hours  amLODIPine   Tablet 10 milliGRAM(s) Oral daily  aspirin  chewable 81 milliGRAM(s) Oral daily  cefpodoxime 200 milliGRAM(s) Oral every 12 hours  cloNIDine Patch 0.1 mG/24Hr(s) 1 patch Transdermal every 7 days  clopidogrel Tablet 75 milliGRAM(s) Oral daily  dextrose 5%. 1000 milliLiter(s) (50 mL/Hr) IV Continuous <Continuous>  dextrose 50% Injectable 12.5 Gram(s) IV Push once  dextrose 50% Injectable 25 Gram(s) IV Push once  dextrose 50% Injectable 25 Gram(s) IV Push once  heparin   Injectable 5000 Unit(s) SubCutaneous every 12 hours  hydrALAZINE 50 milliGRAM(s) Oral three times a day  insulin lispro (HumaLOG) corrective regimen sliding scale   SubCutaneous three times a day before meals  insulin lispro (HumaLOG) corrective regimen sliding scale   SubCutaneous at bedtime  levETIRAcetam  Solution 500 milliGRAM(s) Oral two times a day  metoprolol tartrate 50 milliGRAM(s) Oral two times a day  nystatin Powder 1 Application(s) Topical two times a day  simvastatin 20 milliGRAM(s) Oral at bedtime  sodium chloride 0.45%. 1000 milliLiter(s) (100 mL/Hr) IV Continuous <Continuous>    MEDICATIONS  (PRN):  acetaminophen   Tablet .. 650 milliGRAM(s) Oral every 6 hours PRN Temp greater or equal to 38C (100.4F), Mild Pain (1 - 3)  dextrose 40% Gel 15 Gram(s) Oral once PRN Blood Glucose LESS THAN 70 milliGRAM(s)/deciliter  glucagon  Injectable 1 milliGRAM(s) IntraMuscular once PRN Glucose LESS THAN 70 milligrams/deciliter  haloperidol    Injectable 1 milliGRAM(s) IV Push three times a day PRN agitation    Pertinent Labs: 08-06 Na133 mmol/L<L> Glu 102 mg/dL<H> K+ 3.0 mmol/L<L> Cr  0.57 mg/dL BUN 7 mg/dL 08-06 Phos 2.8 mg/dL 08-06 Alb 1.9 g/dL<L>08-06 ALT 21 U/L AST 19 U/L Alkaline Phosphatase 87 U/L  07-29-20 @ 11:09 a1c 7.7< within acceptable for the elderly c cognitive impairment, functional status)   CAPILLARY BLOOD GLUCOSE      POCT Blood Glucose.: 115 mg/dL (06 Aug 2020 10:49)  POCT Blood Glucose.: 118 mg/dL (06 Aug 2020 07:25)  POCT Blood Glucose.: 115 mg/dL (05 Aug 2020 21:40)  POCT Blood Glucose.: 156 mg/dL (05 Aug 2020 15:54)    Skin:   Pressure ulcers x 3 noted  1. 08/04/20; right buttock; stage II  2. 08/04/20; left buttock; stage II  3. 08/04/20; left inner thigh; stage II      Estimated Needs:   [ x] no change since previous assessment( 07/30/20)  [ ] recalculated:     Previous Nutrition Diagnosis:   Malnutrition severe malnutrition in context of acute illness.     Etiology inadequate protein-energy intake in setting of hx of s/p fall, pressure ulcers, AMADOU,     Signs/Symptoms <50% nutrition needs > 5 days, physical findings of moderate muscle loss.     Goal/Expected Outcome pt to consume >50-75% of meals & supplement; not met consistently     Nutrition Diagnosis is [x ] ongoing  [ ] resolved [ ] not applicable     New Nutrition Diagnosis: [x ] not applicable       Interventions:   Recommend  [x] Continue c current diet regimen   [ ] Change Diet To:  [ ] Nutrition Supplement  [ ] Nutrition Support  [ x] Other: monitor for AMS/sleepiness,  swallow re-evaluation if AMS w/o change.     Monitoring and Evaluation:   [ x] PO intake [ x ] Tolerance to diet prescription [ x ] weights [ x ] labs[ x ] follow up per protocol  [ ] other: Assessment:  Pt adm c acute kidney injury, encephalopathy, AMADOU, urinary retention, hydronephrosis, cystitis, hypernatremia, bacteremia, seizure disorder, humerus fracture, left UE sling.   PMH include DM( was not on diabetic meds PTA), HTN, CVA.   Pt's daughter called RD for discharge education, RD educated daughter on Dysphagia 1 Pureed - Nectar Consistency Fluid , CHO controlled diet, blood glucose goals explained,  nutrition education to be mailed to daughter.     Factors impacting intake: [ ] none [ ] nausea  [ ] vomiting [ ] diarrhea [ ] constipation  [ ]chewing problems [x ] swallowing issues; 07/29/20, swallow evaluation recommended Dysphagia 1 Pureed - Nectar Consistency Fluid   [ x] other: AMS, pt is very sleepy today     Diet Prescription: Diet, Dysphagia 1 Pureed-Nectar Consistency Fluid:   Consistent Carbohydrate {Evening Snack}  Low Sodium  No Carb Prosource (1pkg = 15gms Protein)     Qty per Day:  1  Supplement Feeding Modality:  Oral  Ensure Pudding Cans or Servings Per Day:  3       Frequency:  Daily (07-30-20 @ 16:05)    Intake: 0% today, previous documentation of improved oral intake noted     Current Weight: 08/06/20, 57.1 kg, 07/29/20, 59.7 kg, c wt. loss of 2.6 kg noted  % Weight Change: 4.35%  No edema noted   RD c limited view of pt, appeared to have moderate depletion of orbital and temple areas.     Pertinent Medications: MEDICATIONS  (STANDING):  ALBUTerol    90 MICROgram(s) HFA Inhaler 3 Puff(s) Inhalation every 4 hours  amLODIPine   Tablet 10 milliGRAM(s) Oral daily  aspirin  chewable 81 milliGRAM(s) Oral daily  cefpodoxime 200 milliGRAM(s) Oral every 12 hours  cloNIDine Patch 0.1 mG/24Hr(s) 1 patch Transdermal every 7 days  clopidogrel Tablet 75 milliGRAM(s) Oral daily  dextrose 5%. 1000 milliLiter(s) (50 mL/Hr) IV Continuous <Continuous>  dextrose 50% Injectable 12.5 Gram(s) IV Push once  dextrose 50% Injectable 25 Gram(s) IV Push once  dextrose 50% Injectable 25 Gram(s) IV Push once  heparin   Injectable 5000 Unit(s) SubCutaneous every 12 hours  hydrALAZINE 50 milliGRAM(s) Oral three times a day  insulin lispro (HumaLOG) corrective regimen sliding scale   SubCutaneous three times a day before meals  insulin lispro (HumaLOG) corrective regimen sliding scale   SubCutaneous at bedtime  levETIRAcetam  Solution 500 milliGRAM(s) Oral two times a day  metoprolol tartrate 50 milliGRAM(s) Oral two times a day  nystatin Powder 1 Application(s) Topical two times a day  simvastatin 20 milliGRAM(s) Oral at bedtime  sodium chloride 0.45%. 1000 milliLiter(s) (100 mL/Hr) IV Continuous <Continuous>    MEDICATIONS  (PRN):  acetaminophen   Tablet .. 650 milliGRAM(s) Oral every 6 hours PRN Temp greater or equal to 38C (100.4F), Mild Pain (1 - 3)  dextrose 40% Gel 15 Gram(s) Oral once PRN Blood Glucose LESS THAN 70 milliGRAM(s)/deciliter  glucagon  Injectable 1 milliGRAM(s) IntraMuscular once PRN Glucose LESS THAN 70 milligrams/deciliter  haloperidol    Injectable 1 milliGRAM(s) IV Push three times a day PRN agitation    Pertinent Labs: 08-06 Na133 mmol/L<L> Glu 102 mg/dL<H> K+ 3.0 mmol/L<L> Cr  0.57 mg/dL BUN 7 mg/dL 08-06 Phos 2.8 mg/dL 08-06 Alb 1.9 g/dL<L>08-06 ALT 21 U/L AST 19 U/L Alkaline Phosphatase 87 U/L  07-29-20 @ 11:09 a1c 7.7< within acceptable for the elderly c cognitive impairment, functional status)   CAPILLARY BLOOD GLUCOSE      POCT Blood Glucose.: 115 mg/dL (06 Aug 2020 10:49)  POCT Blood Glucose.: 118 mg/dL (06 Aug 2020 07:25)  POCT Blood Glucose.: 115 mg/dL (05 Aug 2020 21:40)  POCT Blood Glucose.: 156 mg/dL (05 Aug 2020 15:54)    Skin:   Pressure ulcers x 3 noted  1. 08/04/20; right buttock; stage II  2. 08/04/20; left buttock; stage II  3. 08/04/20; left inner thigh; stage II      Estimated Needs:   [ x] no change since previous assessment( 07/30/20)  [ ] recalculated:     Previous Nutrition Diagnosis:   Malnutrition severe malnutrition in context of acute illness.     Etiology inadequate protein-energy intake in setting of hx of s/p fall, pressure ulcers, AMADOU,     Signs/Symptoms <50% nutrition needs > 5 days, physical findings of moderate muscle loss.     Goal/Expected Outcome pt to consume >50-75% of meals & supplement; not met consistently     Nutrition Diagnosis is [x ] ongoing  [ ] resolved [ ] not applicable     New Nutrition Diagnosis: [x ] not applicable       Interventions:   Recommend  [x] Continue c current diet regimen   [ ] Change Diet To:  [ ] Nutrition Supplement  [ ] Nutrition Support  [ x] Other: monitor for AMS/sleepiness,  swallow re-evaluation if AMS w/o change.     Monitoring and Evaluation:   [ x] PO intake [ x ] Tolerance to diet prescription [ x ] weights [ x ] labs[ x ] follow up per protocol  [ ] other:

## 2020-08-06 NOTE — SWALLOW BEDSIDE ASSESSMENT ADULT - SWALLOW EVAL: RECOMMENDED FEEDING/EATING TECHNIQUES
small sips/bites/maintain upright posture during/after eating for 30 mins/oral hygiene/allow for swallow between intakes/crush medication (when feasible)
alternate food with liquid/small sips/bites/allow for swallow between intakes/position upright (90 degrees)/crush medication (when feasible)/maintain upright posture during/after eating for 30 mins

## 2020-08-06 NOTE — SWALLOW BEDSIDE ASSESSMENT ADULT - SLP GENERAL OBSERVATIONS
pt seen bedside, alert and ?oriented to self. pt nonverbal, essentially noncommunicative and she did not follow directions or [facility]. pt leaning to the right despite attempts to reposition and she visually located to SLP.
Pt seen bedside, alert and ?oriented to self. Pt nonverbal, essentially non-communicative. Pt unable to [facility] directions or [facility]. Pt with natural lean to the right; reposition attempted; pt localized to SLP when on the left.

## 2020-08-06 NOTE — PROGRESS NOTE ADULT - SUBJECTIVE AND OBJECTIVE BOX
HPI:  70yo F w/ PMH HTN, DM, CVA, seizure disorder BIBA d/t found unresponsive by family at home this AM. BS was reportedly high. VSS. Pt awoke on ED arrival, looks to voice, non verbal. 	Family - Pt lives w/ aide, aide noted pt to be unresponsive while feeding this AM. Pt had episode unresponsiveness, slumped, in chair yesterday, family noted bedsore BL buttocks, abscess at that time. Pt w/ recent fall 2wks ago (7/14) seen at [facility] VS ED, L humerus fx. Pt previously ambulatory, verbal, progressive decline, family unsure since when exactly not walking / not talking.   after a milner was placed 1750 ccof urine came out (28 Jul 2020 17:40)      Allergies    No Known Allergies    Intolerances        MEDICATIONS  (STANDING):  ALBUTerol    90 MICROgram(s) HFA Inhaler 3 Puff(s) Inhalation every 4 hours  amLODIPine   Tablet 10 milliGRAM(s) Oral daily  aspirin  chewable 81 milliGRAM(s) Oral daily  cefpodoxime 200 milliGRAM(s) Oral every 12 hours  cloNIDine Patch 0.1 mG/24Hr(s) 1 patch Transdermal every 7 days  clopidogrel Tablet 75 milliGRAM(s) Oral daily  dextrose 5%. 1000 milliLiter(s) (50 mL/Hr) IV Continuous <Continuous>  dextrose 50% Injectable 12.5 Gram(s) IV Push once  dextrose 50% Injectable 25 Gram(s) IV Push once  dextrose 50% Injectable 25 Gram(s) IV Push once  heparin   Injectable 5000 Unit(s) SubCutaneous every 12 hours  hydrALAZINE 50 milliGRAM(s) Oral three times a day  insulin lispro (HumaLOG) corrective regimen sliding scale   SubCutaneous three times a day before meals  insulin lispro (HumaLOG) corrective regimen sliding scale   SubCutaneous at bedtime  levETIRAcetam  Solution 500 milliGRAM(s) Oral two times a day  metoprolol tartrate 50 milliGRAM(s) Oral two times a day  nystatin Powder 1 Application(s) Topical two times a day  simvastatin 20 milliGRAM(s) Oral at bedtime    MEDICATIONS  (PRN):  acetaminophen   Tablet .. 650 milliGRAM(s) Oral every 6 hours PRN Temp greater or equal to 38C (100.4F), Mild Pain (1 - 3)  dextrose 40% Gel 15 Gram(s) Oral once PRN Blood Glucose LESS THAN 70 milliGRAM(s)/deciliter  glucagon  Injectable 1 milliGRAM(s) IntraMuscular once PRN Glucose LESS THAN 70 milligrams/deciliter  haloperidol    Injectable 1 milliGRAM(s) IV Push three times a day PRN agitation      REVIEW OF SYSTEMS:    CONSTITUTIONAL: No fever, chills, weight loss, or fatigue  HEENT: No sore throat, runny nose, ear ache  RESPIRATORY: No cough, wheezing, No shortness of breath  CARDIOVASCULAR: No chest pain, palpitations, dizziness  GASTROINTESTINAL: No abdominal pain. No nausea, vomiting, diarrhea  GENITOURINARY: No dysuria, increase frequency, hematuria, or incontinence  NEUROLOGICAL: No headaches, memory loss, loss of strength, numbness, or tremors, no weakness  EXTREMITY: No pedal edema BLE  SKIN: No itching, burning, rashes, or lesions     VITAL SIGNS:  T(C): 36.8 (08-06-20 @ 16:54), Max: 37.6 (08-06-20 @ 12:41)  T(F): 98.3 (08-06-20 @ 16:54), Max: 99.7 (08-06-20 @ 12:41)  HR: 108 (08-06-20 @ 16:54) (94 - 113)  BP: 151/98 (08-06-20 @ 16:54) (104/57 - 160/79)  RR: 18 (08-06-20 @ 16:54) (17 - 18)  SpO2: 98% (08-06-20 @ 16:54) (98% - 100%)  Wt(kg): --    PHYSICAL EXAM:    GENERAL: not in any distress  HEENT: Neck is supple, normocephalic, atraumatic   CHEST/LUNG: Clear to auscultation bilaterally; No rales, rhonchi, wheezing  HEART: Regular rate and rhythm; No murmurs, rubs, or gallops  ABDOMEN: Soft, Nontender, Nondistended; Bowel sounds present, no rebound   EXTREMITIES:  2+ Peripheral Pulses, No clubbing, cyanosis, or edema  GENITOURINARY:   SKIN: No rashes or lesions  BACK: no pressor sore   NERVOUS SYSTEM:  Alert & Oriented X3, Good concentration  PSYCH: normal affect     LABS:                         8.7    6.57  )-----------( 386      ( 06 Aug 2020 09:46 )             26.9     08-06    133<L>  |  102  |  7   ----------------------------<  102<H>  3.0<L>   |  23  |  0.57    Ca    7.2<L>      06 Aug 2020 09:46  Phos  2.8     08-06  Mg     1.6     08-06    TPro  5.7<L>  /  Alb  1.9<L>  /  TBili  0.4  /  DBili  x   /  AST  19  /  ALT  21  /  AlkPhos  87  08-06    LIVER FUNCTIONS - ( 06 Aug 2020 09:46 )  Alb: 1.9 g/dL / Pro: 5.7 gm/dL / ALK PHOS: 87 U/L / ALT: 21 U/L / AST: 19 U/L / GGT: x                                   Culture Results:   No growth to date. (08-03 @ 14:21)  Culture Results:   >=3 organisms. Probable collection contamination. (08-02 @ 02:34)                Radiology: HPI:  70yo F w/ PMH HTN, DM, CVA, seizure disorder BIBA d/t found unresponsive by family at home this AM. BS was reportedly high. VSS. Pt awoke on ED arrival, looks to voice, non verbal. 	Family - Pt lives w/ aide, aide noted pt to be unresponsive while feeding this AM. Pt had episode unresponsiveness, slumped, in chair yesterday, family noted bedsore BL buttocks, abscess at that time. Pt w/ recent fall 2wks ago (7/14) seen at [facility] VS ED, L humerus fx. Pt previously ambulatory, verbal, progressive decline, family unsure since when exactly not walking / not talking.   after a milner was placed 1750 ccof urine came out (28 Jul 2020 17:40)  all events noted   seen by gu   neuro ;; TME     Allergies    No Known Allergies    Intolerances        MEDICATIONS  (STANDING):  ALBUTerol    90 MICROgram(s) HFA Inhaler 3 Puff(s) Inhalation every 4 hours  amLODIPine   Tablet 10 milliGRAM(s) Oral daily  aspirin  chewable 81 milliGRAM(s) Oral daily  cefpodoxime 200 milliGRAM(s) Oral every 12 hours  cloNIDine Patch 0.1 mG/24Hr(s) 1 patch Transdermal every 7 days  clopidogrel Tablet 75 milliGRAM(s) Oral daily  dextrose 5%. 1000 milliLiter(s) (50 mL/Hr) IV Continuous <Continuous>  dextrose 50% Injectable 12.5 Gram(s) IV Push once  dextrose 50% Injectable 25 Gram(s) IV Push once  dextrose 50% Injectable 25 Gram(s) IV Push once  heparin   Injectable 5000 Unit(s) SubCutaneous every 12 hours  hydrALAZINE 50 milliGRAM(s) Oral three times a day  insulin lispro (HumaLOG) corrective regimen sliding scale   SubCutaneous three times a day before meals  insulin lispro (HumaLOG) corrective regimen sliding scale   SubCutaneous at bedtime  levETIRAcetam  Solution 500 milliGRAM(s) Oral two times a day  metoprolol tartrate 50 milliGRAM(s) Oral two times a day  nystatin Powder 1 Application(s) Topical two times a day  simvastatin 20 milliGRAM(s) Oral at bedtime    MEDICATIONS  (PRN):  acetaminophen   Tablet .. 650 milliGRAM(s) Oral every 6 hours PRN Temp greater or equal to 38C (100.4F), Mild Pain (1 - 3)  dextrose 40% Gel 15 Gram(s) Oral once PRN Blood Glucose LESS THAN 70 milliGRAM(s)/deciliter  glucagon  Injectable 1 milliGRAM(s) IntraMuscular once PRN Glucose LESS THAN 70 milligrams/deciliter  haloperidol    Injectable 1 milliGRAM(s) IV Push three times a day PRN agitation      REVIEW OF SYSTEMS:    unable to assess     VITAL SIGNS:  T(C): 36.8 (08-06-20 @ 16:54), Max: 37.6 (08-06-20 @ 12:41)  T(F): 98.3 (08-06-20 @ 16:54), Max: 99.7 (08-06-20 @ 12:41)  HR: 108 (08-06-20 @ 16:54) (94 - 113)  BP: 151/98 (08-06-20 @ 16:54) (104/57 - 160/79)  RR: 18 (08-06-20 @ 16:54) (17 - 18)  SpO2: 98% (08-06-20 @ 16:54) (98% - 100%)  Wt(kg): --    PHYSICAL EXAM:    GENERAL: not in any distress  HEENT: Neck is supple, normocephalic, atraumatic   CHEST/LUNG: Clear to auscultation bilaterally; No rales, rhonchi, wheezing  HEART: Regular rate and rhythm; No murmurs, rubs, or gallops  ABDOMEN: Soft, Nontender, Nondistended; Bowel sounds present, no rebound   EXTREMITIES:  2+ Peripheral Pulses, No clubbing, cyanosis, or edema  GENITOURINARY: milner   SKIN: No rashes or lesions  BACK: no pressor sore   NERVOUS SYSTEM:arousable non verbal contracted   incontinence dermatitis     LABS:                         8.7    6.57  )-----------( 386      ( 06 Aug 2020 09:46 )             26.9     08-06    133<L>  |  102  |  7   ----------------------------<  102<H>  3.0<L>   |  23  |  0.57    Ca    7.2<L>      06 Aug 2020 09:46  Phos  2.8     08-06  Mg     1.6     08-06    TPro  5.7<L>  /  Alb  1.9<L>  /  TBili  0.4  /  DBili  x   /  AST  19  /  ALT  21  /  AlkPhos  87  08-06    LIVER FUNCTIONS - ( 06 Aug 2020 09:46 )  Alb: 1.9 g/dL / Pro: 5.7 gm/dL / ALK PHOS: 87 U/L / ALT: 21 U/L / AST: 19 U/L / GGT: x                                   Culture Results:   No growth to date. (08-03 @ 14:21)  Culture Results:   >=3 organisms. Probable collection contamination. (08-02 @ 02:34)                Radiology:

## 2020-08-06 NOTE — PROGRESS NOTE ADULT - SUBJECTIVE AND OBJECTIVE BOX
HPI:  68yo F w/ PMH HTN, DM, CVA, seizure disorder BIBA d/t found unresponsive by family at home this AM. BS was reportedly high. VSS. Pt awoke on ED arrival, looks to voice, non verbal. 	Family - Pt lives w/ aide, aide noted pt to be unresponsive while feeding this AM. Pt had episode unresponsiveness, slumped, in chair yesterday, family noted bedsore BL buttocks, abscess at that time. Pt w/ recent fall 2wks ago (7/14) seen at [facility] VS ED, L humerus fx. Pt previously ambulatory, verbal, progressive decline, family unsure since when exactly not walking / not talking.       after a milner was placed 1750 ccof urine came out (28 Jul 2020 17:40)  Patient is a 69y old  Female who presents with a chief complaint of AMS (04 Aug 2020 11:49)      INTERVAL HPI/OVERNIGHT EVENTS: no acute events     MEDICATIONS  (STANDING):  ALBUTerol    90 MICROgram(s) HFA Inhaler 3 Puff(s) Inhalation every 4 hours  amLODIPine   Tablet 10 milliGRAM(s) Oral daily  aspirin  chewable 81 milliGRAM(s) Oral daily  cefpodoxime 200 milliGRAM(s) Oral every 12 hours  cloNIDine Patch 0.1 mG/24Hr(s) 1 patch Transdermal every 7 days  clopidogrel Tablet 75 milliGRAM(s) Oral daily  dextrose 5%. 1000 milliLiter(s) (50 mL/Hr) IV Continuous <Continuous>  dextrose 50% Injectable 12.5 Gram(s) IV Push once  dextrose 50% Injectable 25 Gram(s) IV Push once  dextrose 50% Injectable 25 Gram(s) IV Push once  heparin   Injectable 5000 Unit(s) SubCutaneous every 12 hours  hydrALAZINE 50 milliGRAM(s) Oral three times a day  insulin lispro (HumaLOG) corrective regimen sliding scale   SubCutaneous three times a day before meals  insulin lispro (HumaLOG) corrective regimen sliding scale   SubCutaneous at bedtime  levETIRAcetam  Solution 500 milliGRAM(s) Oral two times a day  metoprolol tartrate 50 milliGRAM(s) Oral two times a day  nystatin Powder 1 Application(s) Topical two times a day  simvastatin 20 milliGRAM(s) Oral at bedtime    MEDICATIONS  (PRN):  acetaminophen   Tablet .. 650 milliGRAM(s) Oral every 6 hours PRN Temp greater or equal to 38C (100.4F), Mild Pain (1 - 3)  dextrose 40% Gel 15 Gram(s) Oral once PRN Blood Glucose LESS THAN 70 milliGRAM(s)/deciliter  glucagon  Injectable 1 milliGRAM(s) IntraMuscular once PRN Glucose LESS THAN 70 milligrams/deciliter  haloperidol    Injectable 1 milliGRAM(s) IV Push three times a day PRN agitation    Allergies    No Known Allergies    Intolerances        REVIEW OF SYSTEMS:  non verbal   Vital Signs Last 24 Hrs  T(C): 36.8 (06 Aug 2020 16:54), Max: 37.6 (06 Aug 2020 12:41)  T(F): 98.3 (06 Aug 2020 16:54), Max: 99.7 (06 Aug 2020 12:41)  HR: 108 (06 Aug 2020 16:54) (94 - 113)  BP: 151/98 (06 Aug 2020 16:54) (104/57 - 160/79)  BP(mean): --  RR: 18 (06 Aug 2020 16:54) (17 - 18)  SpO2: 98% (06 Aug 2020 16:54) (98% - 100%)    PHYSICAL EXAM:  GENERAL: NAD, well-groomed, well-developed  HEAD:  Atraumatic, Normocephalic  EYES: EOMI, PERRLA, conjunctiva and sclera clear  ENMT: No tonsillar erythema, exudates, or enlargement; Moist mucous membranes, Good dentition, No lesions  NECK: Supple, No JVD, Normal thyroid  NERVOUS SYSTEM: awake non verbal   CHEST/LUNG: Clear to percussion bilaterally; No rales, rhonchi, wheezing, or rubs  HEART: Regular rate and rhythm; No murmurs, rubs, or gallops  ABDOMEN: Soft, Nontender, Nondistended; Bowel sounds present milner present   EXTREMITIES:  2+ Peripheral Pulses, No clubbing, cyanosis, or edema  LYMPH: No lymphadenopathy noted  SKIN: No rashes or lesions    LABS:                              8.7    6.57  )-----------( 386      ( 06 Aug 2020 09:46 )             26.9     08-06    133<L>  |  102  |  7   ----------------------------<  102<H>  3.0<L>   |  23  |  0.57    Ca    7.2<L>      06 Aug 2020 09:46  Phos  2.8     08-06  Mg     1.6     08-06    TPro  5.7<L>  /  Alb  1.9<L>  /  TBili  0.4  /  DBili  x   /  AST  19  /  ALT  21  /  AlkPhos  87  08-06                  CAPILLARY BLOOD GLUCOSE      POCT Blood Glucose.: 217 mg/dL (05 Aug 2020 11:18)  POCT Blood Glucose.: 147 mg/dL (05 Aug 2020 07:33)  POCT Blood Glucose.: 135 mg/dL (04 Aug 2020 21:23)  POCT Blood Glucose.: 155 mg/dL (04 Aug 2020 16:18)      RADIOLOGY & ADDITIONAL TESTS:    Imaging Personally Reviewed:  [ X] YES  [ ] NO    Consultant(s) Notes Reviewed:  [ X] YES  [ ] NO    Care Discussed with Consultants/Other Providers [ X] YES  [ ] NO

## 2020-08-06 NOTE — SWALLOW BEDSIDE ASSESSMENT ADULT - PHARYNGEAL PHASE
Within functional limits/Delayed pharyngeal swallow Cough post oral intake/Delayed pharyngeal swallow

## 2020-08-06 NOTE — PROGRESS NOTE ADULT - ASSESSMENT
69 years old female with unresponsive found too have acute kidney injury and hydro eeg ordered per neuro recs                      Fever, of unclear source - subsided.  - urine cult: gram POSITIVE organisms.  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  - vascular eval noted.  - local wound care as per vascular order.    Hypokalemia.  - replete potassium    - hypomagnesemia: resolved with mag suppl

## 2020-08-06 NOTE — SWALLOW BEDSIDE ASSESSMENT ADULT - COMMENTS
CXR7/28/2020 IMPRESSION: No acute infiltrate. Reidentified left humeral neck fracture.    CThead no contrast 7/28/2020 IMPRESSION: No change since 7/15/2020. Moderate atrophy. Old right frontal cortical infarct. Small vessel white matter ischemic changes. No acute hemorrhage.    XR Chest 8/3/20: IMPRESSION: No active pulmonary disease. No overt s/s of asp/pen.

## 2020-08-06 NOTE — SWALLOW BEDSIDE ASSESSMENT ADULT - H & P REVIEW
yes/70yo F w/ PMH HTN, DM, CVA, seizure disorder BIBA d/t found unresponsive by family at home this AM. BS was reportedly high. VSS. Pt awoke on ED arrival, looks to voice, non verbal. 	Family - Pt lives w/ aide, aide noted pt to be unresponsive while feeding this AM. Pt had episode unresponsiveness, slumped, in chair yesterday, family noted bedsore BL buttocks, abscess at that time. Pt w/ recent fall 2wks ago (7/14) seen at [facility] VS ED, L humerus fx. Pt previously ambulatory, verbal, progressive decline, family unsure since when exactly not walking / not talking. after a milner was placed 1750 ccof urine came out
yes/68yo F w/ PMH HTN, DM, CVA, seizure disorder BIBA d/t found unresponsive by family at home this AM. BS was reportedly high. VSS. Pt awoke on ED arrival, looks to voice, non verbal. 	Family - Pt lives w/ aide, aide noted pt to be unresponsive while feeding this AM. Pt had episode unresponsiveness, slumped, in chair yesterday, family noted bedsore BL buttocks, abscess at that time. Pt w/ recent fall 2wks ago (7/14) seen at [facility] VS ED, L humerus fx. Pt previously ambulatory, verbal, progressive decline, family unsure since when exactly not walking / not talking. after a milner was placed 1750 ccof urine came out

## 2020-08-06 NOTE — SWALLOW BEDSIDE ASSESSMENT ADULT - ORAL PHASE
Decreased anterior-posterior movement of the bolus/Delayed oral transit time/Within functional limits Decreased anterior-posterior movement of the bolus/Delayed oral transit time

## 2020-08-07 LAB
ALBUMIN SERPL ELPH-MCNC: 2.5 G/DL — LOW (ref 3.3–5)
ALP SERPL-CCNC: 101 U/L — SIGNIFICANT CHANGE UP (ref 40–120)
ALT FLD-CCNC: 24 U/L — SIGNIFICANT CHANGE UP (ref 12–78)
ANION GAP SERPL CALC-SCNC: 8 MMOL/L — SIGNIFICANT CHANGE UP (ref 5–17)
AST SERPL-CCNC: 23 U/L — SIGNIFICANT CHANGE UP (ref 15–37)
BILIRUB SERPL-MCNC: 0.5 MG/DL — SIGNIFICANT CHANGE UP (ref 0.2–1.2)
BUN SERPL-MCNC: 10 MG/DL — SIGNIFICANT CHANGE UP (ref 7–23)
CALCIUM SERPL-MCNC: 8.4 MG/DL — LOW (ref 8.5–10.1)
CHLORIDE SERPL-SCNC: 107 MMOL/L — SIGNIFICANT CHANGE UP (ref 96–108)
CO2 SERPL-SCNC: 25 MMOL/L — SIGNIFICANT CHANGE UP (ref 22–31)
CREAT SERPL-MCNC: 0.92 MG/DL — SIGNIFICANT CHANGE UP (ref 0.5–1.3)
GLUCOSE BLDC GLUCOMTR-MCNC: 145 MG/DL — HIGH (ref 70–99)
GLUCOSE BLDC GLUCOMTR-MCNC: 156 MG/DL — HIGH (ref 70–99)
GLUCOSE BLDC GLUCOMTR-MCNC: 187 MG/DL — HIGH (ref 70–99)
GLUCOSE BLDC GLUCOMTR-MCNC: 89 MG/DL — SIGNIFICANT CHANGE UP (ref 70–99)
GLUCOSE SERPL-MCNC: 140 MG/DL — HIGH (ref 70–99)
HCT VFR BLD CALC: 29.2 % — LOW (ref 34.5–45)
HGB BLD-MCNC: 9.4 G/DL — LOW (ref 11.5–15.5)
MAGNESIUM SERPL-MCNC: 1.9 MG/DL — SIGNIFICANT CHANGE UP (ref 1.6–2.6)
MCHC RBC-ENTMCNC: 28.8 PG — SIGNIFICANT CHANGE UP (ref 27–34)
MCHC RBC-ENTMCNC: 32.2 GM/DL — SIGNIFICANT CHANGE UP (ref 32–36)
MCV RBC AUTO: 89.6 FL — SIGNIFICANT CHANGE UP (ref 80–100)
NRBC # BLD: 0 /100 WBCS — SIGNIFICANT CHANGE UP (ref 0–0)
PHOSPHATE SERPL-MCNC: 2.8 MG/DL — SIGNIFICANT CHANGE UP (ref 2.5–4.5)
PLATELET # BLD AUTO: 446 K/UL — HIGH (ref 150–400)
POTASSIUM SERPL-MCNC: 3.4 MMOL/L — LOW (ref 3.5–5.3)
POTASSIUM SERPL-SCNC: 3.4 MMOL/L — LOW (ref 3.5–5.3)
PROT SERPL-MCNC: 6.6 GM/DL — SIGNIFICANT CHANGE UP (ref 6–8.3)
RBC # BLD: 3.26 M/UL — LOW (ref 3.8–5.2)
RBC # FLD: 13.2 % — SIGNIFICANT CHANGE UP (ref 10.3–14.5)
SODIUM SERPL-SCNC: 140 MMOL/L — SIGNIFICANT CHANGE UP (ref 135–145)
WBC # BLD: 8.25 K/UL — SIGNIFICANT CHANGE UP (ref 3.8–10.5)
WBC # FLD AUTO: 8.25 K/UL — SIGNIFICANT CHANGE UP (ref 3.8–10.5)

## 2020-08-07 PROCEDURE — 99232 SBSQ HOSP IP/OBS MODERATE 35: CPT

## 2020-08-07 RX ORDER — POTASSIUM CHLORIDE 20 MEQ
40 PACKET (EA) ORAL ONCE
Refills: 0 | Status: COMPLETED | OUTPATIENT
Start: 2020-08-07 | End: 2020-08-07

## 2020-08-07 RX ADMIN — Medication 50 MILLIGRAM(S): at 05:45

## 2020-08-07 RX ADMIN — Medication 2: at 08:18

## 2020-08-07 RX ADMIN — LEVETIRACETAM 500 MILLIGRAM(S): 250 TABLET, FILM COATED ORAL at 17:14

## 2020-08-07 RX ADMIN — NYSTATIN CREAM 1 APPLICATION(S): 100000 CREAM TOPICAL at 17:15

## 2020-08-07 RX ADMIN — Medication 50 MILLIGRAM(S): at 17:14

## 2020-08-07 RX ADMIN — Medication 50 MILLIGRAM(S): at 13:09

## 2020-08-07 RX ADMIN — Medication 81 MILLIGRAM(S): at 13:09

## 2020-08-07 RX ADMIN — Medication 200 MILLIGRAM(S): at 05:45

## 2020-08-07 RX ADMIN — Medication 1 PATCH: at 19:24

## 2020-08-07 RX ADMIN — HEPARIN SODIUM 5000 UNIT(S): 5000 INJECTION INTRAVENOUS; SUBCUTANEOUS at 17:14

## 2020-08-07 RX ADMIN — SIMVASTATIN 20 MILLIGRAM(S): 20 TABLET, FILM COATED ORAL at 21:56

## 2020-08-07 RX ADMIN — Medication 1 PATCH: at 08:19

## 2020-08-07 RX ADMIN — Medication 200 MILLIGRAM(S): at 17:14

## 2020-08-07 RX ADMIN — NYSTATIN CREAM 1 APPLICATION(S): 100000 CREAM TOPICAL at 05:45

## 2020-08-07 RX ADMIN — LEVETIRACETAM 500 MILLIGRAM(S): 250 TABLET, FILM COATED ORAL at 05:45

## 2020-08-07 RX ADMIN — AMLODIPINE BESYLATE 10 MILLIGRAM(S): 2.5 TABLET ORAL at 05:45

## 2020-08-07 RX ADMIN — HEPARIN SODIUM 5000 UNIT(S): 5000 INJECTION INTRAVENOUS; SUBCUTANEOUS at 05:45

## 2020-08-07 RX ADMIN — Medication 40 MILLIEQUIVALENT(S): at 14:41

## 2020-08-07 RX ADMIN — Medication 50 MILLIGRAM(S): at 21:57

## 2020-08-07 RX ADMIN — CLOPIDOGREL BISULFATE 75 MILLIGRAM(S): 75 TABLET, FILM COATED ORAL at 13:09

## 2020-08-07 NOTE — PROGRESS NOTE ADULT - ASSESSMENT
resolved sepsis   antibiotics were started and fevers and leukocytosis resolved   clinically improving   cxr no pna  discuss with RN  switch to po end date 8/12  local care to skin   at risk for recurrent sepsis due to host factors   stable from ID standpoint, will sign off please reconsult if needed  thank you

## 2020-08-07 NOTE — PROGRESS NOTE ADULT - SUBJECTIVE AND OBJECTIVE BOX
HPI:  68yo F w/ PMH HTN, DM, CVA, seizure disorder BIBA d/t found unresponsive by family at home this AM. BS was reportedly high. VSS. Pt awoke on ED arrival, looks to voice, non verbal. 	Family - Pt lives w/ aide, aide noted pt to be unresponsive while feeding this AM. Pt had episode unresponsiveness, slumped, in chair yesterday, family noted bedsore BL buttocks, abscess at that time. Pt w/ recent fall 2wks ago (7/14) seen at [facility] VS ED, L humerus fx. Pt previously ambulatory, verbal, progressive decline, family unsure since when exactly not walking / not talking.       after a milner was placed 1750 ccof urine came out (28 Jul 2020 17:40)  Patient is a 69y old  Female who presents with a chief complaint of AMS (04 Aug 2020 11:49)      INTERVAL HPI/OVERNIGHT EVENTS: no acute events     MEDICATIONS  (STANDING):  ALBUTerol    90 MICROgram(s) HFA Inhaler 3 Puff(s) Inhalation every 4 hours  amLODIPine   Tablet 10 milliGRAM(s) Oral daily  aspirin  chewable 81 milliGRAM(s) Oral daily  cefpodoxime 200 milliGRAM(s) Oral every 12 hours  cloNIDine Patch 0.1 mG/24Hr(s) 1 patch Transdermal every 7 days  clopidogrel Tablet 75 milliGRAM(s) Oral daily  dextrose 5%. 1000 milliLiter(s) (50 mL/Hr) IV Continuous <Continuous>  dextrose 50% Injectable 12.5 Gram(s) IV Push once  dextrose 50% Injectable 25 Gram(s) IV Push once  dextrose 50% Injectable 25 Gram(s) IV Push once  heparin   Injectable 5000 Unit(s) SubCutaneous every 12 hours  hydrALAZINE 50 milliGRAM(s) Oral three times a day  insulin lispro (HumaLOG) corrective regimen sliding scale   SubCutaneous three times a day before meals  insulin lispro (HumaLOG) corrective regimen sliding scale   SubCutaneous at bedtime  levETIRAcetam  Solution 500 milliGRAM(s) Oral two times a day  metoprolol tartrate 50 milliGRAM(s) Oral two times a day  nystatin Powder 1 Application(s) Topical two times a day  simvastatin 20 milliGRAM(s) Oral at bedtime    MEDICATIONS  (PRN):  acetaminophen   Tablet .. 650 milliGRAM(s) Oral every 6 hours PRN Temp greater or equal to 38C (100.4F), Mild Pain (1 - 3)  dextrose 40% Gel 15 Gram(s) Oral once PRN Blood Glucose LESS THAN 70 milliGRAM(s)/deciliter  glucagon  Injectable 1 milliGRAM(s) IntraMuscular once PRN Glucose LESS THAN 70 milligrams/deciliter  haloperidol    Injectable 1 milliGRAM(s) IV Push three times a day PRN agitation    Allergies    No Known Allergies    Intolerances        REVIEW OF SYSTEMS:  unrelieable given dementia   Vital Signs Last 24 Hrs  T(C): 36.8 (06 Aug 2020 16:54), Max: 37.6 (06 Aug 2020 12:41)  T(F): 98.3 (06 Aug 2020 16:54), Max: 99.7 (06 Aug 2020 12:41)  HR: 108 (06 Aug 2020 16:54) (94 - 113)  BP: 151/98 (06 Aug 2020 16:54) (104/57 - 160/79)  BP(mean): --  RR: 18 (06 Aug 2020 16:54) (17 - 18)  SpO2: 98% (06 Aug 2020 16:54) (98% - 100%)    PHYSICAL EXAM:  GENERAL: NAD, well-groomed, well-developed  HEAD:  Atraumatic, Normocephalic  EYES: EOMI, PERRLA, conjunctiva and sclera clear  ENMT: No tonsillar erythema, exudates, or enlargement; Moist mucous membranes, Good dentition, No lesions  NECK: Supple, No JVD, Normal thyroid  NERVOUS SYSTEM: awake non verbal   CHEST/LUNG: Clear to percussion bilaterally; No rales, rhonchi, wheezing, or rubs  HEART: Regular rate and rhythm; No murmurs, rubs, or gallops  ABDOMEN: Soft, Nontender, Nondistended; Bowel sounds present milner present   EXTREMITIES:  2+ Peripheral Pulses, No clubbing, cyanosis, or edema  LYMPH: No lymphadenopathy noted  SKIN: No rashes or lesions    LABS:                        9.4    8.25  )-----------( 446      ( 07 Aug 2020 07:21 )             29.2     08-07    140  |  107  |  10  ----------------------------<  140<H>  3.4<L>   |  25  |  0.92    Ca    8.4<L>      07 Aug 2020 07:21  Phos  2.8     08-07  Mg     1.9     08-07    TPro  6.6  /  Alb  2.5<L>  /  TBili  0.5  /  DBili  x   /  AST  23  /  ALT  24  /  AlkPhos  101  08-07                      CAPILLARY BLOOD GLUCOSE      POCT Blood Glucose.: 217 mg/dL (05 Aug 2020 11:18)  POCT Blood Glucose.: 147 mg/dL (05 Aug 2020 07:33)  POCT Blood Glucose.: 135 mg/dL (04 Aug 2020 21:23)  POCT Blood Glucose.: 155 mg/dL (04 Aug 2020 16:18)      RADIOLOGY & ADDITIONAL TESTS:    Imaging Personally Reviewed:  [ X] YES  [ ] NO    Consultant(s) Notes Reviewed:  [ X] YES  [ ] NO    Care Discussed with Consultants/Other Providers [ X] YES  [ ] NO

## 2020-08-07 NOTE — CONSULT NOTE ADULT - ASSESSMENT
70 y/o female pt with right foot ingrown toenail and bilat elongated mycotic toenails  - pt seen and evaluated   - slantback procedure preformed on right hallux toeanail with no incident   - toenails debrided x10 using sterile nippers  - pt tolerated procedure well with no complications  - follow up with podiatry after discharge (call  for appointment

## 2020-08-07 NOTE — PROGRESS NOTE ADULT - ASSESSMENT
69 years old female with unresponsive found too have acute kidney injury and hydro eeg ordered per neuro recs                Patient with plan to discharge on Monday       Fever, of unclear source - subsided.  - urine cult: gram POSITIVE organisms.  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  - vascular eval noted.  - local wound care as per vascular order.    Hypokalemia.  - replete potassium    - hypomagnesemia: resolved with mag suppl

## 2020-08-07 NOTE — PROGRESS NOTE ADULT - PROBLEM SELECTOR PLAN 9
- keppra level: 54 (high).  - Neurology eval with Dr. Mendoza noted.  - Keppra was decreased to 500 mg bid.  -eeg done

## 2020-08-07 NOTE — CONSULT NOTE ADULT - SUBJECTIVE AND OBJECTIVE BOX
Patient is a 69y old  Female who presents with a chief complaint of AMS (07 Aug 2020 14:41)      HPI:  70yo F w/ PMH HTN, DM, CVA, seizure disorder BIBA d/t found unresponsive by family at home this AM. BS was reportedly high. VSS. Pt awoke on ED arrival, looks to voice, non verbal. 	Family - Pt lives w/ aide, aide noted pt to be unresponsive while feeding this AM. Pt had episode unresponsiveness, slumped, in chair yesterday, family noted bedsore BL buttocks, abscess at that time. Pt w/ recent fall 2wks ago (7/14) seen at [facility] ED, L humerus fx. Pt previously ambulatory, verbal, progressive decline, family unsure since when exactly not walking / not talking.     Podiatry consulted for elongated ingrown toenails    PAST MEDICAL & SURGICAL HISTORY:  CVA (cerebral infarction): right side weakness  Vision changes: lt eye  Urinary incontinence  Seizure disorder  Gout  OA (osteoarthritis): bautista knees, low back  and  bautista shoulders  Asthma  Diabetes  Hypertension  Kidney stone      MEDICATIONS  (STANDING):  ALBUTerol    90 MICROgram(s) HFA Inhaler 3 Puff(s) Inhalation every 4 hours  amLODIPine   Tablet 10 milliGRAM(s) Oral daily  aspirin  chewable 81 milliGRAM(s) Oral daily  cefpodoxime 200 milliGRAM(s) Oral every 12 hours  cloNIDine Patch 0.1 mG/24Hr(s) 1 patch Transdermal every 7 days  clopidogrel Tablet 75 milliGRAM(s) Oral daily  dextrose 5%. 1000 milliLiter(s) (50 mL/Hr) IV Continuous <Continuous>  dextrose 50% Injectable 12.5 Gram(s) IV Push once  dextrose 50% Injectable 25 Gram(s) IV Push once  dextrose 50% Injectable 25 Gram(s) IV Push once  heparin   Injectable 5000 Unit(s) SubCutaneous every 12 hours  hydrALAZINE 50 milliGRAM(s) Oral three times a day  insulin lispro (HumaLOG) corrective regimen sliding scale   SubCutaneous three times a day before meals  insulin lispro (HumaLOG) corrective regimen sliding scale   SubCutaneous at bedtime  levETIRAcetam  Solution 500 milliGRAM(s) Oral two times a day  metoprolol tartrate 50 milliGRAM(s) Oral two times a day  nystatin Powder 1 Application(s) Topical two times a day  simvastatin 20 milliGRAM(s) Oral at bedtime    MEDICATIONS  (PRN):  acetaminophen   Tablet .. 650 milliGRAM(s) Oral every 6 hours PRN Temp greater or equal to 38C (100.4F), Mild Pain (1 - 3)  dextrose 40% Gel 15 Gram(s) Oral once PRN Blood Glucose LESS THAN 70 milliGRAM(s)/deciliter  glucagon  Injectable 1 milliGRAM(s) IntraMuscular once PRN Glucose LESS THAN 70 milligrams/deciliter  haloperidol    Injectable 1 milliGRAM(s) IV Push three times a day PRN agitation      Allergies    No Known Allergies    Intolerances        VITALS:    Vital Signs Last 24 Hrs  T(C): 37.1 (07 Aug 2020 16:35), Max: 37.4 (07 Aug 2020 12:40)  T(F): 98.7 (07 Aug 2020 16:35), Max: 99.4 (07 Aug 2020 12:40)  HR: 84 (07 Aug 2020 16:35) (84 - 107)  BP: 145/52 (07 Aug 2020 16:35) (145/52 - 161/73)  BP(mean): --  RR: 18 (07 Aug 2020 16:35) (18 - 18)  SpO2: 97% (07 Aug 2020 16:35) (97% - 100%)    LABS:                          9.4    8.25  )-----------( 446      ( 07 Aug 2020 07:21 )             29.2       08-07    140  |  107  |  10  ----------------------------<  140<H>  3.4<L>   |  25  |  0.92    Ca    8.4<L>      07 Aug 2020 07:21  Phos  2.8     08-07  Mg     1.9     08-07    TPro  6.6  /  Alb  2.5<L>  /  TBili  0.5  /  DBili  x   /  AST  23  /  ALT  24  /  AlkPhos  101  08-07      CAPILLARY BLOOD GLUCOSE      POCT Blood Glucose.: 145 mg/dL (07 Aug 2020 16:37)  POCT Blood Glucose.: 89 mg/dL (07 Aug 2020 12:34)  POCT Blood Glucose.: 156 mg/dL (07 Aug 2020 08:01)  POCT Blood Glucose.: 171 mg/dL (06 Aug 2020 21:12)          LOWER EXTREMITY PHYSICAL EXAM:    Vasular: DP/PT 0/4, B/L, CFT <3 seconds B/L, Temperature gradient WNL, B/L.   Neuro: unable to assess  Musculoskeletal/Ortho: no gross deformities  Skin: ingrown right hallux toenail with no signs of infection medial boarder, elongated dystrophic toenails x10

## 2020-08-08 LAB
ANION GAP SERPL CALC-SCNC: 10 MMOL/L — SIGNIFICANT CHANGE UP (ref 5–17)
BUN SERPL-MCNC: 12 MG/DL — SIGNIFICANT CHANGE UP (ref 7–23)
CALCIUM SERPL-MCNC: 8.5 MG/DL — SIGNIFICANT CHANGE UP (ref 8.5–10.1)
CHLORIDE SERPL-SCNC: 105 MMOL/L — SIGNIFICANT CHANGE UP (ref 96–108)
CO2 SERPL-SCNC: 23 MMOL/L — SIGNIFICANT CHANGE UP (ref 22–31)
CREAT SERPL-MCNC: 0.77 MG/DL — SIGNIFICANT CHANGE UP (ref 0.5–1.3)
CULTURE RESULTS: SIGNIFICANT CHANGE UP
GLUCOSE BLDC GLUCOMTR-MCNC: 113 MG/DL — HIGH (ref 70–99)
GLUCOSE BLDC GLUCOMTR-MCNC: 164 MG/DL — HIGH (ref 70–99)
GLUCOSE BLDC GLUCOMTR-MCNC: 177 MG/DL — HIGH (ref 70–99)
GLUCOSE BLDC GLUCOMTR-MCNC: 182 MG/DL — HIGH (ref 70–99)
GLUCOSE SERPL-MCNC: 156 MG/DL — HIGH (ref 70–99)
HCT VFR BLD CALC: 27.4 % — LOW (ref 34.5–45)
HGB BLD-MCNC: 8.8 G/DL — LOW (ref 11.5–15.5)
MAGNESIUM SERPL-MCNC: 1.8 MG/DL — SIGNIFICANT CHANGE UP (ref 1.6–2.6)
MCHC RBC-ENTMCNC: 28.8 PG — SIGNIFICANT CHANGE UP (ref 27–34)
MCHC RBC-ENTMCNC: 32.1 GM/DL — SIGNIFICANT CHANGE UP (ref 32–36)
MCV RBC AUTO: 89.5 FL — SIGNIFICANT CHANGE UP (ref 80–100)
NRBC # BLD: 0 /100 WBCS — SIGNIFICANT CHANGE UP (ref 0–0)
PHOSPHATE SERPL-MCNC: 2.9 MG/DL — SIGNIFICANT CHANGE UP (ref 2.5–4.5)
PLATELET # BLD AUTO: 446 K/UL — HIGH (ref 150–400)
POTASSIUM SERPL-MCNC: 3.7 MMOL/L — SIGNIFICANT CHANGE UP (ref 3.5–5.3)
POTASSIUM SERPL-SCNC: 3.7 MMOL/L — SIGNIFICANT CHANGE UP (ref 3.5–5.3)
RBC # BLD: 3.06 M/UL — LOW (ref 3.8–5.2)
RBC # FLD: 13.7 % — SIGNIFICANT CHANGE UP (ref 10.3–14.5)
SODIUM SERPL-SCNC: 138 MMOL/L — SIGNIFICANT CHANGE UP (ref 135–145)
SPECIMEN SOURCE: SIGNIFICANT CHANGE UP
WBC # BLD: 9.73 K/UL — SIGNIFICANT CHANGE UP (ref 3.8–10.5)
WBC # FLD AUTO: 9.73 K/UL — SIGNIFICANT CHANGE UP (ref 3.8–10.5)

## 2020-08-08 PROCEDURE — 99232 SBSQ HOSP IP/OBS MODERATE 35: CPT

## 2020-08-08 RX ADMIN — Medication 50 MILLIGRAM(S): at 17:25

## 2020-08-08 RX ADMIN — Medication 50 MILLIGRAM(S): at 05:07

## 2020-08-08 RX ADMIN — Medication 1 PATCH: at 07:20

## 2020-08-08 RX ADMIN — Medication 50 MILLIGRAM(S): at 21:15

## 2020-08-08 RX ADMIN — Medication 2: at 08:04

## 2020-08-08 RX ADMIN — Medication 2: at 11:38

## 2020-08-08 RX ADMIN — LEVETIRACETAM 500 MILLIGRAM(S): 250 TABLET, FILM COATED ORAL at 17:24

## 2020-08-08 RX ADMIN — SIMVASTATIN 20 MILLIGRAM(S): 20 TABLET, FILM COATED ORAL at 21:15

## 2020-08-08 RX ADMIN — NYSTATIN CREAM 1 APPLICATION(S): 100000 CREAM TOPICAL at 05:07

## 2020-08-08 RX ADMIN — HEPARIN SODIUM 5000 UNIT(S): 5000 INJECTION INTRAVENOUS; SUBCUTANEOUS at 17:25

## 2020-08-08 RX ADMIN — Medication 50 MILLIGRAM(S): at 13:12

## 2020-08-08 RX ADMIN — Medication 200 MILLIGRAM(S): at 05:07

## 2020-08-08 RX ADMIN — Medication 81 MILLIGRAM(S): at 11:38

## 2020-08-08 RX ADMIN — HEPARIN SODIUM 5000 UNIT(S): 5000 INJECTION INTRAVENOUS; SUBCUTANEOUS at 05:07

## 2020-08-08 RX ADMIN — CLOPIDOGREL BISULFATE 75 MILLIGRAM(S): 75 TABLET, FILM COATED ORAL at 11:38

## 2020-08-08 RX ADMIN — NYSTATIN CREAM 1 APPLICATION(S): 100000 CREAM TOPICAL at 17:24

## 2020-08-08 RX ADMIN — LEVETIRACETAM 500 MILLIGRAM(S): 250 TABLET, FILM COATED ORAL at 05:07

## 2020-08-08 RX ADMIN — Medication 200 MILLIGRAM(S): at 17:25

## 2020-08-08 RX ADMIN — AMLODIPINE BESYLATE 10 MILLIGRAM(S): 2.5 TABLET ORAL at 05:07

## 2020-08-08 NOTE — PROGRESS NOTE ADULT - SUBJECTIVE AND OBJECTIVE BOX
HPI:  68yo F w/ PMH HTN, DM, CVA, seizure disorder BIBA d/t found unresponsive by family at home this AM. BS was reportedly high. VSS. Pt awoke on ED arrival, looks to voice, non verbal. 	Family - Pt lives w/ aide, aide noted pt to be unresponsive while feeding this AM. Pt had episode unresponsiveness, slumped, in chair yesterday, family noted bedsore BL buttocks, abscess at that time. Pt w/ recent fall 2wks ago (7/14) seen at [facility] ED, L humerus fx. Pt previously ambulatory, verbal, progressive decline, family unsure since when exactly not walking / not talking.     Patient is a 69y old  Female who presents with a chief complaint of AMS (07 Aug 2020 14:41)      INTERVAL HPI/OVERNIGHT EVENTS:    MEDICATIONS  (STANDING):  ALBUTerol    90 MICROgram(s) HFA Inhaler 3 Puff(s) Inhalation every 4 hours  amLODIPine   Tablet 10 milliGRAM(s) Oral daily  aspirin  chewable 81 milliGRAM(s) Oral daily  cefpodoxime 200 milliGRAM(s) Oral every 12 hours  cloNIDine Patch 0.1 mG/24Hr(s) 1 patch Transdermal every 7 days  clopidogrel Tablet 75 milliGRAM(s) Oral daily  dextrose 5%. 1000 milliLiter(s) (50 mL/Hr) IV Continuous <Continuous>  dextrose 50% Injectable 12.5 Gram(s) IV Push once  dextrose 50% Injectable 25 Gram(s) IV Push once  dextrose 50% Injectable 25 Gram(s) IV Push once  heparin   Injectable 5000 Unit(s) SubCutaneous every 12 hours  hydrALAZINE 50 milliGRAM(s) Oral three times a day  insulin lispro (HumaLOG) corrective regimen sliding scale   SubCutaneous three times a day before meals  insulin lispro (HumaLOG) corrective regimen sliding scale   SubCutaneous at bedtime  levETIRAcetam  Solution 500 milliGRAM(s) Oral two times a day  metoprolol tartrate 50 milliGRAM(s) Oral two times a day  nystatin Powder 1 Application(s) Topical two times a day  simvastatin 20 milliGRAM(s) Oral at bedtime    MEDICATIONS  (PRN):  acetaminophen   Tablet .. 650 milliGRAM(s) Oral every 6 hours PRN Temp greater or equal to 38C (100.4F), Mild Pain (1 - 3)  dextrose 40% Gel 15 Gram(s) Oral once PRN Blood Glucose LESS THAN 70 milliGRAM(s)/deciliter  glucagon  Injectable 1 milliGRAM(s) IntraMuscular once PRN Glucose LESS THAN 70 milligrams/deciliter  haloperidol    Injectable 1 milliGRAM(s) IV Push three times a day PRN agitation      Allergies    No Known Allergies    Intolerances        REVIEW OF SYSTEMS:  unreliable given dementia     Vital Signs Last 24 Hrs  T(C): 36.4 (08 Aug 2020 11:27), Max: 37.4 (07 Aug 2020 12:40)  T(F): 97.6 (08 Aug 2020 11:27), Max: 99.4 (07 Aug 2020 12:40)  HR: 72 (08 Aug 2020 11:27) (72 - 110)  BP: 131/64 (08 Aug 2020 11:27) (131/64 - 179/76)  BP(mean): --  RR: 16 (08 Aug 2020 11:27) (16 - 18)  SpO2: 99% (08 Aug 2020 11:27) (96% - 99%)    PHYSICAL EXAM:  GENERAL: NAD, well-groomed, well-developed  HEAD:  Atraumatic, Normocephalic  EYES: EOMI, PERRLA, conjunctiva and sclera clear  ENMT: No tonsillar erythema, exudates, or enlargement; Moist mucous membranes, Good dentition, No lesions  NECK: Supple, No JVD, Normal thyroid  NERVOUS SYSTEM: awake gives some responses   CHEST/LUNG: Clear to percussion bilaterally; No rales, rhonchi, wheezing, or rubs  HEART: Regular rate and rhythm; No murmurs, rubs, or gallops  ABDOMEN: Soft, Nontender, Nondistended; Bowel sounds present  EXTREMITIES:  2+ Peripheral Pulses, No clubbing, cyanosis, or edema  LYMPH: No lymphadenopathy noted  SKIN: nails cleaned   LABS:                        8.8    9.73  )-----------( 446      ( 08 Aug 2020 07:35 )             27.4     08-08    138  |  105  |  12  ----------------------------<  156<H>  3.7   |  23  |  0.77    Ca    8.5      08 Aug 2020 07:35  Phos  2.9     08-08  Mg     1.8     08-08    TPro  6.6  /  Alb  2.5<L>  /  TBili  0.5  /  DBili  x   /  AST  23  /  ALT  24  /  AlkPhos  101  08-07        CAPILLARY BLOOD GLUCOSE      POCT Blood Glucose.: 182 mg/dL (08 Aug 2020 10:54)  POCT Blood Glucose.: 164 mg/dL (08 Aug 2020 07:38)  POCT Blood Glucose.: 187 mg/dL (07 Aug 2020 20:49)  POCT Blood Glucose.: 145 mg/dL (07 Aug 2020 16:37)  POCT Blood Glucose.: 89 mg/dL (07 Aug 2020 12:34)      RADIOLOGY & ADDITIONAL TESTS:    Imaging Personally Reviewed:  [X ] YES  [ ] NO    Consultant(s) Notes Reviewed:  [X ] YES  [ ] NO    Care Discussed with Consultants/Other Providers [ X] YES  [ ] NO

## 2020-08-08 NOTE — PROGRESS NOTE ADULT - SUBJECTIVE AND OBJECTIVE BOX
HPI:  70yo F w/ PMH HTN, DM, CVA, seizure disorder BIBA d/t found unresponsive by family at home this AM. BS was reportedly high. VSS. Pt awoke on ED arrival, looks to voice, non verbal. 	Family - Pt lives w/ aide, aide noted pt to be unresponsive while feeding this AM. Pt had episode unresponsiveness, slumped, in chair yesterday, family noted bedsore BL buttocks, abscess at that time. Pt w/ recent fall 2wks ago (7/14) seen at [facility] ED, L humerus fx. Pt previously ambulatory, verbal, progressive decline, family unsure since when exactly not walking / not talking.       after a milner was placed 1750 ccof urine came out (28 Jul 2020 17:40)  blood culture staph hominis  urine culture ?? NEGATIVE     Allergies    No Known Allergies    Intolerances        MEDICATIONS  (STANDING):  ALBUTerol    90 MICROgram(s) HFA Inhaler 3 Puff(s) Inhalation every 4 hours  amLODIPine   Tablet 10 milliGRAM(s) Oral daily  aspirin  chewable 81 milliGRAM(s) Oral daily  cefpodoxime 200 milliGRAM(s) Oral every 12 hours  cloNIDine Patch 0.1 mG/24Hr(s) 1 patch Transdermal every 7 days  clopidogrel Tablet 75 milliGRAM(s) Oral daily  dextrose 5%. 1000 milliLiter(s) (50 mL/Hr) IV Continuous <Continuous>  dextrose 50% Injectable 12.5 Gram(s) IV Push once  dextrose 50% Injectable 25 Gram(s) IV Push once  dextrose 50% Injectable 25 Gram(s) IV Push once  heparin   Injectable 5000 Unit(s) SubCutaneous every 12 hours  hydrALAZINE 50 milliGRAM(s) Oral three times a day  insulin lispro (HumaLOG) corrective regimen sliding scale   SubCutaneous three times a day before meals  insulin lispro (HumaLOG) corrective regimen sliding scale   SubCutaneous at bedtime  levETIRAcetam  Solution 500 milliGRAM(s) Oral two times a day  metoprolol tartrate 50 milliGRAM(s) Oral two times a day  nystatin Powder 1 Application(s) Topical two times a day  simvastatin 20 milliGRAM(s) Oral at bedtime    MEDICATIONS  (PRN):  acetaminophen   Tablet .. 650 milliGRAM(s) Oral every 6 hours PRN Temp greater or equal to 38C (100.4F), Mild Pain (1 - 3)  dextrose 40% Gel 15 Gram(s) Oral once PRN Blood Glucose LESS THAN 70 milliGRAM(s)/deciliter  glucagon  Injectable 1 milliGRAM(s) IntraMuscular once PRN Glucose LESS THAN 70 milligrams/deciliter  haloperidol    Injectable 1 milliGRAM(s) IV Push three times a day PRN agitation      REVIEW OF SYSTEMS:    unable to assess   VITAL SIGNS:  T(C): 36.4 (08-08-20 @ 11:27), Max: 37.4 (08-08-20 @ 04:43)  T(F): 97.6 (08-08-20 @ 11:27), Max: 99.4 (08-08-20 @ 04:43)  HR: 72 (08-08-20 @ 11:27) (72 - 110)  BP: 131/64 (08-08-20 @ 11:27) (131/64 - 179/76)  RR: 16 (08-08-20 @ 11:27) (16 - 18)  SpO2: 99% (08-08-20 @ 11:27) (96% - 99%)  Wt(kg): --    PHYSICAL EXAM:  arousable lethargic   GENERAL: not in any distress  HEENT: Neck is supple, normocephalic, atraumatic   CHEST/LUNG: Clear to auscultation bilaterally; No rales, rhonchi, wheezing  HEART: Regular rate and rhythm; No murmurs, rubs, or gallops  ABDOMEN: Soft, Nontender, Nondistended; Bowel sounds present, no rebound   EXTREMITIES:  2+ Peripheral Pulses, No clubbing, cyanosis, or edema  SKIN: No rashes or lesions  BACK: no pressor sore   NERVOUS SYSTEM: arousable   milner     LABS:                         8.8    9.73  )-----------( 446      ( 08 Aug 2020 07:35 )             27.4     08-08    138  |  105  |  12  ----------------------------<  156<H>  3.7   |  23  |  0.77    Ca    8.5      08 Aug 2020 07:35  Phos  2.9     08-08  Mg     1.8     08-08    TPro  6.6  /  Alb  2.5<L>  /  TBili  0.5  /  DBili  x   /  AST  23  /  ALT  24  /  AlkPhos  101  08-07    LIVER FUNCTIONS - ( 07 Aug 2020 07:21 )  Alb: 2.5 g/dL / Pro: 6.6 gm/dL / ALK PHOS: 101 U/L / ALT: 24 U/L / AST: 23 U/L / GGT: x                                   Culture Results:   No Growth Final (08-03 @ 14:21)  Culture Results:   >=3 organisms. Probable collection contamination. (08-02 @ 02:34)                Radiology:

## 2020-08-08 NOTE — PROGRESS NOTE ADULT - ASSESSMENT
69 years old female with unresponsive found too have acute kidney injury and hydro eeg ordered per neuro recs                Patient with plan to discharge on Monday       Fever, of unclear sour resolved - urine cult: gram POSITIVE organisms.  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  - vascular eval noted.  - local wound care as per vascular order.    Hypokalemia.  - resolved     - hypomagnesemia: resolved

## 2020-08-08 NOTE — PROGRESS NOTE ADULT - ASSESSMENT
resolved sepsis   antibiotics were started and fevers and leukocytosis resolved   clinically improving   cxr no pna  discuss with RN  switch to po x 7 more days on po vantin   local care to skin   end date is by 8/12

## 2020-08-09 LAB
GLUCOSE BLDC GLUCOMTR-MCNC: 153 MG/DL — HIGH (ref 70–99)
GLUCOSE BLDC GLUCOMTR-MCNC: 153 MG/DL — HIGH (ref 70–99)
GLUCOSE BLDC GLUCOMTR-MCNC: 166 MG/DL — HIGH (ref 70–99)
GLUCOSE BLDC GLUCOMTR-MCNC: 171 MG/DL — HIGH (ref 70–99)

## 2020-08-09 PROCEDURE — 99232 SBSQ HOSP IP/OBS MODERATE 35: CPT

## 2020-08-09 RX ADMIN — SIMVASTATIN 20 MILLIGRAM(S): 20 TABLET, FILM COATED ORAL at 21:59

## 2020-08-09 RX ADMIN — Medication 2: at 11:38

## 2020-08-09 RX ADMIN — Medication 50 MILLIGRAM(S): at 17:04

## 2020-08-09 RX ADMIN — LEVETIRACETAM 500 MILLIGRAM(S): 250 TABLET, FILM COATED ORAL at 17:05

## 2020-08-09 RX ADMIN — HEPARIN SODIUM 5000 UNIT(S): 5000 INJECTION INTRAVENOUS; SUBCUTANEOUS at 17:05

## 2020-08-09 RX ADMIN — HEPARIN SODIUM 5000 UNIT(S): 5000 INJECTION INTRAVENOUS; SUBCUTANEOUS at 05:18

## 2020-08-09 RX ADMIN — Medication 50 MILLIGRAM(S): at 05:18

## 2020-08-09 RX ADMIN — CLOPIDOGREL BISULFATE 75 MILLIGRAM(S): 75 TABLET, FILM COATED ORAL at 11:40

## 2020-08-09 RX ADMIN — Medication 1 PATCH: at 06:52

## 2020-08-09 RX ADMIN — Medication 81 MILLIGRAM(S): at 11:40

## 2020-08-09 RX ADMIN — NYSTATIN CREAM 1 APPLICATION(S): 100000 CREAM TOPICAL at 05:19

## 2020-08-09 RX ADMIN — Medication 2: at 08:13

## 2020-08-09 RX ADMIN — Medication 50 MILLIGRAM(S): at 13:31

## 2020-08-09 RX ADMIN — Medication 1 PATCH: at 19:42

## 2020-08-09 RX ADMIN — NYSTATIN CREAM 1 APPLICATION(S): 100000 CREAM TOPICAL at 17:10

## 2020-08-09 RX ADMIN — Medication 2: at 16:17

## 2020-08-09 RX ADMIN — Medication 1 PATCH: at 07:10

## 2020-08-09 RX ADMIN — Medication 50 MILLIGRAM(S): at 21:59

## 2020-08-09 RX ADMIN — AMLODIPINE BESYLATE 10 MILLIGRAM(S): 2.5 TABLET ORAL at 05:18

## 2020-08-09 RX ADMIN — Medication 200 MILLIGRAM(S): at 05:18

## 2020-08-09 RX ADMIN — Medication 200 MILLIGRAM(S): at 17:04

## 2020-08-09 RX ADMIN — LEVETIRACETAM 500 MILLIGRAM(S): 250 TABLET, FILM COATED ORAL at 05:20

## 2020-08-09 NOTE — PROGRESS NOTE ADULT - PROBLEM SELECTOR PROBLEM 7
Humerus fracture
Hypernatremia
Seizure disorder
Hypernatremia
Hypernatremia
Seizure disorder

## 2020-08-09 NOTE — PROGRESS NOTE ADULT - PROBLEM SELECTOR PROBLEM 9
Humerus fracture
Humerus fracture
Seizure disorder

## 2020-08-09 NOTE — PROGRESS NOTE ADULT - PROBLEM SELECTOR PLAN 6
- continue rocephin  - follow up urine culture
- ? uremia on admission  - ?? seizure.
- resolved.   - intravenous bicarb given  - renal is following.
- resolved. now hypo will continue to follow   - intravenous bicarb given  - renal is following.
- resolved.  - intravenous bicarb given  - renal is following.
- resolved.   - intravenous bicarb given  - renal is following.
- ? uremia on admission  - ?? seizure.

## 2020-08-09 NOTE — PROGRESS NOTE ADULT - NSHPATTENDINGPLANDISCUSS_GEN_ALL_CORE
nursing staff, patient's daughter.
nursing staff.
nursing staff, patient's daughter.

## 2020-08-09 NOTE — PROGRESS NOTE ADULT - REASON FOR ADMISSION
Last OV: 6/19/2018  Last labs: 6/19/2018  Fenofibrate: 3/18/2019 #90 no RF  Glimepiride: 3/18/2019 #90 no RF    No future appointments.
Altered Mental Status
fevers
reconsult
retention
retention
AMS
AMS
Altered Mental Status
AMADOU
AMS

## 2020-08-09 NOTE — PROGRESS NOTE ADULT - PROBLEM SELECTOR PROBLEM 6
Cystitis
Encephalopathy, metabolic
Hyperkalemia
Encephalopathy, metabolic

## 2020-08-09 NOTE — PROGRESS NOTE ADULT - PROBLEM SELECTOR PROBLEM 8
Preventive measure
Bacteremia
Cystitis
Bacteremia
Bacteremia
Cystitis

## 2020-08-09 NOTE — PROGRESS NOTE ADULT - SUBJECTIVE AND OBJECTIVE BOX
HPI:  68yo F w/ PMH HTN, DM, CVA, seizure disorder BIBA d/t found unresponsive by family at home this AM. BS was reportedly high. VSS. Pt awoke on ED arrival, looks to voice, non verbal. 	Family - Pt lives w/ aide, aide noted pt to be unresponsive while feeding this AM. Pt had episode unresponsiveness, slumped, in chair yesterday, family noted bedsore BL buttocks, abscess at that time. Pt w/ recent fall 2wks ago (7/14) seen at [facility] ED, L humerus fx. Pt previously ambulatory, verbal, progressive decline, family unsure since when exactly not walking / not talking.     Patient is a 69y old  Female who presents with a chief complaint of AMS (07 Aug 2020 14:41)      INTERVAL HPI/OVERNIGHT EVENTS: no acute events overnight     MEDICATIONS  (STANDING):  ALBUTerol    90 MICROgram(s) HFA Inhaler 3 Puff(s) Inhalation every 4 hours  amLODIPine   Tablet 10 milliGRAM(s) Oral daily  aspirin  chewable 81 milliGRAM(s) Oral daily  cefpodoxime 200 milliGRAM(s) Oral every 12 hours  cloNIDine Patch 0.1 mG/24Hr(s) 1 patch Transdermal every 7 days  clopidogrel Tablet 75 milliGRAM(s) Oral daily  dextrose 5%. 1000 milliLiter(s) (50 mL/Hr) IV Continuous <Continuous>  dextrose 50% Injectable 12.5 Gram(s) IV Push once  dextrose 50% Injectable 25 Gram(s) IV Push once  dextrose 50% Injectable 25 Gram(s) IV Push once  heparin   Injectable 5000 Unit(s) SubCutaneous every 12 hours  hydrALAZINE 50 milliGRAM(s) Oral three times a day  insulin lispro (HumaLOG) corrective regimen sliding scale   SubCutaneous three times a day before meals  insulin lispro (HumaLOG) corrective regimen sliding scale   SubCutaneous at bedtime  levETIRAcetam  Solution 500 milliGRAM(s) Oral two times a day  metoprolol tartrate 50 milliGRAM(s) Oral two times a day  nystatin Powder 1 Application(s) Topical two times a day  simvastatin 20 milliGRAM(s) Oral at bedtime    MEDICATIONS  (PRN):  acetaminophen   Tablet .. 650 milliGRAM(s) Oral every 6 hours PRN Temp greater or equal to 38C (100.4F), Mild Pain (1 - 3)  dextrose 40% Gel 15 Gram(s) Oral once PRN Blood Glucose LESS THAN 70 milliGRAM(s)/deciliter  glucagon  Injectable 1 milliGRAM(s) IntraMuscular once PRN Glucose LESS THAN 70 milligrams/deciliter  haloperidol    Injectable 1 milliGRAM(s) IV Push three times a day PRN agitation      Allergies    No Known Allergies    Intolerances        REVIEW OF SYSTEMS:  unreliable given dementia     Vital Signs Last 24 Hrs  T(C): 37 (09 Aug 2020 11:11), Max: 37 (09 Aug 2020 11:11)  T(F): 98.6 (09 Aug 2020 11:11), Max: 98.6 (09 Aug 2020 11:11)  HR: 101 (09 Aug 2020 11:11) (81 - 101)  BP: 128/64 (09 Aug 2020 11:11) (122/61 - 164/70)  BP(mean): --  RR: 18 (09 Aug 2020 11:11) (16 - 18)  SpO2: 100% (09 Aug 2020 11:11) (98% - 100%)    PHYSICAL EXAM:  GENERAL: NAD, well-groomed, well-developed  HEAD:  Atraumatic, Normocephalic  EYES: EOMI, PERRLA, conjunctiva and sclera clear  ENMT: No tonsillar erythema, exudates, or enlargement; Moist mucous membranes, Good dentition, No lesions  NECK: Supple, No JVD, Normal thyroid  NERVOUS SYSTEM: awake gives some responses   CHEST/LUNG: Clear to percussion bilaterally; No rales, rhonchi, wheezing, or rubs  HEART: Regular rate and rhythm; No murmurs, rubs, or gallops  ABDOMEN: Soft, Nontender, Nondistended; Bowel sounds present  EXTREMITIES:  2+ Peripheral Pulses, No clubbing, cyanosis, or edema  LYMPH: No lymphadenopathy noted  SKIN: nails cleaned   LABS:                        8.8    9.73  )-----------( 446      ( 08 Aug 2020 07:35 )             27.4     08-08    138  |  105  |  12  ----------------------------<  156<H>  3.7   |  23  |  0.77    Ca    8.5      08 Aug 2020 07:35  Phos  2.9     08-08  Mg     1.8     08-08    TPro  6.6  /  Alb  2.5<L>  /  TBili  0.5  /  DBili  x   /  AST  23  /  ALT  24  /  AlkPhos  101  08-07        CAPILLARY BLOOD GLUCOSE      POCT Blood Glucose.: 182 mg/dL (08 Aug 2020 10:54)  POCT Blood Glucose.: 164 mg/dL (08 Aug 2020 07:38)  POCT Blood Glucose.: 187 mg/dL (07 Aug 2020 20:49)  POCT Blood Glucose.: 145 mg/dL (07 Aug 2020 16:37)  POCT Blood Glucose.: 89 mg/dL (07 Aug 2020 12:34)      RADIOLOGY & ADDITIONAL TESTS:    Imaging Personally Reviewed:  [X ] YES  [ ] NO    Consultant(s) Notes Reviewed:  [X ] YES  [ ] NO    Care Discussed with Consultants/Other Providers [ X] YES  [ ] NO

## 2020-08-10 ENCOUNTER — TRANSCRIPTION ENCOUNTER (OUTPATIENT)
Age: 70
End: 2020-08-10

## 2020-08-10 VITALS
TEMPERATURE: 99 F | OXYGEN SATURATION: 99 % | RESPIRATION RATE: 17 BRPM | SYSTOLIC BLOOD PRESSURE: 158 MMHG | HEART RATE: 80 BPM | DIASTOLIC BLOOD PRESSURE: 78 MMHG

## 2020-08-10 LAB
GLUCOSE BLDC GLUCOMTR-MCNC: 141 MG/DL — HIGH (ref 70–99)
GLUCOSE BLDC GLUCOMTR-MCNC: 182 MG/DL — HIGH (ref 70–99)

## 2020-08-10 PROCEDURE — 99239 HOSP IP/OBS DSCHRG MGMT >30: CPT

## 2020-08-10 RX ORDER — CEFPODOXIME PROXETIL 100 MG
1 TABLET ORAL
Qty: 4 | Refills: 0
Start: 2020-08-10 | End: 2020-08-11

## 2020-08-10 RX ADMIN — Medication 1 PATCH: at 07:17

## 2020-08-10 RX ADMIN — Medication 200 MILLIGRAM(S): at 05:19

## 2020-08-10 RX ADMIN — Medication 2: at 07:42

## 2020-08-10 RX ADMIN — Medication 50 MILLIGRAM(S): at 05:19

## 2020-08-10 RX ADMIN — LEVETIRACETAM 500 MILLIGRAM(S): 250 TABLET, FILM COATED ORAL at 05:19

## 2020-08-10 RX ADMIN — HEPARIN SODIUM 5000 UNIT(S): 5000 INJECTION INTRAVENOUS; SUBCUTANEOUS at 05:19

## 2020-08-10 RX ADMIN — NYSTATIN CREAM 1 APPLICATION(S): 100000 CREAM TOPICAL at 05:18

## 2020-08-10 RX ADMIN — CLOPIDOGREL BISULFATE 75 MILLIGRAM(S): 75 TABLET, FILM COATED ORAL at 11:18

## 2020-08-10 RX ADMIN — AMLODIPINE BESYLATE 10 MILLIGRAM(S): 2.5 TABLET ORAL at 05:19

## 2020-08-10 RX ADMIN — Medication 81 MILLIGRAM(S): at 11:18

## 2020-08-10 NOTE — DISCHARGE NOTE PROVIDER - NSDCMRMEDTOKEN_GEN_ALL_CORE_FT
amLODIPine 10 mg oral tablet: 1 tab(s) orally once a day  aspirin 81 mg oral delayed release tablet: 1 tab(s) orally once a day  cefpodoxime 200 mg oral tablet: 1 tab(s) orally every 12 hours through 8/12/2020  cloNIDine 0.1 mg oral tablet: 1 tab(s) orally 2 times a day  clopidogrel 75 mg oral tablet: 1 tab(s) orally once a day  hydrALAZINE 25 mg oral tablet: 3 tab(s) orally 3 times a day  levETIRAcetam 750 mg oral tablet: 1 tab(s) orally 2 times a day  metoprolol tartrate 50 mg oral tablet: 1 tab(s) orally 2 times a day  traZODone 50 mg oral tablet: 50 milligram(s) orally once a day (at bedtime)  Zocor 20 mg oral tablet: 1 tab(s) orally once a day (at bedtime)

## 2020-08-10 NOTE — DISCHARGE NOTE PROVIDER - HOSPITAL COURSE
70yo F w/ PMH HTN, DM, CVA, seizure disorder BIBA d/t found unresponsive by family at home this AM.            Fever, of unclear source     resolved - urine cult: gram POSITIVE organisms.    - On  oral antibiotics     Multiple skin wounds to buttocks and groin, likely incontinence-associated dermatitis (IAD).    - vascular eval noted.    - local wound care as per vascular order.             Problem/Plan - 1:    ·  Problem: Encephalopathy, metabolic.  Plan: - poss uremia on admission    - patient is nonverbal.          Problem/Plan - 2:    ·  Problem: AMADOU (acute kidney injury).  Plan: - resolved    - creat 8.6 on admission - - > 1.25 - - > 0.77 - - > 0.86    - Renal is following.          Problem/Plan - 3:    ·  Problem: Urinary retention.  Plan: - failed TOV    -t Milner catheter.    - change milner q4-6 weeks.          Problem/Plan - 4:    ·  Problem: Hydronephrosis with urinary obstruction due to renal calculus.  Plan: - resolved.    -  is following.          Problem/Plan - 5:    ·  Problem: Cystitis.  Plan: - treated with cefepime    on oral abx.          Problem/Plan - 6:    Problem: Hyperkalemia. Plan: - resolved.     - intravenous bicarb given    - renal is following.         Problem/Plan - 7:    ·  Problem: Hypernatremia.  Plan: normal.          Problem/Plan - 8:    ·  Problem: Bacteremia.  Plan: - Contaminant as per ID.    - repeat blood cultures- no growth.    - ID following.          Problem/Plan - 9:    ·  Problem: Seizure disorder.  Plan: - keppra level: 54 (high).    - Neurology eval with Dr. Mendoza noted.    - Keppra was decreased to 500 mg bid.    -eeg done.          Problem/Plan - 10:    Problem: Humerus fracture. Plan; - recent left humerus fracture: conservative, supportive care.    - Left UE sling.            Cleared for discharge by attending Deniz on 8/10/2020 for home. 68yo F w/ PMH HTN, DM, CVA, seizure disorder BIBA d/t found unresponsive by family at home this AM.            Fever, of unclear source     resolved - urine cult: gram POSITIVE organisms.    - On  oral antibiotics     Multiple skin wounds to buttocks and groin, likely incontinence-associated dermatitis (IAD).    - vascular eval noted.    - local wound care as per vascular order.             Problem/Plan - 1:    ·  Problem: Encephalopathy, metabolic.  Plan: - poss uremia on admission    - patient is nonverbal.          Problem/Plan - 2:    ·  Problem: AMADOU (acute kidney injury).  Plan: - resolved    - creat 8.6 on admission - - > 1.25 - - > 0.77 - - > 0.86    - Renal is following.          Problem/Plan - 3:    ·  Problem: Urinary retention.  Plan: - failed TOV    -t Milner catheter.    - change milner q4-6 weeks.          Problem/Plan - 4:    ·  Problem: Hydronephrosis with urinary obstruction due to renal calculus.  Plan: - resolved.    -  is following.          Problem/Plan - 5:    ·  Problem: Cystitis.  Plan: - treated with cefepime    on oral abx.          Problem/Plan - 6:    Problem: Hyperkalemia. Plan: - resolved.     - intravenous bicarb given    - renal is following.         Problem/Plan - 7:    ·  Problem: Hypernatremia.  Plan: normal.          Problem/Plan - 8:    ·  Problem: Bacteremia.  Plan: - Contaminant as per ID.    - repeat blood cultures- no growth.    - ID following.          Problem/Plan - 9:    ·  Problem: Seizure disorder.  Plan: - keppra level: 54 (high).    - Neurology eval with Dr. Mendoza noted.    - Keppra was decreased to 500 mg bid.    -eeg done.          Problem/Plan - 10:    Problem: Humerus fracture. Plan; - recent left humerus fracture: conservative, supportive care.    - Left UE sling.        Problem 11: Patient with severe protein calorie malnutrition present on admission as noted by nutrition as well as attending     Problem 12 : Patient with sepsis present on admission and rulled in with positive urine culture             Cleared for discharge by attending Deniz on 8/10/2020 for home.

## 2020-08-10 NOTE — PROGRESS NOTE ADULT - SUBJECTIVE AND OBJECTIVE BOX
HPI:  69 year old female patient with medical history of HTN, DM, CVA, seizure disorder.  Was brought in to ED after beig found unresponsive by family at home.  BS was reportedly high. VSS. Pt awoke on ED arrival, looks to voice, non verbal. 	Family - Pt lives w/ aide, aide noted pt to be unresponsive while feeding this AM. Pt had episode unresponsiveness, slumped, in chair yesterday, family noted bedsore BL buttocks, abscess at that time. Pt w/ recent fall 2wks ago (7/14) seen at [facility] VS ED, L humerus fx. Pt previously ambulatory, verbal, progressive decline, family unsure since when exactly not walking / not talking.       after a milner was placed 1750 ccof urine came out (28 Jul 2020 17:40)      Allergies    No Known Allergies    Intolerances        MEDICATIONS  (STANDING):  ALBUTerol    90 MICROgram(s) HFA Inhaler 3 Puff(s) Inhalation every 4 hours  amLODIPine   Tablet 10 milliGRAM(s) Oral daily  aspirin  chewable 81 milliGRAM(s) Oral daily  cefpodoxime 200 milliGRAM(s) Oral every 12 hours  cloNIDine Patch 0.1 mG/24Hr(s) 1 patch Transdermal every 7 days  clopidogrel Tablet 75 milliGRAM(s) Oral daily  dextrose 5%. 1000 milliLiter(s) (50 mL/Hr) IV Continuous <Continuous>  dextrose 50% Injectable 12.5 Gram(s) IV Push once  dextrose 50% Injectable 25 Gram(s) IV Push once  dextrose 50% Injectable 25 Gram(s) IV Push once  heparin   Injectable 5000 Unit(s) SubCutaneous every 12 hours  hydrALAZINE 50 milliGRAM(s) Oral three times a day  insulin lispro (HumaLOG) corrective regimen sliding scale   SubCutaneous three times a day before meals  insulin lispro (HumaLOG) corrective regimen sliding scale   SubCutaneous at bedtime  levETIRAcetam  Solution 500 milliGRAM(s) Oral two times a day  metoprolol tartrate 50 milliGRAM(s) Oral two times a day  nystatin Powder 1 Application(s) Topical two times a day  simvastatin 20 milliGRAM(s) Oral at bedtime    MEDICATIONS  (PRN):  acetaminophen   Tablet .. 650 milliGRAM(s) Oral every 6 hours PRN Temp greater or equal to 38C (100.4F), Mild Pain (1 - 3)  dextrose 40% Gel 15 Gram(s) Oral once PRN Blood Glucose LESS THAN 70 milliGRAM(s)/deciliter  glucagon  Injectable 1 milliGRAM(s) IntraMuscular once PRN Glucose LESS THAN 70 milligrams/deciliter  haloperidol    Injectable 1 milliGRAM(s) IV Push three times a day PRN agitation      REVIEW OF SYSTEMS:    CONSTITUTIONAL: No fever, chills, weight loss, or fatigue  HEENT: No sore throat, runny nose, ear ache  RESPIRATORY: No cough, wheezing, No shortness of breath  CARDIOVASCULAR: No chest pain, palpitations, dizziness  GASTROINTESTINAL: No abdominal pain. No nausea, vomiting, diarrhea  GENITOURINARY: No dysuria, increase frequency, hematuria, or incontinence  NEUROLOGICAL: No headaches, memory loss, loss of strength, numbness, or tremors, no weakness  EXTREMITY: No pedal edema BLE  SKIN: No itching, burning, rashes, or lesions     VITAL SIGNS:  T(C): 37.3 (08-10-20 @ 05:18), Max: 37.3 (08-10-20 @ 05:18)  T(F): 99.2 (08-10-20 @ 05:18), Max: 99.2 (08-10-20 @ 05:18)  HR: 77 (08-10-20 @ 05:18) (77 - 101)  BP: 153/68 (08-10-20 @ 05:18) (128/64 - 175/86)  RR: 18 (08-10-20 @ 05:18) (17 - 18)  SpO2: 98% (08-10-20 @ 05:18) (94% - 100%)  Wt(kg): --    PHYSICAL EXAM:    GENERAL: not in any distress  HEENT: Neck is supple, normocephalic, atraumatic   CHEST/LUNG: Clear to percussion bilaterally; No rales, rhonchi, wheezing  HEART: Regular rate and rhythm; No murmurs, rubs, or gallops  ABDOMEN: Soft, Nontender, Nondistended; Bowel sounds present, no rebound   EXTREMITIES:  2+ Peripheral Pulses, No clubbing, cyanosis, or edema  GENITOURINARY:   SKIN: No rashes or lesions  BACK: no pressor sore   NERVOUS SYSTEM:  Alert & Oriented X3, Good concentration  PSYCH: normal affect     LABS:     Culture Results:   No Growth Final (08-03 @ 14:21)        Radiology:

## 2020-08-10 NOTE — DISCHARGE NOTE PROVIDER - CARE PROVIDER_API CALL
Lara Isaac)  Internal Medicine  [address] [place] [address]  Phone: [phone]  Fax: [phone]  Follow Up Time:

## 2020-08-10 NOTE — PROGRESS NOTE ADULT - ASSESSMENT
Patient is being by us for Sepsis/ Bacteremia/ UTI/ urinary retention /bilateral mod hydro    Sepsis now resolved    Patient has remain afebrile  Leukocytosis down to normal    BC grew: CoNS-- likely a contaminant  Surveillance BC negative so far    UA at admission with some pyuria  UC negative    CXR: No infiltrates  Procal: 0.13-- no significant    COVID PCR: negative  COVID Ab: negative    s/p Cefepime  Now on Vantin, plan to complete 7 more days, end date: 8/12  local care to skin   -Urology following

## 2020-08-10 NOTE — CHART NOTE - NSCHARTNOTEFT_GEN_A_CORE
Assessment: Pt with PMHx HTN, DM, CVA, seizure disorder; with AMADOU, encephalopathy, urinary retention, hydronephrosis, cystitis, hypernatremia, bacteremia, seizure disorder, humerus fracture. Per repeat swallow evaluation on 8/6, SLP recommended pureed-nectar thick liquids diet.    Factors impacting intake: [ ] none [ ] nausea  [ ] vomiting [ ] diarrhea [ ] constipation  [ ]chewing problems [x] swallowing issues  [x] other: AMS    Diet Prescription: Diet, Dysphagia 1 Pureed-Nectar Consistency Fluid:   Consistent Carbohydrate {Evening Snack}  Low Sodium  No Carb Prosource (1pkg = 15gms Protein)     Qty per Day:  1  Supplement Feeding Modality:  Oral  Ensure Pudding Cans or Servings Per Day:  3       Frequency:  Daily (07-30-20 @ 16:05)    Intake: Po intake varies; %; Total feed    Current Weight: 57.1 kg (8/9), 59.7 kg (7/29)  % Weight Change: 4.4 % wt loss x 10 days    Physical appearance: No edema; Unable to conduct nutrition focused physical exam at this time due to social distancing protocol, however was able to observe muscle wasting/fat loss in following visible regions: orbital(moderate) & temporal(moderate) regions    Pertinent Medications: MEDICATIONS  (STANDING):  ALBUTerol    90 MICROgram(s) HFA Inhaler 3 Puff(s) Inhalation every 4 hours  amLODIPine   Tablet 10 milliGRAM(s) Oral daily  aspirin  chewable 81 milliGRAM(s) Oral daily  cefpodoxime 200 milliGRAM(s) Oral every 12 hours  cloNIDine Patch 0.1 mG/24Hr(s) 1 patch Transdermal every 7 days  clopidogrel Tablet 75 milliGRAM(s) Oral daily  dextrose 5%. 1000 milliLiter(s) (50 mL/Hr) IV Continuous <Continuous>  dextrose 50% Injectable 12.5 Gram(s) IV Push once  dextrose 50% Injectable 25 Gram(s) IV Push once  dextrose 50% Injectable 25 Gram(s) IV Push once  heparin   Injectable 5000 Unit(s) SubCutaneous every 12 hours  hydrALAZINE 50 milliGRAM(s) Oral three times a day  insulin lispro (HumaLOG) corrective regimen sliding scale   SubCutaneous three times a day before meals  insulin lispro (HumaLOG) corrective regimen sliding scale   SubCutaneous at bedtime  levETIRAcetam  Solution 500 milliGRAM(s) Oral two times a day  metoprolol tartrate 50 milliGRAM(s) Oral two times a day  nystatin Powder 1 Application(s) Topical two times a day  simvastatin 20 milliGRAM(s) Oral at bedtime    MEDICATIONS  (PRN):  acetaminophen   Tablet .. 650 milliGRAM(s) Oral every 6 hours PRN Temp greater or equal to 38C (100.4F), Mild Pain (1 - 3)  dextrose 40% Gel 15 Gram(s) Oral once PRN Blood Glucose LESS THAN 70 milliGRAM(s)/deciliter  glucagon  Injectable 1 milliGRAM(s) IntraMuscular once PRN Glucose LESS THAN 70 milligrams/deciliter  haloperidol    Injectable 1 milliGRAM(s) IV Push three times a day PRN agitation    Pertinent Labs: 08-08 Na138 mmol/L Glu 156 mg/dL<H> K+ 3.7 mmol/L Cr  0.77 mg/dL BUN 12 mg/dL 08-08 Phos 2.9 mg/dL 08-07 Alb 2.5 g/dL<L>    CAPILLARY BLOOD GLUCOSE    POCT Blood Glucose.: 141 mg/dL (10 Aug 2020 10:50)  POCT Blood Glucose.: 182 mg/dL (10 Aug 2020 07:30)  POCT Blood Glucose.: 153 mg/dL (09 Aug 2020 21:00)  POCT Blood Glucose.: 153 mg/dL (09 Aug 2020 15:54)    Skin: As of 8/4/20: Pt with stage II pressure ulcer on R buttock, stage II pressure ulcer on L buttock, & stage II pressure ulcer on L inner thigh    Estimated Needs:   [x] no change since previous assessment on 7/30/20  [ ] recalculated:     Previous Nutrition Diagnosis:   Malnutrition severe malnutrition in context of acute illness.     Etiology inadequate protein-energy intake in setting of hx of s/p fall, pressure ulcers, AMADOU,     Signs/Symptoms <50% nutrition needs > 5 days, physical findings of moderate muscle loss.     Goal/Expected Outcome pt to consume >50-75% of meals & supplement; not met consistently      Nutrition Diagnosis is [x] ongoing  [ ] resolved [ ] not applicable     New Nutrition Diagnosis: [x] not applicable       Interventions:   Recommend Continue with current diet as rx'd  [ ] Change Diet To:  [ ] Nutrition Supplement  [ ] Nutrition Support  [ ] Other:     Monitoring and Evaluation:   [ x ] PO intake [ x ] Tolerance to diet prescription [ x ] weights [ x ] labs[ x ] follow up per protocol  [ ] other: Assessment: Pt with PMHx HTN, DM, CVA, seizure disorder; with AMADOU, encephalopathy, urinary retention, hydronephrosis, cystitis, hypernatremia, bacteremia, seizure disorder, humerus fracture. Per repeat swallow evaluation on 8/6, SLP recommended to continue with pureed-nectar thick diet.    Factors impacting intake: [ ] none [ ] nausea  [ ] vomiting [ ] diarrhea [ ] constipation  [ ]chewing problems [x] swallowing issues  [x] other: AMS    Diet Prescription: Diet, Dysphagia 1 Pureed-Nectar Consistency Fluid:   Consistent Carbohydrate {Evening Snack}  Low Sodium  No Carb Prosource (1pkg = 15gms Protein)     Qty per Day:  1  Supplement Feeding Modality:  Oral  Ensure Pudding Cans or Servings Per Day:  3       Frequency:  Daily (07-30-20 @ 16:05)    Intake: Po intake varies; %; Total feed    Current Weight: 57.1 kg (8/9), 59.7 kg (7/29)  % Weight Change: 4.4 % wt loss x 10 days    Physical appearance: No edema; Unable to conduct nutrition focused physical exam at this time due to social distancing protocol, however able to observe muscle wasting/fat loss in following visible regions: orbital(moderate) & temporal(moderate) regions    Pertinent Medications: MEDICATIONS  (STANDING):  ALBUTerol    90 MICROgram(s) HFA Inhaler 3 Puff(s) Inhalation every 4 hours  amLODIPine   Tablet 10 milliGRAM(s) Oral daily  aspirin  chewable 81 milliGRAM(s) Oral daily  cefpodoxime 200 milliGRAM(s) Oral every 12 hours  cloNIDine Patch 0.1 mG/24Hr(s) 1 patch Transdermal every 7 days  clopidogrel Tablet 75 milliGRAM(s) Oral daily  dextrose 5%. 1000 milliLiter(s) (50 mL/Hr) IV Continuous <Continuous>  dextrose 50% Injectable 12.5 Gram(s) IV Push once  dextrose 50% Injectable 25 Gram(s) IV Push once  dextrose 50% Injectable 25 Gram(s) IV Push once  heparin   Injectable 5000 Unit(s) SubCutaneous every 12 hours  hydrALAZINE 50 milliGRAM(s) Oral three times a day  insulin lispro (HumaLOG) corrective regimen sliding scale   SubCutaneous three times a day before meals  insulin lispro (HumaLOG) corrective regimen sliding scale   SubCutaneous at bedtime  levETIRAcetam  Solution 500 milliGRAM(s) Oral two times a day  metoprolol tartrate 50 milliGRAM(s) Oral two times a day  nystatin Powder 1 Application(s) Topical two times a day  simvastatin 20 milliGRAM(s) Oral at bedtime    MEDICATIONS  (PRN):  acetaminophen   Tablet .. 650 milliGRAM(s) Oral every 6 hours PRN Temp greater or equal to 38C (100.4F), Mild Pain (1 - 3)  dextrose 40% Gel 15 Gram(s) Oral once PRN Blood Glucose LESS THAN 70 milliGRAM(s)/deciliter  glucagon  Injectable 1 milliGRAM(s) IntraMuscular once PRN Glucose LESS THAN 70 milligrams/deciliter  haloperidol    Injectable 1 milliGRAM(s) IV Push three times a day PRN agitation    Pertinent Labs: 08-08 Na138 mmol/L Glu 156 mg/dL<H> K+ 3.7 mmol/L Cr  0.77 mg/dL BUN 12 mg/dL 08-08 Phos 2.9 mg/dL 08-07 Alb 2.5 g/dL<L>    CAPILLARY BLOOD GLUCOSE    POCT Blood Glucose.: 141 mg/dL (10 Aug 2020 10:50)  POCT Blood Glucose.: 182 mg/dL (10 Aug 2020 07:30)  POCT Blood Glucose.: 153 mg/dL (09 Aug 2020 21:00)  POCT Blood Glucose.: 153 mg/dL (09 Aug 2020 15:54)    Skin: As of 8/4/20: Pt with stage II pressure ulcer on R buttock, stage II pressure ulcer on L buttock, & stage II pressure ulcer on L inner thigh    Estimated Needs:   [x] no change since previous assessment on 7/30/20  [ ] recalculated:     Previous Nutrition Diagnosis:   Malnutrition severe malnutrition in context of acute illness.     Etiology inadequate protein-energy intake in setting of hx of s/p fall, pressure ulcers, AMADOU,     Signs/Symptoms <50% nutrition needs > 5 days, physical findings of moderate muscle loss.     Goal/Expected Outcome pt to consume >50-75% of meals & supplement; goal not consistently met      Nutrition Diagnosis is [x] ongoing  [ ] resolved [ ] not applicable     New Nutrition Diagnosis: [x] not applicable       Interventions:   Recommend Continue with current diet as rx'd  [ ] Change Diet To:  [ ] Nutrition Supplement  [ ] Nutrition Support  [ ] Other:     Monitoring and Evaluation:   [ x ] PO intake [ x ] Tolerance to diet prescription [ x ] weights [ x ] labs [ x ] follow up per protocol  [ ] other:

## 2020-08-10 NOTE — DISCHARGE NOTE PROVIDER - NSDCCPCAREPLAN_GEN_ALL_CORE_FT
PRINCIPAL DISCHARGE DIAGNOSIS  Diagnosis: AMADOU (acute kidney injury)  Assessment and Plan of Treatment: Due to retention and likely UTI.  Continue milner.  Now resolved.  Continue to monitor.  Please follow up with your primary care doctor hira 1-2 weeks of discharge.      SECONDARY DISCHARGE DIAGNOSES  Diagnosis: Cystitis  Assessment and Plan of Treatment: Continue and complete antibiotics as directed.  Blood cultures negative.    Diagnosis: Urinary retention  Assessment and Plan of Treatment: Continue milner - should be changed every 4-6 weeks.  Last changed on 8/1/2020.

## 2020-08-10 NOTE — PROGRESS NOTE ADULT - PROVIDER SPECIALTY LIST ADULT
Hospitalist
Infectious Disease
Nephrology
Neurology
Urology
Nephrology
Infectious Disease
Hospitalist

## 2020-08-14 ENCOUNTER — INPATIENT (INPATIENT)
Facility: HOSPITAL | Age: 70
LOS: 16 days | Discharge: HOME HEALTH | End: 2020-08-31
Attending: INTERNAL MEDICINE | Admitting: INTERNAL MEDICINE
Payer: MEDICARE

## 2020-08-14 VITALS
HEIGHT: 62 IN | SYSTOLIC BLOOD PRESSURE: 128 MMHG | TEMPERATURE: 101 F | WEIGHT: 130.07 LBS | RESPIRATION RATE: 18 BRPM | HEART RATE: 89 BPM | DIASTOLIC BLOOD PRESSURE: 68 MMHG | OXYGEN SATURATION: 98 %

## 2020-08-14 DIAGNOSIS — N20.0 CALCULUS OF KIDNEY: Chronic | ICD-10-CM

## 2020-08-14 LAB
ALBUMIN SERPL ELPH-MCNC: 3 G/DL — LOW (ref 3.3–5)
ALP SERPL-CCNC: 100 U/L — SIGNIFICANT CHANGE UP (ref 40–120)
ALT FLD-CCNC: 23 U/L — SIGNIFICANT CHANGE UP (ref 12–78)
ANION GAP SERPL CALC-SCNC: 9 MMOL/L — SIGNIFICANT CHANGE UP (ref 5–17)
APPEARANCE UR: ABNORMAL
APTT BLD: 30.4 SEC — SIGNIFICANT CHANGE UP (ref 27.5–35.5)
AST SERPL-CCNC: 21 U/L — SIGNIFICANT CHANGE UP (ref 15–37)
BACTERIA # UR AUTO: ABNORMAL
BASOPHILS # BLD AUTO: 0.04 K/UL — SIGNIFICANT CHANGE UP (ref 0–0.2)
BASOPHILS NFR BLD AUTO: 0.4 % — SIGNIFICANT CHANGE UP (ref 0–2)
BILIRUB SERPL-MCNC: 0.7 MG/DL — SIGNIFICANT CHANGE UP (ref 0.2–1.2)
BILIRUB UR-MCNC: NEGATIVE — SIGNIFICANT CHANGE UP
BUN SERPL-MCNC: 28 MG/DL — HIGH (ref 7–23)
CALCIUM SERPL-MCNC: 8.9 MG/DL — SIGNIFICANT CHANGE UP (ref 8.5–10.1)
CHLORIDE SERPL-SCNC: 105 MMOL/L — SIGNIFICANT CHANGE UP (ref 96–108)
CO2 SERPL-SCNC: 25 MMOL/L — SIGNIFICANT CHANGE UP (ref 22–31)
COLOR SPEC: YELLOW — SIGNIFICANT CHANGE UP
CREAT SERPL-MCNC: 1.39 MG/DL — HIGH (ref 0.5–1.3)
DIFF PNL FLD: ABNORMAL
EOSINOPHIL # BLD AUTO: 0 K/UL — SIGNIFICANT CHANGE UP (ref 0–0.5)
EOSINOPHIL NFR BLD AUTO: 0 % — SIGNIFICANT CHANGE UP (ref 0–6)
EPI CELLS # UR: SIGNIFICANT CHANGE UP
GLUCOSE BLDC GLUCOMTR-MCNC: 148 MG/DL — HIGH (ref 70–99)
GLUCOSE BLDC GLUCOMTR-MCNC: 234 MG/DL — HIGH (ref 70–99)
GLUCOSE SERPL-MCNC: 231 MG/DL — HIGH (ref 70–99)
GLUCOSE UR QL: NEGATIVE MG/DL — SIGNIFICANT CHANGE UP
HCT VFR BLD CALC: 31.2 % — LOW (ref 34.5–45)
HGB BLD-MCNC: 10.1 G/DL — LOW (ref 11.5–15.5)
IMM GRANULOCYTES NFR BLD AUTO: 0.5 % — SIGNIFICANT CHANGE UP (ref 0–1.5)
INR BLD: 1.15 RATIO — SIGNIFICANT CHANGE UP (ref 0.88–1.16)
KETONES UR-MCNC: NEGATIVE — SIGNIFICANT CHANGE UP
LACTATE SERPL-SCNC: 2 MMOL/L — SIGNIFICANT CHANGE UP (ref 0.7–2)
LEUKOCYTE ESTERASE UR-ACNC: ABNORMAL
LYMPHOCYTES # BLD AUTO: 1.57 K/UL — SIGNIFICANT CHANGE UP (ref 1–3.3)
LYMPHOCYTES # BLD AUTO: 15.2 % — SIGNIFICANT CHANGE UP (ref 13–44)
MCHC RBC-ENTMCNC: 29.4 PG — SIGNIFICANT CHANGE UP (ref 27–34)
MCHC RBC-ENTMCNC: 32.4 GM/DL — SIGNIFICANT CHANGE UP (ref 32–36)
MCV RBC AUTO: 90.7 FL — SIGNIFICANT CHANGE UP (ref 80–100)
MONOCYTES # BLD AUTO: 1.01 K/UL — HIGH (ref 0–0.9)
MONOCYTES NFR BLD AUTO: 9.7 % — SIGNIFICANT CHANGE UP (ref 2–14)
NEUTROPHILS # BLD AUTO: 7.69 K/UL — HIGH (ref 1.8–7.4)
NEUTROPHILS NFR BLD AUTO: 74.2 % — SIGNIFICANT CHANGE UP (ref 43–77)
NITRITE UR-MCNC: POSITIVE
NRBC # BLD: 0 /100 WBCS — SIGNIFICANT CHANGE UP (ref 0–0)
PH UR: 6 — SIGNIFICANT CHANGE UP (ref 5–8)
PLATELET # BLD AUTO: 395 K/UL — SIGNIFICANT CHANGE UP (ref 150–400)
POTASSIUM SERPL-MCNC: 4 MMOL/L — SIGNIFICANT CHANGE UP (ref 3.5–5.3)
POTASSIUM SERPL-SCNC: 4 MMOL/L — SIGNIFICANT CHANGE UP (ref 3.5–5.3)
PROT SERPL-MCNC: 7.6 GM/DL — SIGNIFICANT CHANGE UP (ref 6–8.3)
PROT UR-MCNC: 100 MG/DL
PROTHROM AB SERPL-ACNC: 13.2 SEC — SIGNIFICANT CHANGE UP (ref 10.6–13.6)
RBC # BLD: 3.44 M/UL — LOW (ref 3.8–5.2)
RBC # FLD: 14.4 % — SIGNIFICANT CHANGE UP (ref 10.3–14.5)
RBC CASTS # UR COMP ASSIST: SIGNIFICANT CHANGE UP /HPF (ref 0–4)
SARS-COV-2 RNA SPEC QL NAA+PROBE: SIGNIFICANT CHANGE UP
SODIUM SERPL-SCNC: 139 MMOL/L — SIGNIFICANT CHANGE UP (ref 135–145)
SP GR SPEC: 1.01 — SIGNIFICANT CHANGE UP (ref 1.01–1.02)
UROBILINOGEN FLD QL: NEGATIVE MG/DL — SIGNIFICANT CHANGE UP
WBC # BLD: 10.36 K/UL — SIGNIFICANT CHANGE UP (ref 3.8–10.5)
WBC # FLD AUTO: 10.36 K/UL — SIGNIFICANT CHANGE UP (ref 3.8–10.5)
WBC UR QL: >50

## 2020-08-14 PROCEDURE — 93010 ELECTROCARDIOGRAM REPORT: CPT

## 2020-08-14 PROCEDURE — 99285 EMERGENCY DEPT VISIT HI MDM: CPT

## 2020-08-14 PROCEDURE — 99223 1ST HOSP IP/OBS HIGH 75: CPT

## 2020-08-14 PROCEDURE — 71045 X-RAY EXAM CHEST 1 VIEW: CPT | Mod: 26

## 2020-08-14 PROCEDURE — 74018 RADEX ABDOMEN 1 VIEW: CPT | Mod: 26

## 2020-08-14 RX ORDER — METOPROLOL TARTRATE 50 MG
50 TABLET ORAL
Refills: 0 | Status: DISCONTINUED | OUTPATIENT
Start: 2020-08-14 | End: 2020-08-24

## 2020-08-14 RX ORDER — SIMVASTATIN 20 MG/1
20 TABLET, FILM COATED ORAL AT BEDTIME
Refills: 0 | Status: DISCONTINUED | OUTPATIENT
Start: 2020-08-14 | End: 2020-08-24

## 2020-08-14 RX ORDER — AMLODIPINE BESYLATE 2.5 MG/1
10 TABLET ORAL DAILY
Refills: 0 | Status: DISCONTINUED | OUTPATIENT
Start: 2020-08-14 | End: 2020-08-24

## 2020-08-14 RX ORDER — ACETAMINOPHEN 500 MG
650 TABLET ORAL ONCE
Refills: 0 | Status: COMPLETED | OUTPATIENT
Start: 2020-08-14 | End: 2020-08-14

## 2020-08-14 RX ORDER — HYDRALAZINE HCL 50 MG
25 TABLET ORAL THREE TIMES A DAY
Refills: 0 | Status: DISCONTINUED | OUTPATIENT
Start: 2020-08-14 | End: 2020-08-14

## 2020-08-14 RX ORDER — CLOPIDOGREL BISULFATE 75 MG/1
75 TABLET, FILM COATED ORAL DAILY
Refills: 0 | Status: DISCONTINUED | OUTPATIENT
Start: 2020-08-14 | End: 2020-08-16

## 2020-08-14 RX ORDER — ASPIRIN/CALCIUM CARB/MAGNESIUM 324 MG
81 TABLET ORAL DAILY
Refills: 0 | Status: DISCONTINUED | OUTPATIENT
Start: 2020-08-14 | End: 2020-08-16

## 2020-08-14 RX ORDER — LEVETIRACETAM 250 MG/1
750 TABLET, FILM COATED ORAL
Refills: 0 | Status: DISCONTINUED | OUTPATIENT
Start: 2020-08-14 | End: 2020-08-15

## 2020-08-14 RX ORDER — POLYETHYLENE GLYCOL 3350 17 G/17G
17 POWDER, FOR SOLUTION ORAL
Refills: 0 | Status: DISCONTINUED | OUTPATIENT
Start: 2020-08-14 | End: 2020-08-24

## 2020-08-14 RX ORDER — HEPARIN SODIUM 5000 [USP'U]/ML
5000 INJECTION INTRAVENOUS; SUBCUTANEOUS EVERY 12 HOURS
Refills: 0 | Status: DISCONTINUED | OUTPATIENT
Start: 2020-08-14 | End: 2020-08-24

## 2020-08-14 RX ORDER — SENNA PLUS 8.6 MG/1
2 TABLET ORAL AT BEDTIME
Refills: 0 | Status: DISCONTINUED | OUTPATIENT
Start: 2020-08-14 | End: 2020-08-24

## 2020-08-14 RX ORDER — CEFTRIAXONE 500 MG/1
1000 INJECTION, POWDER, FOR SOLUTION INTRAMUSCULAR; INTRAVENOUS EVERY 24 HOURS
Refills: 0 | Status: DISCONTINUED | OUTPATIENT
Start: 2020-08-15 | End: 2020-08-17

## 2020-08-14 RX ORDER — SODIUM CHLORIDE 9 MG/ML
1000 INJECTION INTRAMUSCULAR; INTRAVENOUS; SUBCUTANEOUS ONCE
Refills: 0 | Status: COMPLETED | OUTPATIENT
Start: 2020-08-14 | End: 2020-08-14

## 2020-08-14 RX ORDER — CEFTRIAXONE 500 MG/1
1000 INJECTION, POWDER, FOR SOLUTION INTRAMUSCULAR; INTRAVENOUS EVERY 24 HOURS
Refills: 0 | Status: DISCONTINUED | OUTPATIENT
Start: 2020-08-14 | End: 2020-08-14

## 2020-08-14 RX ORDER — SODIUM CHLORIDE 9 MG/ML
1000 INJECTION INTRAMUSCULAR; INTRAVENOUS; SUBCUTANEOUS
Refills: 0 | Status: DISCONTINUED | OUTPATIENT
Start: 2020-08-14 | End: 2020-08-24

## 2020-08-14 RX ORDER — ACETAMINOPHEN 500 MG
650 TABLET ORAL EVERY 6 HOURS
Refills: 0 | Status: DISCONTINUED | OUTPATIENT
Start: 2020-08-14 | End: 2020-08-24

## 2020-08-14 RX ORDER — CEFTRIAXONE 500 MG/1
1000 INJECTION, POWDER, FOR SOLUTION INTRAMUSCULAR; INTRAVENOUS ONCE
Refills: 0 | Status: COMPLETED | OUTPATIENT
Start: 2020-08-14 | End: 2020-08-14

## 2020-08-14 RX ADMIN — SIMVASTATIN 20 MILLIGRAM(S): 20 TABLET, FILM COATED ORAL at 21:15

## 2020-08-14 RX ADMIN — CLOPIDOGREL BISULFATE 75 MILLIGRAM(S): 75 TABLET, FILM COATED ORAL at 18:02

## 2020-08-14 RX ADMIN — CEFTRIAXONE 100 MILLIGRAM(S): 500 INJECTION, POWDER, FOR SOLUTION INTRAMUSCULAR; INTRAVENOUS at 13:30

## 2020-08-14 RX ADMIN — SODIUM CHLORIDE 1000 MILLILITER(S): 9 INJECTION INTRAMUSCULAR; INTRAVENOUS; SUBCUTANEOUS at 12:22

## 2020-08-14 RX ADMIN — Medication 1 ENEMA: at 16:50

## 2020-08-14 RX ADMIN — Medication 0.1 MILLIGRAM(S): at 18:02

## 2020-08-14 RX ADMIN — Medication 650 MILLIGRAM(S): at 13:00

## 2020-08-14 RX ADMIN — SODIUM CHLORIDE 75 MILLILITER(S): 9 INJECTION INTRAMUSCULAR; INTRAVENOUS; SUBCUTANEOUS at 15:00

## 2020-08-14 RX ADMIN — Medication 650 MILLIGRAM(S): at 12:22

## 2020-08-14 RX ADMIN — Medication 50 MILLIGRAM(S): at 18:03

## 2020-08-14 RX ADMIN — LEVETIRACETAM 750 MILLIGRAM(S): 250 TABLET, FILM COATED ORAL at 18:02

## 2020-08-14 RX ADMIN — SENNA PLUS 2 TABLET(S): 8.6 TABLET ORAL at 21:15

## 2020-08-14 RX ADMIN — HEPARIN SODIUM 5000 UNIT(S): 5000 INJECTION INTRAVENOUS; SUBCUTANEOUS at 18:03

## 2020-08-14 NOTE — ED PROVIDER NOTE - CARE PLAN
Principal Discharge DX:	Urinary tract infection associated with indwelling urethral catheter, sequela

## 2020-08-14 NOTE — ED PROVIDER NOTE - OBJECTIVE STATEMENT
68yo female with pmh dementia (non verbal), CVA (on plavix), seizure disorder (on keppra), htn, DM, hld BIBEMS for fever since yesterday. As per EMS, patient was recently admitted to hospital for UTI. Discharged on Monday with a milner. EMS says that family noticed milner output decreased since yesterday. Shows only 250 cc in milner over 36 hours. Also family reported patient had not had a bowel movement since her discharged. As per EMS, samiriyl reports patient is at mental baseline.

## 2020-08-14 NOTE — H&P ADULT - ASSESSMENT
70 yo female PMH dementia (non verbal), CVA (on plavix), seizure disorder (on keppra), htn, DM, hld BIBEMS for fever since yesterday. As per EMS, patient was recently admitted to hospital for UTI. Discharged on Monday with a milner. EMS says that family noticed milner output decreased since yesterday. Shows only 250 cc in milner over 36 hours. Also family reported patient had not had a bowel movement since her discharged. As per EMS, family reports patient is at mental baseline. 70 yo female PMH dementia (non verbal), CVA (on plavix), seizure disorder (on keppra), htn, DM, hld BIBEMS for fever since yesterday. As per EMS, patient was recently admitted to hospital for UTI. Discharged on Monday with a milner. EMS says that family noticed milner output decreased since yesterday. Shows only 250 cc in milner over 36 hours. Also family reported patient had not had a bowel movement since her discharged. As per EMS, family reports patient is at mental baseline.    ID: Likely catheter associated UTI. IV Ceftriaxone daily, adjust per urine cultrue. Milner changed in ER.     Renal: AMADOU on CKD III. IVF normal saline 75/h, follow repeat in AM.     CV: Continue Norvasc, ASA, Clonidine, Plavix, hydralazine, lopressor and Zocur at home dose.     PT eval. Continue Kepra 750 mg bid for prior seizure history.     Called family and contacts at both numbers, no answer. Will need to confirm meds with family in the AM.     Increased bowl regimen for constipation. 68 yo female PMH dementia (non verbal), CVA (on plavix), seizure disorder (on keppra), htn, DM, hld BIBEMS for fever since yesterday. As per EMS, patient was recently admitted to hospital for UTI. Discharged on Monday with a milner. EMS says that family noticed milner output decreased since yesterday. Shows only 250 cc in milner over 36 hours. Also family reported patient had not had a bowel movement since her discharged. As per EMS, family reports patient is at mental baseline.    ID: Likely catheter associated UTI. IV Ceftriaxone daily, adjust per urine cultrue. Milner changed in ER.     Renal: AMADOU on CKD III. IVF normal saline 75/h, follow repeat in AM.     CV: Continue Norvasc, ASA, Clonidine, Plavix, hydralazine, lopressor and Zocur at home dose.     Neuro: PT eval. Continue Kepra 750 mg bid for prior seizure history. No need for CT head, stop Trazdone as this can cause retension and sedation.        Called family and contacts at both numbers, no answer. Will need to confirm meds with family in the AM.     Increased bowl regimen for constipation. 68 yo female PMH dementia (non verbal), CVA (on plavix), seizure disorder (on keppra), htn, DM, hld BIBEMS for fever since yesterday. As per EMS, patient was recently admitted to hospital for UTI. Discharged on Monday with a milner. EMS says that family noticed milner output decreased since yesterday. Shows only 250 cc in milner over 36 hours. Also family reported patient had not had a bowel movement since her discharged. As per EMS, family reports patient is at mental baseline.    ID: Likely catheter associated UTI. IV Ceftriaxone daily, adjust per urine cultrue. Milner changed in ER.     Renal: AMADOU on CKD III. IVF normal saline 75/h, follow repeat in AM.     CV: Continue Norvasc, ASA, Clonidine, Plavix, lopressor and Zocur at home dose. Hold Hydralazine for now with UTI.     Neuro: PT eval. Continue Kepra 750 mg bid for prior seizure history. No need for CT head, stop Trazdone as this can cause retension and sedation.        Called family and contacts at both numbers, no answer. Will need to confirm meds with family in the AM.     Increased bowl regimen for constipation.

## 2020-08-14 NOTE — ED PROVIDER NOTE - PHYSICAL EXAMINATION
PE:   GEN: Awake, alert, interactive, NAD  HEAD AND NECK: NC/AT. Airway patent. Neck supple.   EYES: Clear b/l. PERRL  CARDIAC: RRR. S1, S2. No evident pedal edema.    RESP: Normal respiratory effort with no use of accessory muscles or retractions. Clear throughout on auscultation.  ABD: soft, non-distended, non-tender. No rebound, no guarding.   NEURO: Awake, alert. Non verbal. Does not follow commands   MSK: Moving all extremities with no apparent deformities.   SKIN: Warm, dry, intact normal color

## 2020-08-14 NOTE — ED ADULT NURSE NOTE - NSIMPLEMENTINTERV_GEN_ALL_ED
Implemented All Fall with Harm Risk Interventions:  [place] to call system. Call bell, personal items and telephone within reach. Instruct patient to call for assistance. Room bathroom lighting operational. Non-slip footwear when patient is off stretcher. Physically safe environment: no spills, clutter or unnecessary equipment. Stretcher in lowest position, wheels locked, appropriate side rails in place. Provide visual cue, wrist band, yellow gown, etc. Monitor gait and stability. Monitor for mental status changes and reorient to person, place, and time. Review medications for side effects contributing to fall risk. Reinforce activity limits and safety measures with patient and family. Provide visual clues: red socks.

## 2020-08-14 NOTE — ED PROVIDER NOTE - ATTENDING CONTRIBUTION TO CARE
I have seen the patient with the PA and agree with above examination and assessment and plan with the following addendum:        Focused PE:   General: nonverbal at baseline  Head: Normocephalic, atraumatic  Eyes: PERRLA, EOMI  Cardiac: RRR, no murmurs, rubs or gallops  Resp: CTA, no wheezes, rales or ronchi  GI: Nondistended, nontender, no rebound or guarding  Neuro: nonverbal at baselineno focal deficits.   Ext: Non edematous, nontender.     Ddx: Sepsis/ UTI/ milner malfunction  Plan: Cbc, cmp, lactate, fluids, abx, ua, ucx, cxr, reassess

## 2020-08-14 NOTE — H&P ADULT - HISTORY OF PRESENT ILLNESS
68 yo female PMH dementia (non verbal), CVA (on plavix), seizure disorder (on keppra), htn, DM, hld BIBEMS for fever since yesterday. As per EMS, patient was recently admitted to hospital for UTI. Discharged on Monday with a milner. EMS says that family noticed milner output decreased since yesterday. Shows only 250 cc in milner over 36 hours. Also family reported patient had not had a bowel movement since her discharged. As per EMS, family reports patient is at mental baseline. 70 yo female PMH dementia (non verbal), CVA (on plavix), seizure disorder (on keppra), htn, DM, hld BIBEMS for fever since yesterday. As per EMS, patient was recently admitted to hospital for UTI. Discharged on Monday with a milner.   Milner was changed this admission in the ER. EMS says that family noticed milner output decreased since yesterday. Shows only 250 cc in milner over 36 hours. Also family reported patient had not had a bowel movement since her discharged. As per EMS, family reports patient is at mental baseline. On arrival milner output looks infected.

## 2020-08-14 NOTE — PATIENT PROFILE ADULT - VISION (WITH CORRECTIVE LENSES IF THE PATIENT USUALLY WEARS THEM):
Normal vision: sees adequately in most situations; can see medication labels, newsprint/as per sister

## 2020-08-14 NOTE — ED PROVIDER NOTE - CLINICAL SUMMARY MEDICAL DECISION MAKING FREE TEXT BOX
68yo female with pmh dementia (non verbal), CVA (on plavix), seizure disorder (on keppra), htn, DM, hld BIBEMS for fever and decreased urine output since yesterday. Febrile to 101.2. Abdomen soft, non tender. Urine prurulent. Will replace milner and get sepsis work up. TBA

## 2020-08-14 NOTE — ED ADULT TRIAGE NOTE - CHIEF COMPLAINT QUOTE
pt BIBA with c/o fever, cath placed on Monday with decreased urine output and dark urine, no respiratory sx, no BM for 5 days as per family

## 2020-08-14 NOTE — H&P ADULT - NSHPLABSRESULTS_GEN_ALL_CORE
LABS:                        10.1   10.36 )-----------( 395      ( 14 Aug 2020 12:07 )             31.2     08-14    139  |  105  |  28<H>  ----------------------------<  231<H>  4.0   |  25  |  1.39<H>    Ca    8.9      14 Aug 2020 12:07    TPro  7.6  /  Alb  3.0<L>  /  TBili  0.7  /  DBili  x   /  AST  21  /  ALT  23  /  AlkPhos  100  08-14    PT/INR - ( 14 Aug 2020 12:07 )   PT: 13.2 sec;   INR: 1.15 ratio         PTT - ( 14 Aug 2020 12:07 )  PTT:30.4 sec  Urinalysis Basic - ( 14 Aug 2020 12:25 )    Color: Yellow / Appearance: very cloudy / S.010 / pH: x  Gluc: x / Ketone: Negative  / Bili: Negative / Urobili: Negative mg/dL   Blood: x / Protein: 100 mg/dL / Nitrite: Positive   Leuk Esterase: Moderate / RBC: 0-2 /HPF / WBC >50   Sq Epi: x / Non Sq Epi: Few / Bacteria: Moderate          RADIOLOGY & ADDITIONAL TESTS:

## 2020-08-14 NOTE — H&P ADULT - NSHPPHYSICALEXAM_GEN_ALL_CORE
PHYSICAL EXAMINATION:  Vital Signs Last 24 Hrs  T(C): 38.2 (14 Aug 2020 12:26), Max: 38.4 (14 Aug 2020 11:36)  T(F): 100.8 (14 Aug 2020 12:26), Max: 101.2 (14 Aug 2020 11:36)  HR: 89 (14 Aug 2020 12:26) (89 - 89)  BP: 116/74 (14 Aug 2020 12:26) (116/74 - 128/68)  BP(mean): --  RR: 18 (14 Aug 2020 12:26) (18 - 18)  SpO2: 98% (14 Aug 2020 12:26) (98% - 98%)  CAPILLARY BLOOD GLUCOSE      POCT Blood Glucose.: 234 mg/dL (14 Aug 2020 11:51)      GENERAL: NAD, well-groomed, well-developed  HEAD:  atraumatic, normocephalic  EYES: sclera anicteric  ENMT: mucous membranes moist  NECK: supple, No JVD  CHEST/LUNG: clear to auscultation bilaterally; no rales, rhonchi, or wheezing b/l  HEART: normal S1, S2  ABDOMEN: BS+, soft, ND, NT   EXTREMITIES:  pulses palpable; no clubbing, cyanosis, or edema b/l LEs  NEURO: awake, alert, interactive; moves all extremities  SKIN: no rashes or lesions PHYSICAL EXAMINATION:  Vital Signs Last 24 Hrs  T(C): 38.2 (14 Aug 2020 12:26), Max: 38.4 (14 Aug 2020 11:36)  T(F): 100.8 (14 Aug 2020 12:26), Max: 101.2 (14 Aug 2020 11:36)  HR: 89 (14 Aug 2020 12:26) (89 - 89)  BP: 116/74 (14 Aug 2020 12:26) (116/74 - 128/68)  BP(mean): --  RR: 18 (14 Aug 2020 12:26) (18 - 18)  SpO2: 98% (14 Aug 2020 12:26) (98% - 98%)  CAPILLARY BLOOD GLUCOSE      POCT Blood Glucose.: 234 mg/dL (14 Aug 2020 11:51)      GENERAL: NAD, seen in 1-D, in bed, milner in place, cloudy, no fevers or cough  HEAD:  atraumatic, normocephalic  EYES: sclera anicteric  ENMT: mucous membranes moist  NECK: supple, No JVD  CHEST/LUNG: clear to auscultation bilaterally; no rales, rhonchi, or wheezing b/l  HEART: normal S1, S2  ABDOMEN: BS+, soft, ND, NT   EXTREMITIES:  pulses palpable; no clubbing, cyanosis, or edema b/l LEs  NEURO: awake, alert, interactive; moves all extremities  SKIN: no rashes or lesions

## 2020-08-14 NOTE — ED ADULT NURSE NOTE - OBJECTIVE STATEMENT
Pt awake, BIBA from home c/o fever, decreased urine output, and no BM x 5 days. Pt is non-verbal, bed bound, p/w indwelling catheter placed on monday. Pt also p/w left arm precautions band and sling, EMS unable to provide information regarding left arm.

## 2020-08-14 NOTE — ED ADULT NURSE NOTE - PLAN OF CARE
Position of comfort/Side rails/Explanation of exam/test/NPO Side rails/Fall precautions/Explanation of exam/test/NPO/Position of comfort

## 2020-08-15 LAB
-  COAGULASE NEGATIVE STAPHYLOCOCCUS: SIGNIFICANT CHANGE UP
ANION GAP SERPL CALC-SCNC: 8 MMOL/L — SIGNIFICANT CHANGE UP (ref 5–17)
BUN SERPL-MCNC: 20 MG/DL — SIGNIFICANT CHANGE UP (ref 7–23)
CALCIUM SERPL-MCNC: 8.5 MG/DL — SIGNIFICANT CHANGE UP (ref 8.5–10.1)
CHLORIDE SERPL-SCNC: 106 MMOL/L — SIGNIFICANT CHANGE UP (ref 96–108)
CO2 SERPL-SCNC: 25 MMOL/L — SIGNIFICANT CHANGE UP (ref 22–31)
CREAT SERPL-MCNC: 0.77 MG/DL — SIGNIFICANT CHANGE UP (ref 0.5–1.3)
GLUCOSE SERPL-MCNC: 142 MG/DL — HIGH (ref 70–99)
HCT VFR BLD CALC: 28.3 % — LOW (ref 34.5–45)
HGB BLD-MCNC: 8.9 G/DL — LOW (ref 11.5–15.5)
MCHC RBC-ENTMCNC: 28.8 PG — SIGNIFICANT CHANGE UP (ref 27–34)
MCHC RBC-ENTMCNC: 31.4 GM/DL — LOW (ref 32–36)
MCV RBC AUTO: 91.6 FL — SIGNIFICANT CHANGE UP (ref 80–100)
METHOD TYPE: SIGNIFICANT CHANGE UP
NRBC # BLD: 0 /100 WBCS — SIGNIFICANT CHANGE UP (ref 0–0)
PLATELET # BLD AUTO: 320 K/UL — SIGNIFICANT CHANGE UP (ref 150–400)
POTASSIUM SERPL-MCNC: 2.9 MMOL/L — CRITICAL LOW (ref 3.5–5.3)
POTASSIUM SERPL-SCNC: 2.9 MMOL/L — CRITICAL LOW (ref 3.5–5.3)
RBC # BLD: 3.09 M/UL — LOW (ref 3.8–5.2)
RBC # FLD: 14.1 % — SIGNIFICANT CHANGE UP (ref 10.3–14.5)
SARS-COV-2 IGG SERPL QL IA: NEGATIVE — SIGNIFICANT CHANGE UP
SARS-COV-2 IGM SERPL IA-ACNC: 0.19 INDEX — SIGNIFICANT CHANGE UP
SODIUM SERPL-SCNC: 139 MMOL/L — SIGNIFICANT CHANGE UP (ref 135–145)
WBC # BLD: 8.64 K/UL — SIGNIFICANT CHANGE UP (ref 3.8–10.5)
WBC # FLD AUTO: 8.64 K/UL — SIGNIFICANT CHANGE UP (ref 3.8–10.5)

## 2020-08-15 PROCEDURE — 99233 SBSQ HOSP IP/OBS HIGH 50: CPT

## 2020-08-15 RX ORDER — LEVETIRACETAM 250 MG/1
500 TABLET, FILM COATED ORAL
Refills: 0 | Status: DISCONTINUED | OUTPATIENT
Start: 2020-08-15 | End: 2020-08-24

## 2020-08-15 RX ORDER — POTASSIUM CHLORIDE 20 MEQ
40 PACKET (EA) ORAL EVERY 4 HOURS
Refills: 0 | Status: COMPLETED | OUTPATIENT
Start: 2020-08-15 | End: 2020-08-15

## 2020-08-15 RX ORDER — POTASSIUM CHLORIDE 20 MEQ
40 PACKET (EA) ORAL EVERY 4 HOURS
Refills: 0 | Status: DISCONTINUED | OUTPATIENT
Start: 2020-08-15 | End: 2020-08-15

## 2020-08-15 RX ADMIN — AMLODIPINE BESYLATE 10 MILLIGRAM(S): 2.5 TABLET ORAL at 06:05

## 2020-08-15 RX ADMIN — CLOPIDOGREL BISULFATE 75 MILLIGRAM(S): 75 TABLET, FILM COATED ORAL at 11:06

## 2020-08-15 RX ADMIN — Medication 0.1 MILLIGRAM(S): at 06:05

## 2020-08-15 RX ADMIN — LEVETIRACETAM 750 MILLIGRAM(S): 250 TABLET, FILM COATED ORAL at 06:04

## 2020-08-15 RX ADMIN — Medication 40 MILLIEQUIVALENT(S): at 22:09

## 2020-08-15 RX ADMIN — Medication 81 MILLIGRAM(S): at 11:06

## 2020-08-15 RX ADMIN — SIMVASTATIN 20 MILLIGRAM(S): 20 TABLET, FILM COATED ORAL at 22:09

## 2020-08-15 RX ADMIN — Medication 50 MILLIGRAM(S): at 18:46

## 2020-08-15 RX ADMIN — Medication 40 MILLIEQUIVALENT(S): at 11:07

## 2020-08-15 RX ADMIN — Medication 50 MILLIGRAM(S): at 06:05

## 2020-08-15 RX ADMIN — HEPARIN SODIUM 5000 UNIT(S): 5000 INJECTION INTRAVENOUS; SUBCUTANEOUS at 18:46

## 2020-08-15 RX ADMIN — Medication 40 MILLIEQUIVALENT(S): at 16:54

## 2020-08-15 RX ADMIN — HEPARIN SODIUM 5000 UNIT(S): 5000 INJECTION INTRAVENOUS; SUBCUTANEOUS at 06:05

## 2020-08-15 RX ADMIN — Medication 0.1 MILLIGRAM(S): at 18:46

## 2020-08-15 RX ADMIN — CEFTRIAXONE 100 MILLIGRAM(S): 500 INJECTION, POWDER, FOR SOLUTION INTRAMUSCULAR; INTRAVENOUS at 11:07

## 2020-08-15 RX ADMIN — LEVETIRACETAM 500 MILLIGRAM(S): 250 TABLET, FILM COATED ORAL at 18:46

## 2020-08-15 NOTE — DIETITIAN INITIAL EVALUATION ADULT. - PERTINENT MEDS FT
Plavix, Heparin, NS @ 75 ml/hr, Keppra, KCl, Norvasc, Dulcolax, Catapres, Lopressor, Miralax, Senna, Zocor

## 2020-08-15 NOTE — DIETITIAN INITIAL EVALUATION ADULT. - ETIOLOGY
Inadequate energy/protein intake + increased energy/protein needs related to unstageable pressure ulcer, UTI, AMADOU, Sepsis

## 2020-08-15 NOTE — DIETITIAN INITIAL EVALUATION ADULT. - ENERGY NEEDS
Height (cm): 157.48 (08-14)  Weight (kg): 56.9 (08-15)  BMI (kg/m2): 22.9 (08-15)  IBW: 50 kg +/- 10%    % IBW:  114%   UBW:  stable    %UBW: 100%

## 2020-08-15 NOTE — PROGRESS NOTE ADULT - SUBJECTIVE AND OBJECTIVE BOX
Patient is a 69y old  Female who presents with a chief complaint of Catheter associated UTI and AMADOU from dehydration. (14 Aug 2020 14:06)      INTERVAL HPI/OVERNIGHT EVENTS: had multiple BMs last night. afebrile and eating well.     MEDICATIONS  (STANDING):  amLODIPine   Tablet 10 milliGRAM(s) Oral daily  aspirin enteric coated 81 milliGRAM(s) Oral daily  bisacodyl Suppository 10 milliGRAM(s) Rectal daily  cefTRIAXone   IVPB 1000 milliGRAM(s) IV Intermittent every 24 hours  cloNIDine 0.1 milliGRAM(s) Oral two times a day  clopidogrel Tablet 75 milliGRAM(s) Oral daily  heparin   Injectable 5000 Unit(s) SubCutaneous every 12 hours  levETIRAcetam 750 milliGRAM(s) Oral two times a day  metoprolol tartrate 50 milliGRAM(s) Oral two times a day  polyethylene glycol 3350 17 Gram(s) Oral two times a day  potassium chloride    Tablet ER 40 milliEquivalent(s) Oral every 4 hours  senna 2 Tablet(s) Oral at bedtime  simvastatin 20 milliGRAM(s) Oral at bedtime  sodium chloride 0.9%. 1000 milliLiter(s) (75 mL/Hr) IV Continuous <Continuous>    MEDICATIONS  (PRN):  acetaminophen   Tablet .. 650 milliGRAM(s) Oral every 6 hours PRN Temp greater or equal to 38C (100.4F), Mild Pain (1 - 3)      Allergies    No Known Allergies    Intolerances        REVIEW OF SYSTEMS:  cannot give d,t dementia     Vital Signs Last 24 Hrs  T(C): 37.2 (15 Aug 2020 05:38), Max: 38.4 (14 Aug 2020 11:36)  T(F): 98.9 (15 Aug 2020 05:38), Max: 101.2 (14 Aug 2020 11:36)  HR: 78 (15 Aug 2020 05:38) (73 - 107)  BP: 150/56 (15 Aug 2020 05:38) (116/74 - 157/89)  BP(mean): --  RR: 16 (15 Aug 2020 05:38) (16 - 18)  SpO2: 96% (15 Aug 2020 05:38) (96% - 99%)      PHYSICAL EXAM:  GENERAL: NAD, well-groomed, well-developed  HEAD:  Atraumatic, Normocephalic  EYES: EOMI, PERRLA, conjunctiva and sclera clear  ENMT: No tonsillar erythema, exudates, or enlargement; Moist mucous membranes, Good dentition, No lesions  NECK: Supple, No JVD, Normal thyroid  NERVOUS SYSTEM: FROM x4 non verbal alert   CHEST/LUNG: Clear to percussion bilaterally; No rales, rhonchi, wheezing, or rubs  HEART: Regular rate and rhythm; No murmurs, rubs, or gallops  ABDOMEN: Soft, Nontender, Nondistended; Bowel sounds present  EXTREMITIES:  2+ Peripheral Pulses, No clubbing, cyanosis, or edema  LYMPH: No lymphadenopathy noted      LABS:                        8.9    8.64  )-----------( 320      ( 15 Aug 2020 07:23 )             28.3     08-15    139  |  106  |  20  ----------------------------<  142<H>  2.9<LL>   |  25  |  0.77    Ca    8.5      15 Aug 2020 07:23    TPro  7.6  /  Alb  3.0<L>  /  TBili  0.7  /  DBili  x   /  AST  21  /  ALT  23  /  AlkPhos  100  08-14    PT/INR - ( 14 Aug 2020 12:07 )   PT: 13.2 sec;   INR: 1.15 ratio         PTT - ( 14 Aug 2020 12:07 )  PTT:30.4 sec  Urinalysis Basic - ( 14 Aug 2020 12:25 )    Color: Yellow / Appearance: very cloudy / S.010 / pH: x  Gluc: x / Ketone: Negative  / Bili: Negative / Urobili: Negative mg/dL   Blood: x / Protein: 100 mg/dL / Nitrite: Positive   Leuk Esterase: Moderate / RBC: 0-2 /HPF / WBC >50   Sq Epi: x / Non Sq Epi: Few / Bacteria: Moderate      CAPILLARY BLOOD GLUCOSE      POCT Blood Glucose.: 148 mg/dL (14 Aug 2020 16:43)  POCT Blood Glucose.: 234 mg/dL (14 Aug 2020 11:51)      RADIOLOGY & ADDITIONAL TESTS:    Imaging Personally Reviewed:  [ ] YES  [ ] NO    Consultant(s) Notes Reviewed:  [ ] YES  [ ] NO    Care Discussed with Consultants/Other Providers [ ] YES  [ ] NO

## 2020-08-15 NOTE — PROVIDER CONTACT NOTE (CRITICAL VALUE NOTIFICATION) - SITUATION
Blood culture 08/14/20 is positive growth aerobic bottle gram positive cocci in clusters.
Potassium 2.9

## 2020-08-15 NOTE — CHART NOTE - NSCHARTNOTEFT_GEN_A_CORE
PA Note    Received a call from PT regarding order for consult. Pt is bedbound and as per PT, pt is not a candidate for PT.     Order was discontinued    Julianne Onofre, PAC  Medicine

## 2020-08-15 NOTE — CHART NOTE - NSCHARTNOTEFT_GEN_A_CORE
Upon Nutritional Assessment by the Registered Dietitian your patient was determined to meet criteria / has evidence of the following diagnosis/diagnoses:          [ ]  Mild Protein Calorie Malnutrition        [ ]  Moderate Protein Calorie Malnutrition        [X ] Severe Protein Calorie Malnutrition        [ ] Unspecified Protein Calorie Malnutrition        [ ] Underweight / BMI <19        [ ] Morbid Obesity / BMI > 40      Findings as based on:  •  Comprehensive nutrition assessment and consultation  •  Calorie counts (nutrient intake analysis)  •  Food acceptance and intake status from observations by staff  •  Follow up  •  Patient education  •  Intervention secondary to interdisciplinary rounds  •   concerns      Treatment:    The following diet has been recommended:   CCHO no snack diet, pureed consistency, + Glucerna x 2/day (provides 440 kcal, 20 g protein) for additional nutrition support      PROVIDER Section:     By signing this assessment you are acknowledging and agree with the diagnosis/diagnoses assigned by the Registered Dietitian    Comments:

## 2020-08-15 NOTE — DIETITIAN INITIAL EVALUATION ADULT. - OTHER INFO
Pt was recently d/c from [facility] for similar issues. MD note reflects catheter related UTI & AMADOU from dehydration + sepsis 2/2 UTI. Unable to interview pt as she is non-verbal & confused. Per RD note (7/30) daughter reported pt c broken shoulder s/p fall on 7/14. Pt lives in a 2-family home upstairs c HHA & sister lives downstairs; daughter does shopping & HHA cooks; they also receive meals from [employer]. Daughter reported decreased PO since end of June (50-75% usual intake); pt eats regular consistency at home but per RN pt is edentulous & pureed consistency would be appropriate for her (that is what she ate at last admission). Daughter also reported that pt is c DM but is off all DM meds. However, A1c (from last admission) & f/s (on this admission) are elevated so diet will be changed to CCHO; RN to advise MD re need for DM protocol. Per previous nutrition assessments wt has been stable since 1/2020 but over 100# loss x 5 years ago.  No reports of any N/V, bowel regimen in place for constipation.

## 2020-08-15 NOTE — DIETITIAN INITIAL EVALUATION ADULT. - DIET TYPE
Glucerna x 2/day (provides 440 kcal, 20 g protein)/pureed/consistent carbohydrate (no snacks)/supplement (specify)

## 2020-08-15 NOTE — DIETITIAN INITIAL EVALUATION ADULT. - PHYSICAL APPEARANCE
emaciated/BMI = 22.9; 1+ edema noted left arm/other (specify) Nutrition focused physical exam conducted:    Subcutaneous fat loss: [severe ] Orbital fat pads region, [unable ]Buccal fat region, [unable ]Triceps region,  [moderate ]Ribs region.    Muscle wasting: [moderate ]Temples region, [moderate ]Clavicle region, [moderate ]Shoulder region,   [unable ]Scapula region, [unable ]Interosseous region,  [moderate ]thigh region, [moderate ]Calf region

## 2020-08-15 NOTE — PROGRESS NOTE ADULT - ASSESSMENT
70 yo female PMH dementia (non verbal), CVA (on plavix), seizure disorder (on keppra), htn, DM, hld BIBEMS for fever since yesterday. As per EMS, patient was recently admitted to hospital for UTI. Discharged on Monday with a milner. EMS says that family noticed milner output decreased since yesterday. Shows only 250 cc in milner over 36 hours. Also family reported patient had not had a bowel movement since her discharged. As per EMS, family reports patient is at mental baseline.    sepsis 2/2 uti   Likely catheter associated UTI. IV Ceftriaxone daily, adjust per urine culture Milner changed in ER.   aferbile currently and wbc stable. non-toxic     AMADOU on CKD III.  cr improved with fluids    IVF normal saline 75/h for now   lilely ubstruction 2/2 constipation  urinating well     constipation  - had 3 bm overnight     hypokalemia   - replete      HTN  Continue Norvasc, ASA, Clonidine, Plavix, lopressor and Zocur at home dose. Hold Hydralazine for now with UTI.   monitor bp     seizure    PT eval. decreased Kepra 750 mg bid to 500 mg as per last hospital stay   No need for CT head,   stop Trazadone as this can cause retention and sedation.

## 2020-08-15 NOTE — DIETITIAN INITIAL EVALUATION ADULT. - PERTINENT LABORATORY DATA
08-15 Na139 mmol/L Glu 142 mg/dL<H> K+ 2.9 mmol/L<LL> Cr  0.77 mg/dL BUN 20 mg/dL 08-14 Alb 3.0 g/dL<L>; 07-29-20 A1C 7.7%, average glu 174; 08-14 POCT: 234, 148

## 2020-08-16 ENCOUNTER — RESULT REVIEW (OUTPATIENT)
Age: 70
End: 2020-08-16

## 2020-08-16 LAB
ANION GAP SERPL CALC-SCNC: 6 MMOL/L — SIGNIFICANT CHANGE UP (ref 5–17)
BUN SERPL-MCNC: 12 MG/DL — SIGNIFICANT CHANGE UP (ref 7–23)
CALCIUM SERPL-MCNC: 8.6 MG/DL — SIGNIFICANT CHANGE UP (ref 8.5–10.1)
CHLORIDE SERPL-SCNC: 111 MMOL/L — HIGH (ref 96–108)
CO2 SERPL-SCNC: 23 MMOL/L — SIGNIFICANT CHANGE UP (ref 22–31)
CREAT SERPL-MCNC: 0.68 MG/DL — SIGNIFICANT CHANGE UP (ref 0.5–1.3)
CULTURE RESULTS: SIGNIFICANT CHANGE UP
GLUCOSE BLDC GLUCOMTR-MCNC: 263 MG/DL — HIGH (ref 70–99)
GLUCOSE SERPL-MCNC: 147 MG/DL — HIGH (ref 70–99)
GRAM STN FLD: SIGNIFICANT CHANGE UP
HCT VFR BLD CALC: 28.7 % — LOW (ref 34.5–45)
HCT VFR BLD CALC: 29.3 % — LOW (ref 34.5–45)
HGB BLD-MCNC: 9.2 G/DL — LOW (ref 11.5–15.5)
HGB BLD-MCNC: 9.3 G/DL — LOW (ref 11.5–15.5)
MCHC RBC-ENTMCNC: 28.5 PG — SIGNIFICANT CHANGE UP (ref 27–34)
MCHC RBC-ENTMCNC: 28.8 PG — SIGNIFICANT CHANGE UP (ref 27–34)
MCHC RBC-ENTMCNC: 31.7 GM/DL — LOW (ref 32–36)
MCHC RBC-ENTMCNC: 32.1 GM/DL — SIGNIFICANT CHANGE UP (ref 32–36)
MCV RBC AUTO: 89.9 FL — SIGNIFICANT CHANGE UP (ref 80–100)
MCV RBC AUTO: 90 FL — SIGNIFICANT CHANGE UP (ref 80–100)
NRBC # BLD: 0 /100 WBCS — SIGNIFICANT CHANGE UP (ref 0–0)
NRBC # BLD: 0 /100 WBCS — SIGNIFICANT CHANGE UP (ref 0–0)
ORGANISM # SPEC MICROSCOPIC CNT: SIGNIFICANT CHANGE UP
ORGANISM # SPEC MICROSCOPIC CNT: SIGNIFICANT CHANGE UP
PLATELET # BLD AUTO: 296 K/UL — SIGNIFICANT CHANGE UP (ref 150–400)
PLATELET # BLD AUTO: 322 K/UL — SIGNIFICANT CHANGE UP (ref 150–400)
POTASSIUM SERPL-MCNC: 4.2 MMOL/L — SIGNIFICANT CHANGE UP (ref 3.5–5.3)
POTASSIUM SERPL-SCNC: 4.2 MMOL/L — SIGNIFICANT CHANGE UP (ref 3.5–5.3)
RBC # BLD: 3.19 M/UL — LOW (ref 3.8–5.2)
RBC # BLD: 3.26 M/UL — LOW (ref 3.8–5.2)
RBC # FLD: 13.8 % — SIGNIFICANT CHANGE UP (ref 10.3–14.5)
RBC # FLD: 13.9 % — SIGNIFICANT CHANGE UP (ref 10.3–14.5)
SODIUM SERPL-SCNC: 140 MMOL/L — SIGNIFICANT CHANGE UP (ref 135–145)
SPECIMEN SOURCE: SIGNIFICANT CHANGE UP
WBC # BLD: 6.1 K/UL — SIGNIFICANT CHANGE UP (ref 3.8–10.5)
WBC # BLD: 6.65 K/UL — SIGNIFICANT CHANGE UP (ref 3.8–10.5)
WBC # FLD AUTO: 6.1 K/UL — SIGNIFICANT CHANGE UP (ref 3.8–10.5)
WBC # FLD AUTO: 6.65 K/UL — SIGNIFICANT CHANGE UP (ref 3.8–10.5)

## 2020-08-16 PROCEDURE — 88112 CYTOPATH CELL ENHANCE TECH: CPT | Mod: 26

## 2020-08-16 PROCEDURE — 99233 SBSQ HOSP IP/OBS HIGH 50: CPT

## 2020-08-16 PROCEDURE — 76775 US EXAM ABDO BACK WALL LIM: CPT | Mod: 26

## 2020-08-16 RX ADMIN — SODIUM CHLORIDE 75 MILLILITER(S): 9 INJECTION INTRAMUSCULAR; INTRAVENOUS; SUBCUTANEOUS at 22:15

## 2020-08-16 RX ADMIN — LEVETIRACETAM 500 MILLIGRAM(S): 250 TABLET, FILM COATED ORAL at 18:41

## 2020-08-16 RX ADMIN — SENNA PLUS 2 TABLET(S): 8.6 TABLET ORAL at 22:15

## 2020-08-16 RX ADMIN — AMLODIPINE BESYLATE 10 MILLIGRAM(S): 2.5 TABLET ORAL at 05:51

## 2020-08-16 RX ADMIN — SIMVASTATIN 20 MILLIGRAM(S): 20 TABLET, FILM COATED ORAL at 22:14

## 2020-08-16 RX ADMIN — SODIUM CHLORIDE 75 MILLILITER(S): 9 INJECTION INTRAMUSCULAR; INTRAVENOUS; SUBCUTANEOUS at 05:53

## 2020-08-16 RX ADMIN — CEFTRIAXONE 100 MILLIGRAM(S): 500 INJECTION, POWDER, FOR SOLUTION INTRAMUSCULAR; INTRAVENOUS at 12:26

## 2020-08-16 RX ADMIN — Medication 0.1 MILLIGRAM(S): at 05:51

## 2020-08-16 RX ADMIN — Medication 50 MILLIGRAM(S): at 18:41

## 2020-08-16 RX ADMIN — Medication 0.1 MILLIGRAM(S): at 18:41

## 2020-08-16 RX ADMIN — POLYETHYLENE GLYCOL 3350 17 GRAM(S): 17 POWDER, FOR SOLUTION ORAL at 05:52

## 2020-08-16 RX ADMIN — Medication 50 MILLIGRAM(S): at 05:52

## 2020-08-16 RX ADMIN — LEVETIRACETAM 500 MILLIGRAM(S): 250 TABLET, FILM COATED ORAL at 05:51

## 2020-08-16 NOTE — CONSULT NOTE ADULT - SUBJECTIVE AND OBJECTIVE BOX
UROLOGY CONSULT NOTE    Patient is a 69y old  Female who presents with a chief complaint of Catheter associated UTI and AMADOU from dehydration. (16 Aug 2020 12:19)    HPI:  70 yo female PMH dementia (non verbal), CVA (on plavix), seizure disorder (on keppra), htn, DM, hld BIBEMS for fever since yesterday. As per EMS, patient was recently admitted to hospital for UTI. Discharged on Monday with a milner.   Milner was changed this admission in the ER. EMS says that family noticed milner output decreased since yesterday. Shows only 250 cc in milner over 36 hours. Also family reported patient had not had a bowel movement since her discharged. As per EMS, family reports patient is at mental baseline. On arrival milner output looks infected. (14 Aug 2020 14:06)    Patient seen and examined at bedside. Nonverbal, unable to obtain history from patient. Looks comfortable.   No fevers.   Milner was exchanged in the ER 8/15, patient developed hematuria and clots this AM, now with light bloody urine draining, no clots seen.     PAST MEDICAL & SURGICAL HISTORY:  CVA (cerebral infarction): right side weakness  Vision changes: lt eye  Urinary incontinence  Seizure disorder  Gout  OA (osteoarthritis): bautista knees, low back  and  bautista shoulders  Asthma  Diabetes  Hypertension  Kidney stone      Review of Systems:  Contained within HPI    MEDICATIONS  (STANDING):  amLODIPine   Tablet 10 milliGRAM(s) Oral daily  bisacodyl Suppository 10 milliGRAM(s) Rectal daily  cefTRIAXone   IVPB 1000 milliGRAM(s) IV Intermittent every 24 hours  cloNIDine 0.1 milliGRAM(s) Oral two times a day  heparin   Injectable 5000 Unit(s) SubCutaneous every 12 hours  levETIRAcetam 500 milliGRAM(s) Oral two times a day  metoprolol tartrate 50 milliGRAM(s) Oral two times a day  polyethylene glycol 3350 17 Gram(s) Oral two times a day  senna 2 Tablet(s) Oral at bedtime  simvastatin 20 milliGRAM(s) Oral at bedtime  sodium chloride 0.9%. 1000 milliLiter(s) (75 mL/Hr) IV Continuous <Continuous>    MEDICATIONS  (PRN):  acetaminophen   Tablet .. 650 milliGRAM(s) Oral every 6 hours PRN Temp greater or equal to 38C (100.4F), Mild Pain (1 - 3)      Allergies  No Known Allergies    SOCIAL HISTORY   Unable to obtain    FAMILY HISTORY:  No pertinent family history in first degree relatives    Vital Signs Last 24 Hrs  T(C): 37.3 (16 Aug 2020 11:17), Max: 37.3 (16 Aug 2020 11:17)  T(F): 99.1 (16 Aug 2020 11:17), Max: 99.1 (16 Aug 2020 11:17)  HR: 66 (16 Aug 2020 11:17) (60 - 93)  BP: 141/55 (16 Aug 2020 11:17) (140/56 - 151/52)  RR: 16 (16 Aug 2020 11:17) (16 - 18)  SpO2: 100% (16 Aug 2020 11:17) (99% - 100%)    Physical Exam:    General:  Appears stated age, well-groomed, well-nourished, no distress  Eyes : NATALIA  HENT:  WNL, no JVD  Chest: nonlabored respirations  Cardiovascular:  Regular rate & rhythm  Abdomen:  Soft, NTND  : Indwelling milner catheter draining light bloody urine, no clots seen in tubing  Extremities: No calf tenderness b/l  Skin:  No rash  Musculoskeletal:  normal strength  Neuro/Psych: Alert and awake. Nonverbal.       LABS:                        9.2    6.10  )-----------( 296      ( 16 Aug 2020 06:15 )             28.7     08-16    140  |  111<H>  |  12  ----------------------------<  147<H>  4.2   |  23  |  0.68    Ca    8.6      16 Aug 2020 06:15    RADIOLOGY & ADDITIONAL STUDIES:  < from: US Renal (08.16.20 @ 11:43) >    EXAM:  US KIDNEY(S)                            PROCEDURE DATE:  08/16/2020          INTERPRETATION:  CLINICAL INFORMATION: Hematuria, urinary tract infection    COMPARISON: 7/28/2020    TECHNIQUE: Sonography of the kidneys and bladder.    FINDINGS:    Right kidney:  11.2 cm. 3.4 x 2.4 x 3.1 cm cyst. 6 mm calculus. No renal mass or hydronephrosis.    Left kidney:  11 cm. 2.1 x 1.7 x 2 cm cyst. 19 mm calculus. No renal mass or hydronephrosis.    Urinary bladder: Milner catheter present, appears ajit surrounded by soft tissue or blood products.    IMPRESSION:    No hydronephrosis.  Milner catheter in place, appears to be surrounded by soft tissue or blood products. Recommend direct visualization with cystoscopy for further evaluation.      DIANN BRIDGES M.D., ATTENDING RADIOLOGIST  This document has been electronically signed. Aug 16 2020 11:54AM    < end of copied text > UROLOGY CONSULT NOTE    Patient is a 69y old  Female who presents with a chief complaint of Catheter associated UTI and AMADOU from dehydration. (16 Aug 2020 12:19)    HPI:  70 yo female PMH dementia (non verbal), CVA (on plavix), seizure disorder (on keppra), htn, DM, hld BIBEMS for fever since yesterday. As per EMS, patient was recently admitted to hospital for UTI. Discharged on Monday with a milner.   Milner was changed this admission in the ER. EMS says that family noticed milner output decreased since yesterday. Shows only 250 cc in milner over 36 hours. Also family reported patient had not had a bowel movement since her discharged. As per EMS, family reports patient is at mental baseline. On arrival milner output looks infected. (14 Aug 2020 14:06)    Patient seen and examined at bedside. Nonverbal, unable to obtain history from patient. Looks comfortable.   No fevers.   Milner was exchanged in the ER 8/15, patient developed hematuria with clots this morning. Milner irrigated by RN, clots irrigated out.    PAST MEDICAL & SURGICAL HISTORY:  CVA (cerebral infarction): right side weakness  Vision changes: lt eye  Urinary incontinence  Seizure disorder  Gout  OA (osteoarthritis): bautista knees, low back  and  bautista shoulders  Asthma  Diabetes  Hypertension  Kidney stone      Review of Systems:  Contained within HPI    MEDICATIONS  (STANDING):  amLODIPine   Tablet 10 milliGRAM(s) Oral daily  bisacodyl Suppository 10 milliGRAM(s) Rectal daily  cefTRIAXone   IVPB 1000 milliGRAM(s) IV Intermittent every 24 hours  cloNIDine 0.1 milliGRAM(s) Oral two times a day  heparin   Injectable 5000 Unit(s) SubCutaneous every 12 hours  levETIRAcetam 500 milliGRAM(s) Oral two times a day  metoprolol tartrate 50 milliGRAM(s) Oral two times a day  polyethylene glycol 3350 17 Gram(s) Oral two times a day  senna 2 Tablet(s) Oral at bedtime  simvastatin 20 milliGRAM(s) Oral at bedtime  sodium chloride 0.9%. 1000 milliLiter(s) (75 mL/Hr) IV Continuous <Continuous>    MEDICATIONS  (PRN):  acetaminophen   Tablet .. 650 milliGRAM(s) Oral every 6 hours PRN Temp greater or equal to 38C (100.4F), Mild Pain (1 - 3)      Allergies  No Known Allergies    SOCIAL HISTORY   Unable to obtain    FAMILY HISTORY:  No pertinent family history in first degree relatives    Vital Signs Last 24 Hrs  T(C): 37.3 (16 Aug 2020 11:17), Max: 37.3 (16 Aug 2020 11:17)  T(F): 99.1 (16 Aug 2020 11:17), Max: 99.1 (16 Aug 2020 11:17)  HR: 66 (16 Aug 2020 11:17) (60 - 93)  BP: 141/55 (16 Aug 2020 11:17) (140/56 - 151/52)  RR: 16 (16 Aug 2020 11:17) (16 - 18)  SpO2: 100% (16 Aug 2020 11:17) (99% - 100%)    Physical Exam:    General:  Appears stated age, well-groomed, well-nourished, no distress  Eyes : NATALIA  HENT:  WNL, no JVD  Chest: nonlabored respirations  Cardiovascular:  Regular rate & rhythm  Abdomen:  Soft, NTND  : Indwelling milner catheter draining grossly bloody urine, no clots seen in tubing.   Extremities: No calf tenderness b/l  Skin:  No rash  Musculoskeletal:  normal strength  Neuro/Psych: Alert and awake. Nonverbal.       LABS:                        9.2    6.10  )-----------( 296      ( 16 Aug 2020 06:15 )             28.7     08-16    140  |  111<H>  |  12  ----------------------------<  147<H>  4.2   |  23  |  0.68    Ca    8.6      16 Aug 2020 06:15    RADIOLOGY & ADDITIONAL STUDIES:  < from: US Renal (08.16.20 @ 11:43) >    EXAM:  US KIDNEY(S)                            PROCEDURE DATE:  08/16/2020          INTERPRETATION:  CLINICAL INFORMATION: Hematuria, urinary tract infection    COMPARISON: 7/28/2020    TECHNIQUE: Sonography of the kidneys and bladder.    FINDINGS:    Right kidney:  11.2 cm. 3.4 x 2.4 x 3.1 cm cyst. 6 mm calculus. No renal mass or hydronephrosis.    Left kidney:  11 cm. 2.1 x 1.7 x 2 cm cyst. 19 mm calculus. No renal mass or hydronephrosis.    Urinary bladder: Milner catheter present, appears ajit surrounded by soft tissue or blood products.    IMPRESSION:    No hydronephrosis.  Milner catheter in place, appears to be surrounded by soft tissue or blood products. Recommend direct visualization with cystoscopy for further evaluation.      DIANN BRIDGES M.D., ATTENDING RADIOLOGIST  This document has been electronically signed. Aug 16 2020 11:54AM    < end of copied text >

## 2020-08-16 NOTE — PROGRESS NOTE ADULT - SUBJECTIVE AND OBJECTIVE BOX
Patient is a 69y old  Female who presents with a chief complaint of Catheter associated UTI and AMADOU from dehydration. (14 Aug 2020 14:06)      INTERVAL HPI/OVERNIGHT EVENTS: had hematuria this am with clots.  eating well    MEDICATIONS  (STANDING):  amLODIPine   Tablet 10 milliGRAM(s) Oral daily  aspirin enteric coated 81 milliGRAM(s) Oral daily  bisacodyl Suppository 10 milliGRAM(s) Rectal daily  cefTRIAXone   IVPB 1000 milliGRAM(s) IV Intermittent every 24 hours  cloNIDine 0.1 milliGRAM(s) Oral two times a day  clopidogrel Tablet 75 milliGRAM(s) Oral daily  heparin   Injectable 5000 Unit(s) SubCutaneous every 12 hours  levETIRAcetam 750 milliGRAM(s) Oral two times a day  metoprolol tartrate 50 milliGRAM(s) Oral two times a day  polyethylene glycol 3350 17 Gram(s) Oral two times a day  potassium chloride    Tablet ER 40 milliEquivalent(s) Oral every 4 hours  senna 2 Tablet(s) Oral at bedtime  simvastatin 20 milliGRAM(s) Oral at bedtime  sodium chloride 0.9%. 1000 milliLiter(s) (75 mL/Hr) IV Continuous <Continuous>    MEDICATIONS  (PRN):  acetaminophen   Tablet .. 650 milliGRAM(s) Oral every 6 hours PRN Temp greater or equal to 38C (100.4F), Mild Pain (1 - 3)      Allergies    No Known Allergies    Intolerances        REVIEW OF SYSTEMS:  cannot give d,t dementia     Vital Signs Last 24 Hrs  T(C): 37.3 (16 Aug 2020 11:17), Max: 37.3 (16 Aug 2020 11:17)  T(F): 99.1 (16 Aug 2020 11:17), Max: 99.1 (16 Aug 2020 11:17)  HR: 66 (16 Aug 2020 11:17) (60 - 93)  BP: 141/55 (16 Aug 2020 11:17) (140/56 - 151/52)  BP(mean): --  RR: 16 (16 Aug 2020 11:17) (16 - 18)  SpO2: 100% (16 Aug 2020 11:17) (99% - 100%)      PHYSICAL EXAM:  GENERAL: NAD, well-groomed, well-developed  HEAD:  Atraumatic, Normocephalic  EYES: EOMI, PERRLA, conjunctiva and sclera clear  ENMT: No tonsillar erythema, exudates, or enlargement; Moist mucous membranes, Good dentition, No lesions  NECK: Supple, No JVD, Normal thyroid  NERVOUS SYSTEM: FROM x4 non verbal alert   CHEST/LUNG: Clear to percussion bilaterally; No rales, rhonchi, wheezing, or rubs  HEART: Regular rate and rhythm; No murmurs, rubs, or gallops  ABDOMEN: Soft, Nontender, Nondistended; Bowel sounds present  EXTREMITIES:  2+ Peripheral Pulses, No clubbing, cyanosis, or edema  LYMPH: No lymphadenopathy noted      LABS:                                   9.2    6.10  )-----------( 296      ( 16 Aug 2020 06:15 )             28.7     08-16    140  |  111<H>  |  12  ----------------------------<  147<H>  4.2   |  23  |  0.68    Ca    8.6      16 Aug 2020 06:15              Color: Yellow / Appearance: very cloudy / S.010 / pH: x  Gluc: x / Ketone: Negative  / Bili: Negative / Urobili: Negative mg/dL   Blood: x / Protein: 100 mg/dL / Nitrite: Positive   Leuk Esterase: Moderate / RBC: 0-2 /HPF / WBC >50   Sq Epi: x / Non Sq Epi: Few / Bacteria: Moderate      CAPILLARY BLOOD GLUCOSE      POCT Blood Glucose.: 148 mg/dL (14 Aug 2020 16:43)  POCT Blood Glucose.: 234 mg/dL (14 Aug 2020 11:51)      RADIOLOGY & ADDITIONAL TESTS:    Imaging Personally Reviewed:  [x ] YES  [ ] NO    Consultant(s) Notes Reviewed:  [ ] YES  [ ] NO    Care Discussed with Consultants/Other Providers [ ] YES  [ ] NO

## 2020-08-16 NOTE — CONSULT NOTE ADULT - ASSESSMENT
A/P: 70 yo female PMH dementia (non verbal), CVA (on plavix), seizure disorder (on keppra), htn, DM, hld BIBEMS for fever since yesterday. Patient was recently admitted to hospital for UTI. Discharged on Monday with a milner. EMS says that family noticed milner output decreased since yesterday. Showed only 250 cc in milner over 36 hours. Milner exchanged in the ER 8/15  Urology consulted for hematuria, recurrent UTI, AMADOU (improving). Stable H/H.   Urine cx Prelim: GNR  Renal US: shows milner catheter in bladder with surrounding blood products    Continue Milner catheter, strict I/Os, Irrigate PRN for clots  F/u urine cx, cytology  F/u final results of blood c/s  Continue medical management and supportive care  F/u AM labs trend H/H, transfuse per medicine PRN A/P: 68 yo female PMH dementia (non verbal), CVA (on plavix), seizure disorder (on keppra), htn, DM, hld BIBEMS for fever since yesterday. Patient was recently admitted to hospital for UTI. Discharged on Monday with a milner. EMS says that family noticed milner output decreased since yesterday. Showed only 250 cc in milner over 36 hours. Milner exchanged in the ER 8/15  Urology consulted for Recurrent UTI, hematuria, AMADOU (improving). Stable H/H.   Urine cx Prelim: GNR  Renal US: shows milner catheter in bladder with surrounding blood products    Remove 16F milner and replace with 22F milner to irrigate clots, strict I/Os  F/u urine cx, cytology  Irrigate PRN for hematuria/clots  F/u final results of blood c/s  Continue medical management and supportive care  F/u AM labs trend H/H, transfuse per medicine PRN  Discussed with Dr. Hernandez

## 2020-08-16 NOTE — PROGRESS NOTE ADULT - ASSESSMENT
70 yo female PMH dementia (non verbal), CVA (on plavix), seizure disorder (on keppra), htn, DM, hld BIBEMS for fever since yesterday. As per EMS, patient was recently admitted to hospital for UTI. Discharged on Monday with a milner. EMS says that family noticed milner output decreased since yesterday. Shows only 250 cc in milner over 36 hours. Also family reported patient had not had a bowel movement since her discharged. As per EMS, family reports patient is at mental baseline.    sepsis 2/2 uti   Likely catheter associated UTI. IV Ceftriaxone daily, adjust per urine culture Milner changed in ER.   aferbile currently and wbc stable. urine cx growing gram neg rods. blood cx contaminate.  non-toxic     hematuria   - renal us shows clots but cbc stable   - will hold asa/plavix and get urology consult     AMADOU  cr improved with fluids    IVF normal saline 75/h for now    obstruction likely 2/2 constipation  urinating well     constipation  - resolved     hypokalemia   - repleted    HTN  Continue Norvasc, lopressor and Zocur at home dose. Hold Hydralazine for now with UTI.   monitor bp     seizure    PT eval. decreased Kepra 750 mg bid to 500 mg as per last hospital stay   No need for CT head,   stop Trazadone as this can cause retention and sedation.

## 2020-08-16 NOTE — PROGRESS NOTE ADULT - SUBJECTIVE AND OBJECTIVE BOX
Medicine PA Note    -- pt admitted with UTI and hematuria,   -- pt with bright blood in the milner catheter, no blood clots,   -- continue ASA and Plavix,   -- Hgb- 10.1>8.9 from 08/15, repeat CBC in am,   -- hold am dose of heparin SC  -- cont monitor

## 2020-08-17 ENCOUNTER — RESULT REVIEW (OUTPATIENT)
Age: 70
End: 2020-08-17

## 2020-08-17 LAB
ANION GAP SERPL CALC-SCNC: 7 MMOL/L — SIGNIFICANT CHANGE UP (ref 5–17)
BLD GP AB SCN SERPL QL: SIGNIFICANT CHANGE UP
BUN SERPL-MCNC: 8 MG/DL — SIGNIFICANT CHANGE UP (ref 7–23)
CALCIUM SERPL-MCNC: 8.7 MG/DL — SIGNIFICANT CHANGE UP (ref 8.5–10.1)
CHLORIDE SERPL-SCNC: 108 MMOL/L — SIGNIFICANT CHANGE UP (ref 96–108)
CO2 SERPL-SCNC: 27 MMOL/L — SIGNIFICANT CHANGE UP (ref 22–31)
CREAT SERPL-MCNC: 0.77 MG/DL — SIGNIFICANT CHANGE UP (ref 0.5–1.3)
GLUCOSE BLDC GLUCOMTR-MCNC: 134 MG/DL — HIGH (ref 70–99)
GLUCOSE BLDC GLUCOMTR-MCNC: 157 MG/DL — HIGH (ref 70–99)
GLUCOSE BLDC GLUCOMTR-MCNC: 269 MG/DL — HIGH (ref 70–99)
GLUCOSE SERPL-MCNC: 211 MG/DL — HIGH (ref 70–99)
HCT VFR BLD CALC: 28.2 % — LOW (ref 34.5–45)
HGB BLD-MCNC: 9.3 G/DL — LOW (ref 11.5–15.5)
MCHC RBC-ENTMCNC: 29.3 PG — SIGNIFICANT CHANGE UP (ref 27–34)
MCHC RBC-ENTMCNC: 33 GM/DL — SIGNIFICANT CHANGE UP (ref 32–36)
MCV RBC AUTO: 89 FL — SIGNIFICANT CHANGE UP (ref 80–100)
NRBC # BLD: 0 /100 WBCS — SIGNIFICANT CHANGE UP (ref 0–0)
PLATELET # BLD AUTO: 310 K/UL — SIGNIFICANT CHANGE UP (ref 150–400)
POTASSIUM SERPL-MCNC: 3.6 MMOL/L — SIGNIFICANT CHANGE UP (ref 3.5–5.3)
POTASSIUM SERPL-SCNC: 3.6 MMOL/L — SIGNIFICANT CHANGE UP (ref 3.5–5.3)
RBC # BLD: 3.17 M/UL — LOW (ref 3.8–5.2)
RBC # FLD: 13.7 % — SIGNIFICANT CHANGE UP (ref 10.3–14.5)
SODIUM SERPL-SCNC: 142 MMOL/L — SIGNIFICANT CHANGE UP (ref 135–145)
WBC # BLD: 7.51 K/UL — SIGNIFICANT CHANGE UP (ref 3.8–10.5)
WBC # FLD AUTO: 7.51 K/UL — SIGNIFICANT CHANGE UP (ref 3.8–10.5)

## 2020-08-17 PROCEDURE — 73030 X-RAY EXAM OF SHOULDER: CPT | Mod: 26,LT

## 2020-08-17 PROCEDURE — 99221 1ST HOSP IP/OBS SF/LOW 40: CPT

## 2020-08-17 PROCEDURE — 99233 SBSQ HOSP IP/OBS HIGH 50: CPT

## 2020-08-17 PROCEDURE — 88305 TISSUE EXAM BY PATHOLOGIST: CPT | Mod: 26

## 2020-08-17 RX ORDER — HYDRALAZINE HCL 50 MG
25 TABLET ORAL EVERY 8 HOURS
Refills: 0 | Status: DISCONTINUED | OUTPATIENT
Start: 2020-08-17 | End: 2020-08-24

## 2020-08-17 RX ORDER — CEFEPIME 1 G/1
2000 INJECTION, POWDER, FOR SOLUTION INTRAMUSCULAR; INTRAVENOUS EVERY 8 HOURS
Refills: 0 | Status: DISCONTINUED | OUTPATIENT
Start: 2020-08-17 | End: 2020-08-19

## 2020-08-17 RX ORDER — SALICYLIC ACID 0.5 %
1 CLEANSER (GRAM) TOPICAL
Refills: 0 | Status: DISCONTINUED | OUTPATIENT
Start: 2020-08-17 | End: 2020-08-24

## 2020-08-17 RX ADMIN — Medication 25 MILLIGRAM(S): at 21:36

## 2020-08-17 RX ADMIN — Medication 50 MILLIGRAM(S): at 06:06

## 2020-08-17 RX ADMIN — Medication 1 APPLICATION(S): at 17:14

## 2020-08-17 RX ADMIN — POLYETHYLENE GLYCOL 3350 17 GRAM(S): 17 POWDER, FOR SOLUTION ORAL at 06:05

## 2020-08-17 RX ADMIN — POLYETHYLENE GLYCOL 3350 17 GRAM(S): 17 POWDER, FOR SOLUTION ORAL at 17:13

## 2020-08-17 RX ADMIN — SIMVASTATIN 20 MILLIGRAM(S): 20 TABLET, FILM COATED ORAL at 21:37

## 2020-08-17 RX ADMIN — AMLODIPINE BESYLATE 10 MILLIGRAM(S): 2.5 TABLET ORAL at 06:05

## 2020-08-17 RX ADMIN — Medication 0.1 MILLIGRAM(S): at 06:06

## 2020-08-17 RX ADMIN — HEPARIN SODIUM 5000 UNIT(S): 5000 INJECTION INTRAVENOUS; SUBCUTANEOUS at 17:13

## 2020-08-17 RX ADMIN — Medication 0.1 MILLIGRAM(S): at 17:12

## 2020-08-17 RX ADMIN — LEVETIRACETAM 500 MILLIGRAM(S): 250 TABLET, FILM COATED ORAL at 17:12

## 2020-08-17 RX ADMIN — SENNA PLUS 2 TABLET(S): 8.6 TABLET ORAL at 21:37

## 2020-08-17 RX ADMIN — SODIUM CHLORIDE 75 MILLILITER(S): 9 INJECTION INTRAMUSCULAR; INTRAVENOUS; SUBCUTANEOUS at 21:38

## 2020-08-17 RX ADMIN — LEVETIRACETAM 500 MILLIGRAM(S): 250 TABLET, FILM COATED ORAL at 06:06

## 2020-08-17 RX ADMIN — Medication 25 MILLIGRAM(S): at 15:15

## 2020-08-17 RX ADMIN — CEFTRIAXONE 100 MILLIGRAM(S): 500 INJECTION, POWDER, FOR SOLUTION INTRAMUSCULAR; INTRAVENOUS at 12:02

## 2020-08-17 RX ADMIN — Medication 10 MILLIGRAM(S): at 11:39

## 2020-08-17 RX ADMIN — Medication 50 MILLIGRAM(S): at 17:12

## 2020-08-17 RX ADMIN — CEFEPIME 100 MILLIGRAM(S): 1 INJECTION, POWDER, FOR SOLUTION INTRAMUSCULAR; INTRAVENOUS at 21:36

## 2020-08-17 NOTE — CONSULT NOTE ADULT - SUBJECTIVE AND OBJECTIVE BOX
68 yo female PMH dementia (non verbal), CVA (on plavix), seizure disorder (on keppra), htn, DM, hld admitted for UTI. Patient previously seen by ortho service 7/16 for Left proximal humerus fracture. Patient is demented/ non-verbal at baseline. Hx obtained from chart. Patient did not follow up out patient for proximal humerus fracture previously placed in a sling within acceptable tolerances.      Vital Signs Last 24 Hrs  T(C): 37.1 (17 Aug 2020 11:51), Max: 37.2 (17 Aug 2020 05:13)  T(F): 98.7 (17 Aug 2020 11:51), Max: 99 (17 Aug 2020 05:13)  HR: 76 (17 Aug 2020 17:00) (71 - 78)  BP: 124/66 (17 Aug 2020 17:00) (124/66 - 155/55)  BP(mean): --  RR: 16 (17 Aug 2020 17:00) (16 - 16)  SpO2: 99% (17 Aug 2020 17:00) (96% - 100%)    Imaging:   L Shoulder Xr: Proximal humerus remains fractured in acceptable tolerance with some callus formation    PE:  GEN: Demented AAOx0 NAD  LUE:   Skin intact throughout  exam limited due to mental status   SILT C5-T1   +motor grossly but difficult to examine  +AIN/PIN/Ulnar/Axillary/Median  +Radial Artery  compartments soft/intact    Secondary Exam: Benign, Skin intact, NTTP along axial spine, SILT throughout, motor grossly intact throughout, no other orthopedic injuries at this time, compartments soft and compressible 68 yo female PMH dementia (non verbal), CVA (on plavix), seizure disorder (on keppra), htn, DM, hld admitted for UTI. Patient previously seen by ortho service 7/16 for Left proximal humerus fracture. Patient is demented/ non-verbal at baseline. Hx obtained from chart. Patient did not follow up out patient for proximal humerus fracture previously placed in a sling within acceptable tolerances. Patient seen at bedside today unable to respond to questions / participate in PE     Vital Signs Last 24 Hrs  T(C): 37.1 (17 Aug 2020 11:51), Max: 37.2 (17 Aug 2020 05:13)  T(F): 98.7 (17 Aug 2020 11:51), Max: 99 (17 Aug 2020 05:13)  HR: 76 (17 Aug 2020 17:00) (71 - 78)  BP: 124/66 (17 Aug 2020 17:00) (124/66 - 155/55)  BP(mean): --  RR: 16 (17 Aug 2020 17:00) (16 - 16)  SpO2: 99% (17 Aug 2020 17:00) (96% - 100%)    Imaging:   L Shoulder Xr: Proximal humerus remains fractured in acceptable tolerance with some callus formation    PE:  GEN: Demented AAOx0 NAD  LUE:   Skin intact throughout  NTTP over proximal humerus / shoulder  Non-painful PROM  exam limited due to mental status   unable to assess sensation   +PIN, unable to assess motor appropriately     Secondary Exam: NTTP over bony prominences of all 4 extremities, negative axial compression of hips, difficult to obtain due to patient inability to participate with exam

## 2020-08-17 NOTE — PROGRESS NOTE ADULT - SUBJECTIVE AND OBJECTIVE BOX
Short note     I was called for infectious disease consult for am.   case discussed with Dr Clifford, urine culture with significant amount of pseudomonas pending drug susceptibility .  will give cefepime . Will see am.

## 2020-08-17 NOTE — PROGRESS NOTE ADULT - ASSESSMENT
70 yo female PMH dementia (non verbal), CVA (on plavix), seizure disorder (on keppra), htn, DM, hld BIBEMS for fever since yesterday. As per EMS, patient was recently admitted to hospital for UTI. Discharged on Monday with a milner. EMS says that family noticed milner output decreased since yesterday. Shows only 250 cc in milner over 36 hours. Also family reported patient had not had a bowel movement since her discharged. As per EMS, family reports patient is at mental baseline.    sepsis 2/2 uti and POA  Likely catheter associated UTI. urine cx growing pseudomonas will change antibx to cefepime . consult Mary for IS saw patient last admission one week ago .   Milner changed in ER.   afebrile currently and wbc stable.  blood cx coag negative staph likely contaminant        hematuria   - renal us shows clots but cbc stable   -  hold asa/plavix   urology consult noted     AMADOU resolved     constipation  - resolved     hypokalemia   - replace as needed   HTN  Continue Norvasc, lopressor hydralazine ( incerase as needed)       HLD;  Zocur at home dose.     seizure    PT eval. decreased Keppra 750 mg bid to 500 mg as per last hospital stay   No need for CT head,   stop Trazadone as this can cause retention and sedation. 68 yo female PMH dementia (non verbal), CVA (on plavix), seizure disorder (on keppra), htn, DM, hld BIBEMS for fever since yesterday. As per EMS, patient was recently admitted to hospital for UTI. Discharged on Monday with a milner. EMS says that family noticed milner output decreased since yesterday. Shows only 250 cc in milner over 36 hours. Also family reported patient had not had a bowel movement since her discharged. As per EMS, family reports patient is at mental baseline.    sepsis 2/2 uti and POA  Likely catheter associated UTI. urine cx growing pseudomonas will change antibx to cefepime . consult Mary for IS saw patient last admission one week ago .   Milner changed in ER.   afebrile currently and wbc stable.  blood cx coag negative staph likely contaminant        hematuria   - renal us shows clots but cbc stable   -  hold asa/plavix   urology consult noted     AMADOU resolved     constipation  - resolved     hypokalemia   - replace as needed   HTN  Continue Norvasc, lopressor hydralazine ( incerase as needed)       HLD;  Zocur at home dose.      stage 3 decub: will get wound care consult , per nursing was POA   seizure    PT eval. decreased Keppra 750 mg bid to 500 mg as per last hospital stay   No need for CT head,   stop Trazadone as this can cause retention and sedation. 70 yo female PMH dementia (non verbal), CVA (on plavix), seizure disorder (on keppra), htn, DM, hld BIBEMS for fever since yesterday. As per EMS, patient was recently admitted to hospital for UTI. Discharged on Monday with a milner. EMS says that family noticed milner output decreased since yesterday. Shows only 250 cc in milner over 36 hours. Also family reported patient had not had a bowel movement since her discharged. As per EMS, family reports patient is at mental baseline.    sepsis 2/2 uti and POA  Likely catheter associated UTI. urine cx growing pseudomonas will change antibx to cefepime . consult Mary for IS saw patient last admission one week ago .   Milner changed in ER.   afebrile currently and wbc stable.  blood cx coag negative staph likely contaminant        hematuria   - renal us shows clots but cbc stable   -  hold asa/plavix   urology consult noted    f/u urine cytology    AMADOU resolved     constipation  - resolved     hypokalemia   - replace as needed   HTN  Continue Norvasc, lopressor hydralazine ( incerase as needed)       HLD;  Zocur at home dose.      stage 3 decub: will get wound care consult , per nursing was POA   seizure    PT eval. decreased Keppra 750 mg bid to 500 mg as per last hospital stay   No need for CT head,   stop Trazadone as this can cause retention and sedation. 68 yo female PMH dementia (non verbal), CVA (on plavix), seizure disorder (on keppra), htn, DM, hld BIBEMS for fever since yesterday. As per EMS, patient was recently admitted to hospital for UTI. Discharged on Monday with a milner. EMS says that family noticed milner output decreased since yesterday. Shows only 250 cc in milner over 36 hours. Also family reported patient had not had a bowel movement since her discharged. As per EMS, family reports patient is at mental baseline.    sepsis 2/2 uti and POA  Likely catheter associated UTI. urine cx growing pseudomonas will change antibx to cefepime . consult [facility] for IS saw patient last admission one week ago .   Milner changed in ER.   afebrile currently and wbc stable.  blood cx coag negative staph likely contaminant        hematuria   - renal us shows clots but cbc stable   -  hold asa/plavix   urology consult noted    f/u urine cytology    AMADOU resolved     constipation  - resolved     hypokalemia   - replace as needed   HTN  Continue Norvasc, lopressor hydralazine ( incerase as needed)       HLD;  Zocur at home dose.      stage 3 decub: will get wound care consult , per nursing was POA   seizure    PT eval. decreased Keppra 750 mg bid to 500 mg as per last hospital stay   No need for CT head,   stop Trazadone as this can cause retention and sedation.      left humeral fracture  will get orthopedic  to revealuate

## 2020-08-17 NOTE — PROGRESS NOTE ADULT - SUBJECTIVE AND OBJECTIVE BOX
Patient seen and examined at bedside in no distress.   Nonverbal.   Per aide, no issues o/n.     T(F): 99 (08-17-20 @ 05:13), Max: 99.1 (08-16-20 @ 11:17)  HR: 78 (08-17-20 @ 05:13) (66 - 78)  BP: 155/55 (08-17-20 @ 05:13) (141/55 - 158/71)  RR: 16 (08-17-20 @ 05:13) (16 - 16)  SpO2: 96% (08-17-20 @ 05:13) (96% - 100%)    PHYSICAL EXAM:  General: Awake, NAD  CV: +S1S2 regular rate and rhythm  Lung: Respirations nonlabored  Abdomen: Soft  : Milner catheter in place draining gross hematuria, no clots noted, output: 2675cc/24hrs. No SP tenderness     LABS:                        9.3    7.51  )-----------( 310      ( 17 Aug 2020 07:43 )             28.2     08-16    140  |  111<H>  |  12  ----------------------------<  147<H>  4.2   |  23  |  0.68      A/P: 69F PMH dementia (non verbal), CVA (on plavix), seizure disorder (on keppra), HTN, DM, HLD, recent admission for UTI with milner catheter, BIBEMS for fever x1 day and low UOP, a/w GNR UTI, gross hematuria, AMADOU.   S/p milner catheter exchange 8/15 and 8/16. Prelim ucx: pseudomonas  - continue milner catheter, monitor UOP, irrigate PRN  - f/u final c/s ucx  - antibiotics, recommend ID consult  - monitor H/H  - continue medical management and supportive care Patient seen and examined at bedside in no distress.   Nonverbal.   Per aide, no issues o/n.     T(F): 99 (08-17-20 @ 05:13), Max: 99.1 (08-16-20 @ 11:17)  HR: 78 (08-17-20 @ 05:13) (66 - 78)  BP: 155/55 (08-17-20 @ 05:13) (141/55 - 158/71)  RR: 16 (08-17-20 @ 05:13) (16 - 16)  SpO2: 96% (08-17-20 @ 05:13) (96% - 100%)    PHYSICAL EXAM:  General: Awake, NAD  CV: +S1S2 regular rate and rhythm  Lung: Respirations nonlabored  Abdomen: Soft  : Milner catheter in place draining gross hematuria, no clots noted, output: 2675cc/24hrs. No SP tenderness     LABS:                        9.3    7.51  )-----------( 310      ( 17 Aug 2020 07:43 )             28.2     08-16    140  |  111<H>  |  12  ----------------------------<  147<H>  4.2   |  23  |  0.68      A/P: 69F PMH dementia (non verbal), CVA (on plavix), seizure disorder (on keppra), HTN, DM, HLD, recent admission for UTI with milner catheter, BIBEMS for fever x1 day and low UOP, a/w GNR UTI, gross hematuria, AMADOU.   S/p milner catheter exchange 8/15 and 8/16. Prelim ucx: pseudomonas  - continue milner catheter, monitor UOP, irrigate PRN  - f/u final c/s ucx  - antibiotics, recommend ID consult  - monitor H/H  - will need cystoscopy to evaluate source of hematuria once medically cleared for OR  - continue medical management and supportive care  - discussed with Dr. Hernandez

## 2020-08-17 NOTE — CONSULT NOTE ADULT - ASSESSMENT
69F with 69F with L proximal humerus fracture on 7/16    -Continue primary management by medical team  -DVT PPx per medicine  -Fracture seen to be within acceptable tolerance and appropriate callus formation for conservative management /non-operative    -Recommend NWB to LUE with sling for comfort  -Follow up with Dr. Corral after discharge  -No acute orthopedic surgical intervention needed at this time  -Will D/w attending and advise if plan changes   -Ortho stable for discharge 69F with L proximal humerus fracture on 7/16    -Continue primary management by medical team  -DVT PPx per medicine  -Fracture seen to be within acceptable tolerance and appropriate callus formation for conservative management /non-operative    -Recommend NWB to LUE with sling for comfort; Remove from sling for range of motion exercises to prevent elbow stiffness  -Follow up with Dr. Corral after discharge  -No acute orthopedic surgical intervention needed at this time  -Will D/w attending and advise if plan changes   -Ortho stable for discharge    Riley Causey DO  PGY2, Orthopaedic Surgery 69F with L proximal humerus fracture on 7/16    -Continue primary management by medical team  -DVT PPx per medicine  -Fracture seen to be within acceptable tolerance and appropriate callus formation for conservative management /non-operative    -may remove sling as tolerated. a/aa/prom in all planes  -Follow up with Dr. Corral 1 month after discharge for repeat xrays  -No acute orthopedic surgical intervention needed at this time  -Will D/w attending and advise if plan changes   -Ortho stable for discharge    Riley Causey DO  PGY2, Orthopaedic Surgery

## 2020-08-17 NOTE — PROGRESS NOTE ADULT - SUBJECTIVE AND OBJECTIVE BOX
I am inheriting patient on today 8/17/2020    Patient is a 69y old  Female who presents with a chief complaint of Catheter associated UTI and AMADOU from dehydration. (14 Aug 2020 14:06)      INTERVAL HPI/OVERNIGHT EVENTS: none      MEDICATIONS  (STANDING):  amLODIPine   Tablet 10 milliGRAM(s) Oral daily  cefTRIAXone   IVPB 1000 milliGRAM(s) IV Intermittent every 24 hours  cloNIDine 0.1 milliGRAM(s) Oral two times a day  heparin   Injectable 5000 Unit(s) SubCutaneous every 12 hours  levETIRAcetam 500 milliGRAM(s) Oral two times a day  metoprolol tartrate 50 milliGRAM(s) Oral two times a day  polyethylene glycol 3350 17 Gram(s) Oral two times a day  senna 2 Tablet(s) Oral at bedtime  simvastatin 20 milliGRAM(s) Oral at bedtime  sodium chloride 0.9%. 1000 milliLiter(s) (75 mL/Hr) IV Continuous <Continuous>    MEDICATIONS  (PRN):  acetaminophen   Tablet .. 650 milliGRAM(s) Oral every 6 hours PRN Temp greater or equal to 38C (100.4F), Mild Pain (1 - 3)      Allergies    No Known Allergies    Intolerances    Vital Signs Last 24 Hrs  T(C): 37.1 (17 Aug 2020 11:51), Max: 37.3 (16 Aug 2020 17:57)  T(F): 98.7 (17 Aug 2020 11:51), Max: 99.1 (16 Aug 2020 17:57)  HR: 71 (17 Aug 2020 11:51) (71 - 78)  BP: 144/64 (17 Aug 2020 11:51) (144/64 - 158/71)  BP(mean): --  RR: 16 (17 Aug 2020 11:51) (16 - 16)  SpO2: 100% (17 Aug 2020 11:51) (96% - 100%)    PHYSICAL EXAM:  GENERAL: NAD, well-groomed, well-developed  HEAD:  Atraumatic, Normocephalic  EYES: EOMI, PERRLA, conjunctiva and sclera clear  ENMT: No tonsillar erythema, exudates, or enlargement; Moist mucous membranes, Good dentition, No lesions  NECK: Supple, No JVD, Normal thyroid  NERVOUS SYSTEM: FROM x4 non verbal alert   CHEST/LUNG: Clear to percussion bilaterally; No rales, rhonchi, wheezing, or rubs  HEART: Regular rate and rhythm; No murmurs, rubs, or gallops  ABDOMEN: Soft, Nontender, Nondistended; Bowel sounds present  EXTREMITIES:  2+ Peripheral Pulses, No clubbing, cyanosis, or edema      LABS:                                                  9.3    7.51  )-----------( 310      ( 17 Aug 2020 07:43 )             28.2   08-17    142  |  108  |  8   ----------------------------<  211<H>  3.6   |  27  |  0.77    Ca    8.7      17 Aug 2020 10:11        CAPILLARY BLOOD GLUCOSE      POCT Blood Glucose.: 269 mg/dL (17 Aug 2020 11:04)  POCT Blood Glucose.: 157 mg/dL (17 Aug 2020 06:52)  POCT Blood Glucose.: 263 mg/dL (16 Aug 2020 19:43)      RADIOLOGY & ADDITIONAL TESTS:    Imaging Personally Reviewed:  [x ] YES  [ ] NO    Consultant(s) Notes Reviewed:  [ ] YES  [ ] NO    Care Discussed with Consultants/Other Providers [ ] YES  [ ] NO I am inheriting patient on today 8/17/2020    Patient is a 69y old  Female who presents with a chief complaint of Catheter associated UTI and AMADOU from dehydration. (14 Aug 2020 14:06)      INTERVAL HPI/OVERNIGHT EVENTS: none      MEDICATIONS  (STANDING):  amLODIPine   Tablet 10 milliGRAM(s) Oral daily  cefTRIAXone   IVPB 1000 milliGRAM(s) IV Intermittent every 24 hours  cloNIDine 0.1 milliGRAM(s) Oral two times a day  heparin   Injectable 5000 Unit(s) SubCutaneous every 12 hours  levETIRAcetam 500 milliGRAM(s) Oral two times a day  metoprolol tartrate 50 milliGRAM(s) Oral two times a day  polyethylene glycol 3350 17 Gram(s) Oral two times a day  senna 2 Tablet(s) Oral at bedtime  simvastatin 20 milliGRAM(s) Oral at bedtime  sodium chloride 0.9%. 1000 milliLiter(s) (75 mL/Hr) IV Continuous <Continuous>    MEDICATIONS  (PRN):  acetaminophen   Tablet .. 650 milliGRAM(s) Oral every 6 hours PRN Temp greater or equal to 38C (100.4F), Mild Pain (1 - 3)      Allergies    No Known Allergies    Intolerances    Vital Signs Last 24 Hrs  T(C): 37.1 (17 Aug 2020 11:51), Max: 37.3 (16 Aug 2020 17:57)  T(F): 98.7 (17 Aug 2020 11:51), Max: 99.1 (16 Aug 2020 17:57)  HR: 71 (17 Aug 2020 11:51) (71 - 78)  BP: 144/64 (17 Aug 2020 11:51) (144/64 - 158/71)  BP(mean): --  RR: 16 (17 Aug 2020 11:51) (16 - 16)  SpO2: 100% (17 Aug 2020 11:51) (96% - 100%)    PHYSICAL EXAM:  GENERAL: NAD, well-groomed, well-developed  HEAD:  Atraumatic, Normocephalic  EYES: EOMI, PERRLA, conjunctiva and sclera clear  ENMT: No tonsillar erythema, exudates, or enlargement; Moist mucous membranes, Good dentition, No lesions  NECK: Supple, No JVD, Normal thyroid  NERVOUS SYSTEM: FROM x4 non verbal alert   CHEST/LUNG: Clear to percussion bilaterally; No rales, rhonchi, wheezing, or rubs  HEART: Regular rate and rhythm; No murmurs, rubs, or gallops  ABDOMEN: Soft, Nontender, Nondistended; Bowel sounds present  EXTREMITIES:  2+ Peripheral Pulses, No clubbing, cyanosis, or edema  SKIN: stage 3 buttock      LABS:                                                  9.3    7.51  )-----------( 310      ( 17 Aug 2020 07:43 )             28.2   08-17    142  |  108  |  8   ----------------------------<  211<H>  3.6   |  27  |  0.77    Ca    8.7      17 Aug 2020 10:11        CAPILLARY BLOOD GLUCOSE      POCT Blood Glucose.: 269 mg/dL (17 Aug 2020 11:04)  POCT Blood Glucose.: 157 mg/dL (17 Aug 2020 06:52)  POCT Blood Glucose.: 263 mg/dL (16 Aug 2020 19:43)      RADIOLOGY & ADDITIONAL TESTS:    Imaging Personally Reviewed:  [x ] YES  [ ] NO    Consultant(s) Notes Reviewed:  [ ] YES  [ ] NO    Care Discussed with Consultants/Other Providers [ ] YES  [ ] NO

## 2020-08-17 NOTE — CONSULT NOTE ADULT - ATTENDING COMMENTS
healing proximal humerus fx.  PT, rom as tolerated in all planes a/aa/prom. may dc sling at this point. repeat xrays 1 month

## 2020-08-18 DIAGNOSIS — N17.9 ACUTE KIDNEY FAILURE, UNSPECIFIED: ICD-10-CM

## 2020-08-18 DIAGNOSIS — R33.9 RETENTION OF URINE, UNSPECIFIED: ICD-10-CM
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
## 2020-08-18 DIAGNOSIS — Z11.59 ENCOUNTER FOR SCREENING FOR OTHER VIRAL DISEASES: ICD-10-CM

## 2020-08-18 DIAGNOSIS — E86.0 DEHYDRATION: ICD-10-CM

## 2020-08-18 DIAGNOSIS — F03.90 UNSPECIFIED DEMENTIA WITHOUT BEHAVIORAL DISTURBANCE: ICD-10-CM

## 2020-08-18 DIAGNOSIS — S70.312A ABRASION, LEFT THIGH, INITIAL ENCOUNTER: ICD-10-CM

## 2020-08-18 DIAGNOSIS — E87.0 HYPEROSMOLALITY AND HYPERNATREMIA: ICD-10-CM

## 2020-08-18 DIAGNOSIS — A41.9 SEPSIS, UNSPECIFIED ORGANISM: ICD-10-CM
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
## 2020-08-18 DIAGNOSIS — G40.909 EPILEPSY, UNSPECIFIED, NOT INTRACTABLE, WITHOUT STATUS EPILEPTICUS: ICD-10-CM

## 2020-08-18 DIAGNOSIS — S42.302D UNSPECIFIED FRACTURE OF SHAFT OF HUMERUS, LEFT ARM, SUBSEQUENT ENCOUNTER FOR FRACTURE WITH ROUTINE HEALING: ICD-10-CM

## 2020-08-18 DIAGNOSIS — E43 UNSPECIFIED SEVERE PROTEIN-CALORIE MALNUTRITION: ICD-10-CM

## 2020-08-18 DIAGNOSIS — N13.6 PYONEPHROSIS: ICD-10-CM

## 2020-08-18 DIAGNOSIS — L89.322 PRESSURE ULCER OF LEFT BUTTOCK, STAGE 2: ICD-10-CM

## 2020-08-18 DIAGNOSIS — G93.41 METABOLIC ENCEPHALOPATHY: ICD-10-CM

## 2020-08-18 DIAGNOSIS — I69.351 HEMIPLEGIA AND HEMIPARESIS FOLLOWING CEREBRAL INFARCTION AFFECTING RIGHT DOMINANT SIDE: ICD-10-CM

## 2020-08-18 DIAGNOSIS — E83.42 HYPOMAGNESEMIA: ICD-10-CM

## 2020-08-18 DIAGNOSIS — M10.9 GOUT, UNSPECIFIED: ICD-10-CM

## 2020-08-18 DIAGNOSIS — Z79.82 LONG TERM (CURRENT) USE OF ASPIRIN: ICD-10-CM

## 2020-08-18 DIAGNOSIS — E11.9 TYPE 2 DIABETES MELLITUS WITHOUT COMPLICATIONS: ICD-10-CM

## 2020-08-18 DIAGNOSIS — L89.312 PRESSURE ULCER OF RIGHT BUTTOCK, STAGE 2: ICD-10-CM

## 2020-08-18 DIAGNOSIS — E87.6 HYPOKALEMIA: ICD-10-CM

## 2020-08-18 DIAGNOSIS — E87.5 HYPERKALEMIA: ICD-10-CM

## 2020-08-18 DIAGNOSIS — Z79.02 LONG TERM (CURRENT) USE OF ANTITHROMBOTICS/ANTIPLATELETS: ICD-10-CM

## 2020-08-18 DIAGNOSIS — L60.3 NAIL DYSTROPHY: ICD-10-CM

## 2020-08-18 DIAGNOSIS — L30.8 OTHER SPECIFIED DERMATITIS: ICD-10-CM

## 2020-08-18 DIAGNOSIS — B35.1 TINEA UNGUIUM: ICD-10-CM

## 2020-08-18 DIAGNOSIS — I10 ESSENTIAL (PRIMARY) HYPERTENSION: ICD-10-CM

## 2020-08-18 DIAGNOSIS — N32.0 BLADDER-NECK OBSTRUCTION: ICD-10-CM
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
## 2020-08-18 LAB
-  AMIKACIN: SIGNIFICANT CHANGE UP
-  AZTREONAM: SIGNIFICANT CHANGE UP
-  CEFEPIME: SIGNIFICANT CHANGE UP
-  CEFTAZIDIME: SIGNIFICANT CHANGE UP
-  CIPROFLOXACIN: SIGNIFICANT CHANGE UP
-  GENTAMICIN: SIGNIFICANT CHANGE UP
-  IMIPENEM: SIGNIFICANT CHANGE UP
-  LEVOFLOXACIN: SIGNIFICANT CHANGE UP
-  MEROPENEM: SIGNIFICANT CHANGE UP
-  PIPERACILLIN/TAZOBACTAM: SIGNIFICANT CHANGE UP
-  TOBRAMYCIN: SIGNIFICANT CHANGE UP
ANION GAP SERPL CALC-SCNC: 5 MMOL/L — SIGNIFICANT CHANGE UP (ref 5–17)
BUN SERPL-MCNC: 9 MG/DL — SIGNIFICANT CHANGE UP (ref 7–23)
CALCIUM SERPL-MCNC: 8.2 MG/DL — LOW (ref 8.5–10.1)
CHLORIDE SERPL-SCNC: 108 MMOL/L — SIGNIFICANT CHANGE UP (ref 96–108)
CO2 SERPL-SCNC: 27 MMOL/L — SIGNIFICANT CHANGE UP (ref 22–31)
CREAT SERPL-MCNC: 0.87 MG/DL — SIGNIFICANT CHANGE UP (ref 0.5–1.3)
CULTURE RESULTS: SIGNIFICANT CHANGE UP
GLUCOSE BLDC GLUCOMTR-MCNC: 170 MG/DL — HIGH (ref 70–99)
GLUCOSE BLDC GLUCOMTR-MCNC: 177 MG/DL — HIGH (ref 70–99)
GLUCOSE SERPL-MCNC: 157 MG/DL — HIGH (ref 70–99)
HCT VFR BLD CALC: 24.1 % — LOW (ref 34.5–45)
HGB BLD-MCNC: 7.8 G/DL — LOW (ref 11.5–15.5)
MAGNESIUM SERPL-MCNC: 2 MG/DL — SIGNIFICANT CHANGE UP (ref 1.6–2.6)
MCHC RBC-ENTMCNC: 28.7 PG — SIGNIFICANT CHANGE UP (ref 27–34)
MCHC RBC-ENTMCNC: 32.4 GM/DL — SIGNIFICANT CHANGE UP (ref 32–36)
MCV RBC AUTO: 88.6 FL — SIGNIFICANT CHANGE UP (ref 80–100)
METHOD TYPE: SIGNIFICANT CHANGE UP
NRBC # BLD: 0 /100 WBCS — SIGNIFICANT CHANGE UP (ref 0–0)
ORGANISM # SPEC MICROSCOPIC CNT: SIGNIFICANT CHANGE UP
ORGANISM # SPEC MICROSCOPIC CNT: SIGNIFICANT CHANGE UP
PHOSPHATE SERPL-MCNC: 3.4 MG/DL — SIGNIFICANT CHANGE UP (ref 2.5–4.5)
PLATELET # BLD AUTO: 283 K/UL — SIGNIFICANT CHANGE UP (ref 150–400)
POTASSIUM SERPL-MCNC: 3.8 MMOL/L — SIGNIFICANT CHANGE UP (ref 3.5–5.3)
POTASSIUM SERPL-SCNC: 3.8 MMOL/L — SIGNIFICANT CHANGE UP (ref 3.5–5.3)
RBC # BLD: 2.72 M/UL — LOW (ref 3.8–5.2)
RBC # FLD: 13.8 % — SIGNIFICANT CHANGE UP (ref 10.3–14.5)
SODIUM SERPL-SCNC: 140 MMOL/L — SIGNIFICANT CHANGE UP (ref 135–145)
SPECIMEN SOURCE: SIGNIFICANT CHANGE UP
WBC # BLD: 5.49 K/UL — SIGNIFICANT CHANGE UP (ref 3.8–10.5)
WBC # FLD AUTO: 5.49 K/UL — SIGNIFICANT CHANGE UP (ref 3.8–10.5)

## 2020-08-18 PROCEDURE — 99233 SBSQ HOSP IP/OBS HIGH 50: CPT

## 2020-08-18 RX ADMIN — Medication 25 MILLIGRAM(S): at 22:46

## 2020-08-18 RX ADMIN — CEFEPIME 100 MILLIGRAM(S): 1 INJECTION, POWDER, FOR SOLUTION INTRAMUSCULAR; INTRAVENOUS at 22:46

## 2020-08-18 RX ADMIN — LEVETIRACETAM 500 MILLIGRAM(S): 250 TABLET, FILM COATED ORAL at 05:33

## 2020-08-18 RX ADMIN — LEVETIRACETAM 500 MILLIGRAM(S): 250 TABLET, FILM COATED ORAL at 17:57

## 2020-08-18 RX ADMIN — Medication 0.1 MILLIGRAM(S): at 05:33

## 2020-08-18 RX ADMIN — AMLODIPINE BESYLATE 10 MILLIGRAM(S): 2.5 TABLET ORAL at 05:33

## 2020-08-18 RX ADMIN — Medication 25 MILLIGRAM(S): at 05:33

## 2020-08-18 RX ADMIN — POLYETHYLENE GLYCOL 3350 17 GRAM(S): 17 POWDER, FOR SOLUTION ORAL at 17:58

## 2020-08-18 RX ADMIN — Medication 50 MILLIGRAM(S): at 05:33

## 2020-08-18 RX ADMIN — POLYETHYLENE GLYCOL 3350 17 GRAM(S): 17 POWDER, FOR SOLUTION ORAL at 05:33

## 2020-08-18 RX ADMIN — Medication 1 APPLICATION(S): at 05:33

## 2020-08-18 RX ADMIN — HEPARIN SODIUM 5000 UNIT(S): 5000 INJECTION INTRAVENOUS; SUBCUTANEOUS at 17:57

## 2020-08-18 RX ADMIN — Medication 25 MILLIGRAM(S): at 14:50

## 2020-08-18 RX ADMIN — Medication 0.1 MILLIGRAM(S): at 17:57

## 2020-08-18 RX ADMIN — Medication 650 MILLIGRAM(S): at 17:57

## 2020-08-18 RX ADMIN — SIMVASTATIN 20 MILLIGRAM(S): 20 TABLET, FILM COATED ORAL at 22:46

## 2020-08-18 RX ADMIN — SENNA PLUS 2 TABLET(S): 8.6 TABLET ORAL at 22:46

## 2020-08-18 RX ADMIN — CEFEPIME 100 MILLIGRAM(S): 1 INJECTION, POWDER, FOR SOLUTION INTRAMUSCULAR; INTRAVENOUS at 05:32

## 2020-08-18 RX ADMIN — CEFEPIME 100 MILLIGRAM(S): 1 INJECTION, POWDER, FOR SOLUTION INTRAMUSCULAR; INTRAVENOUS at 14:50

## 2020-08-18 RX ADMIN — Medication 1 APPLICATION(S): at 18:05

## 2020-08-18 RX ADMIN — Medication 50 MILLIGRAM(S): at 17:57

## 2020-08-18 NOTE — CONSULT NOTE ADULT - ASSESSMENT
69F PMH dementia (non verbal), CVA (on plavix), seizure disorder (on keppra), HTN, DM, HLD, recent admission for UTI with milner catheter, BIBEMS for fever   recent uti few weeks ago for  UTI/ urinary retention /bilateral mod hydro, urine culture nondiagnostic s/p abx       S/p milner catheter exchange 8/15 and 8/16    urine culture with significant amount of pseudomonas pending drug susceptibility   blood culture with coag neg staph likely contaminant   Patient is for urology procedure cystoscopy to evaluate source of hematuria    NOTE TO CONTINUE   EVALUATION IN PROGRESSS 69F PMH dementia (non verbal), CVA (on plavix), seizure disorder (on keppra), HTN, DM, HLD, recent admission for UTI with milner catheter, BIBEMS for fever   recent uti few weeks ago for  UTI/ urinary retention /bilateral mod hydro, urine culture nondiagnostic s/p abx     UTI / urinary retention  /ilateral mod hydro with chronic milner   S/p milner catheter exchange 8/15 and 8/16    urine culture with significant amount of pseudomonas pending drug susceptibility   blood culture with coag neg staph likely contaminant   Patient is for urology procedure cystoscopy to evaluate source of hematuria  on cefepime   fu urine culture pseudomonas pending drug susceptibility  we will fu  Thanks

## 2020-08-18 NOTE — CONSULT NOTE ADULT - SUBJECTIVE AND OBJECTIVE BOX
70 yo lady with PMH of dementia (non verbal), CVA (on plavix), seizure disorder (on keppra), HTN, DM, HLD, BIBEMS for fever since yesterday. As per EMS, patient was recently admitted to hospital for UTI. Discharged on Monday with a milner.   Milner was changed this admission in the ER. EMS says that family noticed milner output decreased since yesterday. Shows only 250 cc in milner over 36 hours. Also family reported patient had not had a bowel movement since her discharged. As per EMS, family reports patient is at mental baseline. On arrival milner output looks infected. (14 Aug 2020 14:06)      PAST MEDICAL & SURGICAL HISTORY:  CVA (cerebral infarction): right side weakness  Vision changes: lt eye  Urinary incontinence  Seizure disorder  Gout  OA (osteoarthritis): bautista knees, low back  and  bautista shoulders  Asthma  Diabetes  Hypertension  Kidney stone      SOCHX:   tobacco,  -  alcohol    FMHX: FA/MO  - contributory       Recent Travel:    Immunizations:    Allergies    No Known Allergies    Intolerances        MEDICATIONS  (STANDING):  amLODIPine   Tablet 10 milliGRAM(s) Oral daily  cefepime   IVPB 2000 milliGRAM(s) IV Intermittent every 8 hours  cloNIDine 0.1 milliGRAM(s) Oral two times a day  heparin   Injectable 5000 Unit(s) SubCutaneous every 12 hours  hydrALAZINE 25 milliGRAM(s) Oral every 8 hours  levETIRAcetam 500 milliGRAM(s) Oral two times a day  metoprolol tartrate 50 milliGRAM(s) Oral two times a day  polyethylene glycol 3350 17 Gram(s) Oral two times a day  senna 2 Tablet(s) Oral at bedtime  simvastatin 20 milliGRAM(s) Oral at bedtime  sodium chloride 0.9%. 1000 milliLiter(s) (75 mL/Hr) IV Continuous <Continuous>  vitamin A &amp; D Ointment 1 Application(s) Topical two times a day        REVIEW OF SYSTEMS: IN PORGRESSS        VITAL SIGNS:    T(C): 37.4 (08-18-20 @ 05:08), Max: 37.7 (08-17-20 @ 17:00)  T(F): 99.3 (08-18-20 @ 05:08), Max: 99.9 (08-17-20 @ 17:00)  HR: 82 (08-18-20 @ 05:08) (71 - 94)  BP: 128/67 (08-18-20 @ 05:08) (124/66 - 148/83)  RR: 16 (08-18-20 @ 05:08) (16 - 16)  SpO2: 100% (08-18-20 @ 05:08) (98% - 100%)    PHYSICAL EXAM:    GENERAL: IN PROGRESS        LABS:                         7.8    5.49  )-----------( 283      ( 18 Aug 2020 07:55 )             24.1     08-18    140  |  108  |  9   ----------------------------<  157<H>  3.8   |  27  |  0.87    Ca    8.2<L>      18 Aug 2020 07:55  Phos  3.4     08-18  Mg     2.0     08-18                                Culture Results:   >100,000 CFU/ml Pseudomonas aeruginosa (08-14 @ 16:29)  Culture Results:   No growth to date. (08-14 @ 15:19)  Culture Results:   Growth in aerobic bottle: Staphylococcus hominis  Single set isolate, possible contaminant. Contact  Microbiology if susceptibility testing clinically  indicated.  "Due to technical problems, Proteus sp. will Not be reported as part of  the BCID panel until further notice"  ***Blood Panel PCR results on this specimen are available  approximately 3 hours after the Gram stain result.***  Gram stain, PCR, and/or culture results may not always  correspond due to difference in methodologies.  ************************************************************  This PCR assay was performed using [employer].  The following targets are tested for: Enterococcus,  vancomycin resistant enterococci, Listeria monocytogenes,  coagulase negative staphylococci, S. aureus,  methicillin resistant S. aureus, Streptococcus agalactiae  (Group B), S. pneumoniae, S. pyogenes (Group A),  Acinetobacter baumannii, Enterobacter cloacae, E. coli,  Klebsiella oxytoca, K. pneumoniae, Proteus sp.,  Serratia marcescens, Haemophilus influenzae,  Neisseria meningitidis, Pseudomonas aeruginosa, Candida  albicans, C. glabrata, C krusei, C parapsilosis,  C. tropicalis and the KPC resistance gene. (08-14 @ 15:19)                Radiology: 70 yo lady with PMH of dementia (non verbal), CVA (on plavix), seizure disorder (on keppra), HTN, DM, HLD, BIBEMS for fever since yesterday. As per EMS, patient was recently admitted to hospital for UTI. Discharged on Monday with a milner.   Milner was changed this admission in the ER. EMS says that family noticed milner output decreased since yesterday. Shows only 250 cc in milner over 36 hours. Also family reported patient had not had a bowel movement since her discharged. As per EMS, family reports patient is at mental baseline. On arrival milner output looks infected. (14 Aug 2020 14:06)      PAST MEDICAL & SURGICAL HISTORY:  CVA (cerebral infarction): right side weakness  Vision changes: lt eye  Urinary incontinence  Seizure disorder  Gout  OA (osteoarthritis): bautista knees, low back  and  bautista shoulders  Asthma  Diabetes  Hypertension  Kidney stone      SOCHX:   tobacco,  -  alcohol    FMHX: FA/MO  - contributory       Recent Travel:    Immunizations:    Allergies    No Known Allergies    Intolerances        MEDICATIONS  (STANDING):  amLODIPine   Tablet 10 milliGRAM(s) Oral daily  cefepime   IVPB 2000 milliGRAM(s) IV Intermittent every 8 hours  cloNIDine 0.1 milliGRAM(s) Oral two times a day  heparin   Injectable 5000 Unit(s) SubCutaneous every 12 hours  hydrALAZINE 25 milliGRAM(s) Oral every 8 hours  levETIRAcetam 500 milliGRAM(s) Oral two times a day  metoprolol tartrate 50 milliGRAM(s) Oral two times a day  polyethylene glycol 3350 17 Gram(s) Oral two times a day  senna 2 Tablet(s) Oral at bedtime  simvastatin 20 milliGRAM(s) Oral at bedtime  sodium chloride 0.9%. 1000 milliLiter(s) (75 mL/Hr) IV Continuous <Continuous>  vitamin A &amp; D Ointment 1 Application(s) Topical two times a day        REVIEW OF SYSTEMS: nonverbal         VITAL SIGNS:    T(C): 37.4 (08-18-20 @ 05:08), Max: 37.7 (08-17-20 @ 17:00)  T(F): 99.3 (08-18-20 @ 05:08), Max: 99.9 (08-17-20 @ 17:00)  HR: 82 (08-18-20 @ 05:08) (71 - 94)  BP: 128/67 (08-18-20 @ 05:08) (124/66 - 148/83)  RR: 16 (08-18-20 @ 05:08) (16 - 16)  SpO2: 100% (08-18-20 @ 05:08) (98% - 100%)    PHYSICAL EXAM:    GENERAL: nonverbal, not in any distress   cvs - normal s2s2  res - clear   abd - soft   sacrum - stage 2-3 decubiti small, foul smelling, no active discharge , no sing of acute infection   cns - cannot communicate , nonverbal    - milner in   skin - no rash         LABS:                         7.8    5.49  )-----------( 283      ( 18 Aug 2020 07:55 )             24.1     08-18    140  |  108  |  9   ----------------------------<  157<H>  3.8   |  27  |  0.87    Ca    8.2<L>      18 Aug 2020 07:55  Phos  3.4     08-18  Mg     2.0     08-18            Culture Results:   >100,000 CFU/ml Pseudomonas aeruginosa (08-14 @ 16:29)  Culture Results:   No growth to date. (08-14 @ 15:19)  Culture Results:   Growth in aerobic bottle: Staphylococcus hominis  Single set isolate, possible contaminant. Contact  Microbiology if susceptibility testing clinically  indicated.  "Due to technical problems, Proteus sp. will Not be reported as part of  the BCID panel until further notice"  ***Blood Panel PCR results on this specimen are available  approximately 3 hours after the Gram stain result.***  Gram stain, PCR, and/or culture results may not always  correspond due to difference in methodologies.  ************************************************************  This PCR assay was performed using [employer].  The following targets are tested for: Enterococcus,  vancomycin resistant enterococci, Listeria monocytogenes,  coagulase negative staphylococci, S. aureus,  methicillin resistant S. aureus, Streptococcus agalactiae  (Group B), S. pneumoniae, S. pyogenes (Group A),  Acinetobacter baumannii, Enterobacter cloacae, E. coli,  Klebsiella oxytoca, K. pneumoniae, Proteus sp.,  Serratia marcescens, Haemophilus influenzae,  Neisseria meningitidis, Pseudomonas aeruginosa, Candida  albicans, C. glabrata, C krusei, C parapsilosis,  C. tropicalis and the KPC resistance gene. (08-14 @ 15:19)                Radiology:

## 2020-08-18 NOTE — PHYSICAL THERAPY INITIAL EVALUATION ADULT - ADDITIONAL COMMENTS
(Per DPT Lilliana on 07/2020):    PT attempted to contact pt's daughter Gali ([phone]. [phone]) as pt is non-verbal at baseline & currently unable to follow commands or communicate --> no answer & no name on voicemail at either number therefore message was not left to ensure pt privacy. Per care coordination assessment in EMR pt is baseline dependent, 24/7 home health aide services. PT notes pt was in [facility] ED from 7/15-7/16 after aide found pt on floor s/p unwitnessed fall stating pt was attempting to get OOB on her own --> + L humerus fx, splinted by ortho & recommendation was for outpatient follow up in 1 week.     Wound assessment at 07/2020 notes IAD to buttocks.

## 2020-08-18 NOTE — PROGRESS NOTE ADULT - SUBJECTIVE AND OBJECTIVE BOX
I am inheriting patient on today 8/17/2020    Patient is a 69y old  Female who presents with a chief complaint of Catheter associated UTI and AMADOU from dehydration. (14 Aug 2020 14:06)      INTERVAL HPI/OVERNIGHT EVENTS: none    MEDICATIONS  (STANDING):  amLODIPine   Tablet 10 milliGRAM(s) Oral daily  cefepime   IVPB 2000 milliGRAM(s) IV Intermittent every 8 hours  cloNIDine 0.1 milliGRAM(s) Oral two times a day  heparin   Injectable 5000 Unit(s) SubCutaneous every 12 hours  hydrALAZINE 25 milliGRAM(s) Oral every 8 hours  levETIRAcetam 500 milliGRAM(s) Oral two times a day  metoprolol tartrate 50 milliGRAM(s) Oral two times a day  polyethylene glycol 3350 17 Gram(s) Oral two times a day  senna 2 Tablet(s) Oral at bedtime  simvastatin 20 milliGRAM(s) Oral at bedtime  sodium chloride 0.9%. 1000 milliLiter(s) (75 mL/Hr) IV Continuous <Continuous>  vitamin A &amp; D Ointment 1 Application(s) Topical two times a day    MEDICATIONS  (PRN):  acetaminophen   Tablet .. 650 milliGRAM(s) Oral every 6 hours PRN Temp greater or equal to 38C (100.4F), Mild Pain (1 - 3)      Allergies    No Known Allergies    Intolerances    Vital Signs Last 24 Hrs  T(C): 37.6 (18 Aug 2020 11:03), Max: 37.7 (17 Aug 2020 17:00)  T(F): 99.6 (18 Aug 2020 11:03), Max: 99.9 (17 Aug 2020 17:00)  HR: 65 (18 Aug 2020 11:03) (65 - 94)  BP: 115/65 (18 Aug 2020 11:03) (115/65 - 148/83)  BP(mean): --  RR: 16 (18 Aug 2020 11:03) (16 - 16)  SpO2: 100% (18 Aug 2020 11:03) (98% - 100%)    PHYSICAL EXAM:  GENERAL: NAD, well-groomed, well-developed  HEAD:  Atraumatic, Normocephalic  EYES: EOMI, PERRLA, conjunctiva and sclera clear  ENMT: No tonsillar erythema, exudates, or enlargement; Moist mucous membranes, Good dentition, No lesions  NECK: Supple, No JVD, Normal thyroid  NERVOUS SYSTEM: FROM x4 non verbal alert   CHEST/LUNG: Clear to percussion bilaterally; No rales, rhonchi, wheezing, or rubs  HEART: Regular rate and rhythm; No murmurs, rubs, or gallops  ABDOMEN: Soft, Nontender, Nondistended; Bowel sounds present  EXTREMITIES:  2+ Peripheral Pulses, No clubbing, cyanosis, or edema  SKIN: stage 3 buttock      LABS:                                                      7.8    5.49  )-----------( 283      ( 18 Aug 2020 07:55 )             24.1   08-18    140  |  108  |  9   ----------------------------<  157<H>  3.8   |  27  |  0.87    Ca    8.2<L>      18 Aug 2020 07:55  Phos  3.4     08-18  Mg     2.0     08-18      CAPILLARY BLOOD GLUCOSE      POCT Blood Glucose.: 170 mg/dL (18 Aug 2020 11:01)  POCT Blood Glucose.: 177 mg/dL (18 Aug 2020 08:00)  POCT Blood Glucose.: 134 mg/dL (17 Aug 2020 16:54)      RADIOLOGY & ADDITIONAL TESTS:    Imaging Personally Reviewed:  [x ] YES  [ ] NO    Consultant(s) Notes Reviewed:  [ ] YES  [ ] NO    Care Discussed with Consultants/Other Providers [ ] YES  [ ] NO

## 2020-08-18 NOTE — PROGRESS NOTE ADULT - ASSESSMENT
68 yo female PMH dementia (non verbal), CVA (on plavix), seizure disorder (on keppra), htn, DM, hld BIBEMS for fever since yesterday. As per EMS, patient was recently admitted to hospital for UTI. Discharged on Monday with a milner. EMS says that family noticed milner output decreased since yesterday. Shows only 250 cc in milner over 36 hours. Also family reported patient had not had a bowel movement since her discharged. As per EMS, family reports patient is at mental baseline.    sepsis 2/2 uti and POA  Likely catheter associated UTI. urine cx growing pseudomonas will change antibx to cefepime . consult Mary for IS saw patient last admission one week ago .   Milner changed in ER.   afebrile currently and wbc stable.  blood cx coag negative staph likely contaminant        hematuria   - renal us shows clots but cbc stable   -  hold asa/plavix   urology consult noted    f/u urine cytology  8/18/2020  hgb low at 7.8  wlll type and cross    AMADOU resolved     constipation  - resolved     hypokalemia   - replace as needed   HTN  Continue Norvasc, lopressor hydralazine ( increase as needed)       HLD;  Zocur at home dose.      stage 3 decub: will get wound care consult , per nursing was POA   seizure    PT eval. decreased Keppra 750 mg bid to 500 mg as per last hospital stay   No need for CT head,   stop Trazadone as this can cause retention and sedation.      left humeral fracture  will get orthopedic  to reevaluate  8/18/2020 reviewed ortho note and ill d/c sling repeat xray in one month

## 2020-08-18 NOTE — PROGRESS NOTE ADULT - SUBJECTIVE AND OBJECTIVE BOX
Patient seen and examined at bedside in no acute distress.  Pt is nonverbal. Per nursing, no events overnight.    Milner catheter indwelling, afebrile     Vital Signs Last 24 Hrs  T(F): 99.3 (08-18-20 @ 05:08), Max: 99.9 (08-17-20 @ 17:00)  HR: 82 (08-18-20 @ 05:08)  BP: 128/67 (08-18-20 @ 05:08)  RR: 16 (08-18-20 @ 05:08)  SpO2: 100% (08-18-20 @ 05:08)      POCT Blood Glucose.: 177 mg/dL (18 Aug 2020 08:00)      GENERAL: Awake, NAD  CHEST/LUNG: Respirations nonlabored  HEART: Regular rate and rhythm;   ABDOMEN: Soft, Nontender, Nondistended  : milner catheter draining very light pink urine, no gross hematuria, no clots, output: 3,900cc/24hr.  Catheter irrigated at bedside.   EXTREMITIES:  no calf tenderness, No edema    I&O's Detail    17 Aug 2020 07:01  -  18 Aug 2020 07:00  --------------------------------------------------------  IN:    IV PiggyBack: 50 mL    Oral Fluid: 460 mL    sodium chloride 0.9%.: 900 mL  Total IN: 1410 mL    OUT:    Indwelling Catheter - Urethral: 3900 mL  Total OUT: 3900 mL    Total NET: -2490 mL          LABS:                        7.8    5.49  )-----------( 283      ( 18 Aug 2020 07:55 )             24.1     08-18    140  |  108  |  9   ----------------------------<  157<H>  3.8   |  27  |  0.87    Ca    8.2<L>      18 Aug 2020 07:55  Phos  3.4     08-18  Mg     2.0     08-18      Impression: 69F PMH dementia (non verbal), CVA (on plavix), seizure disorder (on keppra), HTN, DM, HLD, recent admission for UTI with milner catheter, BIBEMS for fever x1 day and low UOP, a/w GNR UTI, gross hematuria, AMADOU.   S/p milner catheter exchange 8/15 and 8/16. Prelim ucx: pseudomonas. Hematuria improved today, mild blood-tinged urine in milner, no clots. Drop in H/H today: 9.3->7.8/28.2->24.1    Plan  -Continue milner catheter, monitor for hematuria. Irrigate as needed. Cystoscopy planning when patient is medically stable for OR.   -Trend H/H. Plavix is on hold.   -F/u cytology and final urine cx.   -Medical management and supportive care per primary team.   -Will discuss with Dr. Caicedo.

## 2020-08-18 NOTE — PHYSICAL THERAPY INITIAL EVALUATION ADULT - GENERAL OBSERVATIONS, REHAB EVAL
Patient right sidelying in bed. (+) Left arm sling, PIV infusion, urinary catheter with dark content.

## 2020-08-18 NOTE — PHYSICAL THERAPY INITIAL EVALUATION ADULT - MODALITIES TREATMENT COMMENTS
Previous IAD in 07/2020 now progressed to Stage II Pressure Injuries (right measuring 5x4.5x0.2 cm, left measuring 3x4.5x0.2cm both with yellow, small fibrinous discharge; no malodor.

## 2020-08-18 NOTE — PHYSICAL THERAPY INITIAL EVALUATION ADULT - PERTINENT HX OF CURRENT PROBLEM, REHAB EVAL
Patient brought back to [facility]- by family from recent discharge due to note of decreased catheter output and no bowel movement since discharged. Of note, patient had previous fracture of left shoulder. Ortho consulted on this admission and stated now has callus formation and may remove sling for comfort; no surgical interventions required. Drop in H+H. Urology planning for cystoscopy when medically optimized.

## 2020-08-19 LAB
ANION GAP SERPL CALC-SCNC: 6 MMOL/L — SIGNIFICANT CHANGE UP (ref 5–17)
BUN SERPL-MCNC: 10 MG/DL — SIGNIFICANT CHANGE UP (ref 7–23)
CALCIUM SERPL-MCNC: 8.3 MG/DL — LOW (ref 8.5–10.1)
CHLORIDE SERPL-SCNC: 109 MMOL/L — HIGH (ref 96–108)
CO2 SERPL-SCNC: 27 MMOL/L — SIGNIFICANT CHANGE UP (ref 22–31)
CREAT SERPL-MCNC: 0.82 MG/DL — SIGNIFICANT CHANGE UP (ref 0.5–1.3)
CULTURE RESULTS: SIGNIFICANT CHANGE UP
GLUCOSE BLDC GLUCOMTR-MCNC: 139 MG/DL — HIGH (ref 70–99)
GLUCOSE BLDC GLUCOMTR-MCNC: 141 MG/DL — HIGH (ref 70–99)
GLUCOSE BLDC GLUCOMTR-MCNC: 147 MG/DL — HIGH (ref 70–99)
GLUCOSE BLDC GLUCOMTR-MCNC: 159 MG/DL — HIGH (ref 70–99)
GLUCOSE SERPL-MCNC: 136 MG/DL — HIGH (ref 70–99)
HCT VFR BLD CALC: 25.2 % — LOW (ref 34.5–45)
HGB BLD-MCNC: 8.3 G/DL — LOW (ref 11.5–15.5)
MAGNESIUM SERPL-MCNC: 1.9 MG/DL — SIGNIFICANT CHANGE UP (ref 1.6–2.6)
MCHC RBC-ENTMCNC: 29.3 PG — SIGNIFICANT CHANGE UP (ref 27–34)
MCHC RBC-ENTMCNC: 32.9 GM/DL — SIGNIFICANT CHANGE UP (ref 32–36)
MCV RBC AUTO: 89 FL — SIGNIFICANT CHANGE UP (ref 80–100)
NRBC # BLD: 0 /100 WBCS — SIGNIFICANT CHANGE UP (ref 0–0)
PHOSPHATE SERPL-MCNC: 3.2 MG/DL — SIGNIFICANT CHANGE UP (ref 2.5–4.5)
PLATELET # BLD AUTO: 265 K/UL — SIGNIFICANT CHANGE UP (ref 150–400)
POTASSIUM SERPL-MCNC: 3.5 MMOL/L — SIGNIFICANT CHANGE UP (ref 3.5–5.3)
POTASSIUM SERPL-SCNC: 3.5 MMOL/L — SIGNIFICANT CHANGE UP (ref 3.5–5.3)
RBC # BLD: 2.83 M/UL — LOW (ref 3.8–5.2)
RBC # FLD: 13.8 % — SIGNIFICANT CHANGE UP (ref 10.3–14.5)
SODIUM SERPL-SCNC: 142 MMOL/L — SIGNIFICANT CHANGE UP (ref 135–145)
SPECIMEN SOURCE: SIGNIFICANT CHANGE UP
WBC # BLD: 5.14 K/UL — SIGNIFICANT CHANGE UP (ref 3.8–10.5)
WBC # FLD AUTO: 5.14 K/UL — SIGNIFICANT CHANGE UP (ref 3.8–10.5)

## 2020-08-19 PROCEDURE — 99232 SBSQ HOSP IP/OBS MODERATE 35: CPT

## 2020-08-19 PROCEDURE — 99233 SBSQ HOSP IP/OBS HIGH 50: CPT

## 2020-08-19 PROCEDURE — 93306 TTE W/DOPPLER COMPLETE: CPT | Mod: 26

## 2020-08-19 RX ORDER — CEFEPIME 1 G/1
2000 INJECTION, POWDER, FOR SOLUTION INTRAMUSCULAR; INTRAVENOUS EVERY 8 HOURS
Refills: 0 | Status: DISCONTINUED | OUTPATIENT
Start: 2020-08-19 | End: 2020-08-19

## 2020-08-19 RX ORDER — CEFEPIME 1 G/1
1000 INJECTION, POWDER, FOR SOLUTION INTRAMUSCULAR; INTRAVENOUS EVERY 12 HOURS
Refills: 0 | Status: DISCONTINUED | OUTPATIENT
Start: 2020-08-19 | End: 2020-08-19

## 2020-08-19 RX ORDER — CEFEPIME 1 G/1
2000 INJECTION, POWDER, FOR SOLUTION INTRAMUSCULAR; INTRAVENOUS EVERY 12 HOURS
Refills: 0 | Status: DISCONTINUED | OUTPATIENT
Start: 2020-08-19 | End: 2020-08-24

## 2020-08-19 RX ADMIN — Medication 1 APPLICATION(S): at 18:09

## 2020-08-19 RX ADMIN — LEVETIRACETAM 500 MILLIGRAM(S): 250 TABLET, FILM COATED ORAL at 18:04

## 2020-08-19 RX ADMIN — Medication 25 MILLIGRAM(S): at 22:21

## 2020-08-19 RX ADMIN — CEFEPIME 100 MILLIGRAM(S): 1 INJECTION, POWDER, FOR SOLUTION INTRAMUSCULAR; INTRAVENOUS at 18:04

## 2020-08-19 RX ADMIN — POLYETHYLENE GLYCOL 3350 17 GRAM(S): 17 POWDER, FOR SOLUTION ORAL at 18:04

## 2020-08-19 RX ADMIN — SODIUM CHLORIDE 75 MILLILITER(S): 9 INJECTION INTRAMUSCULAR; INTRAVENOUS; SUBCUTANEOUS at 05:32

## 2020-08-19 RX ADMIN — Medication 0.1 MILLIGRAM(S): at 18:04

## 2020-08-19 RX ADMIN — SIMVASTATIN 20 MILLIGRAM(S): 20 TABLET, FILM COATED ORAL at 22:21

## 2020-08-19 RX ADMIN — SODIUM CHLORIDE 75 MILLILITER(S): 9 INJECTION INTRAMUSCULAR; INTRAVENOUS; SUBCUTANEOUS at 16:55

## 2020-08-19 RX ADMIN — Medication 25 MILLIGRAM(S): at 15:11

## 2020-08-19 RX ADMIN — CEFEPIME 100 MILLIGRAM(S): 1 INJECTION, POWDER, FOR SOLUTION INTRAMUSCULAR; INTRAVENOUS at 05:29

## 2020-08-19 RX ADMIN — HEPARIN SODIUM 5000 UNIT(S): 5000 INJECTION INTRAVENOUS; SUBCUTANEOUS at 18:05

## 2020-08-19 RX ADMIN — SENNA PLUS 2 TABLET(S): 8.6 TABLET ORAL at 22:21

## 2020-08-19 RX ADMIN — Medication 50 MILLIGRAM(S): at 18:05

## 2020-08-19 RX ADMIN — Medication 1 APPLICATION(S): at 11:13

## 2020-08-19 RX ADMIN — HEPARIN SODIUM 5000 UNIT(S): 5000 INJECTION INTRAVENOUS; SUBCUTANEOUS at 08:57

## 2020-08-19 NOTE — CONSULT NOTE ADULT - REASON FOR ADMISSION
Catheter associated UTI and AMADOU from dehydration.

## 2020-08-19 NOTE — PROGRESS NOTE ADULT - SUBJECTIVE AND OBJECTIVE BOX
I am inheriting patient on today 8/17/2020    Patient is a 69y old  Female who presents with a chief complaint of Catheter associated UTI and AMADOU from dehydration. (14 Aug 2020 14:06)      INTERVAL HPI/OVERNIGHT EVENTS: none    MEDICATIONS  (STANDING):  amLODIPine   Tablet 10 milliGRAM(s) Oral daily  cefepime   IVPB 1000 milliGRAM(s) IV Intermittent every 12 hours  cloNIDine 0.1 milliGRAM(s) Oral two times a day  heparin   Injectable 5000 Unit(s) SubCutaneous every 12 hours  hydrALAZINE 25 milliGRAM(s) Oral every 8 hours  levETIRAcetam 500 milliGRAM(s) Oral two times a day  metoprolol tartrate 50 milliGRAM(s) Oral two times a day  polyethylene glycol 3350 17 Gram(s) Oral two times a day  senna 2 Tablet(s) Oral at bedtime  simvastatin 20 milliGRAM(s) Oral at bedtime  sodium chloride 0.9%. 1000 milliLiter(s) (75 mL/Hr) IV Continuous <Continuous>  vitamin A &amp; D Ointment 1 Application(s) Topical two times a day    MEDICATIONS  (PRN):  acetaminophen   Tablet .. 650 milliGRAM(s) Oral every 6 hours PRN Temp greater or equal to 38C (100.4F), Mild Pain (1 - 3)      Allergies    No Known Allergies    Intolerances    Vital Signs Last 24 Hrs  T(C): 36.9 (19 Aug 2020 05:35), Max: 38.1 (18 Aug 2020 17:00)  T(F): 98.5 (19 Aug 2020 05:35), Max: 100.5 (18 Aug 2020 17:00)  HR: 70 (19 Aug 2020 05:35) (65 - 77)  BP: 146/64 (19 Aug 2020 05:35) (115/65 - 158/69)  BP(mean): --  RR: 16 (19 Aug 2020 05:35) (16 - 16)  SpO2: 99% (19 Aug 2020 05:35) (99% - 100%)      PHYSICAL EXAM:  GENERAL: NAD, well-groomed, well-developed  HEAD:  Atraumatic, Normocephalic  EYES: EOMI, PERRLA, conjunctiva and sclera clear  ENMT: No tonsillar erythema, exudates, or enlargement; Moist mucous membranes, Good dentition, No lesions  NECK: Supple, No JVD, Normal thyroid  NERVOUS SYSTEM: FROM x4 non verbal alert   CHEST/LUNG: Clear to percussion bilaterally; No rales, rhonchi, wheezing, or rubs  HEART: Regular rate and rhythm; No murmurs, rubs, or gallops  ABDOMEN: Soft, Nontender, Nondistended; Bowel sounds present  EXTREMITIES:  2+ Peripheral Pulses, No clubbing, cyanosis, or edema  SKIN: stage 3 buttock      LABS:                                                          8.3    5.14  )-----------( 265      ( 19 Aug 2020 07:17 )             25.2   08-19    142  |  109<H>  |  10  ----------------------------<  136<H>  3.5   |  27  |  0.82    Ca    8.3<L>      19 Aug 2020 07:17  Phos  3.2     08-19  Mg     1.9     08-19      CAPILLARY BLOOD GLUCOSE      POCT Blood Glucose.: 147 mg/dL (19 Aug 2020 07:55)  POCT Blood Glucose.: 170 mg/dL (18 Aug 2020 11:01)      RADIOLOGY & ADDITIONAL TESTS:    Imaging Personally Reviewed:  [x ] YES  [ ] NO    Consultant(s) Notes Reviewed:  [ ] YES  [ ] NO    Care Discussed with Consultants/Other Providers [ ] YES  [ ] NO

## 2020-08-19 NOTE — CONSULT NOTE ADULT - CONSULT REASON
L Proximal Humerus Fx
Recurrent UTI, Hematuria
pre op
Catheter associated UTI and AMADOU from dehydration.

## 2020-08-19 NOTE — PROGRESS NOTE ADULT - SUBJECTIVE AND OBJECTIVE BOX
Patient seen and examined bedside with Dr Caicedo   nonverbal  febrile    T(F): 98.5 (08-19-20 @ 05:35), Max: 100.5 (08-18-20 @ 17:00)  HR: 70 (08-19-20 @ 05:35) (69 - 77)  BP: 146/64 (08-19-20 @ 05:35) (139/69 - 158/69)  RR: 16 (08-19-20 @ 05:35) (16 - 16)  SpO2: 99% (08-19-20 @ 05:35) (99% - 100%)  Wt(kg): --  CAPILLARY BLOOD GLUCOSE      POCT Blood Glucose.: 141 mg/dL (19 Aug 2020 10:46)  POCT Blood Glucose.: 147 mg/dL (19 Aug 2020 07:55)      PHYSICAL EXAM:  General: NAD, alert and awake  HEENT: NCAT, EOMI, conjunctiva clear  Chest: nonlabored respirations, good inspiratory effort  Abdomen: soft, NTND.   Extremities: no pedal edema or calf tenderness noted   : No CVA or SP tenderness. Milner catheter draining blood tinged urine, no gross hematuria, no clots    LABS:                        8.3    5.14  )-----------( 265      ( 19 Aug 2020 07:17 )             25.2   08-19    142  |  109<H>  |  10  ----------------------------<  136<H>  3.5   |  27  |  0.82    Ca    8.3<L>      19 Aug 2020 07:17  Phos  3.2     08-19  Mg     1.9     08-19      I&O's Detail    18 Aug 2020 07:01  -  19 Aug 2020 07:00  --------------------------------------------------------  IN:    sodium chloride 0.9%.: 900 mL  Total IN: 900 mL    OUT:    Indwelling Catheter - Urethral: 1700 mL  Total OUT: 1700 mL    Total NET: -800 mL      19 Aug 2020 07:01  -  19 Aug 2020 11:40  --------------------------------------------------------  IN:    Oral Fluid: 30 mL  Total IN: 30 mL    OUT:  Total OUT: 0 mL    Total NET: 30 mL  Culture - Urine (08.14.20 @ 16:29)    -  Amikacin: S <=16    -  Aztreonam: R >16    -  Cefepime: S 8    -  Ceftazidime: S 8    -  Ciprofloxacin: R >2    -  Gentamicin: S <=2    -  Imipenem: I 4    -  Levofloxacin: R >4    -  Meropenem: R >8    -  Piperacillin/Tazobactam: S 16    -  Tobramycin: S <=2    Specimen Source: .Urine Clean Catch (Midstream)    Culture Results:   >100,000 CFU/ml Pseudomonas aeruginosa (Carbapenem Resistant)    Organism Identification: Pseudomonas aeruginosa (Carbapenem Resistant)    Organism: Pseudomonas aeruginosa (Carbapenem Resistant)    Method Type: MELISA        Impression: 69F PMH dementia (non verbal), CVA (on plavix), seizure disorder (on keppra), HTN, DM, HLD, recent admission for UTI with milner catheter, BIBEMS for fever x1 day and low UOP, a/w GNR UTI, gross hematuria, AMADOU.   S/p milner catheter exchange 8/15 and 8/16. Hematuria improved today, mild blood-tinged urine in milner, no clots.   ucx: Carbapenem Resistant pseudomonas    Plan  -Continue milner catheter, monitor for hematuria. Irrigate as needed. Cystoscopy planning when patient is medically/Cardio stable for OR.   -Trend H/H. Plavix is on hold.   -F/u cytology    -Discussed with Medical team  -Will discuss with Dr. Caicedo.

## 2020-08-19 NOTE — PROGRESS NOTE ADULT - ATTENDING COMMENTS
as above  has pseudomonas-now with fever 101.    Advise CT urogram-never had CT-has stone on sono and may be related to uti and fever/bleed    will ultimately need cysto when afebrile on appropriate atb-on cefipime only 2 days    repeat c/s ordered for tomorrow  will try to put on or schedule next week pending cultures and clinical course  cont cefipime for now

## 2020-08-19 NOTE — PROGRESS NOTE ADULT - SUBJECTIVE AND OBJECTIVE BOX
HPI:  68 yo female PMH dementia (non verbal), CVA (on plavix), seizure disorder (on keppra), htn, DM, hld BIBEMS for fever since yesterday. As per EMS, patient was recently admitted to hospital for UTI. Discharged on Monday with a milner.   Milner was changed this admission in the ER. EMS says that family noticed milner output decreased since yesterday. Shows only 250 cc in milner over 36 hours. Also family reported patient had not had a bowel movement since her discharged. As per EMS, family reports patient is at mental baseline. On arrival milner output looks infected. (14 Aug 2020 14:06)      Allergies    No Known Allergies    Intolerances        MEDICATIONS  (STANDING):  amLODIPine   Tablet 10 milliGRAM(s) Oral daily  cefepime   IVPB 1000 milliGRAM(s) IV Intermittent every 12 hours  cloNIDine 0.1 milliGRAM(s) Oral two times a day  heparin   Injectable 5000 Unit(s) SubCutaneous every 12 hours  hydrALAZINE 25 milliGRAM(s) Oral every 8 hours  levETIRAcetam 500 milliGRAM(s) Oral two times a day  metoprolol tartrate 50 milliGRAM(s) Oral two times a day  polyethylene glycol 3350 17 Gram(s) Oral two times a day  senna 2 Tablet(s) Oral at bedtime  simvastatin 20 milliGRAM(s) Oral at bedtime  sodium chloride 0.9%. 1000 milliLiter(s) (75 mL/Hr) IV Continuous <Continuous>  vitamin A &amp; D Ointment 1 Application(s) Topical two times a day    MEDICATIONS  (PRN):  acetaminophen   Tablet .. 650 milliGRAM(s) Oral every 6 hours PRN Temp greater or equal to 38C (100.4F), Mild Pain (1 - 3)      REVIEW OF SYSTEMS:    nonverbal     VITAL SIGNS:  T(C): 36.9 (08-19-20 @ 05:35), Max: 38.1 (08-18-20 @ 17:00)  T(F): 98.5 (08-19-20 @ 05:35), Max: 100.5 (08-18-20 @ 17:00)  HR: 70 (08-19-20 @ 05:35) (65 - 77)  BP: 146/64 (08-19-20 @ 05:35) (115/65 - 158/69)  RR: 16 (08-19-20 @ 05:35) (16 - 16)  SpO2: 99% (08-19-20 @ 05:35) (99% - 100%)  Wt(kg): --    PHYSICAL EXAM:    GENERAL: not in any distress  HEENT: Neck is supple, normocephalic, atraumatic   CHEST/LUNG: Clear to percussion bilaterally; No rales, rhonchi, wheezing  HEART: Regular rate and rhythm; No murmurs, rubs, or gallops  ABDOMEN: Soft, Nontender, Nondistended; Bowel sounds present, no rebound   EXTREMITIES:  2+ Peripheral Pulses, No clubbing, cyanosis, or edema  GENITOURINARY:   SKIN: No rashes or lesions  BACK: no pressor sore   NERVOUS SYSTEM:  Alert & demented nonverbal     LABS:                         8.3    5.14  )-----------( 265      ( 19 Aug 2020 07:17 )             25.2     08-19    142  |  109<H>  |  10  ----------------------------<  136<H>  3.5   |  27  |  0.82    Ca    8.3<L>      19 Aug 2020 07:17  Phos  3.2     08-19  Mg     1.9     08-19                                Culture Results:   >100,000 CFU/ml Pseudomonas aeruginosa (Carbapenem Resistant) (08-14 @ 16:29)  Culture Results:   No growth to date. (08-14 @ 15:19)  Culture Results:   Growth in aerobic bottle: Staphylococcus hominis  Single set isolate, possible contaminant. Contact  Microbiology if susceptibility testing clinically  indicated.  "Due to technical problems, Proteus sp. will Not be reported as part of  the BCID panel until further notice"  ***Blood Panel PCR results on this specimen are available  approximately 3 hours after the Gram stain result.***  Gram stain, PCR, and/or culture results may not always  correspond due to difference in methodologies.  ************************************************************  This PCR assay was performed using [employer].  The following targets are tested for: Enterococcus,  vancomycin resistant enterococci, Listeria monocytogenes,  coagulase negative staphylococci, S. aureus,  methicillin resistant S. aureus, Streptococcus agalactiae  (Group B), S. pneumoniae, S. pyogenes (Group A),  Acinetobacter baumannii, Enterobacter cloacae, E. coli,  Klebsiella oxytoca, K. pneumoniae, Proteus sp.,  Serratia marcescens, Haemophilus influenzae,  Neisseria meningitidis, Pseudomonas aeruginosa, Candida  albicans, C. glabrata, C krusei, C parapsilosis,  C. tropicalis and the KPC resistance gene. (08-14 @ 15:19)                Radiology:

## 2020-08-19 NOTE — PROGRESS NOTE ADULT - ASSESSMENT
69F PMH dementia (non verbal), CVA (on plavix), seizure disorder (on keppra), HTN, DM, HLD, recent admission for UTI with milner catheter, BIBEMS for fever   recent uti few weeks ago for  UTI/ urinary retention /bilateral mod hydro, urine culture nondiagnostic s/p abx     UTI / urinary retention / Bilateral mod hydro with chronic milner   S/p milner catheter exchange 8/15 and 8/16    urine culture with significant amount of pseudomonas   blood culture with coag neg staph likely contaminant   Patient is for urology procedure cystoscopy to evaluate source of hematuria  continue cefepime   fu surveillance blood cultures   we will fu

## 2020-08-19 NOTE — CONSULT NOTE ADULT - SUBJECTIVE AND OBJECTIVE BOX
HPI:  HPI:  68 yo female PMH dementia (non verbal), CVA (on plavix), seizure disorder (on keppra), htn, DM, hld BIBEMS for fever since yesterday. As per EMS, patient was recently admitted to hospital for UTI. Discharged on Monday with a milner.   Imlner was changed this admission in the ER. EMS says that family noticed milner output decreased since yesterday. Shows only 250 cc in milner over 36 hours. Also family reported patient had not had a bowel movement since her discharged. As per EMS, family reports patient is at mental baseline. On arrival milner output looks infected. (14 Aug 2020 14:06)      Chief Complaint:  Patient is a 69y old  Female who presents with a chief complaint of Catheter associated UTI and AMADOU from dehydration. (19 Aug 2020 11:39)      Review of Systems:    General:  No wt loss, fevers, chills, night sweats  Eyes:  Good vision, no reported pain  ENT:  No sore throat, pain, runny nose, dysphagia  CV:  No pain, palpitations, hypo/hypertension  Resp:  No dyspnea, cough, tachypnea, wheezing  GI:  No pain, nausea, vomiting, diarrhea, constipation           Social History/Family History  SOCHX:   tobacco,  -  alcohol    FMHX: FA/MO  - contributory       Discussed with:  PMD, Family    Physical Exam:    Vital Signs:  Vital Signs Last 24 Hrs  T(C): 38.4 (19 Aug 2020 17:40), Max: 38.4 (19 Aug 2020 17:40)  T(F): 101.1 (19 Aug 2020 17:40), Max: 101.1 (19 Aug 2020 17:40)  HR: 77 (19 Aug 2020 17:40) (69 - 88)  BP: 129/77 (19 Aug 2020 17:40) (129/77 - 176/77)  BP(mean): --  RR: 17 (19 Aug 2020 17:40) (16 - 17)  SpO2: 96% (19 Aug 2020 17:40) (96% - 100%)  Daily     Daily   I&O's Summary    18 Aug 2020 07:01  -  19 Aug 2020 07:00  --------------------------------------------------------  IN: 900 mL / OUT: 1700 mL / NET: -800 mL    19 Aug 2020 07:01  -  19 Aug 2020 21:12  --------------------------------------------------------  IN: 270 mL / OUT: 1700 mL / NET: -1430 mL          Chest:  Full & symmetric excursion, no increased effort, breath sounds clear  Cardiovascular:  Regular rhythm, S1, S2, no murmur/rub/S3/S4, no carotid/femoral/abdominal bruit, radial/pedal pulses 2+  Abdomen:  Soft, non-tender, non-distended, normoactive bowel sounds, no HSM         Laboratory:                          8.3    5.14  )-----------( 265      ( 19 Aug 2020 07:17 )             25.2     08-19    142  |  109<H>  |  10  ----------------------------<  136<H>  3.5   |  27  |  0.82    Ca    8.3<L>      19 Aug 2020 07:17  Phos  3.2     08-19  Mg     1.9     08-19      CAPILLARY BLOOD GLUCOSE      POCT Blood Glucose.: 139 mg/dL (19 Aug 2020 16:30)  POCT Blood Glucose.: 141 mg/dL (19 Aug 2020 10:46)  POCT Blood Glucose.: 147 mg/dL (19 Aug 2020 07:55)      Assessment:  I am asked to evaluate this patient for preprocedure cardiovascular risk assessment  The patient is scheduled to have a urologic procedure  The diagnostic echocardiogram has a preserved ejection fraction and no significant valvular heart disease  all pertinent labs are reviewed  the patient has multiple comorbidities which pose an increased risk, however the patient's cardiovascular risk status remained stable and therefore acceptable for this urologic procedure  He continues to have potassium supplementation as well as multiple medical therapies to optimize his systolic blood pressure are proving effective

## 2020-08-19 NOTE — PROGRESS NOTE ADULT - ASSESSMENT
68 yo female PMH dementia (non verbal), CVA (on plavix), seizure disorder (on keppra), htn, DM, hld BIBEMS for fever since yesterday. As per EMS, patient was recently admitted to hospital for UTI. Discharged on Monday with a milner. EMS says that family noticed milner output decreased since yesterday. Shows only 250 cc in milner over 36 hours. Also family reported patient had not had a bowel movement since her discharged. As per EMS, family reports patient is at mental baseline.    sepsis 2/2 uti and POA  Likely catheter associated UTI. urine cx growing pseudomonas will change antibx to cefepime . consult Mary for IS saw patient last admission one week ago .   Milner changed in ER.   afebrile currently and wbc stable.  blood cx coag negative staph likely contaminant        hematuria   - renal us shows clots but cbc stable   -  hold asa/plavix   urology consult noted anthony need cystoscopy will cardiology clearance    f/u urine cytology  8/18/2020  hgb low at 7.8  wlll type and cross  8/19/2020 hgb 8.3 ( no blood given)    AMADOU resolved     constipation  - resolved     hypokalemia   - replace as needed     HTN  Continue Norvasc, lopressor hydralazine ( increase as needed)       HLD;  Zocor at home dose.      stage 3 decub: will get wound care consult , per nursing was POA   seizure    PT eval. decreased Keppra 750 mg bid to 500 mg as per last hospital stay   No need for CT head,   stop Trazadone as this can cause retention and sedation.      left humeral fracture  will get orthopedic  to reevaluate  8/18/2020 reviewed ortho note and will d/c sling repeat xray in one month 68 yo female PMH dementia (non verbal), CVA (on plavix), seizure disorder (on keppra), htn, DM, hld BIBEMS for fever since yesterday. As per EMS, patient was recently admitted to hospital for UTI. Discharged on Monday with a milner. EMS says that family noticed milner output decreased since yesterday. Shows only 250 cc in milner over 36 hours. Also family reported patient had not had a bowel movement since her discharged. As per EMS, family reports patient is at mental baseline.    sepsis 2/2 uti and POA  Likely catheter associated UTI. urine cx growing pseudomonas will change antibx to cefepime . consult Mary for IS saw patient last admission one week ago .   Milner changed in ER.   afebrile currently and wbc stable.  blood cx coag negative staph likely contaminant   8/19/2020 carbapenem  resistant pseudomonas will change to Cefepime 2 gram iv q12 based on d/w ID and [facility] ( pharmacy )          hematuria   - renal us shows clots but cbc stable   -  hold asa/plavix   urology consult noted anthony need cystoscopy will cardiology clearance    f/u urine cytology  8/18/2020  hgb low at 7.8  wlll type and cross  8/19/2020 hgb 8.3 ( no blood given)    AMADOU resolved     constipation  - resolved     hypokalemia   - replace as needed     HTN  Continue Norvasc, lopressor hydralazine ( increase as needed)       HLD;  Zocor at home dose.      stage 3 decub: will get wound care consult , per nursing was POA   seizure    PT eval. decreased Keppra 750 mg bid to 500 mg as per last hospital stay   No need for CT head,   stop Trazadone as this can cause retention and sedation.      left humeral fracture  will get orthopedic  to reevaluate  8/18/2020 reviewed ortho note and will d/c sling repeat xray in one month 68 yo female PMH dementia (non verbal), CVA (on plavix), seizure disorder (on keppra), htn, DM, hld BIBEMS for fever since yesterday. As per EMS, patient was recently admitted to hospital for UTI. Discharged on Monday with a milner. EMS says that family noticed milner output decreased since yesterday. Shows only 250 cc in milner over 36 hours. Also family reported patient had not had a bowel movement since her discharged. As per EMS, family reports patient is at mental baseline.    sepsis 2/2 uti and POA  Likely catheter associated UTI. urine cx growing pseudomonas will change antibx to cefepime . consult Mary for IS saw patient last admission one week ago .   Milner changed in ER.   afebrile currently and wbc stable.  blood cx coag negative staph likely contaminant   8/19/2020 carbapenem  resistant pseudomonas will change to Cefepime 2 gram iv q12 based on d/w ID and [facility] ( pharmacy )      hematuria   - renal us shows clots but cbc stable   -  hold asa/plavix   urology consult noted anthony need cystoscopy will cardiology clearance    f/u urine cytology  8/18/2020  hgb low at 7.8  wlll type and cross  8/19/2020 hgb 8.3 ( no blood given), hematuria improving     AMADOU resolved     constipation  - resolved     hypokalemia   - replace as needed     HTN  Continue Norvasc, lopressor hydralazine ( increase as needed)       HLD;  Zocor at home dose.      stage 3 decub: will get wound care consult , per nursing was POA    wound care order completed       seizure    PT eval. decreased Keppra 750 mg bid to 500 mg as per last hospital stay   No need for CT head,   stop Trazadone as this can cause retention and sedation.      left humeral fracture  will get orthopedic  to reevaluate  8/18/2020 reviewed ortho note and will d/c sling repeat xray in one month

## 2020-08-20 DIAGNOSIS — M19.90 UNSPECIFIED OSTEOARTHRITIS, UNSPECIFIED SITE: ICD-10-CM

## 2020-08-20 DIAGNOSIS — T83.511S INFECTION AND INFLAMMATORY REACTION DUE TO INDWELLING URETHRAL CATHETER, SEQUELA: ICD-10-CM

## 2020-08-20 DIAGNOSIS — H53.9 UNSPECIFIED VISUAL DISTURBANCE: ICD-10-CM

## 2020-08-20 DIAGNOSIS — G40.909 EPILEPSY, UNSPECIFIED, NOT INTRACTABLE, WITHOUT STATUS EPILEPTICUS: ICD-10-CM

## 2020-08-20 DIAGNOSIS — M10.9 GOUT, UNSPECIFIED: ICD-10-CM

## 2020-08-20 LAB
ANION GAP SERPL CALC-SCNC: 5 MMOL/L — SIGNIFICANT CHANGE UP (ref 5–17)
BUN SERPL-MCNC: 14 MG/DL — SIGNIFICANT CHANGE UP (ref 7–23)
CALCIUM SERPL-MCNC: 8.2 MG/DL — LOW (ref 8.5–10.1)
CHLORIDE SERPL-SCNC: 109 MMOL/L — HIGH (ref 96–108)
CO2 SERPL-SCNC: 25 MMOL/L — SIGNIFICANT CHANGE UP (ref 22–31)
CREAT SERPL-MCNC: 0.82 MG/DL — SIGNIFICANT CHANGE UP (ref 0.5–1.3)
GLUCOSE BLDC GLUCOMTR-MCNC: 118 MG/DL — HIGH (ref 70–99)
GLUCOSE BLDC GLUCOMTR-MCNC: 150 MG/DL — HIGH (ref 70–99)
GLUCOSE BLDC GLUCOMTR-MCNC: 163 MG/DL — HIGH (ref 70–99)
GLUCOSE BLDC GLUCOMTR-MCNC: 188 MG/DL — HIGH (ref 70–99)
GLUCOSE SERPL-MCNC: 167 MG/DL — HIGH (ref 70–99)
HCT VFR BLD CALC: 24.6 % — LOW (ref 34.5–45)
HGB BLD-MCNC: 7.9 G/DL — LOW (ref 11.5–15.5)
MAGNESIUM SERPL-MCNC: 1.9 MG/DL — SIGNIFICANT CHANGE UP (ref 1.6–2.6)
MCHC RBC-ENTMCNC: 29.2 PG — SIGNIFICANT CHANGE UP (ref 27–34)
MCHC RBC-ENTMCNC: 32.1 GM/DL — SIGNIFICANT CHANGE UP (ref 32–36)
MCV RBC AUTO: 90.8 FL — SIGNIFICANT CHANGE UP (ref 80–100)
NON-GYNECOLOGICAL CYTOLOGY STUDY: SIGNIFICANT CHANGE UP
NRBC # BLD: 0 /100 WBCS — SIGNIFICANT CHANGE UP (ref 0–0)
PHOSPHATE SERPL-MCNC: 2.7 MG/DL — SIGNIFICANT CHANGE UP (ref 2.5–4.5)
PLATELET # BLD AUTO: 287 K/UL — SIGNIFICANT CHANGE UP (ref 150–400)
POTASSIUM SERPL-MCNC: 3.3 MMOL/L — LOW (ref 3.5–5.3)
POTASSIUM SERPL-SCNC: 3.3 MMOL/L — LOW (ref 3.5–5.3)
RBC # BLD: 2.71 M/UL — LOW (ref 3.8–5.2)
RBC # FLD: 14.2 % — SIGNIFICANT CHANGE UP (ref 10.3–14.5)
SODIUM SERPL-SCNC: 139 MMOL/L — SIGNIFICANT CHANGE UP (ref 135–145)
WBC # BLD: 6.32 K/UL — SIGNIFICANT CHANGE UP (ref 3.8–10.5)
WBC # FLD AUTO: 6.32 K/UL — SIGNIFICANT CHANGE UP (ref 3.8–10.5)

## 2020-08-20 PROCEDURE — 99232 SBSQ HOSP IP/OBS MODERATE 35: CPT

## 2020-08-20 PROCEDURE — 74177 CT ABD & PELVIS W/CONTRAST: CPT | Mod: 26

## 2020-08-20 RX ORDER — BACITRACIN ZINC 500 UNIT/G
1 OINTMENT IN PACKET (EA) TOPICAL DAILY
Refills: 0 | Status: DISCONTINUED | OUTPATIENT
Start: 2020-08-20 | End: 2020-08-24

## 2020-08-20 RX ORDER — POTASSIUM CHLORIDE 20 MEQ
10 PACKET (EA) ORAL
Refills: 0 | Status: COMPLETED | OUTPATIENT
Start: 2020-08-20 | End: 2020-08-20

## 2020-08-20 RX ADMIN — Medication 25 MILLIGRAM(S): at 21:50

## 2020-08-20 RX ADMIN — CEFEPIME 100 MILLIGRAM(S): 1 INJECTION, POWDER, FOR SOLUTION INTRAMUSCULAR; INTRAVENOUS at 05:40

## 2020-08-20 RX ADMIN — POLYETHYLENE GLYCOL 3350 17 GRAM(S): 17 POWDER, FOR SOLUTION ORAL at 05:39

## 2020-08-20 RX ADMIN — Medication 100 MILLIEQUIVALENT(S): at 13:13

## 2020-08-20 RX ADMIN — Medication 1 APPLICATION(S): at 05:40

## 2020-08-20 RX ADMIN — Medication 100 MILLIEQUIVALENT(S): at 16:11

## 2020-08-20 RX ADMIN — Medication 25 MILLIGRAM(S): at 05:39

## 2020-08-20 RX ADMIN — Medication 0.1 MILLIGRAM(S): at 18:14

## 2020-08-20 RX ADMIN — SIMVASTATIN 20 MILLIGRAM(S): 20 TABLET, FILM COATED ORAL at 21:50

## 2020-08-20 RX ADMIN — SENNA PLUS 2 TABLET(S): 8.6 TABLET ORAL at 21:50

## 2020-08-20 RX ADMIN — Medication 50 MILLIGRAM(S): at 05:39

## 2020-08-20 RX ADMIN — AMLODIPINE BESYLATE 10 MILLIGRAM(S): 2.5 TABLET ORAL at 05:39

## 2020-08-20 RX ADMIN — Medication 0.1 MILLIGRAM(S): at 05:39

## 2020-08-20 RX ADMIN — LEVETIRACETAM 500 MILLIGRAM(S): 250 TABLET, FILM COATED ORAL at 05:39

## 2020-08-20 RX ADMIN — Medication 1 APPLICATION(S): at 13:12

## 2020-08-20 RX ADMIN — Medication 25 MILLIGRAM(S): at 13:12

## 2020-08-20 RX ADMIN — HEPARIN SODIUM 5000 UNIT(S): 5000 INJECTION INTRAVENOUS; SUBCUTANEOUS at 05:40

## 2020-08-20 RX ADMIN — CEFEPIME 100 MILLIGRAM(S): 1 INJECTION, POWDER, FOR SOLUTION INTRAMUSCULAR; INTRAVENOUS at 18:14

## 2020-08-20 RX ADMIN — HEPARIN SODIUM 5000 UNIT(S): 5000 INJECTION INTRAVENOUS; SUBCUTANEOUS at 18:14

## 2020-08-20 RX ADMIN — Medication 100 MILLIEQUIVALENT(S): at 14:56

## 2020-08-20 RX ADMIN — Medication 50 MILLIGRAM(S): at 18:13

## 2020-08-20 RX ADMIN — SODIUM CHLORIDE 75 MILLILITER(S): 9 INJECTION INTRAMUSCULAR; INTRAVENOUS; SUBCUTANEOUS at 11:00

## 2020-08-20 RX ADMIN — Medication 1 APPLICATION(S): at 18:15

## 2020-08-20 RX ADMIN — LEVETIRACETAM 500 MILLIGRAM(S): 250 TABLET, FILM COATED ORAL at 18:13

## 2020-08-20 NOTE — PROGRESS NOTE ADULT - ASSESSMENT
70 yo female PMH dementia (non verbal), CVA (on plavix), seizure disorder (on keppra), htn, DM, hld BIBEMS for fever since yesterday. As per EMS, patient was recently admitted to hospital for UTI. Discharged on Monday with a milner. EMS says that family noticed milner output decreased since yesterday. Shows only 250 cc in milner over 36 hours. Also family reported patient had not had a bowel movement since her discharged. As per EMS, family reports patient is at mental baseline.    sepsis 2/2 uti and POA  Likely catheter associated UTI. urine cx growing pseudomonas will change antibx to cefepime . consult Mary for IS saw patient last admission one week ago .   Milner changed in ER.   afebrile currently and wbc stable.  blood cx coag negative staph likely contaminant   8/19/2020 carbapenem  resistant pseudomonas will change to Cefepime 2 gram iv q12 based on d/w ID and [facility] ( pharmacy )      hematuria   - renal us shows clots but cbc stable   -  hold asa/plavix   urology consult noted anthony need cystoscopy will cardiology clearance    f/u urine cytology  8/18/2020  hgb low at 7.8  wlll type and cross  8/19/2020 hgb 8.3 ( no blood given), hematuria improving   8/20/2020 hgb at 7.9 will type and cross    Hypokalemia : will be replaced     AMADOU resolved     constipation  - resolved     hypokalemia   - replace as needed     HTN  Continue Norvasc, lopressor hydralazine ( increase as needed)    HLD  Zocor at home dose.      stage 3 decub: will get wound care consult , per nursing was POA    wound care order completed   continue with wound care    seizure    PT eval. decreased Keppra 750 mg bid to 500 mg as per last hospital stay   No need for CT head,   stop Trazadone as this can cause retention and sedation.      left humeral fracture  will get orthopedic  to reevaluate  8/18/2020 reviewed ortho note and will d/c sling repeat xray in one month

## 2020-08-20 NOTE — PROGRESS NOTE ADULT - ASSESSMENT
69F PMH dementia (non verbal), CVA (on plavix), seizure disorder (on keppra), HTN, DM, HLD, recent admission for UTI with milner catheter, BIBEMS for fever   recent uti few weeks ago for  UTI/ urinary retention /bilateral mod hydro, urine culture nondiagnostic s/p abx     UTI / urinary retention / Bilateral mod hydro with chronic milner   S/p milner catheter exchange 8/15 and 8/16    urine culture with significant amount of pseudomonas   blood culture with coag neg staph likely contaminant   Patient is for urology procedure cystoscopy to evaluate source of hematuria  continue cefepime   fu surveillance blood cultures   we will fu 69F PMH dementia (non verbal), CVA (on plavix), seizure disorder (on keppra), HTN, DM, HLD, recent admission for UTI with milner catheter, BIBEMS for fever   recent uti few weeks ago for  UTI/ urinary retention /bilateral mod hydro, urine culture nondiagnostic s/p abx   UTI / urinary retention / Bilateral mod hydro with chronic mliner   S/p milner catheter exchange 8/15 and 8/16  urine culture with significant amount of pseudomonas   blood culture with coag neg staph likely contaminant   Patient is for urology procedure cystoscopy to evaluate source of hematuria  continue cefepime   fu surveillance blood cultures

## 2020-08-20 NOTE — PROGRESS NOTE ADULT - SUBJECTIVE AND OBJECTIVE BOX
HPI:  70 yo female PMH dementia (non verbal), CVA (on plavix), seizure disorder (on keppra), htn, DM, hld BIBEMS for fever since yesterday. As per EMS, patient was recently admitted to hospital for UTI. Discharged on Monday with a milner.   Milner was changed this admission in the ER. EMS says that family noticed milner output decreased since yesterday. Shows only 250 cc in milner over 36 hours. Also family reported patient had not had a bowel movement since her discharged. As per EMS, family reports patient is at mental baseline. On arrival milner output looks infected. (14 Aug 2020 14:06)      Allergies    No Known Allergies    Intolerances        MEDICATIONS  (STANDING):  amLODIPine   Tablet 10 milliGRAM(s) Oral daily  BACItracin   Ointment 1 Application(s) Topical daily  cefepime   IVPB 2000 milliGRAM(s) IV Intermittent every 12 hours  cloNIDine 0.1 milliGRAM(s) Oral two times a day  heparin   Injectable 5000 Unit(s) SubCutaneous every 12 hours  hydrALAZINE 25 milliGRAM(s) Oral every 8 hours  levETIRAcetam 500 milliGRAM(s) Oral two times a day  metoprolol tartrate 50 milliGRAM(s) Oral two times a day  polyethylene glycol 3350 17 Gram(s) Oral two times a day  senna 2 Tablet(s) Oral at bedtime  simvastatin 20 milliGRAM(s) Oral at bedtime  sodium chloride 0.9%. 1000 milliLiter(s) (75 mL/Hr) IV Continuous <Continuous>  vitamin A &amp; D Ointment 1 Application(s) Topical two times a day    MEDICATIONS  (PRN):  acetaminophen   Tablet .. 650 milliGRAM(s) Oral every 6 hours PRN Temp greater or equal to 38C (100.4F), Mild Pain (1 - 3)      REVIEW OF SYSTEMS:    CONSTITUTIONAL: No fever, chills, weight loss, or fatigue  HEENT: No sore throat, runny nose, ear ache  RESPIRATORY: No cough, wheezing, No shortness of breath  CARDIOVASCULAR: No chest pain, palpitations, dizziness  GASTROINTESTINAL: No abdominal pain. No nausea, vomiting, diarrhea  GENITOURINARY: No dysuria, increase frequency, hematuria, or incontinence  NEUROLOGICAL: No headaches, memory loss, loss of strength, numbness, or tremors, no weakness  EXTREMITY: No pedal edema BLE  SKIN: No itching, burning, rashes, or lesions     VITAL SIGNS:  T(C): 37.2 (08-20-20 @ 13:00), Max: 37.7 (08-20-20 @ 09:00)  T(F): 99 (08-20-20 @ 13:00), Max: 99.9 (08-20-20 @ 09:00)  HR: 87 (08-20-20 @ 13:00) (80 - 91)  BP: 161/74 (08-20-20 @ 13:00) (121/68 - 161/74)  RR: 17 (08-20-20 @ 13:00) (16 - 18)  SpO2: 100% (08-20-20 @ 13:00) (99% - 100%)  Wt(kg): --    PHYSICAL EXAM:    GENERAL: not in any distress  HEENT: Neck is supple, normocephalic, atraumatic   CHEST/LUNG: Clear to auscultation bilaterally; No rales, rhonchi, wheezing  HEART: Regular rate and rhythm; No murmurs, rubs, or gallops  ABDOMEN: Soft, Nontender, Nondistended; Bowel sounds present, no rebound   EXTREMITIES:  2+ Peripheral Pulses, No clubbing, cyanosis, or edema  GENITOURINARY:   SKIN: No rashes or lesions  BACK: no pressor sore   NERVOUS SYSTEM:  Alert & Oriented X3, Good concentration  PSYCH: normal affect     LABS:                         7.9    6.32  )-----------( 287      ( 20 Aug 2020 08:13 )             24.6     08-20    139  |  109<H>  |  14  ----------------------------<  167<H>  3.3<L>   |  25  |  0.82    Ca    8.2<L>      20 Aug 2020 08:13  Phos  2.7     08-20  Mg     1.9     08-20                                Culture Results:   >100,000 CFU/ml Pseudomonas aeruginosa (Carbapenem Resistant) (08-14 @ 16:29)  Culture Results:   No Growth Final (08-14 @ 15:19)  Culture Results:   Growth in aerobic bottle: Staphylococcus hominis  Single set isolate, possible contaminant. Contact  Microbiology if susceptibility testing clinically  indicated.  "Due to technical problems, Proteus sp. will Not be reported as part of  the BCID panel until further notice"  ***Blood Panel PCR results on this specimen are available  approximately 3 hours after the Gram stain result.***  Gram stain, PCR, and/or culture results may not always  correspond due to difference in methodologies.  ************************************************************  This PCR assay was performed using [employer].  The following targets are tested for: Enterococcus,  vancomycin resistant enterococci, Listeria monocytogenes,  coagulase negative staphylococci, S. aureus,  methicillin resistant S. aureus, Streptococcus agalactiae  (Group B), S. pneumoniae, S. pyogenes (Group A),  Acinetobacter baumannii, Enterobacter cloacae, E. coli,  Klebsiella oxytoca, K. pneumoniae, Proteus sp.,  Serratia marcescens, Haemophilus influenzae,  Neisseria meningitidis, Pseudomonas aeruginosa, Candida  albicans, C. glabrata, C krusei, C parapsilosis,  C. tropicalis and the KPC resistance gene. (08-14 @ 15:19)                Radiology: HPI:  68 yo female PMH dementia (non verbal), CVA (on plavix), seizure disorder (on keppra), htn, DM, hld BIBEMS for fever since yesterday. As per EMS, patient was recently admitted to hospital for UTI. Discharged on Monday with a milner.   Milner was changed this admission in the ER. EMS says that family noticed milner output decreased since yesterday. Shows only 250 cc in milner over 36 hours. Also family reported patient had not had a bowel movement since her discharged. As per EMS, family reports patient is at mental baseline. On arrival milner output looks infected. (14 Aug 2020 14:06)  all events noted   no info from patient   Allergies    No Known Allergies    Intolerances        MEDICATIONS  (STANDING):  amLODIPine   Tablet 10 milliGRAM(s) Oral daily  BACItracin   Ointment 1 Application(s) Topical daily  cefepime   IVPB 2000 milliGRAM(s) IV Intermittent every 12 hours  cloNIDine 0.1 milliGRAM(s) Oral two times a day  heparin   Injectable 5000 Unit(s) SubCutaneous every 12 hours  hydrALAZINE 25 milliGRAM(s) Oral every 8 hours  levETIRAcetam 500 milliGRAM(s) Oral two times a day  metoprolol tartrate 50 milliGRAM(s) Oral two times a day  polyethylene glycol 3350 17 Gram(s) Oral two times a day  senna 2 Tablet(s) Oral at bedtime  simvastatin 20 milliGRAM(s) Oral at bedtime  sodium chloride 0.9%. 1000 milliLiter(s) (75 mL/Hr) IV Continuous <Continuous>  vitamin A &amp; D Ointment 1 Application(s) Topical two times a day    MEDICATIONS  (PRN):  acetaminophen   Tablet .. 650 milliGRAM(s) Oral every 6 hours PRN Temp greater or equal to 38C (100.4F), Mild Pain (1 - 3)      REVIEW OF SYSTEMS:    unable to assess     VITAL SIGNS:  T(C): 37.2 (08-20-20 @ 13:00), Max: 37.7 (08-20-20 @ 09:00)  T(F): 99 (08-20-20 @ 13:00), Max: 99.9 (08-20-20 @ 09:00)  HR: 87 (08-20-20 @ 13:00) (80 - 91)  BP: 161/74 (08-20-20 @ 13:00) (121/68 - 161/74)  RR: 17 (08-20-20 @ 13:00) (16 - 18)  SpO2: 100% (08-20-20 @ 13:00) (99% - 100%)  Wt(kg): --    PHYSICAL EXAM:    GENERAL: not in any distress  HEENT: Neck is supple, normocephalic, atraumatic   CHEST/LUNG: Clear to auscultation bilaterally; No rales, rhonchi, wheezing  HEART: Regular rate and rhythm; No murmurs, rubs, or gallops  ABDOMEN: Soft, Nontender, Nondistended; Bowel sounds present, no rebound   EXTREMITIES:  2+ Peripheral Pulses, No clubbing, cyanosis, or edema  GENITOURINARY: milner  SKIN: No rashes or lesions  BACK: stage 3 decubiti bilateral cheeks clean   seen with nursing   NERVOUS SYSTEM:  arousable     LABS:                         7.9    6.32  )-----------( 287      ( 20 Aug 2020 08:13 )             24.6     08-20    139  |  109<H>  |  14  ----------------------------<  167<H>  3.3<L>   |  25  |  0.82    Ca    8.2<L>      20 Aug 2020 08:13  Phos  2.7     08-20  Mg     1.9     08-20                                Culture Results:   >100,000 CFU/ml Pseudomonas aeruginosa (Carbapenem Resistant) (08-14 @ 16:29)  Culture Results:   No Growth Final (08-14 @ 15:19)  Culture Results:   Growth in aerobic bottle: Staphylococcus hominis  Single set isolate, possible contaminant. Contact  Microbiology if susceptibility testing clinically  indicated.  "Due to technical problems, Proteus sp. will Not be reported as part of  the BCID panel until further notice"  ***Blood Panel PCR results on this specimen are available  approximately 3 hours after the Gram stain result.***  Gram stain, PCR, and/or culture results may not always  correspond due to difference in methodologies.  ************************************************************  This PCR assay was performed using [employer].  The following targets are tested for: Enterococcus,  vancomycin resistant enterococci, Listeria monocytogenes,  coagulase negative staphylococci, S. aureus,  methicillin resistant S. aureus, Streptococcus agalactiae  (Group B), S. pneumoniae, S. pyogenes (Group A),  Acinetobacter baumannii, Enterobacter cloacae, E. coli,  Klebsiella oxytoca, K. pneumoniae, Proteus sp.,  Serratia marcescens, Haemophilus influenzae,  Neisseria meningitidis, Pseudomonas aeruginosa, Candida  albicans, C. glabrata, C krusei, C parapsilosis,  C. tropicalis and the KPC resistance gene. (08-14 @ 15:19)                Radiology:

## 2020-08-20 NOTE — PROGRESS NOTE ADULT - SUBJECTIVE AND OBJECTIVE BOX
I am inheriting patient on today 8/17/2020    Patient is a 69y old  Female who presents with a chief complaint of Catheter associated UTI and AMADOU from dehydration. (14 Aug 2020 14:06)      INTERVAL HPI/OVERNIGHT EVENTS: none    MEDICATIONS  (STANDING):  amLODIPine   Tablet 10 milliGRAM(s) Oral daily  BACItracin   Ointment 1 Application(s) Topical daily  cefepime   IVPB 2000 milliGRAM(s) IV Intermittent every 12 hours  cloNIDine 0.1 milliGRAM(s) Oral two times a day  heparin   Injectable 5000 Unit(s) SubCutaneous every 12 hours  hydrALAZINE 25 milliGRAM(s) Oral every 8 hours  levETIRAcetam 500 milliGRAM(s) Oral two times a day  metoprolol tartrate 50 milliGRAM(s) Oral two times a day  polyethylene glycol 3350 17 Gram(s) Oral two times a day  senna 2 Tablet(s) Oral at bedtime  simvastatin 20 milliGRAM(s) Oral at bedtime  sodium chloride 0.9%. 1000 milliLiter(s) (75 mL/Hr) IV Continuous <Continuous>  vitamin A &amp; D Ointment 1 Application(s) Topical two times a day    MEDICATIONS  (PRN):  acetaminophen   Tablet .. 650 milliGRAM(s) Oral every 6 hours PRN Temp greater or equal to 38C (100.4F), Mild Pain (1 - 3)    Allergies    No Known Allergies    Intolerances    Vital Signs Last 24 Hrs  T(C): 37.7 (20 Aug 2020 09:00), Max: 38.4 (19 Aug 2020 17:40)  T(F): 99.9 (20 Aug 2020 09:00), Max: 101.1 (19 Aug 2020 17:40)  HR: 91 (20 Aug 2020 05:41) (77 - 91)  BP: 134/59 (20 Aug 2020 05:41) (121/68 - 134/59)  BP(mean): --  RR: 16 (20 Aug 2020 05:41) (16 - 18)  SpO2: 99% (20 Aug 2020 05:41) (96% - 99%)      PHYSICAL EXAM:  GENERAL: NAD, well-groomed, well-developed  HEAD:  Atraumatic, Normocephalic  EYES: EOMI, PERRLA, conjunctiva and sclera clear  ENMT: No tonsillar erythema, exudates, or enlargement; Moist mucous membranes, Good dentition, No lesions  NECK: Supple, No JVD, Normal thyroid  NERVOUS SYSTEM: FROM x4 non verbal alert   CHEST/LUNG: Clear to percussion bilaterally; No rales, rhonchi, wheezing, or rubs  HEART: Regular rate and rhythm; No murmurs, rubs, or gallops  ABDOMEN: Soft, Nontender, Nondistended; Bowel sounds present  EXTREMITIES:  2+ Peripheral Pulses, No clubbing, cyanosis, or edema  SKIN: stage 3 buttock      LABS:                                                              7.9    6.32  )-----------( 287      ( 20 Aug 2020 08:13 )             24.6   08-20    139  |  109<H>  |  14  ----------------------------<  167<H>  3.3<L>   |  25  |  0.82    Ca    8.2<L>      20 Aug 2020 08:13  Phos  2.7     08-20  Mg     1.9     08-20        CAPILLARY BLOOD GLUCOSE      POCT Blood Glucose.: 150 mg/dL (20 Aug 2020 12:06)  POCT Blood Glucose.: 188 mg/dL (20 Aug 2020 07:41)  POCT Blood Glucose.: 159 mg/dL (19 Aug 2020 22:10)  POCT Blood Glucose.: 139 mg/dL (19 Aug 2020 16:30)      RADIOLOGY & ADDITIONAL TESTS:    Imaging Personally Reviewed:  [x ] YES  [ ] NO    Consultant(s) Notes Reviewed:  [ ] YES  [ ] NO    Care Discussed with Consultants/Other Providers [ ] YES  [ ] NO

## 2020-08-20 NOTE — PROGRESS NOTE ADULT - SUBJECTIVE AND OBJECTIVE BOX
Patient seen and examined at bedside in no distress.  Nonverbal.  Febrile to 101.1 last evening.    T(F): 98.7 (08-20-20 @ 05:41), Max: 101.1 (08-19-20 @ 17:40)  HR: 91 (08-20-20 @ 05:41) (77 - 91)  BP: 134/59 (08-20-20 @ 05:41) (121/68 - 176/77)  RR: 16 (08-20-20 @ 05:41) (16 - 18)  SpO2: 99% (08-20-20 @ 05:41) (96% - 100%)    PHYSICAL EXAM:  General: Resting, nonverbal  CV: +S1S2 regular rate and rhythm  Lung: Respirations nonlabored  Abdomen: Soft, NTND  Extremities: No pedal edema b/l, slightly contracted  : Milner catheter in place draining yellow urine with minimal sediment, no hematuria noted. Output: 2700cc/24hrs. No suprapubic tenderness     LABS:                        7.9    6.32  )-----------( 287      ( 20 Aug 2020 08:13 )             24.6     08-20    139  |  109<H>  |  14  ----------------------------<  167<H>  3.3<L>   |  25  |  0.82    Ca    8.2<L>      20 Aug 2020 08:13  Phos  2.7     08-20  Mg     1.9     08-20      A/P: 69F PMH dementia (non verbal), CVA (plavix), seizure disorder (on keppra), HTN, DM, HLD, recent admission for UTI with milner catheter, a/w UTI (most recent urine cx: carbapenem resistant pseudomonas), gross hematuria (resolved), AMADOU, s/p milner catheter exchange 8/15 and 8/16. Febrile.   - f/u CT urogram today  - cysto planning once medically stable, afebrile, and on appropriate antibiotics per ID  - antipyretics PRN  - continue milner catheter, monitor UOP  - cardio note appreciated; "CV risk status...acceptable for this urologic procedure"  - continue medical management and supportive care

## 2020-08-20 NOTE — PROGRESS NOTE ADULT - SUBJECTIVE AND OBJECTIVE BOX
68 yo female PMH dementia (non verbal), CVA (on plavix), seizure disorder (on keppra), htn, DM, hld BIBEMS for fever since yesterday. As per EMS, patient was recently admitted to hospital for UTI. Discharged on Monday with a milner.   Milner was changed this admission in the ER. EMS says that family noticed milner output decreased since yesterday. Shows only 250 cc in milner over 36 hours. Also family reported patient had not had a bowel movement since her discharged. As per EMS, family reports patient is at mental baseline. On arrival milner output looks infected. (14 Aug 2020 14:06)      Chief Complaint:  Patient is a 69y old  Female who presents with a chief complaint of Catheter associated UTI and AMADOU from dehydration. (19 Aug 2020 11:39)      Review of Systems:    General:  No wt loss, fevers, chills, night sweats  Eyes:  Good vision, no reported pain  ENT:  No sore throat, pain, runny nose, dysphagia  CV:  No pain, palpitations, hypo/hypertension  Resp:  No dyspnea, cough, tachypnea, wheezing  GI:  No pain, nausea, vomiting, diarrhea, constipation           Social History/Family History  SOCHX:   tobacco,  -  alcohol    FMHX: FA/MO  - contributory       Discussed with:  PMD, Family    Physical Exam:    Vital Signs:  Vital Signs Last 24 Hrs  T(C): 38.4 (19 Aug 2020 17:40), Max: 38.4 (19 Aug 2020 17:40)  T(F): 101.1 (19 Aug 2020 17:40), Max: 101.1 (19 Aug 2020 17:40)  HR: 77 (19 Aug 2020 17:40) (69 - 88)  BP: 129/77 (19 Aug 2020 17:40) (129/77 - 176/77)  BP(mean): --  RR: 17 (19 Aug 2020 17:40) (16 - 17)  SpO2: 96% (19 Aug 2020 17:40) (96% - 100%)  Daily     Daily   I&O's Summary    18 Aug 2020 07:01  -  19 Aug 2020 07:00  --------------------------------------------------------  IN: 900 mL / OUT: 1700 mL / NET: -800 mL    19 Aug 2020 07:01  -  19 Aug 2020 21:12  --------------------------------------------------------  IN: 270 mL / OUT: 1700 mL / NET: -1430 mL          Chest:  Full & symmetric excursion, no increased effort, breath sounds clear  Cardiovascular:  Regular rhythm, S1, S2, no murmur/rub/S3/S4, no carotid/femoral/abdominal bruit, radial/pedal pulses 2+  Abdomen:  Soft, non-tender, non-distended, normoactive bowel sounds, no HSM         Laboratory:                          8.3    5.14  )-----------( 265      ( 19 Aug 2020 07:17 )             25.2     08-19    142  |  109<H>  |  10  ----------------------------<  136<H>  3.5   |  27  |  0.82    Ca    8.3<L>      19 Aug 2020 07:17  Phos  3.2     08-19  Mg     1.9     08-19      CAPILLARY BLOOD GLUCOSE      POCT Blood Glucose.: 139 mg/dL (19 Aug 2020 16:30)  POCT Blood Glucose.: 141 mg/dL (19 Aug 2020 10:46)  POCT Blood Glucose.: 147 mg/dL (19 Aug 2020 07:55)      Assessment:  I am asked to evaluate this patient for preprocedure cardiovascular risk assessment  The patient is scheduled to have a urologic procedure  The diagnostic echocardiogram has a preserved ejection fraction and no significant valvular heart disease  all pertinent labs are reviewed  the patient has multiple comorbidities which pose an increased risk, however the patient's cardiovascular risk status remained stable and therefore acceptable for this urologic procedure  He continues to have potassium supplementation as well as multiple medical therapies to optimize his systolic blood pressure are proving effective

## 2020-08-21 LAB
ANION GAP SERPL CALC-SCNC: 7 MMOL/L — SIGNIFICANT CHANGE UP (ref 5–17)
BLD GP AB SCN SERPL QL: SIGNIFICANT CHANGE UP
BUN SERPL-MCNC: 10 MG/DL — SIGNIFICANT CHANGE UP (ref 7–23)
CALCIUM SERPL-MCNC: 9.1 MG/DL — SIGNIFICANT CHANGE UP (ref 8.5–10.1)
CHLORIDE SERPL-SCNC: 107 MMOL/L — SIGNIFICANT CHANGE UP (ref 96–108)
CO2 SERPL-SCNC: 26 MMOL/L — SIGNIFICANT CHANGE UP (ref 22–31)
CREAT SERPL-MCNC: 0.71 MG/DL — SIGNIFICANT CHANGE UP (ref 0.5–1.3)
GLUCOSE BLDC GLUCOMTR-MCNC: 119 MG/DL — HIGH (ref 70–99)
GLUCOSE BLDC GLUCOMTR-MCNC: 136 MG/DL — HIGH (ref 70–99)
GLUCOSE BLDC GLUCOMTR-MCNC: 157 MG/DL — HIGH (ref 70–99)
GLUCOSE BLDC GLUCOMTR-MCNC: 168 MG/DL — HIGH (ref 70–99)
GLUCOSE SERPL-MCNC: 121 MG/DL — HIGH (ref 70–99)
HCT VFR BLD CALC: 30.8 % — LOW (ref 34.5–45)
HGB BLD-MCNC: 10.2 G/DL — LOW (ref 11.5–15.5)
MAGNESIUM SERPL-MCNC: 2.1 MG/DL — SIGNIFICANT CHANGE UP (ref 1.6–2.6)
MCHC RBC-ENTMCNC: 29.2 PG — SIGNIFICANT CHANGE UP (ref 27–34)
MCHC RBC-ENTMCNC: 33.1 GM/DL — SIGNIFICANT CHANGE UP (ref 32–36)
MCV RBC AUTO: 88.3 FL — SIGNIFICANT CHANGE UP (ref 80–100)
NON-GYNECOLOGICAL CYTOLOGY STUDY: SIGNIFICANT CHANGE UP
NRBC # BLD: 0 /100 WBCS — SIGNIFICANT CHANGE UP (ref 0–0)
PHOSPHATE SERPL-MCNC: 2.5 MG/DL — SIGNIFICANT CHANGE UP (ref 2.5–4.5)
PLATELET # BLD AUTO: 345 K/UL — SIGNIFICANT CHANGE UP (ref 150–400)
POTASSIUM SERPL-MCNC: 3.5 MMOL/L — SIGNIFICANT CHANGE UP (ref 3.5–5.3)
POTASSIUM SERPL-SCNC: 3.5 MMOL/L — SIGNIFICANT CHANGE UP (ref 3.5–5.3)
RBC # BLD: 3.49 M/UL — LOW (ref 3.8–5.2)
RBC # FLD: 14.2 % — SIGNIFICANT CHANGE UP (ref 10.3–14.5)
SODIUM SERPL-SCNC: 140 MMOL/L — SIGNIFICANT CHANGE UP (ref 135–145)
WBC # BLD: 6.69 K/UL — SIGNIFICANT CHANGE UP (ref 3.8–10.5)
WBC # FLD AUTO: 6.69 K/UL — SIGNIFICANT CHANGE UP (ref 3.8–10.5)

## 2020-08-21 PROCEDURE — 71045 X-RAY EXAM CHEST 1 VIEW: CPT | Mod: 26

## 2020-08-21 PROCEDURE — 99232 SBSQ HOSP IP/OBS MODERATE 35: CPT

## 2020-08-21 PROCEDURE — 71045 X-RAY EXAM CHEST 1 VIEW: CPT | Mod: 26,77

## 2020-08-21 RX ADMIN — POLYETHYLENE GLYCOL 3350 17 GRAM(S): 17 POWDER, FOR SOLUTION ORAL at 05:44

## 2020-08-21 RX ADMIN — Medication 50 MILLIGRAM(S): at 21:23

## 2020-08-21 RX ADMIN — LEVETIRACETAM 500 MILLIGRAM(S): 250 TABLET, FILM COATED ORAL at 05:44

## 2020-08-21 RX ADMIN — Medication 50 MILLIGRAM(S): at 05:44

## 2020-08-21 RX ADMIN — Medication 25 MILLIGRAM(S): at 05:44

## 2020-08-21 RX ADMIN — HEPARIN SODIUM 5000 UNIT(S): 5000 INJECTION INTRAVENOUS; SUBCUTANEOUS at 05:44

## 2020-08-21 RX ADMIN — SIMVASTATIN 20 MILLIGRAM(S): 20 TABLET, FILM COATED ORAL at 21:23

## 2020-08-21 RX ADMIN — Medication 0.1 MILLIGRAM(S): at 05:44

## 2020-08-21 RX ADMIN — LEVETIRACETAM 500 MILLIGRAM(S): 250 TABLET, FILM COATED ORAL at 21:23

## 2020-08-21 RX ADMIN — Medication 0.1 MILLIGRAM(S): at 21:23

## 2020-08-21 RX ADMIN — Medication 1 APPLICATION(S): at 12:22

## 2020-08-21 RX ADMIN — Medication 1 APPLICATION(S): at 05:46

## 2020-08-21 RX ADMIN — CEFEPIME 100 MILLIGRAM(S): 1 INJECTION, POWDER, FOR SOLUTION INTRAMUSCULAR; INTRAVENOUS at 05:44

## 2020-08-21 RX ADMIN — Medication 1 APPLICATION(S): at 18:03

## 2020-08-21 RX ADMIN — AMLODIPINE BESYLATE 10 MILLIGRAM(S): 2.5 TABLET ORAL at 05:44

## 2020-08-21 RX ADMIN — CEFEPIME 100 MILLIGRAM(S): 1 INJECTION, POWDER, FOR SOLUTION INTRAMUSCULAR; INTRAVENOUS at 18:05

## 2020-08-21 RX ADMIN — POLYETHYLENE GLYCOL 3350 17 GRAM(S): 17 POWDER, FOR SOLUTION ORAL at 21:23

## 2020-08-21 RX ADMIN — HEPARIN SODIUM 5000 UNIT(S): 5000 INJECTION INTRAVENOUS; SUBCUTANEOUS at 18:05

## 2020-08-21 RX ADMIN — Medication 25 MILLIGRAM(S): at 23:07

## 2020-08-21 RX ADMIN — SENNA PLUS 2 TABLET(S): 8.6 TABLET ORAL at 21:23

## 2020-08-21 NOTE — PROGRESS NOTE ADULT - ASSESSMENT
68 yo female PMH dementia (non verbal), CVA (on plavix), seizure disorder (on keppra), htn, DM, hld BIBEMS for fever since yesterday. As per EMS, patient was recently admitted to hospital for UTI. Discharged on Monday with a milner. EMS says that family noticed milner output decreased since yesterday. Shows only 250 cc in milner over 36 hours. Also family reported patient had not had a bowel movement since her discharged. As per EMS, family reports patient is at mental baseline.    sepsis 2/2 uti and POA  Likely catheter associated UTI. urine cx growing pseudomonas will change antibx to cefepime . consult [facility] for IS saw patient last admission one week ago .   Milner changed in ER.   afebrile currently and wbc stable.  blood cx coag negative staph likely contaminant   8/19/2020 carbapenem  resistant pseudomonas will change to Cefepime 2 gram iv q12 based on d/w ID and [facility] ( pharmacy )   8/21/2020 per d/w Id and her d/w urologist antibx will continue until at least Monday for planned cystoscopy      hematuria   - renal us shows clots but cbc stable   -  hold asa/plavix   urology consult noted anthony need cystoscopy will cardiology clearance    f/u urine cytology  8/18/2020  hgb low at 7.8  wlll type and cross  8/19/2020 hgb 8.3 ( no blood given), hematuria improving   8/20/2020 hgb at 7.9 will type and cross  8/21/2020 hgb at 10 stable for today    Hypokalemia : will be replaced     AMADOU resolved     constipation  - resolved     hypokalemia   - replace as needed     HTN  Continue Norvasc, lopressor hydralazine ( increase as needed)    HLD  Zocor at home dose.      stage 3 decub: will get wound care consult , per nursing was POA    wound care order completed   continue with wound care    seizure    PT eval. decreased Keppra 750 mg bid to 500 mg as per last hospital stay   No need for CT head,   stop Trazadone as this can cause retention and sedation.      left humeral fracture  will get orthopedic  to reevaluate  8/18/2020 reviewed ortho note and will d/c sling repeat xray in one month

## 2020-08-21 NOTE — CHART NOTE - NSCHARTNOTEFT_GEN_A_CORE
Pt admitted c catheter associated UTI (recurrent) & AMADOU from dehydration; sepsis 2/2 UTI/ PMHx includes dementia (pt non-verbal), CVA, seizure disorder, HTN, HLD, T2DM. Cystoscopy pending to determine source of hematuria.     Factors impacting intake: [ ] none [ ] nausea  [ ] vomiting [ ] diarrhea [ ] constipation  [ ]chewing problems [ ] swallowing issues  [X ] other: AMS, persistent lack of appetite    Diet Prescription: Diet, Consistent Carbohydrate/No Snacks:   Pureed  Supplement Feeding Modality:  Oral  Glucerna Shake Cans or Servings Per Day:  1       Frequency:  Two Times a day (08-15-20 @ 16:27)    Intake:   intake is poor; MD placed consult for TF recommendation (see below); pt refusing meals at times; total assistance required; mostly < 50% intake    Current Weight:   56.4 kg (8/20); admission wt 56.3 kg (8/14)  % Weight Change:  stable x 6 days    Nutrition focused physical exam conducted:    Subcutaneous fat loss: [moderate ] Orbital fat pads region, [unable ]Buccal fat region, [unable ]Triceps region,  [moderate ]Ribs region.    Muscle wasting: [moderate ]Temples region, [moderate ]Clavicle region, [moderate ]Shoulder region, [unable ]Scapula region, [ unable]Interosseous region,  [moderate ]thigh region, [moderate ]Calf region    Physical Appearance:  no edema noted    Pertinent Medications: MEDICATIONS  (STANDING):  amLODIPine   Tablet 10 milliGRAM(s) Oral daily  BACItracin   Ointment 1 Application(s) Topical daily  cefepime   IVPB 2000 milliGRAM(s) IV Intermittent every 12 hours  cloNIDine 0.1 milliGRAM(s) Oral two times a day  heparin   Injectable 5000 Unit(s) SubCutaneous every 12 hours  hydrALAZINE 25 milliGRAM(s) Oral every 8 hours  levETIRAcetam 500 milliGRAM(s) Oral two times a day  metoprolol tartrate 50 milliGRAM(s) Oral two times a day  polyethylene glycol 3350 17 Gram(s) Oral two times a day  senna 2 Tablet(s) Oral at bedtime  simvastatin 20 milliGRAM(s) Oral at bedtime  sodium chloride 0.9%. 1000 milliLiter(s) (75 mL/Hr) IV Continuous <Continuous>  vitamin A &amp; D Ointment 1 Application(s) Topical two times a day    MEDICATIONS  (PRN):  acetaminophen   Tablet .. 650 milliGRAM(s) Oral every 6 hours PRN Temp greater or equal to 38C (100.4F), Mild Pain (1 - 3)    Pertinent Labs: 08-21 Na140 mmol/L Glu 121 mg/dL<H> K+ 3.5 mmol/L Cr  0.71 mg/dL BUN 10 mg/dL 08-21 Phos 2.5 mg/dL; 08-20 POCT: 188, 150, 163, 118; 07-29 A1c 7.7%, average glu 174    Skin:  Left gluteal area stage III; right gluteal area stage II; right inner buttock stage II    Estimated Needs:   [X ] no change since previous assessment (8/15)  [ ] recalculated:     Previous Nutrition Diagnosis:   [x ]  Severe Malnutrition - Acute  Etiology:  Inadequate energy/protein intake + increased energy/protein needs related to multiple pressure ulcers, UTI, Sepsis, AMADOU  Signs & Symptoms:  Physical findings of moderate fat depletion & muscle wasting as noted    Previous GOAL:  Pt to consume >50% meals/supplements - not met; maintain wt +/- 2# during hospitalization - met  NEW GOAL:  Provide nutrition/hydration as medically appropriate & within family's wishes for tx    Nutrition Diagnosis is [X ] ongoing  [ ] resolved [ ] not applicable     New Nutrition Diagnosis: [X ] not applicable    Interventions:  continue current rx as noted  Recommend  [ ] Change Diet To:  [ ] Nutrition Supplement  [X ] Nutrition Support: as medically appropriate consider tube feeding; recommend Glucerna 1.2 to goal rate of 50 ml/hr (provides 1200 ml, 1440 kcal, 72 g protein, 972 ml H2O)  [X ] Other:  free H2O flushes of 150 ml q 6 hrs    Monitoring and Evaluation:   [X ] PO intake [ x ] Tolerance to diet prescription [ x ] weights [ x ] labs[ x ] follow up per protocol  [ ] other:

## 2020-08-21 NOTE — PROGRESS NOTE ADULT - SUBJECTIVE AND OBJECTIVE BOX
HPI:  68 yo female PMH dementia (non verbal), CVA (on plavix), seizure disorder (on keppra), htn, DM, hld BIBEMS for fever since yesterday. As per EMS, patient was recently admitted to hospital for UTI. Discharged on Monday with a milner.   Milner was changed this admission in the ER. EMS says that family noticed milner output decreased since yesterday. Shows only 250 cc in milner over 36 hours. Also family reported patient had not had a bowel movement since her discharged. As per EMS, family reports patient is at mental baseline. On arrival milner output looks infected. (14 Aug 2020 14:06)      Allergies    No Known Allergies    Intolerances        MEDICATIONS  (STANDING):  amLODIPine   Tablet 10 milliGRAM(s) Oral daily  BACItracin   Ointment 1 Application(s) Topical daily  cefepime   IVPB 2000 milliGRAM(s) IV Intermittent every 12 hours  cloNIDine 0.1 milliGRAM(s) Oral two times a day  heparin   Injectable 5000 Unit(s) SubCutaneous every 12 hours  hydrALAZINE 25 milliGRAM(s) Oral every 8 hours  levETIRAcetam 500 milliGRAM(s) Oral two times a day  metoprolol tartrate 50 milliGRAM(s) Oral two times a day  polyethylene glycol 3350 17 Gram(s) Oral two times a day  senna 2 Tablet(s) Oral at bedtime  simvastatin 20 milliGRAM(s) Oral at bedtime  sodium chloride 0.9%. 1000 milliLiter(s) (75 mL/Hr) IV Continuous <Continuous>  vitamin A &amp; D Ointment 1 Application(s) Topical two times a day    MEDICATIONS  (PRN):  acetaminophen   Tablet .. 650 milliGRAM(s) Oral every 6 hours PRN Temp greater or equal to 38C (100.4F), Mild Pain (1 - 3)      REVIEW OF SYSTEMS:    nonverbal     VITAL SIGNS:  T(C): 37.2 (08-21-20 @ 05:22), Max: 37.3 (08-21-20 @ 00:32)  T(F): 99 (08-21-20 @ 05:22), Max: 99.2 (08-21-20 @ 00:32)  HR: 79 (08-21-20 @ 05:22) (77 - 87)  BP: 153/73 (08-21-20 @ 05:22) (143/55 - 161/74)  RR: 17 (08-21-20 @ 05:22) (17 - 17)  SpO2: 99% (08-21-20 @ 05:22) (99% - 100%)  Wt(kg): --    PHYSICAL EXAM:    GENERAL: not in any distress  CHEST/LUNG: Clear  HEART: Regular rate and rhythm; No murmurs, rubs, or gallops  ABDOMEN: Soft, Nontende  EXTREMITIES:  2+ Peripheral Pulses  GENITOURINARY: milner in   SKIN: No rashes or lesions  BACK: sacrum decubiti clean   NERVOUS SYSTEM:  Alert &  nonverbal , nontoxic       LABS:                         10.2   6.69  )-----------( 345      ( 21 Aug 2020 06:19 )             30.8     08-21    140  |  107  |  10  ----------------------------<  121<H>  3.5   |  26  |  0.71    Ca    9.1      21 Aug 2020 06:19  Phos  2.5     08-21  Mg     2.1     08-21                                Culture Results:   >100,000 CFU/ml Pseudomonas aeruginosa (Carbapenem Resistant) (08-14 @ 16:29)  Culture Results:   No Growth Final (08-14 @ 15:19)  Culture Results:   Growth in aerobic bottle: Staphylococcus hominis  Single set isolate, possible contaminant. Contact  Microbiology if susceptibility testing clinically  indicated.  "Due to technical problems, Proteus sp. will Not be reported as part of  the BCID panel until further notice"  ***Blood Panel PCR results on this specimen are available  approximately 3 hours after the Gram stain result.***  Gram stain, PCR, and/or culture results may not always  correspond due to difference in methodologies.  ************************************************************  This PCR assay was performed using [employer].  The following targets are tested for: Enterococcus,  vancomycin resistant enterococci, Listeria monocytogenes,  coagulase negative staphylococci, S. aureus,  methicillin resistant S. aureus, Streptococcus agalactiae  (Group B), S. pneumoniae, S. pyogenes (Group A),  Acinetobacter baumannii, Enterobacter cloacae, E. coli,  Klebsiella oxytoca, K. pneumoniae, Proteus sp.,  Serratia marcescens, Haemophilus influenzae,  Neisseria meningitidis, Pseudomonas aeruginosa, Candida  albicans, C. glabrata, C krusei, C parapsilosis,  C. tropicalis and the KPC resistance gene. (08-14 @ 15:19)                Radiology:

## 2020-08-21 NOTE — PROGRESS NOTE ADULT - SUBJECTIVE AND OBJECTIVE BOX
70 yo female PMH dementia (non verbal), CVA (on plavix), seizure disorder (on keppra), htn, DM, hld BIBEMS for fever since yesterday. As per EMS, patient was recently admitted to hospital for UTI. Discharged on Monday with a milner.   Milner was changed this admission in the ER. EMS says that family noticed milner output decreased since yesterday. Shows only 250 cc in milner over 36 hours. Also family reported patient had not had a bowel movement since her discharged. As per EMS, family reports patient is at mental baseline. On arrival milner output looks infected. (14 Aug 2020 14:06)      Chief Complaint:  Patient is a 69y old  Female who presents with a chief complaint of Catheter associated UTI and AMADOU from dehydration. (19 Aug 2020 11:39)      Review of Systems:    General:  No wt loss, fevers, chills, night sweats  Eyes:  Good vision, no reported pain  ENT:  No sore throat, pain, runny nose, dysphagia  CV:  No pain, palpitations, hypo/hypertension  Resp:  No dyspnea, cough, tachypnea, wheezing  GI:  No pain, nausea, vomiting, diarrhea, constipation           Social History/Family History  SOCHX:   tobacco,  -  alcohol    FMHX: FA/MO  - contributory       Discussed with:  PMD, Family    Physical Exam:    Vital Signs:  Vital Signs Last 24 Hrs  T(C): 38.4 (19 Aug 2020 17:40), Max: 38.4 (19 Aug 2020 17:40)  T(F): 101.1 (19 Aug 2020 17:40), Max: 101.1 (19 Aug 2020 17:40)  HR: 77 (19 Aug 2020 17:40) (69 - 88)  BP: 129/77 (19 Aug 2020 17:40) (129/77 - 176/77)  BP(mean): --  RR: 17 (19 Aug 2020 17:40) (16 - 17)  SpO2: 96% (19 Aug 2020 17:40) (96% - 100%)  Daily     Daily   I&O's Summary    18 Aug 2020 07:01  -  19 Aug 2020 07:00  --------------------------------------------------------  IN: 900 mL / OUT: 1700 mL / NET: -800 mL    19 Aug 2020 07:01  -  19 Aug 2020 21:12  --------------------------------------------------------  IN: 270 mL / OUT: 1700 mL / NET: -1430 mL          Chest:  Full & symmetric excursion, no increased effort, breath sounds clear  Cardiovascular:  Regular rhythm, S1, S2, no murmur/rub/S3/S4, no carotid/femoral/abdominal bruit, radial/pedal pulses 2+  Abdomen:  Soft, non-tender, non-distended, normoactive bowel sounds, no HSM         Laboratory:                          8.3    5.14  )-----------( 265      ( 19 Aug 2020 07:17 )             25.2     08-19    142  |  109<H>  |  10  ----------------------------<  136<H>  3.5   |  27  |  0.82    Ca    8.3<L>      19 Aug 2020 07:17  Phos  3.2     08-19  Mg     1.9     08-19      CAPILLARY BLOOD GLUCOSE      POCT Blood Glucose.: 139 mg/dL (19 Aug 2020 16:30)  POCT Blood Glucose.: 141 mg/dL (19 Aug 2020 10:46)  POCT Blood Glucose.: 147 mg/dL (19 Aug 2020 07:55)      Assessment:  I am asked to evaluate this patient for preprocedure cardiovascular risk assessment  The patient is scheduled to have a urologic procedure  The diagnostic echocardiogram has a preserved ejection fraction and no significant valvular heart disease  all pertinent labs are reviewed  the patient has multiple comorbidities which pose an increased risk, however the patient's cardiovascular risk status remained stable and therefore acceptable for this urologic procedure  He continues to have potassium supplementation as well as multiple medical therapies to optimize his systolic blood pressure are proving effective

## 2020-08-21 NOTE — PROGRESS NOTE ADULT - SUBJECTIVE AND OBJECTIVE BOX
I am inheriting patient on today 8/17/2020    Patient is a 69y old  Female who presents with a chief complaint of Catheter associated UTI and AMADOU from dehydration. (14 Aug 2020 14:06)    MEDICATIONS  (STANDING):  amLODIPine   Tablet 10 milliGRAM(s) Oral daily  BACItracin   Ointment 1 Application(s) Topical daily  cefepime   IVPB 2000 milliGRAM(s) IV Intermittent every 12 hours  cloNIDine 0.1 milliGRAM(s) Oral two times a day  heparin   Injectable 5000 Unit(s) SubCutaneous every 12 hours  hydrALAZINE 25 milliGRAM(s) Oral every 8 hours  levETIRAcetam 500 milliGRAM(s) Oral two times a day  metoprolol tartrate 50 milliGRAM(s) Oral two times a day  polyethylene glycol 3350 17 Gram(s) Oral two times a day  senna 2 Tablet(s) Oral at bedtime  simvastatin 20 milliGRAM(s) Oral at bedtime  sodium chloride 0.9%. 1000 milliLiter(s) (75 mL/Hr) IV Continuous <Continuous>  vitamin A &amp; D Ointment 1 Application(s) Topical two times a day    MEDICATIONS  (PRN):  acetaminophen   Tablet .. 650 milliGRAM(s) Oral every 6 hours PRN Temp greater or equal to 38C (100.4F), Mild Pain (1 - 3)    Allergies    No Known Allergies    Intolerances      Vital Signs Last 24 Hrs  T(C): 37.6 (21 Aug 2020 11:47), Max: 37.6 (21 Aug 2020 11:47)  T(F): 99.7 (21 Aug 2020 11:47), Max: 99.7 (21 Aug 2020 11:47)  HR: 59 (21 Aug 2020 11:47) (59 - 87)  BP: 130/48 (21 Aug 2020 11:47) (130/48 - 161/74)  BP(mean): --  RR: 16 (21 Aug 2020 11:47) (16 - 17)  SpO2: 100% (21 Aug 2020 11:47) (99% - 100%)      PHYSICAL EXAM:  GENERAL: NAD, well-groomed, well-developed  HEAD:  Atraumatic, Normocephalic  EYES: EOMI, PERRLA, conjunctiva and sclera clear  ENMT: No tonsillar erythema, exudates, or enlargement; Moist mucous membranes, Good dentition, No lesions  NECK: Supple, No JVD, Normal thyroid  NERVOUS SYSTEM: FROM x4 non verbal alert   CHEST/LUNG: Clear to percussion bilaterally; No rales, rhonchi, wheezing, or rubs  HEART: Regular rate and rhythm; No murmurs, rubs, or gallops  ABDOMEN: Soft, Nontender, Nondistended; Bowel sounds present  EXTREMITIES:  2+ Peripheral Pulses, No clubbing, cyanosis, or edema  SKIN: stage 3 buttock      LABS:                                                    10.2   6.69  )-----------( 345      ( 21 Aug 2020 06:19 )             30.8   08-21    140  |  107  |  10  ----------------------------<  121<H>  3.5   |  26  |  0.71    Ca    9.1      21 Aug 2020 06:19  Phos  2.5     08-21  Mg     2.1     08-21          CAPILLARY BLOOD GLUCOSE      POCT Blood Glucose.: 168 mg/dL (21 Aug 2020 08:09)  POCT Blood Glucose.: 118 mg/dL (20 Aug 2020 21:55)  POCT Blood Glucose.: 163 mg/dL (20 Aug 2020 18:34)      RADIOLOGY & ADDITIONAL TESTS:    Imaging Personally Reviewed:  [x ] YES  [ ] NO    Consultant(s) Notes Reviewed:  [ ] YES  [ ] NO    Care Discussed with Consultants/Other Providers [ ] YES  [ ] NO

## 2020-08-21 NOTE — PROGRESS NOTE ADULT - ASSESSMENT
69F PMH dementia (non verbal), CVA (on plavix), seizure disorder (on keppra), HTN, DM, HLD, recent admission for UTI with milner catheter, BIBEMS for fever   recent uti few weeks ago for  UTI/ urinary retention /bilateral mod hydro, urine culture nondiagnostic s/p abx   UTI / urinary retention / Bilateral mod hydro with chronic milner   S/p milner catheter exchange 8/15 and 8/16  no fever , nonverbal , nontoxic   urine culture with significant amount of pseudomonas    blood culture with coag neg staph likely contaminant   Patient is for urology procedure cystoscopy to evaluate source of hematuria monday ?  continue cefepime started on 8/17  s/p Rocephin  case discussed with urologist Dr Caicedo , will keep abx until monday until procedure done

## 2020-08-21 NOTE — PROGRESS NOTE ADULT - SUBJECTIVE AND OBJECTIVE BOX
Patient seen and examined bedside resting comfortably.  Nonverbal.  No new events. Afebrile.    T(F): 99.7 (08-21-20 @ 11:47), Max: 99.7 (08-21-20 @ 11:47)  HR: 59 (08-21-20 @ 11:47) (59 - 79)  BP: 130/48 (08-21-20 @ 11:47) (130/48 - 153/73)  RR: 16 (08-21-20 @ 11:47) (16 - 17)  SpO2: 100% (08-21-20 @ 11:47) (99% - 100%)  Wt(kg): --      POCT Blood Glucose.: 157 mg/dL (21 Aug 2020 12:36)  POCT Blood Glucose.: 168 mg/dL (21 Aug 2020 08:09)  POCT Blood Glucose.: 118 mg/dL (20 Aug 2020 21:55)  POCT Blood Glucose.: 163 mg/dL (20 Aug 2020 18:34)      PHYSICAL EXAM:  General: NAD  HEENT: NCAT  Chest: nonlabored respirations  Abdomen: soft, NTND  : milner catheter indwelling draining clear yellow urine, output 2300cc    LABS:                        10.2   6.69  )-----------( 345      ( 21 Aug 2020 06:19 )             30.8   08-21    140  |  107  |  10  ----------------------------<  121<H>  3.5   |  26  |  0.71    Ca    9.1      21 Aug 2020 06:19  Phos  2.5     08-21  Mg     2.1     08-21      I&O's Detail    20 Aug 2020 07:01  -  21 Aug 2020 07:00  --------------------------------------------------------  IN:  Total IN: 0 mL    OUT:    Indwelling Catheter - Urethral: 2300 mL  Total OUT: 2300 mL    Total NET: -2300 mL      A/P: 69F PMH dementia (non verbal), CVA (plavix), seizure disorder (on keppra), HTN, DM, HLD, recent admission for UTI with milner catheter, a/w UTI and gross hematuria, now resolved, AMADOU, s/p milner catheter exchange 8/15 and 8/16.    UCx 8/14: carbapenem resistant pseudomonas  8/14 Bcx staph hominis  s/p CTAP with contrast; as discussed with Dr Caicedo, will get noncontrast study for CT Urogram.  - continue cefepime per ID, started 8/17. Plan to continue perioperatively: patient is booked for cysto and TURBT on Monday at 12:30. Consent to be obtained by surgeon, Dr Caicedo, from pt's family member.  - continue milner catheter, monitor UOP  - cardio noted: "CV risk status...acceptable for this urologic procedure"  - continue medical management and supportive care

## 2020-08-22 LAB
GLUCOSE BLDC GLUCOMTR-MCNC: 133 MG/DL — HIGH (ref 70–99)
GLUCOSE BLDC GLUCOMTR-MCNC: 147 MG/DL — HIGH (ref 70–99)
GLUCOSE BLDC GLUCOMTR-MCNC: 183 MG/DL — HIGH (ref 70–99)

## 2020-08-22 PROCEDURE — 99232 SBSQ HOSP IP/OBS MODERATE 35: CPT

## 2020-08-22 PROCEDURE — 74176 CT ABD & PELVIS W/O CONTRAST: CPT | Mod: 26

## 2020-08-22 RX ADMIN — LEVETIRACETAM 500 MILLIGRAM(S): 250 TABLET, FILM COATED ORAL at 05:08

## 2020-08-22 RX ADMIN — HEPARIN SODIUM 5000 UNIT(S): 5000 INJECTION INTRAVENOUS; SUBCUTANEOUS at 17:03

## 2020-08-22 RX ADMIN — CEFEPIME 100 MILLIGRAM(S): 1 INJECTION, POWDER, FOR SOLUTION INTRAMUSCULAR; INTRAVENOUS at 05:09

## 2020-08-22 RX ADMIN — SODIUM CHLORIDE 75 MILLILITER(S): 9 INJECTION INTRAMUSCULAR; INTRAVENOUS; SUBCUTANEOUS at 17:04

## 2020-08-22 RX ADMIN — POLYETHYLENE GLYCOL 3350 17 GRAM(S): 17 POWDER, FOR SOLUTION ORAL at 05:09

## 2020-08-22 RX ADMIN — POLYETHYLENE GLYCOL 3350 17 GRAM(S): 17 POWDER, FOR SOLUTION ORAL at 17:03

## 2020-08-22 RX ADMIN — AMLODIPINE BESYLATE 10 MILLIGRAM(S): 2.5 TABLET ORAL at 05:08

## 2020-08-22 RX ADMIN — Medication 0.1 MILLIGRAM(S): at 17:02

## 2020-08-22 RX ADMIN — SENNA PLUS 2 TABLET(S): 8.6 TABLET ORAL at 21:42

## 2020-08-22 RX ADMIN — Medication 25 MILLIGRAM(S): at 21:40

## 2020-08-22 RX ADMIN — SODIUM CHLORIDE 75 MILLILITER(S): 9 INJECTION INTRAMUSCULAR; INTRAVENOUS; SUBCUTANEOUS at 05:08

## 2020-08-22 RX ADMIN — CEFEPIME 100 MILLIGRAM(S): 1 INJECTION, POWDER, FOR SOLUTION INTRAMUSCULAR; INTRAVENOUS at 17:03

## 2020-08-22 RX ADMIN — SIMVASTATIN 20 MILLIGRAM(S): 20 TABLET, FILM COATED ORAL at 21:40

## 2020-08-22 RX ADMIN — Medication 25 MILLIGRAM(S): at 05:09

## 2020-08-22 RX ADMIN — Medication 25 MILLIGRAM(S): at 13:03

## 2020-08-22 RX ADMIN — LEVETIRACETAM 500 MILLIGRAM(S): 250 TABLET, FILM COATED ORAL at 17:03

## 2020-08-22 RX ADMIN — Medication 50 MILLIGRAM(S): at 05:08

## 2020-08-22 RX ADMIN — Medication 1 APPLICATION(S): at 05:09

## 2020-08-22 RX ADMIN — HEPARIN SODIUM 5000 UNIT(S): 5000 INJECTION INTRAVENOUS; SUBCUTANEOUS at 05:09

## 2020-08-22 RX ADMIN — Medication 1 APPLICATION(S): at 17:04

## 2020-08-22 RX ADMIN — Medication 1 APPLICATION(S): at 13:03

## 2020-08-22 RX ADMIN — Medication 0.1 MILLIGRAM(S): at 05:09

## 2020-08-22 RX ADMIN — Medication 50 MILLIGRAM(S): at 17:02

## 2020-08-22 NOTE — PROGRESS NOTE ADULT - SUBJECTIVE AND OBJECTIVE BOX
I am inheriting patient on today 8/17/2020 my alst day with patient is 8/22/2020    Patient is a 69y old  Female who presents with a chief complaint of Catheter associated UTI and AMADOU from dehydration. (14 Aug 2020 14:06)    MEDICATIONS  (STANDING):  amLODIPine   Tablet 10 milliGRAM(s) Oral daily  BACItracin   Ointment 1 Application(s) Topical daily  cefepime   IVPB 2000 milliGRAM(s) IV Intermittent every 12 hours  cloNIDine 0.1 milliGRAM(s) Oral two times a day  heparin   Injectable 5000 Unit(s) SubCutaneous every 12 hours  hydrALAZINE 25 milliGRAM(s) Oral every 8 hours  levETIRAcetam 500 milliGRAM(s) Oral two times a day  metoprolol tartrate 50 milliGRAM(s) Oral two times a day  polyethylene glycol 3350 17 Gram(s) Oral two times a day  senna 2 Tablet(s) Oral at bedtime  simvastatin 20 milliGRAM(s) Oral at bedtime  sodium chloride 0.9%. 1000 milliLiter(s) (75 mL/Hr) IV Continuous <Continuous>  vitamin A &amp; D Ointment 1 Application(s) Topical two times a day    MEDICATIONS  (PRN):  acetaminophen   Tablet .. 650 milliGRAM(s) Oral every 6 hours PRN Temp greater or equal to 38C (100.4F), Mild Pain (1 - 3)    Allergies    No Known Allergies    Intolerances    Vital Signs Last 24 Hrs  T(C): 37.3 (22 Aug 2020 05:37), Max: 37.6 (21 Aug 2020 11:47)  T(F): 99.2 (22 Aug 2020 05:37), Max: 99.7 (21 Aug 2020 11:47)  HR: 64 (22 Aug 2020 05:37) (59 - 99)  BP: 146/98 (22 Aug 2020 05:37) (104/41 - 157/76)  BP(mean): --  RR: 16 (22 Aug 2020 05:37) (16 - 17)  SpO2: 99% (22 Aug 2020 05:37) (99% - 100%)    PHYSICAL EXAM:  GENERAL: NAD, well-groomed, well-developed  HEAD:  Atraumatic, Normocephalic  EYES: EOMI, PERRLA, conjunctiva and sclera clear  ENMT: No tonsillar erythema, exudates, or enlargement; Moist mucous membranes, Good dentition, No lesions  NECK: Supple, No JVD, Normal thyroid  NERVOUS SYSTEM: FROM x4 non verbal alert   CHEST/LUNG: Clear to percussion bilaterally; No rales, rhonchi, wheezing, or rubs  HEART: Regular rate and rhythm; No murmurs, rubs, or gallops  ABDOMEN: Soft, Nontender, Nondistended; Bowel sounds present  EXTREMITIES:  2+ Peripheral Pulses, No clubbing, cyanosis, or edema  SKIN: stage 3 buttock      LABS:                                                      10.2   6.69  )-----------( 345      ( 21 Aug 2020 06:19 )             30.8   08-21    140  |  107  |  10  ----------------------------<  121<H>  3.5   |  26  |  0.71    Ca    9.1      21 Aug 2020 06:19  Phos  2.5     08-21  Mg     2.1     08-21      CAPILLARY BLOOD GLUCOSE      POCT Blood Glucose.: 133 mg/dL (22 Aug 2020 07:37)  POCT Blood Glucose.: 119 mg/dL (21 Aug 2020 21:40)  POCT Blood Glucose.: 136 mg/dL (21 Aug 2020 18:19)  POCT Blood Glucose.: 157 mg/dL (21 Aug 2020 12:36)      RADIOLOGY & ADDITIONAL TESTS:    Imaging Personally Reviewed:  [x ] YES  [ ] NO    Consultant(s) Notes Reviewed:  [ ] YES  [ ] NO    Care Discussed with Consultants/Other Providers [ ] YES  [ ] NO

## 2020-08-22 NOTE — PROGRESS NOTE ADULT - ATTENDING COMMENTS
as above-on or schedule for Mon    phone conversation with daughter(POA)-      after reviewing non contrast CT, the stone is in pelvis and near UPJ-at the time of cysto advise stone to be addressed with prob laser lithotripsy      the stone may or maynot be involved in the recurrent UTI's but also may resukt in passage and obsruction      pt is at her clinically best shape and off plavix, on atb      reviewed risk./benefit of addressing stone definitively  Daughter agrees and will sign consent  ationale risk alternatives reviewed  all questions answered

## 2020-08-22 NOTE — PROGRESS NOTE ADULT - ASSESSMENT
70 yo female PMH dementia (non verbal), CVA (on plavix), seizure disorder (on keppra), htn, DM, hld BIBEMS for fever since yesterday. As per EMS, patient was recently admitted to hospital for UTI. Discharged on Monday with a milner. EMS says that family noticed milner output decreased since yesterday. Shows only 250 cc in milner over 36 hours. Also family reported patient had not had a bowel movement since her discharged. As per EMS, family reports patient is at mental baseline.    sepsis 2/2 uti and POA  Likely catheter associated UTI. urine cx growing pseudomonas will change antibx to cefepime . consult [facility] for [facility] saw patient last admission one week ago .   Milner changed in ER.   afebrile currently and wbc stable.  blood cx coag negative staph likely contaminant   8/19/2020 carbapenem  resistant pseudomonas will change to Cefepime 2 gram iv q12 based on d/w ID and [facility] ( pharmacy )   8/21/2020 per d/w Id and her d/w urologist antibx will continue until at least Monday for planned cystoscopy noted cardiology note      hematuria   - renal us shows clots but cbc stable   -  hold asa/plavix   urology consult noted anthony need cystoscopy will cardiology clearance    f/u urine cytology  8/18/2020  hgb low at 7.8  wlll type and cross  8/19/2020 hgb 8.3 ( no blood given), hematuria improving   8/20/2020 hgb at 7.9 will type and cross  8/21/2020 hgb at 10 stable for today    Hypokalemia : will be replaced     AMADOU resolved     constipation  - resolved     hypokalemia   - replace as needed     HTN  Continue Norvasc, lopressor hydralazine ( increase as needed)    HLD  Zocor at home dose.      stage 3 decub: will get wound care consult , per nursing was POA    wound care order completed   continue with wound care    seizure    PT eval. decreased Keppra 750 mg bid to 500 mg as per last hospital stay   No need for CT head,   stop Trazadone as this can cause retention and sedation.      left humeral fracture  will get orthopedic  to reevaluate  8/18/2020 reviewed ortho note and will d/c sling repeat xray in one month    severe protein malnutrition placed ngt on 8/21/2020 due to inconsistent oral intake case d/w daughter who gave permission may require peg to be determined at a later date .

## 2020-08-22 NOTE — PROGRESS NOTE ADULT - NSHPATTENDINGPLANDISCUSS_GEN_ALL_CORE
daughter via phone

## 2020-08-22 NOTE — PROGRESS NOTE ADULT - SUBJECTIVE AND OBJECTIVE BOX
Patient seen and examined at bedside.   Nonverbal. No new events per RN.   Pt with b/l wrist restraints.  Upon entering pt's room, patient pulling at restraints, grasping at the milner catheter.     Vital Signs Last 24 Hrs  T(F): 98.9 (08-22-20 @ 11:19), Max: 99.2 (08-21-20 @ 17:17)  HR: 66 (08-22-20 @ 11:19)  BP: 152/80 (08-22-20 @ 11:19)  RR: 18 (08-22-20 @ 11:19)  SpO2: 98% (08-22-20 @ 11:19)    POCT Blood Glucose.: 147 mg/dL (22 Aug 2020 16:15)    GENERAL: Alert, NAD  CHEST/LUNG: Respirations nonlabored  HEART: S1S2, Regular rate and rhythm  ABDOMEN: soft, Nontender, Nondistended, no rebound or guarding noted   : milner catheter draining 1,000cc/24hr clear yellow urine   EXTREMITIES:  no calf tenderness, No edema    I&O's Detail    21 Aug 2020 07:01  -  22 Aug 2020 07:00  --------------------------------------------------------  IN:    IV PiggyBack: 50 mL    ns in tub fed  [id number]: 160 mL    sodium chloride 0.9%.: 900 mL  Total IN: 1110 mL    OUT:    Indwelling Catheter - Urethral: 1000 mL  Total OUT: 1000 mL    Total NET: 110 mL      22 Aug 2020 07:01  -  22 Aug 2020 16:32  --------------------------------------------------------  IN:    ns in tub fed  [id number]: 240 mL  Total IN: 240 mL    OUT:  Total OUT: 0 mL    Total NET: 240 mL      LABS:                        10.2   6.69  )-----------( 345      ( 21 Aug 2020 06:19 )             30.8     08-21    140  |  107  |  10  ----------------------------<  121<H>  3.5   |  26  |  0.71    Ca    9.1      21 Aug 2020 06:19  Phos  2.5     08-21  Mg     2.1     08-21    IMAGING:     < from: CT Abdomen and Pelvis w/ IV Cont (08.20.20 @ 11:40) >  EXAM:  CT ABDOMEN AND PELVIS IC                            PROCEDURE DATE:  08/20/2020          INTERPRETATION:  CLINICAL INFORMATION: Hematuria.    COMPARISON: Renal ultrasound 8/16/2020.    PROCEDURE:  CT of the Abdomen and Pelvis was performed with and without intravenous contrast.  Precontrast, Nephrographic and Excretory phases were acquired (CT UROGRAM).    Intravenous contrast: 90 ml Omnipaque 350. 10 ml discarded.  Oral contrast: None.  Sagittal and coronal reformats were performed.    FINDINGS:    LOWER CHEST: Within normal limits.    LIVER: Within normal limits.  BILE DUCTS: Normal caliber.  GALLBLADDER: Within normal limits.  SPLEEN: Within normal limits.  PANCREAS: Within normal limits.  ADRENALS: Within normal limits.    KIDNEYS/URETERS:   3 mm and 6 mm nonobstructing right intrarenal calcifications. 1.7 cm nonobstructing left intrapelvic calcification. 2.7 cm cyst mid pole right kidney.  There are innumerable, small, indeterminate subcentimeter hypodense renal lesions.    BLADDER: Milner catheter within the urinary bladder which is nondistended.  Possible bladder wall thickening.  REPRODUCTIVE ORGANS: Elongated, calcified fibroid uterus.    BOWEL: Fecal retention with distended rectosigmoid colon.   Appendix normal.  PERITONEUM: No ascites.  VESSELS: Prominent atherosclerotic calcification of the aorta and branch vessels.  RETROPERITONEUM/LYMPH NODES: No lymphadenopathy.  ABDOMINAL WALL: Within normal limits.  BONES: Within normal limits.    IMPRESSION:    Nonobstructing right intrarenal calcifications.  Nonobstructing left intrapelvic calcification.    Right renal cyst.  Innumerable, small, indeterminate hypodense bilateral renal lesions.    Milner catheter within a nondistended urinary bladder, cannot exclude bladder wall thickening.    Other findings as discussed.    DIMAS SLAUGHTER M.D., ATTENDING RADIOLOGIST  This document has been electronically signed. Aug 20 2020 12:16PM          < end of copied text >    < from: CT Abdomen and Pelvis No Cont (08.22.20 @ 12:01) >  EXAM:  CT ABDOMEN AND PELVIS                            PROCEDURE DATE:  08/22/2020          INTERPRETATION:  Clinical Information: Hematuria    Comparison: 08/20/2020    Technique: Noncontrast CT abdomen and pelvis with Axial, sagittal, coronal reconstructions.    Findings:    Imaged chest: Unremarkable.  Hepatobiliary: Liver, biliary tree, gallbladder are unremarkable.  Spleen: Unremarkable.  Pancreas: Unremarkable.  Adrenal glands: Unremarkable.    Urinary: No hydronephrosis. 2 cm left renalpelvis stone, nonobstructive. Multiple additional tiny nonobstructive intrarenal calculi bilaterally. Multiple tiny renal cystic lesions of varying density bilaterally. Urinary bladder is collapsed around a Milner catheter, possible underlying thickening is improved compared to prior.    Reproductive organs: Calcified uterine fibroids.  Gastrointestinal: Large stool burden. NG tube in the stomach.  Peritoneum: Unremarkable.  Vasculature: Severe atherosclerotic changes.  Retroperitoneum: Unremarkable.  Subcutaneous tissues: Unremarkable.  Neuromusculoskeletal: Mild degenerative changes.    Impression: No hydronephrosis. Bilateral nonobstructing nephrolithiasis.    CATARINA SHINE M.D., ATTENDING RADIOLOGIST  This document has been electronically signed. Aug 22 2020 12:44PM    < end of copied text >    Impression: 69F PMH dementia (non verbal), CVA (plavix), seizure disorder (on keppra), HTN, DM, HLD, recent admission for UTI with milner catheter, a/w UTI and gross hematuria, now resolved, AMADOU, s/p milner catheter exchange 8/15 and 8/16.  Cyto neg for malignant cells. UCx 8/14: carbapenem resistant pseudomonas. 8/14 Bcx staph hominis. CT 8/20 and noncontrast CT 8/22 show no hydro, b/l nonobstructing nephrolithiasis.     Plan  - Abx per ID. Patient is scheduled for cysto/TURBT, possible ureteroscopy with laser lithotripsy.  Dr. Caicedo to discuss with niece again this evening.   - Continue milner catheter, monitor output.   - Cardio recs appreciated.   - Continue medical management and supportive care  - Discussed with Dr. Caicedo.

## 2020-08-23 ENCOUNTER — TRANSCRIPTION ENCOUNTER (OUTPATIENT)
Age: 70
End: 2020-08-23

## 2020-08-23 LAB
ANION GAP SERPL CALC-SCNC: 8 MMOL/L — SIGNIFICANT CHANGE UP (ref 5–17)
APTT BLD: 31.2 SEC — SIGNIFICANT CHANGE UP (ref 27.5–35.5)
BUN SERPL-MCNC: 11 MG/DL — SIGNIFICANT CHANGE UP (ref 7–23)
CALCIUM SERPL-MCNC: 8.8 MG/DL — SIGNIFICANT CHANGE UP (ref 8.5–10.1)
CHLORIDE SERPL-SCNC: 105 MMOL/L — SIGNIFICANT CHANGE UP (ref 96–108)
CO2 SERPL-SCNC: 27 MMOL/L — SIGNIFICANT CHANGE UP (ref 22–31)
CREAT SERPL-MCNC: 0.76 MG/DL — SIGNIFICANT CHANGE UP (ref 0.5–1.3)
GLUCOSE BLDC GLUCOMTR-MCNC: 105 MG/DL — HIGH (ref 70–99)
GLUCOSE BLDC GLUCOMTR-MCNC: 148 MG/DL — HIGH (ref 70–99)
GLUCOSE BLDC GLUCOMTR-MCNC: 149 MG/DL — HIGH (ref 70–99)
GLUCOSE BLDC GLUCOMTR-MCNC: 151 MG/DL — HIGH (ref 70–99)
GLUCOSE BLDC GLUCOMTR-MCNC: 205 MG/DL — HIGH (ref 70–99)
GLUCOSE SERPL-MCNC: 109 MG/DL — HIGH (ref 70–99)
HCT VFR BLD CALC: 29.9 % — LOW (ref 34.5–45)
HGB BLD-MCNC: 9.4 G/DL — LOW (ref 11.5–15.5)
INR BLD: 1.13 RATIO — SIGNIFICANT CHANGE UP (ref 0.88–1.16)
MAGNESIUM SERPL-MCNC: 2 MG/DL — SIGNIFICANT CHANGE UP (ref 1.6–2.6)
MCHC RBC-ENTMCNC: 28.7 PG — SIGNIFICANT CHANGE UP (ref 27–34)
MCHC RBC-ENTMCNC: 31.4 GM/DL — LOW (ref 32–36)
MCV RBC AUTO: 91.4 FL — SIGNIFICANT CHANGE UP (ref 80–100)
NRBC # BLD: 0 /100 WBCS — SIGNIFICANT CHANGE UP (ref 0–0)
PHOSPHATE SERPL-MCNC: 2.4 MG/DL — LOW (ref 2.5–4.5)
PLATELET # BLD AUTO: 337 K/UL — SIGNIFICANT CHANGE UP (ref 150–400)
POTASSIUM SERPL-MCNC: 3.2 MMOL/L — LOW (ref 3.5–5.3)
POTASSIUM SERPL-SCNC: 3.2 MMOL/L — LOW (ref 3.5–5.3)
PROTHROM AB SERPL-ACNC: 13 SEC — SIGNIFICANT CHANGE UP (ref 10.6–13.6)
RBC # BLD: 3.27 M/UL — LOW (ref 3.8–5.2)
RBC # FLD: 14.3 % — SIGNIFICANT CHANGE UP (ref 10.3–14.5)
SODIUM SERPL-SCNC: 140 MMOL/L — SIGNIFICANT CHANGE UP (ref 135–145)
WBC # BLD: 7.58 K/UL — SIGNIFICANT CHANGE UP (ref 3.8–10.5)
WBC # FLD AUTO: 7.58 K/UL — SIGNIFICANT CHANGE UP (ref 3.8–10.5)

## 2020-08-23 PROCEDURE — 99232 SBSQ HOSP IP/OBS MODERATE 35: CPT

## 2020-08-23 RX ORDER — GENTAMICIN SULFATE 40 MG/ML
120 VIAL (ML) INJECTION ONCE
Refills: 0 | Status: COMPLETED | OUTPATIENT
Start: 2020-08-23 | End: 2020-08-23

## 2020-08-23 RX ORDER — GENTAMICIN SULFATE 40 MG/ML
80 VIAL (ML) INJECTION EVERY 8 HOURS
Refills: 0 | Status: DISCONTINUED | OUTPATIENT
Start: 2020-08-24 | End: 2020-08-24

## 2020-08-23 RX ORDER — GENTAMICIN SULFATE 40 MG/ML
VIAL (ML) INJECTION
Refills: 0 | Status: DISCONTINUED | OUTPATIENT
Start: 2020-08-23 | End: 2020-08-24

## 2020-08-23 RX ORDER — SODIUM,POTASSIUM PHOSPHATES 278-250MG
1 POWDER IN PACKET (EA) ORAL ONCE
Refills: 0 | Status: COMPLETED | OUTPATIENT
Start: 2020-08-23 | End: 2020-08-23

## 2020-08-23 RX ADMIN — CEFEPIME 100 MILLIGRAM(S): 1 INJECTION, POWDER, FOR SOLUTION INTRAMUSCULAR; INTRAVENOUS at 05:10

## 2020-08-23 RX ADMIN — LEVETIRACETAM 500 MILLIGRAM(S): 250 TABLET, FILM COATED ORAL at 17:25

## 2020-08-23 RX ADMIN — Medication 25 MILLIGRAM(S): at 13:09

## 2020-08-23 RX ADMIN — Medication 0.1 MILLIGRAM(S): at 17:25

## 2020-08-23 RX ADMIN — HEPARIN SODIUM 5000 UNIT(S): 5000 INJECTION INTRAVENOUS; SUBCUTANEOUS at 05:18

## 2020-08-23 RX ADMIN — LEVETIRACETAM 500 MILLIGRAM(S): 250 TABLET, FILM COATED ORAL at 05:23

## 2020-08-23 RX ADMIN — SODIUM CHLORIDE 75 MILLILITER(S): 9 INJECTION INTRAMUSCULAR; INTRAVENOUS; SUBCUTANEOUS at 17:51

## 2020-08-23 RX ADMIN — POLYETHYLENE GLYCOL 3350 17 GRAM(S): 17 POWDER, FOR SOLUTION ORAL at 17:26

## 2020-08-23 RX ADMIN — SENNA PLUS 2 TABLET(S): 8.6 TABLET ORAL at 22:08

## 2020-08-23 RX ADMIN — Medication 25 MILLIGRAM(S): at 05:23

## 2020-08-23 RX ADMIN — POLYETHYLENE GLYCOL 3350 17 GRAM(S): 17 POWDER, FOR SOLUTION ORAL at 05:24

## 2020-08-23 RX ADMIN — Medication 1 TABLET(S): at 17:26

## 2020-08-23 RX ADMIN — CEFEPIME 100 MILLIGRAM(S): 1 INJECTION, POWDER, FOR SOLUTION INTRAMUSCULAR; INTRAVENOUS at 17:25

## 2020-08-23 RX ADMIN — HEPARIN SODIUM 5000 UNIT(S): 5000 INJECTION INTRAVENOUS; SUBCUTANEOUS at 17:25

## 2020-08-23 RX ADMIN — Medication 25 MILLIGRAM(S): at 22:08

## 2020-08-23 RX ADMIN — Medication 200 MILLIGRAM(S): at 21:48

## 2020-08-23 RX ADMIN — SIMVASTATIN 20 MILLIGRAM(S): 20 TABLET, FILM COATED ORAL at 22:08

## 2020-08-23 RX ADMIN — Medication 1 APPLICATION(S): at 05:24

## 2020-08-23 RX ADMIN — Medication 0.1 MILLIGRAM(S): at 05:23

## 2020-08-23 RX ADMIN — Medication 50 MILLIGRAM(S): at 05:18

## 2020-08-23 RX ADMIN — AMLODIPINE BESYLATE 10 MILLIGRAM(S): 2.5 TABLET ORAL at 05:18

## 2020-08-23 RX ADMIN — Medication 50 MILLIGRAM(S): at 17:25

## 2020-08-23 RX ADMIN — Medication 1 APPLICATION(S): at 13:09

## 2020-08-23 RX ADMIN — Medication 1 APPLICATION(S): at 17:26

## 2020-08-23 NOTE — PROGRESS NOTE ADULT - SUBJECTIVE AND OBJECTIVE BOX
T(F): 99.1 (08-23-20 @ 11:32), Max: 99.6 (08-22-20 @ 23:43)  HR: 64 (08-23-20 @ 11:32) (63 - 79)  BP: 157/53 (08-23-20 @ 11:32) (141/52 - 157/53)  RR: 18 (08-23-20 @ 11:32) (16 - 18)  SpO2: 97% (08-23-20 @ 11:32) (94% - 97%)      POCT Blood Glucose.: 151 mg/dL (23 Aug 2020 10:57)  POCT Blood Glucose.: 149 mg/dL (23 Aug 2020 07:44)  POCT Blood Glucose.: 205 mg/dL (23 Aug 2020 00:05)  POCT Blood Glucose.: 147 mg/dL (22 Aug 2020 16:15)          LABS:                        9.4    7.58  )-----------( 337      ( 23 Aug 2020 07:08 )             29.9   08-23    140  |  105  |  11  ----------------------------<  109<H>  3.2<L>   |  27  |  0.76    Ca    8.8      23 Aug 2020 07:12  Phos  2.4     08-23  Mg     2.0     08-23    PT/INR - ( 23 Aug 2020 07:08 )   PT: 13.0 sec;   INR: 1.13 ratio         PTT - ( 23 Aug 2020 07:08 )  PTT:31.2 sec  I&O's Detail    22 Aug 2020 07:01  -  23 Aug 2020 07:00  --------------------------------------------------------  IN:    IV PiggyBack: 50 mL    ns in tub fed  [id number]: 610 mL    sodium chloride 0.9%.: 900 mL  Total IN: 1560 mL    OUT:    Indwelling Catheter - Urethral: 1100 mL  Total OUT: 1100 mL    Total NET: 460 mL      23 Aug 2020 07:01  -  23 Aug 2020 15:02  --------------------------------------------------------  IN:    Oral Fluid: 120 mL  Total IN: 120 mL    OUT:    Indwelling Catheter - Urethral: 850 mL  Total OUT: 850 mL    Total NET: -730 mL      Assessment/Plan:  s/p CT urogram with renal UPJ stone  booked for cysto/TURB, possible left ureteroscopy/laser lithotripsy/stent insertion tomorrow afternoon. NPO p midnight. IVF.  C/w abx per ID. Repeat UCx sent 8/22, results pending Pt seen bedside resting comfortably.   No new events.  Afebrile    T(F): 99.1 (08-23-20 @ 11:32), Max: 99.6 (08-22-20 @ 23:43)  HR: 64 (08-23-20 @ 11:32) (63 - 79)  BP: 157/53 (08-23-20 @ 11:32) (141/52 - 157/53)  RR: 18 (08-23-20 @ 11:32) (16 - 18)  SpO2: 97% (08-23-20 @ 11:32) (94% - 97%)    POCT Blood Glucose.: 151 mg/dL (23 Aug 2020 10:57)  POCT Blood Glucose.: 149 mg/dL (23 Aug 2020 07:44)  POCT Blood Glucose.: 205 mg/dL (23 Aug 2020 00:05)  POCT Blood Glucose.: 147 mg/dL (22 Aug 2020 16:15)      GENERAL: NAD  CHEST/LUNG: Respirations nonlabored  HEART: S1S2  ABDOMEN: soft, Nontender, Nondistended  : milner catheter draining clear yellow urine       LABS:                        9.4    7.58  )-----------( 337      ( 23 Aug 2020 07:08 )             29.9   08-23    140  |  105  |  11  ----------------------------<  109<H>  3.2<L>   |  27  |  0.76    Ca    8.8      23 Aug 2020 07:12  Phos  2.4     08-23  Mg     2.0     08-23    PT/INR - ( 23 Aug 2020 07:08 )   PT: 13.0 sec;   INR: 1.13 ratio         PTT - ( 23 Aug 2020 07:08 )  PTT:31.2 sec  I&O's Detail    22 Aug 2020 07:01  -  23 Aug 2020 07:00  --------------------------------------------------------  IN:    IV PiggyBack: 50 mL    ns in tub fed  [id number]: 610 mL    sodium chloride 0.9%.: 900 mL  Total IN: 1560 mL    OUT:    Indwelling Catheter - Urethral: 1100 mL  Total OUT: 1100 mL    Total NET: 460 mL      23 Aug 2020 07:01  -  23 Aug 2020 15:02  --------------------------------------------------------  IN:    Oral Fluid: 120 mL  Total IN: 120 mL    OUT:    Indwelling Catheter - Urethral: 850 mL  Total OUT: 850 mL    Total NET: -730 mL      Assessment/Plan: 69F PMH dementia (non verbal), CVA (plavix), seizure disorder (on keppra), HTN, DM, HLD, recent admission for UTI with milner catheter, a/w UTI and gross hematuria, now resolved, AMADOU, s/p milner catheter exchange 8/15 and 8/16. Cyto neg for malignant cells. UCx 8/14: carbapenem resistant pseudomonas. 8/14 Bcx staph hominis.  s/p CT urogram with renal UPJ stone  booked for cysto/TURB, possible left ureteroscopy/laser lithotripsy/stent insertion tomorrow afternoon. NPO p midnight. IVF.  C/w abx per ID. Repeat UCx sent 8/22, results pending

## 2020-08-23 NOTE — PROGRESS NOTE ADULT - SUBJECTIVE AND OBJECTIVE BOX
68 yo female PMH dementia (non verbal), CVA (on plavix), seizure disorder (on keppra), htn, DM, hld BIBEMS for fever.    8/23:  Patient seen at bedside, self-removed NGT overnight.        PAST MEDICAL & SURGICAL HISTORY:  CVA (cerebral infarction): right side weakness  Vision changes: lt eye  Urinary incontinence  Seizure disorder  Gout  OA (osteoarthritis): bautista knees, low back  and  bautista shoulders  Asthma  Diabetes  Hypertension  Kidney stone      REVIEW OF SYSTEMS:  Not a reliable historian.      MEDICATIONS  (STANDING):  amLODIPine   Tablet 10 milliGRAM(s) Oral daily  BACItracin   Ointment 1 Application(s) Topical daily  cefepime   IVPB 2000 milliGRAM(s) IV Intermittent every 12 hours  cloNIDine 0.1 milliGRAM(s) Oral two times a day  heparin   Injectable 5000 Unit(s) SubCutaneous every 12 hours  hydrALAZINE 25 milliGRAM(s) Oral every 8 hours  levETIRAcetam 500 milliGRAM(s) Oral two times a day  metoprolol tartrate 50 milliGRAM(s) Oral two times a day  polyethylene glycol 3350 17 Gram(s) Oral two times a day  senna 2 Tablet(s) Oral at bedtime  simvastatin 20 milliGRAM(s) Oral at bedtime  sodium chloride 0.9%. 1000 milliLiter(s) (75 mL/Hr) IV Continuous <Continuous>  vitamin A &amp; D Ointment 1 Application(s) Topical two times a day    MEDICATIONS  (PRN):  acetaminophen   Tablet .. 650 milliGRAM(s) Oral every 6 hours PRN Temp greater or equal to 38C (100.4F), Mild Pain (1 - 3)      Allergies  No Known Allergies    FAMILY HISTORY:  No pertinent family history in first degree relatives      Vital Signs Last 24 Hrs  T(C): 37.3 (23 Aug 2020 11:32), Max: 37.6 (22 Aug 2020 23:43)  T(F): 99.1 (23 Aug 2020 11:32), Max: 99.6 (22 Aug 2020 23:43)  HR: 64 (23 Aug 2020 11:32) (63 - 79)  BP: 157/53 (23 Aug 2020 11:32) (141/52 - 157/53)  RR: 18 (23 Aug 2020 11:32) (16 - 18)  SpO2: 97% (23 Aug 2020 11:32) (94% - 97%)    PHYSICAL EXAM:  GENERAL: NAD, well-groomed, well-developed  HEAD:  Atraumatic, Normocephalic  EYES: EOMI, PERRLA, conjunctiva and sclera clear  ENMT: No tonsillar erythema, exudates, or enlargement; Moist mucous membranes, Good dentition, No lesions  NECK: Supple, No JVD, Normal thyroid  NERVOUS SYSTEM: FROM x4 non verbal alert   CHEST/LUNG: Clear to percussion bilaterally; No rales, rhonchi, wheezing, or rubs  HEART: Regular rate and rhythm; No murmurs, rubs, or gallops  ABDOMEN: Soft, Nontender, Nondistended; Bowel sounds present  EXTREMITIES:  2+ Peripheral Pulses, No clubbing, cyanosis, or edema  SKIN: stage 3 buttock      LABS:                        9.4    7.58  )-----------( 337      ( 23 Aug 2020 07:08 )             29.9     08-23    140  |  105  |  11  ----------------------------<  109<H>  3.2<L>   |  27  |  0.76    Ca    8.8      23 Aug 2020 07:12  Phos  2.4     08-23  Mg     2.0     08-23      PT/INR - ( 23 Aug 2020 07:08 )   PT: 13.0 sec;   INR: 1.13 ratio         PTT - ( 23 Aug 2020 07:08 )  PTT:31.2 sec

## 2020-08-23 NOTE — PROGRESS NOTE ADULT - SUBJECTIVE AND OBJECTIVE BOX
HPI:  69F PMH dementia (non verbal), CVA (on plavix), seizure disorder (on keppra), HTN, DM, HLD, recent admission for UTI with milner catheter, BIBEMS for fever   recent uti few weeks ago for  UTI/ urinary retention /bilateral mod hydro, urine culture nondiagnostic s/p abx      Patient is for urology procedure cystoscopy to evaluate source of hematuria Monday           Allergies    No Known Allergies    Intolerances        MEDICATIONS  (STANDING):  amLODIPine   Tablet 10 milliGRAM(s) Oral daily  BACItracin   Ointment 1 Application(s) Topical daily  cefepime   IVPB 2000 milliGRAM(s) IV Intermittent every 12 hours  cloNIDine 0.1 milliGRAM(s) Oral two times a day  heparin   Injectable 5000 Unit(s) SubCutaneous every 12 hours  hydrALAZINE 25 milliGRAM(s) Oral every 8 hours  levETIRAcetam 500 milliGRAM(s) Oral two times a day  metoprolol tartrate 50 milliGRAM(s) Oral two times a day  polyethylene glycol 3350 17 Gram(s) Oral two times a day  senna 2 Tablet(s) Oral at bedtime  simvastatin 20 milliGRAM(s) Oral at bedtime  sodium chloride 0.9%. 1000 milliLiter(s) (75 mL/Hr) IV Continuous <Continuous>  vitamin A &amp; D Ointment 1 Application(s) Topical two times a day    MEDICATIONS  (PRN):  acetaminophen   Tablet .. 650 milliGRAM(s) Oral every 6 hours PRN Temp greater or equal to 38C (100.4F), Mild Pain (1 - 3)      REVIEW OF SYSTEMS:    Unable to obtain     VITAL SIGNS:  T(C): 37.3 (08-23-20 @ 11:32), Max: 37.6 (08-22-20 @ 23:43)  T(F): 99.1 (08-23-20 @ 11:32), Max: 99.6 (08-22-20 @ 23:43)  HR: 64 (08-23-20 @ 11:32) (63 - 79)  BP: 157/53 (08-23-20 @ 11:32) (141/52 - 157/53)  RR: 18 (08-23-20 @ 11:32) (16 - 18)  SpO2: 97% (08-23-20 @ 11:32) (94% - 97%)  Wt(kg): --    PHYSICAL EXAM:    GENERAL: non verbal appears chronically ill   HEENT: Neck is supple, normocephalic, atraumatic   CHEST/LUNG: Clear to percussion bilaterally; No rales, rhonchi, wheezing  HEART: Regular rate and rhythm; No murmurs, rubs, or gallops  ABDOMEN: Soft, Nontender, Nondistended; Bowel sounds present, no rebound   EXTREMITIES:  2+ Peripheral Pulses, No clubbing, cyanosis, or edema  GENITOURINARY:   SKIN: No rashes or lesions  NERVOUS SYSTEM: non verbal      LABS:                         9.4    7.58  )-----------( 337      ( 23 Aug 2020 07:08 )             29.9     08-23    140  |  105  |  11  ----------------------------<  109<H>  3.2<L>   |  27  |  0.76    Ca    8.8      23 Aug 2020 07:12  Phos  2.4     08-23  Mg     2.0     08-23        PT/INR - ( 23 Aug 2020 07:08 )   PT: 13.0 sec;   INR: 1.13 ratio         PTT - ( 23 Aug 2020 07:08 )  PTT:31.2 sec

## 2020-08-23 NOTE — CHART NOTE - NSCHARTNOTEFT_GEN_A_CORE
Notified by RN pt with bilateral wrist restraints self removed NGT.  Will not replace at this time, will d/w MD alternatives in am

## 2020-08-23 NOTE — PROGRESS NOTE ADULT - ASSESSMENT
obstructive uropathy  seen by   awaiting scope on Monday   cont antibiotics likely until procedure done   no change in clinical status so far

## 2020-08-23 NOTE — PROGRESS NOTE ADULT - ASSESSMENT
70 yo female PMH dementia (non verbal), CVA (on plavix), seizure disorder (on keppra), htn, DM, hld BIBEMS for fever. As per EMS, patient was recently admitted to hospital for UTI. Discharged with a milner. EMS says that family noticed milner output decreased. Showed only 250 cc in milner over 36 hours. Also family reported patient had not had a bowel movement since her discharged. As per EMS, family reports patient is at mental baseline.    sepsis 2/2 uti and POA  Likely catheter associated UTI. urine cx growing pseudomonas will changed antibx to cefepime.  ID consult appreciated  blood cx coag negative staph likely contaminant      hematuria   - renal us shows clots but cbc stable   -  hold asa/plavix   urology consult noted will need cystoscopy-Monday; cleared by cardiology  Hgb stable    Hypophosphatemia:  Will replace    AMADOU resolved     constipation  - resolved     hypokalemia   - replace as needed     HTN  Continue Norvasc, lopressor hydralazine ( increase as needed)    HLD  Zocor at home dose.      stage 3 decub:   per nursing was POA    wound care order completed   continue with wound care    seizure    PT eval. decreased Keppra 750 mg bid to 500 mg as per last hospital stay   No need for CT head,   stop Trazadone as this can cause retention and sedation.      left humeral fracture  will get orthopedic  to reevaluate  8/18/2020 reviewed ortho note and will d/c sling repeat xray in one month     severe protein malnutrition   placed ngt on 8/21/2020   -Patient self-removed NGT 8/23/20; per RN patient eats with asst.  Will re-start diet and monitor in the interest of patient comfort.

## 2020-08-23 NOTE — PROGRESS NOTE ADULT - SUBJECTIVE AND OBJECTIVE BOX
68 yo female PMH dementia (non verbal), CVA (on plavix), seizure disorder (on keppra), htn, DM, hld BIBEMS for fever since yesterday. As per EMS, patient was recently admitted to hospital for UTI. Discharged on Monday with a milner.   Milner was changed this admission in the ER. EMS says that family noticed milner output decreased since yesterday. Shows only 250 cc in milner over 36 hours. Also family reported patient had not had a bowel movement since her discharged. As per EMS, family reports patient is at mental baseline. On arrival milner output looks infected. (14 Aug 2020 14:06)      Chief Complaint:  Patient is a 69y old  Female who presents with a chief complaint of Catheter associated UTI and AMADOU from dehydration. (19 Aug 2020 11:39)      Review of Systems:    General:  No wt loss, fevers, chills, night sweats  Eyes:  Good vision, no reported pain  ENT:  No sore throat, pain, runny nose, dysphagia  CV:  No pain, palpitations, hypo/hypertension  Resp:  No dyspnea, cough, tachypnea, wheezing  GI:  No pain, nausea, vomiting, diarrhea, constipation           Social History/Family History  SOCHX:   tobacco,  -  alcohol    FMHX: FA/MO  - contributory       Discussed with:  PMD, Family    Physical Exam:    Vital Signs:  Vital Signs Last 24 Hrs  T(C): 38.4 (19 Aug 2020 17:40), Max: 38.4 (19 Aug 2020 17:40)  T(F): 101.1 (19 Aug 2020 17:40), Max: 101.1 (19 Aug 2020 17:40)  HR: 77 (19 Aug 2020 17:40) (69 - 88)  BP: 129/77 (19 Aug 2020 17:40) (129/77 - 176/77)  BP(mean): --  RR: 17 (19 Aug 2020 17:40) (16 - 17)  SpO2: 96% (19 Aug 2020 17:40) (96% - 100%)  Daily     Daily   I&O's Summary    18 Aug 2020 07:01  -  19 Aug 2020 07:00  --------------------------------------------------------  IN: 900 mL / OUT: 1700 mL / NET: -800 mL    19 Aug 2020 07:01  -  19 Aug 2020 21:12  --------------------------------------------------------  IN: 270 mL / OUT: 1700 mL / NET: -1430 mL          Chest:  Full & symmetric excursion, no increased effort, breath sounds clear  Cardiovascular:  Regular rhythm, S1, S2, no murmur/rub/S3/S4, no carotid/femoral/abdominal bruit, radial/pedal pulses 2+  Abdomen:  Soft, non-tender, non-distended, normoactive bowel sounds, no HSM         Laboratory:                          8.3    5.14  )-----------( 265      ( 19 Aug 2020 07:17 )             25.2     08-19    142  |  109<H>  |  10  ----------------------------<  136<H>  3.5   |  27  |  0.82    Ca    8.3<L>      19 Aug 2020 07:17  Phos  3.2     08-19  Mg     1.9     08-19      CAPILLARY BLOOD GLUCOSE      POCT Blood Glucose.: 139 mg/dL (19 Aug 2020 16:30)  POCT Blood Glucose.: 141 mg/dL (19 Aug 2020 10:46)  POCT Blood Glucose.: 147 mg/dL (19 Aug 2020 07:55)      Assessment:  I am asked to evaluate this patient for preprocedure cardiovascular risk assessment  The patient is scheduled to have a urologic procedure  The diagnostic echocardiogram has a preserved ejection fraction and no significant valvular heart disease  all pertinent labs are reviewed  the patient has multiple comorbidities which pose an increased risk, however the patient's cardiovascular risk status remained stable and therefore acceptable for this urologic procedure  He continues to have potassium supplementation as well as multiple medical therapies to optimize his systolic blood pressure are proving effective  cystoscopy with ureteral  stent

## 2020-08-24 ENCOUNTER — RESULT REVIEW (OUTPATIENT)
Age: 70
End: 2020-08-24

## 2020-08-24 LAB
ALBUMIN SERPL ELPH-MCNC: 2.5 G/DL — LOW (ref 3.3–5)
ALP SERPL-CCNC: 77 U/L — SIGNIFICANT CHANGE UP (ref 40–120)
ALT FLD-CCNC: 11 U/L — LOW (ref 12–78)
ANION GAP SERPL CALC-SCNC: 7 MMOL/L — SIGNIFICANT CHANGE UP (ref 5–17)
AST SERPL-CCNC: 13 U/L — LOW (ref 15–37)
BASOPHILS # BLD AUTO: 0.04 K/UL — SIGNIFICANT CHANGE UP (ref 0–0.2)
BASOPHILS NFR BLD AUTO: 0.7 % — SIGNIFICANT CHANGE UP (ref 0–2)
BILIRUB SERPL-MCNC: 0.8 MG/DL — SIGNIFICANT CHANGE UP (ref 0.2–1.2)
BLD GP AB SCN SERPL QL: SIGNIFICANT CHANGE UP
BUN SERPL-MCNC: 10 MG/DL — SIGNIFICANT CHANGE UP (ref 7–23)
CALCIUM SERPL-MCNC: 8.5 MG/DL — SIGNIFICANT CHANGE UP (ref 8.5–10.1)
CHLORIDE SERPL-SCNC: 110 MMOL/L — HIGH (ref 96–108)
CO2 SERPL-SCNC: 25 MMOL/L — SIGNIFICANT CHANGE UP (ref 22–31)
CREAT SERPL-MCNC: 0.72 MG/DL — SIGNIFICANT CHANGE UP (ref 0.5–1.3)
EOSINOPHIL # BLD AUTO: 0.02 K/UL — SIGNIFICANT CHANGE UP (ref 0–0.5)
EOSINOPHIL NFR BLD AUTO: 0.3 % — SIGNIFICANT CHANGE UP (ref 0–6)
GLUCOSE BLDC GLUCOMTR-MCNC: 107 MG/DL — HIGH (ref 70–99)
GLUCOSE BLDC GLUCOMTR-MCNC: 123 MG/DL — HIGH (ref 70–99)
GLUCOSE BLDC GLUCOMTR-MCNC: 133 MG/DL — HIGH (ref 70–99)
GLUCOSE BLDC GLUCOMTR-MCNC: 134 MG/DL — HIGH (ref 70–99)
GLUCOSE SERPL-MCNC: 113 MG/DL — HIGH (ref 70–99)
HCT VFR BLD CALC: 27.4 % — LOW (ref 34.5–45)
HGB BLD-MCNC: 8.6 G/DL — LOW (ref 11.5–15.5)
IMM GRANULOCYTES NFR BLD AUTO: 0.7 % — SIGNIFICANT CHANGE UP (ref 0–1.5)
LYMPHOCYTES # BLD AUTO: 2.15 K/UL — SIGNIFICANT CHANGE UP (ref 1–3.3)
LYMPHOCYTES # BLD AUTO: 36.1 % — SIGNIFICANT CHANGE UP (ref 13–44)
MAGNESIUM SERPL-MCNC: 2.2 MG/DL — SIGNIFICANT CHANGE UP (ref 1.6–2.6)
MCHC RBC-ENTMCNC: 28.9 PG — SIGNIFICANT CHANGE UP (ref 27–34)
MCHC RBC-ENTMCNC: 31.4 GM/DL — LOW (ref 32–36)
MCV RBC AUTO: 91.9 FL — SIGNIFICANT CHANGE UP (ref 80–100)
MONOCYTES # BLD AUTO: 0.8 K/UL — SIGNIFICANT CHANGE UP (ref 0–0.9)
MONOCYTES NFR BLD AUTO: 13.4 % — SIGNIFICANT CHANGE UP (ref 2–14)
NEUTROPHILS # BLD AUTO: 2.91 K/UL — SIGNIFICANT CHANGE UP (ref 1.8–7.4)
NEUTROPHILS NFR BLD AUTO: 48.8 % — SIGNIFICANT CHANGE UP (ref 43–77)
NRBC # BLD: 0 /100 WBCS — SIGNIFICANT CHANGE UP (ref 0–0)
PHOSPHATE SERPL-MCNC: 2.9 MG/DL — SIGNIFICANT CHANGE UP (ref 2.5–4.5)
PLATELET # BLD AUTO: 297 K/UL — SIGNIFICANT CHANGE UP (ref 150–400)
POTASSIUM SERPL-MCNC: 3.4 MMOL/L — LOW (ref 3.5–5.3)
POTASSIUM SERPL-SCNC: 3.4 MMOL/L — LOW (ref 3.5–5.3)
PROT SERPL-MCNC: 6.5 GM/DL — SIGNIFICANT CHANGE UP (ref 6–8.3)
RBC # BLD: 2.98 M/UL — LOW (ref 3.8–5.2)
RBC # FLD: 14.5 % — SIGNIFICANT CHANGE UP (ref 10.3–14.5)
SARS-COV-2 RNA SPEC QL NAA+PROBE: SIGNIFICANT CHANGE UP
SODIUM SERPL-SCNC: 142 MMOL/L — SIGNIFICANT CHANGE UP (ref 135–145)
WBC # BLD: 5.96 K/UL — SIGNIFICANT CHANGE UP (ref 3.8–10.5)
WBC # FLD AUTO: 5.96 K/UL — SIGNIFICANT CHANGE UP (ref 3.8–10.5)

## 2020-08-24 PROCEDURE — 52356 CYSTO/URETERO W/LITHOTRIPSY: CPT | Mod: 50

## 2020-08-24 PROCEDURE — 52332 CYSTOSCOPY AND TREATMENT: CPT | Mod: 50,59

## 2020-08-24 PROCEDURE — 99232 SBSQ HOSP IP/OBS MODERATE 35: CPT

## 2020-08-24 PROCEDURE — 52352 CYSTOURETERO W/STONE REMOVE: CPT | Mod: 50,59

## 2020-08-24 PROCEDURE — 76000 FLUOROSCOPY <1 HR PHYS/QHP: CPT | Mod: 26

## 2020-08-24 PROCEDURE — 88300 SURGICAL PATH GROSS: CPT | Mod: 26,59

## 2020-08-24 RX ORDER — SODIUM CHLORIDE 9 MG/ML
1000 INJECTION INTRAMUSCULAR; INTRAVENOUS; SUBCUTANEOUS
Refills: 0 | Status: DISCONTINUED | OUTPATIENT
Start: 2020-08-24 | End: 2020-08-26

## 2020-08-24 RX ORDER — HEPARIN SODIUM 5000 [USP'U]/ML
5000 INJECTION INTRAVENOUS; SUBCUTANEOUS EVERY 12 HOURS
Refills: 0 | Status: DISCONTINUED | OUTPATIENT
Start: 2020-08-24 | End: 2020-08-31

## 2020-08-24 RX ORDER — POLYETHYLENE GLYCOL 3350 17 G/17G
17 POWDER, FOR SOLUTION ORAL
Refills: 0 | Status: DISCONTINUED | OUTPATIENT
Start: 2020-08-24 | End: 2020-08-31

## 2020-08-24 RX ORDER — METOPROLOL TARTRATE 50 MG
50 TABLET ORAL
Refills: 0 | Status: DISCONTINUED | OUTPATIENT
Start: 2020-08-24 | End: 2020-08-31

## 2020-08-24 RX ORDER — POTASSIUM CHLORIDE 20 MEQ
20 PACKET (EA) ORAL ONCE
Refills: 0 | Status: COMPLETED | OUTPATIENT
Start: 2020-08-24 | End: 2020-08-24

## 2020-08-24 RX ORDER — SALICYLIC ACID 0.5 %
1 CLEANSER (GRAM) TOPICAL
Refills: 0 | Status: DISCONTINUED | OUTPATIENT
Start: 2020-08-24 | End: 2020-08-31

## 2020-08-24 RX ORDER — AMLODIPINE BESYLATE 2.5 MG/1
10 TABLET ORAL DAILY
Refills: 0 | Status: DISCONTINUED | OUTPATIENT
Start: 2020-08-24 | End: 2020-08-31

## 2020-08-24 RX ORDER — HYDROMORPHONE HYDROCHLORIDE 2 MG/ML
0.5 INJECTION INTRAMUSCULAR; INTRAVENOUS; SUBCUTANEOUS
Refills: 0 | Status: DISCONTINUED | OUTPATIENT
Start: 2020-08-24 | End: 2020-08-24

## 2020-08-24 RX ORDER — SENNA PLUS 8.6 MG/1
2 TABLET ORAL AT BEDTIME
Refills: 0 | Status: DISCONTINUED | OUTPATIENT
Start: 2020-08-24 | End: 2020-08-31

## 2020-08-24 RX ORDER — LEVETIRACETAM 250 MG/1
500 TABLET, FILM COATED ORAL
Refills: 0 | Status: DISCONTINUED | OUTPATIENT
Start: 2020-08-24 | End: 2020-08-31

## 2020-08-24 RX ORDER — CEFEPIME 1 G/1
2000 INJECTION, POWDER, FOR SOLUTION INTRAMUSCULAR; INTRAVENOUS EVERY 12 HOURS
Refills: 0 | Status: DISCONTINUED | OUTPATIENT
Start: 2020-08-24 | End: 2020-08-25

## 2020-08-24 RX ORDER — SODIUM CHLORIDE 9 MG/ML
1000 INJECTION, SOLUTION INTRAVENOUS
Refills: 0 | Status: DISCONTINUED | OUTPATIENT
Start: 2020-08-24 | End: 2020-08-24

## 2020-08-24 RX ORDER — METOCLOPRAMIDE HCL 10 MG
10 TABLET ORAL ONCE
Refills: 0 | Status: DISCONTINUED | OUTPATIENT
Start: 2020-08-24 | End: 2020-08-24

## 2020-08-24 RX ORDER — BACITRACIN ZINC 500 UNIT/G
1 OINTMENT IN PACKET (EA) TOPICAL DAILY
Refills: 0 | Status: DISCONTINUED | OUTPATIENT
Start: 2020-08-24 | End: 2020-08-31

## 2020-08-24 RX ORDER — ACETAMINOPHEN 500 MG
650 TABLET ORAL EVERY 6 HOURS
Refills: 0 | Status: DISCONTINUED | OUTPATIENT
Start: 2020-08-24 | End: 2020-08-31

## 2020-08-24 RX ORDER — SIMVASTATIN 20 MG/1
20 TABLET, FILM COATED ORAL AT BEDTIME
Refills: 0 | Status: DISCONTINUED | OUTPATIENT
Start: 2020-08-24 | End: 2020-08-31

## 2020-08-24 RX ORDER — HYDRALAZINE HCL 50 MG
25 TABLET ORAL EVERY 8 HOURS
Refills: 0 | Status: DISCONTINUED | OUTPATIENT
Start: 2020-08-24 | End: 2020-08-31

## 2020-08-24 RX ORDER — HYDROMORPHONE HYDROCHLORIDE 2 MG/ML
1 INJECTION INTRAMUSCULAR; INTRAVENOUS; SUBCUTANEOUS
Refills: 0 | Status: DISCONTINUED | OUTPATIENT
Start: 2020-08-24 | End: 2020-08-24

## 2020-08-24 RX ADMIN — Medication 1 APPLICATION(S): at 17:36

## 2020-08-24 RX ADMIN — Medication 0.1 MILLIGRAM(S): at 05:28

## 2020-08-24 RX ADMIN — Medication 50 MILLIGRAM(S): at 05:28

## 2020-08-24 RX ADMIN — Medication 1 APPLICATION(S): at 05:28

## 2020-08-24 RX ADMIN — CEFEPIME 100 MILLIGRAM(S): 1 INJECTION, POWDER, FOR SOLUTION INTRAMUSCULAR; INTRAVENOUS at 17:36

## 2020-08-24 RX ADMIN — Medication 204 MILLIGRAM(S): at 14:22

## 2020-08-24 RX ADMIN — Medication 25 MILLIGRAM(S): at 05:28

## 2020-08-24 RX ADMIN — CEFEPIME 100 MILLIGRAM(S): 1 INJECTION, POWDER, FOR SOLUTION INTRAMUSCULAR; INTRAVENOUS at 05:25

## 2020-08-24 RX ADMIN — LEVETIRACETAM 500 MILLIGRAM(S): 250 TABLET, FILM COATED ORAL at 05:27

## 2020-08-24 RX ADMIN — AMLODIPINE BESYLATE 10 MILLIGRAM(S): 2.5 TABLET ORAL at 05:28

## 2020-08-24 RX ADMIN — LEVETIRACETAM 500 MILLIGRAM(S): 250 TABLET, FILM COATED ORAL at 17:43

## 2020-08-24 RX ADMIN — SODIUM CHLORIDE 75 MILLILITER(S): 9 INJECTION INTRAMUSCULAR; INTRAVENOUS; SUBCUTANEOUS at 00:22

## 2020-08-24 RX ADMIN — Medication 1 APPLICATION(S): at 17:44

## 2020-08-24 RX ADMIN — SODIUM CHLORIDE 75 MILLILITER(S): 9 INJECTION INTRAMUSCULAR; INTRAVENOUS; SUBCUTANEOUS at 22:08

## 2020-08-24 RX ADMIN — Medication 204 MILLIGRAM(S): at 05:34

## 2020-08-24 RX ADMIN — Medication 20 MILLIEQUIVALENT(S): at 08:25

## 2020-08-24 RX ADMIN — SODIUM CHLORIDE 75 MILLILITER(S): 9 INJECTION INTRAMUSCULAR; INTRAVENOUS; SUBCUTANEOUS at 14:22

## 2020-08-24 RX ADMIN — POLYETHYLENE GLYCOL 3350 17 GRAM(S): 17 POWDER, FOR SOLUTION ORAL at 05:34

## 2020-08-24 NOTE — PROGRESS NOTE ADULT - SUBJECTIVE AND OBJECTIVE BOX
HPI:  68 yo female PMH dementia (non verbal), CVA (on plavix), seizure disorder (on keppra), htn, DM, hld BIBEMS for fever since yesterday. As per EMS, patient was recently admitted to hospital for UTI. Discharged on Monday with a milner.   Milner was changed this admission in the ER. EMS says that family noticed milner output decreased since yesterday. Shows only 250 cc in milner over 36 hours. Also family reported patient had not had a bowel movement since her discharged. As per EMS, family reports patient is at mental baseline. On arrival milner output looks infected. (14 Aug 2020 14:06)      Allergies    No Known Allergies    Intolerances        MEDICATIONS  (STANDING):  amLODIPine   Tablet 10 milliGRAM(s) Oral daily  BACItracin   Ointment 1 Application(s) Topical daily  cefepime   IVPB 2000 milliGRAM(s) IV Intermittent every 12 hours  cloNIDine 0.1 milliGRAM(s) Oral two times a day  gentamicin   IVPB 80 milliGRAM(s) IV Intermittent every 8 hours  gentamicin   IVPB      heparin   Injectable 5000 Unit(s) SubCutaneous every 12 hours  hydrALAZINE 25 milliGRAM(s) Oral every 8 hours  levETIRAcetam 500 milliGRAM(s) Oral two times a day  metoprolol tartrate 50 milliGRAM(s) Oral two times a day  polyethylene glycol 3350 17 Gram(s) Oral two times a day  senna 2 Tablet(s) Oral at bedtime  simvastatin 20 milliGRAM(s) Oral at bedtime  sodium chloride 0.9%. 1000 milliLiter(s) (75 mL/Hr) IV Continuous <Continuous>  vitamin A &amp; D Ointment 1 Application(s) Topical two times a day    MEDICATIONS  (PRN):  acetaminophen   Tablet .. 650 milliGRAM(s) Oral every 6 hours PRN Temp greater or equal to 38C (100.4F), Mild Pain (1 - 3)      REVIEW OF SYSTEMS:    CONSTITUTIONAL: No fever, chills, weight loss, or fatigue  HEENT: No sore throat, runny nose, ear ache  RESPIRATORY: No cough, wheezing, No shortness of breath  CARDIOVASCULAR: No chest pain, palpitations, dizziness  GASTROINTESTINAL: No abdominal pain. No nausea, vomiting, diarrhea  GENITOURINARY: No dysuria, increase frequency, hematuria, or incontinence  NEUROLOGICAL: No headaches, memory loss, loss of strength, numbness, or tremors, no weakness  EXTREMITY: No pedal edema BLE  SKIN: No itching, burning, rashes, or lesions     VITAL SIGNS:  T(C): 37.2 (08-24-20 @ 11:45), Max: 37.7 (08-23-20 @ 17:16)  T(F): 99 (08-24-20 @ 11:45), Max: 99.8 (08-23-20 @ 17:16)  HR: 60 (08-24-20 @ 11:45) (60 - 66)  BP: 113/79 (08-24-20 @ 11:45) (113/79 - 156/56)  RR: 17 (08-24-20 @ 11:45) (16 - 17)  SpO2: 100% (08-24-20 @ 11:45) (98% - 100%)  Wt(kg): --    PHYSICAL EXAM:    GENERAL: not in any distress  HEENT: Neck is supple, normocephalic, atraumatic   CHEST/LUNG: Clear to auscultation bilaterally; No rales, rhonchi, wheezing  HEART: Regular rate and rhythm; No murmurs, rubs, or gallops  ABDOMEN: Soft, Nontender, Nondistended; Bowel sounds present, no rebound   EXTREMITIES:  2+ Peripheral Pulses, No clubbing, cyanosis, or edema  GENITOURINARY:   SKIN: No rashes or lesions  BACK: no pressor sore   NERVOUS SYSTEM:  Alert & Oriented X3, Good concentration  PSYCH: normal affect     LABS:                         8.6    5.96  )-----------( 297      ( 24 Aug 2020 07:41 )             27.4     08-24    142  |  110<H>  |  10  ----------------------------<  113<H>  3.4<L>   |  25  |  0.72    Ca    8.5      24 Aug 2020 07:41  Phos  2.9     08-24  Mg     2.2     08-24    TPro  6.5  /  Alb  2.5<L>  /  TBili  0.8  /  DBili  x   /  AST  13<L>  /  ALT  11<L>  /  AlkPhos  77  08-24    LIVER FUNCTIONS - ( 24 Aug 2020 07:41 )  Alb: 2.5 g/dL / Pro: 6.5 gm/dL / ALK PHOS: 77 U/L / ALT: 11 U/L / AST: 13 U/L / GGT: x           PT/INR - ( 23 Aug 2020 07:08 )   PT: 13.0 sec;   INR: 1.13 ratio         PTT - ( 23 Aug 2020 07:08 )  PTT:31.2 sec                        Culture Results:   >100,000 CFU/ml Pseudomonas aeruginosa (08-22 @ 12:50)                Radiology: HPI:  70 yo female PMH dementia (non verbal), CVA (on plavix), seizure disorder (on keppra), htn, DM, hld BIBEMS for fever since yesterday. As per EMS, patient was recently admitted to hospital for UTI. Discharged on Monday with a milner.   Milner was changed this admission in the ER. EMS says that family noticed milner output decreased since yesterday. Shows only 250 cc in milner over 36 hours. Also family reported patient had not had a bowel movement since her discharged. As per EMS, family reports patient is at mental baseline. On arrival milner output looks infected. (14 Aug 2020 14:06)  no info from patient   going for sx today cystoscopy with TURB     Allergies    No Known Allergies    Intolerances        MEDICATIONS  (STANDING):  amLODIPine   Tablet 10 milliGRAM(s) Oral daily  BACItracin   Ointment 1 Application(s) Topical daily  cefepime   IVPB 2000 milliGRAM(s) IV Intermittent every 12 hours  cloNIDine 0.1 milliGRAM(s) Oral two times a day  gentamicin   IVPB 80 milliGRAM(s) IV Intermittent every 8 hours  gentamicin   IVPB      heparin   Injectable 5000 Unit(s) SubCutaneous every 12 hours  hydrALAZINE 25 milliGRAM(s) Oral every 8 hours  levETIRAcetam 500 milliGRAM(s) Oral two times a day  metoprolol tartrate 50 milliGRAM(s) Oral two times a day  polyethylene glycol 3350 17 Gram(s) Oral two times a day  senna 2 Tablet(s) Oral at bedtime  simvastatin 20 milliGRAM(s) Oral at bedtime  sodium chloride 0.9%. 1000 milliLiter(s) (75 mL/Hr) IV Continuous <Continuous>  vitamin A &amp; D Ointment 1 Application(s) Topical two times a day    MEDICATIONS  (PRN):  acetaminophen   Tablet .. 650 milliGRAM(s) Oral every 6 hours PRN Temp greater or equal to 38C (100.4F), Mild Pain (1 - 3)      REVIEW OF SYSTEMS:    unable to obtain   VITAL SIGNS:  T(C): 37.2 (08-24-20 @ 11:45), Max: 37.7 (08-23-20 @ 17:16)  T(F): 99 (08-24-20 @ 11:45), Max: 99.8 (08-23-20 @ 17:16)  HR: 60 (08-24-20 @ 11:45) (60 - 66)  BP: 113/79 (08-24-20 @ 11:45) (113/79 - 156/56)  RR: 17 (08-24-20 @ 11:45) (16 - 17)  SpO2: 100% (08-24-20 @ 11:45) (98% - 100%)  Wt(kg): --    PHYSICAL EXAM:  arousable non verbal in nad  GENERAL: not in any distress  HEENT: Neck is supple, normocephalic, atraumatic   CHEST/LUNG: Clear to auscultation bilaterally; No rales, rhonchi, wheezing  HEART: Regular rate and rhythm; No murmurs, rubs, or gallops  ABDOMEN: Soft, Nontender, Nondistended; Bowel sounds present, no rebound   EXTREMITIES:  2+ Peripheral Pulses, No clubbing, cyanosis, or edema  minler plus   SKIN: No rashes or lesions  BACK: no pressor sore   NERVOUS SYSTEM:  arousable lethargic     LABS:                         8.6    5.96  )-----------( 297      ( 24 Aug 2020 07:41 )             27.4     08-24    142  |  110<H>  |  10  ----------------------------<  113<H>  3.4<L>   |  25  |  0.72    Ca    8.5      24 Aug 2020 07:41  Phos  2.9     08-24  Mg     2.2     08-24    TPro  6.5  /  Alb  2.5<L>  /  TBili  0.8  /  DBili  x   /  AST  13<L>  /  ALT  11<L>  /  AlkPhos  77  08-24    LIVER FUNCTIONS - ( 24 Aug 2020 07:41 )  Alb: 2.5 g/dL / Pro: 6.5 gm/dL / ALK PHOS: 77 U/L / ALT: 11 U/L / AST: 13 U/L / GGT: x           PT/INR - ( 23 Aug 2020 07:08 )   PT: 13.0 sec;   INR: 1.13 ratio         PTT - ( 23 Aug 2020 07:08 )  PTT:31.2 sec                        Culture Results:   >100,000 CFU/ml Pseudomonas aeruginosa (08-22 @ 12:50)                Radiology:

## 2020-08-24 NOTE — PROGRESS NOTE ADULT - SUBJECTIVE AND OBJECTIVE BOX
70 yo female PMH dementia (non verbal), CVA (on plavix), seizure disorder (on keppra), htn, DM, hld BIBEMS for fever since yesterday. As per EMS, patient was recently admitted to hospital for UTI. Discharged on Monday with a milner.   Milner was changed this admission in the ER. EMS says that family noticed milner output decreased since yesterday. Shows only 250 cc in milner over 36 hours. Also family reported patient had not had a bowel movement since her discharged. As per EMS, family reports patient is at mental baseline. On arrival milner output looks infected. (14 Aug 2020 14:06)      Chief Complaint:  Patient is a 69y old  Female who presents with a chief complaint of Catheter associated UTI and AMADOU from dehydration. (19 Aug 2020 11:39)      Review of Systems:    General:  No wt loss, fevers, chills, night sweats  Eyes:  Good vision, no reported pain  ENT:  No sore throat, pain, runny nose, dysphagia  CV:  No pain, palpitations, hypo/hypertension  Resp:  No dyspnea, cough, tachypnea, wheezing  GI:  No pain, nausea, vomiting, diarrhea, constipation           Social History/Family History  SOCHX:   tobacco,  -  alcohol    FMHX: FA/MO  - contributory       Discussed with:  PMD, Family    Physical Exam:    Vital Signs:  Vital Signs Last 24 Hrs  T(C): 38.4 (19 Aug 2020 17:40), Max: 38.4 (19 Aug 2020 17:40)  T(F): 101.1 (19 Aug 2020 17:40), Max: 101.1 (19 Aug 2020 17:40)  HR: 77 (19 Aug 2020 17:40) (69 - 88)  BP: 129/77 (19 Aug 2020 17:40) (129/77 - 176/77)  BP(mean): --  RR: 17 (19 Aug 2020 17:40) (16 - 17)  SpO2: 96% (19 Aug 2020 17:40) (96% - 100%)  Daily     Daily   I&O's Summary    18 Aug 2020 07:01  -  19 Aug 2020 07:00  --------------------------------------------------------  IN: 900 mL / OUT: 1700 mL / NET: -800 mL    19 Aug 2020 07:01  -  19 Aug 2020 21:12  --------------------------------------------------------  IN: 270 mL / OUT: 1700 mL / NET: -1430 mL          Chest:  Full & symmetric excursion, no increased effort, breath sounds clear  Cardiovascular:  Regular rhythm, S1, S2, no murmur/rub/S3/S4, no carotid/femoral/abdominal bruit, radial/pedal pulses 2+  Abdomen:  Soft, non-tender, non-distended, normoactive bowel sounds, no HSM         Laboratory:                          8.3    5.14  )-----------( 265      ( 19 Aug 2020 07:17 )             25.2     08-19    142  |  109<H>  |  10  ----------------------------<  136<H>  3.5   |  27  |  0.82    Ca    8.3<L>      19 Aug 2020 07:17  Phos  3.2     08-19  Mg     1.9     08-19      CAPILLARY BLOOD GLUCOSE      POCT Blood Glucose.: 139 mg/dL (19 Aug 2020 16:30)  POCT Blood Glucose.: 141 mg/dL (19 Aug 2020 10:46)  POCT Blood Glucose.: 147 mg/dL (19 Aug 2020 07:55)      Assessment:  I am asked to evaluate this patient for preprocedure cardiovascular risk assessment  The patient is scheduled to have a urologic procedure  The diagnostic echocardiogram has a preserved ejection fraction and no significant valvular heart disease  all pertinent labs are reviewed  the patient has multiple comorbidities which pose an increased risk, however the patient's cardiovascular risk status remained stable and therefore acceptable for this urologic procedure  He continues to have potassium supplementation as well as multiple medical therapies to optimize his systolic blood pressure are proving effective  cystoscopy with ureteral  stent

## 2020-08-24 NOTE — PROGRESS NOTE ADULT - ASSESSMENT
69F PMH dementia (non verbal), CVA (on plavix), seizure disorder (on keppra), HTN, DM, HLD, recent admission for UTI with milner catheter, BIBEMS for fever   recent uti few weeks ago for  UTI/ urinary retention /bilateral mod hydro, urine culture nondiagnostic s/p abx   UTI / urinary retention / Bilateral mod hydro with chronic milner   S/p milner catheter exchange 8/15 and 8/16  urine culture with significant amount of pseudomonas   blood culture with coag neg staph likely contaminant   Patient is for urology procedure cystoscopy to evaluate source of hematuria  continue cefepime   fu surveillance blood cultures 69F PMH dementia (non verbal), CVA (on plavix), seizure disorder (on keppra), HTN, DM, HLD, recent admission for UTI with milner catheter, BIBEMS for fever   recent uti few weeks ago for  UTI/ urinary retention /bilateral mod hydro, urine culture nondiagnostic s/p abx   UTI / urinary retention / Bilateral mod hydro with chronic milner   S/p milner catheter exchange 8/15 and 8/16  urine culture with significant amount of pseudomonas   blood culture with coag neg staph likely contaminant   Patient is for urology procedure cystoscopy to evaluate source of hematuria  continue cefepime   fu surveillance blood cultures all NEGATIVE

## 2020-08-24 NOTE — BRIEF OPERATIVE NOTE - NSICDXBRIEFPROCEDURE_GEN_ALL_CORE_FT
PROCEDURES:  Cystoscopy with retrograde pyelography with insertion of left ureteral stent 24-Aug-2020 22:22:28  Todd Caicedo  Cystoscopic extraction of ureteral stone 24-Aug-2020 22:21:58  Todd Caicedo  Cystoscopy with left ureteroscopy and left-sided laser lithotripsy procedure 24-Aug-2020 22:21:43  Todd Caicedo

## 2020-08-24 NOTE — PROGRESS NOTE ADULT - ATTENDING COMMENTS
as above  for or today  cysto/possible fulguration /TURBT-also left uscope/laser litho left upj stone  reviewed with daughter(POA) at lenght  ratioale risk alternatives reviewed-all questions answered  on appropriate atb for over one week-added gent yesterday-unlikely to clear bacteria with both stone and milner  will proceed

## 2020-08-24 NOTE — PROGRESS NOTE ADULT - SUBJECTIVE AND OBJECTIVE BOX
UROLOGY Preop Note    Patient is a 69y old  Female who presents with a chief complaint of Catheter associated UTI and AMADOU from dehydration. (23 Aug 2020 15:02)    Diagnosis: Renal UPJ stone  Procedure: Cystoscopy, TURB, possible left ureteroscopy with laser lithotripsy, stent insertion   Surgeon: Dr. Caicedo                           8.6    5.96  )-----------( 297      ( 24 Aug 2020 07:41 )             27.4     08-24    142  |  110<H>  |  10  ----------------------------<  113<H>  3.4<L>   |  25  |  0.72    Ca    8.5      24 Aug 2020 07:41  Phos  2.9     08-24  Mg     2.2     08-24    TPro  6.5  /  Alb  2.5<L>  /  TBili  0.8  /  DBili  x   /  AST  13<L>  /  ALT  11<L>  /  AlkPhos  77  08-24    PT/INR - ( 23 Aug 2020 07:08 )   PT: 13.0 sec;   INR: 1.13 ratio         PTT - ( 23 Aug 2020 07:08 )  PTT:31.2 sec    Assessment: 69F PMH dementia (non verbal), CVA (plavix), seizure disorder (on keppra), HTN, DM, HLD, recent admission for UTI with milner catheter, a/w UTI and gross hematuria, now resolved, AMADOU, s/p milner catheter exchange 8/15 and 8/16. Cyto neg for malignant cells. UCx 8/14: carbapenem resistant pseudomonas. 8/14 Bcx staph hominis.  s/p CT urogram with renal UPJ stone    Scheduled for OR today.    Hypokalemia- repleted   [X] Continue Cefepime, Gentamicin added for prelim Urine cx: Pseudomonas, f/u final results  [X ] Type & Screen  [X ] CBC  [X ] BMP  [X ] PT/PTT/INR  [X ] Urinalysis  [X ] Chest X-ray  [X ] EKG  [X ] NPO/IVF  *Need medical clearance   [X ] Added on to OR Schedule  [X ] Anti-coagulation held

## 2020-08-24 NOTE — PROGRESS NOTE ADULT - SUBJECTIVE AND OBJECTIVE BOX
FATOU MCBRIDE  ----------------------------------------  The patient was seen and evaluated for urinary tract infection. Appears in no distress.    Vital Signs Last 24 Hrs  T(C): 37.2 (24 Aug 2020 11:59), Max: 37.7 (23 Aug 2020 17:16)  T(F): 99 (24 Aug 2020 11:59), Max: 99.8 (23 Aug 2020 17:16)  HR: 60 (24 Aug 2020 11:59) (60 - 66)  BP: 113/79 (24 Aug 2020 11:59) (113/79 - 156/56)  BP(mean): --  RR: 17 (24 Aug 2020 11:59) (16 - 17)  SpO2: 100% (24 Aug 2020 11:59) (98% - 100%)    CAPILLARY BLOOD GLUCOSE  POCT Blood Glucose.: 133 mg/dL (24 Aug 2020 10:57)  POCT Blood Glucose.: 134 mg/dL (24 Aug 2020 07:38)  POCT Blood Glucose.: 105 mg/dL (23 Aug 2020 22:40)  POCT Blood Glucose.: 148 mg/dL (23 Aug 2020 17:24)    PHYSICAL EXAMINATION:  ----------------------------------------  General appearance: No acute distress, Awake, Alert  HEENT: Normocephalic, Atraumatic, Conjunctiva clear, EOMI  Neck: Supple, No JVD, No tenderness  Lungs: Breath sound equal bilaterally, No wheezes, No rales  Cardiovascular: S1S2, Regular rhythm  Abdomen: Soft, Nontender, Nondistended, No guarding/rebound, Positive bowel sounds  Extremities: No clubbing, No cyanosis, No edema, No calf tenderness  Neuro: Strength equal bilaterally, No tremors    LABORATORY STUDIES:  ----------------------------------------             8.6    5.96  )-----------( 297      ( 24 Aug 2020 07:41 )             27.4     08-24    142  |  110<H>  |  10  ----------------------------<  113<H>  3.4<L>   |  25  |  0.72    Ca    8.5      24 Aug 2020 07:41  Phos  2.9     08-24  Mg     2.2     08-24    TPro  6.5  /  Alb  2.5<L>  /  TBili  0.8  /  DBili  x   /  AST  13<L>  /  ALT  11<L>  /  AlkPhos  77  08-24    LIVER FUNCTIONS - ( 24 Aug 2020 07:41 )  Alb: 2.5 g/dL / Pro: 6.5 gm/dL / ALK PHOS: 77 U/L / ALT: 11 U/L / AST: 13 U/L / GGT: x           PT/INR - ( 23 Aug 2020 07:08 )   PT: 13.0 sec;   INR: 1.13 ratio    PTT - ( 23 Aug 2020 07:08 )  PTT:31.2 sec    Culture - Urine (collected 22 Aug 2020 12:50)  Source: .Urine Catheterized  Preliminary Report (23 Aug 2020 17:21):    >100,000 CFU/ml Pseudomonas aeruginosa    MEDICATIONS  (STANDING):  amLODIPine   Tablet 10 milliGRAM(s) Oral daily  BACItracin   Ointment 1 Application(s) Topical daily  cefepime   IVPB 2000 milliGRAM(s) IV Intermittent every 12 hours  cloNIDine 0.1 milliGRAM(s) Oral two times a day  gentamicin   IVPB 80 milliGRAM(s) IV Intermittent every 8 hours  gentamicin   IVPB      heparin   Injectable 5000 Unit(s) SubCutaneous every 12 hours  hydrALAZINE 25 milliGRAM(s) Oral every 8 hours  levETIRAcetam 500 milliGRAM(s) Oral two times a day  metoprolol tartrate 50 milliGRAM(s) Oral two times a day  polyethylene glycol 3350 17 Gram(s) Oral two times a day  senna 2 Tablet(s) Oral at bedtime  simvastatin 20 milliGRAM(s) Oral at bedtime  sodium chloride 0.9%. 1000 milliLiter(s) (75 mL/Hr) IV Continuous <Continuous>  vitamin A &amp; D Ointment 1 Application(s) Topical two times a day    MEDICATIONS  (PRN):  acetaminophen   Tablet .. 650 milliGRAM(s) Oral every 6 hours PRN Temp greater or equal to 38C (100.4F), Mild Pain (1 - 3)      ASSESSMENT / PLAN:  ----------------------------------------  70 yo female PMH dementia (non verbal), CVA (on plavix), seizure disorder (on keppra), htn, DM, hld BIBEMS for fever. As per EMS, patient was recently admitted to hospital for UTI. Discharged with a milner. EMS says that family noticed milner output decreased. Showed only 250 cc in milner over 36 hours. Also family reported patient had not had a bowel movement since her discharged. As per EMS, family reports patient is at mental baseline.    Sepsis / Urinary tract infection - Suspect secondary to indwelling catheter. Present on adminssion. On cefepime with urine culture growing Pseudomonas. Blood culture Staph thought to be contaminant.    Hematuria - Producing clear urine. Urology consultation noted. Planned for cystoscopy.    Hypertension - Close blood pressure monitoring. On clonidine, hydralazine, and metoprolol.    Seizure disorder - On levetiracetam.    Acute kidney injury - Renal function improved.    Decubitus - Continue with wound care and routine positioning.    Humerus fracture - Orthopedics consultation noted. For repeat Xray in one month.    Severe protein calorie malnutrition - Encouragement / Assistance with oral intake. FATOU MCBRIDE  ----------------------------------------  The patient was seen and evaluated for urinary tract infection. Appears in no distress.    Vital Signs Last 24 Hrs  T(C): 37.2 (24 Aug 2020 11:59), Max: 37.7 (23 Aug 2020 17:16)  T(F): 99 (24 Aug 2020 11:59), Max: 99.8 (23 Aug 2020 17:16)  HR: 60 (24 Aug 2020 11:59) (60 - 66)  BP: 113/79 (24 Aug 2020 11:59) (113/79 - 156/56)  BP(mean): --  RR: 17 (24 Aug 2020 11:59) (16 - 17)  SpO2: 100% (24 Aug 2020 11:59) (98% - 100%)    CAPILLARY BLOOD GLUCOSE  POCT Blood Glucose.: 133 mg/dL (24 Aug 2020 10:57)  POCT Blood Glucose.: 134 mg/dL (24 Aug 2020 07:38)  POCT Blood Glucose.: 105 mg/dL (23 Aug 2020 22:40)  POCT Blood Glucose.: 148 mg/dL (23 Aug 2020 17:24)    PHYSICAL EXAMINATION:  ----------------------------------------  General appearance: No acute distress, Awake, Alert  HEENT: Normocephalic, Atraumatic, Conjunctiva clear, EOMI  Neck: Supple, No JVD, No tenderness  Lungs: Breath sound equal bilaterally, No wheezes, No rales  Cardiovascular: S1S2, Regular rhythm  Abdomen: Soft, Nontender, Nondistended, No guarding/rebound, Positive bowel sounds  Extremities: No clubbing, No cyanosis, No edema, No calf tenderness  Neuro: Strength equal bilaterally, No tremors    LABORATORY STUDIES:  ----------------------------------------             8.6    5.96  )-----------( 297      ( 24 Aug 2020 07:41 )             27.4     08-24    142  |  110<H>  |  10  ----------------------------<  113<H>  3.4<L>   |  25  |  0.72    Ca    8.5      24 Aug 2020 07:41  Phos  2.9     08-24  Mg     2.2     08-24    TPro  6.5  /  Alb  2.5<L>  /  TBili  0.8  /  DBili  x   /  AST  13<L>  /  ALT  11<L>  /  AlkPhos  77  08-24    LIVER FUNCTIONS - ( 24 Aug 2020 07:41 )  Alb: 2.5 g/dL / Pro: 6.5 gm/dL / ALK PHOS: 77 U/L / ALT: 11 U/L / AST: 13 U/L / GGT: x           PT/INR - ( 23 Aug 2020 07:08 )   PT: 13.0 sec;   INR: 1.13 ratio    PTT - ( 23 Aug 2020 07:08 )  PTT:31.2 sec    Culture - Urine (collected 22 Aug 2020 12:50)  Source: .Urine Catheterized  Preliminary Report (23 Aug 2020 17:21):    >100,000 CFU/ml Pseudomonas aeruginosa    MEDICATIONS  (STANDING):  amLODIPine   Tablet 10 milliGRAM(s) Oral daily  BACItracin   Ointment 1 Application(s) Topical daily  cefepime   IVPB 2000 milliGRAM(s) IV Intermittent every 12 hours  cloNIDine 0.1 milliGRAM(s) Oral two times a day  gentamicin   IVPB 80 milliGRAM(s) IV Intermittent every 8 hours  gentamicin   IVPB      heparin   Injectable 5000 Unit(s) SubCutaneous every 12 hours  hydrALAZINE 25 milliGRAM(s) Oral every 8 hours  levETIRAcetam 500 milliGRAM(s) Oral two times a day  metoprolol tartrate 50 milliGRAM(s) Oral two times a day  polyethylene glycol 3350 17 Gram(s) Oral two times a day  senna 2 Tablet(s) Oral at bedtime  simvastatin 20 milliGRAM(s) Oral at bedtime  sodium chloride 0.9%. 1000 milliLiter(s) (75 mL/Hr) IV Continuous <Continuous>  vitamin A &amp; D Ointment 1 Application(s) Topical two times a day    MEDICATIONS  (PRN):  acetaminophen   Tablet .. 650 milliGRAM(s) Oral every 6 hours PRN Temp greater or equal to 38C (100.4F), Mild Pain (1 - 3)      ASSESSMENT / PLAN:  ----------------------------------------  68 yo female PMH dementia (non verbal), CVA (on plavix), seizure disorder (on keppra), htn, DM, hld BIBEMS for fever. As per EMS, patient was recently admitted to hospital for UTI. Discharged with a milner. EMS says that family noticed milner output decreased. Showed only 250 cc in milner over 36 hours. Also family reported patient had not had a bowel movement since her discharged. As per EMS, family reports patient is at mental baseline.    Sepsis / Urinary tract infection - Suspect secondary to indwelling catheter. Present on adminssion. On cefepime with urine culture growing Pseudomonas. Blood culture Staph thought to be contaminant.    Hematuria - Producing clear urine. Urology consultation noted. Planned for cystoscopy.    Hypertension - Close blood pressure monitoring. On clonidine, hydralazine, and metoprolol.    Seizure disorder - On levetiracetam.    Acute kidney injury - Renal function improved.    Decubitus - Continue with wound care and routine positioning.    Humerus fracture - Orthopedics consultation noted. For repeat Xray in one month.    Severe protein calorie malnutrition - Encouragement / Assistance with oral intake.    Hypokalemia - Potassium supplementation.

## 2020-08-24 NOTE — PRE-OP CHECKLIST - AS TEMP SITE
oral
direct patient care (not related to procedure)/additional history taking/consult w/ pt's family directly relating to pts condition

## 2020-08-24 NOTE — BRIEF OPERATIVE NOTE - NSICDXBRIEFPOSTOP_GEN_ALL_CORE_FT
POST-OP DIAGNOSIS:  Left renal stone 24-Aug-2020 22:25:57  Todd Caicedo  Gross hematuria 24-Aug-2020 22:24:47  Todd Caicedo

## 2020-08-25 LAB
-  AMIKACIN: SIGNIFICANT CHANGE UP
-  AZTREONAM: SIGNIFICANT CHANGE UP
-  CEFEPIME: SIGNIFICANT CHANGE UP
-  CEFTAZIDIME: SIGNIFICANT CHANGE UP
-  CIPROFLOXACIN: SIGNIFICANT CHANGE UP
-  GENTAMICIN: SIGNIFICANT CHANGE UP
-  IMIPENEM: SIGNIFICANT CHANGE UP
-  LEVOFLOXACIN: SIGNIFICANT CHANGE UP
-  MEROPENEM: SIGNIFICANT CHANGE UP
-  PIPERACILLIN/TAZOBACTAM: SIGNIFICANT CHANGE UP
-  TOBRAMYCIN: SIGNIFICANT CHANGE UP
ANION GAP SERPL CALC-SCNC: 7 MMOL/L — SIGNIFICANT CHANGE UP (ref 5–17)
BUN SERPL-MCNC: 11 MG/DL — SIGNIFICANT CHANGE UP (ref 7–23)
CALCIUM SERPL-MCNC: 8.4 MG/DL — LOW (ref 8.5–10.1)
CHLORIDE SERPL-SCNC: 108 MMOL/L — SIGNIFICANT CHANGE UP (ref 96–108)
CO2 SERPL-SCNC: 23 MMOL/L — SIGNIFICANT CHANGE UP (ref 22–31)
CREAT SERPL-MCNC: 0.87 MG/DL — SIGNIFICANT CHANGE UP (ref 0.5–1.3)
CULTURE RESULTS: SIGNIFICANT CHANGE UP
GLUCOSE BLDC GLUCOMTR-MCNC: 153 MG/DL — HIGH (ref 70–99)
GLUCOSE BLDC GLUCOMTR-MCNC: 168 MG/DL — HIGH (ref 70–99)
GLUCOSE BLDC GLUCOMTR-MCNC: 172 MG/DL — HIGH (ref 70–99)
GLUCOSE BLDC GLUCOMTR-MCNC: 191 MG/DL — HIGH (ref 70–99)
GLUCOSE SERPL-MCNC: 158 MG/DL — HIGH (ref 70–99)
HCT VFR BLD CALC: 28.3 % — LOW (ref 34.5–45)
HGB BLD-MCNC: 9.4 G/DL — LOW (ref 11.5–15.5)
MCHC RBC-ENTMCNC: 29.6 PG — SIGNIFICANT CHANGE UP (ref 27–34)
MCHC RBC-ENTMCNC: 33.2 GM/DL — SIGNIFICANT CHANGE UP (ref 32–36)
MCV RBC AUTO: 89 FL — SIGNIFICANT CHANGE UP (ref 80–100)
METHOD TYPE: SIGNIFICANT CHANGE UP
NRBC # BLD: 0 /100 WBCS — SIGNIFICANT CHANGE UP (ref 0–0)
ORGANISM # SPEC MICROSCOPIC CNT: SIGNIFICANT CHANGE UP
ORGANISM # SPEC MICROSCOPIC CNT: SIGNIFICANT CHANGE UP
PLATELET # BLD AUTO: 330 K/UL — SIGNIFICANT CHANGE UP (ref 150–400)
POTASSIUM SERPL-MCNC: 3.6 MMOL/L — SIGNIFICANT CHANGE UP (ref 3.5–5.3)
POTASSIUM SERPL-SCNC: 3.6 MMOL/L — SIGNIFICANT CHANGE UP (ref 3.5–5.3)
RBC # BLD: 3.18 M/UL — LOW (ref 3.8–5.2)
RBC # FLD: 14.4 % — SIGNIFICANT CHANGE UP (ref 10.3–14.5)
SODIUM SERPL-SCNC: 138 MMOL/L — SIGNIFICANT CHANGE UP (ref 135–145)
SPECIMEN SOURCE: SIGNIFICANT CHANGE UP
WBC # BLD: 11.97 K/UL — HIGH (ref 3.8–10.5)
WBC # FLD AUTO: 11.97 K/UL — HIGH (ref 3.8–10.5)

## 2020-08-25 PROCEDURE — 99232 SBSQ HOSP IP/OBS MODERATE 35: CPT

## 2020-08-25 PROCEDURE — 74018 RADEX ABDOMEN 1 VIEW: CPT | Mod: 26

## 2020-08-25 RX ORDER — CIPROFLOXACIN LACTATE 400MG/40ML
500 VIAL (ML) INTRAVENOUS EVERY 12 HOURS
Refills: 0 | Status: DISCONTINUED | OUTPATIENT
Start: 2020-08-25 | End: 2020-08-31

## 2020-08-25 RX ORDER — HYDRALAZINE HCL 50 MG
25 TABLET ORAL ONCE
Refills: 0 | Status: COMPLETED | OUTPATIENT
Start: 2020-08-25 | End: 2020-08-25

## 2020-08-25 RX ORDER — METOPROLOL TARTRATE 50 MG
5 TABLET ORAL ONCE
Refills: 0 | Status: COMPLETED | OUTPATIENT
Start: 2020-08-25 | End: 2020-08-25

## 2020-08-25 RX ADMIN — SENNA PLUS 2 TABLET(S): 8.6 TABLET ORAL at 22:21

## 2020-08-25 RX ADMIN — Medication 50 MILLIGRAM(S): at 06:33

## 2020-08-25 RX ADMIN — Medication 1 APPLICATION(S): at 11:40

## 2020-08-25 RX ADMIN — LEVETIRACETAM 500 MILLIGRAM(S): 250 TABLET, FILM COATED ORAL at 17:07

## 2020-08-25 RX ADMIN — HEPARIN SODIUM 5000 UNIT(S): 5000 INJECTION INTRAVENOUS; SUBCUTANEOUS at 06:24

## 2020-08-25 RX ADMIN — SIMVASTATIN 20 MILLIGRAM(S): 20 TABLET, FILM COATED ORAL at 22:21

## 2020-08-25 RX ADMIN — Medication 0.1 MILLIGRAM(S): at 17:07

## 2020-08-25 RX ADMIN — Medication 1 APPLICATION(S): at 06:34

## 2020-08-25 RX ADMIN — Medication 1 APPLICATION(S): at 17:08

## 2020-08-25 RX ADMIN — Medication 0.1 MILLIGRAM(S): at 06:33

## 2020-08-25 RX ADMIN — LEVETIRACETAM 500 MILLIGRAM(S): 250 TABLET, FILM COATED ORAL at 06:33

## 2020-08-25 RX ADMIN — Medication 500 MILLIGRAM(S): at 17:08

## 2020-08-25 RX ADMIN — SODIUM CHLORIDE 75 MILLILITER(S): 9 INJECTION INTRAMUSCULAR; INTRAVENOUS; SUBCUTANEOUS at 11:41

## 2020-08-25 RX ADMIN — Medication 50 MILLIGRAM(S): at 17:07

## 2020-08-25 RX ADMIN — AMLODIPINE BESYLATE 10 MILLIGRAM(S): 2.5 TABLET ORAL at 06:33

## 2020-08-25 RX ADMIN — Medication 25 MILLIGRAM(S): at 06:33

## 2020-08-25 RX ADMIN — HEPARIN SODIUM 5000 UNIT(S): 5000 INJECTION INTRAVENOUS; SUBCUTANEOUS at 17:07

## 2020-08-25 RX ADMIN — Medication 25 MILLIGRAM(S): at 01:51

## 2020-08-25 RX ADMIN — Medication 25 MILLIGRAM(S): at 22:21

## 2020-08-25 RX ADMIN — Medication 25 MILLIGRAM(S): at 13:35

## 2020-08-25 RX ADMIN — CEFEPIME 100 MILLIGRAM(S): 1 INJECTION, POWDER, FOR SOLUTION INTRAMUSCULAR; INTRAVENOUS at 06:22

## 2020-08-25 NOTE — PROGRESS NOTE ADULT - SUBJECTIVE AND OBJECTIVE BOX
Post-op check    S/P Cysto, lithotripsy, stone extraction, insertion of left ureteral stent POD#0  69 year old Female seen and examined at bedside resting comfortably. Nonverbal. No pain, chest pain, shortness of breath, nausea/ vomiting, and dizziness.     Vital Signs Last 24 Hrs  T(F): 98.5 (08-25-20 @ 00:00), Max: 99.4 (08-24-20 @ 18:05)  HR: 66 (08-25-20 @ 00:00)  BP: 149/71 (08-25-20 @ 00:00)  RR: 18 (08-25-20 @ 00:00)  SpO2: 98% (08-25-20 @ 00:00)  POCT Blood Glucose.: 107 mg/dL (24 Aug 2020 17:16)    PE:  GENERAL: Alert, NAD  CHEST/LUNG: Clear to auscultation bilaterally, respirations nonlabored  HEART: S1S2, Regular rate and rhythm  ABDOMEN: soft NTND  : Milner indwelling with clear yellow urine. No suprapubic tenderness.  EXTREMITIES:  no calf tenderness, No edema, intermittent compression devices in place bilaterally      Assessment: 69F S/P Cysto, lithotripsy, stone extraction, insertion of left ureteral stent POD#0  Ucx (sent 8/22) prelim pseudomonas.    Plan:  - Continue milner. Monitor urine output.  - F/u AM KUB  - DVT prophylaxis, Incentive Spirometer, OOB, Ambulating, pain control  - Continue antibiotics (Cefepime)  - f/u labs. Follow up urine culture susceptibilities.  - TOV Tues or Wednes. F/u in the office in 2 weeks for stent removal.  - continue current management per medicine  - Discussed with Dr. Caicedo
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
## 2020-08-25 NOTE — CHART NOTE - NSCHARTNOTEFT_GEN_A_CORE
Pt admitted c catheter associated UTI (recurrent) & AMADOU from dehydration; sepsis 2/2 UTI. PMHx includes dementia (pt non-verbal), CVA, seizure disorder, HTN, HLD, T2DM. Pt s/p cystoscopy, lithotripsy, stone extraction, insertion of left ureteral stent (8/24).       Factors impacting intake: [ ] none [ ] nausea  [ ] vomiting [ ] diarrhea [ ] constipation  [ ]chewing problems [ ] swallowing issues  [x ] other:  AMS, not able to self-feed, persistenc lack of appetite    Diet Prescription: Diet, Consistent Carbohydrate/No Snacks:   Pureed  Supplement Feeding Modality:  Oral  Glucerna Shake Cans or Servings Per Day:  1       Frequency:  Two Times a day (08-24-20 @ 22:02)    Intake: remains poor; <50% mostly; requires total feeding assistance; pt refusing meals at times    Current Weight:  56.6 kg (8/24); admission wt 56.3 kg (8/14)  % Weight Change: wt stable x 10 days    Physical Appearance:  no edema noted    Pertinent Medications: MEDICATIONS  (STANDING):  amLODIPine   Tablet 10 milliGRAM(s) Oral daily  BACItracin   Ointment 1 Application(s) Topical daily  cefepime   IVPB 2000 milliGRAM(s) IV Intermittent every 12 hours  cloNIDine 0.1 milliGRAM(s) Oral two times a day  heparin   Injectable 5000 Unit(s) SubCutaneous every 12 hours  hydrALAZINE 25 milliGRAM(s) Oral every 8 hours  levETIRAcetam 500 milliGRAM(s) Oral two times a day  metoprolol tartrate 50 milliGRAM(s) Oral two times a day  polyethylene glycol 3350 17 Gram(s) Oral two times a day  senna 2 Tablet(s) Oral at bedtime  simvastatin 20 milliGRAM(s) Oral at bedtime  sodium chloride 0.9%. 1000 milliLiter(s) (75 mL/Hr) IV Continuous <Continuous>  vitamin A &amp; D Ointment 1 Application(s) Topical two times a day    MEDICATIONS  (PRN):  acetaminophen   Tablet .. 650 milliGRAM(s) Oral every 6 hours PRN Temp greater or equal to 38C (100.4F), Mild Pain (1 - 3)    Pertinent Labs: 08-25 Na138 mmol/L Glu 158 mg/dL<H> K+ 3.6 mmol/L Cr  0.87 mg/dL BUN 11 mg/dL 08-24 Phos 2.9 mg/dL 08-24 Alb 2.5 g/dL<L>, POCT: 134, 133, 123, 107; 07-29 A1c 7.7%, average glu 174    Skin:  b/l buttocks stage II pressure ulcers noted    Estimated Needs:   [X ] no change since previous assessment  (8/15)  [ ] recalculated:     Previous Nutrition Diagnosis:   [X ] Severe Malnutrition - Acute  Etiology:  Inadequate energy/protein intake + increased energy/protein needs related to multiple pressure ulcers, UTI, AMADOU, Sepsis  Signs & Symptoms:  Physical findings of moderate fate depletion & muscle wasting as noted on 8/21     GOAL:  Provide nutrition/hydration as medically appropriate & within family's wishes for tx    Nutrition Diagnosis is [X ] ongoing  [ ] resolved [ ] not applicable     New Nutrition Diagnosis: [X ] not applicable    Interventions:   continue current diet rx as noted  Recommend  [ ] Change Diet To:  [ ] Nutrition Supplement  [X ] Nutrition Support: consider tube feeding via NGT if lack of appetite persists; recommend Glucerna 1.2 to goal rate of 50 ml/hr (provides 1200 ml, 1440 kcal, 72 g protein, 972 ml H2O)  [X] Other: free H2O flushes of 150 ml q 6 hrs; consider appetite stimulant as medically appropriate    Monitoring and Evaluation:   [X ] PO intake [ x ] Tolerance to diet prescription [ x ] weights [ x ] labs[ x ] follow up per protocol  [ ] other:

## 2020-08-25 NOTE — PROGRESS NOTE ADULT - ASSESSMENT
69F PMH dementia (non verbal), CVA (on plavix), seizure disorder (on keppra), HTN, DM, HLD, recent admission for UTI with milner catheter, BIBEMS for fever   recent uti few weeks ago for  UTI/ urinary retention /bilateral mod hydro, urine culture nondiagnostic s/p abx   UTI / urinary retention / Bilateral mod hydro with chronic milner   S/p milner catheter exchange 8/15 and 8/16  urine culture with significant amount of pseudomonas   blood culture with coag neg staph likely contaminant   continue cefepime   fu surveillance blood cultures all NEGATIVE

## 2020-08-25 NOTE — PROGRESS NOTE ADULT - SUBJECTIVE AND OBJECTIVE BOX
HPI:  70 yo female PMH dementia (non verbal), CVA (on plavix), seizure disorder (on keppra), htn, DM, hld BIBEMS for fever since yesterday. As per EMS, patient was recently admitted to hospital for UTI. Discharged on Monday with a milner.   Milner was changed this admission in the ER. EMS says that family noticed milner output decreased since yesterday. Shows only 250 cc in milner over 36 hours. Also family reported patient had not had a bowel movement since her discharged. As per EMS, family reports patient is at mental baseline. On arrival milner output looks infected. (14 Aug 2020 14:06)  sp sx yesterday for hematuria ;;  Cystoscopy with retrograde pyelography with insertion of left ureteral stent 24-Aug-2020 22:22:28 l  Cystoscopic extraction of ureteral stone 24-Aug-2020   Cystoscopy with left ureteroscopy and left-sided laser lithotripsy   No Known Allergies    Intolerances        MEDICATIONS  (STANDING):  amLODIPine   Tablet 10 milliGRAM(s) Oral daily  BACItracin   Ointment 1 Application(s) Topical daily  cefepime   IVPB 2000 milliGRAM(s) IV Intermittent every 12 hours  cloNIDine 0.1 milliGRAM(s) Oral two times a day  heparin   Injectable 5000 Unit(s) SubCutaneous every 12 hours  hydrALAZINE 25 milliGRAM(s) Oral every 8 hours  levETIRAcetam 500 milliGRAM(s) Oral two times a day  metoprolol tartrate 50 milliGRAM(s) Oral two times a day  polyethylene glycol 3350 17 Gram(s) Oral two times a day  senna 2 Tablet(s) Oral at bedtime  simvastatin 20 milliGRAM(s) Oral at bedtime  sodium chloride 0.9%. 1000 milliLiter(s) (75 mL/Hr) IV Continuous <Continuous>  vitamin A &amp; D Ointment 1 Application(s) Topical two times a day    MEDICATIONS  (PRN):  acetaminophen   Tablet .. 650 milliGRAM(s) Oral every 6 hours PRN Temp greater or equal to 38C (100.4F), Mild Pain (1 - 3)      REVIEW OF SYSTEMS:    unable to assess     VITAL SIGNS:  T(C): 37 (08-25-20 @ 10:00), Max: 37.4 (08-24-20 @ 18:05)  T(F): 98.6 (08-25-20 @ 10:00), Max: 99.4 (08-24-20 @ 18:05)  HR: 60 (08-25-20 @ 10:00) (60 - 106)  BP: 123/52 (08-25-20 @ 10:00) (98/64 - 179/83)  RR: 16 (08-25-20 @ 10:00) (15 - 22)  SpO2: 99% (08-25-20 @ 10:00) (97% - 100%)  Wt(kg): --    PHYSICAL EXAM:  lethargic arousable non verbal   GENERAL: not in any distress  HEENT: Neck is supple, normocephalic, atraumatic   CHEST/LUNG: Clear to auscultation bilaterally; No rales, rhonchi, wheezing  HEART: Regular rate and rhythm; No murmurs, rubs, or gallops  ABDOMEN: Soft, Nontender, Nondistended; Bowel sounds present, no rebound   EXTREMITIES:  2+ Peripheral Pulses, No clubbing, cyanosis, or edema  TOV   SKIN: No rashes or lesions  BACK: no pressor sore   NERVOUS SYSTEM:  arousable lethargic   LABS:                         9.4    11.97 )-----------( 330      ( 25 Aug 2020 06:42 )             28.3     08-25    138  |  108  |  11  ----------------------------<  158<H>  3.6   |  23  |  0.87    Ca    8.4<L>      25 Aug 2020 06:42  Phos  2.9     08-24  Mg     2.2     08-24    TPro  6.5  /  Alb  2.5<L>  /  TBili  0.8  /  DBili  x   /  AST  13<L>  /  ALT  11<L>  /  AlkPhos  77  08-24    LIVER FUNCTIONS - ( 24 Aug 2020 07:41 )  Alb: 2.5 g/dL / Pro: 6.5 gm/dL / ALK PHOS: 77 U/L / ALT: 11 U/L / AST: 13 U/L / GGT: x                                   Culture Results:   >100,000 CFU/ml Pseudomonas aeruginosa (08-22 @ 12:50)                Radiology:

## 2020-08-25 NOTE — PROGRESS NOTE ADULT - ATTENDING COMMENTS
per PA note  c/s pseudo senstyive to quinolones  milner reinserted for retention    Advise-d/c with milner  can switch to cipro or levaquin  f/u 2 weeks for stent removal inoffice  KUB-fragments in lower pole

## 2020-08-25 NOTE — PROGRESS NOTE ADULT - SUBJECTIVE AND OBJECTIVE BOX
Patient seen and examined at bedside resting comfortably. Nonverbal. No pain, chest pain, shortness of breath, nausea/ vomiting, and dizziness.   Mayberry draining well     T(F): 98.4 (08-25-20 @ 06:00), Max: 99.4 (08-24-20 @ 18:05)  HR: 106 (08-25-20 @ 06:00) (60 - 106)  BP: 166/86 (08-25-20 @ 06:00) (98/64 - 179/83)  RR: 18 (08-25-20 @ 06:00) (15 - 22)  SpO2: 98% (08-25-20 @ 06:00) (97% - 100%)    POCT Blood Glucose.: 168 mg/dL (25 Aug 2020 07:16)  POCT Blood Glucose.: 191 mg/dL (25 Aug 2020 02:05)  POCT Blood Glucose.: 107 mg/dL (24 Aug 2020 17:16)  POCT Blood Glucose.: 123 mg/dL (24 Aug 2020 14:15)  POCT Blood Glucose.: 133 mg/dL (24 Aug 2020 10:57)      PHYSICAL EXAM:    General: NAD, alert and awake. Nonverbal   Chest: nonlabored respirations  Abdomen: soft, NT/ND.   Extremities: Calf soft, nontender b/l.   : No suprapubic tenderness or bladder distention. Mayberry draining concentrated urine (output: 1400cc/24hr)     LABS:                        9.4    11.97 )-----------( 330      ( 25 Aug 2020 06:42 )             28.3   08-25    138  |  108  |  11  ----------------------------<  158<H>  3.6   |  23  |  0.87    Ca    8.4<L>      25 Aug 2020 06:42  Phos  2.9     08-24  Mg     2.2     08-24    TPro  6.5  /  Alb  2.5<L>  /  TBili  0.8  /  DBili  x   /  AST  13<L>  /  ALT  11<L>  /  AlkPhos  77  08-24    I&O's Detail    24 Aug 2020 07:01  -  25 Aug 2020 07:00  --------------------------------------------------------  IN:  Total IN: 0 mL    OUT:    Indwelling Catheter - Urethral: 1400 mL  Total OUT: 1400 mL    Total NET: -1400 mL      Assessment: 69F S/P Cysto, lithotripsy, stone extraction, insertion of left ureteral stent POD#1, stable.   Ucx (sent 8/22) prelim pseudomonas.    Plan:  - TOV today, f/u PVR  - F/u AM KUB  - DVT prophylaxis, Incentive Spirometer, OOB, Ambulating, pain control  - Continue antibiotics (Cefepime), F/u urine cx susceptibilities.  - continue current management per medicine  - Will need follow up in the office in 2 weeks for stent removal   - Discussed with Dr. Caicedo

## 2020-08-25 NOTE — CHART NOTE - NSCHARTNOTEFT_GEN_A_CORE
Urine cx and sensitivities reviewed (8/22):     Culture - Urine (08.22.20 @ 12:50)    -  Tobramycin: S <=2    -  Piperacillin/Tazobactam: R >64    -  Meropenem: S <=1    -  Levofloxacin: S <=0.5    -  Imipenem: S <=1    -  Gentamicin: S 4    -  Ciprofloxacin: S <=0.25    -  Ceftazidime: R >16    -  Cefepime: R >16    -  Aztreonam: R >16    -  Amikacin: S <=16    Specimen Source: .Urine Catheterized    Culture Results:   >100,000 CFU/ml Pseudomonas aeruginosa    Organism Identification: Pseudomonas aeruginosa    Organism: Pseudomonas aeruginosa    Method Type: MELISA      As per discussion with Dr. Hernandez, cefepime discontinued and patient started on Cipro 500mg PO BID.

## 2020-08-26 LAB
GLUCOSE BLDC GLUCOMTR-MCNC: 148 MG/DL — HIGH (ref 70–99)
HCT VFR BLD CALC: 26.2 % — LOW (ref 34.5–45)
HGB BLD-MCNC: 8.6 G/DL — LOW (ref 11.5–15.5)
MCHC RBC-ENTMCNC: 29.4 PG — SIGNIFICANT CHANGE UP (ref 27–34)
MCHC RBC-ENTMCNC: 32.8 GM/DL — SIGNIFICANT CHANGE UP (ref 32–36)
MCV RBC AUTO: 89.4 FL — SIGNIFICANT CHANGE UP (ref 80–100)
NRBC # BLD: 0 /100 WBCS — SIGNIFICANT CHANGE UP (ref 0–0)
PLATELET # BLD AUTO: 293 K/UL — SIGNIFICANT CHANGE UP (ref 150–400)
RBC # BLD: 2.93 M/UL — LOW (ref 3.8–5.2)
RBC # FLD: 14.8 % — HIGH (ref 10.3–14.5)
SURGICAL PATHOLOGY STUDY: SIGNIFICANT CHANGE UP
WBC # BLD: 8.94 K/UL — SIGNIFICANT CHANGE UP (ref 3.8–10.5)
WBC # FLD AUTO: 8.94 K/UL — SIGNIFICANT CHANGE UP (ref 3.8–10.5)

## 2020-08-26 PROCEDURE — 99233 SBSQ HOSP IP/OBS HIGH 50: CPT

## 2020-08-26 RX ADMIN — Medication 1 APPLICATION(S): at 06:02

## 2020-08-26 RX ADMIN — Medication 50 MILLIGRAM(S): at 17:01

## 2020-08-26 RX ADMIN — SIMVASTATIN 20 MILLIGRAM(S): 20 TABLET, FILM COATED ORAL at 22:30

## 2020-08-26 RX ADMIN — POLYETHYLENE GLYCOL 3350 17 GRAM(S): 17 POWDER, FOR SOLUTION ORAL at 17:02

## 2020-08-26 RX ADMIN — Medication 0.1 MILLIGRAM(S): at 05:46

## 2020-08-26 RX ADMIN — Medication 1 APPLICATION(S): at 17:03

## 2020-08-26 RX ADMIN — LEVETIRACETAM 500 MILLIGRAM(S): 250 TABLET, FILM COATED ORAL at 05:46

## 2020-08-26 RX ADMIN — SODIUM CHLORIDE 75 MILLILITER(S): 9 INJECTION INTRAMUSCULAR; INTRAVENOUS; SUBCUTANEOUS at 17:02

## 2020-08-26 RX ADMIN — Medication 500 MILLIGRAM(S): at 17:00

## 2020-08-26 RX ADMIN — Medication 25 MILLIGRAM(S): at 14:37

## 2020-08-26 RX ADMIN — Medication 500 MILLIGRAM(S): at 05:48

## 2020-08-26 RX ADMIN — Medication 0.1 MILLIGRAM(S): at 17:00

## 2020-08-26 RX ADMIN — Medication 25 MILLIGRAM(S): at 22:30

## 2020-08-26 RX ADMIN — SENNA PLUS 2 TABLET(S): 8.6 TABLET ORAL at 22:30

## 2020-08-26 RX ADMIN — Medication 50 MILLIGRAM(S): at 05:47

## 2020-08-26 RX ADMIN — POLYETHYLENE GLYCOL 3350 17 GRAM(S): 17 POWDER, FOR SOLUTION ORAL at 05:55

## 2020-08-26 RX ADMIN — LEVETIRACETAM 500 MILLIGRAM(S): 250 TABLET, FILM COATED ORAL at 17:00

## 2020-08-26 RX ADMIN — AMLODIPINE BESYLATE 10 MILLIGRAM(S): 2.5 TABLET ORAL at 05:47

## 2020-08-26 RX ADMIN — HEPARIN SODIUM 5000 UNIT(S): 5000 INJECTION INTRAVENOUS; SUBCUTANEOUS at 17:00

## 2020-08-26 RX ADMIN — HEPARIN SODIUM 5000 UNIT(S): 5000 INJECTION INTRAVENOUS; SUBCUTANEOUS at 05:47

## 2020-08-26 RX ADMIN — Medication 1 APPLICATION(S): at 12:20

## 2020-08-26 RX ADMIN — Medication 25 MILLIGRAM(S): at 05:46
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
## 2020-08-26 NOTE — PROGRESS NOTE ADULT - SUBJECTIVE AND OBJECTIVE BOX
Surgery NP note  Patient seen and examined bedside resting comfortably.  Nonverbal  Tolerating diet.  Normal flatus/BM. Afebrile  Milner draining well    T(F): 98.9 (08-26-20 @ 11:55), Max: 99.7 (08-25-20 @ 23:33)  HR: 63 (08-26-20 @ 11:55) (63 - 88)  BP: 138/47 (08-26-20 @ 11:55) (127/63 - 162/86)  RR: 16 (08-26-20 @ 11:55) (16 - 17)  SpO2: 100% (08-26-20 @ 11:55) (98% - 100%)  Wt(kg): --  CAPILLARY BLOOD GLUCOSE      POCT Blood Glucose.: 148 mg/dL (26 Aug 2020 07:39)  POCT Blood Glucose.: 172 mg/dL (25 Aug 2020 22:35)      PHYSICAL EXAM:  General: NAD, WDWN.   Neuro:  somnolent    HEENT: NCAT, EOMI, conjunctiva clear  CV: +S1+S2 regular rate and rhythm  Lung: clear to ausculation bilaterally, respirations nonlabored, good inspiratory effort  Abdomen: soft, Non tender, No Distension. Normal active BS  Extremities: no pedal edema or calf tenderness noted   : NO SP tenderness, NO CVA tenderness, Indwelling milner catheter with blood tinged urine    LABS:                        8.6    8.94  )-----------( 293      ( 26 Aug 2020 07:24 )             26.2     08-25    138  |  108  |  11  ----------------------------<  158<H>  3.6   |  23  |  0.87    Ca    8.4<L>      25 Aug 2020 06:42          I&O's Detail    25 Aug 2020 07:01  -  26 Aug 2020 07:00  --------------------------------------------------------  IN:    Oral Fluid: 720 mL    sodium chloride 0.9%.: 1800 mL  Total IN: 2520 mL    OUT:    Indwelling Catheter - Urethral: 2850 mL    Voided: 500 mL  Total OUT: 3350 mL    Total NET: -830 mL      26 Aug 2020 07:01  -  26 Aug 2020 14:49  --------------------------------------------------------  IN:    Oral Fluid: 210 mL  Total IN: 210 mL    OUT:  Total OUT: 0 mL    Total NET: 210 mL      Assessment: 69F S/P Cysto, lithotripsy, stone extraction, insertion of left ureteral stent POD#2, stable.   Ucx (sent 8/22) prelim pseudomonas. Failed trial of Void     Plan:  - Continue with Milner catheter, May discharge with milner, F/U in office in 2weeks for stent removal  - DVT prophylaxis, Incentive Spirometer, OOB, Ambulating, pain control  - Continue antibiotics  - continue current management per medicine   - Discussed with Dr. Caicedo

## 2020-08-26 NOTE — PROGRESS NOTE ADULT - ASSESSMENT
Patient is being see for UTI/ B/L Hydronephrosis/ chronic Milner    S/p milner catheter exchange 8/15 and 8/16    Afebrile  No leukocytosis    UA significant for pyuria  UC grew Carbapenem resistance Pseudomonas (S- Cefepime, Cipro)    BC grew CoNS-- likely a contaminant      s/p Cefepime  Now on Cipro   -Urology following-- planning F/u in the office in 2 weeks for stent removal Patient is being see for UTI/ B/L Hydronephrosis/ chronic Milner    S/p milner catheter exchange 8/15 and 8/16    Afebrile  No leukocytosis    UA significant for pyuria  UC grew Carbapenem resistance Pseudomonas (S- Cefepime, Cipro)    BC grew CoNS-- likely a contaminant      s/p Cefepime  Now on Cipro, plan to complete a total of 14 days  -Urology following-- planning F/u in the office in 2 weeks for stent removal

## 2020-08-26 NOTE — PROGRESS NOTE ADULT - SUBJECTIVE AND OBJECTIVE BOX
HPI:  70 yo female PMH dementia (non verbal), CVA (on plavix), seizure disorder (on keppra), htn, DM, hld BIBEMS for fever since yesterday. As per EMS, patient was recently admitted to hospital for UTI. Discharged on Monday with a milner.   Milner was changed this admission in the ER. EMS says that family noticed milner output decreased since yesterday. Shows only 250 cc in milner over 36 hours. Also family reported patient had not had a bowel movement since her discharged. As per EMS, family reports patient is at mental baseline. On arrival milner output looks infected. (14 Aug 2020 14:06)      Allergies    No Known Allergies    Intolerances        MEDICATIONS  (STANDING):  amLODIPine   Tablet 10 milliGRAM(s) Oral daily  BACItracin   Ointment 1 Application(s) Topical daily  ciprofloxacin     Tablet 500 milliGRAM(s) Oral every 12 hours  cloNIDine 0.1 milliGRAM(s) Oral two times a day  heparin   Injectable 5000 Unit(s) SubCutaneous every 12 hours  hydrALAZINE 25 milliGRAM(s) Oral every 8 hours  levETIRAcetam 500 milliGRAM(s) Oral two times a day  metoprolol tartrate 50 milliGRAM(s) Oral two times a day  polyethylene glycol 3350 17 Gram(s) Oral two times a day  senna 2 Tablet(s) Oral at bedtime  simvastatin 20 milliGRAM(s) Oral at bedtime  sodium chloride 0.9%. 1000 milliLiter(s) (75 mL/Hr) IV Continuous <Continuous>  vitamin A &amp; D Ointment 1 Application(s) Topical two times a day    MEDICATIONS  (PRN):  acetaminophen   Tablet .. 650 milliGRAM(s) Oral every 6 hours PRN Temp greater or equal to 38C (100.4F), Mild Pain (1 - 3)      REVIEW OF SYSTEMS:    CONSTITUTIONAL: No fever, chills, weight loss, or fatigue  HEENT: No sore throat, runny nose, ear ache  RESPIRATORY: No cough, wheezing, No shortness of breath  CARDIOVASCULAR: No chest pain, palpitations, dizziness  GASTROINTESTINAL: No abdominal pain. No nausea, vomiting, diarrhea  GENITOURINARY: No dysuria, increase frequency, hematuria, or incontinence  NEUROLOGICAL: No headaches, memory loss, loss of strength, numbness, or tremors, no weakness  EXTREMITY: No pedal edema BLE  SKIN: No itching, burning, rashes, or lesions     VITAL SIGNS:  T(C): 37.3 (08-26-20 @ 05:05), Max: 37.6 (08-25-20 @ 14:00)  T(F): 99.2 (08-26-20 @ 05:05), Max: 99.7 (08-25-20 @ 14:00)  HR: 88 (08-26-20 @ 05:05) (68 - 88)  BP: 156/89 (08-26-20 @ 05:05) (127/63 - 162/86)  RR: 17 (08-26-20 @ 05:05) (16 - 17)  SpO2: 99% (08-26-20 @ 05:05) (98% - 100%)  Wt(kg): --    PHYSICAL EXAM:    GENERAL: not in any distress  HEENT: Neck is supple, normocephalic, atraumatic   CHEST/LUNG: Clear to percussion bilaterally; No rales, rhonchi, wheezing  HEART: Regular rate and rhythm; No murmurs, rubs, or gallops  ABDOMEN: Soft, Nontender, Nondistended; Bowel sounds present, no rebound   EXTREMITIES:  2+ Peripheral Pulses, No clubbing, cyanosis, or edema  GENITOURINARY:   SKIN: No rashes or lesions  BACK: no pressor sore   NERVOUS SYSTEM:  Alert & Oriented X3, Good concentration  PSYCH: normal affect     LABS:                         8.6    8.94  )-----------( 293      ( 26 Aug 2020 07:24 )             26.2     08-25    138  |  108  |  11  ----------------------------<  158<H>  3.6   |  23  |  0.87    Ca    8.4<L>      25 Aug 2020 06:42      Culture Results:   >100,000 CFU/ml Pseudomonas aeruginosa (08-22 @ 12:50)        Radiology: HPI:  70 yo female PMH dementia (non verbal), CVA (on plavix), seizure disorder (on keppra), htn, DM, hld BIBEMS for fever since yesterday. As per EMS, patient was recently admitted to hospital for UTI. Discharged on Monday with a milner.   Milner was changed this admission in the ER. EMS says that family noticed milner output decreased since yesterday. Shows only 250 cc in milner over 36 hours. Also family reported patient had not had a bowel movement since her discharged. As per EMS, family reports patient is at mental baseline. On arrival milner output looks infected. (14 Aug 2020 14:06)      Allergies    No Known Allergies    Intolerances        MEDICATIONS  (STANDING):  amLODIPine   Tablet 10 milliGRAM(s) Oral daily  BACItracin   Ointment 1 Application(s) Topical daily  ciprofloxacin     Tablet 500 milliGRAM(s) Oral every 12 hours  cloNIDine 0.1 milliGRAM(s) Oral two times a day  heparin   Injectable 5000 Unit(s) SubCutaneous every 12 hours  hydrALAZINE 25 milliGRAM(s) Oral every 8 hours  levETIRAcetam 500 milliGRAM(s) Oral two times a day  metoprolol tartrate 50 milliGRAM(s) Oral two times a day  polyethylene glycol 3350 17 Gram(s) Oral two times a day  senna 2 Tablet(s) Oral at bedtime  simvastatin 20 milliGRAM(s) Oral at bedtime  sodium chloride 0.9%. 1000 milliLiter(s) (75 mL/Hr) IV Continuous <Continuous>  vitamin A &amp; D Ointment 1 Application(s) Topical two times a day    MEDICATIONS  (PRN):  acetaminophen   Tablet .. 650 milliGRAM(s) Oral every 6 hours PRN Temp greater or equal to 38C (100.4F), Mild Pain (1 - 3)      REVIEW OF SYSTEMS:    CONSTITUTIONAL: No fever, chills, weight loss, or fatigue  HEENT: No sore throat, runny nose, ear ache  RESPIRATORY: No cough, wheezing, No shortness of breath  CARDIOVASCULAR: No chest pain, palpitations, dizziness  GASTROINTESTINAL: No abdominal pain. No nausea, vomiting, diarrhea  GENITOURINARY: No dysuria, increase frequency, hematuria, or incontinence  NEUROLOGICAL: No headaches, memory loss, loss of strength, numbness, or tremors, no weakness  EXTREMITY: No pedal edema BLE  SKIN: No itching, burning, rashes, or lesions     VITAL SIGNS:  T(C): 37.3 (08-26-20 @ 05:05), Max: 37.6 (08-25-20 @ 14:00)  T(F): 99.2 (08-26-20 @ 05:05), Max: 99.7 (08-25-20 @ 14:00)  HR: 88 (08-26-20 @ 05:05) (68 - 88)  BP: 156/89 (08-26-20 @ 05:05) (127/63 - 162/86)  RR: 17 (08-26-20 @ 05:05) (16 - 17)  SpO2: 99% (08-26-20 @ 05:05) (98% - 100%)  Wt(kg): --    PHYSICAL EXAM:    GENERAL: not in any distress  HEENT: Neck is supple, normocephalic, atraumatic   CHEST/LUNG: Clear to percussion bilaterally; No rales, rhonchi, wheezing  HEART: Regular rate and rhythm; No murmurs, rubs, or gallops  ABDOMEN: Soft, Nontender, Nondistended; Bowel sounds present, no rebound   EXTREMITIES:  No edema  GENITOURINARY: Milner on place  SKIN: No rashes or lesions    LABS:                         8.6    8.94  )-----------( 293      ( 26 Aug 2020 07:24 )             26.2     08-25    138  |  108  |  11  ----------------------------<  158<H>  3.6   |  23  |  0.87    Ca    8.4<L>      25 Aug 2020 06:42      Culture Results:   >100,000 CFU/ml Pseudomonas aeruginosa (08-22 @ 12:50)        Radiology:

## 2020-08-26 NOTE — PROGRESS NOTE ADULT - SUBJECTIVE AND OBJECTIVE BOX
Patient is a 69y old  Female w/ Hx HTN, CVA, seizure disorder, dementia (non-verbal) who presented with a chief complaint of fever; had been discharged recently with indwelling Mayberry      INTERVAL HPI/OVERNIGHT EVENTS:      REVIEW OF SYSTEMS:   Unable to get ROS - pt non-verbal    MEDICATIONS  (STANDING):  amLODIPine   Tablet 10 milliGRAM(s) Oral daily  BACItracin   Ointment 1 Application(s) Topical daily  ciprofloxacin     Tablet 500 milliGRAM(s) Oral every 12 hours  cloNIDine 0.1 milliGRAM(s) Oral two times a day  heparin   Injectable 5000 Unit(s) SubCutaneous every 12 hours  hydrALAZINE 25 milliGRAM(s) Oral every 8 hours  levETIRAcetam 500 milliGRAM(s) Oral two times a day  metoprolol tartrate 50 milliGRAM(s) Oral two times a day  polyethylene glycol 3350 17 Gram(s) Oral two times a day  senna 2 Tablet(s) Oral at bedtime  simvastatin 20 milliGRAM(s) Oral at bedtime  sodium chloride 0.9%. 1000 milliLiter(s) (75 mL/Hr) IV Continuous <Continuous>  vitamin A &amp; D Ointment 1 Application(s) Topical two times a day    MEDICATIONS  (PRN):  acetaminophen   Tablet .. 650 milliGRAM(s) Oral every 6 hours PRN Temp greater or equal to 38C (100.4F), Mild Pain (1 - 3)      Allergies    No Known Allergies    Intolerances        Vital Signs Last 24 Hrs  T(C): 37.2 (26 Aug 2020 17:11), Max: 37.6 (25 Aug 2020 23:33)  T(F): 98.9 (26 Aug 2020 17:11), Max: 99.7 (25 Aug 2020 23:33)  HR: 69 (26 Aug 2020 17:11) (63 - 88)  BP: 154/89 (26 Aug 2020 17:11) (127/63 - 156/89)  BP(mean): --  RR: 17 (26 Aug 2020 17:11) (16 - 17)  SpO2: 100% (26 Aug 2020 17:11) (98% - 100%)    PHYSICAL EXAM:  GENERAL: NAD  HEAD:  Atraumatic, Normocephalic  EYES: EOMI, PERRLA, conjunctiva and sclera clear  ENT: O/P Clear  NECK: Supple, No JVD  NERVOUS SYSTEM:  No focal deficits  CHEST/LUNG: Clear to percussion bilaterally; No rales, rhonchi, wheezing  HEART: Regular rate and rhythm; No murmurs, rubs, or gallops  ABDOMEN: Soft, Nontender, Nondistended; Bowel sounds present  EXTREMITIES:  2+ Peripheral Pulses, No clubbing, cyanosis, or edema  SKIN: No rashes or lesions    LABS:                        8.6    8.94  )-----------( 293      ( 26 Aug 2020 07:24 )             26.2     08-25    138  |  108  |  11  ----------------------------<  158<H>  3.6   |  23  |  0.87    Ca    8.4<L>      25 Aug 2020 06:42          CAPILLARY BLOOD GLUCOSE      POCT Blood Glucose.: 148 mg/dL (26 Aug 2020 07:39)  POCT Blood Glucose.: 172 mg/dL (25 Aug 2020 22:35)      RADIOLOGY & ADDITIONAL TESTS:    Imaging Personally Reviewed:  [ ] YES  [ ] NO    Consultant(s) Notes Reviewed:  [ ] YES  [ ] NO    Care Discussed with Consultants/Other Providers [ ] YES  [ ] NO Patient is a 69y old  Female w/ Hx HTN, CVA, seizure disorder, dementia (non-verbal) who presented with a chief complaint of fever; had been discharged recently with indwelling Mayberry      INTERVAL HPI/OVERNIGHT EVENTS:   Pt indicates that she feels OK. Daughter present; feels pt is at baseline. Per daughter, appetite has been VERY good today.    REVIEW OF SYSTEMS:   Unable to get ROS - pt non-verbal    MEDICATIONS  (STANDING):  amLODIPine   Tablet 10 milliGRAM(s) Oral daily  BACItracin   Ointment 1 Application(s) Topical daily  ciprofloxacin     Tablet 500 milliGRAM(s) Oral every 12 hours  cloNIDine 0.1 milliGRAM(s) Oral two times a day  heparin   Injectable 5000 Unit(s) SubCutaneous every 12 hours  hydrALAZINE 25 milliGRAM(s) Oral every 8 hours  levETIRAcetam 500 milliGRAM(s) Oral two times a day  metoprolol tartrate 50 milliGRAM(s) Oral two times a day  polyethylene glycol 3350 17 Gram(s) Oral two times a day  senna 2 Tablet(s) Oral at bedtime  simvastatin 20 milliGRAM(s) Oral at bedtime  sodium chloride 0.9%. 1000 milliLiter(s) (75 mL/Hr) IV Continuous <Continuous>  vitamin A &amp; D Ointment 1 Application(s) Topical two times a day    MEDICATIONS  (PRN):  acetaminophen   Tablet .. 650 milliGRAM(s) Oral every 6 hours PRN Temp greater or equal to 38C (100.4F), Mild Pain (1 - 3)      Allergies    No Known Allergies    Intolerances        Vital Signs Last 24 Hrs  T(C): 37.2 (26 Aug 2020 17:11), Max: 37.6 (25 Aug 2020 23:33)  T(F): 98.9 (26 Aug 2020 17:11), Max: 99.7 (25 Aug 2020 23:33)  HR: 69 (26 Aug 2020 17:11) (63 - 88)  BP: 154/89 (26 Aug 2020 17:11) (127/63 - 156/89)  BP(mean): --  RR: 17 (26 Aug 2020 17:11) (16 - 17)  SpO2: 100% (26 Aug 2020 17:11) (98% - 100%)    PHYSICAL EXAM:  GENERAL: NAD  HEAD:  Atraumatic, Normocephalic  EYES: EOMI, PERRLA, conjunctiva and sclera clear  ENT: O/P Clear  NECK: Supple, No JVD  NERVOUS SYSTEM:  No focal deficits; mostly non-verbal  CHEST/LUNG: Clear to percussion bilaterally; No rales, rhonchi, wheezing  HEART: Regular rate and rhythm; No murmurs, rubs, or gallops  ABDOMEN: Soft, Nontender, Nondistended; Bowel sounds present  EXTREMITIES:  2+ Peripheral Pulses, No clubbing, cyanosis, or edema  SKIN: No rashes or lesions    LABS:                        8.6    8.94  )-----------( 293      ( 26 Aug 2020 07:24 )             26.2     08-25    138  |  108  |  11  ----------------------------<  158<H>  3.6   |  23  |  0.87    Ca    8.4<L>      25 Aug 2020 06:42          CAPILLARY BLOOD GLUCOSE      POCT Blood Glucose.: 148 mg/dL (26 Aug 2020 07:39)  POCT Blood Glucose.: 172 mg/dL (25 Aug 2020 22:35)      RADIOLOGY & ADDITIONAL TESTS:    Imaging Personally Reviewed:  [ ] YES  [ ] NO    Consultant(s) Notes Reviewed:  [ ] YES  [ ] NO    Care Discussed with Consultants/Other Providers [ ] YES  [ ] NO

## 2020-08-27 ENCOUNTER — TRANSCRIPTION ENCOUNTER (OUTPATIENT)
Age: 70
End: 2020-08-27

## 2020-08-27 LAB
ANION GAP SERPL CALC-SCNC: 7 MMOL/L — SIGNIFICANT CHANGE UP (ref 5–17)
BUN SERPL-MCNC: 13 MG/DL — SIGNIFICANT CHANGE UP (ref 7–23)
CALCIUM SERPL-MCNC: 8.3 MG/DL — LOW (ref 8.5–10.1)
CHLORIDE SERPL-SCNC: 109 MMOL/L — HIGH (ref 96–108)
CO2 SERPL-SCNC: 25 MMOL/L — SIGNIFICANT CHANGE UP (ref 22–31)
CREAT SERPL-MCNC: 0.76 MG/DL — SIGNIFICANT CHANGE UP (ref 0.5–1.3)
CULTURE RESULTS: NO GROWTH — SIGNIFICANT CHANGE UP
GLUCOSE SERPL-MCNC: 131 MG/DL — HIGH (ref 70–99)
MAGNESIUM SERPL-MCNC: 2 MG/DL — SIGNIFICANT CHANGE UP (ref 1.6–2.6)
PHOSPHATE SERPL-MCNC: 2.4 MG/DL — LOW (ref 2.5–4.5)
POTASSIUM SERPL-MCNC: 3.4 MMOL/L — LOW (ref 3.5–5.3)
POTASSIUM SERPL-SCNC: 3.4 MMOL/L — LOW (ref 3.5–5.3)
SODIUM SERPL-SCNC: 141 MMOL/L — SIGNIFICANT CHANGE UP (ref 135–145)
SPECIMEN SOURCE: SIGNIFICANT CHANGE UP

## 2020-08-27 PROCEDURE — 99233 SBSQ HOSP IP/OBS HIGH 50: CPT

## 2020-08-27 RX ORDER — POTASSIUM CHLORIDE 20 MEQ
10 PACKET (EA) ORAL
Refills: 0 | Status: DISCONTINUED | OUTPATIENT
Start: 2020-08-27 | End: 2020-08-28

## 2020-08-27 RX ORDER — POLYETHYLENE GLYCOL 3350 17 G/17G
17 POWDER, FOR SOLUTION ORAL
Qty: 510 | Refills: 0
Start: 2020-08-27

## 2020-08-27 RX ADMIN — SIMVASTATIN 20 MILLIGRAM(S): 20 TABLET, FILM COATED ORAL at 21:39

## 2020-08-27 RX ADMIN — Medication 1 APPLICATION(S): at 12:25

## 2020-08-27 RX ADMIN — Medication 25 MILLIGRAM(S): at 05:17

## 2020-08-27 RX ADMIN — Medication 0.1 MILLIGRAM(S): at 05:13

## 2020-08-27 RX ADMIN — LEVETIRACETAM 500 MILLIGRAM(S): 250 TABLET, FILM COATED ORAL at 05:13

## 2020-08-27 RX ADMIN — LEVETIRACETAM 500 MILLIGRAM(S): 250 TABLET, FILM COATED ORAL at 17:18

## 2020-08-27 RX ADMIN — SENNA PLUS 2 TABLET(S): 8.6 TABLET ORAL at 21:39

## 2020-08-27 RX ADMIN — Medication 500 MILLIGRAM(S): at 05:13

## 2020-08-27 RX ADMIN — POLYETHYLENE GLYCOL 3350 17 GRAM(S): 17 POWDER, FOR SOLUTION ORAL at 17:19

## 2020-08-27 RX ADMIN — Medication 50 MILLIGRAM(S): at 17:18

## 2020-08-27 RX ADMIN — HEPARIN SODIUM 5000 UNIT(S): 5000 INJECTION INTRAVENOUS; SUBCUTANEOUS at 17:19

## 2020-08-27 RX ADMIN — Medication 50 MILLIGRAM(S): at 07:31

## 2020-08-27 RX ADMIN — Medication 0.1 MILLIGRAM(S): at 17:18

## 2020-08-27 RX ADMIN — Medication 1 APPLICATION(S): at 05:16

## 2020-08-27 RX ADMIN — Medication 25 MILLIGRAM(S): at 21:39

## 2020-08-27 RX ADMIN — Medication 25 MILLIGRAM(S): at 16:42

## 2020-08-27 RX ADMIN — Medication 1 APPLICATION(S): at 17:26

## 2020-08-27 RX ADMIN — AMLODIPINE BESYLATE 10 MILLIGRAM(S): 2.5 TABLET ORAL at 05:13

## 2020-08-27 RX ADMIN — Medication 10 MILLIEQUIVALENT(S): at 17:18

## 2020-08-27 RX ADMIN — Medication 500 MILLIGRAM(S): at 17:18

## 2020-08-27 RX ADMIN — POLYETHYLENE GLYCOL 3350 17 GRAM(S): 17 POWDER, FOR SOLUTION ORAL at 05:17

## 2020-08-27 RX ADMIN — HEPARIN SODIUM 5000 UNIT(S): 5000 INJECTION INTRAVENOUS; SUBCUTANEOUS at 05:13

## 2020-08-27 NOTE — DISCHARGE NOTE PROVIDER - NSDCMRMEDTOKEN_GEN_ALL_CORE_FT
amLODIPine 10 mg oral tablet: 1 tab(s) orally once a day  aspirin 81 mg oral delayed release tablet: 1 tab(s) orally once a day  ciprofloxacin 500 mg oral tablet: 1 tab(s) orally every 12 hours  cloNIDine 0.1 mg oral tablet: 1 tab(s) orally 2 times a day  clopidogrel 75 mg oral tablet: 1 tab(s) orally once a day  hydrALAZINE 25 mg oral tablet: 3 tab(s) orally 3 times a day  levETIRAcetam 750 mg oral tablet: 1 tab(s) orally 2 times a day  metoprolol tartrate 50 mg oral tablet: 1 tab(s) orally 2 times a day  polyethylene glycol 3350 oral powder for reconstitution: 17 gram(s) orally 2 times a day, As Needed -for constipation   traZODone 50 mg oral tablet: 50 milligram(s) orally once a day (at bedtime)  Zocor 20 mg oral tablet: 1 tab(s) orally once a day (at bedtime)

## 2020-08-27 NOTE — DISCHARGE NOTE PROVIDER - PROVIDER TOKENS
PROVIDER:[TOKEN:[[id number]],FOLLOWUP:[1 week]],FREE:[LAST:[Your primary physician],PHONE:[(   )    -],FAX:[(   )    -],FOLLOWUP:[2 weeks]] PROVIDER:[TOKEN:[[id number]],FOLLOWUP:[1 week]],FREE:[LAST:[Your primary physician],PHONE:[(   )    -],FAX:[(   )    -],FOLLOWUP:[2 weeks]],PROVIDER:[TOKEN:[[id number]],FOLLOWUP:[1 month]]

## 2020-08-27 NOTE — DISCHARGE NOTE PROVIDER - CARE PROVIDER_API CALL
Todd Caicedo  UROLOGY  [address] [facility] [facility] [place] [address]  Phone: [phone]  Fax: [phone]  Follow Up Time: 1 week    Your primary physician,   Phone: (   )    -  Fax: (   )    -  Follow Up Time: 2 weeks Todd Caicedo  UROLOGY  [address] [facility] [facility] [place] [address]  Phone: [phone]  Fax: [phone]  Follow Up Time: 1 week    Your primary physician,   Phone: (   )    -  Fax: (   )    -  Follow Up Time: 2 weeks    Jaylon Corral  ORTHOPAEDIC SURGERY  [address] [address], [address]  [place] [address]  Phone: [phone]  Fax: [phone]  Follow Up Time: 1 month

## 2020-08-27 NOTE — PROGRESS NOTE ADULT - SUBJECTIVE AND OBJECTIVE BOX
70 yo female PMH dementia (non verbal), CVA (on plavix), seizure disorder (on keppra), htn, DM, hld BIBEMS for fever since yesterday. As per EMS, patient was recently admitted to hospital for UTI. Discharged on Monday with a milner.   Milner was changed this admission in the ER. EMS says that family noticed milner output decreased since yesterday. Shows only 250 cc in milner over 36 hours. Also family reported patient had not had a bowel movement since her discharged. As per EMS, family reports patient is at mental baseline. On arrival milner output looks infected. (14 Aug 2020 14:06)      Chief Complaint:  Patient is a 69y old  Female who presents with a chief complaint of Catheter associated UTI and AMADOU from dehydration. (19 Aug 2020 11:39)      Review of Systems:    General:  No wt loss, fevers, chills, night sweats  Eyes:  Good vision, no reported pain  ENT:  No sore throat, pain, runny nose, dysphagia  CV:  No pain, palpitations, hypo/hypertension  Resp:  No dyspnea, cough, tachypnea, wheezing  GI:  No pain, nausea, vomiting, diarrhea, constipation           Social History/Family History  SOCHX:   tobacco,  -  alcohol    FMHX: FA/MO  - contributory       Discussed with:  PMD, Family    Physical Exam:    Vital Signs:  Vital Signs Last 24 Hrs  T(C): 38.4 (19 Aug 2020 17:40), Max: 38.4 (19 Aug 2020 17:40)  T(F): 101.1 (19 Aug 2020 17:40), Max: 101.1 (19 Aug 2020 17:40)  HR: 77 (19 Aug 2020 17:40) (69 - 88)  BP: 129/77 (19 Aug 2020 17:40) (129/77 - 176/77)  BP(mean): --  RR: 17 (19 Aug 2020 17:40) (16 - 17)  SpO2: 96% (19 Aug 2020 17:40) (96% - 100%)  Daily     Daily   I&O's Summary    18 Aug 2020 07:01  -  19 Aug 2020 07:00  --------------------------------------------------------  IN: 900 mL / OUT: 1700 mL / NET: -800 mL    19 Aug 2020 07:01  -  19 Aug 2020 21:12  --------------------------------------------------------  IN: 270 mL / OUT: 1700 mL / NET: -1430 mL          Chest:  Full & symmetric excursion, no increased effort, breath sounds clear  Cardiovascular:  Regular rhythm, S1, S2, no murmur/rub/S3/S4, no carotid/femoral/abdominal bruit, radial/pedal pulses 2+  Abdomen:  Soft, non-tender, non-distended, normoactive bowel sounds, no HSM         Laboratory:                          8.3    5.14  )-----------( 265      ( 19 Aug 2020 07:17 )             25.2     08-19    142  |  109<H>  |  10  ----------------------------<  136<H>  3.5   |  27  |  0.82    Ca    8.3<L>      19 Aug 2020 07:17  Phos  3.2     08-19  Mg     1.9     08-19      CAPILLARY BLOOD GLUCOSE      POCT Blood Glucose.: 139 mg/dL (19 Aug 2020 16:30)  POCT Blood Glucose.: 141 mg/dL (19 Aug 2020 10:46)  POCT Blood Glucose.: 147 mg/dL (19 Aug 2020 07:55)      Assessment:  I am asked to evaluate this patient for preprocedure cardiovascular risk assessment    The diagnostic echocardiogram has a preserved ejection fraction and no significant valvular heart disease  all pertinent labs are reviewed  He continues to have potassium supplementation as well as multiple medical therapies to optimize his systolic blood pressure are proving effective  post cystoscopy with ureteral  stent  DC plan per /ID

## 2020-08-27 NOTE — PROGRESS NOTE ADULT - SUBJECTIVE AND OBJECTIVE BOX
HPI:  70 yo female PMH dementia (non verbal), CVA (on plavix), seizure disorder (on keppra), htn, DM, hld BIBEMS for fever since yesterday. As per EMS, patient was recently admitted to hospital for UTI. Discharged on Monday with a milner.   Milner was changed this admission in the ER. EMS says that family noticed milner output decreased since yesterday. Shows only 250 cc in milner over 36 hours. Also family reported patient had not had a bowel movement since her discharged. As per EMS, family reports patient is at mental baseline. On arrival milner output looks infected. (14 Aug 2020 14:06)  work up consistent with urinary tract infection     Allergies    No Known Allergies    Intolerances        MEDICATIONS  (STANDING):  amLODIPine   Tablet 10 milliGRAM(s) Oral daily  BACItracin   Ointment 1 Application(s) Topical daily  ciprofloxacin     Tablet 500 milliGRAM(s) Oral every 12 hours  cloNIDine 0.1 milliGRAM(s) Oral two times a day  heparin   Injectable 5000 Unit(s) SubCutaneous every 12 hours  hydrALAZINE 25 milliGRAM(s) Oral every 8 hours  levETIRAcetam 500 milliGRAM(s) Oral two times a day  metoprolol tartrate 50 milliGRAM(s) Oral two times a day  polyethylene glycol 3350 17 Gram(s) Oral two times a day  potassium chloride    Tablet ER 10 milliEquivalent(s) Oral two times a day  senna 2 Tablet(s) Oral at bedtime  simvastatin 20 milliGRAM(s) Oral at bedtime  vitamin A &amp; D Ointment 1 Application(s) Topical two times a day    MEDICATIONS  (PRN):  acetaminophen   Tablet .. 650 milliGRAM(s) Oral every 6 hours PRN Temp greater or equal to 38C (100.4F), Mild Pain (1 - 3)      REVIEW OF SYSTEMS:    unable to assess  as per siter by bedside   sister is back to normal    VITAL SIGNS:  T(C): 36.8 (08-27-20 @ 11:38), Max: 37.5 (08-26-20 @ 23:20)  T(F): 98.2 (08-27-20 @ 11:38), Max: 99.5 (08-26-20 @ 23:20)  HR: 74 (08-27-20 @ 11:38) (64 - 77)  BP: 154/66 (08-27-20 @ 11:38) (151/69 - 170/72)  RR: 17 (08-27-20 @ 11:38) (17 - 17)  SpO2: 100% (08-27-20 @ 11:38) (98% - 100%)  Wt(kg): --    PHYSICAL EXAM:    GENERAL: not in any distress  HEENT: Neck is supple, normocephalic, atraumatic   CHEST/LUNG: Clear to auscultation bilaterally; No rales, rhonchi, wheezing  HEART: Regular rate and rhythm; No murmurs, rubs, or gallops  ABDOMEN: Soft, Nontender, Nondistended; Bowel sounds present, no rebound   EXTREMITIES:  2+ Peripheral Pulses, No clubbing, cyanosis, or edema  GENITOURINARY: milner   SKIN: No rashes or lesions  BACK: no pressor sore   NERVOUS SYSTEM:  Alert & non verbal   eating well with sister    LABS:                         8.6    8.94  )-----------( 293      ( 26 Aug 2020 07:24 )             26.2     08-27    141  |  109<H>  |  13  ----------------------------<  131<H>  3.4<L>   |  25  |  0.76    Ca    8.3<L>      27 Aug 2020 06:40  Phos  2.4     08-27  Mg     2.0     08-27                                Culture Results:   No growth to date. (08-25 @ 09:56)  Culture Results:   No growth (08-25 @ 09:56)  Culture Results:   >100,000 CFU/ml Pseudomonas aeruginosa (08-22 @ 12:50)                Radiology:    < from: CT Abdomen and Pelvis No Cont (08.22.20 @ 12:01) >  Urinary: No hydronephrosis. 2 cm left renalpelvis stone, nonobstructive. Multiple additional tiny nonobstructive intrarenal calculi bilaterally. Multiple tiny renal cystic lesions of varying density bilaterally. Urinary bladder is collapsed around a Milner catheter, possible underlying thickening is improved compared to prior.    Reproductive organs: Calcified uterine fibroids.  Gastrointestinal: Large stool burden. NG tube in the stomach.  Peritoneum: Unremarkable.  Vasculature: Severe atherosclerotic changes.  Retroperitoneum: Unremarkable.  Subcutaneous tissues: Unremarkable.  Neuromusculoskeletal: Mild degenerative changes.    Impression: No hydronephrosis. Bilateral nonobstructing nephrolithiasis.                CATARINA SHINE M.D., ATTENDING RADIOLOGIST  This document has been electronically signed. Aug 22 2020 12:44PM                < end of copied text >

## 2020-08-27 NOTE — PROGRESS NOTE ADULT - SUBJECTIVE AND OBJECTIVE BOX
Patient seen and examined bedside resting comfortably.  Nonverbal  Tolerating diet.  Normal flatus/BM. Afebrile  Milner draining well    T(F): 99.2 (08-27-20 @ 05:20), Max: 99.5 (08-26-20 @ 23:20)  HR: 64 (08-27-20 @ 05:20) (63 - 77)  BP: 170/72 (08-27-20 @ 05:20) (138/47 - 170/72)  RR: 17 (08-27-20 @ 05:20) (16 - 17)  SpO2: 100% (08-27-20 @ 05:20) (98% - 100%)  Wt(kg): --  CAPILLARY BLOOD GLUCOSE      PHYSICAL EXAM:  General: NAD, WDWN.   Neuro:  somnolent    HEENT: NCAT, EOMI, conjunctiva clear  CV: +S1+S2 regular rate and rhythm  Lung: clear to ausculation bilaterally, respirations nonlabored, good inspiratory effort  Abdomen: soft, Non tender, No Distension. Normal active BS  Extremities: no pedal edema or calf tenderness noted   : NO SP tenderness, NO CVA tenderness, Indwelling milner catheter with blood tinged urine    LABS:                        8.6    8.94  )-----------( 293      ( 26 Aug 2020 07:24 )             26.2   08-27    141  |  109<H>  |  13  ----------------------------<  131<H>  3.4<L>   |  25  |  0.76    Ca    8.3<L>      27 Aug 2020 06:40  Phos  2.4     08-27  Mg     2.0     08-27      I&O's Detail    26 Aug 2020 07:01  -  27 Aug 2020 07:00  --------------------------------------------------------  IN:    Oral Fluid: 210 mL    sodium chloride 0.9%: 900 mL  Total IN: 1110 mL    OUT:    Indwelling Catheter - Urethral: 1900 mL  Total OUT: 1900 mL    Total NET: -790 mL      Assessment: 69F S/P Cysto, lithotripsy, stone extraction, insertion of left ureteral stent POD#2, stable.   Ucx (sent 8/22) prelim pseudomonas. Failed trial of Void     Plan:  - Continue with Milner catheter, May discharge with milner, F/U in office in 2weeks for stent removal  - DVT prophylaxis, Incentive Spirometer, OOB, Ambulating, pain control  - Continue antibiotics  - continue current management per medicine   - Discussed with Dr. Caicedo Patient seen and examined bedside resting comfortably.  Nonverbal  Tolerating diet.  Normal flatus/BM. Afebrile  Milner draining well    T(F): 99.2 (08-27-20 @ 05:20), Max: 99.5 (08-26-20 @ 23:20)  HR: 64 (08-27-20 @ 05:20) (63 - 77)  BP: 170/72 (08-27-20 @ 05:20) (138/47 - 170/72)  RR: 17 (08-27-20 @ 05:20) (16 - 17)  SpO2: 100% (08-27-20 @ 05:20) (98% - 100%)  Wt(kg): --  CAPILLARY BLOOD GLUCOSE      PHYSICAL EXAM:  General: NAD, WDWN.   Neuro:  somnolent    HEENT: NCAT, EOMI, conjunctiva clear  CV: +S1+S2 regular rate and rhythm  Lung: clear to ausculation bilaterally, respirations nonlabored, good inspiratory effort  Abdomen: soft, Non tender, No Distension. Normal active BS  Extremities: no pedal edema or calf tenderness noted   : NO SP tenderness, NO CVA tenderness, Indwelling milner catheter with blood tinged urine    LABS:                        8.6    8.94  )-----------( 293      ( 26 Aug 2020 07:24 )             26.2   08-27    141  |  109<H>  |  13  ----------------------------<  131<H>  3.4<L>   |  25  |  0.76    Ca    8.3<L>      27 Aug 2020 06:40  Phos  2.4     08-27  Mg     2.0     08-27      I&O's Detail    26 Aug 2020 07:01  -  27 Aug 2020 07:00  --------------------------------------------------------  IN:    Oral Fluid: 210 mL    sodium chloride 0.9%: 900 mL  Total IN: 1110 mL    OUT:    Indwelling Catheter - Urethral: 1900 mL  Total OUT: 1900 mL    Total NET: -790 mL      Assessment: 69F S/P Cysto, lithotripsy, stone extraction, insertion of left ureteral stent POD#2, stable.   Ucx (sent 8/22) prelim pseudomonas. Failed trial of Void     Plan:  - Continue with Milner catheter, May discharge with milner, F/U in office in 2weeks for stent removal - Discussed with daughter   - DVT prophylaxis, Incentive Spirometer, OOB, Ambulating, pain control  - Continue antibiotics  - continue current management per medicine   - Discussed with Dr. Caicedo

## 2020-08-27 NOTE — PROGRESS NOTE ADULT - ASSESSMENT
69F PMH dementia (non verbal), CVA (on plavix), seizure disorder (on keppra), HTN, DM, HLD, recent admission for UTI with milner catheter, BIBEMS for fever   recent uti few weeks ago for  UTI/ urinary retention /bilateral mod hydro, urine culture nondiagnostic s/p abx   UTI / urinary retention / Bilateral mod hydro with chronic milner   S/p milner catheter exchange 8/15 and 8/16  urine culture with significant amount of pseudomonas   blood culture with coag neg staph likely contaminant   prev isolate from 8/14 was cipro resistant sp sp cefepime from 8/17 to 8/25   fu surveillance blood cultures all NEGATIVE   sp cysto ; or culture nogrowth   infectiuos disease wise clear for discharge

## 2020-08-27 NOTE — DISCHARGE NOTE PROVIDER - HOSPITAL COURSE
68 yo female PMH dementia (non verbal), CVA (on plavix), seizure disorder (on keppra), htn, DM, was BIBEMS for fever. Patient was recently admitted to hospital for UTI. Discharged with a milner. EMS says that family noticed milner output decreased. Showed only 250 cc in milner over 36 hours. Also family reported patient had not had a bowel movement since her discharge. As per EMS, family reports patient is at mental baseline.        Sepsis / Urinary tract infection - Present on admission     suspect secondary to indwelling catheter.     Urine culture growing Pseudomonas     Blood culture noted Coag-neg Staph which was thought to be contaminant.     On cefepime initially; now changed to oral Cipro     Continue Cipro for total 14 days (thru 9/7), until around the time of stent removal        Hematuria / Nephrolithiasis     Urology consulted (Marifer)     - Status post cystoscopy with lithotripsy and left ureteral stent placement on 8/24     - outpatient f/u in 2 weeks for stent removal (approx. 9/7)        Hypertension     - normally takes Clonidine, Hydralazine, Metoprolol, Amlodipine     - continuing same        Seizure disorder     - On levetiracetam.        Acute kidney injury     - Due to obstructive uropathy and dehydration     - Baseline Cr probably about 0.7     - at admission, Cr = 1.39; post Tx --> 0.7 - 0.8 range     - Milner catheter removed for a trial of void, but failed; replaced        Decubitus     - Continue with wound care and routine positioning.        Humerus fracture     - Orthopedics consultation noted (Dr. Jaylon Corral)     - sling prn     - For repeat Xray in one month at ortho office        Severe protein calorie malnutrition     - Encouragement / Assistance with oral intake.     - Pureed diet + Glucerna        Hypokalemia     - Potassium supplementation prn        Hx CVA     - ASA, Plavix, Zocor

## 2020-08-27 NOTE — DISCHARGE NOTE PROVIDER - NSDCCPCAREPLAN_GEN_ALL_CORE_FT
PRINCIPAL DISCHARGE DIAGNOSIS  Diagnosis: Urinary tract infection associated with indwelling urethral catheter, sequela  Assessment and Plan of Treatment:
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
## 2020-08-27 NOTE — PROGRESS NOTE ADULT - PROBLEM SELECTOR PROBLEM 1
Urinary tract infection associated with indwelling urethral catheter, sequela
Clear bilaterally, pupils equal, round and reactive to light.

## 2020-08-27 NOTE — DISCHARGE NOTE PROVIDER - CARE PROVIDERS DIRECT ADDRESSES
,[email or web address],[email or web address] ,[email or web address],DirectAddress_Unknown,[email or web address]

## 2020-08-28 ENCOUNTER — TRANSCRIPTION ENCOUNTER (OUTPATIENT)
Age: 70
End: 2020-08-28

## 2020-08-28 LAB
ANION GAP SERPL CALC-SCNC: 7 MMOL/L — SIGNIFICANT CHANGE UP (ref 5–17)
BUN SERPL-MCNC: 9 MG/DL — SIGNIFICANT CHANGE UP (ref 7–23)
CALCIUM SERPL-MCNC: 8.7 MG/DL — SIGNIFICANT CHANGE UP (ref 8.5–10.1)
CHLORIDE SERPL-SCNC: 106 MMOL/L — SIGNIFICANT CHANGE UP (ref 96–108)
CO2 SERPL-SCNC: 28 MMOL/L — SIGNIFICANT CHANGE UP (ref 22–31)
CREAT SERPL-MCNC: 0.79 MG/DL — SIGNIFICANT CHANGE UP (ref 0.5–1.3)
CULTURE RESULTS: SIGNIFICANT CHANGE UP
GLUCOSE BLDC GLUCOMTR-MCNC: 182 MG/DL — HIGH (ref 70–99)
GLUCOSE SERPL-MCNC: 166 MG/DL — HIGH (ref 70–99)
MAGNESIUM SERPL-MCNC: 1.7 MG/DL — SIGNIFICANT CHANGE UP (ref 1.6–2.6)
POTASSIUM SERPL-MCNC: 3.3 MMOL/L — LOW (ref 3.5–5.3)
POTASSIUM SERPL-SCNC: 3.3 MMOL/L — LOW (ref 3.5–5.3)
SODIUM SERPL-SCNC: 141 MMOL/L — SIGNIFICANT CHANGE UP (ref 135–145)
SPECIMEN SOURCE: SIGNIFICANT CHANGE UP

## 2020-08-28 PROCEDURE — 99232 SBSQ HOSP IP/OBS MODERATE 35: CPT

## 2020-08-28 RX ORDER — POTASSIUM CHLORIDE 20 MEQ
10 PACKET (EA) ORAL THREE TIMES A DAY
Refills: 0 | Status: COMPLETED | OUTPATIENT
Start: 2020-08-28 | End: 2020-08-30

## 2020-08-28 RX ADMIN — Medication 50 MILLIGRAM(S): at 17:20

## 2020-08-28 RX ADMIN — Medication 25 MILLIGRAM(S): at 13:12

## 2020-08-28 RX ADMIN — Medication 50 MILLIGRAM(S): at 05:52

## 2020-08-28 RX ADMIN — Medication 1 APPLICATION(S): at 11:12

## 2020-08-28 RX ADMIN — Medication 25 MILLIGRAM(S): at 05:52

## 2020-08-28 RX ADMIN — LEVETIRACETAM 500 MILLIGRAM(S): 250 TABLET, FILM COATED ORAL at 17:20

## 2020-08-28 RX ADMIN — Medication 1 APPLICATION(S): at 17:21

## 2020-08-28 RX ADMIN — SENNA PLUS 2 TABLET(S): 8.6 TABLET ORAL at 21:15

## 2020-08-28 RX ADMIN — LEVETIRACETAM 500 MILLIGRAM(S): 250 TABLET, FILM COATED ORAL at 05:52

## 2020-08-28 RX ADMIN — POLYETHYLENE GLYCOL 3350 17 GRAM(S): 17 POWDER, FOR SOLUTION ORAL at 17:21

## 2020-08-28 RX ADMIN — Medication 1 APPLICATION(S): at 05:53

## 2020-08-28 RX ADMIN — Medication 10 MILLIEQUIVALENT(S): at 05:52

## 2020-08-28 RX ADMIN — Medication 500 MILLIGRAM(S): at 05:52

## 2020-08-28 RX ADMIN — HEPARIN SODIUM 5000 UNIT(S): 5000 INJECTION INTRAVENOUS; SUBCUTANEOUS at 17:20

## 2020-08-28 RX ADMIN — POLYETHYLENE GLYCOL 3350 17 GRAM(S): 17 POWDER, FOR SOLUTION ORAL at 05:52

## 2020-08-28 RX ADMIN — Medication 10 MILLIEQUIVALENT(S): at 13:12

## 2020-08-28 RX ADMIN — AMLODIPINE BESYLATE 10 MILLIGRAM(S): 2.5 TABLET ORAL at 05:52

## 2020-08-28 RX ADMIN — Medication 0.1 MILLIGRAM(S): at 05:52

## 2020-08-28 RX ADMIN — Medication 0.1 MILLIGRAM(S): at 17:20

## 2020-08-28 RX ADMIN — SIMVASTATIN 20 MILLIGRAM(S): 20 TABLET, FILM COATED ORAL at 21:15

## 2020-08-28 RX ADMIN — Medication 25 MILLIGRAM(S): at 21:15

## 2020-08-28 RX ADMIN — Medication 500 MILLIGRAM(S): at 17:20

## 2020-08-28 RX ADMIN — HEPARIN SODIUM 5000 UNIT(S): 5000 INJECTION INTRAVENOUS; SUBCUTANEOUS at 05:51

## 2020-08-28 RX ADMIN — Medication 10 MILLIEQUIVALENT(S): at 21:15

## 2020-08-28 NOTE — PROGRESS NOTE ADULT - SUBJECTIVE AND OBJECTIVE BOX
Patient seen and examined bedside resting comfortably.  Nonverbal  Tolerating diet.  Normal flatus/BM. Afebrile  Milner draining well    T(F): 98.9 (08-28-20 @ 06:15), Max: 99 (08-27-20 @ 23:54)  HR: 89 (08-28-20 @ 06:15) (62 - 89)  BP: 155/86 (08-28-20 @ 06:15) (140/65 - 172/61)  RR: 17 (08-28-20 @ 06:15) (17 - 17)  SpO2: 98% (08-28-20 @ 06:15) (98% - 100%)  Wt(kg): --  CAPILLARY BLOOD GLUCOSE      POCT Blood Glucose.: 182 mg/dL (28 Aug 2020 07:34)      PHYSICAL EXAM:  General: NAD, alert and awake  HEENT: NCAT, EOMI, conjunctiva clear  Chest: nonlabored respirations, good inspiratory effort  Abdomen: soft, NTND.   Extremities: no pedal edema or calf tenderness noted   : NO SP tenderness, NO CVA tenderness, Indwelling milner catheter with clear yellow urine    LABS:  08-28    141  |  106  |  9   ----------------------------<  166<H>  3.3<L>   |  28  |  0.79    Ca    8.7      28 Aug 2020 07:05  Phos  2.4     08-27  Mg     1.7     08-28      I&O's Detail    27 Aug 2020 07:01  -  28 Aug 2020 07:00  --------------------------------------------------------  IN:  Total IN: 0 mL    OUT:    Indwelling Catheter - Urethral: 2300 mL  Total OUT: 2300 mL    Total NET: -2300 mL      Assessment: 69F S/P Cysto, lithotripsy, stone extraction, insertion of left ureteral stent POD#2, stable.   Ucx (sent 8/22) prelim pseudomonas. Failed trial of Void     Plan:  - Continue with Milner catheter, May discharge with milner, F/U in office in 2weeks for stent removal - Discussed with daughter   - DVT prophylaxis, Incentive Spirometer, OOB, Ambulating, pain control  - Continue antibiotics  - continue current management per medicine   - Discussed with Dr. Caicedo Patient seen and examined bedside resting comfortably.  Nonverbal  Tolerating diet.  Normal flatus/BM. Afebrile  Milner draining well    T(F): 98.9 (08-28-20 @ 06:15), Max: 99 (08-27-20 @ 23:54)  HR: 89 (08-28-20 @ 06:15) (62 - 89)  BP: 155/86 (08-28-20 @ 06:15) (140/65 - 172/61)  RR: 17 (08-28-20 @ 06:15) (17 - 17)  SpO2: 98% (08-28-20 @ 06:15) (98% - 100%)  Wt(kg): --  CAPILLARY BLOOD GLUCOSE      POCT Blood Glucose.: 182 mg/dL (28 Aug 2020 07:34)      PHYSICAL EXAM:  General: NAD, alert and awake  HEENT: NCAT, EOMI, conjunctiva clear  Chest: nonlabored respirations, good inspiratory effort  Abdomen: soft, NTND.   Extremities: no pedal edema or calf tenderness noted   : NO SP tenderness, NO CVA tenderness, Indwelling milner catheter with clear yellow urine    LABS:  08-28    141  |  106  |  9   ----------------------------<  166<H>  3.3<L>   |  28  |  0.79    Ca    8.7      28 Aug 2020 07:05  Phos  2.4     08-27  Mg     1.7     08-28      I&O's Detail    27 Aug 2020 07:01  -  28 Aug 2020 07:00  --------------------------------------------------------  IN:  Total IN: 0 mL    OUT:    Indwelling Catheter - Urethral: 2300 mL  Total OUT: 2300 mL    Total NET: -2300 mL      Assessment: 69F S/P Cysto, lithotripsy, stone extraction, insertion of left ureteral stent POD#3, stable.   Ucx (sent 8/22) prelim pseudomonas. Failed trial of Void     Plan:  - Continue with Milner catheter, May discharge with milner, F/U in office in 2weeks for stent removal - Discussed with daughter   - DVT prophylaxis, Incentive Spirometer, OOB, Ambulating, pain control  - Continue antibiotics  - continue current management per medicine   - Discussed with Dr. Caicedo

## 2020-08-28 NOTE — PROGRESS NOTE ADULT - SUBJECTIVE AND OBJECTIVE BOX
Patient is a 69y old  Female who presents with a chief complaint of Catheter associated UTI and AMADOU from dehydration. (28 Aug 2020 11:47)      INTERVAL HPI/OVERNIGHT EVENTS:   No changes.     REVIEW OF SYSTEMS:   Unable to get ROS    MEDICATIONS  (STANDING):  amLODIPine   Tablet 10 milliGRAM(s) Oral daily  BACItracin   Ointment 1 Application(s) Topical daily  ciprofloxacin     Tablet 500 milliGRAM(s) Oral every 12 hours  cloNIDine 0.1 milliGRAM(s) Oral two times a day  heparin   Injectable 5000 Unit(s) SubCutaneous every 12 hours  hydrALAZINE 25 milliGRAM(s) Oral every 8 hours  levETIRAcetam 500 milliGRAM(s) Oral two times a day  metoprolol tartrate 50 milliGRAM(s) Oral two times a day  polyethylene glycol 3350 17 Gram(s) Oral two times a day  potassium chloride    Tablet ER 10 milliEquivalent(s) Oral three times a day  senna 2 Tablet(s) Oral at bedtime  simvastatin 20 milliGRAM(s) Oral at bedtime  vitamin A &amp; D Ointment 1 Application(s) Topical two times a day    MEDICATIONS  (PRN):  acetaminophen   Tablet .. 650 milliGRAM(s) Oral every 6 hours PRN Temp greater or equal to 38C (100.4F), Mild Pain (1 - 3)      Allergies    No Known Allergies    Intolerances        Vital Signs Last 24 Hrs  T(C): 37.1 (28 Aug 2020 17:02), Max: 37.2 (27 Aug 2020 23:54)  T(F): 98.8 (28 Aug 2020 17:02), Max: 99 (27 Aug 2020 23:54)  HR: 92 (28 Aug 2020 17:02) (62 - 99)  BP: 140/72 (28 Aug 2020 17:02) (133/50 - 172/61)  BP(mean): --  RR: 17 (28 Aug 2020 17:02) (17 - 18)  SpO2: 99% (28 Aug 2020 17:02) (98% - 100%)    PHYSICAL EXAM:  GENERAL: NAD  HEAD:  Atraumatic, Normocephalic  EYES: EOMI, PERRLA, conjunctiva and sclera clear  ENT: O/P Clear  NECK: Supple, No JVD  NERVOUS SYSTEM:  No focal deficits  CHEST/LUNG: Clear to percussion bilaterally; No rales, rhonchi, wheezing  HEART: Regular rate and rhythm; No murmurs, rubs, or gallops  ABDOMEN: Soft, Nontender, Nondistended; Bowel sounds present  EXTREMITIES:  2+ Peripheral Pulses, No clubbing, cyanosis, or edema  SKIN: No rashes or lesions    LABS:    08-28    141  |  106  |  9   ----------------------------<  166<H>  3.3<L>   |  28  |  0.79    Ca    8.7      28 Aug 2020 07:05  Phos  2.4     08-27  Mg     1.7     08-28          CAPILLARY BLOOD GLUCOSE      POCT Blood Glucose.: 182 mg/dL (28 Aug 2020 07:34)      RADIOLOGY & ADDITIONAL TESTS:    Imaging Personally Reviewed:  [ ] YES  [ ] NO    Consultant(s) Notes Reviewed:  [ ] YES  [ ] NO    Care Discussed with Consultants/Other Providers [ ] YES  [ ] NO
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
## 2020-08-28 NOTE — PROGRESS NOTE ADULT - ASSESSMENT
69F PMH dementia (non verbal), CVA (on plavix), seizure disorder (on keppra), HTN, DM, HLD, recent admission for UTI with milner catheter, BIBEMS for fever   recent uti few weeks ago for  UTI/ urinary retention /bilateral mod hydro, urine culture nondiagnostic s/p abx   UTI / urinary retention / Bilateral mod hydro with chronic milner   S/p milner catheter exchange 8/15 and 8/16  urine culture with significant amount of pseudomonas   blood culture with coag neg staph likely contaminant   prev isolate from 8/14 was cipro resistant status post cefepime from 8/17 to 8/25   fu surveillance blood cultures all NEGATIVE   sp cysto ; or culture no growth     pt with multiple antibiotics regimen unclear benefit of extended antibiotics course in this case   pt on cefepime since 8/19  status post Cystoscopy with retrograde pyelography with insertion of left ureteral stent 24-Aug-2020   no objection to treat x 7 days post procedure end date 8/31 on po cipro   at risk fro recurrent infections due to host factors

## 2020-08-28 NOTE — DISCHARGE NOTE NURSING/CASE MANAGEMENT/SOCIAL WORK - PATIENT PORTAL LINK FT
You can access the FollowMyHealth Patient Portal offered by [facility] by registering at the following website: [email or web address]. By joining [facility]’s FollowMyHealth portal, you will also be able to view your health information using other applications (apps) compatible with our system.

## 2020-08-28 NOTE — PROGRESS NOTE ADULT - SUBJECTIVE AND OBJECTIVE BOX
HPI:  68 yo female PMH dementia (non verbal), CVA (on plavix), seizure disorder (on keppra), htn, DM, hld BIBEMS for fever since yesterday. As per EMS, patient was recently admitted to hospital for UTI. Discharged on Monday with a milner.   Milner was changed this admission in the ER. EMS says that family noticed milner output decreased since yesterday. Shows only 250 cc in milner over 36 hours. Also family reported patient had not had a bowel movement since her discharged. As per EMS, family reports patient is at mental baseline. On arrival milner output looks infected. (14 Aug 2020 14:06)      Allergies    No Known Allergies    Intolerances        MEDICATIONS  (STANDING):  amLODIPine   Tablet 10 milliGRAM(s) Oral daily  BACItracin   Ointment 1 Application(s) Topical daily  ciprofloxacin     Tablet 500 milliGRAM(s) Oral every 12 hours  cloNIDine 0.1 milliGRAM(s) Oral two times a day  heparin   Injectable 5000 Unit(s) SubCutaneous every 12 hours  hydrALAZINE 25 milliGRAM(s) Oral every 8 hours  levETIRAcetam 500 milliGRAM(s) Oral two times a day  metoprolol tartrate 50 milliGRAM(s) Oral two times a day  polyethylene glycol 3350 17 Gram(s) Oral two times a day  potassium chloride    Tablet ER 10 milliEquivalent(s) Oral three times a day  senna 2 Tablet(s) Oral at bedtime  simvastatin 20 milliGRAM(s) Oral at bedtime  vitamin A &amp; D Ointment 1 Application(s) Topical two times a day    MEDICATIONS  (PRN):  acetaminophen   Tablet .. 650 milliGRAM(s) Oral every 6 hours PRN Temp greater or equal to 38C (100.4F), Mild Pain (1 - 3)      REVIEW OF SYSTEMS:  Unable to obtain     VITAL SIGNS:  T(C): 37.2 (08-28-20 @ 06:15), Max: 37.2 (08-27-20 @ 17:12)  T(F): 98.9 (08-28-20 @ 06:15), Max: 99 (08-27-20 @ 23:54)  HR: 89 (08-28-20 @ 06:15) (62 - 89)  BP: 155/86 (08-28-20 @ 06:15) (140/65 - 172/61)  RR: 17 (08-28-20 @ 06:15) (17 - 17)  SpO2: 98% (08-28-20 @ 06:15) (98% - 99%)  Wt(kg): --    PHYSICAL EXAM:    GENERAL: not in any distress appears chronically ill   HEENT: Neck is supple, normocephalic, atraumatic   CHEST/LUNG: Clear to percussion bilaterally; No rales, rhonchi, wheezing  HEART: Regular rate and rhythm; No murmurs, rubs, or gallops  ABDOMEN: Soft, Nontender, Nondistended; Bowel sounds present, no rebound   EXTREMITIES:  2+ Peripheral Pulses, No clubbing, cyanosis, or edema  GENITOURINARY:   NERVOUS SYSTEM:  non verbal      LABS:     08-28    141  |  106  |  9   ----------------------------<  166<H>  3.3<L>   |  28  |  0.79    Ca    8.7      28 Aug 2020 07:05  Phos  2.4     08-27  Mg     1.7     08-28      Culture Results:   Few Yeast like cells (08-25 @ 09:56)  Culture Results:   No growth (08-25 @ 09:56)  Culture Results:   >100,000 CFU/ml Pseudomonas aeruginosa (08-22 @ 12:50)

## 2020-08-28 NOTE — PROGRESS NOTE ADULT - SUBJECTIVE AND OBJECTIVE BOX
68 yo female PMH dementia (non verbal), CVA (on plavix), seizure disorder (on keppra), htn, DM, hld BIBEMS for fever since yesterday. As per EMS, patient was recently admitted to hospital for UTI. Discharged on Monday with a milner.   Milner was changed this admission in the ER. EMS says that family noticed milner output decreased since yesterday. Shows only 250 cc in milner over 36 hours. Also family reported patient had not had a bowel movement since her discharged. As per EMS, family reports patient is at mental baseline. On arrival milner output looks infected. (14 Aug 2020 14:06)      Chief Complaint:  Patient is a 69y old  Female who presents with a chief complaint of Catheter associated UTI and AMADOU from dehydration. (19 Aug 2020 11:39)      Review of Systems:    General:  No wt loss, fevers, chills, night sweats  Eyes:  Good vision, no reported pain  ENT:  No sore throat, pain, runny nose, dysphagia  CV:  No pain, palpitations, hypo/hypertension  Resp:  No dyspnea, cough, tachypnea, wheezing  GI:  No pain, nausea, vomiting, diarrhea, constipation           Social History/Family History  SOCHX:   tobacco,  -  alcohol    FMHX: FA/MO  - contributory       Discussed with:  PMD, Family    Physical Exam:    Vital Signs:  Vital Signs Last 24 Hrs  T(C): 38.4 (19 Aug 2020 17:40), Max: 38.4 (19 Aug 2020 17:40)  T(F): 101.1 (19 Aug 2020 17:40), Max: 101.1 (19 Aug 2020 17:40)  HR: 77 (19 Aug 2020 17:40) (69 - 88)  BP: 129/77 (19 Aug 2020 17:40) (129/77 - 176/77)  BP(mean): --  RR: 17 (19 Aug 2020 17:40) (16 - 17)  SpO2: 96% (19 Aug 2020 17:40) (96% - 100%)  Daily     Daily   I&O's Summary    18 Aug 2020 07:01  -  19 Aug 2020 07:00  --------------------------------------------------------  IN: 900 mL / OUT: 1700 mL / NET: -800 mL    19 Aug 2020 07:01  -  19 Aug 2020 21:12  --------------------------------------------------------  IN: 270 mL / OUT: 1700 mL / NET: -1430 mL          Chest:  Full & symmetric excursion, no increased effort, breath sounds clear  Cardiovascular:  Regular rhythm, S1, S2, no murmur/rub/S3/S4, no carotid/femoral/abdominal bruit, radial/pedal pulses 2+  Abdomen:  Soft, non-tender, non-distended, normoactive bowel sounds, no HSM         Laboratory:                          8.3    5.14  )-----------( 265      ( 19 Aug 2020 07:17 )             25.2     08-19    142  |  109<H>  |  10  ----------------------------<  136<H>  3.5   |  27  |  0.82    Ca    8.3<L>      19 Aug 2020 07:17  Phos  3.2     08-19  Mg     1.9     08-19      CAPILLARY BLOOD GLUCOSE      POCT Blood Glucose.: 139 mg/dL (19 Aug 2020 16:30)  POCT Blood Glucose.: 141 mg/dL (19 Aug 2020 10:46)  POCT Blood Glucose.: 147 mg/dL (19 Aug 2020 07:55)      Assessment:  I am asked to evaluate this patient for preprocedure cardiovascular risk assessment  The diagnostic echocardiogram has a preserved ejection fraction and no significant valvular heart disease  all pertinent labs are reviewed  multiple medical therapies to optimize his systolic blood pressure are proving effective  post cystoscopy with ureteral  stent  DC plan per /ID

## 2020-08-29 LAB
ANION GAP SERPL CALC-SCNC: 8 MMOL/L — SIGNIFICANT CHANGE UP (ref 5–17)
BUN SERPL-MCNC: 9 MG/DL — SIGNIFICANT CHANGE UP (ref 7–23)
CALCIUM SERPL-MCNC: 9.2 MG/DL — SIGNIFICANT CHANGE UP (ref 8.5–10.1)
CHLORIDE SERPL-SCNC: 106 MMOL/L — SIGNIFICANT CHANGE UP (ref 96–108)
CO2 SERPL-SCNC: 27 MMOL/L — SIGNIFICANT CHANGE UP (ref 22–31)
CREAT SERPL-MCNC: 0.76 MG/DL — SIGNIFICANT CHANGE UP (ref 0.5–1.3)
GLUCOSE BLDC GLUCOMTR-MCNC: 185 MG/DL — HIGH (ref 70–99)
GLUCOSE SERPL-MCNC: 147 MG/DL — HIGH (ref 70–99)
MAGNESIUM SERPL-MCNC: 2.1 MG/DL — SIGNIFICANT CHANGE UP (ref 1.6–2.6)
PHOSPHATE SERPL-MCNC: 3.2 MG/DL — SIGNIFICANT CHANGE UP (ref 2.5–4.5)
POTASSIUM SERPL-MCNC: 3.6 MMOL/L — SIGNIFICANT CHANGE UP (ref 3.5–5.3)
POTASSIUM SERPL-SCNC: 3.6 MMOL/L — SIGNIFICANT CHANGE UP (ref 3.5–5.3)
SODIUM SERPL-SCNC: 141 MMOL/L — SIGNIFICANT CHANGE UP (ref 135–145)

## 2020-08-29 PROCEDURE — 99232 SBSQ HOSP IP/OBS MODERATE 35: CPT

## 2020-08-29 RX ORDER — CIPROFLOXACIN LACTATE 400MG/40ML
1 VIAL (ML) INTRAVENOUS
Qty: 20 | Refills: 0
Start: 2020-08-29 | End: 2020-09-07

## 2020-08-29 RX ADMIN — Medication 1 APPLICATION(S): at 11:20

## 2020-08-29 RX ADMIN — Medication 1 APPLICATION(S): at 05:44

## 2020-08-29 RX ADMIN — POLYETHYLENE GLYCOL 3350 17 GRAM(S): 17 POWDER, FOR SOLUTION ORAL at 05:43

## 2020-08-29 RX ADMIN — Medication 1 APPLICATION(S): at 17:43

## 2020-08-29 RX ADMIN — HEPARIN SODIUM 5000 UNIT(S): 5000 INJECTION INTRAVENOUS; SUBCUTANEOUS at 17:18

## 2020-08-29 RX ADMIN — Medication 10 MILLIEQUIVALENT(S): at 14:01

## 2020-08-29 RX ADMIN — HEPARIN SODIUM 5000 UNIT(S): 5000 INJECTION INTRAVENOUS; SUBCUTANEOUS at 05:43

## 2020-08-29 RX ADMIN — Medication 50 MILLIGRAM(S): at 17:19

## 2020-08-29 RX ADMIN — Medication 0.1 MILLIGRAM(S): at 05:43

## 2020-08-29 RX ADMIN — Medication 500 MILLIGRAM(S): at 17:20

## 2020-08-29 RX ADMIN — Medication 25 MILLIGRAM(S): at 14:00

## 2020-08-29 RX ADMIN — Medication 0.1 MILLIGRAM(S): at 17:19

## 2020-08-29 RX ADMIN — Medication 25 MILLIGRAM(S): at 05:43

## 2020-08-29 RX ADMIN — AMLODIPINE BESYLATE 10 MILLIGRAM(S): 2.5 TABLET ORAL at 05:43

## 2020-08-29 RX ADMIN — Medication 10 MILLIEQUIVALENT(S): at 05:43

## 2020-08-29 RX ADMIN — LEVETIRACETAM 500 MILLIGRAM(S): 250 TABLET, FILM COATED ORAL at 05:43

## 2020-08-29 RX ADMIN — Medication 500 MILLIGRAM(S): at 05:43

## 2020-08-29 RX ADMIN — SIMVASTATIN 20 MILLIGRAM(S): 20 TABLET, FILM COATED ORAL at 21:19

## 2020-08-29 RX ADMIN — POLYETHYLENE GLYCOL 3350 17 GRAM(S): 17 POWDER, FOR SOLUTION ORAL at 17:20

## 2020-08-29 RX ADMIN — LEVETIRACETAM 500 MILLIGRAM(S): 250 TABLET, FILM COATED ORAL at 17:19

## 2020-08-29 RX ADMIN — Medication 10 MILLIEQUIVALENT(S): at 21:20

## 2020-08-29 RX ADMIN — Medication 25 MILLIGRAM(S): at 21:20

## 2020-08-29 RX ADMIN — Medication 50 MILLIGRAM(S): at 05:43

## 2020-08-29 NOTE — PROGRESS NOTE ADULT - SUBJECTIVE AND OBJECTIVE BOX
Patient is a 69y old  Female who presents with a chief complaint of Catheter associated UTI and AMADOU from dehydration. (28 Aug 2020 17:59)      INTERVAL HPI/OVERNIGHT EVENTS:    No change. Discharge got delayed by TC issues    REVIEW OF SYSTEMS:   Remaining ROS negative    MEDICATIONS  (STANDING):  amLODIPine   Tablet 10 milliGRAM(s) Oral daily  BACItracin   Ointment 1 Application(s) Topical daily  ciprofloxacin     Tablet 500 milliGRAM(s) Oral every 12 hours  cloNIDine 0.1 milliGRAM(s) Oral two times a day  heparin   Injectable 5000 Unit(s) SubCutaneous every 12 hours  hydrALAZINE 25 milliGRAM(s) Oral every 8 hours  levETIRAcetam 500 milliGRAM(s) Oral two times a day  metoprolol tartrate 50 milliGRAM(s) Oral two times a day  polyethylene glycol 3350 17 Gram(s) Oral two times a day  potassium chloride    Tablet ER 10 milliEquivalent(s) Oral three times a day  senna 2 Tablet(s) Oral at bedtime  simvastatin 20 milliGRAM(s) Oral at bedtime  vitamin A &amp; D Ointment 1 Application(s) Topical two times a day    MEDICATIONS  (PRN):  acetaminophen   Tablet .. 650 milliGRAM(s) Oral every 6 hours PRN Temp greater or equal to 38C (100.4F), Mild Pain (1 - 3)      Allergies    No Known Allergies    Intolerances        Vital Signs Last 24 Hrs  T(C): 36.3 (29 Aug 2020 17:16), Max: 37.2 (28 Aug 2020 23:33)  T(F): 97.4 (29 Aug 2020 17:16), Max: 98.9 (28 Aug 2020 23:33)  HR: 74 (29 Aug 2020 17:16) (65 - 97)  BP: 135/78 (29 Aug 2020 17:16) (135/78 - 168/59)  BP(mean): --  RR: 18 (29 Aug 2020 17:16) (16 - 18)  SpO2: 99% (29 Aug 2020 17:16) (99% - 100%)    PHYSICAL EXAM:  GENERAL: NAD  HEAD:  Atraumatic, Normocephalic  EYES: EOMI, PERRLA, conjunctiva and sclera clear  ENT: O/P Clear  NECK: Supple, No JVD  NERVOUS SYSTEM:  No focal deficits  CHEST/LUNG: Clear to percussion bilaterally; No rales, rhonchi, wheezing  HEART: Regular rate and rhythm; No murmurs, rubs, or gallops  ABDOMEN: Soft, Nontender, Nondistended; Bowel sounds present  EXTREMITIES:  2+ Peripheral Pulses, No clubbing, cyanosis, or edema  SKIN: No rashes or lesions    LABS:    08-29    141  |  106  |  9   ----------------------------<  147<H>  3.6   |  27  |  0.76    Ca    9.2      29 Aug 2020 06:45  Phos  3.2     08-29  Mg     2.1     08-29          CAPILLARY BLOOD GLUCOSE          RADIOLOGY & ADDITIONAL TESTS:    Imaging Personally Reviewed:  [ ] YES  [ ] NO    Consultant(s) Notes Reviewed:  [ ] YES  [ ] NO    Care Discussed with Consultants/Other Providers [ ] YES  [ ] NO

## 2020-08-29 NOTE — PROGRESS NOTE ADULT - SUBJECTIVE AND OBJECTIVE BOX
68 yo female PMH dementia (non verbal), CVA (on plavix), seizure disorder (on keppra), htn, DM, hld BIBEMS for fever since yesterday. As per EMS, patient was recently admitted to hospital for UTI. Discharged on Monday with a milner.   Milner was changed this admission in the ER. EMS says that family noticed milner output decreased since yesterday. Shows only 250 cc in milner over 36 hours. Also family reported patient had not had a bowel movement since her discharged. As per EMS, family reports patient is at mental baseline. On arrival milner output looks infected. (14 Aug 2020 14:06)      Chief Complaint:  Patient is a 69y old  Female who presents with a chief complaint of Catheter associated UTI and AMADOU from dehydration. (19 Aug 2020 11:39)      Review of Systems:    General:  No wt loss, fevers, chills, night sweats  Eyes:  Good vision, no reported pain  ENT:  No sore throat, pain, runny nose, dysphagia  CV:  No pain, palpitations, hypo/hypertension  Resp:  No dyspnea, cough, tachypnea, wheezing  GI:  No pain, nausea, vomiting, diarrhea, constipation           Social History/Family History  SOCHX:   tobacco,  -  alcohol    FMHX: FA/MO  - contributory       Discussed with:  PMD, Family    Physical Exam:    Vital Signs:  Vital Signs Last 24 Hrs  T(C): 38.4 (19 Aug 2020 17:40), Max: 38.4 (19 Aug 2020 17:40)  T(F): 101.1 (19 Aug 2020 17:40), Max: 101.1 (19 Aug 2020 17:40)  HR: 77 (19 Aug 2020 17:40) (69 - 88)  BP: 129/77 (19 Aug 2020 17:40) (129/77 - 176/77)  BP(mean): --  RR: 17 (19 Aug 2020 17:40) (16 - 17)  SpO2: 96% (19 Aug 2020 17:40) (96% - 100%)  Daily     Daily   I&O's Summary    18 Aug 2020 07:01  -  19 Aug 2020 07:00  --------------------------------------------------------  IN: 900 mL / OUT: 1700 mL / NET: -800 mL    19 Aug 2020 07:01  -  19 Aug 2020 21:12  --------------------------------------------------------  IN: 270 mL / OUT: 1700 mL / NET: -1430 mL          Chest:  Full & symmetric excursion, no increased effort, breath sounds clear  Cardiovascular:  Regular rhythm, S1, S2, no murmur/rub/S3/S4, no carotid/femoral/abdominal bruit, radial/pedal pulses 2+  Abdomen:  Soft, non-tender, non-distended, normoactive bowel sounds, no HSM         Laboratory:                          8.3    5.14  )-----------( 265      ( 19 Aug 2020 07:17 )             25.2     08-19    142  |  109<H>  |  10  ----------------------------<  136<H>  3.5   |  27  |  0.82    Ca    8.3<L>      19 Aug 2020 07:17  Phos  3.2     08-19  Mg     1.9     08-19      CAPILLARY BLOOD GLUCOSE      POCT Blood Glucose.: 139 mg/dL (19 Aug 2020 16:30)  POCT Blood Glucose.: 141 mg/dL (19 Aug 2020 10:46)  POCT Blood Glucose.: 147 mg/dL (19 Aug 2020 07:55)      Assessment:  I am asked to evaluate this patient for preprocedure cardiovascular risk assessment    The diagnostic echocardiogram has a preserved ejection fraction and no significant valvular heart disease  all pertinent labs are reviewed  multiple medical therapies to optimize his systolic blood pressure are proving effective  post cystoscopy with ureteral  stent  DC plan per /ID  I/D, orthopedics on noted
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
## 2020-08-30 LAB
ALBUMIN SERPL ELPH-MCNC: 2.7 G/DL — LOW (ref 3.3–5)
ALP SERPL-CCNC: 75 U/L — SIGNIFICANT CHANGE UP (ref 40–120)
ALT FLD-CCNC: 15 U/L — SIGNIFICANT CHANGE UP (ref 12–78)
ANION GAP SERPL CALC-SCNC: 8 MMOL/L — SIGNIFICANT CHANGE UP (ref 5–17)
AST SERPL-CCNC: 17 U/L — SIGNIFICANT CHANGE UP (ref 15–37)
BILIRUB SERPL-MCNC: 0.4 MG/DL — SIGNIFICANT CHANGE UP (ref 0.2–1.2)
BUN SERPL-MCNC: 17 MG/DL — SIGNIFICANT CHANGE UP (ref 7–23)
CALCIUM SERPL-MCNC: 8.9 MG/DL — SIGNIFICANT CHANGE UP (ref 8.5–10.1)
CHLORIDE SERPL-SCNC: 107 MMOL/L — SIGNIFICANT CHANGE UP (ref 96–108)
CO2 SERPL-SCNC: 27 MMOL/L — SIGNIFICANT CHANGE UP (ref 22–31)
CREAT SERPL-MCNC: 1.11 MG/DL — SIGNIFICANT CHANGE UP (ref 0.5–1.3)
GLUCOSE SERPL-MCNC: 260 MG/DL — HIGH (ref 70–99)
HCT VFR BLD CALC: 29.3 % — LOW (ref 34.5–45)
HGB BLD-MCNC: 9.5 G/DL — LOW (ref 11.5–15.5)
MCHC RBC-ENTMCNC: 29.4 PG — SIGNIFICANT CHANGE UP (ref 27–34)
MCHC RBC-ENTMCNC: 32.4 GM/DL — SIGNIFICANT CHANGE UP (ref 32–36)
MCV RBC AUTO: 90.7 FL — SIGNIFICANT CHANGE UP (ref 80–100)
NRBC # BLD: 0 /100 WBCS — SIGNIFICANT CHANGE UP (ref 0–0)
PLATELET # BLD AUTO: 343 K/UL — SIGNIFICANT CHANGE UP (ref 150–400)
POTASSIUM SERPL-MCNC: 4.4 MMOL/L — SIGNIFICANT CHANGE UP (ref 3.5–5.3)
POTASSIUM SERPL-SCNC: 4.4 MMOL/L — SIGNIFICANT CHANGE UP (ref 3.5–5.3)
PROT SERPL-MCNC: 6.8 GM/DL — SIGNIFICANT CHANGE UP (ref 6–8.3)
RBC # BLD: 3.23 M/UL — LOW (ref 3.8–5.2)
RBC # FLD: 14.9 % — HIGH (ref 10.3–14.5)
SODIUM SERPL-SCNC: 142 MMOL/L — SIGNIFICANT CHANGE UP (ref 135–145)
WBC # BLD: 4.26 K/UL — SIGNIFICANT CHANGE UP (ref 3.8–10.5)
WBC # FLD AUTO: 4.26 K/UL — SIGNIFICANT CHANGE UP (ref 3.8–10.5)

## 2020-08-30 PROCEDURE — 99239 HOSP IP/OBS DSCHRG MGMT >30: CPT

## 2020-08-30 RX ADMIN — HEPARIN SODIUM 5000 UNIT(S): 5000 INJECTION INTRAVENOUS; SUBCUTANEOUS at 17:29

## 2020-08-30 RX ADMIN — Medication 1 APPLICATION(S): at 12:57

## 2020-08-30 RX ADMIN — Medication 1 APPLICATION(S): at 17:40

## 2020-08-30 RX ADMIN — Medication 1 APPLICATION(S): at 06:21

## 2020-08-30 RX ADMIN — Medication 25 MILLIGRAM(S): at 14:42

## 2020-08-30 RX ADMIN — Medication 500 MILLIGRAM(S): at 17:28

## 2020-08-30 RX ADMIN — AMLODIPINE BESYLATE 10 MILLIGRAM(S): 2.5 TABLET ORAL at 06:20

## 2020-08-30 RX ADMIN — Medication 0.1 MILLIGRAM(S): at 06:20

## 2020-08-30 RX ADMIN — Medication 50 MILLIGRAM(S): at 17:28

## 2020-08-30 RX ADMIN — SIMVASTATIN 20 MILLIGRAM(S): 20 TABLET, FILM COATED ORAL at 21:57

## 2020-08-30 RX ADMIN — LEVETIRACETAM 500 MILLIGRAM(S): 250 TABLET, FILM COATED ORAL at 17:29

## 2020-08-30 RX ADMIN — Medication 25 MILLIGRAM(S): at 06:20

## 2020-08-30 RX ADMIN — LEVETIRACETAM 500 MILLIGRAM(S): 250 TABLET, FILM COATED ORAL at 06:20

## 2020-08-30 RX ADMIN — Medication 50 MILLIGRAM(S): at 06:20

## 2020-08-30 RX ADMIN — Medication 0.1 MILLIGRAM(S): at 17:28

## 2020-08-30 RX ADMIN — HEPARIN SODIUM 5000 UNIT(S): 5000 INJECTION INTRAVENOUS; SUBCUTANEOUS at 06:20

## 2020-08-30 RX ADMIN — Medication 10 MILLIEQUIVALENT(S): at 06:20

## 2020-08-30 RX ADMIN — Medication 500 MILLIGRAM(S): at 06:20

## 2020-08-30 NOTE — PROGRESS NOTE ADULT - SUBJECTIVE AND OBJECTIVE BOX
Patient is a 69y old  Female who presents with a chief complaint of Catheter associated UTI and AMADOU from dehydration. (30 Aug 2020 10:52)      INTERVAL HPI/OVERNIGHT EVENTS:    No changes.     REVIEW OF SYSTEMS:   Remaining ROS negative    MEDICATIONS  (STANDING):  amLODIPine   Tablet 10 milliGRAM(s) Oral daily  BACItracin   Ointment 1 Application(s) Topical daily  ciprofloxacin     Tablet 500 milliGRAM(s) Oral every 12 hours  cloNIDine 0.1 milliGRAM(s) Oral two times a day  heparin   Injectable 5000 Unit(s) SubCutaneous every 12 hours  hydrALAZINE 25 milliGRAM(s) Oral every 8 hours  levETIRAcetam 500 milliGRAM(s) Oral two times a day  metoprolol tartrate 50 milliGRAM(s) Oral two times a day  polyethylene glycol 3350 17 Gram(s) Oral two times a day  senna 2 Tablet(s) Oral at bedtime  simvastatin 20 milliGRAM(s) Oral at bedtime  vitamin A &amp; D Ointment 1 Application(s) Topical two times a day    MEDICATIONS  (PRN):  acetaminophen   Tablet .. 650 milliGRAM(s) Oral every 6 hours PRN Temp greater or equal to 38C (100.4F), Mild Pain (1 - 3)      Allergies    No Known Allergies    Intolerances        Vital Signs Last 24 Hrs  T(C): 36.7 (30 Aug 2020 05:17), Max: 36.7 (29 Aug 2020 12:32)  T(F): 98 (30 Aug 2020 05:17), Max: 98.1 (29 Aug 2020 12:32)  HR: 67 (30 Aug 2020 05:17) (64 - 78)  BP: 148/70 (30 Aug 2020 05:17) (135/78 - 168/59)  BP(mean): --  RR: 17 (30 Aug 2020 05:17) (16 - 18)  SpO2: 98% (30 Aug 2020 05:17) (98% - 100%)    PHYSICAL EXAM:  GENERAL: NAD  HEAD:  Atraumatic, Normocephalic  EYES: EOMI, PERRLA, conjunctiva and sclera clear  ENT: O/P Clear  NECK: Supple, No JVD  NERVOUS SYSTEM:  No focal deficits  CHEST/LUNG: Clear to percussion bilaterally; No rales, rhonchi, wheezing  HEART: Regular rate and rhythm; No murmurs, rubs, or gallops  ABDOMEN: Soft, Nontender, Nondistended; Bowel sounds present  EXTREMITIES:  2+ Peripheral Pulses, No clubbing, cyanosis, or edema  SKIN: No rashes or lesions    LABS:                        9.5    4.26  )-----------( 343      ( 30 Aug 2020 11:42 )             29.3     08-30    142  |  107  |  17  ----------------------------<  260<H>  4.4   |  27  |  1.11    Ca    8.9      30 Aug 2020 11:42  Phos  3.2     08-29  Mg     2.1     08-29    TPro  x   /  Alb  2.7<L>  /  TBili  x   /  DBili  x   /  AST  17  /  ALT  15  /  AlkPhos  x   08-30        CAPILLARY BLOOD GLUCOSE      POCT Blood Glucose.: 185 mg/dL (29 Aug 2020 21:16)      RADIOLOGY & ADDITIONAL TESTS:    Imaging Personally Reviewed:  [ ] YES  [ ] NO    Consultant(s) Notes Reviewed:  [ ] YES  [ ] NO    Care Discussed with Consultants/Other Providers [ ] YES  [ ] NO

## 2020-08-30 NOTE — PROGRESS NOTE ADULT - ASSESSMENT
Patient is being see for UTI/ B/L Hydronephrosis/ chronic [place]    S/p [place] catheter exchange 8/15 and 8/16    Afebrile  No leukocytosis    UA significant for pyuria  UC grew Carbapenem resistance Pseudomonas (S- Cefepime, Cipro)    BC grew CoNS-- likely a contaminant    s/p Cefepime  Continue Cipro, x 7 days post procedure end date 8/31   -Urology following-- planning F/u in the office in 2 weeks for stent removal

## 2020-08-30 NOTE — PROGRESS NOTE ADULT - SUBJECTIVE AND OBJECTIVE BOX
69y old  Female who presents with a chief complaint of Catheter associated UTI and AMADOU from dehydration. (19 Aug 2020 11:39)      Review of Systems:    General:  No wt loss, fevers, chills, night sweats  Eyes:  Good vision, no reported pain  ENT:  No sore throat, pain, runny nose, dysphagia  CV:  No pain, palpitations, hypo/hypertension  Resp:  No dyspnea, cough, tachypnea, wheezing  GI:  No pain, nausea, vomiting, diarrhea, constipation           Social History/Family History  SOCHX:   tobacco,  -  alcohol    FMHX: FA/MO  - contributory       Discussed with:  PMD, Family    Physical Exam:    Vital Signs:  Vital Signs Last 24 Hrs  T(C): 38.4 (19 Aug 2020 17:40), Max: 38.4 (19 Aug 2020 17:40)  T(F): 101.1 (19 Aug 2020 17:40), Max: 101.1 (19 Aug 2020 17:40)  HR: 77 (19 Aug 2020 17:40) (69 - 88)  BP: 129/77 (19 Aug 2020 17:40) (129/77 - 176/77)  BP(mean): --  RR: 17 (19 Aug 2020 17:40) (16 - 17)  SpO2: 96% (19 Aug 2020 17:40) (96% - 100%)  Daily     Daily   I&O's Summary    18 Aug 2020 07:01  -  19 Aug 2020 07:00  --------------------------------------------------------  IN: 900 mL / OUT: 1700 mL / NET: -800 mL    19 Aug 2020 07:01  -  19 Aug 2020 21:12  --------------------------------------------------------  IN: 270 mL / OUT: 1700 mL / NET: -1430 mL          Chest:  Full & symmetric excursion, no increased effort, breath sounds clear  Cardiovascular:  Regular rhythm, S1, S2, no murmur/rub/S3/S4, no carotid/femoral/abdominal bruit, radial/pedal pulses 2+  Abdomen:  Soft, non-tender, non-distended, normoactive bowel sounds, no HSM         Laboratory:                          8.3    5.14  )-----------( 265      ( 19 Aug 2020 07:17 )             25.2     08-19    142  |  109<H>  |  10  ----------------------------<  136<H>  3.5   |  27  |  0.82    Ca    8.3<L>      19 Aug 2020 07:17  Phos  3.2     08-19  Mg     1.9     08-19      CAPILLARY BLOOD GLUCOSE      POCT Blood Glucose.: 139 mg/dL (19 Aug 2020 16:30)  POCT Blood Glucose.: 141 mg/dL (19 Aug 2020 10:46)  POCT Blood Glucose.: 147 mg/dL (19 Aug 2020 07:55)      Assessment:  I am asked to evaluate this patient for preprocedure cardiovascular risk assessment    The diagnostic echocardiogram has a preserved ejection fraction and no significant valvular heart disease  all pertinent labs are reviewed  multiple medical therapies to optimize his systolic blood pressure are proving effective  post cystoscopy with ureteral  stent  DC plan per /ID  I/D, orthopedics on noted

## 2020-08-30 NOTE — PROGRESS NOTE ADULT - SUBJECTIVE AND OBJECTIVE BOX
HPI:  68 yo female PMH dementia (non verbal), CVA (on plavix), seizure disorder (on keppra), htn, DM, hld BIBEMS for fever since yesterday. As per EMS, patient was recently admitted to hospital for UTI. Discharged on Monday with a milner.   Minler was changed this admission in the ER. EMS says that family noticed milner output decreased since yesterday. Shows only 250 cc in milner over 36 hours. Also family reported patient had not had a bowel movement since her discharged. As per EMS, family reports patient is at mental baseline. On arrival milner output looks infected. (14 Aug 2020 14:06)      Allergies    No Known Allergies    Intolerances        MEDICATIONS  (STANDING):  amLODIPine   Tablet 10 milliGRAM(s) Oral daily  BACItracin   Ointment 1 Application(s) Topical daily  ciprofloxacin     Tablet 500 milliGRAM(s) Oral every 12 hours  cloNIDine 0.1 milliGRAM(s) Oral two times a day  heparin   Injectable 5000 Unit(s) SubCutaneous every 12 hours  hydrALAZINE 25 milliGRAM(s) Oral every 8 hours  levETIRAcetam 500 milliGRAM(s) Oral two times a day  metoprolol tartrate 50 milliGRAM(s) Oral two times a day  polyethylene glycol 3350 17 Gram(s) Oral two times a day  senna 2 Tablet(s) Oral at bedtime  simvastatin 20 milliGRAM(s) Oral at bedtime  vitamin A &amp; D Ointment 1 Application(s) Topical two times a day    MEDICATIONS  (PRN):  acetaminophen   Tablet .. 650 milliGRAM(s) Oral every 6 hours PRN Temp greater or equal to 38C (100.4F), Mild Pain (1 - 3)      REVIEW OF SYSTEMS:    CONSTITUTIONAL: No fever, chills, weight loss, or fatigue  HEENT: No sore throat, runny nose, ear ache  RESPIRATORY: No cough, wheezing, No shortness of breath  CARDIOVASCULAR: No chest pain, palpitations, dizziness  GASTROINTESTINAL: No abdominal pain. No nausea, vomiting, diarrhea  GENITOURINARY: No dysuria, increase frequency, hematuria, or incontinence  NEUROLOGICAL: No headaches, memory loss, loss of strength, numbness, or tremors, no weakness  EXTREMITY: No pedal edema BLE  SKIN: No itching, burning, rashes, or lesions     VITAL SIGNS:  T(C): 36.7 (08-30-20 @ 05:17), Max: 36.7 (08-29-20 @ 12:32)  T(F): 98 (08-30-20 @ 05:17), Max: 98.1 (08-29-20 @ 12:32)  HR: 67 (08-30-20 @ 05:17) (64 - 78)  BP: 148/70 (08-30-20 @ 05:17) (135/78 - 168/59)  RR: 17 (08-30-20 @ 05:17) (16 - 18)  SpO2: 98% (08-30-20 @ 05:17) (98% - 100%)  Wt(kg): --    PHYSICAL EXAM:    GENERAL: not in any distress  HEENT: Neck is supple, normocephalic, atraumatic   CHEST/LUNG: Clear to percussion bilaterally; No rales, rhonchi, wheezing  HEART: Regular rate and rhythm; No murmurs, rubs, or gallops  ABDOMEN: Soft, Nontender, Nondistended; Bowel sounds present, no rebound   EXTREMITIES:  2+ Peripheral Pulses, No clubbing, cyanosis, or edema  GENITOURINARY:   SKIN: No rashes or lesions  BACK: no pressor sore   NERVOUS SYSTEM:  Alert & Oriented X3, Good concentration  PSYCH: normal affect     LABS:     08-29    141  |  106  |  9   ----------------------------<  147<H>  3.6   |  27  |  0.76    Ca    9.2      29 Aug 2020 06:45  Phos  3.2     08-29  Mg     2.1     08-29      Culture Results:   Few Presumptive Katlyn albicans "Susceptibilities not performed" (08-25 @ 09:56)  Culture Results:   No growth (08-25 @ 09:56)        Radiology:
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
## 2020-08-31 VITALS
OXYGEN SATURATION: 99 % | SYSTOLIC BLOOD PRESSURE: 123 MMHG | HEART RATE: 59 BPM | DIASTOLIC BLOOD PRESSURE: 44 MMHG | TEMPERATURE: 98 F | RESPIRATION RATE: 18 BRPM

## 2020-08-31 LAB
GLUCOSE BLDC GLUCOMTR-MCNC: 171 MG/DL — HIGH (ref 70–99)
NIDUS STONE QN: SIGNIFICANT CHANGE UP

## 2020-08-31 PROCEDURE — 99239 HOSP IP/OBS DSCHRG MGMT >30: CPT

## 2020-08-31 RX ADMIN — Medication 50 MILLIGRAM(S): at 06:16

## 2020-08-31 RX ADMIN — Medication 25 MILLIGRAM(S): at 06:16

## 2020-08-31 RX ADMIN — LEVETIRACETAM 500 MILLIGRAM(S): 250 TABLET, FILM COATED ORAL at 06:16

## 2020-08-31 RX ADMIN — Medication 1 APPLICATION(S): at 06:18

## 2020-08-31 RX ADMIN — AMLODIPINE BESYLATE 10 MILLIGRAM(S): 2.5 TABLET ORAL at 06:16

## 2020-08-31 RX ADMIN — HEPARIN SODIUM 5000 UNIT(S): 5000 INJECTION INTRAVENOUS; SUBCUTANEOUS at 06:17

## 2020-08-31 RX ADMIN — Medication 0.1 MILLIGRAM(S): at 06:16

## 2020-08-31 RX ADMIN — Medication 500 MILLIGRAM(S): at 06:16

## 2020-08-31 NOTE — PROGRESS NOTE ADULT - REASON FOR ADMISSION
Catheter associated UTI and AMADOU from dehydration.
Catheter associated UTI and AMDAOU from dehydration.

## 2020-08-31 NOTE — PROGRESS NOTE ADULT - PROVIDER SPECIALTY LIST ADULT
Cardiology
Hospitalist
Infectious Disease
Urology
Hospitalist
Cardiology
Cardiology
Infectious Disease
Urology
Infectious Disease

## 2020-09-02 DIAGNOSIS — Z16.19 RESISTANCE TO OTHER SPECIFIED BETA LACTAM ANTIBIOTICS: ICD-10-CM
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
## 2020-09-02 DIAGNOSIS — E86.0 DEHYDRATION: ICD-10-CM

## 2020-09-02 DIAGNOSIS — E43 UNSPECIFIED SEVERE PROTEIN-CALORIE MALNUTRITION: ICD-10-CM

## 2020-09-02 DIAGNOSIS — M10.9 GOUT, UNSPECIFIED: ICD-10-CM

## 2020-09-02 DIAGNOSIS — K59.00 CONSTIPATION, UNSPECIFIED: ICD-10-CM

## 2020-09-02 DIAGNOSIS — E83.39 OTHER DISORDERS OF PHOSPHORUS METABOLISM: ICD-10-CM

## 2020-09-02 DIAGNOSIS — G40.909 EPILEPSY, UNSPECIFIED, NOT INTRACTABLE, WITHOUT STATUS EPILEPTICUS: ICD-10-CM

## 2020-09-02 DIAGNOSIS — Z87.440 PERSONAL HISTORY OF URINARY (TRACT) INFECTIONS: ICD-10-CM

## 2020-09-02 DIAGNOSIS — Z74.01 BED CONFINEMENT STATUS: ICD-10-CM

## 2020-09-02 DIAGNOSIS — R31.0 GROSS HEMATURIA: ICD-10-CM

## 2020-09-02 DIAGNOSIS — M17.0 BILATERAL PRIMARY OSTEOARTHRITIS OF KNEE: ICD-10-CM

## 2020-09-02 DIAGNOSIS — M19.011 PRIMARY OSTEOARTHRITIS, RIGHT SHOULDER: ICD-10-CM

## 2020-09-02 DIAGNOSIS — L89.93 PRESSURE ULCER OF UNSPECIFIED SITE, STAGE 3: ICD-10-CM

## 2020-09-02 DIAGNOSIS — E87.6 HYPOKALEMIA: ICD-10-CM

## 2020-09-02 DIAGNOSIS — H54.62 UNQUALIFIED VISUAL LOSS, LEFT EYE, NORMAL VISION RIGHT EYE: ICD-10-CM

## 2020-09-02 DIAGNOSIS — T83.511A INFECTION AND INFLAMMATORY REACTION DUE TO INDWELLING URETHRAL CATHETER, INITIAL ENCOUNTER: ICD-10-CM

## 2020-09-02 DIAGNOSIS — R32 UNSPECIFIED URINARY INCONTINENCE: ICD-10-CM

## 2020-09-02 DIAGNOSIS — J45.909 UNSPECIFIED ASTHMA, UNCOMPLICATED: ICD-10-CM

## 2020-09-02 DIAGNOSIS — M47.9 SPONDYLOSIS, UNSPECIFIED: ICD-10-CM

## 2020-09-02 DIAGNOSIS — N13.6 PYONEPHROSIS: ICD-10-CM

## 2020-09-02 DIAGNOSIS — E78.5 HYPERLIPIDEMIA, UNSPECIFIED: ICD-10-CM

## 2020-09-02 DIAGNOSIS — M19.012 PRIMARY OSTEOARTHRITIS, LEFT SHOULDER: ICD-10-CM

## 2020-09-02 DIAGNOSIS — Z87.442 PERSONAL HISTORY OF URINARY CALCULI: ICD-10-CM

## 2020-09-02 DIAGNOSIS — R33.9 RETENTION OF URINE, UNSPECIFIED: ICD-10-CM

## 2020-09-02 DIAGNOSIS — S42.202A UNSPECIFIED FRACTURE OF UPPER END OF LEFT HUMERUS, INITIAL ENCOUNTER FOR CLOSED FRACTURE: ICD-10-CM

## 2020-09-02 DIAGNOSIS — Z78.1 PHYSICAL RESTRAINT STATUS: ICD-10-CM

## 2020-09-02 DIAGNOSIS — N18.3 CHRONIC KIDNEY DISEASE, STAGE 3 (MODERATE): ICD-10-CM

## 2020-09-02 DIAGNOSIS — F03.90 UNSPECIFIED DEMENTIA WITHOUT BEHAVIORAL DISTURBANCE: ICD-10-CM

## 2020-09-02 DIAGNOSIS — I12.9 HYPERTENSIVE CHRONIC KIDNEY DISEASE WITH STAGE 1 THROUGH STAGE 4 CHRONIC KIDNEY DISEASE, OR UNSPECIFIED CHRONIC KIDNEY DISEASE: ICD-10-CM

## 2020-09-02 DIAGNOSIS — D64.9 ANEMIA, UNSPECIFIED: ICD-10-CM

## 2020-09-02 DIAGNOSIS — I69.351 HEMIPLEGIA AND HEMIPARESIS FOLLOWING CEREBRAL INFARCTION AFFECTING RIGHT DOMINANT SIDE: ICD-10-CM

## 2020-09-02 DIAGNOSIS — Z79.02 LONG TERM (CURRENT) USE OF ANTITHROMBOTICS/ANTIPLATELETS: ICD-10-CM

## 2020-09-02 DIAGNOSIS — A41.52 SEPSIS DUE TO PSEUDOMONAS: ICD-10-CM

## 2020-09-02 DIAGNOSIS — N17.9 ACUTE KIDNEY FAILURE, UNSPECIFIED: ICD-10-CM

## 2020-09-02 DIAGNOSIS — Z79.82 LONG TERM (CURRENT) USE OF ASPIRIN: ICD-10-CM

## 2020-09-02 DIAGNOSIS — E11.22 TYPE 2 DIABETES MELLITUS WITH DIABETIC CHRONIC KIDNEY DISEASE: ICD-10-CM

## 2020-09-04 ENCOUNTER — EMERGENCY (EMERGENCY)
Facility: HOSPITAL | Age: 70
LOS: 0 days | Discharge: HOME | End: 2020-09-04
Attending: STUDENT IN AN ORGANIZED HEALTH CARE EDUCATION/TRAINING PROGRAM
Payer: MEDICARE

## 2020-09-04 VITALS
SYSTOLIC BLOOD PRESSURE: 123 MMHG | RESPIRATION RATE: 15 BRPM | OXYGEN SATURATION: 100 % | DIASTOLIC BLOOD PRESSURE: 63 MMHG | TEMPERATURE: 99 F | WEIGHT: 125 LBS | HEART RATE: 60 BPM | HEIGHT: 64 IN

## 2020-09-04 VITALS
HEART RATE: 66 BPM | TEMPERATURE: 98 F | OXYGEN SATURATION: 99 % | SYSTOLIC BLOOD PRESSURE: 156 MMHG | DIASTOLIC BLOOD PRESSURE: 63 MMHG | RESPIRATION RATE: 14 BRPM

## 2020-09-04 DIAGNOSIS — Z87.442 PERSONAL HISTORY OF URINARY CALCULI: ICD-10-CM

## 2020-09-04 DIAGNOSIS — R31.9 HEMATURIA, UNSPECIFIED: ICD-10-CM

## 2020-09-04 DIAGNOSIS — M19.90 UNSPECIFIED OSTEOARTHRITIS, UNSPECIFIED SITE: ICD-10-CM

## 2020-09-04 DIAGNOSIS — I10 ESSENTIAL (PRIMARY) HYPERTENSION: ICD-10-CM

## 2020-09-04 DIAGNOSIS — I63.9 CEREBRAL INFARCTION, UNSPECIFIED: ICD-10-CM

## 2020-09-04 DIAGNOSIS — R32 UNSPECIFIED URINARY INCONTINENCE: ICD-10-CM

## 2020-09-04 DIAGNOSIS — G40.909 EPILEPSY, UNSPECIFIED, NOT INTRACTABLE, WITHOUT STATUS EPILEPTICUS: ICD-10-CM

## 2020-09-04 DIAGNOSIS — E11.9 TYPE 2 DIABETES MELLITUS WITHOUT COMPLICATIONS: ICD-10-CM

## 2020-09-04 DIAGNOSIS — J45.909 UNSPECIFIED ASTHMA, UNCOMPLICATED: ICD-10-CM

## 2020-09-04 DIAGNOSIS — Z79.82 LONG TERM (CURRENT) USE OF ASPIRIN: ICD-10-CM

## 2020-09-04 DIAGNOSIS — M10.9 GOUT, UNSPECIFIED: ICD-10-CM

## 2020-09-04 DIAGNOSIS — Z79.02 LONG TERM (CURRENT) USE OF ANTITHROMBOTICS/ANTIPLATELETS: ICD-10-CM

## 2020-09-04 DIAGNOSIS — N20.0 CALCULUS OF KIDNEY: Chronic | ICD-10-CM

## 2020-09-04 DIAGNOSIS — H53.9 UNSPECIFIED VISUAL DISTURBANCE: ICD-10-CM

## 2020-09-04 LAB
ANION GAP SERPL CALC-SCNC: 5 MMOL/L — SIGNIFICANT CHANGE UP (ref 5–17)
APPEARANCE UR: ABNORMAL
APTT BLD: 34.5 SEC — SIGNIFICANT CHANGE UP (ref 27.5–35.5)
BACTERIA # UR AUTO: ABNORMAL
BILIRUB UR-MCNC: NEGATIVE — SIGNIFICANT CHANGE UP
BUN SERPL-MCNC: 21 MG/DL — SIGNIFICANT CHANGE UP (ref 7–23)
CALCIUM SERPL-MCNC: 8.8 MG/DL — SIGNIFICANT CHANGE UP (ref 8.5–10.1)
CHLORIDE SERPL-SCNC: 109 MMOL/L — HIGH (ref 96–108)
CO2 SERPL-SCNC: 25 MMOL/L — SIGNIFICANT CHANGE UP (ref 22–31)
COLOR SPEC: ABNORMAL
CREAT SERPL-MCNC: 0.95 MG/DL — SIGNIFICANT CHANGE UP (ref 0.5–1.3)
DIFF PNL FLD: ABNORMAL
EPI CELLS # UR: SIGNIFICANT CHANGE UP
GLUCOSE SERPL-MCNC: 176 MG/DL — HIGH (ref 70–99)
GLUCOSE UR QL: NEGATIVE MG/DL — SIGNIFICANT CHANGE UP
HCT VFR BLD CALC: 36.1 % — SIGNIFICANT CHANGE UP (ref 34.5–45)
HGB BLD-MCNC: 11.2 G/DL — LOW (ref 11.5–15.5)
INR BLD: 1.11 RATIO — SIGNIFICANT CHANGE UP (ref 0.88–1.16)
KETONES UR-MCNC: ABNORMAL
LEUKOCYTE ESTERASE UR-ACNC: NEGATIVE — SIGNIFICANT CHANGE UP
MCHC RBC-ENTMCNC: 29.1 PG — SIGNIFICANT CHANGE UP (ref 27–34)
MCHC RBC-ENTMCNC: 31 GM/DL — LOW (ref 32–36)
MCV RBC AUTO: 93.8 FL — SIGNIFICANT CHANGE UP (ref 80–100)
NITRITE UR-MCNC: NEGATIVE — SIGNIFICANT CHANGE UP
NRBC # BLD: 0 /100 WBCS — SIGNIFICANT CHANGE UP (ref 0–0)
PH UR: 7 — SIGNIFICANT CHANGE UP (ref 5–8)
PLATELET # BLD AUTO: 362 K/UL — SIGNIFICANT CHANGE UP (ref 150–400)
POTASSIUM SERPL-MCNC: 4 MMOL/L — SIGNIFICANT CHANGE UP (ref 3.5–5.3)
POTASSIUM SERPL-SCNC: 4 MMOL/L — SIGNIFICANT CHANGE UP (ref 3.5–5.3)
PROT UR-MCNC: 500 MG/DL
PROTHROM AB SERPL-ACNC: 12.8 SEC — SIGNIFICANT CHANGE UP (ref 10.6–13.6)
RBC # BLD: 3.85 M/UL — SIGNIFICANT CHANGE UP (ref 3.8–5.2)
RBC # FLD: 14.1 % — SIGNIFICANT CHANGE UP (ref 10.3–14.5)
RBC CASTS # UR COMP ASSIST: >50 /HPF (ref 0–4)
SODIUM SERPL-SCNC: 139 MMOL/L — SIGNIFICANT CHANGE UP (ref 135–145)
SP GR SPEC: 1.01 — SIGNIFICANT CHANGE UP (ref 1.01–1.02)
UROBILINOGEN FLD QL: NEGATIVE MG/DL — SIGNIFICANT CHANGE UP
WBC # BLD: 4.17 K/UL — SIGNIFICANT CHANGE UP (ref 3.8–10.5)
WBC # FLD AUTO: 4.17 K/UL — SIGNIFICANT CHANGE UP (ref 3.8–10.5)
WBC UR QL: SIGNIFICANT CHANGE UP

## 2020-09-04 PROCEDURE — 99284 EMERGENCY DEPT VISIT MOD MDM: CPT

## 2020-09-04 NOTE — ED PROVIDER NOTE - CLINICAL SUMMARY MEDICAL DECISION MAKING FREE TEXT BOX
patient with no sign of UTI on UA. patient homedynamcically stblae. h/h higher than prior recent values at this hospital. d/w patient's urologists. bleeding likely trelated to stents recently inserted; this is normal course per his assessments. will d/c patient with tuesday follow up for stnet removal

## 2020-09-04 NOTE — ED PROVIDER NOTE - OBJECTIVE STATEMENT
68 yo female PMH dementia (non verbal), CVA (on plavix), seizure disorder (on keppra), htn, DM, recently d/c from this hospital after admission for nephrolithiaisis with URI, had lithotripsy and left ureteral stent placement on 8/24. now returns for increased blood in milner for past 24 hours. no fevers

## 2020-09-04 NOTE — ED PROVIDER NOTE - CARE PROVIDER_API CALL
Todd Caicedo  UROLOGY  [address], [address]  [place] [id number]  Phone: [phone]  Fax: [phone]  Follow Up Time:

## 2020-09-04 NOTE — ED PROVIDER NOTE - PATIENT PORTAL LINK FT
You can access the FollowMyHealth Patient Portal offered by [facility] by registering at the following website: [email or web address]. By joining [employer]’s FollowMyHealth portal, you will also be able to view your health information using other applications (apps) compatible with our system.

## 2020-09-04 NOTE — ED PROVIDER NOTE - CARE PROVIDERS DIRECT ADDRESSES
[email or web address]

## 2020-09-08 ENCOUNTER — APPOINTMENT (OUTPATIENT)
Dept: UROLOGY | Facility: CLINIC | Age: 70
End: 2020-09-08
Payer: MEDICARE

## 2020-09-08 PROCEDURE — 52310 CYSTOSCOPY AND TREATMENT: CPT

## 2020-09-08 RX ORDER — FLASH GLUCOSE SENSOR
KIT MISCELLANEOUS
Qty: 2 | Refills: 0 | Status: ACTIVE | COMMUNITY
Start: 2020-08-16

## 2020-09-08 RX ORDER — CLONIDINE HYDROCHLORIDE 0.1 MG/1
0.1 TABLET ORAL
Qty: 180 | Refills: 0 | Status: ACTIVE | COMMUNITY
Start: 2019-09-27

## 2020-09-08 RX ORDER — METOPROLOL TARTRATE 100 MG/1
100 TABLET, FILM COATED ORAL
Qty: 180 | Refills: 0 | Status: DISCONTINUED | COMMUNITY
Start: 2020-06-24 | End: 2020-09-08

## 2020-09-08 RX ORDER — TRAZODONE HYDROCHLORIDE 50 MG/1
50 TABLET ORAL
Qty: 30 | Refills: 0 | Status: ACTIVE | COMMUNITY
Start: 2020-06-12

## 2020-09-08 RX ORDER — BLOOD-GLUCOSE METER
KIT MISCELLANEOUS
Qty: 1 | Refills: 0 | Status: ACTIVE | COMMUNITY
Start: 2020-04-28

## 2020-09-08 RX ORDER — SIMVASTATIN 20 MG/1
20 TABLET, FILM COATED ORAL
Qty: 90 | Refills: 0 | Status: ACTIVE | COMMUNITY
Start: 2020-06-24

## 2020-09-08 RX ORDER — OXYCODONE AND ACETAMINOPHEN 5; 325 MG/1; MG/1
5-325 TABLET ORAL
Qty: 6 | Refills: 0 | Status: ACTIVE | COMMUNITY
Start: 2020-07-16

## 2020-09-08 RX ORDER — HYDRALAZINE HYDROCHLORIDE 100 MG/1
100 TABLET ORAL
Qty: 180 | Refills: 0 | Status: ACTIVE | COMMUNITY
Start: 2020-06-24

## 2020-09-08 RX ORDER — NALOXONE HYDROCHLORIDE NASAL 4 MG/.1ML
4 SPRAY NASAL
Qty: 2 | Refills: 0 | Status: ACTIVE | COMMUNITY
Start: 2020-07-16

## 2020-09-08 RX ORDER — FLASH GLUCOSE SCANNING READER
EACH MISCELLANEOUS
Qty: 1 | Refills: 0 | Status: ACTIVE | COMMUNITY
Start: 2020-08-16

## 2020-09-08 RX ORDER — CLOPIDOGREL BISULFATE 75 MG/1
75 TABLET, FILM COATED ORAL
Qty: 90 | Refills: 0 | Status: ACTIVE | COMMUNITY
Start: 2020-03-18

## 2020-09-08 RX ORDER — LANCETS 28 GAUGE
EACH MISCELLANEOUS
Qty: 100 | Refills: 0 | Status: ACTIVE | COMMUNITY
Start: 2020-04-22

## 2020-09-08 RX ORDER — METOPROLOL TARTRATE 50 MG/1
50 TABLET, FILM COATED ORAL
Qty: 180 | Refills: 0 | Status: ACTIVE | COMMUNITY
Start: 2020-09-01

## 2020-09-08 RX ORDER — BLOOD SUGAR DIAGNOSTIC
STRIP MISCELLANEOUS
Qty: 100 | Refills: 0 | Status: ACTIVE | COMMUNITY
Start: 2020-04-22

## 2020-09-08 RX ORDER — LEVETIRACETAM 750 MG/1
750 TABLET, FILM COATED ORAL
Qty: 180 | Refills: 0 | Status: ACTIVE | COMMUNITY
Start: 2020-09-01

## 2020-09-08 RX ORDER — AMLODIPINE BESYLATE 10 MG/1
10 TABLET ORAL
Qty: 90 | Refills: 0 | Status: ACTIVE | COMMUNITY
Start: 2019-09-27

## 2020-09-08 RX ORDER — HYDRALAZINE HYDROCHLORIDE 50 MG/1
50 TABLET ORAL
Qty: 270 | Refills: 0 | Status: DISCONTINUED | COMMUNITY
Start: 2020-09-01 | End: 2020-09-08

## 2020-09-08 RX ORDER — CIPROFLOXACIN HYDROCHLORIDE 500 MG/1
500 TABLET, FILM COATED ORAL
Qty: 20 | Refills: 0 | Status: DISCONTINUED | COMMUNITY
Start: 2020-08-27 | End: 2020-09-08

## 2020-09-08 RX ADMIN — GENTAMICIN SULFATE 2 MG/ML: 40 INJECTION, SOLUTION INTRAMUSCULAR; INTRAVENOUS at 00:00

## 2020-09-09 ENCOUNTER — MED ADMIN CHARGE (OUTPATIENT)
Age: 70
End: 2020-09-09

## 2020-09-09 RX ORDER — GENTAMICIN SULFATE 40 MG/ML
40 INJECTION, SOLUTION INTRAMUSCULAR; INTRAVENOUS
Qty: 1 | Refills: 0 | Status: COMPLETED | OUTPATIENT
Start: 2020-09-09

## 2020-09-09 RX ORDER — GENTAMICIN SULFATE 40 MG/ML
40 INJECTION, SOLUTION INTRAMUSCULAR; INTRAVENOUS
Qty: 1 | Refills: 0 | Status: COMPLETED | OUTPATIENT
Start: 2020-09-08

## 2020-09-13 ENCOUNTER — EMERGENCY (EMERGENCY)
Facility: HOSPITAL | Age: 70
LOS: 0 days | Discharge: HOME | End: 2020-09-13
Attending: EMERGENCY MEDICINE
Payer: MEDICARE

## 2020-09-13 VITALS
TEMPERATURE: 98 F | DIASTOLIC BLOOD PRESSURE: 86 MMHG | SYSTOLIC BLOOD PRESSURE: 166 MMHG | RESPIRATION RATE: 18 BRPM | OXYGEN SATURATION: 100 % | HEIGHT: 64 IN | HEART RATE: 86 BPM | WEIGHT: 130.07 LBS

## 2020-09-13 VITALS
SYSTOLIC BLOOD PRESSURE: 189 MMHG | HEART RATE: 95 BPM | TEMPERATURE: 99 F | DIASTOLIC BLOOD PRESSURE: 88 MMHG | OXYGEN SATURATION: 99 % | RESPIRATION RATE: 15 BRPM

## 2020-09-13 DIAGNOSIS — I10 ESSENTIAL (PRIMARY) HYPERTENSION: ICD-10-CM

## 2020-09-13 DIAGNOSIS — J45.909 UNSPECIFIED ASTHMA, UNCOMPLICATED: ICD-10-CM

## 2020-09-13 DIAGNOSIS — M10.9 GOUT, UNSPECIFIED: ICD-10-CM

## 2020-09-13 DIAGNOSIS — Z96.0 PRESENCE OF UROGENITAL IMPLANTS: ICD-10-CM

## 2020-09-13 DIAGNOSIS — M19.012 PRIMARY OSTEOARTHRITIS, LEFT SHOULDER: ICD-10-CM

## 2020-09-13 DIAGNOSIS — M47.9 SPONDYLOSIS, UNSPECIFIED: ICD-10-CM

## 2020-09-13 DIAGNOSIS — N20.0 CALCULUS OF KIDNEY: Chronic | ICD-10-CM

## 2020-09-13 DIAGNOSIS — I69.351 HEMIPLEGIA AND HEMIPARESIS FOLLOWING CEREBRAL INFARCTION AFFECTING RIGHT DOMINANT SIDE: ICD-10-CM

## 2020-09-13 DIAGNOSIS — F03.90 UNSPECIFIED DEMENTIA WITHOUT BEHAVIORAL DISTURBANCE: ICD-10-CM

## 2020-09-13 DIAGNOSIS — G40.909 EPILEPSY, UNSPECIFIED, NOT INTRACTABLE, WITHOUT STATUS EPILEPTICUS: ICD-10-CM

## 2020-09-13 DIAGNOSIS — Z87.442 PERSONAL HISTORY OF URINARY CALCULI: ICD-10-CM

## 2020-09-13 DIAGNOSIS — T83.9XXA UNSPECIFIED COMPLICATION OF GENITOURINARY PROSTHETIC DEVICE, IMPLANT AND GRAFT, INITIAL ENCOUNTER: ICD-10-CM

## 2020-09-13 DIAGNOSIS — E11.9 TYPE 2 DIABETES MELLITUS WITHOUT COMPLICATIONS: ICD-10-CM
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
## 2020-09-13 DIAGNOSIS — M19.011 PRIMARY OSTEOARTHRITIS, RIGHT SHOULDER: ICD-10-CM

## 2020-09-13 DIAGNOSIS — Z79.82 LONG TERM (CURRENT) USE OF ASPIRIN: ICD-10-CM

## 2020-09-13 DIAGNOSIS — H53.9 UNSPECIFIED VISUAL DISTURBANCE: ICD-10-CM

## 2020-09-13 DIAGNOSIS — R32 UNSPECIFIED URINARY INCONTINENCE: ICD-10-CM

## 2020-09-13 PROCEDURE — 99283 EMERGENCY DEPT VISIT LOW MDM: CPT

## 2020-09-13 NOTE — ED PROVIDER NOTE - NSFOLLOWUPINSTRUCTIONS_ED_ALL_ED_FT
KEEP CATHETER IN PLACE  FOLLOW UP WITH VISITING NURSE AND PMD  RETURN TO ER FOR NEW OR WORSENING SYMPTOMS

## 2020-09-13 NOTE — ED PROVIDER NOTE - OBJECTIVE STATEMENT
68 yo female  with  pmhx dementia (non verbal), CVA (on plavix), seizure disorder (on keppra), htn, DM, recently d/c from this hospital after admission for nephrolithiaisis, had lithotripsy and left ureteral stent placement on 8/24. Patient with indwelling milner that was out of place this am. Visiting nurse came to house but was unable to pass milner.   hx from patients sister who is at bedside  patient non verbal  as per sister, no fevers or vomiting. no change in behavior

## 2020-09-13 NOTE — ED PROVIDER NOTE - PATIENT PORTAL LINK FT
You can access the FollowMyHealth Patient Portal offered by [facility] by registering at the following website: [email or web address]. By joining [employer]’s FollowMyHealth portal, you will also be able to view your health information using other applications (apps) compatible with our system.

## 2020-09-13 NOTE — ED ADULT TRIAGE NOTE - CHIEF COMPLAINT QUOTE
as per emt, pt from home. milner dislodged, pt unable to urinate. nonverbal at baseline. hx: dementia

## 2020-10-09 ENCOUNTER — APPOINTMENT (OUTPATIENT)
Dept: UROLOGY | Facility: CLINIC | Age: 70
End: 2020-10-09
Payer: MEDICARE

## 2020-10-09 VITALS — TEMPERATURE: 97.3 F | HEART RATE: 62 BPM | DIASTOLIC BLOOD PRESSURE: 77 MMHG | SYSTOLIC BLOOD PRESSURE: 164 MMHG

## 2020-10-09 DIAGNOSIS — B49 UNSPECIFIED MYCOSIS: ICD-10-CM

## 2020-10-09 DIAGNOSIS — Z87.898 PERSONAL HISTORY OF OTHER SPECIFIED CONDITIONS: ICD-10-CM

## 2020-10-09 DIAGNOSIS — Z86.73 PERSONAL HISTORY OF TRANSIENT ISCHEMIC ATTACK (TIA), AND CEREBRAL INFARCTION W/OUT RESIDUAL DEFICITS: ICD-10-CM

## 2020-10-09 DIAGNOSIS — Z86.39 PERSONAL HISTORY OF OTHER ENDOCRINE, NUTRITIONAL AND METABOLIC DISEASE: ICD-10-CM

## 2020-10-09 PROCEDURE — 99213 OFFICE O/P EST LOW 20 MIN: CPT | Mod: 25

## 2020-10-09 PROCEDURE — 76775 US EXAM ABDO BACK WALL LIM: CPT

## 2020-10-09 PROCEDURE — 51700 IRRIGATION OF BLADDER: CPT

## 2020-10-09 RX ORDER — NYSTATIN AND TRIAMCINOLONE ACETONIDE 100000; 1 MG/G; MG/G
100000-0.1 CREAM TOPICAL 3 TIMES DAILY
Qty: 1 | Refills: 3 | Status: ACTIVE | COMMUNITY
Start: 2020-10-09 | End: 1900-01-01

## 2020-10-09 RX ORDER — CEFPODOXIME PROXETIL 200 MG/1
200 TABLET, FILM COATED ORAL
Qty: 4 | Refills: 0 | Status: DISCONTINUED | COMMUNITY
Start: 2020-08-10 | End: 2020-10-09

## 2020-10-09 RX ORDER — POTASSIUM CITRATE AND CITRIC ACID 3.3; 1.002 G/1; G/1
3300-1002 GRANULE, FOR SOLUTION ORAL
Qty: 360 | Refills: 3 | Status: ACTIVE | COMMUNITY
Start: 2020-10-09 | End: 1900-01-01

## 2020-10-09 NOTE — PHYSICAL EXAM
[General Appearance - Well Developed] : well developed [General Appearance - Well Nourished] : well nourished [Normal Appearance] : normal appearance [Well Groomed] : well groomed [General Appearance - In No Acute Distress] : no acute distress [Abdomen Soft] : soft [Abdomen Tenderness] : non-tender [Costovertebral Angle Tenderness] : no ~M costovertebral angle tenderness [Macular Rash] : A macular rash was noted [Lesions Buttocks Left] : on the left buttock

## 2020-10-09 NOTE — LETTER BODY
[Dear  ___] : Dear  [unfilled], [Courtesy Letter:] : I had the pleasure of seeing your patient, [unfilled], in my office today. [Please see my note below.] : Please see my note below. [Sincerely,] : Sincerely, [FreeTextEntry1] : stone is fragmented\par K citrate started\par failed trial of void-to obtain uds [FreeTextEntry3] : Todd Caicedo MD FACS\par \par Attending Urologist-[facility]\par Director - Strategic Operations Urology\par Assistant Clinical Professor-[facility] [facility]\par \par

## 2020-10-09 NOTE — HISTORY OF PRESENT ILLNESS
[FreeTextEntry1] : 69 year old female is s/p stent removal 9/8- pt is doing well.\par Had large renal stone fragmented 8/24-all uric acid\par \par Pt is here today for renal US.\par \par  Will also  have TOV today as per families request.

## 2020-10-09 NOTE — ASSESSMENT
[FreeTextEntry1] : 69 year old female here for renal US, TOV\par \par Renal US shows 2.6 x 2.8 cm cyst in lower pole of the right kidney,  sand/debris in the upper and lower pole of the left\par kidney.\par \par \par TOV failed, catheter was reinserted .\par Had a discussion with the patient and her family about further studies including UDS.\par \par Start Nystatin for macular rash.on buttock\par \par polycitra crystals to alkalinize urine\par 24 hour urine\par Will have 24 hour urine\par FU for UDS\par

## 2020-10-10 LAB
APPEARANCE: ABNORMAL
BACTERIA: ABNORMAL
BILIRUBIN URINE: NEGATIVE
BLOOD URINE: NEGATIVE
COLOR: COLORLESS
GLUCOSE QUALITATIVE U: NEGATIVE
HYALINE CASTS: 0 /LPF
KETONES URINE: NEGATIVE
LEUKOCYTE ESTERASE URINE: ABNORMAL
MICROSCOPIC-UA: NORMAL
NITRITE URINE: NEGATIVE
PH URINE: 7
PROTEIN URINE: NEGATIVE
RED BLOOD CELLS URINE: 2 /HPF
SPECIFIC GRAVITY URINE: 1
SQUAMOUS EPITHELIAL CELLS: 1 /HPF
UROBILINOGEN URINE: NORMAL
WHITE BLOOD CELLS URINE: 15 /HPF

## 2020-10-13 LAB — BACTERIA UR CULT: NORMAL

## 2020-10-14 NOTE — PRE-OP CHECKLIST - TEMPERATURE IN CELSIUS (DEGREES C)
Results discussed with the patient at the time of visit.     Alejandro Menezes MD   10/14/2020  10:56 AM         37.2

## 2020-10-27 ENCOUNTER — APPOINTMENT (OUTPATIENT)
Dept: UROLOGY | Facility: CLINIC | Age: 70
End: 2020-10-27

## 2020-10-29 ENCOUNTER — NON-APPOINTMENT (OUTPATIENT)
Age: 70
End: 2020-10-29

## 2020-10-30 ENCOUNTER — NON-APPOINTMENT (OUTPATIENT)
Age: 70
End: 2020-10-30

## 2020-10-30 RX ORDER — ASPIRIN 81 MG/1
81 TABLET, COATED ORAL
Qty: 90 | Refills: 0 | Status: ACTIVE | COMMUNITY
Start: 2020-09-22

## 2020-10-30 RX ORDER — HYDRALAZINE HYDROCHLORIDE 25 MG/1
25 TABLET ORAL
Qty: 540 | Refills: 0 | Status: ACTIVE | COMMUNITY
Start: 2020-09-28

## 2020-10-30 RX ORDER — POTASSIUM CITRATE AND CITRIC ACID 334; 1100 MG/5ML; MG/5ML
1100-334 LIQUID ORAL 4 TIMES DAILY
Qty: 12 | Refills: 0 | Status: ACTIVE | COMMUNITY
Start: 2020-10-30 | End: 1900-01-01

## 2020-10-30 RX ORDER — LEVETIRACETAM 500 MG/1
500 TABLET, FILM COATED ORAL
Qty: 180 | Refills: 0 | Status: ACTIVE | COMMUNITY
Start: 2020-09-22

## 2020-11-01 ENCOUNTER — INPATIENT (INPATIENT)
Facility: HOSPITAL | Age: 70
LOS: 12 days | Discharge: HOME | End: 2020-11-14
Attending: HOSPITALIST | Admitting: HOSPITALIST
Payer: MEDICARE

## 2020-11-01 VITALS
OXYGEN SATURATION: 94 % | WEIGHT: 160.06 LBS | DIASTOLIC BLOOD PRESSURE: 69 MMHG | HEIGHT: 64 IN | TEMPERATURE: 101 F | RESPIRATION RATE: 20 BRPM | SYSTOLIC BLOOD PRESSURE: 160 MMHG | HEART RATE: 89 BPM

## 2020-11-01 DIAGNOSIS — N20.0 CALCULUS OF KIDNEY: Chronic | ICD-10-CM

## 2020-11-01 LAB
ALBUMIN SERPL ELPH-MCNC: 2.6 G/DL — LOW (ref 3.3–5)
ALP SERPL-CCNC: 67 U/L — SIGNIFICANT CHANGE UP (ref 40–120)
ALT FLD-CCNC: 20 U/L — SIGNIFICANT CHANGE UP (ref 12–78)
ANION GAP SERPL CALC-SCNC: 6 MMOL/L — SIGNIFICANT CHANGE UP (ref 5–17)
APPEARANCE UR: ABNORMAL
APTT BLD: 26.4 SEC — LOW (ref 27.5–35.5)
AST SERPL-CCNC: 16 U/L — SIGNIFICANT CHANGE UP (ref 15–37)
BACTERIA # UR AUTO: ABNORMAL
BASOPHILS # BLD AUTO: 0 K/UL — SIGNIFICANT CHANGE UP (ref 0–0.2)
BASOPHILS NFR BLD AUTO: 0 % — SIGNIFICANT CHANGE UP (ref 0–2)
BILIRUB SERPL-MCNC: 0.4 MG/DL — SIGNIFICANT CHANGE UP (ref 0.2–1.2)
BILIRUB UR-MCNC: NEGATIVE — SIGNIFICANT CHANGE UP
BUN SERPL-MCNC: 31 MG/DL — HIGH (ref 7–23)
CALCIUM SERPL-MCNC: 8.8 MG/DL — SIGNIFICANT CHANGE UP (ref 8.5–10.1)
CHLORIDE SERPL-SCNC: 114 MMOL/L — HIGH (ref 96–108)
CO2 SERPL-SCNC: 26 MMOL/L — SIGNIFICANT CHANGE UP (ref 22–31)
COLOR SPEC: YELLOW — SIGNIFICANT CHANGE UP
CREAT SERPL-MCNC: 1.39 MG/DL — HIGH (ref 0.5–1.3)
DIFF PNL FLD: ABNORMAL
EOSINOPHIL # BLD AUTO: 0 K/UL — SIGNIFICANT CHANGE UP (ref 0–0.5)
EOSINOPHIL NFR BLD AUTO: 0 % — SIGNIFICANT CHANGE UP (ref 0–6)
EPI CELLS # UR: SIGNIFICANT CHANGE UP
GLUCOSE BLDC GLUCOMTR-MCNC: 251 MG/DL — HIGH (ref 70–99)
GLUCOSE SERPL-MCNC: 303 MG/DL — HIGH (ref 70–99)
GLUCOSE UR QL: 1000 MG/DL
HCT VFR BLD CALC: 33 % — LOW (ref 34.5–45)
HGB BLD-MCNC: 10.8 G/DL — LOW (ref 11.5–15.5)
INR BLD: 1.16 RATIO — SIGNIFICANT CHANGE UP (ref 0.88–1.16)
KETONES UR-MCNC: NEGATIVE — SIGNIFICANT CHANGE UP
LACTATE SERPL-SCNC: 1.1 MMOL/L — SIGNIFICANT CHANGE UP (ref 0.7–2)
LEUKOCYTE ESTERASE UR-ACNC: ABNORMAL
LYMPHOCYTES # BLD AUTO: 1.93 K/UL — SIGNIFICANT CHANGE UP (ref 1–3.3)
LYMPHOCYTES # BLD AUTO: 19 % — SIGNIFICANT CHANGE UP (ref 13–44)
MANUAL SMEAR VERIFICATION: SIGNIFICANT CHANGE UP
MCHC RBC-ENTMCNC: 28.6 PG — SIGNIFICANT CHANGE UP (ref 27–34)
MCHC RBC-ENTMCNC: 32.7 GM/DL — SIGNIFICANT CHANGE UP (ref 32–36)
MCV RBC AUTO: 87.5 FL — SIGNIFICANT CHANGE UP (ref 80–100)
MONOCYTES # BLD AUTO: 0.71 K/UL — SIGNIFICANT CHANGE UP (ref 0–0.9)
MONOCYTES NFR BLD AUTO: 7 % — SIGNIFICANT CHANGE UP (ref 2–14)
NEUTROPHILS # BLD AUTO: 7.5 K/UL — HIGH (ref 1.8–7.4)
NEUTROPHILS NFR BLD AUTO: 74 % — SIGNIFICANT CHANGE UP (ref 43–77)
NITRITE UR-MCNC: POSITIVE
NRBC # BLD: 0 /100 — SIGNIFICANT CHANGE UP (ref 0–0)
NRBC # BLD: SIGNIFICANT CHANGE UP /100 WBCS (ref 0–0)
PH UR: 5 — SIGNIFICANT CHANGE UP (ref 5–8)
PLAT MORPH BLD: NORMAL — SIGNIFICANT CHANGE UP
PLATELET # BLD AUTO: 175 K/UL — SIGNIFICANT CHANGE UP (ref 150–400)
POTASSIUM SERPL-MCNC: 3.6 MMOL/L — SIGNIFICANT CHANGE UP (ref 3.5–5.3)
POTASSIUM SERPL-SCNC: 3.6 MMOL/L — SIGNIFICANT CHANGE UP (ref 3.5–5.3)
PROT SERPL-MCNC: 7.3 GM/DL — SIGNIFICANT CHANGE UP (ref 6–8.3)
PROT UR-MCNC: 500 MG/DL
PROTHROM AB SERPL-ACNC: 13.4 SEC — SIGNIFICANT CHANGE UP (ref 10.6–13.6)
RAPID RVP RESULT: SIGNIFICANT CHANGE UP
RBC # BLD: 3.77 M/UL — LOW (ref 3.8–5.2)
RBC # FLD: 13.1 % — SIGNIFICANT CHANGE UP (ref 10.3–14.5)
RBC BLD AUTO: NORMAL — SIGNIFICANT CHANGE UP
RBC CASTS # UR COMP ASSIST: SIGNIFICANT CHANGE UP /HPF (ref 0–4)
SARS-COV-2 RNA SPEC QL NAA+PROBE: SIGNIFICANT CHANGE UP
SODIUM SERPL-SCNC: 146 MMOL/L — HIGH (ref 135–145)
SP GR SPEC: 1.02 — SIGNIFICANT CHANGE UP (ref 1.01–1.02)
UROBILINOGEN FLD QL: NEGATIVE MG/DL — SIGNIFICANT CHANGE UP
WBC # BLD: 10.14 K/UL — SIGNIFICANT CHANGE UP (ref 3.8–10.5)
WBC # FLD AUTO: 10.14 K/UL — SIGNIFICANT CHANGE UP (ref 3.8–10.5)
WBC UR QL: SIGNIFICANT CHANGE UP

## 2020-11-01 PROCEDURE — 93010 ELECTROCARDIOGRAM REPORT: CPT

## 2020-11-01 PROCEDURE — 99223 1ST HOSP IP/OBS HIGH 75: CPT

## 2020-11-01 PROCEDURE — 70450 CT HEAD/BRAIN W/O DYE: CPT | Mod: 26,MH

## 2020-11-01 PROCEDURE — 74176 CT ABD & PELVIS W/O CONTRAST: CPT | Mod: 26,MH

## 2020-11-01 PROCEDURE — 71045 X-RAY EXAM CHEST 1 VIEW: CPT | Mod: 26

## 2020-11-01 PROCEDURE — 99285 EMERGENCY DEPT VISIT HI MDM: CPT

## 2020-11-01 RX ORDER — CEFTRIAXONE 500 MG/1
1000 INJECTION, POWDER, FOR SOLUTION INTRAMUSCULAR; INTRAVENOUS ONCE
Refills: 0 | Status: COMPLETED | OUTPATIENT
Start: 2020-11-01 | End: 2020-11-01

## 2020-11-01 RX ORDER — SODIUM CHLORIDE 9 MG/ML
2000 INJECTION INTRAMUSCULAR; INTRAVENOUS; SUBCUTANEOUS ONCE
Refills: 0 | Status: COMPLETED | OUTPATIENT
Start: 2020-11-01 | End: 2020-11-01

## 2020-11-01 RX ORDER — ONDANSETRON 8 MG/1
4 TABLET, FILM COATED ORAL EVERY 6 HOURS
Refills: 0 | Status: DISCONTINUED | OUTPATIENT
Start: 2020-11-01 | End: 2020-11-09

## 2020-11-01 RX ORDER — ACETAMINOPHEN 500 MG
650 TABLET ORAL EVERY 6 HOURS
Refills: 0 | Status: DISCONTINUED | OUTPATIENT
Start: 2020-11-01 | End: 2020-11-09

## 2020-11-01 RX ORDER — SODIUM CHLORIDE 9 MG/ML
1000 INJECTION INTRAMUSCULAR; INTRAVENOUS; SUBCUTANEOUS
Refills: 0 | Status: DISCONTINUED | OUTPATIENT
Start: 2020-11-01 | End: 2020-11-02

## 2020-11-01 RX ORDER — TRAZODONE HCL 50 MG
50 TABLET ORAL
Qty: 0 | Refills: 0 | DISCHARGE

## 2020-11-01 RX ORDER — ACETAMINOPHEN 500 MG
975 TABLET ORAL ONCE
Refills: 0 | Status: COMPLETED | OUTPATIENT
Start: 2020-11-01 | End: 2020-11-01

## 2020-11-01 RX ADMIN — CEFTRIAXONE 100 MILLIGRAM(S): 500 INJECTION, POWDER, FOR SOLUTION INTRAMUSCULAR; INTRAVENOUS at 21:24

## 2020-11-01 RX ADMIN — SODIUM CHLORIDE 2000 MILLILITER(S): 9 INJECTION INTRAMUSCULAR; INTRAVENOUS; SUBCUTANEOUS at 17:00

## 2020-11-01 RX ADMIN — Medication 975 MILLIGRAM(S): at 21:24

## 2020-11-01 NOTE — H&P ADULT - ASSESSMENT
68 y/o female with PMHx of dementia (minimally verbal per daughter occasional gives 1 word answers of yes/no), CVA, Seizure disorder, HTN, DM type 2 (off meds per daughter), admission 2 months ago for renal colic s/p lithotripsy in 8/24 w/ indwelling milner presents to the ED due to hematuria.     IMPROVE VTE Individual Risk Assessment    RISK                                                                Points    [  ] Previous VTE                                                  3    [  ] Thrombophilia                                               2    [  ] Lower limb paralysis                                      2        (unable to hold up >15 seconds)      [  ] Current Cancer                                              2         (within 6 months)    [  ] Immobilization > 24 hrs                                1    [  ] ICU/CCU stay > 24 hours                              1    [  ] Age > 60                                                      1    IMPROVE VTE Score ____2_____    IMPROVE Score 0-1: Low Risk, No VTE prophylaxis required for most patients, encourage ambulation.   IMPROVE Score 2-3: At risk, pharmacologic VTE prophylaxis is indicated for most patients (in the absence of a contraindication)  IMPROVE Score > or = 4: High Risk, pharmacologic VTE prophylaxis is indicated for most patients (in the absence of a contraindication)

## 2020-11-01 NOTE — ED PROVIDER NOTE - OBJECTIVE STATEMENT
68 yo female with pmh dementia (non verbal), CVA (on plavix), seizure disorder (on keppra), htn, DM, + milner, recent admission 2 months ago for lithotripsy/renal colic and stent placement 8/24.     + fever 70 yo female with pmh dementia (non verbal), CVA (on plavix), seizure disorder (on keppra), htn, DM, + milner, recent admission 2 months ago for lithotripsy/renal colic and stent placement 8/24. family called as noted hematuria today and pt a little out of it, not as responsive as she normally is. currently pt improved. pt febrile in ER.

## 2020-11-01 NOTE — ED PROVIDER NOTE - CARE PLAN
Principal Discharge DX:	Fever  Secondary Diagnosis:	Renal colic  Secondary Diagnosis:	UTI (urinary tract infection)

## 2020-11-01 NOTE — ED PROVIDER NOTE - PHYSICAL EXAMINATION
Gen: Alert, nonverbal  Head: NC, AT, PERRL, normal lids/conjunctiva   ENT:  patent oropharynx without erythema/exudate, uvula midline  Neck: supple, no tenderness/meningismus  Pulm: Bilateral clear BS, normal resp effort  CV: RRR, no M/R/G, +dist pulses   Abd: soft, NT/ND, +BS, no guarding/rebound tenderness  Mskel: no edema/erythema/cyanosis   Skin: no rash, no bruising  Neuro: Awake, no sensory/motor deficits, CN 2-12 intact

## 2020-11-01 NOTE — ED PROVIDER NOTE - ADMIT DISPOSITION PRESENT ON ADMISSION SEPSIS
Date & Time: 2/27/2019, 10:27 PM  Patient: Gabe Molina  Encounter Provider(s):    Pedrito Ly MD       To Whom It May Concern:    Gabe Molina was seen and treated in our department on 2/27/2019. She should not return to work until 02/29/19.     I
No

## 2020-11-01 NOTE — H&P ADULT - NSHPLABSRESULTS_GEN_ALL_CORE
T(C): 37.2 (20 @ 23:18), Max: 38.5 (20 @ 19:12)  HR: 73 (20 @ 23:18) (73 - 89)  BP: 158/62 (20 @ 23:18) (158/62 - 160/69)  RR: 17 (20 @ 23:18) (17 - 20)  SpO2: 99% (20 @ 23:18) (94% - 99%)                        10.8   10.14 )-----------( 175      ( 2020 19:55 )             33.0         146<H>  |  114<H>  |  31<H>  ----------------------------<  303<H>  3.6   |  26  |  1.39<H>    Ca    8.8      2020 19:55    TPro  7.3  /  Alb  2.6<L>  /  TBili  0.4  /  DBili  x   /  AST  16  /  ALT  20  /  AlkPhos  67  11-    LIVER FUNCTIONS - ( 2020 19:55 )  Alb: 2.6 g/dL / Pro: 7.3 gm/dL / ALK PHOS: 67 U/L / ALT: 20 U/L / AST: 16 U/L / GGT: x           PT/INR - ( 2020 19:55 )   PT: 13.4 sec;   INR: 1.16 ratio         PTT - ( 2020 19:55 )  PTT:26.4 sec  Urinalysis Basic - ( 2020 21:30 )    Color: Yellow / Appearance: Slightly Turbid / S.020 / pH: x  Gluc: x / Ketone: Negative  / Bili: Negative / Urobili: Negative mg/dL   Blood: x / Protein: 500 mg/dL / Nitrite: Positive   Leuk Esterase: Small / RBC: 0-2 /HPF / WBC 0-2   Sq Epi: x / Non Sq Epi: Occasional / Bacteria: Moderate    < from: CT Abdomen and Pelvis No Cont (20 @ 20:20) >    IMPRESSION:    Likely cystitis. Correlate with urinalysis.  Numerous stones in the urinary bladder likely related to interval lithotripsy.  There appears to be a 6 mm stone in the right ureterovesical junction without hydronephrosis. Numerous small stones in the left renal pelvis likely related to recent lithotripsy. Please note that without IV contrast, pyelonephritis, which is a clinical diagnosis, cannot be assessed.    Tortuous sigmoid colon with a large stool burden.  Cardiomegaly.    < end of copied text >      acetaminophen   Tablet .. 650 milliGRAM(s) Oral every 6 hours PRN  amLODIPine   Tablet 10 milliGRAM(s) Oral daily  cloNIDine 0.1 milliGRAM(s) Oral two times a day  dextrose 40% Gel 15 Gram(s) Oral once PRN  dextrose 5%. 1000 milliLiter(s) IV Continuous <Continuous>  dextrose 50% Injectable 12.5 Gram(s) IV Push once  dextrose 50% Injectable 25 Gram(s) IV Push once  dextrose 50% Injectable 25 Gram(s) IV Push once  glucagon  Injectable 1 milliGRAM(s) IntraMuscular once PRN  hydrALAZINE 25 milliGRAM(s) Oral three times a day  insulin lispro (ADMELOG) corrective regimen sliding scale   SubCutaneous every 6 hours  levETIRAcetam 750 milliGRAM(s) Oral two times a day  metoprolol tartrate 50 milliGRAM(s) Oral two times a day  ondansetron Injectable 4 milliGRAM(s) IV Push every 6 hours PRN  simvastatin 20 milliGRAM(s) Oral at bedtime  sodium chloride 0.9%. 1000 milliLiter(s) IV Continuous <Continuous> T(C): 37.2 (20 @ 23:18), Max: 38.5 (20 @ 19:12)  HR: 73 (20 @ 23:18) (73 - 89)  BP: 158/62 (20 @ 23:18) (158/62 - 160/69)  RR: 17 (20 @ 23:18) (17 - 20)  SpO2: 99% (20 @ 23:18) (94% - 99%)                        10.8   10.14 )-----------( 175      ( 2020 19:55 )             33.0         146<H>  |  114<H>  |  31<H>  ----------------------------<  303<H>  3.6   |  26  |  1.39<H>    Ca    8.8      2020 19:55    TPro  7.3  /  Alb  2.6<L>  /  TBili  0.4  /  DBili  x   /  AST  16  /  ALT  20  /  AlkPhos  67  11-    LIVER FUNCTIONS - ( 2020 19:55 )  Alb: 2.6 g/dL / Pro: 7.3 gm/dL / ALK PHOS: 67 U/L / ALT: 20 U/L / AST: 16 U/L / GGT: x           PT/INR - ( 2020 19:55 )   PT: 13.4 sec;   INR: 1.16 ratio         PTT - ( 2020 19:55 )  PTT:26.4 sec  Urinalysis Basic - ( 2020 21:30 )    Color: Yellow / Appearance: Slightly Turbid / S.020 / pH: x  Gluc: x / Ketone: Negative  / Bili: Negative / Urobili: Negative mg/dL   Blood: x / Protein: 500 mg/dL / Nitrite: Positive   Leuk Esterase: Small / RBC: 0-2 /HPF / WBC 0-2   Sq Epi: x / Non Sq Epi: Occasional / Bacteria: Moderate    < from: CT Abdomen and Pelvis No Cont (20 @ 20:20) >    IMPRESSION:    Likely cystitis. Correlate with urinalysis.  Numerous stones in the urinary bladder likely related to interval lithotripsy.  There appears to be a 6 mm stone in the right ureterovesical junction without hydronephrosis. Numerous small stones in the left renal pelvis likely related to recent lithotripsy. Please note that without IV contrast, pyelonephritis, which is a clinical diagnosis, cannot be assessed.    Tortuous sigmoid colon with a large stool burden.  Cardiomegaly.    < end of copied text >    EKG - NSR at 73 bpm, T wave Inv in inferio-lateral leads; (Inv in V4 - V6 seen in prior EKGs however in II, III, aVF not seen)      acetaminophen   Tablet .. 650 milliGRAM(s) Oral every 6 hours PRN  amLODIPine   Tablet 10 milliGRAM(s) Oral daily  cloNIDine 0.1 milliGRAM(s) Oral two times a day  dextrose 40% Gel 15 Gram(s) Oral once PRN  dextrose 5%. 1000 milliLiter(s) IV Continuous <Continuous>  dextrose 50% Injectable 12.5 Gram(s) IV Push once  dextrose 50% Injectable 25 Gram(s) IV Push once  dextrose 50% Injectable 25 Gram(s) IV Push once  glucagon  Injectable 1 milliGRAM(s) IntraMuscular once PRN  hydrALAZINE 25 milliGRAM(s) Oral three times a day  insulin lispro (ADMELOG) corrective regimen sliding scale   SubCutaneous every 6 hours  levETIRAcetam 750 milliGRAM(s) Oral two times a day  metoprolol tartrate 50 milliGRAM(s) Oral two times a day  ondansetron Injectable 4 milliGRAM(s) IV Push every 6 hours PRN  simvastatin 20 milliGRAM(s) Oral at bedtime  sodium chloride 0.9%. 1000 milliLiter(s) IV Continuous <Continuous>

## 2020-11-01 NOTE — H&P ADULT - HISTORY OF PRESENT ILLNESS
70 y/o female with PMHx of dementia (minimally verbal per daughter occasional gives 1 word answers of yes/no), CVA, Seizure disorder, HTN, DM type 2 (off meds per daughter), admission 2 months ago for lithotripsy/renal colic and stent placement 8/24 w/ indwelling milner presents to the ED due to hematuria. Per daughter at bedside, pt was supposed to go to the Urologist's office for milner removal and TOV on Thursday however unable to arrange a ride. Daughter reports the next day she noticed urine was leaking around milner thus a visiting nurse came to change "part of the tube" and the bag and leakage stopped. Of note in the last few days daughter reports pt has been more sluggish, and sleepy, less alert/responsive. Today she noted hematuria thus brought to the ED. No noted fever or chills.     In ED initial vitals /69, T max 101.3. For sig labs see below. CT      70 y/o female with PMHx of dementia (minimally verbal per daughter occasional gives 1 word answers of yes/no), CVA, Seizure disorder, HTN, DM type 2 (off meds per daughter), admission 2 months ago for renal colic s/p lithotripsy in 8/24 w/ indwelling milner presents to the ED due to hematuria. Per daughter at bedside, pt was supposed to go to the Urologist's office for milner removal and TOV on Thursday however unable to arrange a ride. Daughter reports the next day she noticed urine was leaking around milner thus a visiting nurse came to change "part of the tube" and the bag and leakage stopped. Of note in the last few days daughter reports pt has been more sluggish, and sleepy, less alert/responsive. Today she noted hematuria thus brought to the ED. No noted fever or chills.     In ED initial vitals /69, T max 101.3. For sig labs see below. CT Likely cystitis. Correlate with urinalysis.  Numerous stones in the urinary bladder likely related to interval lithotripsy.  There appears to be a 6 mm stone in the right ureterovesical junction without hydronephrosis. Pt given Rocephin.

## 2020-11-01 NOTE — H&P ADULT - NSHPPHYSICALEXAM_GEN_ALL_CORE
PHYSICAL EXAM:    Vital Signs Last 24 Hrs  T(C): 37.2 (01 Nov 2020 23:18), Max: 38.5 (01 Nov 2020 19:12)  T(F): 98.9 (01 Nov 2020 23:18), Max: 101.3 (01 Nov 2020 19:12)  HR: 73 (01 Nov 2020 23:18) (73 - 89)  BP: 158/62 (01 Nov 2020 23:18) (158/62 - 160/69)  BP(mean): --  RR: 17 (01 Nov 2020 23:18) (17 - 20)  SpO2: 99% (01 Nov 2020 23:18) (94% - 99%)    GENERAL: Pt lying in bed comfortably in NAD  HEENT:  Atraumatic, EOMI, PERRL, conjunctiva and sclera clear, MMM  NECK: Supple, No JVD  CHEST/LUNG: Clear to auscultation bilaterally; No rales, rhonchi, wheezing or rubs. Unlabored respirations  HEART: Regular rate and rhythm; No murmurs, rubs, or gallops  ABDOMEN: Bowel sounds present; Soft, Nontender, Nondistended. No guarding or rigidity    EXTREMITIES:  2+ Peripheral Pulses, brisk capillary refill. No clubbing, cyanosis, or edema  NEUROLOGICAL:  Alert & Oriented X3, speech clear. Answers questions appropriately. Full and equal strength B/L upper and lower extremities. No deficits   MSK: FROM x 4 extremities   SKIN: No rashes or lesions PHYSICAL EXAM:    Vital Signs Last 24 Hrs  T(C): 37.2 (01 Nov 2020 23:18), Max: 38.5 (01 Nov 2020 19:12)  T(F): 98.9 (01 Nov 2020 23:18), Max: 101.3 (01 Nov 2020 19:12)  HR: 73 (01 Nov 2020 23:18) (73 - 89)  BP: 158/62 (01 Nov 2020 23:18) (158/62 - 160/69)  BP(mean): --  RR: 17 (01 Nov 2020 23:18) (17 - 20)  SpO2: 99% (01 Nov 2020 23:18) (94% - 99%)    GENERAL: Pt lying in bed comfortably in NAD  HEENT:  Atraumatic, EOMI, PERRL, conjunctiva and sclera clear, MMM  NECK: Supple, No JVD  CHEST/LUNG: Clear to auscultation bilaterally, Unlabored respirations  HEART: Regular rate and rhythm, nl S1, S2  ABDOMEN: Bowel sounds present; Soft, Nontender, Nondistended  EXTREMITIES:  2+ Peripheral Pulses. No edema  NEUROLOGICAL:  Alert, unable to assess orientation, pt nonverbal  SKIN: No rashes or lesions

## 2020-11-01 NOTE — ED ADULT NURSE NOTE - OBJECTIVE STATEMENT
A&O, nonverbal. pt admitted to ED for AMS. pt's daughter stated x7 days/tuesday, pt not talking or ambulating well. pt hx of stroke, pt not at baseline as per daughter. pt daughter denies falls/injuries. pt daughter denies sob/cp/headache/dizziness/N/V prior to tuesday.

## 2020-11-01 NOTE — H&P ADULT - REASON FOR ADMISSION
UTI, Obstruvtive uropathy secondary to calculi Bacteruria, Obstructive uropathy secondary to calculi UTI, Obstructive uropathy secondary to calculi

## 2020-11-01 NOTE — H&P ADULT - PROBLEM SELECTOR PLAN 2
- 6mm stone at the UVJ  - f/u Urology consult; Dr Hernandez to see in am  - NPO for possible stent placement  - CXR stable, EKG w/ T wave Inv in II, III, aVF not seen in prior EKGs, unknown if pt had/has any cp given Dementia  - Echo done on last admx stable  - would obtain a set of troponins  - hold ASA for now  - if rpt clearance required, would have Cardio see - 6mm stone at the UVJ  - f/u Urology consult; Dr Hernandez to see in am  - NPO for possible stent placement  - CXR stable, EKG w/ T wave Inv in II, III, aVF not seen in prior EKGs, unknown if pt had/has any cp given Dementia  - Echo done on last admx stable  - would obtain a set of troponins  - hold ASA for now  - f/u Cardio consult for clearance

## 2020-11-02 DIAGNOSIS — N20.1 CALCULUS OF URETER: ICD-10-CM

## 2020-11-02 DIAGNOSIS — I10 ESSENTIAL (PRIMARY) HYPERTENSION: ICD-10-CM

## 2020-11-02 DIAGNOSIS — E11.69 TYPE 2 DIABETES MELLITUS WITH OTHER SPECIFIED COMPLICATION: ICD-10-CM

## 2020-11-02 DIAGNOSIS — N30.01 ACUTE CYSTITIS WITH HEMATURIA: ICD-10-CM

## 2020-11-02 DIAGNOSIS — N17.9 ACUTE KIDNEY FAILURE, UNSPECIFIED: ICD-10-CM

## 2020-11-02 DIAGNOSIS — Z29.9 ENCOUNTER FOR PROPHYLACTIC MEASURES, UNSPECIFIED: ICD-10-CM

## 2020-11-02 DIAGNOSIS — G40.909 EPILEPSY, UNSPECIFIED, NOT INTRACTABLE, WITHOUT STATUS EPILEPTICUS: ICD-10-CM

## 2020-11-02 LAB
A1C WITH ESTIMATED AVERAGE GLUCOSE RESULT: 6.9 % — HIGH (ref 4–5.6)
ANION GAP SERPL CALC-SCNC: 5 MMOL/L — SIGNIFICANT CHANGE UP (ref 5–17)
BUN SERPL-MCNC: 23 MG/DL — SIGNIFICANT CHANGE UP (ref 7–23)
CALCIUM SERPL-MCNC: 9 MG/DL — SIGNIFICANT CHANGE UP (ref 8.5–10.1)
CHLORIDE SERPL-SCNC: 114 MMOL/L — HIGH (ref 96–108)
CO2 SERPL-SCNC: 26 MMOL/L — SIGNIFICANT CHANGE UP (ref 22–31)
CREAT SERPL-MCNC: 0.92 MG/DL — SIGNIFICANT CHANGE UP (ref 0.5–1.3)
ESTIMATED AVERAGE GLUCOSE: 151 MG/DL — HIGH (ref 68–114)
GLUCOSE BLDC GLUCOMTR-MCNC: 151 MG/DL — HIGH (ref 70–99)
GLUCOSE BLDC GLUCOMTR-MCNC: 165 MG/DL — HIGH (ref 70–99)
GLUCOSE BLDC GLUCOMTR-MCNC: 206 MG/DL — HIGH (ref 70–99)
GLUCOSE BLDC GLUCOMTR-MCNC: 210 MG/DL — HIGH (ref 70–99)
GLUCOSE SERPL-MCNC: 187 MG/DL — HIGH (ref 70–99)
HCT VFR BLD CALC: 34.5 % — SIGNIFICANT CHANGE UP (ref 34.5–45)
HGB BLD-MCNC: 10.7 G/DL — LOW (ref 11.5–15.5)
MAGNESIUM SERPL-MCNC: 2.4 MG/DL — SIGNIFICANT CHANGE UP (ref 1.6–2.6)
MCHC RBC-ENTMCNC: 27.6 PG — SIGNIFICANT CHANGE UP (ref 27–34)
MCHC RBC-ENTMCNC: 31 GM/DL — LOW (ref 32–36)
MCV RBC AUTO: 88.9 FL — SIGNIFICANT CHANGE UP (ref 80–100)
NRBC # BLD: 0 /100 WBCS — SIGNIFICANT CHANGE UP (ref 0–0)
PHOSPHATE SERPL-MCNC: 2.2 MG/DL — LOW (ref 2.5–4.5)
PLATELET # BLD AUTO: 181 K/UL — SIGNIFICANT CHANGE UP (ref 150–400)
POTASSIUM SERPL-MCNC: 3.3 MMOL/L — LOW (ref 3.5–5.3)
POTASSIUM SERPL-SCNC: 3.3 MMOL/L — LOW (ref 3.5–5.3)
RBC # BLD: 3.88 M/UL — SIGNIFICANT CHANGE UP (ref 3.8–5.2)
RBC # FLD: 12.9 % — SIGNIFICANT CHANGE UP (ref 10.3–14.5)
SARS-COV-2 IGG SERPL QL IA: NEGATIVE — SIGNIFICANT CHANGE UP
SARS-COV-2 IGM SERPL IA-ACNC: 0.09 INDEX — SIGNIFICANT CHANGE UP
SODIUM SERPL-SCNC: 145 MMOL/L — SIGNIFICANT CHANGE UP (ref 135–145)
TROPONIN I SERPL-MCNC: 0.06 NG/ML — HIGH (ref 0.01–0.04)
TROPONIN I SERPL-MCNC: 0.07 NG/ML — HIGH (ref 0.01–0.04)
TROPONIN I SERPL-MCNC: 0.08 NG/ML — HIGH (ref 0.01–0.04)
WBC # BLD: 8.32 K/UL — SIGNIFICANT CHANGE UP (ref 3.8–10.5)
WBC # FLD AUTO: 8.32 K/UL — SIGNIFICANT CHANGE UP (ref 3.8–10.5)

## 2020-11-02 PROCEDURE — 99232 SBSQ HOSP IP/OBS MODERATE 35: CPT

## 2020-11-02 PROCEDURE — 99223 1ST HOSP IP/OBS HIGH 75: CPT

## 2020-11-02 PROCEDURE — 93010 ELECTROCARDIOGRAM REPORT: CPT

## 2020-11-02 PROCEDURE — 99233 SBSQ HOSP IP/OBS HIGH 50: CPT

## 2020-11-02 RX ORDER — HEPARIN SODIUM 5000 [USP'U]/ML
5000 INJECTION INTRAVENOUS; SUBCUTANEOUS EVERY 8 HOURS
Refills: 0 | Status: DISCONTINUED | OUTPATIENT
Start: 2020-11-03 | End: 2020-11-09

## 2020-11-02 RX ORDER — SIMVASTATIN 20 MG/1
20 TABLET, FILM COATED ORAL AT BEDTIME
Refills: 0 | Status: DISCONTINUED | OUTPATIENT
Start: 2020-11-02 | End: 2020-11-09

## 2020-11-02 RX ORDER — TAMSULOSIN HYDROCHLORIDE 0.4 MG/1
0.4 CAPSULE ORAL AT BEDTIME
Refills: 0 | Status: DISCONTINUED | OUTPATIENT
Start: 2020-11-02 | End: 2020-11-09

## 2020-11-02 RX ORDER — LEVETIRACETAM 250 MG/1
750 TABLET, FILM COATED ORAL
Refills: 0 | Status: DISCONTINUED | OUTPATIENT
Start: 2020-11-02 | End: 2020-11-09

## 2020-11-02 RX ORDER — METOPROLOL TARTRATE 50 MG
50 TABLET ORAL
Refills: 0 | Status: DISCONTINUED | OUTPATIENT
Start: 2020-11-02 | End: 2020-11-09

## 2020-11-02 RX ORDER — DEXTROSE 50 % IN WATER 50 %
25 SYRINGE (ML) INTRAVENOUS ONCE
Refills: 0 | Status: DISCONTINUED | OUTPATIENT
Start: 2020-11-02 | End: 2020-11-04

## 2020-11-02 RX ORDER — DEXTROSE 50 % IN WATER 50 %
12.5 SYRINGE (ML) INTRAVENOUS ONCE
Refills: 0 | Status: DISCONTINUED | OUTPATIENT
Start: 2020-11-02 | End: 2020-11-04

## 2020-11-02 RX ORDER — MEROPENEM 1 G/30ML
1000 INJECTION INTRAVENOUS EVERY 12 HOURS
Refills: 0 | Status: DISCONTINUED | OUTPATIENT
Start: 2020-11-02 | End: 2020-11-02

## 2020-11-02 RX ORDER — SODIUM CHLORIDE 9 MG/ML
500 INJECTION, SOLUTION INTRAVENOUS
Refills: 0 | Status: DISCONTINUED | OUTPATIENT
Start: 2020-11-02 | End: 2020-11-02

## 2020-11-02 RX ORDER — INSULIN LISPRO 100/ML
VIAL (ML) SUBCUTANEOUS EVERY 6 HOURS
Refills: 0 | Status: DISCONTINUED | OUTPATIENT
Start: 2020-11-02 | End: 2020-11-04

## 2020-11-02 RX ORDER — SODIUM CHLORIDE 9 MG/ML
1000 INJECTION INTRAMUSCULAR; INTRAVENOUS; SUBCUTANEOUS
Refills: 0 | Status: DISCONTINUED | OUTPATIENT
Start: 2020-11-02 | End: 2020-11-09

## 2020-11-02 RX ORDER — HYDRALAZINE HCL 50 MG
75 TABLET ORAL THREE TIMES A DAY
Refills: 0 | Status: DISCONTINUED | OUTPATIENT
Start: 2020-11-02 | End: 2020-11-03

## 2020-11-02 RX ORDER — CEFTRIAXONE 500 MG/1
1000 INJECTION, POWDER, FOR SOLUTION INTRAMUSCULAR; INTRAVENOUS EVERY 24 HOURS
Refills: 0 | Status: DISCONTINUED | OUTPATIENT
Start: 2020-11-02 | End: 2020-11-03

## 2020-11-02 RX ORDER — SODIUM CHLORIDE 9 MG/ML
1000 INJECTION, SOLUTION INTRAVENOUS
Refills: 0 | Status: DISCONTINUED | OUTPATIENT
Start: 2020-11-02 | End: 2020-11-04

## 2020-11-02 RX ORDER — AMLODIPINE BESYLATE 2.5 MG/1
10 TABLET ORAL DAILY
Refills: 0 | Status: DISCONTINUED | OUTPATIENT
Start: 2020-11-02 | End: 2020-11-09

## 2020-11-02 RX ORDER — SODIUM,POTASSIUM PHOSPHATES 278-250MG
1 POWDER IN PACKET (EA) ORAL THREE TIMES A DAY
Refills: 0 | Status: COMPLETED | OUTPATIENT
Start: 2020-11-02 | End: 2020-11-04

## 2020-11-02 RX ORDER — POTASSIUM CHLORIDE 20 MEQ
40 PACKET (EA) ORAL ONCE
Refills: 0 | Status: COMPLETED | OUTPATIENT
Start: 2020-11-02 | End: 2020-11-02

## 2020-11-02 RX ORDER — DEXTROSE 50 % IN WATER 50 %
15 SYRINGE (ML) INTRAVENOUS ONCE
Refills: 0 | Status: DISCONTINUED | OUTPATIENT
Start: 2020-11-02 | End: 2020-11-04

## 2020-11-02 RX ORDER — GLUCAGON INJECTION, SOLUTION 0.5 MG/.1ML
1 INJECTION, SOLUTION SUBCUTANEOUS ONCE
Refills: 0 | Status: DISCONTINUED | OUTPATIENT
Start: 2020-11-02 | End: 2020-11-04

## 2020-11-02 RX ADMIN — Medication 1 PACKET(S): at 21:51

## 2020-11-02 RX ADMIN — SODIUM CHLORIDE 75 MILLILITER(S): 9 INJECTION INTRAMUSCULAR; INTRAVENOUS; SUBCUTANEOUS at 01:56

## 2020-11-02 RX ADMIN — LEVETIRACETAM 750 MILLIGRAM(S): 250 TABLET, FILM COATED ORAL at 05:29

## 2020-11-02 RX ADMIN — Medication 0.1 MILLIGRAM(S): at 17:40

## 2020-11-02 RX ADMIN — LEVETIRACETAM 750 MILLIGRAM(S): 250 TABLET, FILM COATED ORAL at 17:40

## 2020-11-02 RX ADMIN — Medication 0.1 MILLIGRAM(S): at 05:30

## 2020-11-02 RX ADMIN — Medication 50 MILLIGRAM(S): at 05:30

## 2020-11-02 RX ADMIN — Medication 1: at 12:20

## 2020-11-02 RX ADMIN — Medication 75 MILLIGRAM(S): at 21:53

## 2020-11-02 RX ADMIN — AMLODIPINE BESYLATE 10 MILLIGRAM(S): 2.5 TABLET ORAL at 05:30

## 2020-11-02 RX ADMIN — TAMSULOSIN HYDROCHLORIDE 0.4 MILLIGRAM(S): 0.4 CAPSULE ORAL at 22:40

## 2020-11-02 RX ADMIN — Medication 75 MILLIGRAM(S): at 12:25

## 2020-11-02 RX ADMIN — Medication 75 MILLIGRAM(S): at 05:30

## 2020-11-02 RX ADMIN — SIMVASTATIN 20 MILLIGRAM(S): 20 TABLET, FILM COATED ORAL at 21:52

## 2020-11-02 RX ADMIN — Medication 50 MILLIGRAM(S): at 17:40

## 2020-11-02 RX ADMIN — Medication 40 MILLIEQUIVALENT(S): at 12:20

## 2020-11-02 RX ADMIN — Medication 2: at 21:56

## 2020-11-02 RX ADMIN — Medication 2: at 05:43

## 2020-11-02 RX ADMIN — Medication 1 PACKET(S): at 12:25

## 2020-11-02 RX ADMIN — CEFTRIAXONE 100 MILLIGRAM(S): 500 INJECTION, POWDER, FOR SOLUTION INTRAMUSCULAR; INTRAVENOUS at 21:51

## 2020-11-02 NOTE — CONSULT NOTE ADULT - SUBJECTIVE AND OBJECTIVE BOX
ROS: unable to obtain due to patient Mental status. Daughter in room, able to help communicate with pt.    General: WN/WD NAD  Neurology: A&Ox3, nonfocal, VELIZ x 4  Respiratory: CTA B/L  CV: RRR, S1S2, no murmurs, rubs or gallops  Abdominal: Soft, NT, ND +BS, Last BM  Extremities: No edema, + peripheral pulses  Lines:   : Mayberry catheter in place      Vital Signs Last 24 Hrs  T(C): 37.2 (01 Nov 2020 23:18), Max: 38.5 (01 Nov 2020 19:12)  T(F): 98.9 (01 Nov 2020 23:18), Max: 101.3 (01 Nov 2020 19:12)  HR: 73 (01 Nov 2020 23:18) (73 - 89)  BP: 158/62 (01 Nov 2020 23:18) (158/62 - 160/69)  BP(mean): --  ABP: --  ABP(mean): --  RR: 17 (01 Nov 2020 23:18) (17 - 20)  SpO2: 99% (01 Nov 2020 23:18) (94% - 99%)                            10.8   10.14 )-----------( 175      ( 01 Nov 2020 19:55 )             33.0   11-01    146<H>  |  114<H>  |  31<H>  ----------------------------<  303<H>  3.6   |  26  |  1.39<H>    Ca    8.8      01 Nov 2020 19:55    TPro  7.3  /  Alb  2.6<L>  /  TBili  0.4  /  DBili  x   /  AST  16  /  ALT  20  /  AlkPhos  67  11-01    Lactate, Blood: 1.1 mmol/L (11.01.20 @ 19:55)     68 yo female with pmh dementia (non verbal), CVA (Hx of  plavix), seizure disorder (on keppra), HTN, DM, + milner, recent admission 2 months ago for lithotripsy/renal colic and stent placement 8/24. family called as noted hematuria today and pt a little out of it, not as responsive as she normally is. Currently pt improved.-afebrile. With non-obstructing renal stones.    Pt had temp 101F earlier.  Pt daughter states that pt is no longer taking Plavix        ROS: unable to obtain due to patient Mental status. Daughter in room, able to help communicate with pt.    Exam:  General: Pt nonverbal but following simple commands with daughters help, NAD  Neurology: VELIZ x 4  Respiratory: CTA B/L  CV: RRR, S1S2, no murmurs, rubs or gallops  Abdominal: Soft, NT, ND +BS,  Extremities: No edema, 1+ peripheral pulses  Lines: PIV  : Milner catheter (newly placed by ED) draining clear urine-no hematuria noted currently       Vital Signs Last 24 Hrs  T(C): 37.2 (01 Nov 2020 23:18), Max: 38.5 (01 Nov 2020 19:12)  T(F): 98.9 (01 Nov 2020 23:18), Max: 101.3 (01 Nov 2020 19:12)  HR: 73 (01 Nov 2020 23:18) (73 - 89)  BP: 158/62 (01 Nov 2020 23:18) (158/62 - 160/69)  BP(mean): --  ABP: --  ABP(mean): --  RR: 17 (01 Nov 2020 23:18) (17 - 20)  SpO2: 99% (01 Nov 2020 23:18) (94% - 99%)                            10.8   10.14 )-----------( 175      ( 01 Nov 2020 19:55 )             33.0   11-01    146<H>  |  114<H>  |  31<H>  ----------------------------<  303<H>  3.6   |  26  |  1.39<H>    Ca    8.8      01 Nov 2020 19:55    TPro  7.3  /  Alb  2.6<L>  /  TBili  0.4  /  DBili  x   /  AST  16  /  ALT  20  /  AlkPhos  67  11-01    Lactate, Blood: 1.1 mmol/L (11.01.20 @ 19:55)    CT-H    Impression: No CT evidence of acute intracranial abnormality.    CT-A/P    Lung bases:  There are no pleural effusions. The heart is enlarged. Dependent atelectatic changes at the lung bases.    Peritoneum:  There is no free air.  No free fluid.    Evaluation of solid abdominal organ is diminished without IV contrast.    Liver: Unremarkable.  Spleen: Unremarkable.  Gallbladder: Unremarkable.  Biliary tree: Unremarkable.  Pancreas: Unremarkable.  Adrenal glands: Unremarkable.    Kidneys: No perinephric stranding or hydronephrosis. Bilateral nonobstructive renal stones measure up to 1 cm on the left. Numerous confluent stones in the left renal calyces and pelvis. Prior study of August 22, demonstrated one large stone. Findings are likely related to lithotripsy. Nonobstructive right renal stones measure up to 5 mm. Numerous bilateral renal cysts.    Urinary bladder: Incompletely distended with a Milner catheter in place. Despite that, there appears to be wall thickening and surrounding fat stranding suggesting cystitis. Numerous stones seen dependently measure up to 4 mm. Likely 6 mm stone at the right ureterovesical junction.    Pelvic organs: Enlarged uterus with calcified fibroids.    Bowel:  There is no small bowel obstruction.  The stomach is under distended. The appendix is unremarkable. Scattered colonic diverticulosis. Severe looping of the sigmoid colon which demonstrates a large fecal load, indicative of chronic constipation. The rectal vault measures up to 9 cm in caliber.    Vasculature: Aorta is not dilated. Moderate atherosclerotic vascular calcification.  There is no significant adenopathy.  Bones: Unremarkable.  Subcutaneous tissues: Tiny fat-containing umbilical hernia.    IMPRESSION:    Likely cystitis. Correlate with urinalysis.  Numerous stones in the urinary bladder likely related to interval lithotripsy.  There appears to be a 6 mm stone in the right ureterovesical junction without hydronephrosis. Numerous small stones in the left renal pelvis likely related to recent lithotripsy. Please note that without IV contrast, pyelonephritis, which is a clinical diagnosis, cannot be assessed.    Tortuous sigmoid colon with a large stool burden.  Cardiomegaly.       68 yo female with pmh dementia (non verbal), CVA (Hx of  plavix), seizure disorder (on keppra), HTN, DM, + milner, recent admission 2 months ago for lithotripsy/renal colic and stent placement 8/24. family called as noted hematuria today and pt a little out of it, not as responsive as she normally is. Currently pt improved.-afebrile. With non-obstructing renal stones.    Pt had temp 101F earlier.  Pt daughter states that pt is no longer taking Plavix        ROS: unable to obtain due to patient Mental status. Daughter in room, able to help communicate with pt.    Exam:  General: Pt nonverbal but following simple commands with daughters help, NAD  Neurology: VELIZ x 4  Respiratory: CTA B/L  CV: RRR, S1S2, no murmurs, rubs or gallops  Abdominal: Soft, NT, ND +BS,  Extremities: No edema, 1+ peripheral pulses  Lines: PIV  : Milner catheter (newly placed by ED) draining clear urine-no hematuria noted currently       Vital Signs Last 24 Hrs  T(C): 37.2 (01 Nov 2020 23:18), Max: 38.5 (01 Nov 2020 19:12)  T(F): 98.9 (01 Nov 2020 23:18), Max: 101.3 (01 Nov 2020 19:12)  HR: 73 (01 Nov 2020 23:18) (73 - 89)  BP: 158/62 (01 Nov 2020 23:18) (158/62 - 160/69)  RR: 17 (01 Nov 2020 23:18) (17 - 20)  SpO2: 99% (01 Nov 2020 23:18) (94% - 99%)       LABS:                      10.8   10.14 )-----------( 175      ( 01 Nov 2020 19:55 )             33.0   11-01    146<H>  |  114<H>  |  31<H>  ----------------------------<  303<H>  3.6   |  26  |  1.39<H>    Ca    8.8      01 Nov 2020 19:55    TPro  7.3  /  Alb  2.6<L>  /  TBili  0.4  /  DBili  x   /  AST  16  /  ALT  20  /  AlkPhos  67  11-01    Lactate, Blood: 1.1 mmol/L (11.01.20 @ 19:55)    CT-H    Impression: No CT evidence of acute intracranial abnormality.    CT-A/P    Lung bases:  There are no pleural effusions. The heart is enlarged. Dependent atelectatic changes at the lung bases.    Peritoneum:  There is no free air.  No free fluid.    Evaluation of solid abdominal organ is diminished without IV contrast.    Liver: Unremarkable.  Spleen: Unremarkable.  Gallbladder: Unremarkable.  Biliary tree: Unremarkable.  Pancreas: Unremarkable.  Adrenal glands: Unremarkable.    Kidneys: No perinephric stranding or hydronephrosis. Bilateral nonobstructive renal stones measure up to 1 cm on the left. Numerous confluent stones in the left renal calyces and pelvis. Prior study of August 22, demonstrated one large stone. Findings are likely related to lithotripsy. Nonobstructive right renal stones measure up to 5 mm. Numerous bilateral renal cysts.    Urinary bladder: Incompletely distended with a Milner catheter in place. Despite that, there appears to be wall thickening and surrounding fat stranding suggesting cystitis. Numerous stones seen dependently measure up to 4 mm. Likely 6 mm stone at the right ureterovesical junction.    Pelvic organs: Enlarged uterus with calcified fibroids.    Bowel:  There is no small bowel obstruction.  The stomach is under distended. The appendix is unremarkable. Scattered colonic diverticulosis. Severe looping of the sigmoid colon which demonstrates a large fecal load, indicative of chronic constipation. The rectal vault measures up to 9 cm in caliber.    Vasculature: Aorta is not dilated. Moderate atherosclerotic vascular calcification.  There is no significant adenopathy.  Bones: Unremarkable.  Subcutaneous tissues: Tiny fat-containing umbilical hernia.    IMPRESSION:    Likely cystitis. Correlate with urinalysis.  Numerous stones in the urinary bladder likely related to interval lithotripsy.  There appears to be a 6 mm stone in the right ureterovesical junction without hydronephrosis. Numerous small stones in the left renal pelvis likely related to recent lithotripsy. Please note that without IV contrast, pyelonephritis, which is a clinical diagnosis, cannot be assessed.    Tortuous sigmoid colon with a large stool burden.  Cardiomegaly.

## 2020-11-02 NOTE — PROGRESS NOTE ADULT - ASSESSMENT
70 y/o female with PMHx of dementia (minimally verbal per daughter occasional gives 1 word answers of yes/no), CVA, Seizure disorder, HTN, DM type 2 (off meds per daughter), admission 2 months ago for renal colic s/p lithotripsy in 8/24 w/ indwelling milner presents to the ED due to hematuria.           Problem/Plan - 1:  ·  Problem: Acute cystitis with hematuria and associated with indwelling chronic cathter.  Plan: - CT scan w/ likely cystitis  - cont iv antibiotics and follow final culture results.   - Consulted ID service. Follow recs.   -Gentle hydation  - Milner changed in the ER.   - Follow labs.     Problem/Plan - 2:  ·  Problem: Calculus of ureter.  Plan: - 6mm stone at the UVJ  -Urology following.   - conservative management vs any urology intervention.   -Follow repeat EKG. consulted Cards. follow further recs.     Problem/Plan - 3:  ·  Problem: Seizure disorder.  Plan: - c/w Keppra  - Pending level.     Problem/Plan - 4:  ·  Problem: Acute kidney injury.  Plan: - c/w IVF  - better.     Problem/Plan - 5:  ·  Problem: Essential hypertension. controlled. Plan: - c/w antihypertensives.     Problem/Plan - 6:  Problem: Type 2 diabetes mellitus with other specified complication, without long-term current use of insulin. Plan: - FS q6 w/ SSI.     Problem/Plan - 7:  ·  Problem: DVT prophylaxis.  HSQ started.     PT eval.   Full code      70 y/o female with PMHx of dementia (minimally verbal per daughter occasional gives 1 word answers of yes/no), CVA, Seizure disorder, HTN, DM type 2 (off meds per daughter), admission 2 months ago for renal colic s/p lithotripsy in 8/24 w/ indwelling milner presents to the ED due to hematuria.           Problem/Plan - 1:  ·  Problem: Acute cystitis with hematuria and associated with indwelling chronic cathter.  Plan: - CT scan w/ likely cystitis  - cont iv antibiotics and follow final culture results.   - Consulted ID service. Follow recs.   -Gentle hydation  - Milner changed in the ER.   - Follow labs.     Problem/Plan - 2:  ·  Problem: Calculus of ureter.  Plan: - 6mm stone at the UVJ  -Urology following.   - conservative management vs any urology intervention.   -Follow repeat EKG. consulted Cards. follow further recs.     Problem/Plan - 3:  ·  Problem: Seizure disorder.  Plan: - c/w Keppra  - Pending level.     Problem/Plan - 4:  ·  Problem: Acute kidney injury.  Plan: - c/w IVF  - better.     Problem/Plan - 5:  ·  Problem: Essential hypertension. controlled. Plan: - c/w antihypertensives.     Problem/Plan - 6:  Problem: Type 2 diabetes mellitus with other specified complication, without long-term current use of insulin. Plan: - FS q6 w/ SSI.     Problem/Plan - 7:  ·  Problem: DVT prophylaxis.  HSQ started.     Hypokalemia. Replete and recheck level.   Hypophosphatemia. replete and recheck.     PT mary.   Full code

## 2020-11-02 NOTE — PHYSICAL THERAPY INITIAL EVALUATION ADULT - LEVEL OF INDEPENDENCE: STAIR NEGOTIATION, REHAB EVAL
(baseline prior to admission, as per pt's sister Amaya: pt. needs to be carried on the steps when getting in and out of the house, by ambulance service)

## 2020-11-02 NOTE — PHYSICAL THERAPY INITIAL EVALUATION ADULT - GENERAL OBSERVATIONS, REHAB EVAL
Pt found in bed, awake, responsive to simple commands but inconsistent, non communicative, h/o Dementia,.on room air, not in distress.

## 2020-11-02 NOTE — PHYSICAL THERAPY INITIAL EVALUATION ADULT - PRECAUTIONS/LIMITATIONS, REHAB EVAL
h/o Dementia, does not follow directions well, unsteady standing and ambulation balance , fall risk.

## 2020-11-02 NOTE — PHYSICAL THERAPY INITIAL EVALUATION ADULT - STRENGTHENING, PT EVAL
Improve strength UE and LE to 4/5 to 4+/5 and improve ability in tasks and be able to participate more effectively and safely, using assistive device and prevent falls.

## 2020-11-02 NOTE — CONSULT NOTE ADULT - ASSESSMENT
ASSESSMENT: 69yFemale     PLAN:  68 yo female with pmh dementia (non verbal), CVA (Hx of  plavix), seizure disorder (on keppra), HTN, DM, + milner, recent admission 2 months ago for lithotripsy/renal colic and stent placement 8/24. family called as noted hematuria today and pt a little out of it, not as responsive as she normally is. Currently hemodynamically stable.     Neurologic: APAP prn,  Keppra    Respiratory: O2 prn    Cardiovascular: Home HTN medications    Gastrointestinal/Nutrition: NPO, IVF    Genitourinary/Renal: Trend Uo, BUN/ Cr.     Hematologic: SCD, hold AC for now    Infectious Disease: ceftriaxone per medicine    Endocrine: ISS    Disposition: admit to med/surge. ASSESSMENT: 69yFemale     PLAN:  70 yo female with pmh dementia (non verbal), CVA (Hx of  plavix), seizure disorder (on keppra), HTN, DM, + milner, recent admission 2 months ago for lithotripsy/renal colic and stent placement 8/24. family called as noted hematuria today and pt a little out of it, not as responsive as she normally is. Now w/ nonobstructive stones. Currently hemodynamically stable.     Neurologic: APAP prn,  Keppra    Respiratory: O2 prn    Cardiovascular: Home HTN medications    Gastrointestinal/Nutrition: NPO, IVF    Genitourinary/Renal: Trend Uo, BUN/ Cr.     Hematologic: SCD, hold AC for now    Infectious Disease: ceftriaxone per medicine    Endocrine: ISS    Disposition: admit to med/surge.

## 2020-11-02 NOTE — CONSULT NOTE ADULT - SUBJECTIVE AND OBJECTIVE BOX
Patient is a 69y old  Female who presents with a chief complaint of UTI, Obstructive uropathy secondary to calculi (2020 11:26)      HPI:        70 y/o female with PMHx of dementia (minimally verbal per daughter occasional gives 1 word answers of yes/no), CVA, Seizure disorder, HTN, DM type 2 (off meds per daughter), admission 2 months ago for renal colic s/p lithotripsy in  w/ indwelling milner presents to the ED due to hematuria. Per daughter at bedside, pt was supposed to go to the Urologist's office for milner removal and TOV on Thursday however unable to arrange a ride. Daughter reports the next day she noticed urine was leaking around milner thus a visiting nurse came to change "part of the tube" and the bag and leakage stopped. Of note in the last few days daughter reports pt has been more sluggish, and sleepy, less alert/responsive. Today she noted hematuria thus brought to the ED. No noted fever or chills.     In ED initial vitals /69, T max 101.3. For sig labs see below. CT Likely cystitis. Correlate with urinalysis.  Numerous stones in the urinary bladder likely related to interval lithotripsy.  There appears to be a 6 mm stone in the right ureterovesical junction without hydronephrosis. Pt given Rocephin.      (2020 23:27)      ID consulted for workup and antibiotic management, fever, UTI.  Above reviewed, 69 years old female with PMH of dementia, CVA, seizure disorder, HTN, DM type 2 (not on any medications), s/p admission to the hospital in 2020 with UTI (UCx grew Pseudomonas aeruginosa Carbapenem resistant), urinary retention, s/p lithotripsy and Milner placement, followed by another admission with renal failure, pressure ulcer, Blood culture grew Staphylococcus hominis.   The pt was admitted yesterday with UTI, hematuria, spoke with pt's daughter, reports that the pt was responding well, ambulating up until the beginning of last week, noted leakage around the Milner on Tuesday last week, home care RN has changed the tube and the bag and the leakage stopped on its own. The pt became less mobile and less verbal within next couple days, by Saturday the pt's appetite became poor, by  pt's daughter noted hematuria and the pt became non-verbal, was taken to ED. No reported fevers at home.   The pt became febrile upon admission, 101.3 20 at 19:12, afebrile since then, no prior fevers as per HPI, Milner exchanged in ED during the admission. No leukocytosis, AMADOU resolved, Lactate 1.1, UA was positive for nitrates and moderate bacteria, - WBC, Urine and blood cultures are pending, CT head negative for acute events, Chest Xray negative, CT abd/pelvis w/o contrast - cystitis, numerous stones in the urinary bladder likely related to interval lithotripsy, there appears to be a 6 mm stone in the right ureterovesical junction without hydronephrosis. The pt was started on Rocephin 1 gram IV daily. The pt was seen by urology, recommendation was - if + infection and/or uncontrolled pain, the pt will need stenting, the stent will need to be removed later, the pt will need removal of the stones in the future.     The pt was seen and examined, no distress, non-verbal, nods in response to some of my questions.       PAST MEDICAL & SURGICAL HISTORY:  CVA (cerebral infarction)  right side weakness    Vision changes  lt eye    Urinary incontinence    Seizure disorder    Gout    OA (osteoarthritis)  bautista knees, low back  and  bautista shoulders    Asthma    Diabetes    Hypertension    Kidney stone  History of fall with  shoulder fracture   Possible GYN surgery, fibroids         Allergies  No Known Allergies as per pt's daughter         ANTIMICROBIALS:  cefTRIAXone   IVPB 1000 every 24 hours      MEDICATIONS  (STANDING):    cefTRIAXone   IVPB   100 mL/Hr IV Intermittent (20 @ 21:24)        OTHER MEDS: MEDICATIONS  (STANDING):  acetaminophen   Tablet .. 650 every 6 hours PRN  amLODIPine   Tablet 10 daily  cloNIDine 0.1 two times a day  dextrose 40% Gel 15 once PRN  dextrose 50% Injectable 12.5 once  dextrose 50% Injectable 25 once  dextrose 50% Injectable 25 once  glucagon  Injectable 1 once PRN  hydrALAZINE 75 three times a day  insulin lispro (ADMELOG) corrective regimen sliding scale  every 6 hours  levETIRAcetam 750 two times a day  metoprolol tartrate 50 two times a day  ondansetron Injectable 4 every 6 hours PRN  simvastatin 20 at bedtime  tamsulosin 0.4 at bedtime      SOCIAL HISTORY:   as per daughter, the pt is never smoker, no ETOH    FAMILY HISTORY:  strong family history of HTN, DM, cardiac disease         REVIEW OF SYSTEMS - unable to obtain, the pt has a history dementia, CVA, does not answer any of my questions      Vital Signs Last 24 Hrs  T(F): 98.2 (20 @ 12:15), Max: 101.3 (20 @ 19:12)    Vital Signs Last 24 Hrs  HR: 74 (20 @ 12:15) (73 - 89)  BP: 143/60 (20 @ 12:15) (141/66 - 160/69)  RR: 18 (20 @ 12:15)  SpO2: 100% (20 @ 12:15) (94% - 100%)  Wt(kg): --    PHYSICAL EXAM:  Constitutional: non-toxic, no distress, calm behavior   HEAD/EYES: anicteric, no conjunctival injection  ENT:  supple, unable to assess pt's mouth  Cardiovascular:   normal S1, S2, no murmur, no edema  Respiratory:  clear BS bilaterally, no wheezes, no rales  GI:  soft, normal bowel sounds, pt nods when asked if her abdomen is tender upon palpation  :  + milner with yellow urine with off white sediment, no CVA tenderness  Musculoskeletal:  no joint swelling  Neurologic: awake and alert, weakness of bilateral hands, moderate strength of bilateral lower extremities   Skin:  no rash, no erythema, sacral pressure ulcer - small excoriation, not infected   Heme/Onc: no lymphadenopathy   Psychiatric:  awake, alert        WBC Count: 8.32 K/uL ( @ 06:26)  WBC Count: 10.14 K/uL ( @ 19:55)                            10.7   8.32  )-----------( 181      ( 2020 06:26 )             34.5           145  |  114<H>  |  23  ----------------------------<  187<H>  3.3<L>   |  26  |  0.92    Ca    9.0      2020 06:26  Phos  2.2       Mg     2.4         TPro  7.3  /  Alb  2.6<L>  /  TBili  0.4  /  DBili  x   /  AST  16  /  ALT  20  /  AlkPhos  67        Creatinine Trend: 0.92<--, 1.39<--    Urinalysis Basic - ( 2020 21:30 )    Color: Yellow / Appearance: Slightly Turbid / S.020 / pH: x  Gluc: x / Ketone: Negative  / Bili: Negative / Urobili: Negative mg/dL   Blood: x / Protein: 500 mg/dL / Nitrite: Positive   Leuk Esterase: Small / RBC: 0-2 /HPF / WBC 0-2   Sq Epi: x / Non Sq Epi: Occasional / Bacteria: Moderate        MICROBIOLOGY:  urine and blood cultures are pending          v    Rapid RVP Result: NotDetec ( @ 21:30)                    RADIOLOGY:    < from: Xray Chest 1 View- PORTABLE-Urgent (20 @ 20:33) >  EXAM:  XR CHEST PORTABLE URGENT 1V                            PROCEDURE DATE:  2020          INTERPRETATION:  Clinical Information: Sepsis    Technique: AP chest image.    Comparison: 2020    Findings/  Impression: The heart is unremarkable. The lungs are clear. Bones are unremarkable for age.        CATARINA SHINE MD; Attending Interventional Radiologist  This document has been electronically signed. 2020 10:59AM    < end of copied text >    < from: CT Abdomen and Pelvis No Cont (20 @ 20:20) >  EXAM:  CT ABDOMEN AND PELVIS                            PROCEDURE DATE:  2020          INTERPRETATION:  Abdominal/Pelvic CT    Indication: Fever, flank pain    Technique:  Axial images were obtained from lung bases through pubic symphysis without contrast.  Reformatted coronal and sagittal images were submitted.    COMPARISON: CT of 2020    FINDINGS:    Lung bases:  There are no pleural effusions. The heart is enlarged. Dependent atelectatic changes at the lung bases.    Peritoneum:  There is no free air.  No free fluid.    Evaluation of solid abdominal organ is diminished without IV contrast.    Liver: Unremarkable.  Spleen: Unremarkable.  Gallbladder: Unremarkable.  Biliary tree: Unremarkable.  Pancreas: Unremarkable.  Adrenal glands: Unremarkable.    Kidneys: No perinephric stranding or hydronephrosis. Bilateral nonobstructive renal stones measure up to 1 cm on the left. Numerous confluent stones in the left renal calyces and pelvis. Prior study of , demonstrated one large stone. Findings are likely related to lithotripsy. Nonobstructive right renal stones measure up to 5 mm. Numerous bilateral renal cysts.    Urinary bladder: Incompletely distended with a Milner catheter in place. Despite that, there appears to be wall thickening and surrounding fat stranding suggesting cystitis. Numerous stones seen dependently measure up to 4 mm. Likely 6 mm stone at the right ureterovesical junction.    Pelvic organs: Enlarged uterus with calcified fibroids.    Bowel:  There is no small bowel obstruction.  The stomach is under distended. The appendix is unremarkable. Scattered colonic diverticulosis. Severe looping of the sigmoid colon which demonstrates a large fecal load, indicative of chronic constipation. The rectal vault measures up to 9 cm in caliber.    Vasculature: Aorta is not dilated. Moderate atherosclerotic vascular calcification.  There is no significant adenopathy.  Bones: Unremarkable.  Subcutaneous tissues: Tiny fat-containing umbilical hernia.    IMPRESSION:    Likely cystitis. Correlate with urinalysis.  Numerous stones in the urinary bladder likely related to interval lithotripsy.  There appears to be a 6 mm stone in the right ureterovesical junction without hydronephrosis. Numerous small stones in the left renal pelvis likely related to recent lithotripsy. Please note that without IV contrast, pyelonephritis, which is a clinical diagnosis, cannot be assessed.    Tortuous sigmoid colon with a large stool burden.  Cardiomegaly.    BRINA SAWANT MD; Attending Radiologist  This document has been electronically signed. 2020  8:39PM    < end of copied text >      < from: CT Head No Cont (20 @ 20:20) >    EXAM:  CT BRAIN                            PROCEDURE DATE:  2020          INTERPRETATION:  Clinical Indication: Altered mental status.    Comparison: 2020    Technique: Noncontrast axial CT images of the head were acquired. Coronal and sagittal reformats were obtained.    Findings:  The ventricles and sulci are prominent in size, compatible with mild generalized parenchymal volume loss. No acute intracranial hemorrhage is identified.  No extra-axial fluid collection is identified.  No mass effect or midline shift is seen.  There is no evidence of acute territorial infarct.  There are periventricular and subcortical white matter hypodensities, which are nonspecific, but likely reflect moderate microvascular ischemic disease. Chronic infarcts are noted within the right frontal cortex, bilateral basal ganglia and thalami, right mariajose and right cerebellum.  There is a small retention cyst or polyp in left maxillary sinus. The mastoid air cells are well aerated.  No acute osseous abnormality is seen.      Impression: No CT evidence of acute intracranial abnormality.            DEYA GOYAL MD; Attending Radiologist  This document has been electronically signed. 2020  8:34PM    < end of copied text >           Patient is a 69y old  Female who presents with a chief complaint of UTI, Obstructive uropathy secondary to calculi (2020 11:26)      HPI:        70 y/o female with PMHx of dementia (minimally verbal per daughter occasional gives 1 word answers of yes/no), CVA, Seizure disorder, HTN, DM type 2 (off meds per daughter), admission 2 months ago for renal colic s/p lithotripsy in  w/ indwelling milner presents to the ED due to hematuria. Per daughter at bedside, pt was supposed to go to the Urologist's office for milner removal and TOV on Thursday however unable to arrange a ride. Daughter reports the next day she noticed urine was leaking around milner thus a visiting nurse came to change "part of the tube" and the bag and leakage stopped. Of note in the last few days daughter reports pt has been more sluggish, and sleepy, less alert/responsive. Today she noted hematuria thus brought to the ED. No noted fever or chills.     In ED initial vitals /69, T max 101.3. For sig labs see below. CT Likely cystitis. Correlate with urinalysis.  Numerous stones in the urinary bladder likely related to interval lithotripsy.  There appears to be a 6 mm stone in the right ureterovesical junction without hydronephrosis. Pt given Rocephin.      (2020 23:27)      ID consulted for workup and antibiotic management, fever, UTI.  Above reviewed, 69 years old female with PMH of dementia, CVA, seizure disorder, HTN, DM type 2 (not on any medications), s/p admission to the hospital in 2020 with UTI (UCx grew Pseudomonas aeruginosa Carbapenem resistant), urinary retention, s/p lithotripsy and Milner placement, followed by another admission with renal failure, pressure ulcer, Blood culture grew Staphylococcus hominis.   The pt was admitted yesterday with UTI, hematuria, spoke with pt's daughter, reports that the pt was responding well, ambulating up until the beginning of last week, noted leakage around the Milner on Tuesday last week, home care RN has changed the tube and the bag and the leakage stopped on its own. The pt became less mobile and less verbal within next couple days, by Saturday the pt's appetite became poor, by  pt's daughter noted hematuria and the pt became non-verbal, was taken to ED. No reported fevers at home.   The pt became febrile upon admission, 101.3 20 at 19:12, afebrile since then, no prior fevers as per HPI, Milner exchanged in ED during the admission. No leukocytosis, AMADOU resolved, Lactate 1.1, UA was positive for nitrates and moderate bacteria, - WBC, Urine and blood cultures are pending, CT head negative for acute events, Chest Xray negative, CT abd/pelvis w/o contrast - cystitis, numerous stones in the urinary bladder likely related to interval lithotripsy, there appears to be a 6 mm stone in the right ureterovesical junction without hydronephrosis. The pt was started on Rocephin 1 gram IV daily. The pt was seen by urology, recommendation was - if + infection and/or uncontrolled pain, the pt will need stenting, the stent will need to be removed later, the pt will need removal of the stones in the future.     The pt was seen and examined, no distress, non-verbal, nods in response to some of my questions.       PAST MEDICAL & SURGICAL HISTORY:  CVA (cerebral infarction)  right side weakness    Vision changes  lt eye    Urinary incontinence    Seizure disorder    Gout    OA (osteoarthritis)  bautista knees, low back  and  bautista shoulders    Asthma    Diabetes    Hypertension    Kidney stone  History of fall with  shoulder fracture   Possible GYN surgery, fibroids         Allergies  No Known Allergies as per pt's daughter         ANTIMICROBIALS:  cefTRIAXone   IVPB 1000 every 24 hours      MEDICATIONS  (STANDING):    cefTRIAXone   IVPB   100 mL/Hr IV Intermittent (20 @ 21:24)        OTHER MEDS: MEDICATIONS  (STANDING):  acetaminophen   Tablet .. 650 every 6 hours PRN  amLODIPine   Tablet 10 daily  cloNIDine 0.1 two times a day  dextrose 40% Gel 15 once PRN  dextrose 50% Injectable 12.5 once  dextrose 50% Injectable 25 once  dextrose 50% Injectable 25 once  glucagon  Injectable 1 once PRN  hydrALAZINE 75 three times a day  insulin lispro (ADMELOG) corrective regimen sliding scale  every 6 hours  levETIRAcetam 750 two times a day  metoprolol tartrate 50 two times a day  ondansetron Injectable 4 every 6 hours PRN  simvastatin 20 at bedtime  tamsulosin 0.4 at bedtime      SOCIAL HISTORY:   as per daughter, the pt is never smoker, no ETOH    FAMILY HISTORY:  strong family history of HTN, DM, cardiac disease         REVIEW OF SYSTEMS - unable to obtain, the pt has a history dementia, CVA, does not answer any of my questions      Vital Signs Last 24 Hrs  T(F): 98.2 (20 @ 12:15), Max: 101.3 (20 @ 19:12)    Vital Signs Last 24 Hrs  HR: 74 (20 @ 12:15) (73 - 89)  BP: 143/60 (20 @ 12:15) (141/66 - 160/69)  RR: 18 (20 @ 12:15)  SpO2: 100% (20 @ 12:15) (94% - 100%)  Wt(kg): --    PHYSICAL EXAM:  Constitutional: non-toxic, no distress, calm behavior   HEAD/EYES: anicteric, no conjunctival injection  ENT:  supple, unable to assess pt's mouth  Cardiovascular:   normal S1, S2, no murmur, no edema  Respiratory:  clear BS bilaterally, no wheezes, no rales  GI:  soft, normal bowel sounds, pt nods when asked if her abdomen is tender upon palpation  :  + milner with yellow urine with off white sediment, no CVA tenderness  Musculoskeletal:  no joint swelling  Neurologic: awake and alert, weakness of bilateral hands, moderate strength of bilateral lower extremities   Skin:  no rash, no erythema, sacral pressure ulcer - small excoriation, not infected   Heme/Onc: no lymphadenopathy   Psychiatric:  awake, alert        WBC Count: 8.32 K/uL ( @ 06:26)  WBC Count: 10.14 K/uL ( @ 19:55)                            10.7   8.32  )-----------( 181      ( 2020 06:26 )             34.5           145  |  114<H>  |  23  ----------------------------<  187<H>  3.3<L>   |  26  |  0.92    Ca    9.0      2020 06:26  Phos  2.2       Mg     2.4         TPro  7.3  /  Alb  2.6<L>  /  TBili  0.4  /  DBili  x   /  AST  16  /  ALT  20  /  AlkPhos  67        Creatinine Trend: 0.92<--, 1.39<--    Urinalysis Basic - ( 2020 21:30 )    Color: Yellow / Appearance: Slightly Turbid / S.020 / pH: x  Gluc: x / Ketone: Negative  / Bili: Negative / Urobili: Negative mg/dL   Blood: x / Protein: 500 mg/dL / Nitrite: Positive   Leuk Esterase: Small / RBC: 0-2 /HPF / WBC 0-2   Sq Epi: x / Non Sq Epi: Occasional / Bacteria: Moderate        MICROBIOLOGY:  urine and blood cultures are pending    Rapid RVP Result: NotDetec ( @ 21:30)      RADIOLOGY:  Xray Chest 1 View- PORTABLE-Urgent (20 @ 20:33)  EXAM:  XR CHEST PORTABLE URGENT 1V                          Findings/  Impression: The heart is unremarkable. The lungs are clear. Bones are unremarkable for age.    CT Abdomen and Pelvis No Cont (20 @ 20:20)   EXAM:  CT ABDOMEN AND PELVIS                        IMPRESSION:  Likely cystitis. Correlate with urinalysis.  Numerous stones in the urinary bladder likely related to interval lithotripsy.  There appears to be a 6 mm stone in the right ureterovesical junction without hydronephrosis. Numerous small stones in the left renal pelvis likely related to recent lithotripsy. Please note that without IV contrast, pyelonephritis, which is a clinical diagnosis, cannot be assessed.  Tortuous sigmoid colon with a large stool burden.  Cardiomegaly.      EXAM:  CT BRAIN PROCEDURE DATE:  2020    Findings:  The ventricles and sulci are prominent in size, compatible with mild generalized parenchymal volume loss. No acute intracranial hemorrhage is identified.  No extra-axial fluid collection is identified.  No mass effect or midline shift is seen.  There is no evidence of acute territorial infarct.  There are periventricular and subcortical white matter hypodensities, which are nonspecific, but likely reflect moderate microvascular ischemic disease. Chronic infarcts are noted within the right frontal cortex, bilateral basal ganglia and thalami, right mariajose and right cerebellum.  There is a small retention cyst or polyp in left maxillary sinus. The mastoid air cells are well aerated.  No acute osseous abnormality is seen.    Impression: No CT evidence of acute intracranial abnormality.

## 2020-11-02 NOTE — PHYSICAL THERAPY INITIAL EVALUATION ADULT - PERTINENT HX OF CURRENT PROBLEM, REHAB EVAL
Pt  presents to ED with Hematuria. Pmhx CVA, Dementia, Colic lithotripsy with indwelling cath. Admitted to ED with dx Hematuria

## 2020-11-02 NOTE — PHYSICAL THERAPY INITIAL EVALUATION ADULT - IMPAIRMENTS FOUND, PT EVAL
gait, locomotion, and balance/gross motor/poor safety awareness/ergonomics and body mechanics/aerobic capacity/endurance/arousal, attention, and cognition/muscle strength

## 2020-11-02 NOTE — PROGRESS NOTE ADULT - SUBJECTIVE AND OBJECTIVE BOX
CHIEF COMPLAINT: Limited hx as patient is non verbal.   no report of any vomiting or fever or diarrhea or active gross bleeding         PHYSICAL EXAM:    GENERAL: Thinly built and non verbal   CHEST/LUNG: Clear to ausculation bilaterally, no wheezing, no crackles   HEART: Regular rate and rhythm; No murmurs, rubs  ABDOMEN: Soft, Nontender, Nondistended; Bowel sounds present  EXTREMITIES: No clubbing, cyanosis, or edema. ROM could not assessed because of non verbal status and following no commands.   NERVOUS SYSTEM:  Limited as did not follow commands  Psychiatry: AA and orientation could not be assessed.       OBJECTIVE DATA:   Vital Signs Last 24 Hrs  T(C): 37.1 (2020 05:23), Max: 38.5 (2020 19:12)  T(F): 98.8 (2020 05:23), Max: 101.3 (2020 19:12)  HR: 83 (2020 05:23) (73 - 89)  BP: 141/66 (2020 05:23) (141/66 - 160/69)  BP(mean): --  RR: 16 (2020 05:23) (16 - 20)  SpO2: 98% (2020 05:23) (94% - 100%)           Daily Height in cm: 162.56 (2020 19:12)    Daily   LABS:                        10.7   8.32  )-----------( 181      ( 2020 06:26 )             34.5             11-02    145  |  114<H>  |  23  ----------------------------<  187<H>  3.3<L>   |  26  |  0.92    Ca    9.0      2020 06:26  Phos  2.2     11-  Mg     2.4     11-    TPro  7.3  /  Alb  2.6<L>  /  TBili  0.4  /  DBili  x   /  AST  16  /  ALT  20  /  AlkPhos  67  11-              PT/INR - ( 2020 19:55 )   PT: 13.4 sec;   INR: 1.16 ratio         PTT - ( 2020 19:55 )  PTT:26.4 sec  Urinalysis Basic - ( 2020 21:30 )    Color: Yellow / Appearance: Slightly Turbid / S.020 / pH: x  Gluc: x / Ketone: Negative  / Bili: Negative / Urobili: Negative mg/dL   Blood: x / Protein: 500 mg/dL / Nitrite: Positive   Leuk Esterase: Small / RBC: 0-2 /HPF / WBC 0-2   Sq Epi: x / Non Sq Epi: Occasional / Bacteria: Moderate        CARDIAC MARKERS ( 2020 08:44 )  .071 ng/mL / x     / x     / x     / x      CARDIAC MARKERS ( 2020 06:26 )  .080 ng/mL / x     / x     / x     / x          CAPILLARY BLOOD GLUCOSE      POCT Blood Glucose.: 165 mg/dL (2020 10:49)         Interval Radiology studies: reviewed by me    < from: Xray Chest 1 View- PORTABLE-Urgent (20 @ 20:33) >  Impression: The heart is unremarkable. The lungs are clear. Bones are unremarkable for age.          MEDICATIONS  (STANDING):  amLODIPine   Tablet 10 milliGRAM(s) Oral daily  cefTRIAXone   IVPB 1000 milliGRAM(s) IV Intermittent every 24 hours  cloNIDine 0.1 milliGRAM(s) Oral two times a day  dextrose 5%. 1000 milliLiter(s) (50 mL/Hr) IV Continuous <Continuous>  dextrose 50% Injectable 12.5 Gram(s) IV Push once  dextrose 50% Injectable 25 Gram(s) IV Push once  dextrose 50% Injectable 25 Gram(s) IV Push once  hydrALAZINE 75 milliGRAM(s) Oral three times a day  insulin lispro (ADMELOG) corrective regimen sliding scale   SubCutaneous every 6 hours  levETIRAcetam 750 milliGRAM(s) Oral two times a day  metoprolol tartrate 50 milliGRAM(s) Oral two times a day  potassium chloride    Tablet ER 40 milliEquivalent(s) Oral once  potassium phosphate / sodium phosphate Powder (PHOS-NaK) 1 Packet(s) Oral three times a day  simvastatin 20 milliGRAM(s) Oral at bedtime  sodium chloride 0.9%. 1000 milliLiter(s) (75 mL/Hr) IV Continuous <Continuous>  tamsulosin 0.4 milliGRAM(s) Oral at bedtime    MEDICATIONS  (PRN):  acetaminophen   Tablet .. 650 milliGRAM(s) Oral every 6 hours PRN Temp greater or equal to 38C (100.4F), Mild Pain (1 - 3)  dextrose 40% Gel 15 Gram(s) Oral once PRN Blood Glucose LESS THAN 70 milliGRAM(s)/deciliter  glucagon  Injectable 1 milliGRAM(s) IntraMuscular once PRN Glucose LESS THAN 70 milligrams/deciliter  ondansetron Injectable 4 milliGRAM(s) IV Push every 6 hours PRN Nausea and/or Vomiting

## 2020-11-02 NOTE — PHYSICAL THERAPY INITIAL EVALUATION ADULT - BALANCE TRAINING, PT EVAL
Improve sitting and standing balance to fair or good using assistive device with assistance of 1 person.

## 2020-11-02 NOTE — CONSULT NOTE ADULT - SUBJECTIVE AND OBJECTIVE BOX
CHIEF COMPLAINT:  Patient is a 69y old  Female who presents with a chief complaint of UTI, Obstructive uropathy secondary to calculi (2020 11:26)      HPI:  70 y/o female with PMHx of dementia (minimally verbal per daughter occasional gives 1 word answers of yes/no), CVA, Seizure disorder, HTN, DM type 2 (off meds per daughter), admission 2 months ago for renal colic s/p lithotripsy in  w/ indwelling milner presents to the ED due to hematuria. Per daughter at bedside, pt was supposed to go to the Urologist's office for milner removal and TOV on Thursday however unable to arrange a ride. Daughter reports the next day she noticed urine was leaking around milner thus a visiting nurse came to change "part of the tube" and the bag and leakage stopped. Of note in the last few days daughter reports pt has been more sluggish, and sleepy, less alert/responsive. Today she noted hematuria thus brought to the ED. No noted fever or chills.     In ED initial vitals /69, T max 101.3. For sig labs see below. CT Likely cystitis. Correlate with urinalysis.  Numerous stones in the urinary bladder likely related to interval lithotripsy.  There appears to be a 6 mm stone in the right ureterovesical junction without hydronephrosis. Pt given Rocephin.     Low grade troponin release likely demand ischemia.    ALLERGIES:  No Known Allergies      Home Medications:  amLODIPine 10 mg oral tablet: 1 tab(s) orally once a day (2020 19:56)  aspirin 81 mg oral delayed release tablet: 1 tab(s) orally once a day (2020 19:56)  cloNIDine 0.1 mg oral tablet: 1 tab(s) orally 2 times a day (2020 19:56)  hydrALAZINE 25 mg oral tablet: 3 tab(s) orally 3 times a day (2020 19:56)  levETIRAcetam 750 mg oral tablet: 1 tab(s) orally 2 times a day (2020 19:56)  metoprolol tartrate 50 mg oral tablet: 1 tab(s) orally 2 times a day (2020 19:56)  Zocor 20 mg oral tablet: 1 tab(s) orally once a day (at bedtime) (2020 19:56)    PAST MEDICAL & SURGICAL HISTORY:  CVA (cerebral infarction)  right side weakness    Vision changes  lt eye    Urinary incontinence    Seizure disorder    Gout    OA (osteoarthritis)  bautista knees, low back  and  bautista shoulders    Asthma    Diabetes    Hypertension    Kidney stone          FAMILY HISTORY:  No pertinent family history in first degree relatives        SOCIAL HISTORY:    REVIEW OF SYSTEMS:  Unable to assess    PHYSICAL EXAM:  Vital Signs:  Vital Signs Last 24 Hrs  T(C): 36.8 (2020 12:15), Max: 38.5 (2020 19:12)  T(F): 98.2 (2020 12:15), Max: 101.3 (2020 19:12)  HR: 74 (2020 12:15) (73 - 89)  BP: 143/60 (2020 12:15) (141/66 - 160/69)  RR: 18 (2020 12:15) (16 - 20)  SpO2: 100% (2020 12:15) (94% - 100%)    Constitutional: well developed, normal appearance, well groomed, well nourished, no deformities and no acute distress.   Eyes: the conjunctiva exhibited no abnormalities and the eyelids demonstrated no xanthelasmas.   HEENT: normal oral mucosa, no oral pallor and no oral cyanosis.   Neck: normal jugular venous A waves present, normal jugular venous V waves present and no jugular venous tirado A waves.   Pulmonary: no respiratory distress, normal respiratory rhythm and effort, no accessory muscle use and lungs were clear to auscultation bilaterally.   Cardiovascular: heart rate and rhythm were normal, normal S1 and S2 and no murmur, gallop, rub, heave or thrill are present.   Abdomen: soft, non-tender, no hepato-splenomegaly and no abdominal mass palpated.   Musculoskeletal: the gait could not be assessed..   Extremities: no clubbing of the fingernails, no localized cyanosis, no petechial hemorrhages and no ischemic changes.   Skin: normal skin color and pigmentation, no rash, no venous stasis, no skin lesions, no skin ulcer and no xanthoma was observed.   Psychiatric: oriented to person, place, and time, the affect was normal, the mood was normal and not feeling anxious.      LABORATORY:                          10.7   8.32  )-----------( 181      ( 2020 06:26 )             34.5     11-    145  |  114<H>  |  23  ----------------------------<  187<H>  3.3<L>   |  26  |  0.92    Ca    9.0      2020 06:26  Phos  2.2       Mg     2.4         TPro  7.3  /  Alb  2.6<L>  /  TBili  0.4  /  DBili  x   /  AST  16  /  ALT  20  /  AlkPhos  67  11      CARDIAC MARKERS ( 2020 08:44 )  .071 ng/mL / x     / x     / x     / x      CARDIAC MARKERS ( 2020 06:26 )  .080 ng/mL / x     / x     / x     / x          CAPILLARY BLOOD GLUCOSE      POCT Blood Glucose.: 165 mg/dL (2020 10:49)  POCT Blood Glucose.: 210 mg/dL (2020 05:38)  POCT Blood Glucose.: 251 mg/dL (2020 23:55)    LIVER FUNCTIONS - ( 2020 19:55 )  Alb: 2.6 g/dL / Pro: 7.3 gm/dL / ALK PHOS: 67 U/L / ALT: 20 U/L / AST: 16 U/L / GGT: x           PT/INR - ( 2020 19:55 )   PT: 13.4 sec;   INR: 1.16 ratio         PTT - ( 2020 19:55 )  PTT:26.4 sec  Urinalysis Basic - ( 2020 21:30 )    Color: Yellow / Appearance: Slightly Turbid / S.020 / pH: x  Gluc: x / Ketone: Negative  / Bili: Negative / Urobili: Negative mg/dL   Blood: x / Protein: 500 mg/dL / Nitrite: Positive   Leuk Esterase: Small / RBC: 0-2 /HPF / WBC 0-2   Sq Epi: x / Non Sq Epi: Occasional / Bacteria: Moderate      IMAGING:    < from: 12 Lead ECG (20 @ 09:24) >  Sinus bradycardia  T wave abnormality, consider lateral ischemia  Abnormal ECG  When compared with ECG of 2020 22:09,  No significant change was found    < end of copied text >      < from: CT Abdomen and Pelvis No Cont (20 @ 20:20) >        IMPRESSION:    Likely cystitis. Correlate with urinalysis.  Numerous stones in the urinary bladder likely related to interval lithotripsy.  There appears to be a 6 mm stone in the right ureterovesical junction without hydronephrosis. Numerous small stones in the left renal pelvis likely related to recent lithotripsy. Please note that without IV contrast, pyelonephritis, which is a clinical diagnosis, cannot be assessed.    Tortuous sigmoid colon with a large stool burden.  Cardiomegaly.    < end of copied text >      < from: TTE Echo Complete w/o Contrast w/ Doppler (20 @ 12:44) >  Summary:   1. Left ventricular ejection fraction, by visual estimation, is 60 to 65%.   2. Normal global left ventricular systolic function.   3. There is mild concentric left ventricular hypertrophy.   4. Mild mitral annular calcification.   5. Mild thickening and calcification of the anterior and posterior mitral valve leaflets.   6. No evidence of mitral valve regurgitation.   7. Mild aortic regurgitation.   8. No evidence of systolic or diastolic heart failure.    < end of copied text >    ASSESSMENT:  70 y/o female with PMHx of dementia (minimally verbal per daughter occasional gives 1 word answers of yes/no), CVA, Seizure disorder, HTN, DM type 2 (off meds per daughter), admission 2 months ago for renal colic s/p lithotripsy in  w/ indwelling milner presents to the ED due to hematuria. Per daughter at bedside, pt was supposed to go to the Urologist's office for milner removal and TOV on Thursday however unable to arrange a ride. Daughter reports the next day she noticed urine was leaking around milenr thus a visiting nurse came to change "part of the tube" and the bag and leakage stopped. Of note in the last few days daughter reports pt has been more sluggish, and sleepy, less alert/responsive. Today she noted hematuria thus brought to the ED. No noted fever or chills.     In ED initial vitals /69, T max 101.3. For sig labs see below. CT Likely cystitis. Correlate with urinalysis.  Numerous stones in the urinary bladder likely related to interval lithotripsy.  There appears to be a 6 mm stone in the right ureterovesical junction without hydronephrosis. Pt given Rocephin.     Low grade troponin release likely demand ischemia.      PLAN:     MEDICATIONS  (STANDING):  amLODIPine   Tablet 10 milliGRAM(s) Oral daily  cefTRIAXone   IVPB 1000 milliGRAM(s) IV Intermittent every 24 hours  cloNIDine 0.1 milliGRAM(s) Oral two times a day  dextrose 5%. 1000 milliLiter(s) (50 mL/Hr) IV Continuous <Continuous>  dextrose 50% Injectable 12.5 Gram(s) IV Push once  dextrose 50% Injectable 25 Gram(s) IV Push once  dextrose 50% Injectable 25 Gram(s) IV Push once  hydrALAZINE 75 milliGRAM(s) Oral three times a day  insulin lispro (ADMELOG) corrective regimen sliding scale   SubCutaneous every 6 hours  levETIRAcetam 750 milliGRAM(s) Oral two times a day  metoprolol tartrate 50 milliGRAM(s) Oral two times a day  potassium phosphate / sodium phosphate Powder (PHOS-NaK) 1 Packet(s) Oral three times a day  simvastatin 20 milliGRAM(s) Oral at bedtime  sodium chloride 0.9%. 1000 milliLiter(s) (75 mL/Hr) IV Continuous <Continuous>  tamsulosin 0.4 milliGRAM(s) Oral at bedtime    Lower risk procedure in intermediate risk patient.  From current cardiac standpoint, may proceed ahead with  stent procedure with increased risk.    Joseph Ojeda MD, FACC, BRETT, CHINYERE, FACP  Director, Heart Failure Services  [facility]  , Department of Cardiology  [facility]     CHIEF COMPLAINT:  Patient is a 69y old  Female who presents with a chief complaint of UTI, Obstructive uropathy secondary to calculi (2020 11:26)      HPI:  She is 69 years of age with CVA, nonverbal, dementia status, history of seizure disorder, type 2 diabetes and hypertension admitted for lithotripsy in August with indwelling Mayberry catheter presented with cystitis and hematuria and had numerous urinary bladder stones and a 6 mm stone in the right ureterovesicular junction without hydronephrosis. Also seen to have low-grade troponin release. Initial blood pressure was elevated at 160/69 and temperature of 101.3°F.    ALLERGIES:  No Known Allergies      Home Medications:  amLODIPine 10 mg oral tablet: 1 tab(s) orally once a day (2020 19:56)  aspirin 81 mg oral delayed release tablet: 1 tab(s) orally once a day (:)  cloNIDine 0.1 mg oral tablet: 1 tab(s) orally 2 times a day (:)  hydrALAZINE 25 mg oral tablet: 3 tab(s) orally 3 times a day (:56)  levETIRAcetam 750 mg oral tablet: 1 tab(s) orally 2 times a day (:56)  metoprolol tartrate 50 mg oral tablet: 1 tab(s) orally 2 times a day (:56)  Zocor 20 mg oral tablet: 1 tab(s) orally once a day (at bedtime) (2020 19:56)    PAST MEDICAL & SURGICAL HISTORY:  CVA (cerebral infarction)  right side weakness    Vision changes  lt eye    Urinary incontinence    Seizure disorder    Gout    OA (osteoarthritis)  bautista knees, low back  and  bautista shoulders    Asthma    Diabetes    Hypertension    Kidney stone          FAMILY HISTORY:  No pertinent family history in first degree relatives        SOCIAL HISTORY:  unknown    REVIEW OF SYSTEMS:  Unable to assess    PHYSICAL EXAM:  Vital Signs:  Vital Signs Last 24 Hrs  T(C): 36.8 (2020 12:15), Max: 38.5 (2020 19:)  T(F): 98.2 (2020 12:15), Max: 101.3 (2020 19:)  HR: 74 (2020 12:15) (73 - 89)  BP: 143/60 (2020 12:15) (141/66 - 160/69)  RR: 18 (2020 12:15) (16 - 20)  SpO2: 100% (2020 12:15) (94% - 100%)    Constitutional: normal appearance, well groomed, no deformities and no acute distress.   Eyes: the conjunctiva exhibited no abnormalities and the eyelids demonstrated no xanthelasmas.   HEENT: normal oral mucosa, no oral pallor and no oral cyanosis.   Neck: normal jugular venous A waves present, normal jugular venous V waves present and no jugular venous tirado A waves.   Pulmonary: no respiratory distress, normal respiratory rhythm and effort, no accessory muscle use and lungs were clear to auscultation bilaterally.   Cardiovascular: heart rate and rhythm were normal, normal S1 and S2 and no murmur, gallop, rub, heave or thrill are present.   Abdomen: soft, non-tender  Musculoskeletal: the gait could not be assessed.   Extremities: no clubbing of the fingernails, no localized cyanosis, no petechial hemorrhages and no ischemic changes.   Skin: normal skin color and pigmentation, no rash, no venous stasis, no skin lesions, no skin ulcer and no xanthoma was observed.       LABORATORY:                          10.7   8.32  )-----------( 181      ( 2020 06:26 )             34.5     11-02    145  |  114<H>  |  23  ----------------------------<  187<H>  3.3<L>   |  26  |  0.92    Ca    9.0      2020 06:26  Phos  2.2     11-02  Mg     2.4     11-02    TPro  7.3  /  Alb  2.6<L>  /  TBili  0.4  /  DBili  x   /  AST  16  /  ALT  20  /  AlkPhos  67  11-01      CARDIAC MARKERS ( 2020 08:44 )  .071 ng/mL / x     / x     / x     / x      CARDIAC MARKERS ( 2020 06:26 )  .080 ng/mL / x     / x     / x     / x          CAPILLARY BLOOD GLUCOSE      POCT Blood Glucose.: 165 mg/dL (2020 10:49)  POCT Blood Glucose.: 210 mg/dL (2020 05:38)  POCT Blood Glucose.: 251 mg/dL (2020 23:55)    LIVER FUNCTIONS - ( 2020 19:55 )  Alb: 2.6 g/dL / Pro: 7.3 gm/dL / ALK PHOS: 67 U/L / ALT: 20 U/L / AST: 16 U/L / GGT: x           PT/INR - ( 2020 19:55 )   PT: 13.4 sec;   INR: 1.16 ratio         PTT - ( 2020 19:55 )  PTT:26.4 sec  Urinalysis Basic - ( 2020 21:30 )    Color: Yellow / Appearance: Slightly Turbid / S.020 / pH: x  Gluc: x / Ketone: Negative  / Bili: Negative / Urobili: Negative mg/dL   Blood: x / Protein: 500 mg/dL / Nitrite: Positive   Leuk Esterase: Small / RBC: 0-2 /HPF / WBC 0-2   Sq Epi: x / Non Sq Epi: Occasional / Bacteria: Moderate      IMAGING:    < from: 12 Lead ECG (20 @ 09:24) >  Sinus bradycardia  T wave abnormality, consider lateral ischemia  Abnormal ECG  When compared with ECG of 2020 22:09,  No significant change was found    < end of copied text >      < from: CT Abdomen and Pelvis No Cont (20 @ 20:20) >        IMPRESSION:    Likely cystitis. Correlate with urinalysis.  Numerous stones in the urinary bladder likely related to interval lithotripsy.  There appears to be a 6 mm stone in the right ureterovesical junction without hydronephrosis. Numerous small stones in the left renal pelvis likely related to recent lithotripsy. Please note that without IV contrast, pyelonephritis, which is a clinical diagnosis, cannot be assessed.    Tortuous sigmoid colon with a large stool burden.  Cardiomegaly.    < end of copied text >      < from: TTE Echo Complete w/o Contrast w/ Doppler (20 @ 12:44) >  Summary:   1. Left ventricular ejection fraction, by visual estimation, is 60 to 65%.   2. Normal global left ventricular systolic function.   3. There is mild concentric left ventricular hypertrophy.   4. Mild mitral annular calcification.   5. Mild thickening and calcification of the anterior and posterior mitral valve leaflets.   6. No evidence of mitral valve regurgitation.   7. Mild aortic regurgitation.   8. No evidence of systolic or diastolic heart failure.    < end of copied text >    ASSESSMENT:  She is 69 years of age with CVA, nonverbal, dementia status, history of seizure disorder, type 2 diabetes and hypertension admitted for lithotripsy in August with indwelling Mayberry catheter presented with cystitis and hematuria and had numerous urinary bladder stones and a 6 mm stone in the right ureterovesicular junction without hydronephrosis. Also seen to have low-grade troponin release. Initial blood pressure was elevated at 160/69 and temperature of 101.3°F.  Likely low-grade troponin release secondary to dementia ischemia given the recent medical stress.      PLAN:     She will continue on her amlodipine, clonidine, hydralazine, metoprolol for antihypertensive benefits along with antiseizure medication plus glycemic lowering for type 2 diabetes care. Antibiotics continued for cystitis. She will stay on simvastatin for lipid lowering. Although she had low-grade troponin and an abnormal ECG, her recent echo showed preserved LV systolic function and no significant valvular heart disease. She will be going for a lower risk  procedure as an intermediate risk patient therefore from a current cardiac standpoint she may proceed ahead with her  stenting procedure with increased but acceptable risk. Routine monitoring perioperatively as recommended.    Joseph Ojeda MD, FACC, CHINYERE WEST, FACP  Director, Heart Failure Services  [facility]  , Department of Cardiology  [facility] [employer]

## 2020-11-02 NOTE — CONSULT NOTE ADULT - ATTENDING COMMENTS
Discussed with Pippa Zhang NP. I have reviewed all pertinent clinical information, including history, physical exam, plan and the NP's note and agree. Note has been reviewed and made any necessary modifications.     69 years old female with PMH of dementia, CVA, seizure disorder, HTN, DM type 2 (not on any medications), s/p admission to the hospital in August 2020 with UTI (UCx grew Pseudomonas aeruginosa Carbapenem resistant), urinary retention, s/p lithotripsy and Milner placement, followed by another admission with renal failure, pressure ulcer, Blood culture grew Staphylococcus hominis deemed contaminant.   Here had one time fever, normal WBC, UA in the new milner with only 0-2 WBC, COVID negative   Has suprapubic tenderness on exam, no CVA tenderness, +milner with sediment in it  CXR (I personally reviewed) no pneumonia   CT (I personally reviewed) cystitis and multiple stones   She has had resistant organisms in the past including .   If this is pseudomonas then the ceftriaxone wont have activity   Even if pseudomonas she has had different morphologies with different sensitivity patterns and if this is carbapenem resistant may not have too many options and would potentially need to resort to agents such as (Zerbaxa) ceftolozane/tazobactam.   Since she is stable and her UA was not impressive- would hold off on broadening antibiotics at this time. If not for the fever and suprapubic tenderness would consider no antibiotics.    Plan:  -continue ceftriaxone 1g q24hrs  -follow on blood and urine culture results   -if worsens would agree with urology placing stent   -without stone removal, patient is at high risk of recurrent infections especially if any of them cause obstruction   -patient with functional quandraplegia- >please ensure offloading and optimizing nutrition to avoid bedsores
Pain left flank and fever.     s/p ESWL of large left renal pelvic stone. Residual stones in renal pelvis and urinary bladder. Some residual stones at left UVJ.  Chronic urinary retention secondary to Hypotonic neurogenic bladder. Managed with Mayberry catheter.     will continue antibiotics. Await urine culture. If infection and / or pain nit controlled , will insert ureteral stent. Once infection controlled, will need removal of all renal stones.

## 2020-11-02 NOTE — PHYSICAL THERAPY INITIAL EVALUATION ADULT - ADDITIONAL COMMENTS
As per sister pt lives in a pvt house with 24/7 HHA, to be physically carried on the steps when getting in and out of the house, she lives on 2nd floor pvt house with 15 steps.

## 2020-11-02 NOTE — PHYSICAL THERAPY INITIAL EVALUATION ADULT - FOLLOWS COMMANDS/ANSWERS QUESTIONS, REHAB EVAL
needs constant verbal, visual and tactile cues/unable to follow multi-step instructions/unable to answer questions

## 2020-11-02 NOTE — PHYSICAL THERAPY INITIAL EVALUATION ADULT - MANUAL MUSCLE TESTING RESULTS, REHAB EVAL
unable to assess accurately , pt not following directions however active movements noted all extremities, muscle strength approx 3/5 to 4-/5

## 2020-11-02 NOTE — CONSULT NOTE ADULT - ASSESSMENT
69 years old female with PMH of dementia, CVA, seizure disorder, HTN, DM type 2 (not on any medications), s/p admission to the hospital in August 2020 with UTI (UCx grew Pseudomonas aeruginosa Carbapenem resistant), urinary retention, s/p lithotripsy and Mayberry placement, followed by another admission with renal failure, pressure ulcer, Blood culture grew Staphylococcus hominis.   The pt was admitted yesterday with UTI, hematuria, spoke with pt's daughter, reports that the pt was responding well, ambulating up until the beginning of last week, noted leakage around the Mayberry on Tuesday last week, home care RN has changed the tube and the bag and the leakage stopped on its own. The pt became less mobile and less verbal within next couple days, by Saturday the pt's appetite became poor, by Sunday pt's daughter noted hematuria and the pt became non-verbal, was taken to ED. No reported fevers at home.   The pt became febrile upon admission, 101.3 11/1/20 at 19:12, afebrile since then, no prior fevers as per HPI, Mayberry exchanged in ED during the admission. No leukocytosis, AMADOU resolved, Lactate 1.1, UA was positive for nitrates and moderate bacteria, - WBC, Urine and blood cultures are pending, CT head negative for acute events, Chest Xray negative, CT abd/pelvis w/o contrast - cystitis, numerous stones in the urinary bladder likely related to interval lithotripsy, there appears to be a 6 mm stone in the right ureterovesical junction without hydronephrosis. The pt was started on Rocephin 1 gram IV daily. The pt was seen by urology, recommendation was - if + infection and/or uncontrolled pain, the pt will need stenting, the stent will need to be removed later, the pt will need removal of the stones in the future.     The pt was seen and examined, no distress, non-verbal, nods in response to some of my questions.     1. UTI  2. Fever  3. AMADOU     Plan:  - Continue Rocephin 1 gram IV q 24 hours  - Monitor patient's temperatures  - awaiting for urine and blood cultures  - monitor patient WBC  - glycemic control    69 years old female with PMH of dementia, CVA, seizure disorder, HTN, DM type 2 (not on any medications), s/p admission to the hospital in August 2020 with UTI (UCx grew Pseudomonas aeruginosa Carbapenem resistant), urinary retention, s/p lithotripsy and Mayberry placement, followed by another admission with renal failure, pressure ulcer, Blood culture grew Staphylococcus hominis.  The pt was admitted yesterday with UTI, hematuria, spoke with pt's daughter, reports that the pt was responding well, ambulating up until the beginning of last week, noted leakage around the Mayberry on Tuesday last week, home care RN has changed the tube and the bag and the leakage stopped on its own. The pt became less mobile and less verbal within next couple days, by Saturday the pt's appetite became poor, by Sunday pt's daughter noted hematuria and the pt became non-verbal, was taken to ED. No reported fevers at home.   The pt became febrile upon admission, 101.3 11/1/20 at 19:12, afebrile since then, no prior fevers as per HPI, Mayberry exchanged in ED during the admission. No leukocytosis, AMADOU resolved, Lactate 1.1, UA was positive for nitrates and moderate bacteria, - WBC, Urine and blood cultures are pending, CT head negative for acute events, Chest Xray negative, CT abd/pelvis w/o contrast - cystitis, numerous stones in the urinary bladder likely related to interval lithotripsy, there appears to be a 6 mm stone in the right ureterovesical junction without hydronephrosis. The pt was started on Rocephin 1 gram IV daily. The pt was seen by urology, recommendation was - if + infection and/or uncontrolled pain, the pt will need stenting, the stent will need to be removed later, the pt will need removal of the stones in the future.     The pt was seen and examined, no distress, non-verbal, nods in response to some of my questions.     1. UTI  2. Fever  3. AMADOU     Plan:  - Continue Rocephin 1 gram IV q 24 hours  - Monitor patient's temperatures  - awaiting for urine and blood cultures  - monitor patient WBC  - glycemic control

## 2020-11-02 NOTE — CONSULT NOTE ADULT - REASON FOR ADMISSION
UTI, Obstructive uropathy secondary to calculi

## 2020-11-03 LAB
ANION GAP SERPL CALC-SCNC: 8 MMOL/L — SIGNIFICANT CHANGE UP (ref 5–17)
BUN SERPL-MCNC: 25 MG/DL — HIGH (ref 7–23)
CALCIUM SERPL-MCNC: 8.4 MG/DL — LOW (ref 8.5–10.1)
CHLORIDE SERPL-SCNC: 112 MMOL/L — HIGH (ref 96–108)
CO2 SERPL-SCNC: 24 MMOL/L — SIGNIFICANT CHANGE UP (ref 22–31)
CREAT SERPL-MCNC: 0.78 MG/DL — SIGNIFICANT CHANGE UP (ref 0.5–1.3)
GLUCOSE BLDC GLUCOMTR-MCNC: 173 MG/DL — HIGH (ref 70–99)
GLUCOSE BLDC GLUCOMTR-MCNC: 178 MG/DL — HIGH (ref 70–99)
GLUCOSE BLDC GLUCOMTR-MCNC: 196 MG/DL — HIGH (ref 70–99)
GLUCOSE BLDC GLUCOMTR-MCNC: 217 MG/DL — HIGH (ref 70–99)
GLUCOSE SERPL-MCNC: 162 MG/DL — HIGH (ref 70–99)
HCT VFR BLD CALC: 30.6 % — LOW (ref 34.5–45)
HGB BLD-MCNC: 10.1 G/DL — LOW (ref 11.5–15.5)
MCHC RBC-ENTMCNC: 28.8 PG — SIGNIFICANT CHANGE UP (ref 27–34)
MCHC RBC-ENTMCNC: 33 GM/DL — SIGNIFICANT CHANGE UP (ref 32–36)
MCV RBC AUTO: 87.2 FL — SIGNIFICANT CHANGE UP (ref 80–100)
NRBC # BLD: 0 /100 WBCS — SIGNIFICANT CHANGE UP (ref 0–0)
PHOSPHATE SERPL-MCNC: 3.2 MG/DL — SIGNIFICANT CHANGE UP (ref 2.5–4.5)
PLATELET # BLD AUTO: 191 K/UL — SIGNIFICANT CHANGE UP (ref 150–400)
POTASSIUM SERPL-MCNC: 3.6 MMOL/L — SIGNIFICANT CHANGE UP (ref 3.5–5.3)
POTASSIUM SERPL-SCNC: 3.6 MMOL/L — SIGNIFICANT CHANGE UP (ref 3.5–5.3)
RBC # BLD: 3.51 M/UL — LOW (ref 3.8–5.2)
RBC # FLD: 12.8 % — SIGNIFICANT CHANGE UP (ref 10.3–14.5)
SODIUM SERPL-SCNC: 144 MMOL/L — SIGNIFICANT CHANGE UP (ref 135–145)
WBC # BLD: 6.36 K/UL — SIGNIFICANT CHANGE UP (ref 3.8–10.5)
WBC # FLD AUTO: 6.36 K/UL — SIGNIFICANT CHANGE UP (ref 3.8–10.5)

## 2020-11-03 PROCEDURE — 99232 SBSQ HOSP IP/OBS MODERATE 35: CPT

## 2020-11-03 PROCEDURE — 88300 SURGICAL PATH GROSS: CPT | Mod: 26

## 2020-11-03 PROCEDURE — 99233 SBSQ HOSP IP/OBS HIGH 50: CPT

## 2020-11-03 RX ORDER — HYDRALAZINE HCL 50 MG
100 TABLET ORAL EVERY 8 HOURS
Refills: 0 | Status: DISCONTINUED | OUTPATIENT
Start: 2020-11-03 | End: 2020-11-09

## 2020-11-03 RX ADMIN — Medication 1 PACKET(S): at 13:55

## 2020-11-03 RX ADMIN — Medication 50 MILLIGRAM(S): at 05:31

## 2020-11-03 RX ADMIN — LEVETIRACETAM 750 MILLIGRAM(S): 250 TABLET, FILM COATED ORAL at 05:30

## 2020-11-03 RX ADMIN — HEPARIN SODIUM 5000 UNIT(S): 5000 INJECTION INTRAVENOUS; SUBCUTANEOUS at 21:45

## 2020-11-03 RX ADMIN — Medication 1: at 07:47

## 2020-11-03 RX ADMIN — Medication 1 PACKET(S): at 21:45

## 2020-11-03 RX ADMIN — Medication 0.1 MILLIGRAM(S): at 05:31

## 2020-11-03 RX ADMIN — TAMSULOSIN HYDROCHLORIDE 0.4 MILLIGRAM(S): 0.4 CAPSULE ORAL at 21:44

## 2020-11-03 RX ADMIN — Medication 1: at 11:20

## 2020-11-03 RX ADMIN — AMLODIPINE BESYLATE 10 MILLIGRAM(S): 2.5 TABLET ORAL at 05:28

## 2020-11-03 RX ADMIN — SODIUM CHLORIDE 75 MILLILITER(S): 9 INJECTION INTRAMUSCULAR; INTRAVENOUS; SUBCUTANEOUS at 09:27

## 2020-11-03 RX ADMIN — LEVETIRACETAM 750 MILLIGRAM(S): 250 TABLET, FILM COATED ORAL at 17:18

## 2020-11-03 RX ADMIN — Medication 50 MILLIGRAM(S): at 17:18

## 2020-11-03 RX ADMIN — Medication 1 PACKET(S): at 05:28

## 2020-11-03 RX ADMIN — Medication 100 MILLIGRAM(S): at 21:44

## 2020-11-03 RX ADMIN — HEPARIN SODIUM 5000 UNIT(S): 5000 INJECTION INTRAVENOUS; SUBCUTANEOUS at 13:55

## 2020-11-03 RX ADMIN — Medication 100 MILLIGRAM(S): at 13:59

## 2020-11-03 RX ADMIN — Medication 75 MILLIGRAM(S): at 05:28

## 2020-11-03 RX ADMIN — HEPARIN SODIUM 5000 UNIT(S): 5000 INJECTION INTRAVENOUS; SUBCUTANEOUS at 05:33

## 2020-11-03 RX ADMIN — Medication 1: at 17:23

## 2020-11-03 RX ADMIN — SIMVASTATIN 20 MILLIGRAM(S): 20 TABLET, FILM COATED ORAL at 21:45

## 2020-11-03 RX ADMIN — Medication 0.1 MILLIGRAM(S): at 17:18

## 2020-11-03 NOTE — PROGRESS NOTE ADULT - SUBJECTIVE AND OBJECTIVE BOX
Patient seen and examined bedside with Dr. Trent, limited history due to patient mental status, nonverbal.   No acute overnight events.   Milner draining well.  Afebrile. Tolerating diet.    T(F): 98.8 (11-03-20 @ 11:11), Max: 98.8 (11-03-20 @ 11:11)  HR: 67 (11-03-20 @ 11:31) (67 - 79)  BP: 138/59 (11-03-20 @ 11:11) (138/59 - 173/61)  RR: 15 (11-03-20 @ 11:31) (15 - 18)  SpO2: 98% (11-03-20 @ 11:31) (96% - 100%)    POCT Blood Glucose.: 196 mg/dL (03 Nov 2020 10:39)  POCT Blood Glucose.: 178 mg/dL (03 Nov 2020 07:26)  POCT Blood Glucose.: 206 mg/dL (02 Nov 2020 21:06)  POCT Blood Glucose.: 151 mg/dL (02 Nov 2020 15:37)      PHYSICAL EXAM:    General: NAD, nonverbal   Chest: nonlabored respirations, CTA b/l.  Abdomen: soft, NT/ND.   Extremities: Calf soft, nontender b/l.   : No suprapubic tenderness or bladder distention.  Milner catheter in place, draining yellow urine, no hematuria/clots.     LABS:                        10.1   6.36  )-----------( 191      ( 03 Nov 2020 07:57 )             30.6   11-03    144  |  112<H>  |  25<H>  ----------------------------<  162<H>  3.6   |  24  |  0.78    Ca    8.4<L>      03 Nov 2020 07:57  Phos  3.2     11-03  Mg     2.4     11-02    TPro  7.3  /  Alb  2.6<L>  /  TBili  0.4  /  DBili  x   /  AST  16  /  ALT  20  /  AlkPhos  67  11-01  PT/INR - ( 01 Nov 2020 19:55 )   PT: 13.4 sec;   INR: 1.16 ratio         PTT - ( 01 Nov 2020 19:55 )  PTT:26.4 sec  I&O's Detail    02 Nov 2020 07:01  -  03 Nov 2020 07:00  --------------------------------------------------------  IN:  Total IN: 0 mL    OUT:    Indwelling Catheter - Urethral (mL): 950 mL  Total OUT: 950 mL    Total NET: -950 mL      03 Nov 2020 07:01  -  03 Nov 2020 12:14  --------------------------------------------------------  IN:  Total IN: 0 mL    OUT:    Indwelling Catheter - Urethral (mL): 650 mL    Stool (mL): 1 mL  Total OUT: 651 mL    Total NET: -651 mL      Assessment: 68 y/o female with PMHx of dementia (minimally verbal per daughter occasional gives 1 word answers of yes/no), CVA, Seizure disorder, HTN, DM type 2 (off meds per daughter), admission 2 months ago for renal colic s/p lithotripsy, Left ureteral stent insertion 8/24, following stent removal in the office, with indwelling milner for Chronic 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     Plan:  Continue medical management and supportive care  Continue Rocephin, f/u Urine cx  Milner catheter, monitor I/Os  Trend Renal function  F/u AM labs  Consider Cysto, stone removal and possible stent insertion once medically optimized. Inpatient vs Outpatient?  Discussed with Urology attendings

## 2020-11-03 NOTE — PROGRESS NOTE ADULT - ASSESSMENT
69 years old female with PMH of dementia, CVA, seizure disorder, HTN, DM type 2 (not on any medications), s/p admission to the hospital in August 2020 with UTI (UCx grew Pseudomonas aeruginosa Carbapenem resistant), urinary retention, s/p lithotripsy and Milner placement, followed by another admission with renal failure, pressure ulcer, Blood culture grew Staphylococcus hominis deemed contaminant.   Here had one time fever, normal WBC, UA in the new milner with only 0-2 WBC, COVID negative   Had suprapubic tenderness on exam, no CVA tenderness, +milner with sediment in it  CXR (I personally reviewed) no pneumonia   CT (I personally reviewed) cystitis and multiple stones   She has had resistant organisms in the past including .   If this is pseudomonas then the ceftriaxone wont have activity   Even if pseudomonas she has had different morphologies with different sensitivity patterns and if this is carbapenem resistant may not have too many options and would potentially need to resort to agents such as (Zerbaxa) ceftolozane/tazobactam.   She is afebrile, normal WBC and had a unimpressive UA once her milner was changed. She is colonized with pseudomonas though has extensive resistance. At this time the risks of antibiotics side effects and developing resistance outweigh the benefits of using ceftriaxone and thus it has been stopped. Once her urine culture is identified with sensitivities, would offer treatment is develops clear signs and/or symptoms of UTI or if she is to undergo a urologic procedure on this admission.     Plan:  -stop ceftriaxone  -follow up on final urine culture results   -if pseudomonas, I will call lab and ask for testing against (Zerbaxa) ceftolozane/tazobactam and (Avycaz) ceftazidime/avibactam    -if worsens would agree with urology placing stent   -without stone removal, patient is at high risk of recurrent infections especially if any of them cause obstruction. Stones often harbor the infection and periodically reactivate an active infection  -patient with functional quadriplegia >please ensure offloading and optimizing nutrition to avoid bedsores.     Discussed with Dr. Ortez and Urology PA     Harlan Phan DO  Cell: [phone]  Pager [phone]  Infectious Disease Attending  After 5pm/weekends please call [phone] for all inquiries and new consults

## 2020-11-03 NOTE — PROGRESS NOTE ADULT - SUBJECTIVE AND OBJECTIVE BOX
FATOU MCBRIDE  MRN-[id number]    Follow Up:  ID following for UTI/fever     Interval History: No complaints but limited in ability to give history     ROS:    [ ] Unobtainable because:  [ X] All other systems negative            Allergies  No Known Allergies        ANTIMICROBIALS:  cefTRIAXone   IVPB 1000 every 24 hours      OTHER MEDS:  acetaminophen   Tablet .. 650 milliGRAM(s) Oral every 6 hours PRN  amLODIPine   Tablet 10 milliGRAM(s) Oral daily  cloNIDine 0.1 milliGRAM(s) Oral two times a day  glucagon  Injectable 1 milliGRAM(s) IntraMuscular once PRN  heparin   Injectable 5000 Unit(s) SubCutaneous every 8 hours  hydrALAZINE 100 milliGRAM(s) Oral every 8 hours  insulin lispro (ADMELOG) corrective regimen sliding scale   SubCutaneous every 6 hours  levETIRAcetam 750 milliGRAM(s) Oral two times a day  metoprolol tartrate 50 milliGRAM(s) Oral two times a day  ondansetron Injectable 4 milliGRAM(s) IV Push every 6 hours PRN  potassium phosphate / sodium phosphate Powder (PHOS-NaK) 1 Packet(s) Oral three times a day  simvastatin 20 milliGRAM(s) Oral at bedtime  sodium chloride 0.9%. 1000 milliLiter(s) IV Continuous <Continuous>  tamsulosin 0.4 milliGRAM(s) Oral at bedtime      Physical Exam:  Vital Signs Last 24 Hrs  T(C): 37.1 (2020 11:11), Max: 37.1 (2020 11:11)  T(F): 98.8 (2020 11:11), Max: 98.8 (2020 11:11)  HR: 67 (2020 11:31) (67 - 79)  BP: 138/59 (2020 11:11) (138/59 - 173/61)  BP(mean): --  RR: 15 (2020 11:31) (15 - 18)  SpO2: 98% (2020 11:31) (96% - 100%)    General:    NAD,  non toxic  Head: atraumatic, normocephalic  Eye: normal sclera and conjunctiva  ENT:    no oral lesions, neck supple  Cardio:     regular S1, S2,  no murmur  Respiratory:    clear b/l,    no wheezing  abd:     soft,   BS +,   no tenderness  :   no suprapubic tenderness,   +milner  Musculoskeletal:   no joint swelling,   no edema  vascular: no central lines, +PIV   Skin:    no rash  Neurologic:     poor mental status, knows name, weak in all extremities   psych: normal affect    WBC Count: 6.36 K/uL ( @ 07:57)  WBC Count: 8.32 K/uL ( @ 06:26)  WBC Count: 10.14 K/uL ( @ 19:55)                            10.1   6.36  )-----------( 191      ( 2020 07:57 )             30.6           144  |  112<H>  |  25<H>  ----------------------------<  162<H>  3.6   |  24  |  0.78    Ca    8.4<L>      2020 07:57  Phos  3.2       Mg     2.4         TPro  7.3  /  Alb  2.6<L>  /  TBili  0.4  /  DBili  x   /  AST  16  /  ALT  20  /  AlkPhos  67        Urinalysis Basic - ( 2020 21:30 )    Color: Yellow / Appearance: Slightly Turbid / S.020 / pH: x  Gluc: x / Ketone: Negative  / Bili: Negative / Urobili: Negative mg/dL   Blood: x / Protein: 500 mg/dL / Nitrite: Positive   Leuk Esterase: Small / RBC: 0-2 /HPF / WBC 0-2   Sq Epi: x / Non Sq Epi: Occasional / Bacteria: Moderate        Creatinine Trend: 0.78<--, 0.92<--, 1.39<--  Lactate, Blood: 1.1 mmol/L (20 @ 19:55)      MICROBIOLOGY:  v  .Urine Clean Catch (Midstream)  20   >100,000 CFU/ml Gram Negative Rods  --  --      .Blood Blood-Peripheral  20   No growth to date.  --  --      Rapid RVP Result: NotDetec ( @ 21:30)    RADIOLOGY:  no new imaging

## 2020-11-03 NOTE — PROGRESS NOTE ADULT - SUBJECTIVE AND OBJECTIVE BOX
Patient is a 69y old  Female who presents with a chief complaint of UTI, Obstructive uropathy secondary to calculi (03 Nov 2020 13:14)    PAST MEDICAL & SURGICAL HISTORY:  CVA (cerebral infarction)  right side weakness    Vision changes  lt eye    Urinary incontinence    Seizure disorder    Gout    OA (osteoarthritis)  bautista knees, low back  and  bautista shoulders    Asthma    Diabetes    Hypertension    Kidney stone    INTERVAL HISTORY: resting in bed, in no distress   	  MEDICATIONS:  MEDICATIONS  (STANDING):  amLODIPine   Tablet 10 milliGRAM(s) Oral daily  cloNIDine 0.1 milliGRAM(s) Oral two times a day  heparin   Injectable 5000 Unit(s) SubCutaneous every 8 hours  hydrALAZINE 100 milliGRAM(s) Oral every 8 hours  insulin lispro (ADMELOG) corrective regimen sliding scale   SubCutaneous every 6 hours  levETIRAcetam 750 milliGRAM(s) Oral two times a day  metoprolol tartrate 50 milliGRAM(s) Oral two times a day  potassium phosphate / sodium phosphate Powder (PHOS-NaK) 1 Packet(s) Oral three times a day  simvastatin 20 milliGRAM(s) Oral at bedtime  sodium chloride 0.9%. 1000 milliLiter(s) (75 mL/Hr) IV Continuous <Continuous>  tamsulosin 0.4 milliGRAM(s) Oral at bedtime    MEDICATIONS  (PRN):  acetaminophen   Tablet .. 650 milliGRAM(s) Oral every 6 hours PRN Temp greater or equal to 38C (100.4F), Mild Pain (1 - 3)  dextrose 40% Gel 15 Gram(s) Oral once PRN Blood Glucose LESS THAN 70 milliGRAM(s)/deciliter  glucagon  Injectable 1 milliGRAM(s) IntraMuscular once PRN Glucose LESS THAN 70 milligrams/deciliter  ondansetron Injectable 4 milliGRAM(s) IV Push every 6 hours PRN Nausea and/or Vomiting    Vitals:  T(F): 98.5 (11-03-20 @ 17:10), Max: 98.8 (11-03-20 @ 11:11)  HR: 83 (11-03-20 @ 17:13) (67 - 101)  BP: 144/66 (11-03-20 @ 17:13) (134/66 - 173/61)  RR: 16 (11-03-20 @ 17:13) (15 - 18)  SpO2: 100% (11-03-20 @ 17:13) (96% - 100%)    11-02 @ 07:01  -  11-03 @ 07:00  --------------------------------------------------------  IN:  Total IN: 0 mL    OUT:    Indwelling Catheter - Urethral (mL): 950 mL  Total OUT: 950 mL    Total NET: -950 mL    11-03 @ 07:01  -  11-03 @ 17:42  --------------------------------------------------------  IN:  Total IN: 0 mL    OUT:    Indwelling Catheter - Urethral (mL): 650 mL    Stool (mL): 1 mL  Total OUT: 651 mL    Total NET: -651 mL    Weight (kg): 52 (11-02 @ 01:50)  BMI (kg/m2): 19.7 (11-02 @ 01:50)    PHYSICAL EXAM:  Neuro: Awake, responsive, non verbal  CV: S1 S2 RRR  Lungs: diminished to bases   GI: Soft, BS +, ND, NT  Extremities: No edema  + Mayberry catheter     RADIOLOGY: < from: Xray Chest 1 View- PORTABLE-Urgent (11.01.20 @ 20:33) >  Impression: The heart is unremarkable. The lungs are clear. Bones are unremarkable for age.    < end of copied text >    < from: CT Abdomen and Pelvis No Cont (11.01.20 @ 20:20) >  Likely cystitis. Correlate with urinalysis.  Numerous stones in the urinary bladder likely related to interval lithotripsy.  There appears to be a 6 mm stone in the right ureterovesical junction without hydronephrosis. Numerous small stones in the left renal pelvis likely related to recent lithotripsy. Please note that without IV contrast, pyelonephritis, which is a clinical diagnosis, cannot be assessed.    Tortuous sigmoid colon with a large stool burden.  Cardiomegaly.    < end of copied text >  DIAGNOSTIC TESTING:    [x ] Echocardiogram:   < from: TTE Echo Complete w/o Contrast w/ Doppler (08.19.20 @ 12:44) >  Left Ventricle: The left ventricular internal cavity size is normal.  Global LV systolic function was normal. Left ventricular ejection fraction, by visual estimation, is 60 to 65%. Spectral Doppler shows normal pattern of LV diastolic filling. LV systolic function difficult to assess due to tachyarrhythmia.  Right Ventricle: Normal right ventricular size and function.  Left Atrium: The left atrium is normal in size.  Right Atrium: The right atrium is normal in size.  Pericardium: There is no evidence of pericardial effusion.  Mitral Valve: Structurally normal mitral valve, with normal leaflet excursion. Mild thickening and calcification of the anterior and posterior mitral valve leaflets. There is mild mitral annular calcification. No evidence of mitral valve regurgitation is seen.  Tricuspid Valve: Structurally normal tricuspid valve, with normal leaflet excursion. No tricuspid regurgitation is visualized.  Aortic Valve: Normal trileaflet aortic valve with normal opening. Peak transaortic gradient equals 12.1 mmHg, mean transaortic gradient equals 6.4 mmHg, the calculated aortic valve area equals 2.19 cm² by the continuity equation consistent with normally opening aortic valve. Mild aortic valve regurgitation is seen.  Pulmonic Valve: Structurally normal pulmonic valve, with normal leaflet excursion. No indication of pulmonic valve regurgitation.  Aorta: The aortic root and ascending aorta are structurally normal, with no evidence of dilitation.  Pulmonary Artery: The main pulmonary artery is normal in size.      Summary:   1. Left ventricular ejection fraction, by visual estimation, is 60 to 65%.   2. Normal global left ventricular systolic function.   3. There is mild concentric left ventricular hypertrophy.   4. Mild mitral annular calcification.   5. Mild thickening and calcification of the anterior and posterior mitral valve leaflets.   6. No evidence of mitral valve regurgitation.   7. Mild aortic regurgitation.   8. No evidence of systolic or diastolic heart failure.    < end of copied text >    LABS:	 	    CARDIAC MARKERS:  Troponin I, Serum: .062 ng/mL (11-02 @ 12:46)  Troponin I, Serum: .071 ng/mL (11-02 @ 08:44)  Troponin I, Serum: .080 ng/mL (11-02 @ 06:26)    03 Nov 2020 07:57    144    |  112    |  25     ----------------------------<  162    3.6     |  24     |  0.78   02 Nov 2020 06:26    145    |  114    |  23     ----------------------------<  187    3.3     |  26     |  0.92   01 Nov 2020 19:55    146    |  114    |  31     ----------------------------<  303    3.6     |  26     |  1.39     Ca    8.4        03 Nov 2020 07:57  Phos  3.2       03 Nov 2020 07:57  Mg     2.4       02 Nov 2020 06:26    TPro  7.3    /  Alb  2.6    /  TBili  0.4    /  DBili  x      /  AST  16     /  ALT  20     /  AlkPhos  67     01 Nov 2020 19:55                        10.1   6.36  )-----------( 191      ( 03 Nov 2020 07:57 )             30.6 ,                       10.7   8.32  )-----------( 181      ( 02 Nov 2020 06:26 )             34.5 ,                       10.8   10.14 )-----------( 175      ( 01 Nov 2020 19:55 )             33.0   INR: 1.16 ratio (11-01 @ 19:55)

## 2020-11-03 NOTE — PROGRESS NOTE ADULT - SUBJECTIVE AND OBJECTIVE BOX
CHIEF COMPLAINT: Limited hx as patient is non verbal.   no report of any vomiting or fever or diarrhea or active gross bleeding   some agitation last night.   per aid, ate 25% of her meal.         PHYSICAL EXAM:    GENERAL: Thinly built and non verbal   CHEST/LUNG: Clear to ausculation bilaterally, no wheezing, no crackles   HEART: Regular rate and rhythm; No murmurs, rubs  ABDOMEN: Soft, Nontender, Nondistended; Bowel sounds present. + milner   EXTREMITIES: No clubbing, cyanosis, or edema. ROM could not assessed because of non verbal status and following no commands.   NERVOUS SYSTEM:  Limited as did not follow commands  Psychiatry: AA and orientation could not be assessed.       OBJECTIVE DATA:       Vital Signs Last 24 Hrs  T(C): 37.1 (2020 11:11), Max: 37.1 (2020 11:11)  T(F): 98.8 (2020 11:11), Max: 98.8 (2020 11:11)  HR: 72 (2020 11:11) (72 - 79)  BP: 138/59 (2020 11:11) (138/59 - 173/61)  BP(mean): --  RR: 18 (2020 11:11) (17 - 18)  SpO2: 98% (2020 11:11) (96% - 100%)           Daily     Daily Weight in k.3 (2020 05:54)  LABS:                        10.1   6.36  )-----------( 191      ( 2020 07:57 )             30.6             11-03    144  |  112<H>  |  25<H>  ----------------------------<  162<H>  3.6   |  24  |  0.78    Ca    8.4<L>      2020 07:57  Phos  3.2     11-03  Mg     2.4     11-02    TPro  7.3  /  Alb  2.6<L>  /  TBili  0.4  /  DBili  x   /  AST  16  /  ALT  20  /  AlkPhos  67  11-01              PT/INR - ( 2020 19:55 )   PT: 13.4 sec;   INR: 1.16 ratio         PTT - ( 2020 19:55 )  PTT:26.4 sec  Urinalysis Basic - ( 2020 21:30 )    Color: Yellow / Appearance: Slightly Turbid / S.020 / pH: x  Gluc: x / Ketone: Negative  / Bili: Negative / Urobili: Negative mg/dL   Blood: x / Protein: 500 mg/dL / Nitrite: Positive   Leuk Esterase: Small / RBC: 0-2 /HPF / WBC 0-2   Sq Epi: x / Non Sq Epi: Occasional / Bacteria: Moderate       CARDIAC MARKERS ( 2020 12:46 )  .062 ng/mL / x     / x     / x     / x      CARDIAC MARKERS ( 2020 08:44 )  .071 ng/mL / x     / x     / x     / x      CARDIAC MARKERS ( 2020 06:26 )  .080 ng/mL / x     / x     / x     / x          CAPILLARY BLOOD GLUCOSE      POCT Blood Glucose.: 196 mg/dL (2020 10:39)      Culture - Urine (collected )  Source: .Urine Clean Catch (Midstream)  Preliminary Report ():    >100,000 CFU/ml Gram Negative Rods    Culture - Blood (collected )  Source: .Blood Blood-Peripheral  Preliminary Report ():    No growth to date.    Culture - Blood (collected )  Source: .Blood Blood-Peripheral  Preliminary Report ():    No growth to date.      MEDICATIONS  (STANDING):  amLODIPine   Tablet 10 milliGRAM(s) Oral daily  cefTRIAXone   IVPB 1000 milliGRAM(s) IV Intermittent every 24 hours  cloNIDine 0.1 milliGRAM(s) Oral two times a day  dextrose 5%. 1000 milliLiter(s) (50 mL/Hr) IV Continuous <Continuous>  dextrose 50% Injectable 12.5 Gram(s) IV Push once  dextrose 50% Injectable 25 Gram(s) IV Push once  dextrose 50% Injectable 25 Gram(s) IV Push once  heparin   Injectable 5000 Unit(s) SubCutaneous every 8 hours  hydrALAZINE 100 milliGRAM(s) Oral every 8 hours  insulin lispro (ADMELOG) corrective regimen sliding scale   SubCutaneous every 6 hours  levETIRAcetam 750 milliGRAM(s) Oral two times a day  metoprolol tartrate 50 milliGRAM(s) Oral two times a day  potassium phosphate / sodium phosphate Powder (PHOS-NaK) 1 Packet(s) Oral three times a day  simvastatin 20 milliGRAM(s) Oral at bedtime  sodium chloride 0.9%. 1000 milliLiter(s) (75 mL/Hr) IV Continuous <Continuous>  tamsulosin 0.4 milliGRAM(s) Oral at bedtime    MEDICATIONS  (PRN):  acetaminophen   Tablet .. 650 milliGRAM(s) Oral every 6 hours PRN Temp greater or equal to 38C (100.4F), Mild Pain (1 - 3)  dextrose 40% Gel 15 Gram(s) Oral once PRN Blood Glucose LESS THAN 70 milliGRAM(s)/deciliter  glucagon  Injectable 1 milliGRAM(s) IntraMuscular once PRN Glucose LESS THAN 70 milligrams/deciliter  ondansetron Injectable 4 milliGRAM(s) IV Push every 6 hours PRN Nausea and/or Vomiting

## 2020-11-03 NOTE — PROGRESS NOTE ADULT - ASSESSMENT
70 y/o F with CVA, nonverbal, dementia status, history of seizure disorder, type 2 diabetes and hypertension admitted for lithotripsy in August with indwelling Mayberry catheter presented with cystitis and hematuria and had numerous urinary bladder stones and a 6 mm stone in the right ureterovesicular junction without hydronephrosis. Also seen to have low-grade troponin release. Initial blood pressure was elevated at 160/69 and temperature of 101.3°F.  + Urine culture GNR  Likely low-grade troponin release secondary to dementia ischemia given the recent medical stress.    PLAN:     Continue on amlodipine, clonidine, hydralazine, metoprolol for antihypertensive benefits   Cont antiseizure medication   Cont on simvastatin for lipid lowering, plus glycemic lowering for type 2 diabetes care.   Holding  asa 2/2 hematuria event  ID following for UTI  Recent echo showed preserved LV systolic function and no significant valvular heart disease.  urology considering Cysto, stone removal and possible stent insertion once medically optimized - Inpatient vs Outpatient?

## 2020-11-03 NOTE — PROGRESS NOTE ADULT - ASSESSMENT
68 y/o female with PMHx of dementia (minimally verbal per daughter occasional gives 1 word answers of yes/no), CVA, Seizure disorder, HTN, DM type 2 (off meds per daughter), admission 2 months ago for renal colic s/p lithotripsy in 8/24 w/ indwelling milner presents to the ED due to hematuria.           Problem/Plan - 1:  ·  Problem: Acute GNR cystitis with hematuria and associated with indwelling chronic cathter.  Plan: - CT scan w/ likely cystitis  - cont iv antibiotics and follow final culture results.   - ID following. patient has hx of MDR UTI in the past.   -Poor oral intake, cont IVF for now.   - Milner changed in the ER.       Problem/Plan - 2:  ·  Problem: Calculus of ureter.  Plan: - 6mm stone at the UVJ  -Urology following.   - conservative management vs any urology intervention.   -Repeat EKG is reviewed. Troponin flat curve. appreciated cards consult recs. cleared for any urology procedure if needed.     Problem/Plan - 3:  ·  Problem: Seizure disorder.  Plan: - c/w Keppra    Problem/Plan - 4:  ·  Problem: Acute kidney injury.  Plan: - c/w IVF  -improved.     Problem/Plan - 5:  ·  Problem: Essential hypertension. controlled. Plan: - c/w antihypertensives.     Problem/Plan - 6:  Problem: Type 2 diabetes mellitus with other specified complication, without long-term current use of insulin. Plan: - FS q6 w/ SSI.     Problem/Plan - 7:  ·  Problem: DVT prophylaxis.  HSQ     Hypokalemia. Repleted  Hypophosphatemia. repleted    HTN. uncontrolled. Increase hydralazine and cont other meds. Monitor.     PT eval>>>Home with HHA    Full code

## 2020-11-04 ENCOUNTER — APPOINTMENT (OUTPATIENT)
Dept: UROLOGY | Facility: CLINIC | Age: 70
End: 2020-11-04

## 2020-11-04 LAB
ANION GAP SERPL CALC-SCNC: 8 MMOL/L — SIGNIFICANT CHANGE UP (ref 5–17)
BUN SERPL-MCNC: 21 MG/DL — SIGNIFICANT CHANGE UP (ref 7–23)
CALCIUM SERPL-MCNC: 8.2 MG/DL — LOW (ref 8.5–10.1)
CHLORIDE SERPL-SCNC: 114 MMOL/L — HIGH (ref 96–108)
CO2 SERPL-SCNC: 23 MMOL/L — SIGNIFICANT CHANGE UP (ref 22–31)
CREAT SERPL-MCNC: 0.76 MG/DL — SIGNIFICANT CHANGE UP (ref 0.5–1.3)
GLUCOSE BLDC GLUCOMTR-MCNC: 107 MG/DL — HIGH (ref 70–99)
GLUCOSE BLDC GLUCOMTR-MCNC: 149 MG/DL — HIGH (ref 70–99)
GLUCOSE BLDC GLUCOMTR-MCNC: 160 MG/DL — HIGH (ref 70–99)
GLUCOSE BLDC GLUCOMTR-MCNC: 193 MG/DL — HIGH (ref 70–99)
GLUCOSE BLDC GLUCOMTR-MCNC: 205 MG/DL — HIGH (ref 70–99)
GLUCOSE BLDC GLUCOMTR-MCNC: 207 MG/DL — HIGH (ref 70–99)
GLUCOSE BLDC GLUCOMTR-MCNC: 284 MG/DL — HIGH (ref 70–99)
GLUCOSE SERPL-MCNC: 194 MG/DL — HIGH (ref 70–99)
HCT VFR BLD CALC: 30.1 % — LOW (ref 34.5–45)
HGB BLD-MCNC: 9.8 G/DL — LOW (ref 11.5–15.5)
MCHC RBC-ENTMCNC: 28.1 PG — SIGNIFICANT CHANGE UP (ref 27–34)
MCHC RBC-ENTMCNC: 32.6 GM/DL — SIGNIFICANT CHANGE UP (ref 32–36)
MCV RBC AUTO: 86.2 FL — SIGNIFICANT CHANGE UP (ref 80–100)
NRBC # BLD: 0 /100 WBCS — SIGNIFICANT CHANGE UP (ref 0–0)
PLATELET # BLD AUTO: 223 K/UL — SIGNIFICANT CHANGE UP (ref 150–400)
POTASSIUM SERPL-MCNC: 3.6 MMOL/L — SIGNIFICANT CHANGE UP (ref 3.5–5.3)
POTASSIUM SERPL-SCNC: 3.6 MMOL/L — SIGNIFICANT CHANGE UP (ref 3.5–5.3)
RBC # BLD: 3.49 M/UL — LOW (ref 3.8–5.2)
RBC # FLD: 13 % — SIGNIFICANT CHANGE UP (ref 10.3–14.5)
SODIUM SERPL-SCNC: 145 MMOL/L — SIGNIFICANT CHANGE UP (ref 135–145)
WBC # BLD: 4.49 K/UL — SIGNIFICANT CHANGE UP (ref 3.8–10.5)
WBC # FLD AUTO: 4.49 K/UL — SIGNIFICANT CHANGE UP (ref 3.8–10.5)

## 2020-11-04 PROCEDURE — 99232 SBSQ HOSP IP/OBS MODERATE 35: CPT

## 2020-11-04 PROCEDURE — 99233 SBSQ HOSP IP/OBS HIGH 50: CPT

## 2020-11-04 RX ORDER — DEXTROSE 50 % IN WATER 50 %
25 SYRINGE (ML) INTRAVENOUS ONCE
Refills: 0 | Status: DISCONTINUED | OUTPATIENT
Start: 2020-11-04 | End: 2020-11-09

## 2020-11-04 RX ORDER — GLUCAGON INJECTION, SOLUTION 0.5 MG/.1ML
1 INJECTION, SOLUTION SUBCUTANEOUS ONCE
Refills: 0 | Status: DISCONTINUED | OUTPATIENT
Start: 2020-11-04 | End: 2020-11-09

## 2020-11-04 RX ORDER — DEXTROSE 50 % IN WATER 50 %
15 SYRINGE (ML) INTRAVENOUS ONCE
Refills: 0 | Status: DISCONTINUED | OUTPATIENT
Start: 2020-11-04 | End: 2020-11-09

## 2020-11-04 RX ORDER — INSULIN LISPRO 100/ML
VIAL (ML) SUBCUTANEOUS
Refills: 0 | Status: DISCONTINUED | OUTPATIENT
Start: 2020-11-04 | End: 2020-11-09

## 2020-11-04 RX ORDER — INSULIN LISPRO 100/ML
VIAL (ML) SUBCUTANEOUS AT BEDTIME
Refills: 0 | Status: DISCONTINUED | OUTPATIENT
Start: 2020-11-04 | End: 2020-11-09

## 2020-11-04 RX ORDER — SODIUM CHLORIDE 9 MG/ML
1000 INJECTION, SOLUTION INTRAVENOUS
Refills: 0 | Status: DISCONTINUED | OUTPATIENT
Start: 2020-11-04 | End: 2020-11-09

## 2020-11-04 RX ORDER — DEXTROSE 50 % IN WATER 50 %
12.5 SYRINGE (ML) INTRAVENOUS ONCE
Refills: 0 | Status: DISCONTINUED | OUTPATIENT
Start: 2020-11-04 | End: 2020-11-09

## 2020-11-04 RX ADMIN — LEVETIRACETAM 750 MILLIGRAM(S): 250 TABLET, FILM COATED ORAL at 05:34

## 2020-11-04 RX ADMIN — Medication 0.1 MILLIGRAM(S): at 17:56

## 2020-11-04 RX ADMIN — Medication 100 MILLIGRAM(S): at 05:33

## 2020-11-04 RX ADMIN — Medication 0.1 MILLIGRAM(S): at 05:33

## 2020-11-04 RX ADMIN — HEPARIN SODIUM 5000 UNIT(S): 5000 INJECTION INTRAVENOUS; SUBCUTANEOUS at 05:34

## 2020-11-04 RX ADMIN — SODIUM CHLORIDE 75 MILLILITER(S): 9 INJECTION INTRAMUSCULAR; INTRAVENOUS; SUBCUTANEOUS at 08:04

## 2020-11-04 RX ADMIN — HEPARIN SODIUM 5000 UNIT(S): 5000 INJECTION INTRAVENOUS; SUBCUTANEOUS at 21:57

## 2020-11-04 RX ADMIN — Medication 6: at 11:06

## 2020-11-04 RX ADMIN — HEPARIN SODIUM 5000 UNIT(S): 5000 INJECTION INTRAVENOUS; SUBCUTANEOUS at 13:17

## 2020-11-04 RX ADMIN — SODIUM CHLORIDE 75 MILLILITER(S): 9 INJECTION INTRAMUSCULAR; INTRAVENOUS; SUBCUTANEOUS at 21:57

## 2020-11-04 RX ADMIN — Medication 50 MILLIGRAM(S): at 05:33

## 2020-11-04 RX ADMIN — Medication 100 MILLIGRAM(S): at 13:15

## 2020-11-04 RX ADMIN — SIMVASTATIN 20 MILLIGRAM(S): 20 TABLET, FILM COATED ORAL at 21:56

## 2020-11-04 RX ADMIN — Medication 1 PACKET(S): at 05:33

## 2020-11-04 RX ADMIN — AMLODIPINE BESYLATE 10 MILLIGRAM(S): 2.5 TABLET ORAL at 05:33

## 2020-11-04 RX ADMIN — Medication 50 MILLIGRAM(S): at 17:55

## 2020-11-04 RX ADMIN — Medication 100 MILLIGRAM(S): at 21:56

## 2020-11-04 RX ADMIN — LEVETIRACETAM 750 MILLIGRAM(S): 250 TABLET, FILM COATED ORAL at 17:55

## 2020-11-04 RX ADMIN — Medication 1: at 06:16

## 2020-11-04 NOTE — PROGRESS NOTE ADULT - SUBJECTIVE AND OBJECTIVE BOX
CHIEF COMPLAINT: Limited hx as patient is non verbal.   some agitation off and on .   no report of any hematuria or fever or vomiting          PHYSICAL EXAM:    GENERAL: Thinly built and non verbal   CHEST/LUNG: Clear to ausculation bilaterally, no wheezing, no crackles   HEART: Regular rate and rhythm; No murmurs, rubs  ABDOMEN: Soft, Nontender, Nondistended; Bowel sounds present. + milner   EXTREMITIES: No clubbing, cyanosis, or edema. ROM could not assessed because of non verbal status and following no commands.   NERVOUS SYSTEM:  Limited as did not follow commands  Psychiatry: AA and orientation could not be assessed.       OBJECTIVE DATA:       Vital Signs Last 24 Hrs  T(C): 36.4 (2020 05:11), Max: 37.1 (2020 11:11)  T(F): 97.6 (2020 05:11), Max: 98.8 (2020 11:11)  HR: 73 (2020 05:11) (67 - 101)  BP: 160/61 (2020 05:11) (134/66 - 171/78)  BP(mean): --  RR: 16 (2020 05:11) (15 - 18)  SpO2: 96% (2020 05:11) (96% - 100%)           Daily     Daily Weight in k.4 (2020 05:11)  LABS:                        9.8    4.49  )-----------( 223      ( 2020 07:09 )             30.1                 145  |  114<H>  |  21  ----------------------------<  194<H>  3.6   |  23  |  0.76    Ca    8.2<L>      2020 07:09  Phos  3.2                          CARDIAC MARKERS ( 2020 12:46 )  .062 ng/mL / x     / x     / x     / x          CAPILLARY BLOOD GLUCOSE      POCT Blood Glucose.: 284 mg/dL (2020 10:35)      Culture - Urine (collected )  Source: .Urine Clean Catch (Midstream)  Preliminary Report ():    >100,000 CFU/ml Gram Negative Rods    Culture - Blood (collected )  Source: .Blood Blood-Peripheral  Preliminary Report ():    No growth to date.    Culture - Blood (collected )  Source: .Blood Blood-Peripheral  Preliminary Report ():    No growth to date.      MEDICATIONS  (STANDING):  amLODIPine   Tablet 10 milliGRAM(s) Oral daily  cloNIDine 0.1 milliGRAM(s) Oral two times a day  dextrose 5%. 1000 milliLiter(s) (50 mL/Hr) IV Continuous <Continuous>  dextrose 50% Injectable 12.5 Gram(s) IV Push once  dextrose 50% Injectable 25 Gram(s) IV Push once  dextrose 50% Injectable 25 Gram(s) IV Push once  heparin   Injectable 5000 Unit(s) SubCutaneous every 8 hours  hydrALAZINE 100 milliGRAM(s) Oral every 8 hours  insulin lispro (ADMELOG) corrective regimen sliding scale   SubCutaneous three times a day before meals  insulin lispro (ADMELOG) corrective regimen sliding scale   SubCutaneous at bedtime  levETIRAcetam 750 milliGRAM(s) Oral two times a day  metoprolol tartrate 50 milliGRAM(s) Oral two times a day  simvastatin 20 milliGRAM(s) Oral at bedtime  sodium chloride 0.9%. 1000 milliLiter(s) (75 mL/Hr) IV Continuous <Continuous>  tamsulosin 0.4 milliGRAM(s) Oral at bedtime    MEDICATIONS  (PRN):  acetaminophen   Tablet .. 650 milliGRAM(s) Oral every 6 hours PRN Temp greater or equal to 38C (100.4F), Mild Pain (1 - 3)  dextrose 40% Gel 15 Gram(s) Oral once PRN Blood Glucose LESS THAN 70 milliGRAM(s)/deciliter  glucagon  Injectable 1 milliGRAM(s) IntraMuscular once PRN Glucose LESS THAN 70 milligrams/deciliter  ondansetron Injectable 4 milliGRAM(s) IV Push every 6 hours PRN Nausea and/or Vomiting

## 2020-11-04 NOTE — PROGRESS NOTE ADULT - ASSESSMENT
70 y/o F with CVA, nonverbal, dementia status, history of seizure disorder, type 2 diabetes and hypertension admitted for lithotripsy in August with indwelling Mayberry catheter presented with cystitis and hematuria and had numerous urinary bladder stones and a 6 mm stone in the right ureterovesicular junction without hydronephrosis. Also seen to have low-grade troponin release. Initial blood pressure was elevated at 160/69 and temperature of 101.3°F.  + Urine culture GNR  Likely low-grade troponin release secondary to dementia ischemia given the recent medical stress.    PLAN:     Continue on amlodipine, clonidine, hydralazine, metoprolol for antihypertensive benefits   Cont antiseizure medication   Cont on simvastatin for lipid lowering, plus glycemic lowering for type 2 diabetes care.   Holding  asa 2/2 hematuria event  ID following for UTI  Recent echo showed preserved LV systolic function and no significant valvular heart disease.  urology considering Cysto, stone removal and possible stent insertion once medically optimized - Inpatient vs Outpatient? 70 y/o F with CVA, nonverbal, dementia status, history of seizure disorder, type 2 diabetes and hypertension admitted for lithotripsy in August with indwelling Mayberry catheter presented with cystitis and hematuria and had numerous urinary bladder stones and a 6 mm stone in the right ureterovesicular junction without hydronephrosis. Also seen to have low-grade troponin release. Initial blood pressure was elevated at 160/69 and temperature of 101.3°F.  + Urine culture GNR  Likely low-grade troponin release secondary to dementia ischemia given the recent medical stress.    PLAN:     Continue on amlodipine, clonidine, hydralazine, metoprolol for antihypertensive benefits   Cont antiseizure medication   Cont on simvastatin for lipid lowering, plus glycemic lowering for type 2 diabetes care.   Holding  asa 2/2 hematuria event  ID following for UTI  Recent echo showed preserved LV systolic function and no significant valvular heart disease.  urology considering Cysto, stone removal and possible stent insertion once medically optimized - Inpatient vs Outpatient?  signing off now, please reconsult as needed

## 2020-11-04 NOTE — DIETITIAN INITIAL EVALUATION ADULT. - ETIOLOGY
Inadequate protein/energy intake and increased needs in setting of multiple recent hospital admissions, UTIs, pressure injuries

## 2020-11-04 NOTE — PROGRESS NOTE ADULT - SUBJECTIVE AND OBJECTIVE BOX
The patient is pain free  CT reviewed  c/s pending sensitivity    Vital Signs Last 24 Hrs  T(C): 37 (04 Nov 2020 17:44), Max: 37 (04 Nov 2020 11:10)  T(F): 98.6 (04 Nov 2020 17:44), Max: 98.6 (04 Nov 2020 11:10)  HR: 69 (04 Nov 2020 17:44) (61 - 73)  BP: 83/53 (04 Nov 2020 17:55) (83/53 - 160/61)  BP(mean): --  RR: 18 (04 Nov 2020 17:44) (16 - 18)  SpO2: 100% (04 Nov 2020 17:44) (93% - 100%)    PHYSICAL EXAM:    NAD, well-developed non verbal  Abd: soft, NT/ND, No CVAT        I&O's Summary    03 Nov 2020 07:01  -  04 Nov 2020 07:00  --------------------------------------------------------  IN: 2150 mL / OUT: 2151 mL / NET: -1 mL    04 Nov 2020 07:01  -  04 Nov 2020 21:33  --------------------------------------------------------  IN: 200 mL / OUT: 350 mL / NET: -150 mL        LABS:                        9.8    4.49  )-----------( 223      ( 04 Nov 2020 07:09 )             30.1     11-04    145  |  114<H>  |  21  ----------------------------<  194<H>  3.6   |  23  |  0.76    Ca    8.2<L>      04 Nov 2020 07:09  Phos  3.2     11-03        Urine Culture: 2  organosms-aawit sensitivity    Prior notes/chart reviewed.

## 2020-11-04 NOTE — CHART NOTE - TREATMENT: THE FOLLOWING DIET HAS BEEN RECOMMENDED
Diet, Pureed:   Consistent Carbohydrate {No Snacks}  Supplement Feeding Modality:  Oral  Glucerna Shake Cans or Servings Per Day:  1       Frequency:  Two Times a day (11-04-20 @ 11:53) [Pending Verification By Attending]  Diet, Consistent Carbohydrate/No Snacks:   Pureed  Supplement Feeding Modality:  Oral  Glucerna Shake Cans or Servings Per Day:  1       Frequency:  Two Times a day (11-02-20 @ 10:24) [Active]

## 2020-11-04 NOTE — DIETITIAN INITIAL EVALUATION ADULT. - OTHER INFO
Pt asleep at time of visit. Per chart pt is confused and nonverbal. Pt with multiple recent admissions since July 2020.   Pt with poor intake per flow sheets.   Weight history per previous RD notes as follows: (08/15/2020) 56.9kg (07/30/2020) 59.7kg (01/24/2020) 57.2kg. Weight loss as noted below.

## 2020-11-04 NOTE — PROGRESS NOTE ADULT - SUBJECTIVE AND OBJECTIVE BOX
Patient is a 69y old  Female who presents with a chief complaint of UTI, Obstructive uropathy secondary to calculi (04 Nov 2020 12:14)      PAST MEDICAL & SURGICAL HISTORY:  CVA (cerebral infarction)  right side weakness    Vision changes  lt eye    Urinary incontinence    Seizure disorder    Gout    OA (osteoarthritis)  bautista knees, low back  and  bautista shoulders    Asthma    Diabetes    Hypertension    Kidney stone    INTERVAL HISTORY: Resting in bed, in no distress   	  MEDICATIONS:  MEDICATIONS  (STANDING):  amLODIPine   Tablet 10 milliGRAM(s) Oral daily  cloNIDine 0.1 milliGRAM(s) Oral two times a day  heparin   Injectable 5000 Unit(s) SubCutaneous every 8 hours  hydrALAZINE 100 milliGRAM(s) Oral every 8 hours  insulin lispro (ADMELOG) corrective regimen sliding scale   SubCutaneous three times a day before meals  insulin lispro (ADMELOG) corrective regimen sliding scale   SubCutaneous at bedtime  levETIRAcetam 750 milliGRAM(s) Oral two times a day  metoprolol tartrate 50 milliGRAM(s) Oral two times a day  simvastatin 20 milliGRAM(s) Oral at bedtime  sodium chloride 0.9%. 1000 milliLiter(s) (75 mL/Hr) IV Continuous <Continuous>  tamsulosin 0.4 milliGRAM(s) Oral at bedtime    MEDICATIONS  (PRN):  acetaminophen   Tablet .. 650 milliGRAM(s) Oral every 6 hours PRN Temp greater or equal to 38C (100.4F), Mild Pain (1 - 3)  dextrose 40% Gel 15 Gram(s) Oral once PRN Blood Glucose LESS THAN 70 milliGRAM(s)/deciliter  glucagon  Injectable 1 milliGRAM(s) IntraMuscular once PRN Glucose LESS THAN 70 milligrams/deciliter  ondansetron Injectable 4 milliGRAM(s) IV Push every 6 hours PRN Nausea and/or Vomiting    Vitals:  T(F): 98.6 (11-04-20 @ 11:10), Max: 98.6 (11-04-20 @ 11:10)  HR: 61 (11-04-20 @ 11:10) (61 - 101)  BP: 106/53 (11-04-20 @ 11:10) (106/53 - 171/78)  RR: 16 (11-04-20 @ 11:10) (16 - 18)  SpO2: 97% (11-04-20 @ 11:10) (96% - 100%)  11-03 @ 07:01  -  11-04 @ 07:00  --------------------------------------------------------  IN:    Oral Fluid: 350 mL    sodium chloride 0.9%: 1800 mL  Total IN: 2150 mL    OUT:    Indwelling Catheter - Urethral (mL): 2150 mL    Stool (mL): 1 mL  Total OUT: 2151 mL    Total NET: -1 mL    Weight (kg): 52 (11-02 @ 01:50)  BMI (kg/m2): 19.7 (11-02 @ 01:50)    PHYSICAL EXAM:  Neuro: Awake, responsive  CV: S1 S2 RRR  Lungs: CTABL  GI: Soft, BS +, ND, NT  Extremities: No edema    RADIOLOGY:   < from: Xray Chest 1 View- PORTABLE-Urgent (11.01.20 @ 20:33) >  Impression: The heart is unremarkable. The lungs are clear. Bones are unremarkable for age.    < end of copied text >    < from: CT Abdomen and Pelvis No Cont (11.01.20 @ 20:20) >  Likely cystitis. Correlate with urinalysis.  Numerous stones in the urinary bladder likely related to interval lithotripsy.  There appears to be a 6 mm stone in the right ureterovesical junction without hydronephrosis. Numerous small stones in the left renal pelvis likely related to recent lithotripsy. Please note that without IV contrast, pyelonephritis, which is a clinical diagnosis, cannot be assessed.    Tortuous sigmoid colon with a large stool burden.  Cardiomegaly.    < end of copied text >  DIAGNOSTIC TESTING:    [x ] Echocardiogram: < from: TTE Echo Complete w/o Contrast w/ Doppler (08.19.20 @ 12:44) >  Left Ventricle: The left ventricular internal cavity size is normal.  Global LV systolic function was normal. Left ventricular ejection fraction, by visual estimation, is 60 to 65%. Spectral Doppler shows normal pattern of LV diastolic filling. LV systolic function difficult to assess due to tachyarrhythmia.  Right Ventricle: Normal right ventricular size and function.  Left Atrium: The left atrium is normal in size.  Right Atrium: The right atrium is normal in size.  Pericardium: There is no evidence of pericardial effusion.  Mitral Valve: Structurally normal mitral valve, with normal leaflet excursion. Mild thickening and calcification of the anterior and posterior mitral valve leaflets. There is mild mitral annular calcification. No evidence of mitral valve regurgitation is seen.  Tricuspid Valve: Structurally normal tricuspid valve, with normal leaflet excursion. No tricuspid regurgitation is visualized.  Aortic Valve: Normal trileaflet aortic valve with normal opening. Peak transaortic gradient equals 12.1 mmHg, mean transaortic gradient equals 6.4 mmHg, the calculated aortic valve area equals 2.19 cm² by the continuity equation consistent with normally opening aortic valve. Mild aortic valve regurgitation is seen.  Pulmonic Valve: Structurally normal pulmonic valve, with normal leaflet excursion. No indication of pulmonic valve regurgitation.  Aorta: The aortic root and ascending aorta are structurally normal, with no evidence of dilitation.  Pulmonary Artery: The main pulmonary artery is normal in size.      Summary:   1. Left ventricular ejection fraction, by visual estimation, is 60 to 65%.   2. Normal global left ventricular systolic function.   3. There is mild concentric left ventricular hypertrophy.   4. Mild mitral annular calcification.   5. Mild thickening and calcification of the anterior and posterior mitral valve leaflets.   6. No evidence of mitral valve regurgitation.   7. Mild aortic regurgitation.   8. No evidence of systolic or diastolic heart failure.    < end of copied text >    LABS:	 	    CARDIAC MARKERS:  Troponin I, Serum: .062 ng/mL (11-02 @ 12:46)  Troponin I, Serum: .071 ng/mL (11-02 @ 08:44)  Troponin I, Serum: .080 ng/mL (11-02 @ 06:26)    04 Nov 2020 07:09    145    |  114    |  21     ----------------------------<  194    3.6     |  23     |  0.76   03 Nov 2020 07:57    144    |  112    |  25     ----------------------------<  162    3.6     |  24     |  0.78   02 Nov 2020 06:26    145    |  114    |  23     ----------------------------<  187    3.3     |  26     |  0.92     Ca    8.2        04 Nov 2020 07:09  Phos  3.2       03 Nov 2020 07:57                       9.8    4.49  )-----------( 223      ( 04 Nov 2020 07:09 )             30.1 ,                       10.1   6.36  )-----------( 191      ( 03 Nov 2020 07:57 )             30.6 ,                       10.7   8.32  )-----------( 181      ( 02 Nov 2020 06:26 )             34.5 ,                       10.8   10.14 )-----------( 175      ( 01 Nov 2020 19:55 )             33.0     INR: 1.16 ratio (11-01 @ 19:55)           Patient is a 69y old  Female who presents with a chief complaint of UTI, Obstructive uropathy secondary to calculi (04 Nov 2020 12:14)      PAST MEDICAL & SURGICAL HISTORY:  CVA (cerebral infarction)  right side weakness    Vision changes  lt eye    Urinary incontinence    Seizure disorder    Gout    OA (osteoarthritis)  bautista knees, low back  and  bautista shoulders    Asthma    Diabetes    Hypertension    Kidney stone    INTERVAL HISTORY: Resting in bed, in no distress   	  MEDICATIONS:  MEDICATIONS  (STANDING):  amLODIPine   Tablet 10 milliGRAM(s) Oral daily  cloNIDine 0.1 milliGRAM(s) Oral two times a day  heparin   Injectable 5000 Unit(s) SubCutaneous every 8 hours  hydrALAZINE 100 milliGRAM(s) Oral every 8 hours  insulin lispro (ADMELOG) corrective regimen sliding scale   SubCutaneous three times a day before meals  insulin lispro (ADMELOG) corrective regimen sliding scale   SubCutaneous at bedtime  levETIRAcetam 750 milliGRAM(s) Oral two times a day  metoprolol tartrate 50 milliGRAM(s) Oral two times a day  simvastatin 20 milliGRAM(s) Oral at bedtime  sodium chloride 0.9%. 1000 milliLiter(s) (75 mL/Hr) IV Continuous <Continuous>  tamsulosin 0.4 milliGRAM(s) Oral at bedtime    MEDICATIONS  (PRN):  acetaminophen   Tablet .. 650 milliGRAM(s) Oral every 6 hours PRN Temp greater or equal to 38C (100.4F), Mild Pain (1 - 3)  dextrose 40% Gel 15 Gram(s) Oral once PRN Blood Glucose LESS THAN 70 milliGRAM(s)/deciliter  glucagon  Injectable 1 milliGRAM(s) IntraMuscular once PRN Glucose LESS THAN 70 milligrams/deciliter  ondansetron Injectable 4 milliGRAM(s) IV Push every 6 hours PRN Nausea and/or Vomiting    Vitals:  T(F): 98.6 (11-04-20 @ 11:10), Max: 98.6 (11-04-20 @ 11:10)  HR: 61 (11-04-20 @ 11:10) (61 - 101)  BP: 106/53 (11-04-20 @ 11:10) (106/53 - 171/78)  RR: 16 (11-04-20 @ 11:10) (16 - 18)  SpO2: 97% (11-04-20 @ 11:10) (96% - 100%)  11-03 @ 07:01  -  11-04 @ 07:00  --------------------------------------------------------  IN:    Oral Fluid: 350 mL    sodium chloride 0.9%: 1800 mL  Total IN: 2150 mL    OUT:    Indwelling Catheter - Urethral (mL): 2150 mL    Stool (mL): 1 mL  Total OUT: 2151 mL    Total NET: -1 mL    Weight (kg): 52 (11-02 @ 01:50)  BMI (kg/m2): 19.7 (11-02 @ 01:50)    PHYSICAL EXAM:  Neuro: Awake, responsive  CV: S1 S2 RRR  Lungs: CTABL  GI: Soft, BS +, ND, NT  Extremities: No edema  + Mayberry catheter    RADIOLOGY:   < from: Xray Chest 1 View- PORTABLE-Urgent (11.01.20 @ 20:33) >  Impression: The heart is unremarkable. The lungs are clear. Bones are unremarkable for age.    < end of copied text >    < from: CT Abdomen and Pelvis No Cont (11.01.20 @ 20:20) >  Likely cystitis. Correlate with urinalysis.  Numerous stones in the urinary bladder likely related to interval lithotripsy.  There appears to be a 6 mm stone in the right ureterovesical junction without hydronephrosis. Numerous small stones in the left renal pelvis likely related to recent lithotripsy. Please note that without IV contrast, pyelonephritis, which is a clinical diagnosis, cannot be assessed.    Tortuous sigmoid colon with a large stool burden.  Cardiomegaly.    < end of copied text >  DIAGNOSTIC TESTING:    [x ] Echocardiogram: < from: TTE Echo Complete w/o Contrast w/ Doppler (08.19.20 @ 12:44) >  Left Ventricle: The left ventricular internal cavity size is normal.  Global LV systolic function was normal. Left ventricular ejection fraction, by visual estimation, is 60 to 65%. Spectral Doppler shows normal pattern of LV diastolic filling. LV systolic function difficult to assess due to tachyarrhythmia.  Right Ventricle: Normal right ventricular size and function.  Left Atrium: The left atrium is normal in size.  Right Atrium: The right atrium is normal in size.  Pericardium: There is no evidence of pericardial effusion.  Mitral Valve: Structurally normal mitral valve, with normal leaflet excursion. Mild thickening and calcification of the anterior and posterior mitral valve leaflets. There is mild mitral annular calcification. No evidence of mitral valve regurgitation is seen.  Tricuspid Valve: Structurally normal tricuspid valve, with normal leaflet excursion. No tricuspid regurgitation is visualized.  Aortic Valve: Normal trileaflet aortic valve with normal opening. Peak transaortic gradient equals 12.1 mmHg, mean transaortic gradient equals 6.4 mmHg, the calculated aortic valve area equals 2.19 cm² by the continuity equation consistent with normally opening aortic valve. Mild aortic valve regurgitation is seen.  Pulmonic Valve: Structurally normal pulmonic valve, with normal leaflet excursion. No indication of pulmonic valve regurgitation.  Aorta: The aortic root and ascending aorta are structurally normal, with no evidence of dilitation.  Pulmonary Artery: The main pulmonary artery is normal in size.      Summary:   1. Left ventricular ejection fraction, by visual estimation, is 60 to 65%.   2. Normal global left ventricular systolic function.   3. There is mild concentric left ventricular hypertrophy.   4. Mild mitral annular calcification.   5. Mild thickening and calcification of the anterior and posterior mitral valve leaflets.   6. No evidence of mitral valve regurgitation.   7. Mild aortic regurgitation.   8. No evidence of systolic or diastolic heart failure.    < end of copied text >    LABS:	 	    CARDIAC MARKERS:  Troponin I, Serum: .062 ng/mL (11-02 @ 12:46)  Troponin I, Serum: .071 ng/mL (11-02 @ 08:44)  Troponin I, Serum: .080 ng/mL (11-02 @ 06:26)    04 Nov 2020 07:09    145    |  114    |  21     ----------------------------<  194    3.6     |  23     |  0.76   03 Nov 2020 07:57    144    |  112    |  25     ----------------------------<  162    3.6     |  24     |  0.78   02 Nov 2020 06:26    145    |  114    |  23     ----------------------------<  187    3.3     |  26     |  0.92     Ca    8.2        04 Nov 2020 07:09  Phos  3.2       03 Nov 2020 07:57                       9.8    4.49  )-----------( 223      ( 04 Nov 2020 07:09 )             30.1 ,                       10.1   6.36  )-----------( 191      ( 03 Nov 2020 07:57 )             30.6 ,                       10.7   8.32  )-----------( 181      ( 02 Nov 2020 06:26 )             34.5 ,                       10.8   10.14 )-----------( 175      ( 01 Nov 2020 19:55 )             33.0     INR: 1.16 ratio (11-01 @ 19:55)

## 2020-11-04 NOTE — DIETITIAN INITIAL EVALUATION ADULT. - PERTINENT MEDS FT
MEDICATIONS  (STANDING):  amLODIPine   Tablet 10 milliGRAM(s) Oral daily  cloNIDine 0.1 milliGRAM(s) Oral two times a day  dextrose 5%. 1000 milliLiter(s) (50 mL/Hr) IV Continuous <Continuous>  dextrose 50% Injectable 12.5 Gram(s) IV Push once  dextrose 50% Injectable 25 Gram(s) IV Push once  dextrose 50% Injectable 25 Gram(s) IV Push once  heparin   Injectable 5000 Unit(s) SubCutaneous every 8 hours  hydrALAZINE 100 milliGRAM(s) Oral every 8 hours  insulin lispro (ADMELOG) corrective regimen sliding scale   SubCutaneous three times a day before meals  insulin lispro (ADMELOG) corrective regimen sliding scale   SubCutaneous at bedtime  levETIRAcetam 750 milliGRAM(s) Oral two times a day  metoprolol tartrate 50 milliGRAM(s) Oral two times a day  simvastatin 20 milliGRAM(s) Oral at bedtime  sodium chloride 0.9%. 1000 milliLiter(s) (75 mL/Hr) IV Continuous <Continuous>  tamsulosin 0.4 milliGRAM(s) Oral at bedtime    MEDICATIONS  (PRN):  acetaminophen   Tablet .. 650 milliGRAM(s) Oral every 6 hours PRN Temp greater or equal to 38C (100.4F), Mild Pain (1 - 3)  dextrose 40% Gel 15 Gram(s) Oral once PRN Blood Glucose LESS THAN 70 milliGRAM(s)/deciliter  glucagon  Injectable 1 milliGRAM(s) IntraMuscular once PRN Glucose LESS THAN 70 milligrams/deciliter  ondansetron Injectable 4 milliGRAM(s) IV Push every 6 hours PRN Nausea and/or Vomiting

## 2020-11-04 NOTE — PROGRESS NOTE ADULT - ASSESSMENT
70 yo with multiple med issues  currently with 6 mm distal right stone and large amount of dust/fragments on left from prior lithotripsy    Await urine sensitivities    If on correct antibiotcs would consider right uscope/left uscope Mon pending  OR time and discussio with ehr daughter

## 2020-11-04 NOTE — PROGRESS NOTE ADULT - SUBJECTIVE AND OBJECTIVE BOX
FATOU MCBRIDE  MRN-[id number]    Follow Up:  ID following for UTI/fever     Interval History: No complaints but limited in ability to give history. Afebrile, had some agitation per the nurse     ROS:    [ ] Unobtainable because:  [ X] All other systems negative      Allergies  No Known Allergies        ANTIMICROBIALS:        MEDICATIONS  (STANDING):  amLODIPine   Tablet 10 daily  cloNIDine 0.1 two times a day  heparin   Injectable 5000 every 8 hours  hydrALAZINE 100 every 8 hours  insulin lispro (ADMELOG) corrective regimen sliding scale  three times a day before meals  insulin lispro (ADMELOG) corrective regimen sliding scale  at bedtime  levETIRAcetam 750 two times a day  metoprolol tartrate 50 two times a day  simvastatin 20 at bedtime  tamsulosin 0.4 at bedtime      PRN  acetaminophen   Tablet .. 650 milliGRAM(s) Oral every 6 hours PRN  dextrose 40% Gel 15 Gram(s) Oral once PRN  glucagon  Injectable 1 milliGRAM(s) IntraMuscular once PRN  ondansetron Injectable 4 milliGRAM(s) IV Push every 6 hours PRN      Physical Exam:  Vital Signs Last 24 Hrs  T(F): 97.6 (20 @ 05:11), Max: 98.5 (20 @ 17:10)  HR: 73 (20 @ 05:11)  BP: 160/61 (20 @ 05:11)  RR: 16 (20 @ 05:11)  SpO2: 96% (20 @ 05:11) (96% - 100%)  Wt(kg): --    General:    NAD,  non toxic  Head: atraumatic, normocephalic  Eye: normal sclera and conjunctiva  ENT:    no oral lesions, neck supple  Cardio:     regular S1, S2,  no murmur  Respiratory:    clear b/l,    no wheezing  abd:     soft,   BS +,   no tenderness  :   no suprapubic tenderness,   +milner  Musculoskeletal:   no joint swelling,   no edema  vascular: no central lines, +PIV   Skin:    no rash  Neurologic:     poor mental status, knows name, weak in all extremities   psych: normal affect    WBC Count: 4.49 K/uL ( @ 07:09)  WBC Count: 6.36 K/uL ( @ 07:57)  WBC Count: 8.32 K/uL ( @ 06:26)  WBC Count: 10.14 K/uL ( @ 19:55)                            9.8    4.49  )-----------( 223      ( 2020 07:09 )             30.1           145  |  114<H>  |  21  ----------------------------<  194<H>  3.6   |  23  |  0.76    Ca    8.2<L>      2020 07:09  Phos  3.2             Creatinine Trend: 0.76<--, 0.78<--, 0.92<--, 1.39<--        Urinalysis Basic - ( 2020 21:30 )    Color: Yellow / Appearance: Slightly Turbid / S.020 / pH: x  Gluc: x / Ketone: Negative  / Bili: Negative / Urobili: Negative mg/dL   Blood: x / Protein: 500 mg/dL / Nitrite: Positive   Leuk Esterase: Small / RBC: 0-2 /HPF / WBC 0-2   Sq Epi: x / Non Sq Epi: Occasional / Bacteria: Moderate        Creatinine Trend: 0.78<--, 0.92<--, 1.39<--  Lactate, Blood: 1.1 mmol/L (20 @ 19:55)      MICROBIOLOGY:  Culture - Urine (20 @ 09:16)    Specimen Source: .Urine Clean Catch (Midstream)    Culture Results:   >100,000 CFU/ml Gram Negative Rods      .Blood Blood-Peripheral  20   No growth to date.  --  --      Rapid RVP Result: NotDetec ( @ 21:30)    RADIOLOGY:  no new imaging

## 2020-11-04 NOTE — PROGRESS NOTE ADULT - ASSESSMENT
69 years old female with PMH of dementia, CVA, seizure disorder, HTN, DM type 2 (not on any medications), s/p admission to the hospital in August 2020 with UTI (UCx grew Pseudomonas aeruginosa Carbapenem resistant), urinary retention, s/p lithotripsy and Milner placement, followed by another admission with renal failure, pressure ulcer, Blood culture grew Staphylococcus hominis deemed contaminant.   Here had one time fever, normal WBC, UA in the new milner with only 0-2 WBC, COVID negative   Had suprapubic tenderness on exam, no CVA tenderness, +milner with sediment in it  CXR (I personally reviewed) no pneumonia   CT (I personally reviewed) cystitis and multiple stones   She has had resistant organisms in the past including .   If this is pseudomonas then the ceftriaxone wont have activity   Even if pseudomonas she has had different morphologies with different sensitivity patterns and if this is carbapenem resistant may not have too many options and would potentially need to resort to agents such as (Zerbaxa) ceftolozane/tazobactam.   She is afebrile, normal WBC and had a unimpressive UA once her milner was changed. She is colonized with pseudomonas though has extensive resistance. At this time the risks of antibiotics side effects and developing resistance outweigh the benefits of using ceftriaxone and thus it has been stopped. Once her urine culture is identified with sensitivities, would offer treatment is develops clear signs and/or symptoms of UTI or if she is to undergo a urologic procedure on this admission. Planned for repeat CT and if ureteral stone, urology will plan for removal. If that is the case, then would wait for the most recent urine culture sensitivities and start appropriate therapy.     Plan:  -continue off antibiotics   -follow up on final urine culture results   -if pseudomonas, I will call lab and ask for testing against (Zerbaxa) ceftolozane/tazobactam and (Avycaz) ceftazidime/avibactam    -if worsens would agree with urology placing stent vs stone removal   -without stone removal, patient is at high risk of recurrent infections especially if any of them cause obstruction. Stones often harbor the infection and periodically reactivate an active infection  -patient with functional quadriplegia >please ensure offloading and optimizing nutrition to avoid bedsores.   -good but not tight glycemic control given risks of hypoglycemia     Discussed with Dr. Ortez and Urology PA     Harlanjolene Phan DO  Cell: [phone]  Pager [phone]  Infectious Disease Attending  After 5pm/weekends please call [phone] for all inquiries and new consults

## 2020-11-04 NOTE — DIETITIAN INITIAL EVALUATION ADULT. - ORAL INTAKE PTA/DIET HISTORY
Pt has 24 hour aide and family who assist in ADLs. Per previous RD note (08/15/2020), "Daughter reported decreased PO since end of June (50-75% usual intake)". Pt with T2DM; per previous RD note (08/15/2020) daughter also reported that pt is off all DM meds. HbA1c 6.9% (11/02) indicates good blood glucose management; however can be reflective of poor PO intake.

## 2020-11-04 NOTE — DIETITIAN INITIAL EVALUATION ADULT. - PROBLEM SELECTOR PLAN 2
- 6mm stone at the UVJ  - f/u Urology consult; Dr Hernandez to see in am  - NPO for possible stent placement  - CXR stable, EKG w/ T wave Inv in II, III, aVF not seen in prior EKGs, unknown if pt had/has any cp given Dementia  - Echo done on last admx stable  - would obtain a set of troponins  - hold ASA for now  - f/u Cardio consult for clearance

## 2020-11-04 NOTE — PROGRESS NOTE ADULT - ASSESSMENT
70 y/o female with PMHx of dementia (minimally verbal per daughter occasional gives 1 word answers of yes/no), CVA, Seizure disorder, HTN, DM type 2 (off meds per daughter), admission 2 months ago for renal colic s/p lithotripsy in 8/24 w/ indwelling milner presents to the ED due to hematuria.           Problem/Plan - 1:  ·  Problem: Acute GNR cystitis with hematuria and associated with indwelling chronic cathter.  Plan: - CT scan w/ likely cystitis  - off antibiotic per ID. monitor for any fever or clinical symptoms.   follow final culture results.       Problem/Plan - 2:  ·  Problem: Calculus of ureter.  Plan: - 6mm stone at the UVJ  -Urology planning for another CT scan to asses ureteral stone. if still present, most likely will need cystoscopic removal.     Problem/Plan - 3:  ·  Problem: Seizure disorder.  Plan: - c/w Keppra    Problem/Plan - 4:  ·  Problem: Acute kidney injury. improved. Poor oral intake.     Problem/Plan - 5:  ·  Problem: Essential hypertension. controlled. Plan: - c/w antihypertensives.     Problem/Plan - 6:  Problem: Type 2 diabetes mellitus with other specified complication, without long-term current use of insulin. Plan: - FS q6 w/ SSI.     Problem/Plan - 7:  ·  Problem: DVT prophylaxis.  HSQ     Hypokalemia. Repleted  Hypophosphatemia. repleted    HTN. better controlled. Cont current meds x 24 hours more prior to any further adjustments.     PT eval>>>Home with HHA    Full code

## 2020-11-04 NOTE — DIETITIAN INITIAL EVALUATION ADULT. - PERTINENT LABORATORY DATA
11-04 Na145 mmol/L Glu 194 mg/dL<H> K+ 3.6 mmol/L Cr  0.76 mg/dL BUN 21 mg/dL 11-03 Phos 3.2 mg/dL 11-01 Alb 2.6 g/dL<L>

## 2020-11-04 NOTE — DIETITIAN INITIAL EVALUATION ADULT. - PHYSCIAL ASSESSMENT
BMI=19.7/underweight nutrition focused physical exam conducted visually as pt asleep at time of visit

## 2020-11-05 LAB
-  AMIKACIN: SIGNIFICANT CHANGE UP
-  AMOXICILLIN/CLAVULANIC ACID: SIGNIFICANT CHANGE UP
-  AMPICILLIN/SULBACTAM: SIGNIFICANT CHANGE UP
-  AMPICILLIN: SIGNIFICANT CHANGE UP
-  AMPICILLIN: SIGNIFICANT CHANGE UP
-  AZTREONAM: SIGNIFICANT CHANGE UP
-  CEFAZOLIN: SIGNIFICANT CHANGE UP
-  CEFEPIME: SIGNIFICANT CHANGE UP
-  CEFOXITIN: SIGNIFICANT CHANGE UP
-  CEFTRIAXONE: SIGNIFICANT CHANGE UP
-  CIPROFLOXACIN: SIGNIFICANT CHANGE UP
-  CIPROFLOXACIN: SIGNIFICANT CHANGE UP
-  ERTAPENEM: SIGNIFICANT CHANGE UP
-  GENTAMICIN: SIGNIFICANT CHANGE UP
-  IMIPENEM: SIGNIFICANT CHANGE UP
-  LEVOFLOXACIN: SIGNIFICANT CHANGE UP
-  LEVOFLOXACIN: SIGNIFICANT CHANGE UP
-  MEROPENEM: SIGNIFICANT CHANGE UP
-  NITROFURANTOIN: SIGNIFICANT CHANGE UP
-  NITROFURANTOIN: SIGNIFICANT CHANGE UP
-  PIPERACILLIN/TAZOBACTAM: SIGNIFICANT CHANGE UP
-  TETRACYCLINE: SIGNIFICANT CHANGE UP
-  TIGECYCLINE: SIGNIFICANT CHANGE UP
-  TOBRAMYCIN: SIGNIFICANT CHANGE UP
-  TRIMETHOPRIM/SULFAMETHOXAZOLE: SIGNIFICANT CHANGE UP
-  VANCOMYCIN: SIGNIFICANT CHANGE UP
ANION GAP SERPL CALC-SCNC: 6 MMOL/L — SIGNIFICANT CHANGE UP (ref 5–17)
BUN SERPL-MCNC: 19 MG/DL — SIGNIFICANT CHANGE UP (ref 7–23)
CALCIUM SERPL-MCNC: 8.3 MG/DL — LOW (ref 8.5–10.1)
CHLORIDE SERPL-SCNC: 112 MMOL/L — HIGH (ref 96–108)
CO2 SERPL-SCNC: 24 MMOL/L — SIGNIFICANT CHANGE UP (ref 22–31)
CREAT SERPL-MCNC: 0.77 MG/DL — SIGNIFICANT CHANGE UP (ref 0.5–1.3)
CULTURE RESULTS: SIGNIFICANT CHANGE UP
GLUCOSE BLDC GLUCOMTR-MCNC: 122 MG/DL — HIGH (ref 70–99)
GLUCOSE BLDC GLUCOMTR-MCNC: 134 MG/DL — HIGH (ref 70–99)
GLUCOSE BLDC GLUCOMTR-MCNC: 166 MG/DL — HIGH (ref 70–99)
GLUCOSE BLDC GLUCOMTR-MCNC: 196 MG/DL — HIGH (ref 70–99)
GLUCOSE SERPL-MCNC: 168 MG/DL — HIGH (ref 70–99)
HCT VFR BLD CALC: 32.2 % — LOW (ref 34.5–45)
HGB BLD-MCNC: 10.2 G/DL — LOW (ref 11.5–15.5)
LEVETIRACETAM SERPL-MCNC: 24.8 UG/ML — SIGNIFICANT CHANGE UP (ref 10–40)
MCHC RBC-ENTMCNC: 27.8 PG — SIGNIFICANT CHANGE UP (ref 27–34)
MCHC RBC-ENTMCNC: 31.7 GM/DL — LOW (ref 32–36)
MCV RBC AUTO: 87.7 FL — SIGNIFICANT CHANGE UP (ref 80–100)
METHOD TYPE: SIGNIFICANT CHANGE UP
METHOD TYPE: SIGNIFICANT CHANGE UP
NRBC # BLD: 0 /100 WBCS — SIGNIFICANT CHANGE UP (ref 0–0)
ORGANISM # SPEC MICROSCOPIC CNT: SIGNIFICANT CHANGE UP
PLATELET # BLD AUTO: 250 K/UL — SIGNIFICANT CHANGE UP (ref 150–400)
POTASSIUM SERPL-MCNC: 3.3 MMOL/L — LOW (ref 3.5–5.3)
POTASSIUM SERPL-SCNC: 3.3 MMOL/L — LOW (ref 3.5–5.3)
RBC # BLD: 3.67 M/UL — LOW (ref 3.8–5.2)
RBC # FLD: 12.8 % — SIGNIFICANT CHANGE UP (ref 10.3–14.5)
SODIUM SERPL-SCNC: 142 MMOL/L — SIGNIFICANT CHANGE UP (ref 135–145)
SPECIMEN SOURCE: SIGNIFICANT CHANGE UP
WBC # BLD: 8.31 K/UL — SIGNIFICANT CHANGE UP (ref 3.8–10.5)
WBC # FLD AUTO: 8.31 K/UL — SIGNIFICANT CHANGE UP (ref 3.8–10.5)

## 2020-11-05 PROCEDURE — 99233 SBSQ HOSP IP/OBS HIGH 50: CPT

## 2020-11-05 PROCEDURE — 99232 SBSQ HOSP IP/OBS MODERATE 35: CPT

## 2020-11-05 RX ORDER — POTASSIUM CHLORIDE 20 MEQ
40 PACKET (EA) ORAL ONCE
Refills: 0 | Status: COMPLETED | OUTPATIENT
Start: 2020-11-05 | End: 2020-11-05

## 2020-11-05 RX ADMIN — Medication 0.1 MILLIGRAM(S): at 17:57

## 2020-11-05 RX ADMIN — Medication 100 MILLIGRAM(S): at 22:11

## 2020-11-05 RX ADMIN — TAMSULOSIN HYDROCHLORIDE 0.4 MILLIGRAM(S): 0.4 CAPSULE ORAL at 22:24

## 2020-11-05 RX ADMIN — Medication 50 MILLIGRAM(S): at 17:57

## 2020-11-05 RX ADMIN — LEVETIRACETAM 750 MILLIGRAM(S): 250 TABLET, FILM COATED ORAL at 05:57

## 2020-11-05 RX ADMIN — SIMVASTATIN 20 MILLIGRAM(S): 20 TABLET, FILM COATED ORAL at 22:11

## 2020-11-05 RX ADMIN — Medication 50 MILLIGRAM(S): at 05:58

## 2020-11-05 RX ADMIN — LEVETIRACETAM 750 MILLIGRAM(S): 250 TABLET, FILM COATED ORAL at 17:57

## 2020-11-05 RX ADMIN — SODIUM CHLORIDE 75 MILLILITER(S): 9 INJECTION INTRAMUSCULAR; INTRAVENOUS; SUBCUTANEOUS at 22:20

## 2020-11-05 RX ADMIN — Medication 40 MILLIEQUIVALENT(S): at 18:15

## 2020-11-05 RX ADMIN — Medication 0.1 MILLIGRAM(S): at 06:00

## 2020-11-05 RX ADMIN — HEPARIN SODIUM 5000 UNIT(S): 5000 INJECTION INTRAVENOUS; SUBCUTANEOUS at 05:58

## 2020-11-05 RX ADMIN — Medication 2: at 17:58

## 2020-11-05 RX ADMIN — HEPARIN SODIUM 5000 UNIT(S): 5000 INJECTION INTRAVENOUS; SUBCUTANEOUS at 22:11

## 2020-11-05 RX ADMIN — AMLODIPINE BESYLATE 10 MILLIGRAM(S): 2.5 TABLET ORAL at 05:58

## 2020-11-05 RX ADMIN — Medication 2: at 07:48

## 2020-11-05 RX ADMIN — HEPARIN SODIUM 5000 UNIT(S): 5000 INJECTION INTRAVENOUS; SUBCUTANEOUS at 13:04

## 2020-11-05 RX ADMIN — Medication 100 MILLIGRAM(S): at 05:58

## 2020-11-05 NOTE — SWALLOW BEDSIDE ASSESSMENT ADULT - COMMENTS
CT head 11/1: Impression: No CT evidence of acute intracranial abnormality.    CXR 11/1: Findings: The heart is unremarkable. The lungs are clear. Bones are unremarkable for age. Pt with significantly prolonged mastication 2/2 edentulous status and unable to tolerate [facility] soft solids No overt s/s of asp/pen.

## 2020-11-05 NOTE — PROGRESS NOTE ADULT - ASSESSMENT
70 y/o female with PMHx of dementia (minimally verbal per daughter occasional gives 1 word answers of yes/no), CVA, Seizure disorder, HTN, DM type 2 (off meds per daughter), admission 2 months ago for renal colic s/p lithotripsy in 8/24 w/ indwelling milner presents to the ED due to hematuria.           Problem/Plan - 1:  ·  Problem: Acute  cystitis with hematuria and associated with indwelling chronic cathter.  Plan: - CT scan w/ likely cystitis  - urine culture showed klebsiella and enterococcus.   Off antibiotic per ID.       Problem/Plan - 2:  ·  Problem: Calculus of ureter.  Plan: - 6mm stone at the UVJ  -Urology recs reviewed. planning to take patient to OR on Monday for stone removal.     Problem/Plan - 3:  ·  Problem: Seizure disorder.  Plan: - c/w Keppra    Problem/Plan - 4:  ·  Problem: Acute kidney injury. improved. Poor oral intake.     Problem/Plan - 5:  ·  Problem: Essential hypertension. controlled. Plan: - c/w antihypertensives.     Problem/Plan - 6:  Problem: Type 2 diabetes mellitus with other specified complication, without long-term current use of insulin. controlled. Plan: - FS q6 w/ SSI.     Problem/Plan - 7:  ·  Problem: DVT prophylaxis.  HSQ     Hypokalemia. Replete again and recheck.   Hypophosphatemia. repleted    HTN. Controlled. cont current meds. Monitor.     Dysphagia. repeat swallow study.     PT eval>>>Home with HHA    Full code

## 2020-11-05 NOTE — SWALLOW BEDSIDE ASSESSMENT ADULT - SWALLOW EVAL: DIAGNOSIS
Pt presented with oral dysphagia marked by reduced cognition, prolonged, inadequate mastication with [facility] soft solids 2/2 edentulous status. Pt with adequate bolus formation/manipulation, adequate AP transport and oral transit time with no overt s/s of asp/pen with puree solids/thin liquids via straw.

## 2020-11-05 NOTE — SWALLOW BEDSIDE ASSESSMENT ADULT - SWALLOW EVAL: ORAL MUSCULATURE
unable to assess due to poor participation/comprehension/general observation revealed pt's with reduced lingual/labial/buccal strength/ROM and coordination.

## 2020-11-05 NOTE — SWALLOW BEDSIDE ASSESSMENT ADULT - SLP PERTINENT HISTORY OF CURRENT PROBLEM
Pt with h/o CVA, dysphagia; previously seen on 8/6/20 recommending puree solids/nectar thick liquids

## 2020-11-05 NOTE — PROGRESS NOTE ADULT - ASSESSMENT
69 years old female with PMH of dementia, CVA, seizure disorder, HTN, DM type 2 (not on any medications), s/p admission to the hospital in August 2020 with UTI (UCx grew Pseudomonas aeruginosa Carbapenem resistant), urinary retention, s/p lithotripsy and Milner placement, followed by another admission with renal failure, pressure ulcer, Blood culture grew Staphylococcus hominis deemed contaminant.   Here had one time fever, normal WBC, UA in the new milner with only 0-2 WBC, COVID negative   Had suprapubic tenderness on exam, no CVA tenderness, +milner with sediment in it  CXR (I personally reviewed) no pneumonia   CT (I personally reviewed) cystitis and multiple stones   She has had resistant organisms in the past including .   If this is pseudomonas then the ceftriaxone wont have activity   Even if pseudomonas she has had different morphologies with different sensitivity patterns and if this is carbapenem resistant may not have too many options and would potentially need to resort to agents such as (Zerbaxa) ceftolozane/tazobactam.   She is afebrile, normal WBC and had a unimpressive UA once her milner was changed. She is colonized with pseudomonas though has extensive resistance. At this time the risks of antibiotics side effects and developing resistance outweigh the benefits of using ceftriaxone and thus it has been stopped. Once her urine culture is identified with sensitivities, would offer treatment is develops clear signs and/or symptoms of UTI or if she is to undergo a urologic procedure on this admission. Planned for repeat CT and if ureteral stone, urology will plan for removal. If that is the case, then would wait for the most recent urine culture sensitivities and start appropriate therapy.     11/5 Interval History: The pt is awake and alert, in her bed, does not answer any of my questions. The pt is afebrile, no leukocytosis, UCx grew >100K Klebsiella and >100K Enterococcus faecalis, sensitivity pending, blood cultures with no growth, the pt is off antibiotics. UCx growth will be treated if the pt will have scheduled urology procedure for the 6 mm stone in the right ureterovesical junction, no hydronephrosis.     Plan:  -continue off antibiotics   -follow up on final urine culture results   -if pseudomonas, I will call lab and ask for testing against (Zerbaxa) ceftolozane/tazobactam and (Avycaz) ceftazidime/avibactam    -if worsens would agree with urology placing stent vs stone removal   -without stone removal, patient is at high risk of recurrent infections especially if any of them cause obstruction. Stones often harbor the infection and periodically reactivate an active infection  -patient with functional quadriplegia >please ensure offloading and optimizing nutrition to avoid bedsores.   -good but not tight glycemic control given risks of hypoglycemia    69 years old female with PMH of dementia, CVA, seizure disorder, HTN, DM type 2 (not on any medications), s/p admission to the hospital in August 2020 with UTI (UCx grew Pseudomonas aeruginosa Carbapenem resistant), urinary retention, s/p lithotripsy and Milner placement, followed by another admission with renal failure, pressure ulcer, Blood culture grew Staphylococcus hominis deemed contaminant.   Here had one time fever, normal WBC, UA in the new milner with only 0-2 WBC, COVID negative   Had suprapubic tenderness on exam, no CVA tenderness, +milner with sediment in it  CXR (I personally reviewed) no pneumonia   CT (I personally reviewed) cystitis and multiple stones   She has had resistant organisms in the past including .   If this is pseudomonas then the ceftriaxone wont have activity   Even if pseudomonas she has had different morphologies with different sensitivity patterns and if this is carbapenem resistant may not have too many options and would potentially need to resort to agents such as (Zerbaxa) ceftolozane/tazobactam.   She is afebrile, normal WBC and had a unimpressive UA once her milner was changed. She is colonized with pseudomonas though has extensive resistance. At this time the risks of antibiotics side effects and developing resistance outweigh the benefits of using ceftriaxone and thus it has been stopped. Once her urine culture is identified with sensitivities, would offer treatment is develops clear signs and/or symptoms of UTI or if she is to undergo a urologic procedure on this admission. Planned for repeat CT and if ureteral stone, urology will plan for removal. If that is the case, then would wait for the most recent urine culture sensitivities and start appropriate therapy.     11/5 Interval History: The pt is awake and alert, in her bed, does not answer any of my questions. The pt is afebrile, no leukocytosis, UCx grew >100K Klebsiella and >100K Enterococcus faecalis, sensitivity as above, blood cultures with no growth, the pt is off antibiotics. UCx growth will be treated if the pt will have scheduled urology procedure for the 6 mm stone in the right ureterovesical junction, no hydronephrosis.     Plan:  -continue off antibiotics for now  -patient with functional quadriplegia >please ensure offloading and optimizing nutrition to avoid bedsores.   -good but not tight glycemic control given risks of hypoglycemia     Attending Addendum--  Casre reviewed with NP Pippa Zhang. Her note reviewed and modified as appropriate.   Patient personally assessed and examined.  D/W Dr. Caicedo, patient slated for OR Monday. Would start patient on antibiotics about 48h before planned surgery to hopefully sterilize urine in advance of the procedure. Zosyn would be a reasonable choice for monotherapy, no ideal choices for a single drug.    Dr. Phan to resume care in am.    Dileep Mccann MD  Attending Physician  [facility]  Division of Infectious Diseases  [phone]

## 2020-11-05 NOTE — PROGRESS NOTE ADULT - SUBJECTIVE AND OBJECTIVE BOX
FATOU MCBRIDE  MRN-[id number]    Follow Up:  ID following for UTI/fever     Interval History: The pt is awake and alert, in her bed, does not answer any of my questions. The pt is afebrile, no leukocytosis, UCx grew >100K Klebsiella and >100K Enterococcus faecalis, sensitivity pending, blood cultures with no growth, the pt is off antibiotics. UCx growth will be treated if the pt will have scheduled urology procedure for the 6 mm stone in the right ureterovesical junction, no hydronephrosis.     ROS:  - unable to obtain, pt is minimally verbal, history of dementia      Allergies  No Known Allergies        ANTIMICROBIALS:      OTHER MEDS:  acetaminophen   Tablet .. 650 milliGRAM(s) Oral every 6 hours PRN  amLODIPine   Tablet 10 milliGRAM(s) Oral daily  cloNIDine 0.1 milliGRAM(s) Oral two times a day  dextrose 40% Gel 15 Gram(s) Oral once PRN  dextrose 5%. 1000 milliLiter(s) IV Continuous <Continuous>  dextrose 50% Injectable 12.5 Gram(s) IV Push once  dextrose 50% Injectable 25 Gram(s) IV Push once  dextrose 50% Injectable 25 Gram(s) IV Push once  glucagon  Injectable 1 milliGRAM(s) IntraMuscular once PRN  heparin   Injectable 5000 Unit(s) SubCutaneous every 8 hours  hydrALAZINE 100 milliGRAM(s) Oral every 8 hours  insulin lispro (ADMELOG) corrective regimen sliding scale   SubCutaneous three times a day before meals  insulin lispro (ADMELOG) corrective regimen sliding scale   SubCutaneous at bedtime  levETIRAcetam 750 milliGRAM(s) Oral two times a day  metoprolol tartrate 50 milliGRAM(s) Oral two times a day  ondansetron Injectable 4 milliGRAM(s) IV Push every 6 hours PRN  potassium chloride    Tablet ER 40 milliEquivalent(s) Oral once  simvastatin 20 milliGRAM(s) Oral at bedtime  sodium chloride 0.9%. 1000 milliLiter(s) IV Continuous <Continuous>  tamsulosin 0.4 milliGRAM(s) Oral at bedtime      Vital Signs Last 24 Hrs  T(C): 37.2 (05 Nov 2020 05:30), Max: 37.2 (05 Nov 2020 05:30)  T(F): 99 (05 Nov 2020 05:30), Max: 99 (05 Nov 2020 05:30)  HR: 68 (05 Nov 2020 05:30) (61 - 69)  BP: 167/62 (05 Nov 2020 05:30) (83/53 - 167/62)  BP(mean): --  RR: 18 (05 Nov 2020 05:30) (16 - 18)  SpO2: 99% (05 Nov 2020 05:30) (93% - 100%)    General:    NAD,  non toxic  Head: atraumatic, normocephalic  Eye: normal sclera and conjunctiva  ENT:    no oral lesions, neck supple  Cardio:     regular S1, S2,  no murmur  Respiratory:    clear b/l,    no wheezing  abd:     soft,   BS +,   no tenderness  :   no suprapubic tenderness,   +milner with clear yellow urine   Musculoskeletal:   no joint swelling,   no edema  vascular: no central lines, +PIV   Skin:    no rash  Neurologic:     poor mental status, knows name, weak in all extremities   psych: normal affect    WBC Count: 8.31 K/uL (11-05 @ 07:07)  WBC Count: 4.49 K/uL (11-04 @ 07:09)  WBC Count: 6.36 K/uL (11-03 @ 07:57)  WBC Count: 8.32 K/uL (11-02 @ 06:26)  WBC Count: 10.14 K/uL (11-01 @ 19:55)                            10.2   8.31  )-----------( 250      ( 05 Nov 2020 07:07 )             32.2       11-05    142  |  112<H>  |  19  ----------------------------<  168<H>  3.3<L>   |  24  |  0.77    Ca    8.3<L>      05 Nov 2020 07:07            Creatinine Trend: 0.77<--, 0.76<--, 0.78<--, 0.92<--, 1.39<--      MICROBIOLOGY:  v  .Urine Clean Catch (Midstream)  11-02-20   >100,000 CFU/ml Klebsiella pneumoniae  >100,000 CFU/ml Enterococcus faecalis  --  --      .Blood Blood-Peripheral  11-02-20   No growth to date.  --  --          Rapid RVP Result: NotDetec (11-01 @ 21:30)                    RADIOLOGY:     FATOU MCBRIDE  MRN-[id number]    Follow Up:  ID following for UTI/fever     Interval History: The pt is awake and alert, in her bed, does not answer any of my questions. The pt is afebrile, no leukocytosis, UCx grew >100K Klebsiella and >100K Enterococcus faecalis, sensitivity pending, blood cultures with no growth, the pt is off antibiotics. UCx growth will be treated if the pt will have scheduled urology procedure for the 6 mm stone in the right ureterovesical junction, no hydronephrosis.     ROS:  - unable to obtain, pt is minimally verbal, history of dementia      Allergies  No Known Allergies        ANTIMICROBIALS:      OTHER MEDS:  acetaminophen   Tablet .. 650 milliGRAM(s) Oral every 6 hours PRN  amLODIPine   Tablet 10 milliGRAM(s) Oral daily  cloNIDine 0.1 milliGRAM(s) Oral two times a day  dextrose 40% Gel 15 Gram(s) Oral once PRN  dextrose 5%. 1000 milliLiter(s) IV Continuous <Continuous>  dextrose 50% Injectable 12.5 Gram(s) IV Push once  dextrose 50% Injectable 25 Gram(s) IV Push once  dextrose 50% Injectable 25 Gram(s) IV Push once  glucagon  Injectable 1 milliGRAM(s) IntraMuscular once PRN  heparin   Injectable 5000 Unit(s) SubCutaneous every 8 hours  hydrALAZINE 100 milliGRAM(s) Oral every 8 hours  insulin lispro (ADMELOG) corrective regimen sliding scale   SubCutaneous three times a day before meals  insulin lispro (ADMELOG) corrective regimen sliding scale   SubCutaneous at bedtime  levETIRAcetam 750 milliGRAM(s) Oral two times a day  metoprolol tartrate 50 milliGRAM(s) Oral two times a day  ondansetron Injectable 4 milliGRAM(s) IV Push every 6 hours PRN  potassium chloride    Tablet ER 40 milliEquivalent(s) Oral once  simvastatin 20 milliGRAM(s) Oral at bedtime  sodium chloride 0.9%. 1000 milliLiter(s) IV Continuous <Continuous>  tamsulosin 0.4 milliGRAM(s) Oral at bedtime      Vital Signs Last 24 Hrs  T(C): 37.2 (05 Nov 2020 05:30), Max: 37.2 (05 Nov 2020 05:30)  T(F): 99 (05 Nov 2020 05:30), Max: 99 (05 Nov 2020 05:30)  HR: 68 (05 Nov 2020 05:30) (61 - 69)  BP: 167/62 (05 Nov 2020 05:30) (83/53 - 167/62)  BP(mean): --  RR: 18 (05 Nov 2020 05:30) (16 - 18)  SpO2: 99% (05 Nov 2020 05:30) (93% - 100%)    General:    NAD,  non toxic  Head: atraumatic, normocephalic  Eye: normal sclera and conjunctiva  ENT:    no oral lesions, neck supple  Cardio:     regular S1, S2,  no murmur  Respiratory:    clear b/l,    no wheezing  abd:     soft,   BS +,   no tenderness  :   no suprapubic tenderness,   +milner with clear yellow urine   Musculoskeletal:   no joint swelling,   no edema  vascular: no central lines, +PIV   Skin:    no rash  Neurologic:     poor mental status, knows name, weak in all extremities   psych: normal affect    WBC Count: 8.31 K/uL (11-05 @ 07:07)  WBC Count: 4.49 K/uL (11-04 @ 07:09)  WBC Count: 6.36 K/uL (11-03 @ 07:57)  WBC Count: 8.32 K/uL (11-02 @ 06:26)  WBC Count: 10.14 K/uL (11-01 @ 19:55)                            10.2   8.31  )-----------( 250      ( 05 Nov 2020 07:07 )             32.2       11-05    142  |  112<H>  |  19  ----------------------------<  168<H>  3.3<L>   |  24  |  0.77    Ca    8.3<L>      05 Nov 2020 07:07            Creatinine Trend: 0.77<--, 0.76<--, 0.78<--, 0.92<--, 1.39<--      MICROBIOLOGY:  Culture - Urine (11.02.20 @ 09:16)    -  Ertapenem: S <=0.5    -  Gentamicin: S <=2    -  Imipenem: S <=1    -  Levofloxacin: R >4    -  Levofloxacin: S 1    -  Meropenem: S <=1    -  Nitrofurantoin: I 64 Should not be used to treat pyelonephritis    -  Nitrofurantoin: S <=32 Should not be used to treat pyelonephritis.    -  Piperacillin/Tazobactam: S <=8    -  Tetra/Doxy: R >8    -  Tigecycline: S <=2    -  Tobramycin: S <=2    -  Trimethoprim/Sulfamethoxazole: S 2/38    -  Vancomycin: S 2    -  Amikacin: S <=16    -  Amoxicillin/Clavulanic Acid: S <=8/4    -  Ampicillin: R >16 These ampicillin results predict results for amoxicillin    -  Ampicillin: S <=2 Predicts results to ampicillin/sulbactam, amoxacillin-clavulanate and  piperacillin-tazobactam.    -  Ampicillin/Sulbactam: I 16/8 Enterobacter, Citrobacter, and Serratia may develop resistance during prolonged therapy (3-4 days)    -  Aztreonam: S <=4    -  Cefazolin: S <=2 ([email or web address]) For uncomplicated UTI with K. pneumoniae, E. coli, or P. mirablis: MELISA <=16 is sensitive and MELISA >=32 is resistant. This also predicts results for oral agents cefaclor, cefdinir, cefpodoxime, cefprozil, cefuroxime axetil, cephalexin and locarbef for uncomplicated UTI. Note that some isolates may be susceptible to these agents while testing resistant to cefazolin.    -  Cefepime: S <=2    -  Cefoxitin: S <=8    -  Ceftriaxone: S <=1 Enterobacter, Citrobacter, and Serratia may develop resistance during prolonged therapy    -  Ciprofloxacin: R >2    -  Ciprofloxacin: S <=1    Specimen Source: .Urine Clean Catch (Midstream)    Culture Results:   >100,000 CFU/ml Klebsiella pneumoniae  >100,000 CFU/ml Enterococcus faecalis    Organism Identification: Klebsiella pneumoniae  Enterococcus faecalis    Organism: Klebsiella pneumoniae    Organism: Enterococcus faecalis    Method Type: MELISA    Method Type: MELISA      .Blood Blood-Peripheral  11-02-20   No growth to date.  --  --          Rapid RVP Result: Bonnie (11-01 @ 21:30)                    RADIOLOGY:

## 2020-11-05 NOTE — SWALLOW BEDSIDE ASSESSMENT ADULT - SLP GENERAL OBSERVATIONS
Pt seen bedside, alert and ?oriented to self. Pt mostly nonverbal, essentially non-communicative. Pt [facility] to follow directions or [facility]. Pt localized to SLP and made eye contact during PO trials.

## 2020-11-05 NOTE — SWALLOW BEDSIDE ASSESSMENT ADULT - H & P REVIEW
70 y/o female with PMHx of dementia (minimally verbal per daughter occasional gives 1 word answers of yes/no), CVA, Seizure disorder, HTN, DM type 2 (off meds per daughter), admission 2 months ago for renal colic s/p lithotripsy in 8/24 w/ indwelling milner presents to the ED due to hematuria. Per daughter at bedside, pt was supposed to go to the Urologist's office for milner removal and TOV on Thursday however unable to arrange a ride. Daughter reports the next day she noticed urine was leaking around milner thus a visiting nurse came to change "part of the tube" and the bag and leakage stopped. Of note in the last few days daughter reports pt has been more sluggish, and sleepy, less alert/responsive. Today she noted hematuria thus brought to the ED. No noted fever or chills./yes

## 2020-11-05 NOTE — PROGRESS NOTE ADULT - SUBJECTIVE AND OBJECTIVE BOX
CHIEF COMPLAINT: Limited hx as patient is non verbal.   no fever  no constipation          PHYSICAL EXAM:    GENERAL: Thinly built and non verbal   CHEST/LUNG: Clear to ausculation bilaterally, no wheezing, no crackles   HEART: Regular rate and rhythm; No murmurs, rubs  ABDOMEN: Soft, Nontender, Nondistended; Bowel sounds present. + milner   EXTREMITIES: No clubbing, cyanosis, or edema. ROM could not assessed because of non verbal status and following no commands.   NERVOUS SYSTEM:  Limited as did not follow commands  Psychiatry: AA and orientation could not be assessed.       OBJECTIVE DATA:       Vital Signs Last 24 Hrs  T(C): 36.4 (2020 11:57), Max: 37.2 (2020 05:30)  T(F): 97.6 (:57), Max: 99 (2020 05:30)  HR: 61 (:57) (61 - 69)  BP: 113/54 (:57) (83/53 - 167/62)  BP(mean): --  RR: 18 (:57) (17 - 18)  SpO2: 97% (:57) (93% - 100%)           Daily     Daily Weight in k.9 (2020 05:30)  LABS:                        10.2   8.31  )-----------( 250      ( 2020 07:07 )             32.2                 142  |  112<H>  |  19  ----------------------------<  168<H>  3.3<L>   |  24  |  0.77    Ca    8.3<L>      2020 07:07                         CAPILLARY BLOOD GLUCOSE      POCT Blood Glucose.: 134 mg/dL (2020 11:02)      Culture - Urine (collected )  Source: .Urine Clean Catch (Midstream)  Preliminary Report ():    >100,000 CFU/ml Klebsiella pneumoniae    >100,000 CFU/ml Enterococcus faecalis Susceptibility to follow.  Organism: Klebsiella pneumoniae ()  Organism: Klebsiella pneumoniae ()    Sensitivities:      -  Amikacin: S <=16      -  Amoxicillin/Clavulanic Acid: S <=8/4      -  Ampicillin: R >16 These ampicillin results predict results for amoxicillin      -  Ampicillin/Sulbactam: I 16/8 Enterobacter, Citrobacter, and Serratia may develop resistance during prolonged therapy (3-4 days)      -  Aztreonam: S <=4      -  Cefazolin: S <=2 ([email or web address]) For uncomplicated UTI with K. pneumoniae, E. coli, or P. mirablis: MELISA <=16 is sensitive and MELISA >=32 is resistant. This also predicts results for oral agents cefaclor, cefdinir, cefpodoxime, cefprozil, cefuroxime axetil, cephalexin and locarbef for uncomplicated UTI. Note that some isolates may be susceptible to these agents while testing resistant to cefazolin.      -  Cefepime: S <=2      -  Cefoxitin: S <=8      -  Ceftriaxone: S <=1 Enterobacter, Citrobacter, and Serratia may develop resistance during prolonged therapy      -  Ciprofloxacin: R >2      -  Ertapenem: S <=0.5      -  Gentamicin: S <=2      -  Imipenem: S <=1      -  Levofloxacin: R >4      -  Meropenem: S <=1      -  Nitrofurantoin: I 64 Should not be used to treat pyelonephritis      -  Piperacillin/Tazobactam: S <=8      -  Tigecycline: S <=2      -  Tobramycin: S <=2      -  Trimethoprim/Sulfamethoxazole: S       Method Type: MELISA    Culture - Blood (collected )  Source: .Blood Blood-Peripheral  Preliminary Report ():    No growth to date.    Culture - Blood (collected )  Source: .Blood Blood-Peripheral  Preliminary Report ():    No growth to date.      MEDICATIONS  (STANDING):  amLODIPine   Tablet 10 milliGRAM(s) Oral daily  cloNIDine 0.1 milliGRAM(s) Oral two times a day  dextrose 5%. 1000 milliLiter(s) (50 mL/Hr) IV Continuous <Continuous>  dextrose 50% Injectable 12.5 Gram(s) IV Push once  dextrose 50% Injectable 25 Gram(s) IV Push once  dextrose 50% Injectable 25 Gram(s) IV Push once  heparin   Injectable 5000 Unit(s) SubCutaneous every 8 hours  hydrALAZINE 100 milliGRAM(s) Oral every 8 hours  insulin lispro (ADMELOG) corrective regimen sliding scale   SubCutaneous three times a day before meals  insulin lispro (ADMELOG) corrective regimen sliding scale   SubCutaneous at bedtime  levETIRAcetam 750 milliGRAM(s) Oral two times a day  metoprolol tartrate 50 milliGRAM(s) Oral two times a day  potassium chloride    Tablet ER 40 milliEquivalent(s) Oral once  simvastatin 20 milliGRAM(s) Oral at bedtime  sodium chloride 0.9%. 1000 milliLiter(s) (75 mL/Hr) IV Continuous <Continuous>  tamsulosin 0.4 milliGRAM(s) Oral at bedtime    MEDICATIONS  (PRN):  acetaminophen   Tablet .. 650 milliGRAM(s) Oral every 6 hours PRN Temp greater or equal to 38C (100.4F), Mild Pain (1 - 3)  dextrose 40% Gel 15 Gram(s) Oral once PRN Blood Glucose LESS THAN 70 milliGRAM(s)/deciliter  glucagon  Injectable 1 milliGRAM(s) IntraMuscular once PRN Glucose LESS THAN 70 milligrams/deciliter  ondansetron Injectable 4 milliGRAM(s) IV Push every 6 hours PRN Nausea and/or Vomiting

## 2020-11-06 LAB
ANION GAP SERPL CALC-SCNC: 5 MMOL/L — SIGNIFICANT CHANGE UP (ref 5–17)
BUN SERPL-MCNC: 13 MG/DL — SIGNIFICANT CHANGE UP (ref 7–23)
CALCIUM SERPL-MCNC: 8.2 MG/DL — LOW (ref 8.5–10.1)
CHLORIDE SERPL-SCNC: 111 MMOL/L — HIGH (ref 96–108)
CO2 SERPL-SCNC: 25 MMOL/L — SIGNIFICANT CHANGE UP (ref 22–31)
CREAT SERPL-MCNC: 0.8 MG/DL — SIGNIFICANT CHANGE UP (ref 0.5–1.3)
GLUCOSE BLDC GLUCOMTR-MCNC: 161 MG/DL — HIGH (ref 70–99)
GLUCOSE BLDC GLUCOMTR-MCNC: 177 MG/DL — HIGH (ref 70–99)
GLUCOSE BLDC GLUCOMTR-MCNC: 187 MG/DL — HIGH (ref 70–99)
GLUCOSE BLDC GLUCOMTR-MCNC: 92 MG/DL — SIGNIFICANT CHANGE UP (ref 70–99)
GLUCOSE SERPL-MCNC: 162 MG/DL — HIGH (ref 70–99)
POTASSIUM SERPL-MCNC: 3.6 MMOL/L — SIGNIFICANT CHANGE UP (ref 3.5–5.3)
POTASSIUM SERPL-SCNC: 3.6 MMOL/L — SIGNIFICANT CHANGE UP (ref 3.5–5.3)
SODIUM SERPL-SCNC: 141 MMOL/L — SIGNIFICANT CHANGE UP (ref 135–145)

## 2020-11-06 PROCEDURE — 99232 SBSQ HOSP IP/OBS MODERATE 35: CPT

## 2020-11-06 RX ORDER — PIPERACILLIN AND TAZOBACTAM 4; .5 G/20ML; G/20ML
3.38 INJECTION, POWDER, LYOPHILIZED, FOR SOLUTION INTRAVENOUS EVERY 8 HOURS
Refills: 0 | Status: DISCONTINUED | OUTPATIENT
Start: 2020-11-07 | End: 2020-11-09

## 2020-11-06 RX ORDER — PIPERACILLIN AND TAZOBACTAM 4; .5 G/20ML; G/20ML
3.38 INJECTION, POWDER, LYOPHILIZED, FOR SOLUTION INTRAVENOUS ONCE
Refills: 0 | Status: COMPLETED | OUTPATIENT
Start: 2020-11-07 | End: 2020-11-07

## 2020-11-06 RX ADMIN — LEVETIRACETAM 750 MILLIGRAM(S): 250 TABLET, FILM COATED ORAL at 17:52

## 2020-11-06 RX ADMIN — Medication 100 MILLIGRAM(S): at 13:25

## 2020-11-06 RX ADMIN — AMLODIPINE BESYLATE 10 MILLIGRAM(S): 2.5 TABLET ORAL at 05:05

## 2020-11-06 RX ADMIN — Medication 100 MILLIGRAM(S): at 21:27

## 2020-11-06 RX ADMIN — Medication 0.1 MILLIGRAM(S): at 21:27

## 2020-11-06 RX ADMIN — HEPARIN SODIUM 5000 UNIT(S): 5000 INJECTION INTRAVENOUS; SUBCUTANEOUS at 21:27

## 2020-11-06 RX ADMIN — HEPARIN SODIUM 5000 UNIT(S): 5000 INJECTION INTRAVENOUS; SUBCUTANEOUS at 05:05

## 2020-11-06 RX ADMIN — Medication 50 MILLIGRAM(S): at 05:05

## 2020-11-06 RX ADMIN — Medication 0.1 MILLIGRAM(S): at 05:05

## 2020-11-06 RX ADMIN — Medication 2: at 07:34

## 2020-11-06 RX ADMIN — Medication 50 MILLIGRAM(S): at 17:55

## 2020-11-06 RX ADMIN — Medication 2: at 11:51

## 2020-11-06 RX ADMIN — LEVETIRACETAM 750 MILLIGRAM(S): 250 TABLET, FILM COATED ORAL at 05:05

## 2020-11-06 RX ADMIN — HEPARIN SODIUM 5000 UNIT(S): 5000 INJECTION INTRAVENOUS; SUBCUTANEOUS at 13:25

## 2020-11-06 RX ADMIN — SODIUM CHLORIDE 75 MILLILITER(S): 9 INJECTION INTRAMUSCULAR; INTRAVENOUS; SUBCUTANEOUS at 20:20

## 2020-11-06 RX ADMIN — SIMVASTATIN 20 MILLIGRAM(S): 20 TABLET, FILM COATED ORAL at 21:27

## 2020-11-06 RX ADMIN — Medication 100 MILLIGRAM(S): at 05:05

## 2020-11-06 RX ADMIN — TAMSULOSIN HYDROCHLORIDE 0.4 MILLIGRAM(S): 0.4 CAPSULE ORAL at 21:27

## 2020-11-06 NOTE — PROGRESS NOTE ADULT - ASSESSMENT
70 y/o female with PMHx of dementia (minimally verbal per daughter occasional gives 1 word answers of yes/no), CVA, Seizure disorder, HTN, DM type 2 (off meds per daughter), admission 2 months ago for renal colic s/p lithotripsy in 8/24 w/ indwelling milner presents to the ED due to hematuria.           Problem/Plan - 1:  ·  Problem: Acute  cystitis with hematuria and associated with indwelling chronic cathter.  Plan: - CT scan w/ likely cystitis  - urine culture showed klebsiella and enterococcus.   restarted iv zosyn by ID prior to OR procedure  follow labs.        Problem/Plan - 2:  ·  Problem: Calculus of ureter.  Plan: - 6mm stone at the UVJ  -Reviewed Urology consult note. OR for stenting and stone removal on Monday.     Problem/Plan - 3:  ·  Problem: Seizure disorder.  Plan: - c/w Keppra    Problem/Plan - 4:  ·  Problem: Acute kidney injury. improved. Poor oral intake.     Problem/Plan - 5:  ·  Problem: Essential hypertension. uncontrolled. increase clonidine and cont other meds. Monitor.     Problem/Plan - 6:  Problem: Type 2 diabetes mellitus with other specified complication, without long-term current use of insulin. controlled. Plan: - FS q6 w/ SSI.     Problem/Plan - 7:  ·  Problem: DVT prophylaxis.  HSQ     Hypokalemia. Repleted  Hypophosphatemia. repleted      Dysphagia. cont dysphagia diet with aspiration precautions as per swallow eval.     PT eval>>>Home with HHA    Full code

## 2020-11-06 NOTE — PROGRESS NOTE ADULT - SUBJECTIVE AND OBJECTIVE BOX
CHIEF COMPLAINT: Limited hx as patient is non verbal.   no fever  no report of any vomiting  better food intake reported      PHYSICAL EXAM:    GENERAL: Thinly built and non verbal   CHEST/LUNG: Clear to ausculation bilaterally, no wheezing, no crackles   HEART: Regular rate and rhythm; No murmurs, rubs  ABDOMEN: Soft, Nontender, Nondistended; Bowel sounds present. + milner   EXTREMITIES: No clubbing, cyanosis, or edema. ROM could not assessed because of non verbal status and following no commands.   NERVOUS SYSTEM:  Limited as did not follow commands  Psychiatry: AA and orientation could not be assessed.       OBJECTIVE DATA:       Vital Signs Last 24 Hrs  T(C): 36.7 (2020 11:25), Max: 37.2 (2020 23:31)  T(F): 98.1 (:), Max: 98.9 (2020 23:31)  HR: 67 (:) (58 - 75)  BP: 124/52 (:) (113/54 - 158/75)  BP(mean): --  RR: 18 (2020 11:25) (16 - 18)  SpO2: 99% (2020 11:25) (93% - 99%)           Daily     Daily Weight in k.1 (2020 05:00)  LABS:                        10.2   8.31  )-----------( 250      ( 2020 07:07 )             32.2             11-    141  |  111<H>  |  13  ----------------------------<  162<H>  3.6   |  25  |  0.80    Ca    8.2<L>      2020 07:53                         CAPILLARY BLOOD GLUCOSE      POCT Blood Glucose.: 177 mg/dL (2020 11:09)      Culture - Urine (collected )  Source: .Urine Clean Catch (Midstream)  Final Report ():    >100,000 CFU/ml Klebsiella pneumoniae    >100,000 CFU/ml Enterococcus faecalis  Organism: Klebsiella pneumoniae  Enterococcus faecalis ()  Organism: Enterococcus faecalis ()    Sensitivities:      -  Ampicillin: S <=2 Predicts results to ampicillin/sulbactam, amoxacillin-clavulanate and  piperacillin-tazobactam.      -  Ciprofloxacin: S <=1      -  Levofloxacin: S 1      -  Nitrofurantoin: S <=32 Should not be used to treat pyelonephritis.      -  Tetra/Doxy: R >8      -  Vancomycin: S 2      Method Type: MELISA  Organism: Klebsiella pneumoniae ()    Sensitivities:      -  Amikacin: S <=16      -  Amoxicillin/Clavulanic Acid: S <=8/4      -  Ampicillin: R >16 These ampicillin results predict results for amoxicillin      -  Ampicillin/Sulbactam: I 16/8 Enterobacter, Citrobacter, and Serratia may develop resistance during prolonged therapy (3-4 days)      -  Aztreonam: S <=4      -  Cefazolin: S <=2 ([email or web address]_COM_[email or web address]) For uncomplicated UTI with K. pneumoniae, E. coli, or P. mirablis: MELISA <=16 is sensitive and MELISA >=32 is resistant. This also predicts results for oral agents cefaclor, cefdinir, cefpodoxime, cefprozil, cefuroxime axetil, cephalexin and locarbef for uncomplicated UTI. Note that some isolates may be susceptible to these agents while testing resistant to cefazolin.      -  Cefepime: S <=2      -  Cefoxitin: S <=8      -  Ceftriaxone: S <=1 Enterobacter, Citrobacter, and Serratia may develop resistance during prolonged therapy      -  Ciprofloxacin: R >2      -  Ertapenem: S <=0.5      -  Gentamicin: S <=2      -  Imipenem: S <=1      -  Levofloxacin: R >4      -  Meropenem: S <=1      -  Nitrofurantoin: I 64 Should not be used to treat pyelonephritis      -  Piperacillin/Tazobactam: S <=8      -  Tigecycline: S <=2      -  Tobramycin: S <=2      -  Trimethoprim/Sulfamethoxazole: S       Method Type: MELISA    Culture - Blood (collected )  Source: .Blood Blood-Peripheral  Preliminary Report ():    No growth to date.    Culture - Blood (collected )  Source: .Blood Blood-Peripheral  Preliminary Report ():    No growth to date.        MEDICATIONS  (STANDING):  amLODIPine   Tablet 10 milliGRAM(s) Oral daily  cloNIDine 0.1 milliGRAM(s) Oral every 8 hours  dextrose 5%. 1000 milliLiter(s) (50 mL/Hr) IV Continuous <Continuous>  dextrose 50% Injectable 12.5 Gram(s) IV Push once  dextrose 50% Injectable 25 Gram(s) IV Push once  dextrose 50% Injectable 25 Gram(s) IV Push once  heparin   Injectable 5000 Unit(s) SubCutaneous every 8 hours  hydrALAZINE 100 milliGRAM(s) Oral every 8 hours  insulin lispro (ADMELOG) corrective regimen sliding scale   SubCutaneous three times a day before meals  insulin lispro (ADMELOG) corrective regimen sliding scale   SubCutaneous at bedtime  levETIRAcetam 750 milliGRAM(s) Oral two times a day  metoprolol tartrate 50 milliGRAM(s) Oral two times a day  simvastatin 20 milliGRAM(s) Oral at bedtime  sodium chloride 0.9%. 1000 milliLiter(s) (75 mL/Hr) IV Continuous <Continuous>  tamsulosin 0.4 milliGRAM(s) Oral at bedtime    MEDICATIONS  (PRN):  acetaminophen   Tablet .. 650 milliGRAM(s) Oral every 6 hours PRN Temp greater or equal to 38C (100.4F), Mild Pain (1 - 3)  dextrose 40% Gel 15 Gram(s) Oral once PRN Blood Glucose LESS THAN 70 milliGRAM(s)/deciliter  glucagon  Injectable 1 milliGRAM(s) IntraMuscular once PRN Glucose LESS THAN 70 milligrams/deciliter  ondansetron Injectable 4 milliGRAM(s) IV Push every 6 hours PRN Nausea and/or Vomiting

## 2020-11-06 NOTE — PROGRESS NOTE ADULT - SUBJECTIVE AND OBJECTIVE BOX
FATOU MCBRIDE  MRN-[id number]    Follow Up:  ID following for UTI/fever     Interval History: The pt is awake and alert, in her bed, does not answer any of my questions. The pt is afebrile, no leukocytosis, UCx grew >100K Klebsiella and >100K Enterococcus faecalis, sensitivity pending, planned for urologic procedure on Monday with initiation of antibiotics tomorrow AM.     ROS:  - unable to obtain, pt is minimally verbal, history of dementia      Allergies  No Known Allergies        ANTIMICROBIALS:      MEDICATIONS  (STANDING):  amLODIPine   Tablet 10 daily  cloNIDine 0.1 every 8 hours  dextrose 50% Injectable 12.5 once  dextrose 50% Injectable 25 once  dextrose 50% Injectable 25 once  heparin   Injectable 5000 every 8 hours  hydrALAZINE 100 every 8 hours  insulin lispro (ADMELOG) corrective regimen sliding scale  three times a day before meals  insulin lispro (ADMELOG) corrective regimen sliding scale  at bedtime  levETIRAcetam 750 two times a day  metoprolol tartrate 50 two times a day  simvastatin 20 at bedtime  tamsulosin 0.4 at bedtime      PRN  acetaminophen   Tablet .. 650 milliGRAM(s) Oral every 6 hours PRN  dextrose 40% Gel 15 Gram(s) Oral once PRN  glucagon  Injectable 1 milliGRAM(s) IntraMuscular once PRN  ondansetron Injectable 4 milliGRAM(s) IV Push every 6 hours PRN      Physical Exam:  Vital Signs Last 24 Hrs  T(F): 98.1 (11-06-20 @ 11:25), Max: 98.9 (11-05-20 @ 23:31)  HR: 67 (11-06-20 @ 11:25)  BP: 124/52 (11-06-20 @ 11:25)  RR: 18 (11-06-20 @ 11:25)  SpO2: 99% (11-06-20 @ 11:25) (93% - 99%)  Wt(kg): --    General:    NAD,  non toxic  Head: atraumatic, normocephalic  Eye: normal sclera and conjunctiva  ENT:    no oral lesions, neck supple  Cardio:     regular S1, S2,  no murmur  Respiratory:    clear b/l,    no wheezing  abd:     soft,   BS +,   no tenderness  :   no suprapubic tenderness,   +milner with clear yellow urine   Musculoskeletal:   no joint swelling,   no edema  vascular: no central lines, +PIV   Skin:    no rash  Neurologic:     poor mental status, knows name, weak in all extremities   psych: normal affect    WBC Count: 8.31 K/uL (11-05 @ 07:07)  WBC Count: 4.49 K/uL (11-04 @ 07:09)  WBC Count: 6.36 K/uL (11-03 @ 07:57)  WBC Count: 8.32 K/uL (11-02 @ 06:26)  WBC Count: 10.14 K/uL (11-01 @ 19:55)                            10.2   8.31  )-----------( 250      ( 05 Nov 2020 07:07 )             32.2       11-06    141  |  111<H>  |  13  ----------------------------<  162<H>  3.6   |  25  |  0.80    Ca    8.2<L>      06 Nov 2020 07:53        Creatinine Trend: 0.80<--, 0.77<--, 0.76<--, 0.78<--, 0.92<--, 1.39<--        MICROBIOLOGY:  Culture - Urine (11.02.20 @ 09:16)    -  Ertapenem: S <=0.5    -  Gentamicin: S <=2    -  Imipenem: S <=1    -  Levofloxacin: R >4    -  Levofloxacin: S 1    -  Meropenem: S <=1    -  Nitrofurantoin: I 64 Should not be used to treat pyelonephritis    -  Nitrofurantoin: S <=32 Should not be used to treat pyelonephritis.    -  Piperacillin/Tazobactam: S <=8    -  Tetra/Doxy: R >8    -  Tigecycline: S <=2    -  Tobramycin: S <=2    -  Trimethoprim/Sulfamethoxazole: S 2/38    -  Vancomycin: S 2    -  Amikacin: S <=16    -  Amoxicillin/Clavulanic Acid: S <=8/4    -  Ampicillin: R >16 These ampicillin results predict results for amoxicillin    -  Ampicillin: S <=2 Predicts results to ampicillin/sulbactam, amoxacillin-clavulanate and  piperacillin-tazobactam.    -  Ampicillin/Sulbactam: I 16/8 Enterobacter, Citrobacter, and Serratia may develop resistance during prolonged therapy (3-4 days)    -  Aztreonam: S <=4    -  Cefazolin: S <=2 ([id number]_[id number]) For uncomplicated UTI with K. pneumoniae, E. coli, or P. mirablis: MELISA <=16 is sensitive and MELISA >=32 is resistant. This also predicts results for oral agents cefaclor, cefdinir, cefpodoxime, cefprozil, cefuroxime axetil, cephalexin and locarbef for uncomplicated UTI. Note that some isolates may be susceptible to these agents while testing resistant to cefazolin.    -  Cefepime: S <=2    -  Cefoxitin: S <=8    -  Ceftriaxone: S <=1 Enterobacter, Citrobacter, and Serratia may develop resistance during prolonged therapy    -  Ciprofloxacin: R >2    -  Ciprofloxacin: S <=1    Specimen Source: .Urine Clean Catch (Midstream)    Culture Results:   >100,000 CFU/ml Klebsiella pneumoniae  >100,000 CFU/ml Enterococcus faecalis    Organism Identification: Klebsiella pneumoniae  Enterococcus faecalis    Organism: Klebsiella pneumoniae    Organism: Enterococcus faecalis    Method Type: MELISA    Method Type: MELISA      .Blood Blood-Peripheral  11-02-20   No growth to date.  --  --          Rapid RVP Result: Donyntekatherine (11-01 @ 21:30)                    RADIOLOGY:  no new images

## 2020-11-06 NOTE — PROGRESS NOTE ADULT - SUBJECTIVE AND OBJECTIVE BOX
Patient seen and examined at bedside with Dr. Hernandez  Nonverbal. No acute overnight events, tolerating diet.   Milner draining well.  Afebrile.    T(F): 98.3 (11-06-20 @ 05:00), Max: 98.9 (11-05-20 @ 23:31)  HR: 75 (11-06-20 @ 05:00) (58 - 75)  BP: 158/75 (11-06-20 @ 05:00) (113/54 - 158/75)  RR: 16 (11-06-20 @ 05:00) (16 - 18)  SpO2: 97% (11-06-20 @ 05:00) (93% - 99%)    POCT Blood Glucose.: 187 mg/dL (06 Nov 2020 07:28)  POCT Blood Glucose.: 122 mg/dL (05 Nov 2020 22:19)  POCT Blood Glucose.: 166 mg/dL (05 Nov 2020 15:38)  POCT Blood Glucose.: 134 mg/dL (05 Nov 2020 11:02)      PHYSICAL EXAM:  General: NAD, awake  Heart: S1S2  Chest: nonlabored respirations  : No bladder distention. Milner catheter draining clear urine  Extremities: Calf soft b/l    LABS:                        10.2   8.31  )-----------( 250      ( 05 Nov 2020 07:07 )             32.2   11-06    141  |  111<H>  |  13  ----------------------------<  162<H>  3.6   |  25  |  0.80    Ca    8.2<L>      06 Nov 2020 07:53      I&O's Detail    05 Nov 2020 07:01  -  06 Nov 2020 07:00  --------------------------------------------------------  IN:    sodium chloride 0.9%: 300 mL  Total IN: 300 mL    OUT:    Indwelling Catheter - Urethral (mL): 1620 mL  Total OUT: 1620 mL    Total NET: -1320 mL    Assessment:   70 yo F with PMH of dementia (minimally verbal per daughter occasional gives 1 word answers of yes/no), CVA, Seizure disorder, HTN, DM type 2 (off meds per daughter), admission 2 months ago for renal colic s/p lithotripsy, Left ureteral stent insertion 8/24, following stent removal in the office, with indwelling milner for Chronic 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 Found with bladders tones and 6mm right UVJ stone, no hydro.     Plan:  - OR booked on Monday, 11/9, with Dr. Caicedo for cysto with stone removal and possible Left stent insertion  - start Zosyn at 07:00 on 11/7  - continue milner catheter, monitor I/Os  - f/u AM labs, trend renal function  - continue medical management and supportive care  - discussed with Dr. Hernandez

## 2020-11-06 NOTE — PROGRESS NOTE ADULT - ASSESSMENT
69 years old female with PMH of dementia, CVA, seizure disorder, HTN, DM type 2 (not on any medications), s/p admission to the hospital in August 2020 with UTI (UCx grew Pseudomonas aeruginosa Carbapenem resistant), urinary retention, s/p lithotripsy and Milner placement, followed by another admission with renal failure, pressure ulcer, Blood culture grew Staphylococcus hominis deemed contaminant.   Here had one time fever, normal WBC, UA in the new milner with only 0-2 WBC, COVID negative   Had suprapubic tenderness on exam, no CVA tenderness, +milner with sediment in it  CXR (I personally reviewed) no pneumonia   CT (I personally reviewed) cystitis and multiple stones   She has had resistant organisms in the past including .   If this is pseudomonas then the ceftriaxone wont have activity   Even if pseudomonas she has had different morphologies with different sensitivity patterns and if this is carbapenem resistant may not have too many options and would potentially need to resort to agents such as (Zerbaxa) ceftolozane/tazobactam.   She is afebrile, normal WBC and had a unimpressive UA once her milner was changed. She is colonized with pseudomonas though has extensive resistance. At this time the risks of antibiotics side effects and developing resistance outweigh the benefits of using ceftriaxone and thus it has been stopped. Once her urine culture is identified with sensitivities, would offer treatment is develops clear signs and/or symptoms of UTI or if she is to undergo a urologic procedure on this admission. Planned for repeat CT and if ureteral stone, urology will plan for removal. If that is the case, then would wait for the most recent urine culture sensitivities and start appropriate therapy.     11/5 Interval History: The pt is awake and alert, in her bed, does not answer any of my questions. The pt is afebrile, no leukocytosis, UCx grew >100K Klebsiella and >100K Enterococcus faecalis, sensitivity as above, blood cultures with no growth, the pt is off antibiotics. UCx growth will be treated if the pt will have scheduled urology procedure for the 6 mm stone in the right ureterovesical junction, no hydronephrosis.   11/6: afebrile, normal WBC, will start Zosyn tomorrow AM and scheduled for OR on Monday       Plan:  -continue off antibiotics for today  -Start (Zosyn) Piperacillin/tazobactam 3.375 grams every 8 hours Saturday at 7AM  -patient with functional quadriplegia >please ensure offloading and optimizing nutrition to avoid bedsores.   -good but not tight glycemic control given risks of hypoglycemia   -patient slated for OR Monday. Would start patient on antibiotics about 48h before planned surgery to hopefully sterilize urine in advance of the procedure. Zosyn would be a reasonable choice for monotherapy, no ideal choices for a single drug.      Harlan Phan DO  Cell: [phone]  Pager [phone]  Infectious Disease Attending  After 5pm/weekends please call [phone] for all inquiries and new consults

## 2020-11-07 LAB
ANION GAP SERPL CALC-SCNC: 6 MMOL/L — SIGNIFICANT CHANGE UP (ref 5–17)
BUN SERPL-MCNC: 15 MG/DL — SIGNIFICANT CHANGE UP (ref 7–23)
CALCIUM SERPL-MCNC: 8.1 MG/DL — LOW (ref 8.5–10.1)
CHLORIDE SERPL-SCNC: 113 MMOL/L — HIGH (ref 96–108)
CO2 SERPL-SCNC: 24 MMOL/L — SIGNIFICANT CHANGE UP (ref 22–31)
CREAT SERPL-MCNC: 0.83 MG/DL — SIGNIFICANT CHANGE UP (ref 0.5–1.3)
CULTURE RESULTS: SIGNIFICANT CHANGE UP
CULTURE RESULTS: SIGNIFICANT CHANGE UP
GLUCOSE BLDC GLUCOMTR-MCNC: 157 MG/DL — HIGH (ref 70–99)
GLUCOSE BLDC GLUCOMTR-MCNC: 161 MG/DL — HIGH (ref 70–99)
GLUCOSE BLDC GLUCOMTR-MCNC: 267 MG/DL — HIGH (ref 70–99)
GLUCOSE BLDC GLUCOMTR-MCNC: 73 MG/DL — SIGNIFICANT CHANGE UP (ref 70–99)
GLUCOSE SERPL-MCNC: 153 MG/DL — HIGH (ref 70–99)
HCT VFR BLD CALC: 30.1 % — LOW (ref 34.5–45)
HGB BLD-MCNC: 9.9 G/DL — LOW (ref 11.5–15.5)
MCHC RBC-ENTMCNC: 28.2 PG — SIGNIFICANT CHANGE UP (ref 27–34)
MCHC RBC-ENTMCNC: 32.9 GM/DL — SIGNIFICANT CHANGE UP (ref 32–36)
MCV RBC AUTO: 85.8 FL — SIGNIFICANT CHANGE UP (ref 80–100)
NRBC # BLD: 0 /100 WBCS — SIGNIFICANT CHANGE UP (ref 0–0)
PLATELET # BLD AUTO: 257 K/UL — SIGNIFICANT CHANGE UP (ref 150–400)
POTASSIUM SERPL-MCNC: 3.7 MMOL/L — SIGNIFICANT CHANGE UP (ref 3.5–5.3)
POTASSIUM SERPL-SCNC: 3.7 MMOL/L — SIGNIFICANT CHANGE UP (ref 3.5–5.3)
RBC # BLD: 3.51 M/UL — LOW (ref 3.8–5.2)
RBC # FLD: 12.8 % — SIGNIFICANT CHANGE UP (ref 10.3–14.5)
SODIUM SERPL-SCNC: 143 MMOL/L — SIGNIFICANT CHANGE UP (ref 135–145)
SPECIMEN SOURCE: SIGNIFICANT CHANGE UP
SPECIMEN SOURCE: SIGNIFICANT CHANGE UP
WBC # BLD: 5.84 K/UL — SIGNIFICANT CHANGE UP (ref 3.8–10.5)
WBC # FLD AUTO: 5.84 K/UL — SIGNIFICANT CHANGE UP (ref 3.8–10.5)

## 2020-11-07 PROCEDURE — 99232 SBSQ HOSP IP/OBS MODERATE 35: CPT

## 2020-11-07 RX ORDER — HALOPERIDOL DECANOATE 100 MG/ML
2.5 INJECTION INTRAMUSCULAR EVERY 12 HOURS
Refills: 0 | Status: DISCONTINUED | OUTPATIENT
Start: 2020-11-07 | End: 2020-11-09

## 2020-11-07 RX ADMIN — PIPERACILLIN AND TAZOBACTAM 25 GRAM(S): 4; .5 INJECTION, POWDER, LYOPHILIZED, FOR SOLUTION INTRAVENOUS at 23:18

## 2020-11-07 RX ADMIN — Medication 0.1 MILLIGRAM(S): at 05:54

## 2020-11-07 RX ADMIN — HEPARIN SODIUM 5000 UNIT(S): 5000 INJECTION INTRAVENOUS; SUBCUTANEOUS at 13:57

## 2020-11-07 RX ADMIN — AMLODIPINE BESYLATE 10 MILLIGRAM(S): 2.5 TABLET ORAL at 05:55

## 2020-11-07 RX ADMIN — Medication 0.1 MILLIGRAM(S): at 21:33

## 2020-11-07 RX ADMIN — HALOPERIDOL DECANOATE 2.5 MILLIGRAM(S): 100 INJECTION INTRAMUSCULAR at 14:27

## 2020-11-07 RX ADMIN — Medication 100 MILLIGRAM(S): at 05:55

## 2020-11-07 RX ADMIN — Medication 50 MILLIGRAM(S): at 18:06

## 2020-11-07 RX ADMIN — PIPERACILLIN AND TAZOBACTAM 25 GRAM(S): 4; .5 INJECTION, POWDER, LYOPHILIZED, FOR SOLUTION INTRAVENOUS at 16:05

## 2020-11-07 RX ADMIN — Medication 100 MILLIGRAM(S): at 21:33

## 2020-11-07 RX ADMIN — Medication 2: at 08:37

## 2020-11-07 RX ADMIN — HEPARIN SODIUM 5000 UNIT(S): 5000 INJECTION INTRAVENOUS; SUBCUTANEOUS at 05:55

## 2020-11-07 RX ADMIN — SIMVASTATIN 20 MILLIGRAM(S): 20 TABLET, FILM COATED ORAL at 21:32

## 2020-11-07 RX ADMIN — Medication 50 MILLIGRAM(S): at 05:54

## 2020-11-07 RX ADMIN — LEVETIRACETAM 750 MILLIGRAM(S): 250 TABLET, FILM COATED ORAL at 18:07

## 2020-11-07 RX ADMIN — SODIUM CHLORIDE 75 MILLILITER(S): 9 INJECTION INTRAMUSCULAR; INTRAVENOUS; SUBCUTANEOUS at 14:01

## 2020-11-07 RX ADMIN — HEPARIN SODIUM 5000 UNIT(S): 5000 INJECTION INTRAVENOUS; SUBCUTANEOUS at 21:31

## 2020-11-07 RX ADMIN — Medication 6: at 11:49

## 2020-11-07 RX ADMIN — PIPERACILLIN AND TAZOBACTAM 200 GRAM(S): 4; .5 INJECTION, POWDER, LYOPHILIZED, FOR SOLUTION INTRAVENOUS at 08:37

## 2020-11-07 RX ADMIN — TAMSULOSIN HYDROCHLORIDE 0.4 MILLIGRAM(S): 0.4 CAPSULE ORAL at 21:32

## 2020-11-07 RX ADMIN — LEVETIRACETAM 750 MILLIGRAM(S): 250 TABLET, FILM COATED ORAL at 05:54

## 2020-11-07 NOTE — PROGRESS NOTE ADULT - SUBJECTIVE AND OBJECTIVE BOX
Patient seen and examined bedside resting comfortably.  Nonverbal, no new overnight events.   Afebrile.     T(F): 98.3 (11-07-20 @ 05:54), Max: 98.3 (11-07-20 @ 05:54)  HR: 68 (11-07-20 @ 05:54) (58 - 72)  BP: 165/74 (11-07-20 @ 05:54) (124/52 - 165/74)  RR: 18 (11-07-20 @ 05:54) (17 - 18)  SpO2: 100% (11-07-20 @ 05:54) (97% - 100%)    POCT Blood Glucose.: 161 mg/dL (07 Nov 2020 07:49)  POCT Blood Glucose.: 161 mg/dL (06 Nov 2020 21:38)  POCT Blood Glucose.: 92 mg/dL (06 Nov 2020 15:53)  POCT Blood Glucose.: 177 mg/dL (06 Nov 2020 11:09)      PHYSICAL EXAM:  General: NAD, awake  Heart: S1S2  Chest: nonlabored respirations  : No bladder distention. Milner catheter draining clear urine, output: 1300cc/24hr  Extremities: Calf soft b/l    LABS:                        9.9    5.84  )-----------( 257      ( 07 Nov 2020 07:57 )             30.1   11-06    141  |  111<H>  |  13  ----------------------------<  162<H>  3.6   |  25  |  0.80    Ca    8.2<L>      06 Nov 2020 07:53      I&O's Detail    06 Nov 2020 07:01  -  07 Nov 2020 07:00  --------------------------------------------------------  IN:    sodium chloride 0.9%: 900 mL  Total IN: 900 mL    OUT:    Indwelling Catheter - Urethral (mL): 1300 mL    Stool (mL): 2 mL  Total OUT: 1302 mL    Total NET: -402 mL      Assessment:   68 yo F with PMH of dementia (minimally verbal per daughter occasional gives 1 word answers of yes/no), CVA, Seizure disorder, HTN, DM type 2 (off meds per daughter), admission 2 months ago for renal colic s/p lithotripsy, Left ureteral stent insertion 8/24, following stent removal in the office, with indwelling milner for Chronic 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 Found with bladders tones and 6mm right UVJ stone, no hydro and large amount of dust/fragments on left from prior lithotripsy    Plan:  - OR booked on Monday, 11/9, with Dr. Caicedo for cysto with stone removal and possible Left stent insertion  - Zosyn started 07:00 on 11/7, continue with Zosyn  - continue milner catheter, monitor I/Os  - Preop labs ordered, f/u AM labs  - continue medical management and supportive care, medical optimization for OR  - discussed with urology attending      Patient seen and examined bedside resting comfortably.  Nonverbal, no new overnight events.   Afebrile.     T(F): 98.3 (11-07-20 @ 05:54), Max: 98.3 (11-07-20 @ 05:54)  HR: 68 (11-07-20 @ 05:54) (58 - 72)  BP: 165/74 (11-07-20 @ 05:54) (124/52 - 165/74)  RR: 18 (11-07-20 @ 05:54) (17 - 18)  SpO2: 100% (11-07-20 @ 05:54) (97% - 100%)    POCT Blood Glucose.: 161 mg/dL (07 Nov 2020 07:49)  POCT Blood Glucose.: 161 mg/dL (06 Nov 2020 21:38)  POCT Blood Glucose.: 92 mg/dL (06 Nov 2020 15:53)  POCT Blood Glucose.: 177 mg/dL (06 Nov 2020 11:09)      PHYSICAL EXAM:  General: NAD, awake  Heart: S1S2  Chest: nonlabored respirations  : No bladder distention or SP tenderness. Milner catheter draining clear urine, output: 1300cc/24hr  Extremities: Calf soft b/l    LABS:                        9.9    5.84  )-----------( 257      ( 07 Nov 2020 07:57 )             30.1   11-06    141  |  111<H>  |  13  ----------------------------<  162<H>  3.6   |  25  |  0.80    Ca    8.2<L>      06 Nov 2020 07:53      I&O's Detail    06 Nov 2020 07:01  -  07 Nov 2020 07:00  --------------------------------------------------------  IN:    sodium chloride 0.9%: 900 mL  Total IN: 900 mL    OUT:    Indwelling Catheter - Urethral (mL): 1300 mL    Stool (mL): 2 mL  Total OUT: 1302 mL    Total NET: -402 mL      Assessment:   68 yo F with PMH of dementia (minimally verbal per daughter occasional gives 1 word answers of yes/no), CVA, Seizure disorder, HTN, DM type 2 (off meds per daughter), admission 2 months ago for renal colic s/p lithotripsy, Left ureteral stent insertion 8/24, following stent removal in the office, with indwelling milner for Chronic 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 Found with bladders tones and 6mm right UVJ stone, no hydro and large amount of dust/fragments on left from prior lithotripsy    Plan:  - OR booked on Monday, 11/9, with Dr. Caicedo for cysto with stone removal and possible Left stent insertion  - Zosyn started 07:00 on 11/7, continue with Zosyn  - continue milner catheter, monitor I/Os  - Preop labs ordered, f/u AM labs  - continue medical management and supportive care, medical optimization for OR  - discussed with urology attending

## 2020-11-07 NOTE — PROGRESS NOTE ADULT - SUBJECTIVE AND OBJECTIVE BOX
CHIEF COMPLAINT: Limited hx as patient is non verbal.   no fever  no report of any vomiting        PHYSICAL EXAM:    GENERAL: Thinly built and non verbal   CHEST/LUNG: Clear to ausculation bilaterally, no wheezing, no crackles   HEART: Regular rate and rhythm; No murmurs, rubs  ABDOMEN: Soft, Nontender, Nondistended; Bowel sounds present. + milner   EXTREMITIES: No clubbing, cyanosis, or edema. ROM could not assessed because of non verbal status and following no commands.   NERVOUS SYSTEM:  Limited as did not follow commands  Psychiatry: AA and orientation could not be assessed.       OBJECTIVE DATA:       Vital Signs Last 24 Hrs  T(C): 36.8 (2020 05:54), Max: 36.8 (2020 17:47)  T(F): 98.3 (2020 05:54), Max: 98.3 (2020 05:54)  HR: 68 (2020 05:54) (58 - 72)  BP: 165/74 (2020 05:54) (136/60 - 165/74)  BP(mean): --  RR: 18 (2020 05:54) (17 - 18)  SpO2: 100% (2020 05:54) (97% - 100%)           Daily     Daily Weight in k.2 (2020 05:24)  LABS:                        9.9    5.84  )-----------( 257      ( 2020 07:57 )             30.1             11-07    143  |  113<H>  |  15  ----------------------------<  153<H>  3.7   |  24  |  0.83    Ca    8.1<L>      2020 07:57                         CAPILLARY BLOOD GLUCOSE      POCT Blood Glucose.: 267 mg/dL (2020 10:58)          Interval Radiology studies: reviewed   MEDICATIONS  (STANDING):  amLODIPine   Tablet 10 milliGRAM(s) Oral daily  cloNIDine 0.1 milliGRAM(s) Oral every 8 hours  dextrose 5%. 1000 milliLiter(s) (50 mL/Hr) IV Continuous <Continuous>  dextrose 50% Injectable 12.5 Gram(s) IV Push once  dextrose 50% Injectable 25 Gram(s) IV Push once  dextrose 50% Injectable 25 Gram(s) IV Push once  heparin   Injectable 5000 Unit(s) SubCutaneous every 8 hours  hydrALAZINE 100 milliGRAM(s) Oral every 8 hours  insulin lispro (ADMELOG) corrective regimen sliding scale   SubCutaneous three times a day before meals  insulin lispro (ADMELOG) corrective regimen sliding scale   SubCutaneous at bedtime  levETIRAcetam 750 milliGRAM(s) Oral two times a day  metoprolol tartrate 50 milliGRAM(s) Oral two times a day  piperacillin/tazobactam IVPB.. 3.375 Gram(s) IV Intermittent every 8 hours  simvastatin 20 milliGRAM(s) Oral at bedtime  sodium chloride 0.9%. 1000 milliLiter(s) (75 mL/Hr) IV Continuous <Continuous>  tamsulosin 0.4 milliGRAM(s) Oral at bedtime    MEDICATIONS  (PRN):  acetaminophen   Tablet .. 650 milliGRAM(s) Oral every 6 hours PRN Temp greater or equal to 38C (100.4F), Mild Pain (1 - 3)  dextrose 40% Gel 15 Gram(s) Oral once PRN Blood Glucose LESS THAN 70 milliGRAM(s)/deciliter  glucagon  Injectable 1 milliGRAM(s) IntraMuscular once PRN Glucose LESS THAN 70 milligrams/deciliter  ondansetron Injectable 4 milliGRAM(s) IV Push every 6 hours PRN Nausea and/or Vomiting

## 2020-11-07 NOTE — PROGRESS NOTE ADULT - ASSESSMENT
70 y/o female with PMHx of dementia (minimally verbal per daughter occasional gives 1 word answers of yes/no), CVA, Seizure disorder, HTN, DM type 2 (off meds per daughter), admission 2 months ago for renal colic s/p lithotripsy in 8/24 w/ indwelling milner presents to the ED due to hematuria.           Problem/Plan - 1:  ·  Problem: Acute  cystitis with hematuria and associated with indwelling chronic cathter.  Plan: - CT scan w/ likely cystitis  - urine culture showed klebsiella and enterococcus.   Cont iv zosyn per ID.          Problem/Plan - 2:  ·  Problem: Calculus of ureter.  Plan: - 6mm stone at the UVJ  -Reviewed Urology consult note. OR for stenting and stone removal on Monday.     Problem/Plan - 3:  ·  Problem: Seizure disorder.  Plan: - c/w Keppra    Problem/Plan - 4:  ·  Problem: Acute kidney injury. improved.      Problem/Plan - 5:  ·  Problem: Essential hypertension. better controlled. cont current meds and monitor.     Problem/Plan - 6:  Problem: Type 2 diabetes mellitus with other specified complication, without long-term current use of insulin. controlled. Plan: - FS q6 w/ SSI.     Problem/Plan - 7:  ·  Problem: DVT prophylaxis.  HSQ     Hypokalemia. Repleted  Hypophosphatemia. repleted      Dysphagia. cont dysphagia diet with aspiration precautions as per swallow eval.     PT eval>>>Home with HHA    Full code

## 2020-11-08 ENCOUNTER — TRANSCRIPTION ENCOUNTER (OUTPATIENT)
Age: 70
End: 2020-11-08

## 2020-11-08 LAB
ANION GAP SERPL CALC-SCNC: 3 MMOL/L — LOW (ref 5–17)
APTT BLD: 28.7 SEC — SIGNIFICANT CHANGE UP (ref 27.5–35.5)
BLD GP AB SCN SERPL QL: SIGNIFICANT CHANGE UP
BUN SERPL-MCNC: 16 MG/DL — SIGNIFICANT CHANGE UP (ref 7–23)
CALCIUM SERPL-MCNC: 8.4 MG/DL — LOW (ref 8.5–10.1)
CHLORIDE SERPL-SCNC: 110 MMOL/L — HIGH (ref 96–108)
CO2 SERPL-SCNC: 28 MMOL/L — SIGNIFICANT CHANGE UP (ref 22–31)
CREAT SERPL-MCNC: 1.09 MG/DL — SIGNIFICANT CHANGE UP (ref 0.5–1.3)
GLUCOSE BLDC GLUCOMTR-MCNC: 156 MG/DL — HIGH (ref 70–99)
GLUCOSE BLDC GLUCOMTR-MCNC: 176 MG/DL — HIGH (ref 70–99)
GLUCOSE BLDC GLUCOMTR-MCNC: 182 MG/DL — HIGH (ref 70–99)
GLUCOSE BLDC GLUCOMTR-MCNC: 183 MG/DL — HIGH (ref 70–99)
GLUCOSE SERPL-MCNC: 166 MG/DL — HIGH (ref 70–99)
HCT VFR BLD CALC: 32.2 % — LOW (ref 34.5–45)
HGB BLD-MCNC: 10.2 G/DL — LOW (ref 11.5–15.5)
INR BLD: 1.11 RATIO — SIGNIFICANT CHANGE UP (ref 0.88–1.16)
MCHC RBC-ENTMCNC: 27.5 PG — SIGNIFICANT CHANGE UP (ref 27–34)
MCHC RBC-ENTMCNC: 31.7 GM/DL — LOW (ref 32–36)
MCV RBC AUTO: 86.8 FL — SIGNIFICANT CHANGE UP (ref 80–100)
NRBC # BLD: 0 /100 WBCS — SIGNIFICANT CHANGE UP (ref 0–0)
PLATELET # BLD AUTO: 315 K/UL — SIGNIFICANT CHANGE UP (ref 150–400)
POTASSIUM SERPL-MCNC: 3.7 MMOL/L — SIGNIFICANT CHANGE UP (ref 3.5–5.3)
POTASSIUM SERPL-SCNC: 3.7 MMOL/L — SIGNIFICANT CHANGE UP (ref 3.5–5.3)
PROTHROM AB SERPL-ACNC: 12.8 SEC — SIGNIFICANT CHANGE UP (ref 10.6–13.6)
RBC # BLD: 3.71 M/UL — LOW (ref 3.8–5.2)
RBC # FLD: 12.8 % — SIGNIFICANT CHANGE UP (ref 10.3–14.5)
SODIUM SERPL-SCNC: 141 MMOL/L — SIGNIFICANT CHANGE UP (ref 135–145)
WBC # BLD: 7.04 K/UL — SIGNIFICANT CHANGE UP (ref 3.8–10.5)
WBC # FLD AUTO: 7.04 K/UL — SIGNIFICANT CHANGE UP (ref 3.8–10.5)

## 2020-11-08 PROCEDURE — 99232 SBSQ HOSP IP/OBS MODERATE 35: CPT

## 2020-11-08 PROCEDURE — 93010 ELECTROCARDIOGRAM REPORT: CPT

## 2020-11-08 RX ORDER — VANCOMYCIN HCL 1 G
500 VIAL (EA) INTRAVENOUS EVERY 12 HOURS
Refills: 0 | Status: DISCONTINUED | OUTPATIENT
Start: 2020-11-08 | End: 2020-11-09

## 2020-11-08 RX ORDER — QUETIAPINE FUMARATE 200 MG/1
25 TABLET, FILM COATED ORAL AT BEDTIME
Refills: 0 | Status: DISCONTINUED | OUTPATIENT
Start: 2020-11-08 | End: 2020-11-09

## 2020-11-08 RX ADMIN — TAMSULOSIN HYDROCHLORIDE 0.4 MILLIGRAM(S): 0.4 CAPSULE ORAL at 21:56

## 2020-11-08 RX ADMIN — PIPERACILLIN AND TAZOBACTAM 25 GRAM(S): 4; .5 INJECTION, POWDER, LYOPHILIZED, FOR SOLUTION INTRAVENOUS at 16:10

## 2020-11-08 RX ADMIN — HEPARIN SODIUM 5000 UNIT(S): 5000 INJECTION INTRAVENOUS; SUBCUTANEOUS at 06:53

## 2020-11-08 RX ADMIN — LEVETIRACETAM 750 MILLIGRAM(S): 250 TABLET, FILM COATED ORAL at 17:16

## 2020-11-08 RX ADMIN — Medication 100 MILLIGRAM(S): at 21:56

## 2020-11-08 RX ADMIN — AMLODIPINE BESYLATE 10 MILLIGRAM(S): 2.5 TABLET ORAL at 06:53

## 2020-11-08 RX ADMIN — Medication 0.1 MILLIGRAM(S): at 06:53

## 2020-11-08 RX ADMIN — PIPERACILLIN AND TAZOBACTAM 25 GRAM(S): 4; .5 INJECTION, POWDER, LYOPHILIZED, FOR SOLUTION INTRAVENOUS at 08:05

## 2020-11-08 RX ADMIN — Medication 0.1 MILLIGRAM(S): at 21:56

## 2020-11-08 RX ADMIN — QUETIAPINE FUMARATE 25 MILLIGRAM(S): 200 TABLET, FILM COATED ORAL at 21:56

## 2020-11-08 RX ADMIN — Medication 50 MILLIGRAM(S): at 17:16

## 2020-11-08 RX ADMIN — SIMVASTATIN 20 MILLIGRAM(S): 20 TABLET, FILM COATED ORAL at 21:57

## 2020-11-08 RX ADMIN — HEPARIN SODIUM 5000 UNIT(S): 5000 INJECTION INTRAVENOUS; SUBCUTANEOUS at 21:56

## 2020-11-08 RX ADMIN — Medication 2: at 16:40

## 2020-11-08 RX ADMIN — Medication 2: at 08:01

## 2020-11-08 RX ADMIN — Medication 50 MILLIGRAM(S): at 06:52

## 2020-11-08 RX ADMIN — Medication 100 MILLIGRAM(S): at 13:13

## 2020-11-08 RX ADMIN — Medication 100 MILLIGRAM(S): at 20:14

## 2020-11-08 RX ADMIN — LEVETIRACETAM 750 MILLIGRAM(S): 250 TABLET, FILM COATED ORAL at 06:53

## 2020-11-08 RX ADMIN — Medication 100 MILLIGRAM(S): at 06:52

## 2020-11-08 RX ADMIN — HEPARIN SODIUM 5000 UNIT(S): 5000 INJECTION INTRAVENOUS; SUBCUTANEOUS at 13:13

## 2020-11-08 RX ADMIN — Medication 2: at 12:01

## 2020-11-08 NOTE — PROGRESS NOTE ADULT - ASSESSMENT
68 y/o female with PMHx of dementia (minimally verbal per daughter occasional gives 1 word answers of yes/no), CVA, Seizure disorder, HTN, DM type 2 (off meds per daughter), admission 2 months ago for renal colic s/p lithotripsy in 8/24 w/ indwelling milner presents to the ED due to hematuria.           Problem/Plan - 1:  ·  Problem: Acute  cystitis with hematuria and associated with indwelling chronic cathter.  Plan: - CT scan w/ likely cystitis  - urine culture showed klebsiella and enterococcus.   Cont iv zosyn per ID. Urologist placed iv vancomycin. NPO after MN for cystoscopy tomorrow.          Problem/Plan - 2:  ·  Problem: Calculus of ureter.  Plan: - 6mm stone at the UVJ  -Reviewed Urology consult note. OR for stenting and stone removal on Monday.     Problem/Plan - 3:  ·  Problem: Seizure disorder.  Plan: - c/w Keppra    Problem/Plan - 4:  ·  Problem: Acute kidney injury. improved.      Problem/Plan - 5:  ·  Problem: Essential hypertension.controlled. cont current meds and monitor.     Problem/Plan - 6:  Problem: Type 2 diabetes mellitus with other specified complication, without long-term current use of insulin. controlled. Plan: - FS q6 w/ SSI.     Agitation . start seroquel and cont prn haldol. check EKG for QTc prolongation.     Problem/Plan - 7:  ·  Problem: DVT prophylaxis.  HSQ     Hypokalemia. Repleted  Hypophosphatemia. repleted      Dysphagia. cont dysphagia diet with aspiration precautions as per swallow eval.     PT eval>>>Home with HHA    Full code      68 y/o female with PMHx of dementia (minimally verbal per daughter occasional gives 1 word answers of yes/no), CVA, Seizure disorder, HTN, DM type 2 (off meds per daughter), admission 2 months ago for renal colic s/p lithotripsy in 8/24 w/ indwelling milner presents to the ED due to hematuria.           Problem/Plan - 1:  ·  Problem: Acute  cystitis with hematuria and associated with indwelling chronic cathter.  Plan: - CT scan w/ likely cystitis  - urine culture showed klebsiella and enterococcus.   Cont iv zosyn per ID. Urologist placed iv vancomycin. NPO after MN for cystoscopy tomorrow.          Problem/Plan - 2:  ·  Problem: Calculus of ureter.  Plan: - 6mm stone at the UVJ  -Reviewed Urology consult note. OR for stenting and stone removal on Monday.     Problem/Plan - 3:  ·  Problem: Seizure disorder.  Plan: - c/w Keppra    Problem/Plan - 4:  ·  Problem: Acute kidney injury. improved.      Problem/Plan - 5:  ·  Problem: Essential hypertension.controlled. cont current meds and monitor.     Problem/Plan - 6:  Problem: Type 2 diabetes mellitus with other specified complication, without long-term current use of insulin. controlled. Plan: - FS q6 w/ SSI.     Agitation . start seroquel and cont prn haldol. check EKG for QTc prolongation.     Problem/Plan - 7:  ·  Problem: DVT prophylaxis.  HSQ     Hypokalemia. Repleted    Pressure injury (stage 2) bilateral buttocks. cont local care  Hypophosphatemia. repleted      Dysphagia. cont dysphagia diet with aspiration precautions as per swallow eval.     PT eval>>>Home with HHA    Full code

## 2020-11-08 NOTE — CHART NOTE - NSCHARTNOTEFT_GEN_A_CORE
Pt admitted c hematuria, weakness, lethargy; c indwelling milner for chronic urinary retention; found c obstructive uropathy 2/2 calculi; OR scheduled for 11/9 for cysto c stone removal & possible stent placement.  Pt confused, non-verbal. PMHx includes dementia, CVA, seizure disorder, HTN, T2DM (diet controlled), dysphagia,  Per swallow eval (11/5) SLP recommends pureed consistency c thin liquids.     Factors impacting intake: [ ] none [ ] nausea  [ ] vomiting [ ] diarrhea [ ] constipation  [ ]chewing problems [ ] swallowing issues  [X ] other: AMS, inability to self-feed, lethargy    Diet Prescription: Diet, NPO:   NPO for Procedure/Test     NPO Start Date: 08-Nov-2020,   NPO Start Time: 23:59  Except Medications (11-07-20 @ 12:19)  Diet, Pureed:   Consistent Carbohydrate {No Snacks}  Supplement Feeding Modality:  Oral  Glucerna Shake Cans or Servings Per Day:  1       Frequency:  Two Times a day (11-04-20 @ 11:53)    Intake:   25-50% most meals; drinking some of supplement; requires total feeding assistance    Current Weight: Weight (kg): 57.2 (11/7); 52 (11-02 @ 01:50)  % Weight Change: 9% wt change of questionable accuracy since pt not eating well; possible fluid retention?    Physical Appearance:  no edema noted    Pertinent Medications: MEDICATIONS  (STANDING):  amLODIPine   Tablet 10 milliGRAM(s) Oral daily  cloNIDine 0.1 milliGRAM(s) Oral every 8 hours  dextrose 5%. 1000 milliLiter(s) (50 mL/Hr) IV Continuous <Continuous>  dextrose 50% Injectable 12.5 Gram(s) IV Push once  dextrose 50% Injectable 25 Gram(s) IV Push once  dextrose 50% Injectable 25 Gram(s) IV Push once  heparin   Injectable 5000 Unit(s) SubCutaneous every 8 hours  hydrALAZINE 100 milliGRAM(s) Oral every 8 hours  insulin lispro (ADMELOG) corrective regimen sliding scale   SubCutaneous three times a day before meals  insulin lispro (ADMELOG) corrective regimen sliding scale   SubCutaneous at bedtime  levETIRAcetam 750 milliGRAM(s) Oral two times a day  metoprolol tartrate 50 milliGRAM(s) Oral two times a day  piperacillin/tazobactam IVPB.. 3.375 Gram(s) IV Intermittent every 8 hours  simvastatin 20 milliGRAM(s) Oral at bedtime  sodium chloride 0.9%. 1000 milliLiter(s) (75 mL/Hr) IV Continuous <Continuous>  tamsulosin 0.4 milliGRAM(s) Oral at bedtime    MEDICATIONS  (PRN):  acetaminophen   Tablet .. 650 milliGRAM(s) Oral every 6 hours PRN Temp greater or equal to 38C (100.4F), Mild Pain (1 - 3)  dextrose 40% Gel 15 Gram(s) Oral once PRN Blood Glucose LESS THAN 70 milliGRAM(s)/deciliter  glucagon  Injectable 1 milliGRAM(s) IntraMuscular once PRN Glucose LESS THAN 70 milligrams/deciliter  haloperidol    Injectable 2.5 milliGRAM(s) IntraMuscular every 12 hours PRN agitation  ondansetron Injectable 4 milliGRAM(s) IV Push every 6 hours PRN Nausea and/or Vomiting    Pertinent Labs: 11-08 Na141 mmol/L Glu 166 mg/dL<H> K+ 3.7 mmol/L Cr  1.09 mg/dL BUN 16 mg/dL 11-03 Phos 3.2 mg/dL 11-01 Alb 2.6 g/dL<L>; 11-07 POCT: 161, 267, 73, 157; 11-02-20 A1C 6.9%    Skin: stage II pressure ulcers noted on b/l buttocks    Estimated Needs:   [X ] no change since previous assessment (11/4)  [ ] recalculated:     Previous Nutrition Diagnosis:   [X ] Severe malnutrition on context of acute illness  Etiology:  Inadequate energy/protein intake + increased energy/protein needs related to multiple recent admissions; recurrent UTIs/urinary issues, presence of pressure ulcers  Signs & Symptoms:  Physical findings of severe fat loss & muscle wasting as noted on 11/4 assessment    GOAL:  Pt to consume >75% meals/supplements - not met at this time    Nutrition Diagnosis is [X ] ongoing  [ ] resolved [ ] not applicable     New Nutrition Diagnosis: [X ] not applicable    Interventions:   continue current diet rx as noted  Recommend  [ ] Change Diet To:  [ ] Nutrition Supplement  [ ] Nutrition Support  [ ] Other:     Monitoring and Evaluation:   [X ] PO intake [ x ] Tolerance to diet prescription [ x ] weights [ x ] labs[ x ] follow up per protocol  [ ] other:

## 2020-11-08 NOTE — PROGRESS NOTE ADULT - SUBJECTIVE AND OBJECTIVE BOX
Patient seen and examined at bedside with Dr. Hernandez  No acute overnight events. Nonverbal.   Milner draining well.  Afebrile.    T(F): 97.9 (11-08-20 @ 05:26), Max: 98.5 (11-07-20 @ 21:40)  HR: 79 (11-08-20 @ 05:26) (57 - 82)  BP: 155/72 (11-08-20 @ 05:26) (103/49 - 166/78)  RR: 18 (11-08-20 @ 05:26) (18 - 18)  SpO2: 97% (11-08-20 @ 05:26) (97% - 99%)  Wt(kg): --  CAPILLARY BLOOD GLUCOSE      POCT Blood Glucose.: 156 mg/dL (08 Nov 2020 11:05)  POCT Blood Glucose.: 183 mg/dL (08 Nov 2020 07:51)  POCT Blood Glucose.: 157 mg/dL (07 Nov 2020 21:30)  POCT Blood Glucose.: 73 mg/dL (07 Nov 2020 16:05)      PHYSICAL EXAM:  General: NAD  Heart: S1S2, regular rate and rhythm  Chest: nonlabored respirations, CTA b/l.  Abdomen: +BS, soft, NT/ND.   : No suprapubic tenderness or bladder distention. Milner catheter draining clear urine  Extremities: calf soft b/l    LABS:                        10.2   7.04  )-----------( 315      ( 08 Nov 2020 06:21 )             32.2   11-08    141  |  110<H>  |  16  ----------------------------<  166<H>  3.7   |  28  |  1.09    Ca    8.4<L>      08 Nov 2020 06:21    PT/INR - ( 08 Nov 2020 06:21 )   PT: 12.8 sec;   INR: 1.11 ratio         PTT - ( 08 Nov 2020 06:21 )  PTT:28.7 sec  I&O's Detail    07 Nov 2020 07:01  -  08 Nov 2020 07:00  --------------------------------------------------------  IN:    IV PiggyBack: 200 mL    sodium chloride 0.9%: 900 mL  Total IN: 1100 mL    OUT:    Indwelling Catheter - Urethral (mL): 2000 mL    Stool (mL): 1 mL  Total OUT: 2001 mL    Total NET: -901 mL    Assessment:   70 yo F with PMH of dementia (minimally verbal per daughter occasional gives 1 word answers of yes/no), CVA, Seizure disorder, HTN, DM type 2 (off meds per daughter), admission 2 months ago for renal colic s/p lithotripsy, Left ureteral stent insertion 8/24, following stent removal in the office, with indwelling milner for Chronic 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 Found with bladder stones and 6mm right UVJ stone, no hydro.     Plan:  - OR booked on Monday, 11/9, with Dr. Caicedo for cysto with stone removal and possible Left stent insertion  - continue Zosyn   - continue milner catheter, monitor I/Os  - f/u pre-op labs, trend renal function  - continue medical management and supportive care  - will discuss with Urology attending

## 2020-11-08 NOTE — PROGRESS NOTE ADULT - SUBJECTIVE AND OBJECTIVE BOX
CHIEF COMPLAINT: Limited hx as patient is non verbal.   no fever  no report of any vomiting  + agitation yesterday         PHYSICAL EXAM:    GENERAL: Thinly built and non verbal   CHEST/LUNG: Clear to ausculation bilaterally, no wheezing, no crackles   HEART: Regular rate and rhythm; No murmurs, rubs  ABDOMEN: Soft, Nontender, Nondistended; Bowel sounds present. + milner   EXTREMITIES: No clubbing, cyanosis, or edema. ROM could not assessed because of non verbal status and following no commands.   NERVOUS SYSTEM:  Limited as did not follow commands  Psychiatry: AA and orientation could not be assessed.       OBJECTIVE DATA:       Vital Signs Last 24 Hrs  T(C): 36.6 (08 Nov 2020 05:26), Max: 36.9 (07 Nov 2020 21:40)  T(F): 97.9 (08 Nov 2020 05:26), Max: 98.5 (07 Nov 2020 21:40)  HR: 79 (08 Nov 2020 05:26) (69 - 82)  BP: 155/72 (08 Nov 2020 05:26) (145/64 - 166/78)  BP(mean): --  RR: 18 (08 Nov 2020 05:26) (18 - 18)  SpO2: 97% (08 Nov 2020 05:26) (97% - 98%)           Daily     Daily   LABS:                        10.2   7.04  )-----------( 315      ( 08 Nov 2020 06:21 )             32.2             11-08    141  |  110<H>  |  16  ----------------------------<  166<H>  3.7   |  28  |  1.09    Ca    8.4<L>      08 Nov 2020 06:21                PT/INR - ( 08 Nov 2020 06:21 )   PT: 12.8 sec;   INR: 1.11 ratio         PTT - ( 08 Nov 2020 06:21 )  PTT:28.7 sec         CAPILLARY BLOOD GLUCOSE      POCT Blood Glucose.: 156 mg/dL (08 Nov 2020 11:05)        MEDICATIONS  (STANDING):  amLODIPine   Tablet 10 milliGRAM(s) Oral daily  cloNIDine 0.1 milliGRAM(s) Oral every 8 hours  dextrose 5%. 1000 milliLiter(s) (50 mL/Hr) IV Continuous <Continuous>  dextrose 50% Injectable 25 Gram(s) IV Push once  dextrose 50% Injectable 12.5 Gram(s) IV Push once  dextrose 50% Injectable 25 Gram(s) IV Push once  heparin   Injectable 5000 Unit(s) SubCutaneous every 8 hours  hydrALAZINE 100 milliGRAM(s) Oral every 8 hours  insulin lispro (ADMELOG) corrective regimen sliding scale   SubCutaneous three times a day before meals  insulin lispro (ADMELOG) corrective regimen sliding scale   SubCutaneous at bedtime  levETIRAcetam 750 milliGRAM(s) Oral two times a day  metoprolol tartrate 50 milliGRAM(s) Oral two times a day  piperacillin/tazobactam IVPB.. 3.375 Gram(s) IV Intermittent every 8 hours  QUEtiapine 25 milliGRAM(s) Oral at bedtime  simvastatin 20 milliGRAM(s) Oral at bedtime  sodium chloride 0.9%. 1000 milliLiter(s) (75 mL/Hr) IV Continuous <Continuous>  tamsulosin 0.4 milliGRAM(s) Oral at bedtime  vancomycin  IVPB 500 milliGRAM(s) IV Intermittent every 12 hours    MEDICATIONS  (PRN):  acetaminophen   Tablet .. 650 milliGRAM(s) Oral every 6 hours PRN Temp greater or equal to 38C (100.4F), Mild Pain (1 - 3)  dextrose 40% Gel 15 Gram(s) Oral once PRN Blood Glucose LESS THAN 70 milliGRAM(s)/deciliter  glucagon  Injectable 1 milliGRAM(s) IntraMuscular once PRN Glucose LESS THAN 70 milligrams/deciliter  haloperidol    Injectable 2.5 milliGRAM(s) IntraMuscular every 12 hours PRN agitation  ondansetron Injectable 4 milliGRAM(s) IV Push every 6 hours PRN Nausea and/or Vomiting

## 2020-11-09 ENCOUNTER — RESULT REVIEW (OUTPATIENT)
Age: 70
End: 2020-11-09

## 2020-11-09 LAB
ANION GAP SERPL CALC-SCNC: 5 MMOL/L — SIGNIFICANT CHANGE UP (ref 5–17)
BUN SERPL-MCNC: 14 MG/DL — SIGNIFICANT CHANGE UP (ref 7–23)
CALCIUM SERPL-MCNC: 8.4 MG/DL — LOW (ref 8.5–10.1)
CHLORIDE SERPL-SCNC: 111 MMOL/L — HIGH (ref 96–108)
CO2 SERPL-SCNC: 27 MMOL/L — SIGNIFICANT CHANGE UP (ref 22–31)
CREAT SERPL-MCNC: 1.18 MG/DL — SIGNIFICANT CHANGE UP (ref 0.5–1.3)
GLUCOSE BLDC GLUCOMTR-MCNC: 160 MG/DL — HIGH (ref 70–99)
GLUCOSE BLDC GLUCOMTR-MCNC: 174 MG/DL — HIGH (ref 70–99)
GLUCOSE BLDC GLUCOMTR-MCNC: 193 MG/DL — HIGH (ref 70–99)
GLUCOSE BLDC GLUCOMTR-MCNC: 195 MG/DL — HIGH (ref 70–99)
GLUCOSE BLDC GLUCOMTR-MCNC: 251 MG/DL — HIGH (ref 70–99)
GLUCOSE BLDC GLUCOMTR-MCNC: 44 MG/DL — CRITICAL LOW (ref 70–99)
GLUCOSE SERPL-MCNC: 142 MG/DL — HIGH (ref 70–99)
HCT VFR BLD CALC: 31.1 % — LOW (ref 34.5–45)
HGB BLD-MCNC: 9.7 G/DL — LOW (ref 11.5–15.5)
MCHC RBC-ENTMCNC: 27.6 PG — SIGNIFICANT CHANGE UP (ref 27–34)
MCHC RBC-ENTMCNC: 31.2 GM/DL — LOW (ref 32–36)
MCV RBC AUTO: 88.6 FL — SIGNIFICANT CHANGE UP (ref 80–100)
NRBC # BLD: 0 /100 WBCS — SIGNIFICANT CHANGE UP (ref 0–0)
PLATELET # BLD AUTO: 323 K/UL — SIGNIFICANT CHANGE UP (ref 150–400)
POTASSIUM SERPL-MCNC: 3.7 MMOL/L — SIGNIFICANT CHANGE UP (ref 3.5–5.3)
POTASSIUM SERPL-SCNC: 3.7 MMOL/L — SIGNIFICANT CHANGE UP (ref 3.5–5.3)
RBC # BLD: 3.51 M/UL — LOW (ref 3.8–5.2)
RBC # FLD: 13.2 % — SIGNIFICANT CHANGE UP (ref 10.3–14.5)
SARS-COV-2 RNA SPEC QL NAA+PROBE: SIGNIFICANT CHANGE UP
SODIUM SERPL-SCNC: 143 MMOL/L — SIGNIFICANT CHANGE UP (ref 135–145)
WBC # BLD: 5.86 K/UL — SIGNIFICANT CHANGE UP (ref 3.8–10.5)
WBC # FLD AUTO: 5.86 K/UL — SIGNIFICANT CHANGE UP (ref 3.8–10.5)

## 2020-11-09 PROCEDURE — 52332 CYSTOSCOPY AND TREATMENT: CPT | Mod: 50

## 2020-11-09 PROCEDURE — 99232 SBSQ HOSP IP/OBS MODERATE 35: CPT

## 2020-11-09 PROCEDURE — 52351 CYSTOURETERO & OR PYELOSCOPE: CPT

## 2020-11-09 PROCEDURE — 74420 UROGRAPHY RTRGR +-KUB: CPT | Mod: 26

## 2020-11-09 RX ORDER — HYDRALAZINE HCL 50 MG
100 TABLET ORAL EVERY 8 HOURS
Refills: 0 | Status: DISCONTINUED | OUTPATIENT
Start: 2020-11-09 | End: 2020-11-14

## 2020-11-09 RX ORDER — LEVETIRACETAM 250 MG/1
750 TABLET, FILM COATED ORAL
Refills: 0 | Status: DISCONTINUED | OUTPATIENT
Start: 2020-11-09 | End: 2020-11-14

## 2020-11-09 RX ORDER — SIMVASTATIN 20 MG/1
20 TABLET, FILM COATED ORAL AT BEDTIME
Refills: 0 | Status: DISCONTINUED | OUTPATIENT
Start: 2020-11-09 | End: 2020-11-14

## 2020-11-09 RX ORDER — ONDANSETRON 8 MG/1
4 TABLET, FILM COATED ORAL ONCE
Refills: 0 | Status: DISCONTINUED | OUTPATIENT
Start: 2020-11-09 | End: 2020-11-09

## 2020-11-09 RX ORDER — INSULIN LISPRO 100/ML
VIAL (ML) SUBCUTANEOUS AT BEDTIME
Refills: 0 | Status: DISCONTINUED | OUTPATIENT
Start: 2020-11-09 | End: 2020-11-14

## 2020-11-09 RX ORDER — DEXTROSE 50 % IN WATER 50 %
25 SYRINGE (ML) INTRAVENOUS ONCE
Refills: 0 | Status: COMPLETED | OUTPATIENT
Start: 2020-11-09 | End: 2020-11-09

## 2020-11-09 RX ORDER — ACETAMINOPHEN 500 MG
650 TABLET ORAL EVERY 6 HOURS
Refills: 0 | Status: DISCONTINUED | OUTPATIENT
Start: 2020-11-09 | End: 2020-11-14

## 2020-11-09 RX ORDER — AMLODIPINE BESYLATE 2.5 MG/1
10 TABLET ORAL DAILY
Refills: 0 | Status: DISCONTINUED | OUTPATIENT
Start: 2020-11-09 | End: 2020-11-14

## 2020-11-09 RX ORDER — GLUCAGON INJECTION, SOLUTION 0.5 MG/.1ML
1 INJECTION, SOLUTION SUBCUTANEOUS ONCE
Refills: 0 | Status: DISCONTINUED | OUTPATIENT
Start: 2020-11-09 | End: 2020-11-14

## 2020-11-09 RX ORDER — DEXTROSE 50 % IN WATER 50 %
15 SYRINGE (ML) INTRAVENOUS ONCE
Refills: 0 | Status: DISCONTINUED | OUTPATIENT
Start: 2020-11-09 | End: 2020-11-14

## 2020-11-09 RX ORDER — SODIUM CHLORIDE 9 MG/ML
1000 INJECTION, SOLUTION INTRAVENOUS
Refills: 0 | Status: DISCONTINUED | OUTPATIENT
Start: 2020-11-09 | End: 2020-11-14

## 2020-11-09 RX ORDER — SODIUM CHLORIDE 9 MG/ML
1000 INJECTION, SOLUTION INTRAVENOUS
Refills: 0 | Status: DISCONTINUED | OUTPATIENT
Start: 2020-11-09 | End: 2020-11-09

## 2020-11-09 RX ORDER — DEXTROSE 50 % IN WATER 50 %
25 SYRINGE (ML) INTRAVENOUS ONCE
Refills: 0 | Status: DISCONTINUED | OUTPATIENT
Start: 2020-11-09 | End: 2020-11-14

## 2020-11-09 RX ORDER — HEPARIN SODIUM 5000 [USP'U]/ML
5000 INJECTION INTRAVENOUS; SUBCUTANEOUS EVERY 8 HOURS
Refills: 0 | Status: DISCONTINUED | OUTPATIENT
Start: 2020-11-09 | End: 2020-11-14

## 2020-11-09 RX ORDER — QUETIAPINE FUMARATE 200 MG/1
25 TABLET, FILM COATED ORAL AT BEDTIME
Refills: 0 | Status: DISCONTINUED | OUTPATIENT
Start: 2020-11-09 | End: 2020-11-14

## 2020-11-09 RX ORDER — ONDANSETRON 8 MG/1
4 TABLET, FILM COATED ORAL EVERY 6 HOURS
Refills: 0 | Status: DISCONTINUED | OUTPATIENT
Start: 2020-11-09 | End: 2020-11-14

## 2020-11-09 RX ORDER — SODIUM CHLORIDE 9 MG/ML
1000 INJECTION, SOLUTION INTRAVENOUS
Refills: 0 | Status: DISCONTINUED | OUTPATIENT
Start: 2020-11-09 | End: 2020-11-11

## 2020-11-09 RX ORDER — HALOPERIDOL DECANOATE 100 MG/ML
2.5 INJECTION INTRAMUSCULAR EVERY 12 HOURS
Refills: 0 | Status: DISCONTINUED | OUTPATIENT
Start: 2020-11-09 | End: 2020-11-14

## 2020-11-09 RX ORDER — FENTANYL CITRATE 50 UG/ML
25 INJECTION INTRAVENOUS
Refills: 0 | Status: DISCONTINUED | OUTPATIENT
Start: 2020-11-09 | End: 2020-11-09

## 2020-11-09 RX ORDER — INSULIN LISPRO 100/ML
VIAL (ML) SUBCUTANEOUS
Refills: 0 | Status: DISCONTINUED | OUTPATIENT
Start: 2020-11-09 | End: 2020-11-14

## 2020-11-09 RX ORDER — DEXTROSE 50 % IN WATER 50 %
12.5 SYRINGE (ML) INTRAVENOUS ONCE
Refills: 0 | Status: DISCONTINUED | OUTPATIENT
Start: 2020-11-09 | End: 2020-11-14

## 2020-11-09 RX ORDER — METOPROLOL TARTRATE 50 MG
50 TABLET ORAL
Refills: 0 | Status: DISCONTINUED | OUTPATIENT
Start: 2020-11-09 | End: 2020-11-14

## 2020-11-09 RX ORDER — TAMSULOSIN HYDROCHLORIDE 0.4 MG/1
0.4 CAPSULE ORAL AT BEDTIME
Refills: 0 | Status: DISCONTINUED | OUTPATIENT
Start: 2020-11-09 | End: 2020-11-14

## 2020-11-09 RX ADMIN — PIPERACILLIN AND TAZOBACTAM 25 GRAM(S): 4; .5 INJECTION, POWDER, LYOPHILIZED, FOR SOLUTION INTRAVENOUS at 00:17

## 2020-11-09 RX ADMIN — Medication 100 MILLIGRAM(S): at 08:23

## 2020-11-09 RX ADMIN — Medication 100 MILLIGRAM(S): at 22:51

## 2020-11-09 RX ADMIN — SODIUM CHLORIDE 75 MILLILITER(S): 9 INJECTION INTRAMUSCULAR; INTRAVENOUS; SUBCUTANEOUS at 12:21

## 2020-11-09 RX ADMIN — TAMSULOSIN HYDROCHLORIDE 0.4 MILLIGRAM(S): 0.4 CAPSULE ORAL at 22:51

## 2020-11-09 RX ADMIN — Medication 0.1 MILLIGRAM(S): at 12:49

## 2020-11-09 RX ADMIN — Medication 100 MILLIGRAM(S): at 06:40

## 2020-11-09 RX ADMIN — Medication 0.1 MILLIGRAM(S): at 22:51

## 2020-11-09 RX ADMIN — Medication 100 MILLIGRAM(S): at 12:49

## 2020-11-09 RX ADMIN — PIPERACILLIN AND TAZOBACTAM 25 GRAM(S): 4; .5 INJECTION, POWDER, LYOPHILIZED, FOR SOLUTION INTRAVENOUS at 19:23

## 2020-11-09 RX ADMIN — SODIUM CHLORIDE 75 MILLILITER(S): 9 INJECTION, SOLUTION INTRAVENOUS at 20:15

## 2020-11-09 RX ADMIN — Medication 25 GRAM(S): at 16:22

## 2020-11-09 RX ADMIN — Medication 2: at 08:22

## 2020-11-09 RX ADMIN — HEPARIN SODIUM 5000 UNIT(S): 5000 INJECTION INTRAVENOUS; SUBCUTANEOUS at 12:49

## 2020-11-09 RX ADMIN — SIMVASTATIN 20 MILLIGRAM(S): 20 TABLET, FILM COATED ORAL at 22:51

## 2020-11-09 RX ADMIN — SODIUM CHLORIDE 75 MILLILITER(S): 9 INJECTION INTRAMUSCULAR; INTRAVENOUS; SUBCUTANEOUS at 12:49

## 2020-11-09 RX ADMIN — Medication 50 MILLIGRAM(S): at 06:40

## 2020-11-09 RX ADMIN — AMLODIPINE BESYLATE 10 MILLIGRAM(S): 2.5 TABLET ORAL at 06:40

## 2020-11-09 RX ADMIN — Medication 0.1 MILLIGRAM(S): at 06:40

## 2020-11-09 RX ADMIN — QUETIAPINE FUMARATE 25 MILLIGRAM(S): 200 TABLET, FILM COATED ORAL at 22:51

## 2020-11-09 RX ADMIN — Medication 6: at 12:21

## 2020-11-09 RX ADMIN — LEVETIRACETAM 750 MILLIGRAM(S): 250 TABLET, FILM COATED ORAL at 06:40

## 2020-11-09 RX ADMIN — PIPERACILLIN AND TAZOBACTAM 25 GRAM(S): 4; .5 INJECTION, POWDER, LYOPHILIZED, FOR SOLUTION INTRAVENOUS at 08:23

## 2020-11-09 RX ADMIN — SODIUM CHLORIDE 75 MILLILITER(S): 9 INJECTION INTRAMUSCULAR; INTRAVENOUS; SUBCUTANEOUS at 06:41

## 2020-11-09 NOTE — PROGRESS NOTE ADULT - SUBJECTIVE AND OBJECTIVE BOX
HOD # 8    SUBJECTIVE:  70 y/o F seen and examined at bedside. Pt nonverbal.     ROS:  Unable to obtain     Vital Signs Last 24 Hrs  T(C): 36.3 (09 Nov 2020 00:24), Max: 37.4 (08 Nov 2020 17:15)  T(F): 97.3 (09 Nov 2020 00:24), Max: 99.3 (08 Nov 2020 17:15)  HR: 61 (09 Nov 2020 00:24) (58 - 78)  BP: 134/51 (09 Nov 2020 00:24) (122/59 - 138/57)  BP(mean): --  RR: 18 (09 Nov 2020 00:24) (18 - 20)  SpO2: 100% (09 Nov 2020 00:24) (98% - 100%)    PHYSICAL EXAM:  GENERAL: No acute distress, well-developed  CHEST/LUNG: CTABL, no ronchi, rales or wheezing  HEART: RRR, no MRGs  ABDOMEN: Soft, nonttp, nondistended, +bs, no suprapubic ttp,   NEUROLOGY: A&O x 3, no focal deficits    I&O's Summary    08 Nov 2020 07:01  -  09 Nov 2020 07:00  --------------------------------------------------------  IN: 1100 mL / OUT: 1004 mL / NET: 96 mL      I&O's Detail    08 Nov 2020 07:01  -  09 Nov 2020 07:00  --------------------------------------------------------  IN:    IV PiggyBack: 200 mL    sodium chloride 0.9%: 900 mL  Total IN: 1100 mL    OUT:    Indwelling Catheter - Urethral (mL): 1000 mL    Stool (mL): 4 mL  Total OUT: 1004 mL    Total NET: 96 mL        MEDICATIONS  (STANDING):  amLODIPine   Tablet 10 milliGRAM(s) Oral daily  cloNIDine 0.1 milliGRAM(s) Oral every 8 hours  dextrose 5%. 1000 milliLiter(s) (50 mL/Hr) IV Continuous <Continuous>  dextrose 50% Injectable 12.5 Gram(s) IV Push once  dextrose 50% Injectable 25 Gram(s) IV Push once  dextrose 50% Injectable 25 Gram(s) IV Push once  heparin   Injectable 5000 Unit(s) SubCutaneous every 8 hours  hydrALAZINE 100 milliGRAM(s) Oral every 8 hours  insulin lispro (ADMELOG) corrective regimen sliding scale   SubCutaneous three times a day before meals  insulin lispro (ADMELOG) corrective regimen sliding scale   SubCutaneous at bedtime  levETIRAcetam 750 milliGRAM(s) Oral two times a day  metoprolol tartrate 50 milliGRAM(s) Oral two times a day  piperacillin/tazobactam IVPB.. 3.375 Gram(s) IV Intermittent every 8 hours  QUEtiapine 25 milliGRAM(s) Oral at bedtime  simvastatin 20 milliGRAM(s) Oral at bedtime  sodium chloride 0.9%. 1000 milliLiter(s) (75 mL/Hr) IV Continuous <Continuous>  tamsulosin 0.4 milliGRAM(s) Oral at bedtime  vancomycin  IVPB 500 milliGRAM(s) IV Intermittent every 12 hours    MEDICATIONS  (PRN):  acetaminophen   Tablet .. 650 milliGRAM(s) Oral every 6 hours PRN Temp greater or equal to 38C (100.4F), Mild Pain (1 - 3)  dextrose 40% Gel 15 Gram(s) Oral once PRN Blood Glucose LESS THAN 70 milliGRAM(s)/deciliter  glucagon  Injectable 1 milliGRAM(s) IntraMuscular once PRN Glucose LESS THAN 70 milligrams/deciliter  haloperidol    Injectable 2.5 milliGRAM(s) IntraMuscular every 12 hours PRN agitation  ondansetron Injectable 4 milliGRAM(s) IV Push every 6 hours PRN Nausea and/or Vomiting    LABS:                        9.7    5.86  )-----------( 323      ( 09 Nov 2020 06:24 )             31.1     11-09    143  |  111<H>  |  14  ----------------------------<  142<H>  3.7   |  27  |  1.18    Ca    8.4<L>      09 Nov 2020 06:24      PT/INR - ( 08 Nov 2020 06:21 )   PT: 12.8 sec;   INR: 1.11 ratio         PTT - ( 08 Nov 2020 06:21 )  PTT:28.7 sec      ASSESSMENT  70 y/o F HOD # 8 with chronic urinary retention due to neurogenic bladder admitted for hematuria found to have bladder stones with 6 mm UVJ stone without hydro,     PLAN  - to OR today for cysto, stone removal and possible left stent insertion  - NPO, IVF, IV abx  - pain control, supportive care  - cont care per primary team  - serial abdominal exams  - labs in am  - to be discussed with Urology attending    Surgical Team Contact Information  Spectralink: Ext: [phone] or [phone]  Pager: [phone] HOD # 8    SUBJECTIVE:  70 y/o F seen and examined at bedside. Pt nonverbal.     ROS:  Unable to obtain     Vital Signs Last 24 Hrs  T(C): 36.3 (09 Nov 2020 00:24), Max: 37.4 (08 Nov 2020 17:15)  T(F): 97.3 (09 Nov 2020 00:24), Max: 99.3 (08 Nov 2020 17:15)  HR: 61 (09 Nov 2020 00:24) (58 - 78)  BP: 134/51 (09 Nov 2020 00:24) (122/59 - 138/57)  BP(mean): --  RR: 18 (09 Nov 2020 00:24) (18 - 20)  SpO2: 100% (09 Nov 2020 00:24) (98% - 100%)    PHYSICAL EXAM:  GENERAL: No acute distress, well-developed  CHEST/LUNG: CTABL, no ronchi, rales or wheezing  HEART: RRR, no MRGs  ABDOMEN: Soft, nonttp, nondistended, +bs, no suprapubic ttp,   NEUROLOGY: A&O x 3, no focal deficits    I&O's Summary    08 Nov 2020 07:01  -  09 Nov 2020 07:00  --------------------------------------------------------  IN: 1100 mL / OUT: 1004 mL / NET: 96 mL      I&O's Detail    08 Nov 2020 07:01  -  09 Nov 2020 07:00  --------------------------------------------------------  IN:    IV PiggyBack: 200 mL    sodium chloride 0.9%: 900 mL  Total IN: 1100 mL    OUT:    Indwelling Catheter - Urethral (mL): 1000 mL    Stool (mL): 4 mL  Total OUT: 1004 mL    Total NET: 96 mL        MEDICATIONS  (STANDING):  amLODIPine   Tablet 10 milliGRAM(s) Oral daily  cloNIDine 0.1 milliGRAM(s) Oral every 8 hours  dextrose 5%. 1000 milliLiter(s) (50 mL/Hr) IV Continuous <Continuous>  dextrose 50% Injectable 12.5 Gram(s) IV Push once  dextrose 50% Injectable 25 Gram(s) IV Push once  dextrose 50% Injectable 25 Gram(s) IV Push once  heparin   Injectable 5000 Unit(s) SubCutaneous every 8 hours  hydrALAZINE 100 milliGRAM(s) Oral every 8 hours  insulin lispro (ADMELOG) corrective regimen sliding scale   SubCutaneous three times a day before meals  insulin lispro (ADMELOG) corrective regimen sliding scale   SubCutaneous at bedtime  levETIRAcetam 750 milliGRAM(s) Oral two times a day  metoprolol tartrate 50 milliGRAM(s) Oral two times a day  piperacillin/tazobactam IVPB.. 3.375 Gram(s) IV Intermittent every 8 hours  QUEtiapine 25 milliGRAM(s) Oral at bedtime  simvastatin 20 milliGRAM(s) Oral at bedtime  sodium chloride 0.9%. 1000 milliLiter(s) (75 mL/Hr) IV Continuous <Continuous>  tamsulosin 0.4 milliGRAM(s) Oral at bedtime  vancomycin  IVPB 500 milliGRAM(s) IV Intermittent every 12 hours    MEDICATIONS  (PRN):  acetaminophen   Tablet .. 650 milliGRAM(s) Oral every 6 hours PRN Temp greater or equal to 38C (100.4F), Mild Pain (1 - 3)  dextrose 40% Gel 15 Gram(s) Oral once PRN Blood Glucose LESS THAN 70 milliGRAM(s)/deciliter  glucagon  Injectable 1 milliGRAM(s) IntraMuscular once PRN Glucose LESS THAN 70 milligrams/deciliter  haloperidol    Injectable 2.5 milliGRAM(s) IntraMuscular every 12 hours PRN agitation  ondansetron Injectable 4 milliGRAM(s) IV Push every 6 hours PRN Nausea and/or Vomiting    LABS:                        9.7    5.86  )-----------( 323      ( 09 Nov 2020 06:24 )             31.1     11-09    143  |  111<H>  |  14  ----------------------------<  142<H>  3.7   |  27  |  1.18    Ca    8.4<L>      09 Nov 2020 06:24      PT/INR - ( 08 Nov 2020 06:21 )   PT: 12.8 sec;   INR: 1.11 ratio         PTT - ( 08 Nov 2020 06:21 )  PTT:28.7 sec      ASSESSMENT  70 y/o F HOD # 8 with chronic urinary retention due to neurogenic bladder admitted for hematuria found to have bladder stones with 6 mm UVJ stone without hydro,     PLAN  - to OR today for cysto, stone removal and possible left stent insertion  - NPO, IVF, IV abx  - pain control, supportive care  - cont care per primary team  - serial abdominal exams  - labs in am  - to be discussed with Urology attending

## 2020-11-09 NOTE — BRIEF OPERATIVE NOTE - NSICDXBRIEFPROCEDURE_GEN_ALL_CORE_FT
PROCEDURES:  Cystoscopic removal of bladder stone 09-Nov-2020 20:12:42  Todd Caicedo  Cystoscopy with bilateral ureteroscopy with insertion of stent 09-Nov-2020 20:12:22  Todd Caicedo

## 2020-11-09 NOTE — BRIEF OPERATIVE NOTE - OPERATION/FINDINGS
stone fragments inbladder allremoved  stone on right not seen-likely passed  reinspection of left demonstrates tiny fragments-no large stone

## 2020-11-09 NOTE — PROGRESS NOTE ADULT - SUBJECTIVE AND OBJECTIVE BOX
Post-op check    S/P Cystoscopy with bilateral ureteroscopy with insertion of stent, removal of bladder stones POD#0  69 year old Female seen and examined at bedside. Nonverbal. No pain or complaints. Tolerating milner well. No chest pain, shortness of breath, nausea/ vomiting, and dizziness.     Vital Signs Last 24 Hrs  T(F): 96.9 (11-09-20 @ 21:57), Max: 97.7 (11-09-20 @ 11:56)  HR: 74 (11-09-20 @ 21:57)  BP: 155/63 (11-09-20 @ 21:57)  RR: 16 (11-09-20 @ 21:57)  SpO2: 99% (11-09-20 @ 21:57)  POCT Blood Glucose.: 160 mg/dL (09 Nov 2020 23:18)      GENERAL: Alert, NAD  CHEST/LUNG: Clear to auscultation bilaterally, respirations nonlabored  HEART: S1S2, Regular rate and rhythm  ABDOMEN: soft NTND  : Milner indwelling with clear davis urine. No suprapubic tenderness  EXTREMITIES:  no calf tenderness, No edema, intermittent compression devices in place bilaterally    69 year old  Female with chronic urinary retention due to neurogenic bladder admitted for hematuria found to have bladder stones with 6 mm UVJ stone without hydro S/P Cystoscopy with bilateral ureteroscopy with insertion of stent, removal of bladder stones POD#0    Plan:  - Milner, monitor urine output  - L stent to be removed at bedside tomorrow. R stent to be removed in office.   - Augmentin 875mg BID   - DVT prophylaxis, Incentive Spirometer, OOB, Ambulating, pain control  - f/u labs   - continue current management per medicine. possible d/c planning tomorrow   - discussed with Dr. Caicedo

## 2020-11-09 NOTE — PROGRESS NOTE ADULT - SUBJECTIVE AND OBJECTIVE BOX
FATOU MCBRIDE  MRN-[id number]    Follow Up:  ID following for UTI    Interval History: planned for urologic procedure today. Afebrile and normal WBC. on antibiotics ppx     ROS:    [X ] Unobtainable because: dementia  [ ] All other systems negative    Constitutional: no fever, no chills  Head: no trauma  Eyes: no vision changes, no eye pain  ENT:  no sore throat, no rhinorrhea  Cardiovascular:  no chest pain, no palpitation  Respiratory:  no SOB, no cough  GI:  no abd pain, no vomiting, no diarrhea  urinary: no dysuria, no hematuria, no flank pain  musculoskeletal:  no joint pain, no joint swelling  skin:  no rash  neurology:  no headache, no seizure, no change in mental status  psych: no anxiety, no depression         Allergies  No Known Allergies        ANTIMICROBIALS:  amoxicillin  875 milliGRAM(s)/clavulanate 1 two times a day      OTHER MEDS:  acetaminophen   Tablet .. 650 milliGRAM(s) Oral every 6 hours PRN  amLODIPine   Tablet 10 milliGRAM(s) Oral daily  cloNIDine 0.1 milliGRAM(s) Oral every 8 hours  dextrose 40% Gel 15 Gram(s) Oral once PRN  dextrose 5% + lactated ringers. 1000 milliLiter(s) IV Continuous <Continuous>  dextrose 5%. 1000 milliLiter(s) IV Continuous <Continuous>  dextrose 50% Injectable 12.5 Gram(s) IV Push once  dextrose 50% Injectable 25 Gram(s) IV Push once  dextrose 50% Injectable 25 Gram(s) IV Push once  fentaNYL    Injectable 25 MICROGram(s) IV Push every 5 minutes PRN  glucagon  Injectable 1 milliGRAM(s) IntraMuscular once PRN  haloperidol    Injectable 2.5 milliGRAM(s) IntraMuscular every 12 hours PRN  heparin   Injectable 5000 Unit(s) SubCutaneous every 8 hours  hydrALAZINE 100 milliGRAM(s) Oral every 8 hours  insulin lispro (ADMELOG) corrective regimen sliding scale   SubCutaneous three times a day before meals  insulin lispro (ADMELOG) corrective regimen sliding scale   SubCutaneous at bedtime  lactated ringers. 1000 milliLiter(s) IV Continuous <Continuous>  levETIRAcetam 750 milliGRAM(s) Oral two times a day  metoprolol tartrate 50 milliGRAM(s) Oral two times a day  ondansetron Injectable 4 milliGRAM(s) IV Push once PRN  ondansetron Injectable 4 milliGRAM(s) IV Push every 6 hours PRN  QUEtiapine 25 milliGRAM(s) Oral at bedtime  simvastatin 20 milliGRAM(s) Oral at bedtime  tamsulosin 0.4 milliGRAM(s) Oral at bedtime      Physical Exam:  Vital Signs Last 24 Hrs  T(C): 36.4 (09 Nov 2020 21:15), Max: 36.5 (09 Nov 2020 11:56)  T(F): 97.5 (09 Nov 2020 21:15), Max: 97.7 (09 Nov 2020 11:56)  HR: 75 (09 Nov 2020 21:15) (53 - 75)  BP: 132/61 (09 Nov 2020 21:15) (124/67 - 185/77)  BP(mean): --  RR: 15 (09 Nov 2020 21:15) (12 - 19)  SpO2: 100% (09 Nov 2020 21:15) (97% - 100%)    General:    NAD,  non toxic  Head: atraumatic, normocephalic  Eye: normal sclera and conjunctiva  ENT:    no oral lesions, neck supple  Cardio:     regular S1, S2,  no murmur  Respiratory:    clear b/l,    no wheezing  abd:     soft,   BS +,   no tenderness  :   no CVAT,  no suprapubic tenderness,   +milner  Musculoskeletal:   no joint swelling,   no edema  vascular: no central lines, +PIV   Skin:    no rash  Neurologic:     alert and oriented to name  psych: normal affect    WBC Count: 5.86 K/uL (11-09 @ 06:24)  WBC Count: 7.04 K/uL (11-08 @ 06:21)  WBC Count: 5.84 K/uL (11-07 @ 07:57)  WBC Count: 8.31 K/uL (11-05 @ 07:07)  WBC Count: 4.49 K/uL (11-04 @ 07:09)  WBC Count: 6.36 K/uL (11-03 @ 07:57)                            9.7    5.86  )-----------( 323      ( 09 Nov 2020 06:24 )             31.1       11-09    143  |  111<H>  |  14  ----------------------------<  142<H>  3.7   |  27  |  1.18    Ca    8.4<L>      09 Nov 2020 06:24            Creatinine Trend: 1.18<--, 1.09<--, 0.83<--, 0.80<--, 0.77<--, 0.76<--      MICROBIOLOGY:  v  .Urine Clean Catch (Midstream)  11-02-20   >100,000 CFU/ml Klebsiella pneumoniae  >100,000 CFU/ml Enterococcus faecalis  --  Klebsiella pneumoniae  Enterococcus faecalis      .Blood Blood-Peripheral  11-02-20   No Growth Final  --  --              COVID-19 PCR: Bonnie (11-09)                RADIOLOGY:

## 2020-11-09 NOTE — PROGRESS NOTE ADULT - ASSESSMENT
70 y/o female with PMHx of dementia (minimally verbal per daughter occasional gives 1 word answers of yes/no), CVA, Seizure disorder, HTN, DM type 2 (off meds per daughter), admission 2 months ago for renal colic s/p lithotripsy in 8/24 w/ indwelling milner presents to the ED due to hematuria.           Problem/Plan - 1:  ·  Problem: Acute  cystitis with hematuria and associated with indwelling chronic cathter.  Plan: - CT scan w/ likely cystitis  - urine culture showed klebsiella and enterococcus.   Cont iv zosyn 24 hours post cystoscopy. follow post procedure urology recs. no need to check vanco level.          Problem/Plan - 2:  ·  Problem: Calculus of ureter.  Plan: - 6mm stone at the UVJ  -Reviewed Urology consult note. OR for stenting and stone removal today.      Problem/Plan - 3:  ·  Problem: Seizure disorder.  Plan: - c/w Keppra    Problem/Plan - 4:  ·  Problem: Acute kidney injury. improved.      Problem/Plan - 5:  ·  Problem: Essential hypertension.controlled. cont current meds and monitor.     Problem/Plan - 6:  Problem: Type 2 diabetes mellitus with other specified complication, without long-term current use of insulin. controlled. Plan: - FS q6 w/ SSI.     Agitation . start seroquel and cont prn haldol. check EKG for QTc prolongation.     Problem/Plan - 7:  ·  Problem: DVT prophylaxis.  HSQ     Hypokalemia. Repleted    Pressure injury (stage 2) bilateral buttocks. cont local care  Hypophosphatemia. repleted      Dysphagia. cont dysphagia diet with aspiration precautions as per swallow eval.     PT eval>>>Home with [facility]    Full code

## 2020-11-09 NOTE — PROGRESS NOTE ADULT - SUBJECTIVE AND OBJECTIVE BOX
CHIEF COMPLAINT: Limited hx as patient is non verbal.   no fever  no report of any vomiting  no agitation   NPO after MN for cystoscopy today.         PHYSICAL EXAM:    GENERAL: Thinly built and non verbal   CHEST/LUNG: Clear to ausculation bilaterally, no wheezing, no crackles   HEART: Regular rate and rhythm; No murmurs, rubs  ABDOMEN: Soft, Nontender, Nondistended; Bowel sounds present. + milner   EXTREMITIES: No clubbing, cyanosis, or edema. ROM could not assessed because of non verbal status and following no commands.   NERVOUS SYSTEM:  Limited as did not follow commands  Psychiatry: AA and orientation could not be assessed.       OBJECTIVE DATA:     Vital Signs Last 24 Hrs  T(C): 36.5 (09 Nov 2020 11:56), Max: 37.4 (08 Nov 2020 17:15)  T(F): 97.7 (09 Nov 2020 11:56), Max: 99.3 (08 Nov 2020 17:15)  HR: 61 (09 Nov 2020 11:56) (58 - 78)  BP: 147/70 (09 Nov 2020 11:56) (122/59 - 147/70)  BP(mean): --  RR: 18 (09 Nov 2020 11:56) (18 - 20)  SpO2: 100% (09 Nov 2020 11:56) (98% - 100%)           Daily     Daily   LABS:                        9.7    5.86  )-----------( 323      ( 09 Nov 2020 06:24 )             31.1             11-09    143  |  111<H>  |  14  ----------------------------<  142<H>  3.7   |  27  |  1.18    Ca    8.4<L>      09 Nov 2020 06:24                PT/INR - ( 08 Nov 2020 06:21 )   PT: 12.8 sec;   INR: 1.11 ratio         PTT - ( 08 Nov 2020 06:21 )  PTT:28.7 sec         CAPILLARY BLOOD GLUCOSE      POCT Blood Glucose.: 251 mg/dL (09 Nov 2020 10:31)    MEDICATIONS  (STANDING):  amLODIPine   Tablet 10 milliGRAM(s) Oral daily  cloNIDine 0.1 milliGRAM(s) Oral every 8 hours  dextrose 5%. 1000 milliLiter(s) (50 mL/Hr) IV Continuous <Continuous>  dextrose 50% Injectable 12.5 Gram(s) IV Push once  dextrose 50% Injectable 25 Gram(s) IV Push once  dextrose 50% Injectable 25 Gram(s) IV Push once  heparin   Injectable 5000 Unit(s) SubCutaneous every 8 hours  hydrALAZINE 100 milliGRAM(s) Oral every 8 hours  insulin lispro (ADMELOG) corrective regimen sliding scale   SubCutaneous three times a day before meals  insulin lispro (ADMELOG) corrective regimen sliding scale   SubCutaneous at bedtime  levETIRAcetam 750 milliGRAM(s) Oral two times a day  metoprolol tartrate 50 milliGRAM(s) Oral two times a day  piperacillin/tazobactam IVPB.. 3.375 Gram(s) IV Intermittent every 8 hours  QUEtiapine 25 milliGRAM(s) Oral at bedtime  simvastatin 20 milliGRAM(s) Oral at bedtime  sodium chloride 0.9%. 1000 milliLiter(s) (75 mL/Hr) IV Continuous <Continuous>  tamsulosin 0.4 milliGRAM(s) Oral at bedtime    MEDICATIONS  (PRN):  acetaminophen   Tablet .. 650 milliGRAM(s) Oral every 6 hours PRN Temp greater or equal to 38C (100.4F), Mild Pain (1 - 3)  dextrose 40% Gel 15 Gram(s) Oral once PRN Blood Glucose LESS THAN 70 milliGRAM(s)/deciliter  glucagon  Injectable 1 milliGRAM(s) IntraMuscular once PRN Glucose LESS THAN 70 milligrams/deciliter  haloperidol    Injectable 2.5 milliGRAM(s) IntraMuscular every 12 hours PRN agitation  ondansetron Injectable 4 milliGRAM(s) IV Push every 6 hours PRN Nausea and/or Vomiting

## 2020-11-09 NOTE — PROGRESS NOTE ADULT - ASSESSMENT
69 years old female with PMH of dementia, CVA, seizure disorder, HTN, DM type 2 (not on any medications), s/p admission to the hospital in August 2020 with UTI (UCx grew Pseudomonas aeruginosa Carbapenem resistant), urinary retention, s/p lithotripsy and Milner placement, followed by another admission with renal failure, pressure ulcer, Blood culture grew Staphylococcus hominis deemed contaminant.   Here had one time fever, normal WBC, UA in the new milner with only 0-2 WBC, COVID negative   Had suprapubic tenderness on exam, no CVA tenderness, +milner with sediment in it  CXR (I personally reviewed) no pneumonia   CT (I personally reviewed) cystitis and multiple stones   She has had resistant organisms in the past including .   If this is pseudomonas then the ceftriaxone wont have activity   Even if pseudomonas she has had different morphologies with different sensitivity patterns and if this is carbapenem resistant may not have too many options and would potentially need to resort to agents such as (Zerbaxa) ceftolozane/tazobactam.   She is afebrile, normal WBC and had a unimpressive UA once her milner was changed. She is colonized with pseudomonas though has extensive resistance. At this time the risks of antibiotics side effects and developing resistance outweigh the benefits of using ceftriaxone and thus it has been stopped. Once her urine culture is identified with sensitivities, would offer treatment is develops clear signs and/or symptoms of UTI or if she is to undergo a urologic procedure on this admission. Planned for repeat CT and if ureteral stone, urology will plan for removal. If that is the case, then would wait for the most recent urine culture sensitivities and start appropriate therapy.     11/5 Interval History: The pt is awake and alert, in her bed, does not answer any of my questions. The pt is afebrile, no leukocytosis, UCx grew >100K Klebsiella and >100K Enterococcus faecalis, sensitivity as above, blood cultures with no growth, the pt is off antibiotics. UCx growth will be treated if the pt will have scheduled urology procedure for the 6 mm stone in the right ureterovesical junction, no hydronephrosis.   11/6: afebrile, normal WBC, will start Zosyn tomorrow AM and scheduled for OR on Monday 11/9: urologic procedure today, on Augmentin ppx    Plan:  -continue antibiotics   -patient with functional quadriplegia >please ensure offloading and optimizing nutrition to avoid bedsores.   -good but not tight glycemic control given risks of hypoglycemia   -monitor for signs/symptoms of sepsis/fever/chills etc  -if septic, change antibiotics to (Zosyn) Piperacillin/tazobactam 3.375 grams every 8 hours     Discussed with medicine NP and urology PA    Harlan Phan DO  Cell: [phone]  Pager [phone]  Infectious Disease Attending  After 5pm/weekends please call [phone] for all inquiries and new consults

## 2020-11-09 NOTE — BRIEF OPERATIVE NOTE - NSICDXBRIEFPREOP_GEN_ALL_CORE_FT
PRE-OP DIAGNOSIS:  Right ureteral stone 09-Nov-2020 20:13:34  Todd Caicedo  Left ureteral stone 09-Nov-2020 20:13:22  Todd Caicedo

## 2020-11-09 NOTE — BRIEF OPERATIVE NOTE - NSICDXBRIEFPOSTOP_GEN_ALL_CORE_FT
POST-OP DIAGNOSIS:  Right ureteral stone 09-Nov-2020 20:14:17  Todd Caicedo  Left ureteral stone 09-Nov-2020 20:14:07  Todd Caicedo

## 2020-11-10 ENCOUNTER — APPOINTMENT (OUTPATIENT)
Dept: UROLOGY | Facility: CLINIC | Age: 70
End: 2020-11-10

## 2020-11-10 LAB
ANION GAP SERPL CALC-SCNC: 7 MMOL/L — SIGNIFICANT CHANGE UP (ref 5–17)
BUN SERPL-MCNC: 13 MG/DL — SIGNIFICANT CHANGE UP (ref 7–23)
CALCIUM SERPL-MCNC: 8.8 MG/DL — SIGNIFICANT CHANGE UP (ref 8.5–10.1)
CHLORIDE SERPL-SCNC: 110 MMOL/L — HIGH (ref 96–108)
CO2 SERPL-SCNC: 27 MMOL/L — SIGNIFICANT CHANGE UP (ref 22–31)
CREAT SERPL-MCNC: 1.19 MG/DL — SIGNIFICANT CHANGE UP (ref 0.5–1.3)
GLUCOSE BLDC GLUCOMTR-MCNC: 134 MG/DL — HIGH (ref 70–99)
GLUCOSE BLDC GLUCOMTR-MCNC: 141 MG/DL — HIGH (ref 70–99)
GLUCOSE BLDC GLUCOMTR-MCNC: 177 MG/DL — HIGH (ref 70–99)
GLUCOSE BLDC GLUCOMTR-MCNC: 202 MG/DL — HIGH (ref 70–99)
GLUCOSE BLDC GLUCOMTR-MCNC: 222 MG/DL — HIGH (ref 70–99)
GLUCOSE SERPL-MCNC: 116 MG/DL — HIGH (ref 70–99)
HCT VFR BLD CALC: 36.1 % — SIGNIFICANT CHANGE UP (ref 34.5–45)
HGB BLD-MCNC: 11.4 G/DL — LOW (ref 11.5–15.5)
MCHC RBC-ENTMCNC: 27.9 PG — SIGNIFICANT CHANGE UP (ref 27–34)
MCHC RBC-ENTMCNC: 31.6 GM/DL — LOW (ref 32–36)
MCV RBC AUTO: 88.3 FL — SIGNIFICANT CHANGE UP (ref 80–100)
NRBC # BLD: 0 /100 WBCS — SIGNIFICANT CHANGE UP (ref 0–0)
PLATELET # BLD AUTO: 385 K/UL — SIGNIFICANT CHANGE UP (ref 150–400)
POTASSIUM SERPL-MCNC: 3.6 MMOL/L — SIGNIFICANT CHANGE UP (ref 3.5–5.3)
POTASSIUM SERPL-SCNC: 3.6 MMOL/L — SIGNIFICANT CHANGE UP (ref 3.5–5.3)
RBC # BLD: 4.09 M/UL — SIGNIFICANT CHANGE UP (ref 3.8–5.2)
RBC # FLD: 13.4 % — SIGNIFICANT CHANGE UP (ref 10.3–14.5)
SODIUM SERPL-SCNC: 144 MMOL/L — SIGNIFICANT CHANGE UP (ref 135–145)
WBC # BLD: 7.85 K/UL — SIGNIFICANT CHANGE UP (ref 3.8–10.5)
WBC # FLD AUTO: 7.85 K/UL — SIGNIFICANT CHANGE UP (ref 3.8–10.5)

## 2020-11-10 PROCEDURE — 99232 SBSQ HOSP IP/OBS MODERATE 35: CPT

## 2020-11-10 RX ORDER — FLUCONAZOLE 150 MG/1
400 TABLET ORAL DAILY
Refills: 0 | Status: DISCONTINUED | OUTPATIENT
Start: 2020-11-10 | End: 2020-11-11

## 2020-11-10 RX ADMIN — Medication 0.1 MILLIGRAM(S): at 21:48

## 2020-11-10 RX ADMIN — Medication 1 TABLET(S): at 05:54

## 2020-11-10 RX ADMIN — Medication 1 TABLET(S): at 17:41

## 2020-11-10 RX ADMIN — LEVETIRACETAM 750 MILLIGRAM(S): 250 TABLET, FILM COATED ORAL at 05:50

## 2020-11-10 RX ADMIN — SIMVASTATIN 20 MILLIGRAM(S): 20 TABLET, FILM COATED ORAL at 21:49

## 2020-11-10 RX ADMIN — Medication 100 MILLIGRAM(S): at 21:48

## 2020-11-10 RX ADMIN — Medication 100 MILLIGRAM(S): at 13:42

## 2020-11-10 RX ADMIN — HEPARIN SODIUM 5000 UNIT(S): 5000 INJECTION INTRAVENOUS; SUBCUTANEOUS at 13:42

## 2020-11-10 RX ADMIN — Medication 100 MILLIGRAM(S): at 05:50

## 2020-11-10 RX ADMIN — HEPARIN SODIUM 5000 UNIT(S): 5000 INJECTION INTRAVENOUS; SUBCUTANEOUS at 05:50

## 2020-11-10 RX ADMIN — HEPARIN SODIUM 5000 UNIT(S): 5000 INJECTION INTRAVENOUS; SUBCUTANEOUS at 21:48

## 2020-11-10 RX ADMIN — Medication 0.1 MILLIGRAM(S): at 05:50

## 2020-11-10 RX ADMIN — Medication 0.1 MILLIGRAM(S): at 13:42

## 2020-11-10 RX ADMIN — Medication 4: at 17:41

## 2020-11-10 RX ADMIN — TAMSULOSIN HYDROCHLORIDE 0.4 MILLIGRAM(S): 0.4 CAPSULE ORAL at 21:48

## 2020-11-10 RX ADMIN — Medication 4: at 11:25

## 2020-11-10 RX ADMIN — Medication 50 MILLIGRAM(S): at 05:50

## 2020-11-10 RX ADMIN — LEVETIRACETAM 750 MILLIGRAM(S): 250 TABLET, FILM COATED ORAL at 17:41

## 2020-11-10 RX ADMIN — AMLODIPINE BESYLATE 10 MILLIGRAM(S): 2.5 TABLET ORAL at 05:50

## 2020-11-10 RX ADMIN — QUETIAPINE FUMARATE 25 MILLIGRAM(S): 200 TABLET, FILM COATED ORAL at 21:49

## 2020-11-10 RX ADMIN — FLUCONAZOLE 400 MILLIGRAM(S): 150 TABLET ORAL at 23:37

## 2020-11-10 RX ADMIN — Medication 50 MILLIGRAM(S): at 17:41

## 2020-11-10 NOTE — PROGRESS NOTE ADULT - SUBJECTIVE AND OBJECTIVE BOX
POD #1 - Cystoscopy with bilateral ureteroscopy with insertion of stent, removal of bladder stones.  Patient seen and examined at bedside with no complaints. Admits to tolerating Mayberry, Denies pain, nasuea/vomiting.    Vital Signs Last 24 Hrs  T(F): 97.8 (11-10-20 @ 05:32), Max: 97.8 (11-10-20 @ 00:25)  HR: 87 (11-10-20 @ 05:32)  BP: 171/51 (11-10-20 @ 05:32)  RR: 16 (11-10-20 @ 05:32)  SpO2: 99% (11-10-20 @ 05:32)    CAPILLARY BLOOD GLUCOSE      POCT Blood Glucose.: 134 mg/dL (10 Nov 2020 07:23)      GENERAL: Alert, NAD  CHEST/LUNG: Clear to auscultation bilaterally, respirations nonlabored  HEART: S1S2, Regular rate and rhythm  ABDOMEN: soft NTND  : Mayberry indwelling with clear davis urine. No suprapubic tenderness  EXTREMITIES:  no calf tenderness, No edema, intermittent compression devices in place bilaterally    I&O's Detail    09 Nov 2020 07:01  -  10 Nov 2020 07:00  --------------------------------------------------------  IN:    dextrose 5% + lactated ringers: 1000 mL    Lactated Ringers: 85 mL  Total IN: 1085 mL    OUT:    Indwelling Catheter - Urethral (mL): 1375 mL  Total OUT: 1375 mL    Total NET: -290 mL          LABS:                        11.4   7.85  )-----------( 385      ( 10 Nov 2020 07:57 )             36.1     11-10    144  |  110<H>  |  13  ----------------------------<  116<H>  3.6   |  27  |  1.19    Ca    8.8      10 Nov 2020 07:57

## 2020-11-10 NOTE — PROGRESS NOTE ADULT - SUBJECTIVE AND OBJECTIVE BOX
FATOU MCBRIDE  MRN-[id number]    Follow Up:  ID following for UTI    Interval History: had urologic procedure yest. Doing well, one stent removed the other will be removed outpatient. She is more alert today and denied complaints     ROS:    [ ] Unobtainable because:   [X ] All other systems negative      Allergies  No Known Allergies        ANTIMICROBIALS:    amoxicillin  875 milliGRAM(s)/clavulanate 1 two times a day      MEDICATIONS  (STANDING):  amLODIPine   Tablet 10 daily  cloNIDine 0.1 every 8 hours  dextrose 50% Injectable 12.5 once  dextrose 50% Injectable 25 once  dextrose 50% Injectable 25 once  heparin   Injectable 5000 every 8 hours  hydrALAZINE 100 every 8 hours  insulin lispro (ADMELOG) corrective regimen sliding scale  three times a day before meals  insulin lispro (ADMELOG) corrective regimen sliding scale  at bedtime  levETIRAcetam 750 two times a day  metoprolol tartrate 50 two times a day  QUEtiapine 25 at bedtime  simvastatin 20 at bedtime  tamsulosin 0.4 at bedtime      PRN  acetaminophen   Tablet .. 650 milliGRAM(s) Oral every 6 hours PRN  dextrose 40% Gel 15 Gram(s) Oral once PRN  glucagon  Injectable 1 milliGRAM(s) IntraMuscular once PRN  haloperidol    Injectable 2.5 milliGRAM(s) IntraMuscular every 12 hours PRN  ondansetron Injectable 4 milliGRAM(s) IV Push every 6 hours PRN      Physical Exam:  Vital Signs Last 24 Hrs  T(F): 97.6 (11-10-20 @ 11:42), Max: 97.8 (11-10-20 @ 00:25)  HR: 82 (11-10-20 @ 11:42)  BP: 124/68 (11-10-20 @ 11:42)  RR: 18 (11-10-20 @ 11:42)  SpO2: 99% (11-10-20 @ 11:42) (97% - 100%)  Wt(kg): --    General:    NAD,  non toxic  Head: atraumatic, normocephalic  Eye: normal sclera and conjunctiva  ENT:    no oral lesions, neck supple  Cardio:     regular S1, S2,  no murmur  Respiratory:    clear b/l,    no wheezing  abd:     soft,   BS +,   no tenderness  :   no CVAT,  no suprapubic tenderness,   +milner  Musculoskeletal:   no joint swelling,   no edema  vascular: no central lines, +PIV   Skin:    no rash  Neurologic:     alert and oriented to name  psych: normal affect      LABS:  WBC Count: 7.85 K/uL (11-10 @ 07:57)  WBC Count: 5.86 K/uL (11-09 @ 06:24)  WBC Count: 7.04 K/uL (11-08 @ 06:21)  WBC Count: 5.84 K/uL (11-07 @ 07:57)  WBC Count: 8.31 K/uL (11-05 @ 07:07)  WBC Count: 4.49 K/uL (11-04 @ 07:09)                            11.4   7.85  )-----------( 385      ( 10 Nov 2020 07:57 )             36.1       11-10    144  |  110<H>  |  13  ----------------------------<  116<H>  3.6   |  27  |  1.19    Ca    8.8      10 Nov 2020 07:57        Creatinine Trend: 1.19<--, 1.18<--, 1.09<--, 0.83<--, 0.80<--, 0.77<--        MICROBIOLOGY:  v  .Urine Clean Catch (Midstream)  11-02-20   >100,000 CFU/ml Klebsiella pneumoniae  >100,000 CFU/ml Enterococcus faecalis  --  Klebsiella pneumoniae  Enterococcus faecalis      .Blood Blood-Peripheral  11-02-20   No Growth Final  --  --      COVID-19 PCR: NotDetec (11-09)      RADIOLOGY:

## 2020-11-10 NOTE — PROGRESS NOTE ADULT - SUBJECTIVE AND OBJECTIVE BOX
CHIEF COMPLAINT:   no fever  s/p cystoscopy and stone removal  slight hematuria  no n/v/d        PHYSICAL EXAM:    GENERAL: Thinly built and non verbal   CHEST/LUNG: Clear to ausculation bilaterally, no wheezing, no crackles   HEART: Regular rate and rhythm; No murmurs, rubs  ABDOMEN: Soft, Nontender, Nondistended; Bowel sounds present. + milner   EXTREMITIES: No clubbing, cyanosis, or edema. ROM could not assessed because of non verbal status and following no commands.   NERVOUS SYSTEM:  Limited as did not follow commands  Psychiatry: AA and orientation could not be assessed.       OBJECTIVE DATA:       Vital Signs Last 24 Hrs  T(C): 36.6 (10 Nov 2020 05:32), Max: 36.6 (10 Nov 2020 00:25)  T(F): 97.8 (10 Nov 2020 05:32), Max: 97.8 (10 Nov 2020 00:25)  HR: 87 (10 Nov 2020 05:32) (53 - 87)  BP: 171/51 (10 Nov 2020 05:32) (109/43 - 185/77)  BP(mean): 3 (2020 21:57) (3 - 3)  RR: 16 (10 Nov 2020 05:32) (12 - 19)  SpO2: 99% (10 Nov 2020 05:32) (97% - 100%)           Daily     Daily Weight in k.7 (10 Nov 2020 05:32)  LABS:                        11.4   7.85  )-----------( 385      ( 10 Nov 2020 07:57 )             36.1             11-10    144  |  110<H>  |  13  ----------------------------<  116<H>  3.6   |  27  |  1.19    Ca    8.8      10 Nov 2020 07:57                         CAPILLARY BLOOD GLUCOSE      POCT Blood Glucose.: 134 mg/dL (10 Nov 2020 07:23)      MEDICATIONS  (STANDING):  amLODIPine   Tablet 10 milliGRAM(s) Oral daily  amoxicillin  875 milliGRAM(s)/clavulanate 1 Tablet(s) Oral two times a day  cloNIDine 0.1 milliGRAM(s) Oral every 8 hours  dextrose 5% + lactated ringers. 1000 milliLiter(s) (50 mL/Hr) IV Continuous <Continuous>  dextrose 5%. 1000 milliLiter(s) (50 mL/Hr) IV Continuous <Continuous>  dextrose 50% Injectable 12.5 Gram(s) IV Push once  dextrose 50% Injectable 25 Gram(s) IV Push once  dextrose 50% Injectable 25 Gram(s) IV Push once  heparin   Injectable 5000 Unit(s) SubCutaneous every 8 hours  hydrALAZINE 100 milliGRAM(s) Oral every 8 hours  insulin lispro (ADMELOG) corrective regimen sliding scale   SubCutaneous three times a day before meals  insulin lispro (ADMELOG) corrective regimen sliding scale   SubCutaneous at bedtime  levETIRAcetam 750 milliGRAM(s) Oral two times a day  metoprolol tartrate 50 milliGRAM(s) Oral two times a day  QUEtiapine 25 milliGRAM(s) Oral at bedtime  simvastatin 20 milliGRAM(s) Oral at bedtime  tamsulosin 0.4 milliGRAM(s) Oral at bedtime    MEDICATIONS  (PRN):  acetaminophen   Tablet .. 650 milliGRAM(s) Oral every 6 hours PRN Temp greater or equal to 38C (100.4F), Mild Pain (1 - 3)  dextrose 40% Gel 15 Gram(s) Oral once PRN Blood Glucose LESS THAN 70 milliGRAM(s)/deciliter  glucagon  Injectable 1 milliGRAM(s) IntraMuscular once PRN Glucose LESS THAN 70 milligrams/deciliter  haloperidol    Injectable 2.5 milliGRAM(s) IntraMuscular every 12 hours PRN agitation  ondansetron Injectable 4 milliGRAM(s) IV Push every 6 hours PRN Nausea and/or Vomiting

## 2020-11-10 NOTE — PROGRESS NOTE ADULT - ASSESSMENT
70 y/o female with PMHx of dementia (minimally verbal per daughter occasional gives 1 word answers of yes/no), CVA, Seizure disorder, HTN, DM type 2 (off meds per daughter), admission 2 months ago for renal colic s/p lithotripsy in 8/24 w/ indwelling milner presents to the ED due to hematuria.           Problem/Plan - 1:  ·  Problem: Acute  cystitis with hematuria and associated with indwelling chronic cathter.  Plan: - CT scan w/ likely cystitis  - urine culture showed klebsiella and enterococcus.  cont augmentin per ID.       Problem/Plan - 2:  ·  Problem: Calculus of ureter.  Plan: - 6mm stone at the UVJ  -S/p cystoscopy and stone removal.   left stent removal today.   urologist following.     Problem/Plan - 3:  ·  Problem: Seizure disorder.  Plan: - c/w Keppra    Problem/Plan - 4:  ·  Problem: Acute kidney injury. improved.      Problem/Plan - 5:  ·  Problem: Essential hypertension. controlled. cont current meds and monitor.     Problem/Plan - 6:  Problem: Type 2 diabetes mellitus with other specified complication, without long-term current use of insulin. controlled. Plan: - FS q6 w/ SSI.     Agitation. better. cont seroquel.       Problem/Plan - 7:  ·  Problem: DVT prophylaxis.  HSQ     Hypokalemia. Repleted    Pressure injury (stage 2) bilateral buttocks. cont local care  Hypophosphatemia. repleted      Dysphagia. cont dysphagia diet with aspiration precautions as per swallow eval.     PT eval>>>[facility]. COVID PCR recheck.   Full code      68 y/o female with PMHx of dementia (minimally verbal per daughter occasional gives 1 word answers of yes/no), CVA, Seizure disorder, HTN, DM type 2 (off meds per daughter), admission 2 months ago for renal colic s/p lithotripsy in 8/24 w/ indwelling milner presents to the ED due to hematuria.           Problem/Plan - 1:  ·  Problem: Acute  cystitis with hematuria and associated with indwelling chronic cathter.  Plan: - CT scan w/ likely cystitis  - urine culture showed klebsiella and enterococcus.  cont augmentin per ID.       Problem/Plan - 2:  ·  Problem: Calculus of ureter.  Plan: - 6mm stone at the UVJ  -S/p cystoscopy and stone removal.   left stent removal today.   urologist following.     Problem/Plan - 3:  ·  Problem: Seizure disorder.  Plan: - c/w Keppra    Problem/Plan - 4:  ·  Problem: Acute kidney injury. improved.      Problem/Plan - 5:  ·  Problem: Essential hypertension. controlled. cont current meds and monitor.     Problem/Plan - 6:  Problem: Type 2 diabetes mellitus with other specified complication, without long-term current use of insulin. controlled. Plan: - FS q6 w/ SSI.     Agitation. better. cont seroquel.       Problem/Plan - 7:  ·  Problem: DVT prophylaxis.  HSQ     Hypokalemia. Repleted    Pressure injury (stage 2) bilateral buttocks. cont local care  Hypophosphatemia. repleted      Dysphagia. cont dysphagia diet with aspiration precautions as per swallow eval.     PT eval>>>[facility]. COVID PCR recheck.  Called daughter and sister>>> not sure about safe discharge planning yet. Advised to have follow up with care manager and .   Full code

## 2020-11-10 NOTE — PROGRESS NOTE ADULT - ASSESSMENT
69 year old  Female with chronic urinary retention due to neurogenic bladder admitted for hematuria found to have bladder stones with 6 mm UVJ stone without hydro S/P Cystoscopy with bilateral ureteroscopy with insertion of stent, removal of bladder stones POD#1    Plan:  - Continue Chronic Mayberry  - L Stent to be removed today at bedside. R stent to be removed outpatient  - Continue Augmentin  - Continue DVT prophylaxis, Incentive spirometer, OOB, Ambulating, pain control  - F/u labs  - continue current management per medicine. possible d/c planning today

## 2020-11-10 NOTE — PROGRESS NOTE ADULT - ASSESSMENT
69 years old female with PMH of dementia, CVA, seizure disorder, HTN, DM type 2 (not on any medications), s/p admission to the hospital in August 2020 with UTI (UCx grew Pseudomonas aeruginosa Carbapenem resistant), urinary retention, s/p lithotripsy and Milner placement, followed by another admission with renal failure, pressure ulcer, Blood culture grew Staphylococcus hominis deemed contaminant.   Here had one time fever, normal WBC, UA in the new milner with only 0-2 WBC, COVID negative   Had suprapubic tenderness on exam, no CVA tenderness, +milner with sediment in it  CXR (I personally reviewed) no pneumonia   CT (I personally reviewed) cystitis and multiple stones   She has had resistant organisms in the past including .   If this is pseudomonas then the ceftriaxone wont have activity   Even if pseudomonas she has had different morphologies with different sensitivity patterns and if this is carbapenem resistant may not have too many options and would potentially need to resort to agents such as (Zerbaxa) ceftolozane/tazobactam.   She is afebrile, normal WBC and had a unimpressive UA once her milner was changed. She is colonized with pseudomonas though has extensive resistance. At this time the risks of antibiotics side effects and developing resistance outweigh the benefits of using ceftriaxone and thus it has been stopped. Once her urine culture is identified with sensitivities, would offer treatment is develops clear signs and/or symptoms of UTI or if she is to undergo a urologic procedure on this admission. Planned for repeat CT and if ureteral stone, urology will plan for removal. If that is the case, then would wait for the most recent urine culture sensitivities and start appropriate therapy.     11/5 Interval History: The pt is awake and alert, in her bed, does not answer any of my questions. The pt is afebrile, no leukocytosis, UCx grew >100K Klebsiella and >100K Enterococcus faecalis, sensitivity as above, blood cultures with no growth, the pt is off antibiotics. UCx growth will be treated if the pt will have scheduled urology procedure for the 6 mm stone in the right ureterovesical junction, no hydronephrosis.   11/6: afebrile, normal WBC, will start Zosyn tomorrow AM and scheduled for OR on Monday 11/9: urologic procedure today, on Augmentin ppx  11/10: s/p procedure, doing well, on augmentin     Plan:  -continue antibiotics   -patient with functional quadriplegia >please ensure offloading and optimizing nutrition to avoid bedsores.   -good but not tight glycemic control given risks of hypoglycemia   -monitor for signs/symptoms of sepsis/fever/chills etc  -if septic, change antibiotics to (Zosyn) Piperacillin/tazobactam 3.375 grams every 8 hours   -if discharging patient, can d/c on Augmentin for 2 more days     Discussed with medicine PA    Halran Phan DO  Cell: [phone]  Pager [phone]  Infectious Disease Attending  After 5pm/weekends please call [phone] for all inquiries and new consults

## 2020-11-11 ENCOUNTER — TRANSCRIPTION ENCOUNTER (OUTPATIENT)
Age: 70
End: 2020-11-11

## 2020-11-11 LAB
ANION GAP SERPL CALC-SCNC: 7 MMOL/L — SIGNIFICANT CHANGE UP (ref 5–17)
BUN SERPL-MCNC: 13 MG/DL — SIGNIFICANT CHANGE UP (ref 7–23)
CALCIUM SERPL-MCNC: 8.9 MG/DL — SIGNIFICANT CHANGE UP (ref 8.5–10.1)
CHLORIDE SERPL-SCNC: 107 MMOL/L — SIGNIFICANT CHANGE UP (ref 96–108)
CO2 SERPL-SCNC: 26 MMOL/L — SIGNIFICANT CHANGE UP (ref 22–31)
CREAT SERPL-MCNC: 0.97 MG/DL — SIGNIFICANT CHANGE UP (ref 0.5–1.3)
CULTURE RESULTS: SIGNIFICANT CHANGE UP
GLUCOSE BLDC GLUCOMTR-MCNC: 157 MG/DL — HIGH (ref 70–99)
GLUCOSE BLDC GLUCOMTR-MCNC: 164 MG/DL — HIGH (ref 70–99)
GLUCOSE BLDC GLUCOMTR-MCNC: 164 MG/DL — HIGH (ref 70–99)
GLUCOSE BLDC GLUCOMTR-MCNC: 175 MG/DL — HIGH (ref 70–99)
GLUCOSE SERPL-MCNC: 151 MG/DL — HIGH (ref 70–99)
HCT VFR BLD CALC: 29.5 % — LOW (ref 34.5–45)
HGB BLD-MCNC: 9.5 G/DL — LOW (ref 11.5–15.5)
MCHC RBC-ENTMCNC: 28.2 PG — SIGNIFICANT CHANGE UP (ref 27–34)
MCHC RBC-ENTMCNC: 32.2 GM/DL — SIGNIFICANT CHANGE UP (ref 32–36)
MCV RBC AUTO: 87.5 FL — SIGNIFICANT CHANGE UP (ref 80–100)
NRBC # BLD: 0 /100 WBCS — SIGNIFICANT CHANGE UP (ref 0–0)
PLATELET # BLD AUTO: 323 K/UL — SIGNIFICANT CHANGE UP (ref 150–400)
POTASSIUM SERPL-MCNC: 3.7 MMOL/L — SIGNIFICANT CHANGE UP (ref 3.5–5.3)
POTASSIUM SERPL-SCNC: 3.7 MMOL/L — SIGNIFICANT CHANGE UP (ref 3.5–5.3)
RBC # BLD: 3.37 M/UL — LOW (ref 3.8–5.2)
RBC # FLD: 13.5 % — SIGNIFICANT CHANGE UP (ref 10.3–14.5)
SODIUM SERPL-SCNC: 140 MMOL/L — SIGNIFICANT CHANGE UP (ref 135–145)
SPECIMEN SOURCE: SIGNIFICANT CHANGE UP
SURGICAL PATHOLOGY STUDY: SIGNIFICANT CHANGE UP
WBC # BLD: 9.01 K/UL — SIGNIFICANT CHANGE UP (ref 3.8–10.5)
WBC # FLD AUTO: 9.01 K/UL — SIGNIFICANT CHANGE UP (ref 3.8–10.5)

## 2020-11-11 PROCEDURE — 99231 SBSQ HOSP IP/OBS SF/LOW 25: CPT

## 2020-11-11 PROCEDURE — 99232 SBSQ HOSP IP/OBS MODERATE 35: CPT

## 2020-11-11 RX ORDER — LEVETIRACETAM 250 MG/1
1 TABLET, FILM COATED ORAL
Qty: 0 | Refills: 0 | DISCHARGE
Start: 2020-11-11

## 2020-11-11 RX ORDER — FLUCONAZOLE 150 MG/1
200 TABLET ORAL
Refills: 0 | Status: COMPLETED | OUTPATIENT
Start: 2020-11-11 | End: 2020-11-13

## 2020-11-11 RX ORDER — HYDRALAZINE HCL 50 MG
1 TABLET ORAL
Qty: 0 | Refills: 0 | DISCHARGE
Start: 2020-11-11

## 2020-11-11 RX ORDER — TAMSULOSIN HYDROCHLORIDE 0.4 MG/1
1 CAPSULE ORAL
Qty: 30 | Refills: 0
Start: 2020-11-11 | End: 2020-12-10

## 2020-11-11 RX ORDER — METOPROLOL TARTRATE 50 MG
1 TABLET ORAL
Qty: 0 | Refills: 0 | DISCHARGE
Start: 2020-11-11

## 2020-11-11 RX ORDER — AMLODIPINE BESYLATE 2.5 MG/1
1 TABLET ORAL
Qty: 0 | Refills: 0 | DISCHARGE
Start: 2020-11-11

## 2020-11-11 RX ADMIN — Medication 0.1 MILLIGRAM(S): at 06:32

## 2020-11-11 RX ADMIN — Medication 100 MILLIGRAM(S): at 13:06

## 2020-11-11 RX ADMIN — Medication 50 MILLIGRAM(S): at 18:07

## 2020-11-11 RX ADMIN — Medication 0.1 MILLIGRAM(S): at 13:06

## 2020-11-11 RX ADMIN — Medication 2: at 12:04

## 2020-11-11 RX ADMIN — SIMVASTATIN 20 MILLIGRAM(S): 20 TABLET, FILM COATED ORAL at 21:58

## 2020-11-11 RX ADMIN — HEPARIN SODIUM 5000 UNIT(S): 5000 INJECTION INTRAVENOUS; SUBCUTANEOUS at 06:33

## 2020-11-11 RX ADMIN — HEPARIN SODIUM 5000 UNIT(S): 5000 INJECTION INTRAVENOUS; SUBCUTANEOUS at 13:06

## 2020-11-11 RX ADMIN — Medication 50 MILLIGRAM(S): at 06:32

## 2020-11-11 RX ADMIN — HEPARIN SODIUM 5000 UNIT(S): 5000 INJECTION INTRAVENOUS; SUBCUTANEOUS at 21:58

## 2020-11-11 RX ADMIN — Medication 2: at 08:21

## 2020-11-11 RX ADMIN — Medication 1 TABLET(S): at 18:07

## 2020-11-11 RX ADMIN — Medication 0.1 MILLIGRAM(S): at 21:58

## 2020-11-11 RX ADMIN — QUETIAPINE FUMARATE 25 MILLIGRAM(S): 200 TABLET, FILM COATED ORAL at 21:58

## 2020-11-11 RX ADMIN — Medication 100 MILLIGRAM(S): at 06:32

## 2020-11-11 RX ADMIN — Medication 2: at 16:19

## 2020-11-11 RX ADMIN — Medication 100 MILLIGRAM(S): at 21:58

## 2020-11-11 RX ADMIN — FLUCONAZOLE 200 MILLIGRAM(S): 150 TABLET ORAL at 22:00

## 2020-11-11 RX ADMIN — TAMSULOSIN HYDROCHLORIDE 0.4 MILLIGRAM(S): 0.4 CAPSULE ORAL at 21:58

## 2020-11-11 RX ADMIN — LEVETIRACETAM 750 MILLIGRAM(S): 250 TABLET, FILM COATED ORAL at 06:32

## 2020-11-11 RX ADMIN — LEVETIRACETAM 750 MILLIGRAM(S): 250 TABLET, FILM COATED ORAL at 18:07

## 2020-11-11 RX ADMIN — Medication 1 TABLET(S): at 06:33

## 2020-11-11 RX ADMIN — AMLODIPINE BESYLATE 10 MILLIGRAM(S): 2.5 TABLET ORAL at 06:32

## 2020-11-11 NOTE — DISCHARGE NOTE PROVIDER - NSDCCPCAREPLAN_GEN_ALL_CORE_FT
Calm
PRINCIPAL DISCHARGE DIAGNOSIS  Diagnosis: UTI (urinary tract infection)  Assessment and Plan of Treatment: diflucan 200mg daily until 11/14, augmentin 875mg twice a day until 11/13      SECONDARY DISCHARGE DIAGNOSES  Diagnosis: Essential hypertension  Assessment and Plan of Treatment: continue home blood pressure medications    Diagnosis: Seizure disorder  Assessment and Plan of Treatment: continue home anti-epileptic medications    Diagnosis: Calculus of ureter  Assessment and Plan of Treatment: s/p b/l stent removal. Follow up with Dr. Trent in office within 1 week of discharge date.    Diagnosis: Type 2 diabetes mellitus with other specified complication, without long-term current use of insulin  Assessment and Plan of Treatment: hga1c 6.9    Diagnosis: Renal colic  Assessment and Plan of Treatment: resolved

## 2020-11-11 NOTE — DISCHARGE NOTE PROVIDER - NSDCMRMEDTOKEN_GEN_ALL_CORE_FT
amLODIPine 10 mg oral tablet: 1 tab(s) orally once a day  aspirin 81 mg oral delayed release tablet: 1 tab(s) orally once a day  cloNIDine 0.1 mg oral tablet: 1 tab(s) orally every 8 hours  hydrALAZINE 100 mg oral tablet: 1 tab(s) orally every 8 hours  levETIRAcetam 750 mg oral tablet: 1 tab(s) orally 2 times a day  metoprolol tartrate 50 mg oral tablet: 1 tab(s) orally 2 times a day  tamsulosin 0.4 mg oral capsule: 1 cap(s) orally once a day (at bedtime)  Zocor 20 mg oral tablet: 1 tab(s) orally once a day (at bedtime)   amLODIPine 10 mg oral tablet: 1 tab(s) orally once a day  amoxicillin-clavulanate 875 mg-125 mg oral tablet: 1 tab(s) orally 2 times a day  aspirin 81 mg oral delayed release tablet: 1 tab(s) orally once a day  cloNIDine 0.1 mg oral tablet: 1 tab(s) orally every 8 hours  fluconazole 200 mg oral tablet: 1 tab(s) orally once a day   hydrALAZINE 100 mg oral tablet: 1 tab(s) orally every 8 hours  levETIRAcetam 750 mg oral tablet: 1 tab(s) orally 2 times a day  metoprolol tartrate 50 mg oral tablet: 1 tab(s) orally 2 times a day  QUEtiapine 25 mg oral tablet: 1 tab(s) orally once a day (at bedtime)  tamsulosin 0.4 mg oral capsule: 1 cap(s) orally once a day (at bedtime)  Zocor 20 mg oral tablet: 1 tab(s) orally once a day (at bedtime)

## 2020-11-11 NOTE — DISCHARGE NOTE PROVIDER - CARE PROVIDER_API CALL
Beverley Trent)  Urology  [address] [place] [address]  Phone: [phone]  Fax: [phone]  Follow Up Time: 1 week    PCP,   Phone: (   )    -  Fax: (   )    -  Follow Up Time: 1 week

## 2020-11-11 NOTE — PROGRESS NOTE ADULT - ASSESSMENT
68 y/o female with PMHx of dementia (minimally verbal per daughter occasional gives 1 word answers of yes/no), CVA, Seizure disorder, HTN, DM type 2 (off meds per daughter), admission 2 months ago for renal colic s/p lithotripsy in 8/24 w/ indwelling milner presents to the ED due to hematuria.           Problem/Plan - 1:  ·  Problem: Acute  cystitis with hematuria and associated with indwelling chronic cathter.  Plan: - CT scan w/ likely cystitis  - urine culture showed klebsiella and enterococcus and fungal   cont augmentin per ID. cont diflucan       Problem/Plan - 2:  ·  Problem: Calculus of ureter.  Plan: - 6mm stone at the UVJ  -S/p cystoscopy and stone removal.   stents  removed   urologist following.     Problem/Plan - 3:  ·  Problem: Seizure disorder.  Plan: - c/w Keppra    Problem/Plan - 4:  ·  Problem: Acute kidney injury. improved.      Problem/Plan - 5:  ·  Problem: Essential hypertension. controlled. cont current meds and monitor.     Problem/Plan - 6:  Problem: Type 2 diabetes mellitus with other specified complication, without long-term current use of insulin. controlled. Plan: - FS q6 w/ SSI.     Agitation. better. cont seroquel.       Problem/Plan - 7:  ·  Problem: DVT prophylaxis.  HSQ     Hypokalemia. Repleted    Pressure injury (stage 2) bilateral buttocks. cont local care  Hypophosphatemia. repleted      Dysphagia. cont dysphagia diet with aspiration precautions as per swallow eval.     PT eval>>>[facility] vs [facility]. COVID PCR recheck.  Called daughter and sister>>> not sure about safe discharge planning yet. Advised to have follow up with care manager and .   Full code

## 2020-11-11 NOTE — DISCHARGE NOTE PROVIDER - PROVIDER TOKENS
PROVIDER:[TOKEN:[[id number]],FOLLOWUP:[1 week]],FREE:[LAST:[PCP],PHONE:[(   )    -],FAX:[(   )    -],FOLLOWUP:[1 week]]

## 2020-11-11 NOTE — PROGRESS NOTE ADULT - ASSESSMENT
69 years old female with PMH of dementia, CVA, seizure disorder, HTN, DM type 2 (not on any medications), s/p admission to the hospital in August 2020 with UTI (UCx grew Pseudomonas aeruginosa Carbapenem resistant), urinary retention, s/p lithotripsy and Milner placement, followed by another admission with renal failure, pressure ulcer, Blood culture grew Staphylococcus hominis deemed contaminant.   Here had one time fever, normal WBC, UA in the new milner with only 0-2 WBC, COVID negative   Had suprapubic tenderness on exam, no CVA tenderness, +milner with sediment in it  CXR (I personally reviewed) no pneumonia   CT (I personally reviewed) cystitis and multiple stones   She has had resistant organisms in the past including .   If this is pseudomonas then the ceftriaxone wont have activity   Even if pseudomonas she has had different morphologies with different sensitivity patterns and if this is carbapenem resistant may not have too many options and would potentially need to resort to agents such as (Zerbaxa) ceftolozane/tazobactam.   She is afebrile, normal WBC and had a unimpressive UA once her milner was changed. She is colonized with pseudomonas though has extensive resistance. At this time the risks of antibiotics side effects and developing resistance outweigh the benefits of using ceftriaxone and thus it has been stopped. Once her urine culture is identified with sensitivities, would offer treatment is develops clear signs and/or symptoms of UTI or if she is to undergo a urologic procedure on this admission. Planned for repeat CT and if ureteral stone, urology will plan for removal. If that is the case, then would wait for the most recent urine culture sensitivities and start appropriate therapy.     11/5 Interval History: The pt is awake and alert, in her bed, does not answer any of my questions. The pt is afebrile, no leukocytosis, UCx grew >100K Klebsiella and >100K Enterococcus faecalis, sensitivity as above, blood cultures with no growth, the pt is off antibiotics. UCx growth will be treated if the pt will have scheduled urology procedure for the 6 mm stone in the right ureterovesical junction, no hydronephrosis.   11/6: afebrile, normal WBC, will start Zosyn tomorrow AM and scheduled for OR on Monday 11/9: urologic procedure today, on Augmentin ppx  11/10: s/p procedure, doing well, on Augmentin   11/11: grew candida albicans in urine, while this is usually not treated given the recent major urologic procedure, its important to treat at this time,    Plan:  -continue fluconazole 200mg daily x3 more doses   -continue Augmentin x 2 more doses  -patient with functional quadriplegia >please ensure offloading and optimizing nutrition to avoid bedsores.   -good but not tight glycemic control given risks of hypoglycemia   -monitor for signs/symptoms of sepsis/fever/chills etc  -if septic, change antibiotics to (Zosyn) Piperacillin/tazobactam 3.375 grams every 8 hours     Discussed with medicine PA    Harlan Phan DO  Cell: [phone]  Pager [phone]  Infectious Disease Attending  After 5pm/weekends please call [phone] for all inquiries and new consults

## 2020-11-11 NOTE — PROGRESS NOTE ADULT - SUBJECTIVE AND OBJECTIVE BOX
FATOU MCBRIDE  MRN-[id number]    Follow Up:  ID following for UTI    Interval History: Doing well, one stent removed the other will be removed outpatient. denied complaints, started on fluconazole due to candida in urine with the recent urologic procedure    ROS:    [ ] Unobtainable because:   [X ] All other systems negative      Allergies  No Known Allergies        ANTIMICROBIALS:    amoxicillin  875 milliGRAM(s)/clavulanate 1 two times a day  fluconAZOLE   Tablet 200 <User Schedule>      MEDICATIONS  (STANDING):  amLODIPine   Tablet 10 daily  cloNIDine 0.1 every 8 hours  dextrose 50% Injectable 12.5 once  dextrose 50% Injectable 25 once  dextrose 50% Injectable 25 once  heparin   Injectable 5000 every 8 hours  hydrALAZINE 100 every 8 hours  insulin lispro (ADMELOG) corrective regimen sliding scale  three times a day before meals  insulin lispro (ADMELOG) corrective regimen sliding scale  at bedtime  levETIRAcetam 750 two times a day  metoprolol tartrate 50 two times a day  QUEtiapine 25 at bedtime  simvastatin 20 at bedtime  tamsulosin 0.4 at bedtime      PRN  acetaminophen   Tablet .. 650 milliGRAM(s) Oral every 6 hours PRN  dextrose 40% Gel 15 Gram(s) Oral once PRN  glucagon  Injectable 1 milliGRAM(s) IntraMuscular once PRN  haloperidol    Injectable 2.5 milliGRAM(s) IntraMuscular every 12 hours PRN  ondansetron Injectable 4 milliGRAM(s) IV Push every 6 hours PRN      Physical Exam:  Vital Signs Last 24 Hrs  T(F): 98.6 (11-11-20 @ 11:20), Max: 98.6 (11-11-20 @ 11:20)  HR: 67 (11-11-20 @ 11:20)  BP: 143/67 (11-11-20 @ 11:20)  RR: 18 (11-11-20 @ 11:20)  SpO2: 97% (11-11-20 @ 11:20) (96% - 99%)        General:    NAD,  non toxic  Head: atraumatic, normocephalic  Eye: normal sclera and conjunctiva  ENT:    no oral lesions, neck supple  Cardio:     regular S1, S2,  no murmur  Respiratory:    clear b/l,    no wheezing  abd:     soft,   BS +,   no tenderness  :   no CVAT,  no suprapubic tenderness,   +milner  Musculoskeletal:   no joint swelling,   no edema  vascular: no central lines, +PIV   Skin:    no rash  Neurologic:     alert and oriented to name  psych: normal affect      LABS:  WBC Count: 9.01 K/uL (11-11 @ 07:38)  WBC Count: 7.85 K/uL (11-10 @ 07:57)  WBC Count: 5.86 K/uL (11-09 @ 06:24)  WBC Count: 7.04 K/uL (11-08 @ 06:21)  WBC Count: 5.84 K/uL (11-07 @ 07:57)  WBC Count: 8.31 K/uL (11-05 @ 07:07)                            9.5    9.01  )-----------( 323      ( 11 Nov 2020 07:38 )             29.5       11-11    140  |  107  |  13  ----------------------------<  151<H>  3.7   |  26  |  0.97    Ca    8.9      11 Nov 2020 07:38        Creatinine Trend: 0.97<--, 1.19<--, 1.18<--, 1.09<--, 0.83<--, 0.80<--      MICROBIOLOGY:  .Urine BLADDER URINE  11-10-20   Rare Candida albicans  Culture in progress  --  --      Culture - Urine (11.02.20 @ 09:16)    -  Cefoxitin: S <=8    -  Ertapenem: S <=0.5    -  Gentamicin: S <=2    -  Imipenem: S <=1    -  Levofloxacin: R >4    -  Levofloxacin: S 1    -  Meropenem: S <=1    -  Nitrofurantoin: I 64 Should not be used to treat pyelonephritis    -  Nitrofurantoin: S <=32 Should not be used to treat pyelonephritis.    -  Piperacillin/Tazobactam: S <=8    -  Tetra/Doxy: R >8    -  Tigecycline: S <=2    -  Tobramycin: S <=2    -  Trimethoprim/Sulfamethoxazole: S 2/38    -  Vancomycin: S 2    -  Amikacin: S <=16    -  Amoxicillin/Clavulanic Acid: S <=8/4    -  Ampicillin: R >16 These ampicillin results predict results for amoxicillin    -  Ampicillin: S <=2 Predicts results to ampicillin/sulbactam, amoxacillin-clavulanate and  piperacillin-tazobactam.    -  Ampicillin/Sulbactam: I 16/8 Enterobacter, Citrobacter, and Serratia may develop resistance during prolonged therapy (3-4 days)    -  Aztreonam: S <=4    -  Cefazolin: S <=2 (MIC_CL_COM_ENTERIC_CEFAZU) For uncomplicated UTI with K. pneumoniae, E. coli, or P. mirablis: MELISA <=16 is sensitive and MELISA >=32 is resistant. This also predicts results for oral agents cefaclor, cefdinir, cefpodoxime, cefprozil, cefuroxime axetil, cephalexin and locarbef for uncomplicated UTI. Note that some isolates may be susceptible to these agents while testing resistant to cefazolin.    -  Cefepime: S <=2    -  Ceftriaxone: S <=1 Enterobacter, Citrobacter, and Serratia may develop resistance during prolonged therapy    -  Ciprofloxacin: R >2    -  Ciprofloxacin: S <=1    Specimen Source: .Urine Clean Catch (Midstream)    Culture Results:   >100,000 CFU/ml Klebsiella pneumoniae  >100,000 CFU/ml Enterococcus faecalis    Organism Identification: Klebsiella pneumoniae  Enterococcus faecalis    Organism: Klebsiella pneumoniae    Organism: Enterococcus faecalis    Method Type: MELISA    Method Type: MELISA      COVID-19 PCR: Bonnie (11-09)      RADIOLOGY:  no new images

## 2020-11-11 NOTE — DISCHARGE NOTE PROVIDER - CARE PROVIDERS DIRECT ADDRESSES
[email or web address]

## 2020-11-11 NOTE — DISCHARGE NOTE PROVIDER - NSDCFUSCHEDAPPT_GEN_ALL_CORE_FT
FATOU MCBRIDE ; 11/18/2020 ; [facility] [address] [facility]er ; 11/18/2020 ; [facility] [address]

## 2020-11-11 NOTE — PROGRESS NOTE ADULT - SUBJECTIVE AND OBJECTIVE BOX
HPI:        70 y/o female with PMHx of dementia (minimally verbal per daughter occasional gives 1 word answers of yes/no), CVA, Seizure disorder, HTN, DM type 2 (off meds per daughter), admission 2 months ago for renal colic s/p lithotripsy in 8/24 w/ indwelling milner presents to the ED due to hematuria. Per daughter at bedside, pt was supposed to go to the Urologist's office for milner removal and TOV on Thursday however unable to arrange a ride. Daughter reports the next day she noticed urine was leaking around milner thus a visiting nurse came to change "part of the tube" and the bag and leakage stopped. Of note in the last few days daughter reports pt has been more sluggish, and sleepy, less alert/responsive. Today she noted hematuria thus brought to the ED. No noted fever or chills.     In ED initial vitals /69, T max 101.3. For sig labs see below. CT Likely cystitis. Correlate with urinalysis.  Numerous stones in the urinary bladder likely related to interval lithotripsy.  There appears to be a 6 mm stone in the right ureterovesical junction without hydronephrosis. Pt given Rocephin.      (01 Nov 2020 23:27)    Patient is a 69y old  Female who presents with a chief complaint of UTI, Obstructive uropathy secondary to calculi (11 Nov 2020 14:25)      INTERVAL HPI/OVERNIGHT EVENTS:no acute events     MEDICATIONS  (STANDING):  amLODIPine   Tablet 10 milliGRAM(s) Oral daily  amoxicillin  875 milliGRAM(s)/clavulanate 1 Tablet(s) Oral two times a day  cloNIDine 0.1 milliGRAM(s) Oral every 8 hours  dextrose 5%. 1000 milliLiter(s) (50 mL/Hr) IV Continuous <Continuous>  dextrose 50% Injectable 12.5 Gram(s) IV Push once  dextrose 50% Injectable 25 Gram(s) IV Push once  dextrose 50% Injectable 25 Gram(s) IV Push once  fluconAZOLE   Tablet 200 milliGRAM(s) Oral <User Schedule>  heparin   Injectable 5000 Unit(s) SubCutaneous every 8 hours  hydrALAZINE 100 milliGRAM(s) Oral every 8 hours  insulin lispro (ADMELOG) corrective regimen sliding scale   SubCutaneous three times a day before meals  insulin lispro (ADMELOG) corrective regimen sliding scale   SubCutaneous at bedtime  levETIRAcetam 750 milliGRAM(s) Oral two times a day  metoprolol tartrate 50 milliGRAM(s) Oral two times a day  QUEtiapine 25 milliGRAM(s) Oral at bedtime  simvastatin 20 milliGRAM(s) Oral at bedtime  tamsulosin 0.4 milliGRAM(s) Oral at bedtime    MEDICATIONS  (PRN):  acetaminophen   Tablet .. 650 milliGRAM(s) Oral every 6 hours PRN Temp greater or equal to 38C (100.4F), Mild Pain (1 - 3)  dextrose 40% Gel 15 Gram(s) Oral once PRN Blood Glucose LESS THAN 70 milliGRAM(s)/deciliter  glucagon  Injectable 1 milliGRAM(s) IntraMuscular once PRN Glucose LESS THAN 70 milligrams/deciliter  haloperidol    Injectable 2.5 milliGRAM(s) IntraMuscular every 12 hours PRN agitation  ondansetron Injectable 4 milliGRAM(s) IV Push every 6 hours PRN Nausea and/or Vomiting      Allergies    No Known Allergies    Intolerances        REVIEW OF SYSTEMS:  unable to provide     Vital Signs Last 24 Hrs  T(C): 37 (11 Nov 2020 11:20), Max: 37 (11 Nov 2020 11:20)  T(F): 98.6 (11 Nov 2020 11:20), Max: 98.6 (11 Nov 2020 11:20)  HR: 70 (11 Nov 2020 13:03) (61 - 70)  BP: 136/68 (11 Nov 2020 13:03) (136/68 - 149/53)  BP(mean): --  RR: 18 (11 Nov 2020 11:20) (18 - 19)  SpO2: 97% (11 Nov 2020 11:20) (96% - 99%)    PHYSICAL EXAM:  GENERAL: NAD,  HEAD:  Atraumatic, Normocephalic  EYES: EOMI, PERRLA, conjunctiva and sclera clear  ENMT: No tonsillar erythema, exudates, or enlargement; Moist mucous membranes, Good dentition, No lesions  NECK: Supple, No JVD, Normal thyroid  NERVOUS SYSTEM:  Awake   CHEST/LUNG: Clear to percussion bilaterally; No rales, rhonchi, wheezing, or rubs  HEART: Regular rate and rhythm; No murmurs, rubs, or gallops  ABDOMEN: Soft, Nontender, Nondistended; Bowel sounds present  EXTREMITIES:  2+ Peripheral Pulses, No clubbing, cyanosis, or edema  LYMPH: No lymphadenopathy noted  SKIN: No rashes or lesions    LABS:                        9.5    9.01  )-----------( 323      ( 11 Nov 2020 07:38 )             29.5     11-11    140  |  107  |  13  ----------------------------<  151<H>  3.7   |  26  |  0.97    Ca    8.9      11 Nov 2020 07:38          CAPILLARY BLOOD GLUCOSE      POCT Blood Glucose.: 164 mg/dL (11 Nov 2020 15:34)  POCT Blood Glucose.: 175 mg/dL (11 Nov 2020 11:14)  POCT Blood Glucose.: 164 mg/dL (11 Nov 2020 07:52)  POCT Blood Glucose.: 141 mg/dL (10 Nov 2020 20:57)  POCT Blood Glucose.: 222 mg/dL (10 Nov 2020 17:08)      RADIOLOGY & ADDITIONAL TESTS:    Imaging Personally Reviewed:  [ X] YES  [ ] NO    Consultant(s) Notes Reviewed:  [X ] YES  [ ] NO    Care Discussed with Consultants/Other Providers [ X] YES  [ ] NO

## 2020-11-11 NOTE — PROGRESS NOTE ADULT - SUBJECTIVE AND OBJECTIVE BOX
Surgery NP note  Patient seen and examined bedside resting comfortably.  No complaints offered. NO Abdominal pain  Denies nausea and vomiting. Tolerating diet.  Normal flatus/BM. Has indwelling milner catheter draining well    T(F): 98.6 (11-11-20 @ 11:20), Max: 98.6 (11-11-20 @ 11:20)  HR: 67 (11-11-20 @ 11:20) (61 - 67)  BP: 143/67 (11-11-20 @ 11:20) (132/50 - 149/53)  RR: 18 (11-11-20 @ 11:20) (18 - 19)  SpO2: 97% (11-11-20 @ 11:20) (96% - 99%)  Wt(kg): --  CAPILLARY BLOOD GLUCOSE      POCT Blood Glucose.: 175 mg/dL (11 Nov 2020 11:14)  POCT Blood Glucose.: 164 mg/dL (11 Nov 2020 07:52)  POCT Blood Glucose.: 141 mg/dL (10 Nov 2020 20:57)  POCT Blood Glucose.: 222 mg/dL (10 Nov 2020 17:08)  POCT Blood Glucose.: 177 mg/dL (10 Nov 2020 15:44)      PHYSICAL EXAM:  General: NAD, WDWN.   Neuro:  Alert & responsive  HEENT: NCAT, EOMI, conjunctiva clear  CV: +S1+S2 regular rate and rhythm  Lung: clear to ausculation bilaterally, respirations nonlabored, good inspiratory effort  Abdomen: soft, Non tender, No Distension. Normal active BS  Extremities: no pedal edema or calf tenderness noted   : Milner indwelling with clear davis urine. No suprapubic tenderness    LABS:                        9.5    9.01  )-----------( 323      ( 11 Nov 2020 07:38 )             29.5     11-11    140  |  107  |  13  ----------------------------<  151<H>  3.7   |  26  |  0.97    Ca    8.9      11 Nov 2020 07:38          I&O's Detail    10 Nov 2020 07:01  -  11 Nov 2020 07:00  --------------------------------------------------------  IN:  Total IN: 0 mL    OUT:    Indwelling Catheter - Urethral (mL): 1300 mL  Total OUT: 1300 mL    Total NET: -1300 mL      69 year old  Female with chronic urinary retention due to neurogenic bladder admitted for hematuria found to have bladder stones with 6 mm UVJ stone without hydro S/P Cystoscopy with bilateral ureteroscopy with insertion of stent, removal of bladder stones POD#2 Bilateral stents removed yesterday Culture Results: Rare Candida albicans    Plan:  - Continue with Milner catheter  - Continue Augmentin, Diflucan per ID  - Continue DVT prophylaxis, OOB, Ambulating, pain control  - F/u labs  - continue current management per medicine.   Will discuss with Dr Culver

## 2020-11-11 NOTE — DISCHARGE NOTE PROVIDER - HOSPITAL COURSE
70 y/o female with PMHx of dementia (minimally verbal per daughter occasional gives 1 word answers of yes/no), CVA, Seizure disorder, HTN, DM type 2 (off meds per daughter), admission 2 months ago for renal colic s/p lithotripsy in 8/24 w/ indwelling milner presents to the ED due to hematuria. Patient was admitted with acute cystitis with hematuria secondary to chronic milner. CT scan also consistent with cystitis as well as UVJ stone. Patient started on Iv antibiotics, transitioned to oral antibiotics. Ucx initially showed klebsiella and enterococcus, repeat showing rare candida aurus species. ID consulted. Patient will continue augmentin until 11/13 and diflucan until 11/14. Urology consulted, b/l ureteral stents removed at bedside. AMADOU resolved. Hospital stay notable for agitation, which seroquel was started nightly. Mild troponin leak during initial hospital course of which cardiology was consulted. Speech and swallow consulted for dysphagia. Patient hemodynamically stable for discharge.     [facility] VS [facility]  DIABETIC MEDICATION TO BE DC'ED WITH________ 70 y/o female with PMHx of dementia (minimally verbal per daughter occasional gives 1 word answers of yes/no), CVA, Seizure disorder, HTN, DM type 2 (off meds per daughter), admission 2 months ago for renal colic s/p lithotripsy in 8/24 w/ indwelling milner presents to the ED due to hematuria. Patient was admitted with acute cystitis with hematuria secondary to chronic milner. CT scan also consistent with cystitis as well as UVJ stone. Patient started on Iv antibiotics, transitioned to oral antibiotics. Ucx initially showed klebsiella and enterococcus, repeat showing rare candida aurus species. ID consulted. Patient will continue augmentin until 11/13 and diflucan until 11/14. Urology consulted, b/l ureteral stents removed at bedside. AMADOU resolved. Hospital stay notable for agitation, which seroquel was started nightly. Mild troponin leak during initial hospital course of which cardiology was consulted. Speech and swallow consulted for dysphagia. Patient hemodynamically stable for discharge.

## 2020-11-12 ENCOUNTER — TRANSCRIPTION ENCOUNTER (OUTPATIENT)
Age: 70
End: 2020-11-12

## 2020-11-12 LAB
ANION GAP SERPL CALC-SCNC: 8 MMOL/L — SIGNIFICANT CHANGE UP (ref 5–17)
BUN SERPL-MCNC: 16 MG/DL — SIGNIFICANT CHANGE UP (ref 7–23)
CALCIUM SERPL-MCNC: 8.5 MG/DL — SIGNIFICANT CHANGE UP (ref 8.5–10.1)
CHLORIDE SERPL-SCNC: 107 MMOL/L — SIGNIFICANT CHANGE UP (ref 96–108)
CO2 SERPL-SCNC: 26 MMOL/L — SIGNIFICANT CHANGE UP (ref 22–31)
CREAT SERPL-MCNC: 1.02 MG/DL — SIGNIFICANT CHANGE UP (ref 0.5–1.3)
GLUCOSE BLDC GLUCOMTR-MCNC: 118 MG/DL — HIGH (ref 70–99)
GLUCOSE BLDC GLUCOMTR-MCNC: 153 MG/DL — HIGH (ref 70–99)
GLUCOSE BLDC GLUCOMTR-MCNC: 162 MG/DL — HIGH (ref 70–99)
GLUCOSE BLDC GLUCOMTR-MCNC: 169 MG/DL — HIGH (ref 70–99)
GLUCOSE BLDC GLUCOMTR-MCNC: 209 MG/DL — HIGH (ref 70–99)
GLUCOSE SERPL-MCNC: 149 MG/DL — HIGH (ref 70–99)
HCT VFR BLD CALC: 30 % — LOW (ref 34.5–45)
HGB BLD-MCNC: 9.3 G/DL — LOW (ref 11.5–15.5)
MAGNESIUM SERPL-MCNC: 2.3 MG/DL — SIGNIFICANT CHANGE UP (ref 1.6–2.6)
MCHC RBC-ENTMCNC: 27.4 PG — SIGNIFICANT CHANGE UP (ref 27–34)
MCHC RBC-ENTMCNC: 31 GM/DL — LOW (ref 32–36)
MCV RBC AUTO: 88.2 FL — SIGNIFICANT CHANGE UP (ref 80–100)
NRBC # BLD: 0 /100 WBCS — SIGNIFICANT CHANGE UP (ref 0–0)
PHOSPHATE SERPL-MCNC: 3.3 MG/DL — SIGNIFICANT CHANGE UP (ref 2.5–4.5)
PLATELET # BLD AUTO: 308 K/UL — SIGNIFICANT CHANGE UP (ref 150–400)
POTASSIUM SERPL-MCNC: 3.8 MMOL/L — SIGNIFICANT CHANGE UP (ref 3.5–5.3)
POTASSIUM SERPL-SCNC: 3.8 MMOL/L — SIGNIFICANT CHANGE UP (ref 3.5–5.3)
RBC # BLD: 3.4 M/UL — LOW (ref 3.8–5.2)
RBC # FLD: 13.7 % — SIGNIFICANT CHANGE UP (ref 10.3–14.5)
SODIUM SERPL-SCNC: 141 MMOL/L — SIGNIFICANT CHANGE UP (ref 135–145)
WBC # BLD: 7.45 K/UL — SIGNIFICANT CHANGE UP (ref 3.8–10.5)
WBC # FLD AUTO: 7.45 K/UL — SIGNIFICANT CHANGE UP (ref 3.8–10.5)

## 2020-11-12 PROCEDURE — 99232 SBSQ HOSP IP/OBS MODERATE 35: CPT

## 2020-11-12 RX ORDER — QUETIAPINE FUMARATE 200 MG/1
1 TABLET, FILM COATED ORAL
Qty: 30 | Refills: 0
Start: 2020-11-12 | End: 2020-12-11

## 2020-11-12 RX ORDER — FLUCONAZOLE 150 MG/1
1 TABLET ORAL
Qty: 2 | Refills: 0
Start: 2020-11-12 | End: 2020-11-13

## 2020-11-12 RX ADMIN — Medication 0.1 MILLIGRAM(S): at 21:53

## 2020-11-12 RX ADMIN — Medication 0.1 MILLIGRAM(S): at 05:07

## 2020-11-12 RX ADMIN — Medication 50 MILLIGRAM(S): at 05:07

## 2020-11-12 RX ADMIN — SIMVASTATIN 20 MILLIGRAM(S): 20 TABLET, FILM COATED ORAL at 21:53

## 2020-11-12 RX ADMIN — FLUCONAZOLE 200 MILLIGRAM(S): 150 TABLET ORAL at 23:41

## 2020-11-12 RX ADMIN — HALOPERIDOL DECANOATE 2.5 MILLIGRAM(S): 100 INJECTION INTRAMUSCULAR at 05:21

## 2020-11-12 RX ADMIN — Medication 100 MILLIGRAM(S): at 14:44

## 2020-11-12 RX ADMIN — Medication 2: at 18:33

## 2020-11-12 RX ADMIN — AMLODIPINE BESYLATE 10 MILLIGRAM(S): 2.5 TABLET ORAL at 05:08

## 2020-11-12 RX ADMIN — HEPARIN SODIUM 5000 UNIT(S): 5000 INJECTION INTRAVENOUS; SUBCUTANEOUS at 14:44

## 2020-11-12 RX ADMIN — Medication 50 MILLIGRAM(S): at 18:33

## 2020-11-12 RX ADMIN — HEPARIN SODIUM 5000 UNIT(S): 5000 INJECTION INTRAVENOUS; SUBCUTANEOUS at 21:53

## 2020-11-12 RX ADMIN — Medication 1 TABLET(S): at 05:07

## 2020-11-12 RX ADMIN — LEVETIRACETAM 750 MILLIGRAM(S): 250 TABLET, FILM COATED ORAL at 05:07

## 2020-11-12 RX ADMIN — Medication 1 TABLET(S): at 18:33

## 2020-11-12 RX ADMIN — Medication 2: at 08:04

## 2020-11-12 RX ADMIN — Medication 100 MILLIGRAM(S): at 05:07

## 2020-11-12 RX ADMIN — Medication 100 MILLIGRAM(S): at 21:53

## 2020-11-12 RX ADMIN — HEPARIN SODIUM 5000 UNIT(S): 5000 INJECTION INTRAVENOUS; SUBCUTANEOUS at 05:08

## 2020-11-12 RX ADMIN — QUETIAPINE FUMARATE 25 MILLIGRAM(S): 200 TABLET, FILM COATED ORAL at 21:53

## 2020-11-12 RX ADMIN — LEVETIRACETAM 750 MILLIGRAM(S): 250 TABLET, FILM COATED ORAL at 18:34

## 2020-11-12 RX ADMIN — TAMSULOSIN HYDROCHLORIDE 0.4 MILLIGRAM(S): 0.4 CAPSULE ORAL at 21:53

## 2020-11-12 NOTE — CHART NOTE - NSCHARTNOTEFT_GEN_A_CORE
Assessment: Pt seen for and remains with malnutrition. Pt admitted with UTI s/p bladder stone removal & stent removal (11-10). Pt c T2DM, AMADOU, HTN, seizure disorder, asthma, OA. Pt previously with fair to poor PO intake. Pt PO intake improved today; observed pt consuming 100% of lunch, and self-feeding. Spoke with nursing staff, no N/V/D/C reported.     Factors impacting intake: [x ] none [ ] nausea  [ ] vomiting [ ] diarrhea [ ] constipation  [ ]chewing problems [ ] swallowing issues  [ ] other:     Diet Prescription: Diet, Pureed:   Consistent Carbohydrate {No Snacks}  Supplement Feeding Modality:  Oral  Glucerna Shake Cans or Servings Per Day:  1       Frequency:  Two Times a day (11-09-20 @ 20:52)    Intake: on average 50%     Current Weight: 57.4 (11-)  ;  59.9 (11-)   % Weight Change: unable to accurately assess, possible changes in fluid accumulation noted.     Physical appearance: BMI 19.6, no edema noted; Nutrition focused physical exam conducted:  Subcutaneous fat loss: [ severe ] Orbital fat pads region, [ unable]Buccal fat region, [ severe ]Triceps region,  [severe  ]Ribs region.  Muscle wasting: [ severe ]Temples region, [severe ]Clavicle region, [ moderate ]Shoulder region, [severe ]Scapula region, [ moderate ]Interosseous region,  [unable  ]thigh region, [severe  ]Calf region      Pertinent Medications: MEDICATIONS  (STANDING):  amLODIPine   Tablet 10 milliGRAM(s) Oral daily  amoxicillin  875 milliGRAM(s)/clavulanate 1 Tablet(s) Oral two times a day  cloNIDine 0.1 milliGRAM(s) Oral every 8 hours  dextrose 5%. 1000 milliLiter(s) (50 mL/Hr) IV Continuous <Continuous>  dextrose 50% Injectable 12.5 Gram(s) IV Push once  dextrose 50% Injectable 25 Gram(s) IV Push once  dextrose 50% Injectable 25 Gram(s) IV Push once  fluconAZOLE   Tablet 200 milliGRAM(s) Oral <User Schedule>  heparin   Injectable 5000 Unit(s) SubCutaneous every 8 hours  hydrALAZINE 100 milliGRAM(s) Oral every 8 hours  insulin lispro (ADMELOG) corrective regimen sliding scale   SubCutaneous three times a day before meals  insulin lispro (ADMELOG) corrective regimen sliding scale   SubCutaneous at bedtime  levETIRAcetam 750 milliGRAM(s) Oral two times a day  metoprolol tartrate 50 milliGRAM(s) Oral two times a day  QUEtiapine 25 milliGRAM(s) Oral at bedtime  simvastatin 20 milliGRAM(s) Oral at bedtime  tamsulosin 0.4 milliGRAM(s) Oral at bedtime    MEDICATIONS  (PRN):  acetaminophen   Tablet .. 650 milliGRAM(s) Oral every 6 hours PRN Temp greater or equal to 38C (100.4F), Mild Pain (1 - 3)  dextrose 40% Gel 15 Gram(s) Oral once PRN Blood Glucose LESS THAN 70 milliGRAM(s)/deciliter  glucagon  Injectable 1 milliGRAM(s) IntraMuscular once PRN Glucose LESS THAN 70 milligrams/deciliter  haloperidol    Injectable 2.5 milliGRAM(s) IntraMuscular every 12 hours PRN agitation  ondansetron Injectable 4 milliGRAM(s) IV Push every 6 hours PRN Nausea and/or Vomiting    Pertinent Labs: 11-12 Na 141 mmol/L Glu 149 mg/dL<H> K+ 3.8 mmol/L Cr 1.02 mg/dL BUN 16 mg/dL Phos 3.3 mg/dL Alb n/a   PAB n/a   Hgb 9.3 g/dL<L> Hct 30.0 %<L> HgA1C n/a    Glucose, Serum: 149 mg/dL <H>   24Hr FS:118 mg/dL  153 mg/dL  157 mg/dL  164 mg/dL    Skin: R buttocks Stage II, L buttocks Stage II     Estimated Needs:   [ x] no change since previous assessment (11-)  [ ] recalculated:     Previous Nutrition Diagnosis:     Nutrition Diagnostic Terminology #1 Malnutrition...   Malnutrition Severe malnutrition in the context of acute illness.   Etiology Inadequate protein/energy intake and increased needs in setting of multiple recent hospital admissions, UTIs, pressure injuries.   Signs/Symptoms Physical findings of severe muscle wasting and fat loss; >7.5 weight loss x 3 months.     Goal/Expected Outcome Pt to consume >75% meals/supplements during LOS. (not consistently met, but improving)     Nutrition Diagnosis is [ x] ongoing  [ ] resolved  [x ] improved  [ ] not applicable   Previous Goal:     New Nutrition Diagnosis: [x ] not applicable       Interventions:   Recommend  [x ] Continue: Current diet Rx   [ ] Change Diet To:  [ ] Nutrition Supplement:  [ ] Nutrition Support:  [ ] Other:     Monitoring and Evaluation:   [ x] PO intake [ x ] Tolerance to diet prescription [ x ] weights [ x ] labs[ x ] follow up per protocol  [ ] other:

## 2020-11-12 NOTE — PROGRESS NOTE ADULT - SUBJECTIVE AND OBJECTIVE BOX
HPI:        70 y/o female with PMHx of dementia (minimally verbal per daughter occasional gives 1 word answers of yes/no), CVA, Seizure disorder, HTN, DM type 2 (off meds per daughter), admission 2 months ago for renal colic s/p lithotripsy in 8/24 w/ indwelling milner presents to the ED due to hematuria. Per daughter at bedside, pt was supposed to go to the Urologist's office for milner removal and TOV on Thursday however unable to arrange a ride. Daughter reports the next day she noticed urine was leaking around milner thus a visiting nurse came to change "part of the tube" and the bag and leakage stopped. Of note in the last few days daughter reports pt has been more sluggish, and sleepy, less alert/responsive. Today she noted hematuria thus brought to the ED. No noted fever or chills.     In ED initial vitals /69, T max 101.3. For sig labs see below. CT Likely cystitis. Correlate with urinalysis.  Numerous stones in the urinary bladder likely related to interval lithotripsy.  There appears to be a 6 mm stone in the right ureterovesical junction without hydronephrosis. Pt given Rocephin.      (01 Nov 2020 23:27)    Patient is a 69y old  Female who presents with a chief complaint of UTI, Obstructive uropathy secondary to calculi (11 Nov 2020 14:25)      INTERVAL HPI/OVERNIGHT EVENTS:no acute events     MEDICATIONS  (STANDING):  amLODIPine   Tablet 10 milliGRAM(s) Oral daily  amoxicillin  875 milliGRAM(s)/clavulanate 1 Tablet(s) Oral two times a day  cloNIDine 0.1 milliGRAM(s) Oral every 8 hours  dextrose 5%. 1000 milliLiter(s) (50 mL/Hr) IV Continuous <Continuous>  dextrose 50% Injectable 12.5 Gram(s) IV Push once  dextrose 50% Injectable 25 Gram(s) IV Push once  dextrose 50% Injectable 25 Gram(s) IV Push once  fluconAZOLE   Tablet 200 milliGRAM(s) Oral <User Schedule>  heparin   Injectable 5000 Unit(s) SubCutaneous every 8 hours  hydrALAZINE 100 milliGRAM(s) Oral every 8 hours  insulin lispro (ADMELOG) corrective regimen sliding scale   SubCutaneous three times a day before meals  insulin lispro (ADMELOG) corrective regimen sliding scale   SubCutaneous at bedtime  levETIRAcetam 750 milliGRAM(s) Oral two times a day  metoprolol tartrate 50 milliGRAM(s) Oral two times a day  QUEtiapine 25 milliGRAM(s) Oral at bedtime  simvastatin 20 milliGRAM(s) Oral at bedtime  tamsulosin 0.4 milliGRAM(s) Oral at bedtime    MEDICATIONS  (PRN):  acetaminophen   Tablet .. 650 milliGRAM(s) Oral every 6 hours PRN Temp greater or equal to 38C (100.4F), Mild Pain (1 - 3)  dextrose 40% Gel 15 Gram(s) Oral once PRN Blood Glucose LESS THAN 70 milliGRAM(s)/deciliter  glucagon  Injectable 1 milliGRAM(s) IntraMuscular once PRN Glucose LESS THAN 70 milligrams/deciliter  haloperidol    Injectable 2.5 milliGRAM(s) IntraMuscular every 12 hours PRN agitation  ondansetron Injectable 4 milliGRAM(s) IV Push every 6 hours PRN Nausea and/or Vomiting      Allergies    No Known Allergies    Intolerances        REVIEW OF SYSTEMS:  unable to provide     Vital Signs Last 24 Hrs  T(C): 37.1 (12 Nov 2020 11:55), Max: 37.1 (12 Nov 2020 11:55)  T(F): 98.7 (12 Nov 2020 11:55), Max: 98.7 (12 Nov 2020 11:55)  HR: 61 (12 Nov 2020 11:55) (54 - 61)  BP: 136/77 (12 Nov 2020 11:55) (136/77 - 157/54)  BP(mean): --  RR: 18 (12 Nov 2020 11:55) (17 - 18)  SpO2: 97% (12 Nov 2020 11:55) (96% - 100%)    PHYSICAL EXAM:  GENERAL: NAD,  HEAD:  Atraumatic, Normocephalic  EYES: EOMI, PERRLA, conjunctiva and sclera clear  ENMT: No tonsillar erythema, exudates, or enlargement; Moist mucous membranes, Good dentition, No lesions  NECK: Supple, No JVD, Normal thyroid  NERVOUS SYSTEM:  Awake   CHEST/LUNG: Clear to percussion bilaterally; No rales, rhonchi, wheezing, or rubs  HEART: Regular rate and rhythm; No murmurs, rubs, or gallops  ABDOMEN: Soft, Nontender, Nondistended; Bowel sounds present  EXTREMITIES:  2+ Peripheral Pulses, No clubbing, cyanosis, or edema  LYMPH: No lymphadenopathy noted  SKIN: decubs present on admission     LABS:                        9.5    9.01  )-----------( 323      ( 11 Nov 2020 07:38 )             29.5     11-11    140  |  107  |  13  ----------------------------<  151<H>  3.7   |  26  |  0.97    Ca    8.9      11 Nov 2020 07:38          CAPILLARY BLOOD GLUCOSE      POCT Blood Glucose.: 164 mg/dL (11 Nov 2020 15:34)  POCT Blood Glucose.: 175 mg/dL (11 Nov 2020 11:14)  POCT Blood Glucose.: 164 mg/dL (11 Nov 2020 07:52)  POCT Blood Glucose.: 141 mg/dL (10 Nov 2020 20:57)  POCT Blood Glucose.: 222 mg/dL (10 Nov 2020 17:08)      RADIOLOGY & ADDITIONAL TESTS:    Imaging Personally Reviewed:  [ X] YES  [ ] NO    Consultant(s) Notes Reviewed:  [X ] YES  [ ] NO    Care Discussed with Consultants/Other Providers [ X] YES  [ ] NO

## 2020-11-12 NOTE — PROGRESS NOTE ADULT - ASSESSMENT
69 year old  Female with chronic urinary retention due to neurogenic bladder admitted for hematuria found to have bladder stones with 6 mm UVJ stone without hydro S/P Cystoscopy with bilateral ureteroscopy with insertion of stent, removal of bladder stones POD#2 Bilateral stents removed 11/10 Culture Results: Rare Candida albicans    Plan:  - Continue with Mayberry catheter  - Continue Augmentin, Diflucan per ID  - Continue DVT prophylaxis, OOB, Ambulating, pain control  - F/u labs  - continue current management per medicine.

## 2020-11-12 NOTE — PROGRESS NOTE ADULT - SUBJECTIVE AND OBJECTIVE BOX
Patient seen and examined at bedside resting comfortably. Denies pain, nasuea/vomiting. Tolerating diet.    Vital Signs Last 24 Hrs  T(F): 98.4 (11-12-20 @ 05:51), Max: 98.6 (11-11-20 @ 11:20)  HR: 59 (11-12-20 @ 05:51)  BP: 157/54 (11-12-20 @ 05:51)  RR: 18 (11-12-20 @ 05:51)  SpO2: 99% (11-12-20 @ 05:51)    CAPILLARY BLOOD GLUCOSE      POCT Blood Glucose.: 153 mg/dL (12 Nov 2020 07:56)    PHYSICAL EXAM:  General: NAD, WDWN.   Neuro:  Alert & responsive  HEENT: NCAT, EOMI, conjunctiva clear  CV: +S1+S2 regular rate and rhythm  Lung: clear to ausculation bilaterally, respirations nonlabored, good inspiratory effort  Abdomen: soft, Non tender, No Distension. Normal active BS  Extremities: no pedal edema or calf tenderness noted   : Mayberry indwelling with clear urine. No suprapubic tenderness      LABS:                        9.3    7.45  )-----------( 308      ( 12 Nov 2020 07:19 )             30.0     11-12    141  |  107  |  16  ----------------------------<  149<H>  3.8   |  26  |  1.02    Ca    8.5      12 Nov 2020 07:19  Phos  3.3     11-12  Mg     2.3     11-12

## 2020-11-12 NOTE — PROGRESS NOTE ADULT - ATTENDING COMMENTS
non communicative. I could not illicit pain.  AVSS  WBC on admit 10.7 w no left shift (74%)  Cr 1.39, but dry w high sodium  Blood cx x2 neg  urine w 100 gnr  AVSS w now normal WBC  Cr 0.72  CT w no hydro but right distal/uvj stone, and many phleboliths; also multiple fragmented pieces of stone or dust in left renal pelvis    Imp: non toxic w UTI, all parameters improved  Plan: keep flomax  f/u cx (keep rocephin for now)  Repeat CT 48 hrs to assess for passage.
Saw pt at bedside.  AVSS  Cr 1.19  On Augmentin 875 mg bid    Tried to gently remove left stent on string and both came out.  recheck Cr in am   monitor clinically
as above  creat stable  stents are out    f/u one month for renal sono  K Citrate-has script/meds at home
agree with abov  to f/u 1 month for renal u/s
per above  spoke with pt daughter at length  family agrees to procedure  will have right uscope/stone extract/laser litho and prob left uscope to clear large volume of dust and fragment any residual stone  will also clear debris from bladder-milner catheter makes it diffiuclt to clear bladder debris  reviewed with daughter -rationale, risk alternative reviewed   all questions answered

## 2020-11-12 NOTE — PROGRESS NOTE ADULT - ASSESSMENT
70 y/o female with PMHx of dementia (minimally verbal per daughter occasional gives 1 word answers of yes/no), CVA, Seizure disorder, HTN, DM type 2 (off meds per daughter), admission 2 months ago for renal colic s/p lithotripsy in 8/24 w/ indwelling milner presents to the ED due to hematuria.           Problem/Plan - 1:  ·  Problem: Acute  cystitis with hematuria and associated with indwelling chronic cathter.  Plan: - CT scan w/ likely cystitis  - urine culture showed klebsiella and enterococcus and fungal   cont augmentin per ID. cont diflucan       Problem/Plan - 2:  ·  Problem: Calculus of ureter.  Plan: - 6mm stone at the UVJ  -S/p cystoscopy and stone removal.   stents  removed   urologist following.     Problem/Plan - 3:  ·  Problem: Seizure disorder.  Plan: - c/w Keppra    Problem/Plan - 4:  ·  Problem: Acute kidney injury. improved.      Problem/Plan - 5:  ·  Problem: Essential hypertension. controlled. cont current meds and monitor.     Problem/Plan - 6:  Problem: Type 2 diabetes mellitus with other specified complication, without long-term current use of insulin. controlled. Plan: - FS q6 w/ SSI.     Agitation. better. cont seroquel.       Problem/Plan - 7:  ·  Problem: DVT prophylaxis.  HSQ     Hypokalemia. Repleted    Pressure injury (stage 2) bilateral buttocks. cont local care  Hypophosphatemia. repleted      Dysphagia. cont dysphagia diet with aspiration precautions as per swallow eval.    Plan for discharge home on Saturday

## 2020-11-12 NOTE — DISCHARGE NOTE NURSING/CASE MANAGEMENT/SOCIAL WORK - PATIENT PORTAL LINK FT
You can access the FollowMyHealth Patient Portal offered by [facility] by registering at the following website: [email or web address]. By joining [facility]’s FollowMyHealth portal, you will also be able to view your health information using other applications (apps) compatible with our system.

## 2020-11-13 ENCOUNTER — APPOINTMENT (OUTPATIENT)
Dept: UROLOGY | Facility: CLINIC | Age: 70
End: 2020-11-13

## 2020-11-13 LAB
GLUCOSE BLDC GLUCOMTR-MCNC: 115 MG/DL — HIGH (ref 70–99)
GLUCOSE BLDC GLUCOMTR-MCNC: 134 MG/DL — HIGH (ref 70–99)
GLUCOSE BLDC GLUCOMTR-MCNC: 157 MG/DL — HIGH (ref 70–99)
GLUCOSE BLDC GLUCOMTR-MCNC: 197 MG/DL — HIGH (ref 70–99)
GLUCOSE BLDC GLUCOMTR-MCNC: 208 MG/DL — HIGH (ref 70–99)
GLUCOSE BLDC GLUCOMTR-MCNC: 247 MG/DL — HIGH (ref 70–99)
NIDUS STONE QN: SIGNIFICANT CHANGE UP

## 2020-11-13 PROCEDURE — 99232 SBSQ HOSP IP/OBS MODERATE 35: CPT

## 2020-11-13 RX ADMIN — Medication 2: at 18:24

## 2020-11-13 RX ADMIN — AMLODIPINE BESYLATE 10 MILLIGRAM(S): 2.5 TABLET ORAL at 05:48

## 2020-11-13 RX ADMIN — Medication 0.1 MILLIGRAM(S): at 14:41

## 2020-11-13 RX ADMIN — LEVETIRACETAM 750 MILLIGRAM(S): 250 TABLET, FILM COATED ORAL at 18:28

## 2020-11-13 RX ADMIN — Medication 4: at 11:44

## 2020-11-13 RX ADMIN — Medication 100 MILLIGRAM(S): at 05:48

## 2020-11-13 RX ADMIN — HEPARIN SODIUM 5000 UNIT(S): 5000 INJECTION INTRAVENOUS; SUBCUTANEOUS at 21:45

## 2020-11-13 RX ADMIN — LEVETIRACETAM 750 MILLIGRAM(S): 250 TABLET, FILM COATED ORAL at 05:47

## 2020-11-13 RX ADMIN — HEPARIN SODIUM 5000 UNIT(S): 5000 INJECTION INTRAVENOUS; SUBCUTANEOUS at 05:47

## 2020-11-13 RX ADMIN — Medication 100 MILLIGRAM(S): at 14:41

## 2020-11-13 RX ADMIN — Medication 1 TABLET(S): at 05:47

## 2020-11-13 RX ADMIN — Medication 1 TABLET(S): at 18:28

## 2020-11-13 RX ADMIN — FLUCONAZOLE 200 MILLIGRAM(S): 150 TABLET ORAL at 23:49

## 2020-11-13 RX ADMIN — QUETIAPINE FUMARATE 25 MILLIGRAM(S): 200 TABLET, FILM COATED ORAL at 21:45

## 2020-11-13 RX ADMIN — Medication 0.1 MILLIGRAM(S): at 21:44

## 2020-11-13 RX ADMIN — Medication 100 MILLIGRAM(S): at 21:45

## 2020-11-13 RX ADMIN — Medication 50 MILLIGRAM(S): at 05:48

## 2020-11-13 RX ADMIN — TAMSULOSIN HYDROCHLORIDE 0.4 MILLIGRAM(S): 0.4 CAPSULE ORAL at 21:45

## 2020-11-13 RX ADMIN — HEPARIN SODIUM 5000 UNIT(S): 5000 INJECTION INTRAVENOUS; SUBCUTANEOUS at 14:41

## 2020-11-13 RX ADMIN — SIMVASTATIN 20 MILLIGRAM(S): 20 TABLET, FILM COATED ORAL at 21:45

## 2020-11-13 RX ADMIN — Medication 4: at 08:19

## 2020-11-13 RX ADMIN — Medication 0.1 MILLIGRAM(S): at 05:47

## 2020-11-13 RX ADMIN — Medication 50 MILLIGRAM(S): at 18:28

## 2020-11-13 NOTE — PROGRESS NOTE ADULT - NUTRITIONAL ASSESSMENT
This patient has been assessed with a concern for Malnutrition and has been determined to have a diagnosis/diagnoses of Severe protein-calorie malnutrition.    This patient is being managed with:   Diet Pureed-  Consistent Carbohydrate {No Snacks}  Supplement Feeding Modality:  Oral  Glucerna Shake Cans or Servings Per Day:  1       Frequency:  Two Times a day  Entered: Nov 4 2020 11:53AM    
This patient has been assessed with a concern for Malnutrition and has been determined to have a diagnosis/diagnoses of Severe protein-calorie malnutrition.    This patient is being managed with:   Diet Pureed-  Consistent Carbohydrate {No Snacks}  Supplement Feeding Modality:  Oral  Glucerna Shake Cans or Servings Per Day:  1       Frequency:  Two Times a day  Entered: Nov 4 2020 11:53AM    
This patient has been assessed with a concern for Malnutrition and has been determined to have a diagnosis/diagnoses of Severe protein-calorie malnutrition.    This patient is being managed with:   Diet Pureed-  Consistent Carbohydrate {No Snacks}  Supplement Feeding Modality:  Oral  Glucerna Shake Cans or Servings Per Day:  1       Frequency:  Two Times a day  Entered: Nov 9 2020  8:52PM    
This patient has been assessed with a concern for Malnutrition and has been determined to have a diagnosis/diagnoses of Severe protein-calorie malnutrition.    This patient is being managed with:   Diet Pureed-  Consistent Carbohydrate {No Snacks}  Supplement Feeding Modality:  Oral  Glucerna Shake Cans or Servings Per Day:  1       Frequency:  Two Times a day  Entered: Nov 4 2020 11:53AM    
This patient has been assessed with a concern for Malnutrition and has been determined to have a diagnosis/diagnoses of Severe protein-calorie malnutrition.    This patient is being managed with:   Diet Pureed-  Consistent Carbohydrate {No Snacks}  Supplement Feeding Modality:  Oral  Glucerna Shake Cans or Servings Per Day:  1       Frequency:  Two Times a day  Entered: Nov 9 2020  8:52PM    
This patient has been assessed with a concern for Malnutrition and has been determined to have a diagnosis/diagnoses of Severe protein-calorie malnutrition.    This patient is being managed with:   Diet Pureed-  Consistent Carbohydrate {No Snacks}  Supplement Feeding Modality:  Oral  Glucerna Shake Cans or Servings Per Day:  1       Frequency:  Two Times a day  Entered: Nov 4 2020 11:53AM    
This patient has been assessed with a concern for Malnutrition and has been determined to have a diagnosis/diagnoses of Severe protein-calorie malnutrition.    This patient is being managed with:   Diet Pureed-  Consistent Carbohydrate {No Snacks}  Supplement Feeding Modality:  Oral  Glucerna Shake Cans or Servings Per Day:  1       Frequency:  Two Times a day  Entered: Nov 4 2020 11:53AM    
This patient has been assessed with a concern for Malnutrition and has been determined to have a diagnosis/diagnoses of Severe protein-calorie malnutrition.    This patient is being managed with:   Diet Pureed-  Consistent Carbohydrate {No Snacks}  Supplement Feeding Modality:  Oral  Glucerna Shake Cans or Servings Per Day:  1       Frequency:  Two Times a day  Entered: Nov 9 2020  8:52PM    
This patient has been assessed with a concern for Malnutrition and has been determined to have a diagnosis/diagnoses of Severe protein-calorie malnutrition.    This patient is being managed with:   Diet Pureed-  Consistent Carbohydrate {No Snacks}  Supplement Feeding Modality:  Oral  Glucerna Shake Cans or Servings Per Day:  1       Frequency:  Two Times a day  Entered: Nov 9 2020  8:52PM

## 2020-11-13 NOTE — PROGRESS NOTE ADULT - SUBJECTIVE AND OBJECTIVE BOX
HPI:        70 y/o female with PMHx of dementia (minimally verbal per daughter occasional gives 1 word answers of yes/no), CVA, Seizure disorder, HTN, DM type 2 (off meds per daughter), admission 2 months ago for renal colic s/p lithotripsy in 8/24 w/ indwelling milner presents to the ED due to hematuria. Per daughter at bedside, pt was supposed to go to the Urologist's office for milner removal and TOV on Thursday however unable to arrange a ride. Daughter reports the next day she noticed urine was leaking around milner thus a visiting nurse came to change "part of the tube" and the bag and leakage stopped. Of note in the last few days daughter reports pt has been more sluggish, and sleepy, less alert/responsive. Today she noted hematuria thus brought to the ED. No noted fever or chills.     In ED initial vitals /69, T max 101.3. For sig labs see below. CT Likely cystitis. Correlate with urinalysis.  Numerous stones in the urinary bladder likely related to interval lithotripsy.  There appears to be a 6 mm stone in the right ureterovesical junction without hydronephrosis. Pt given Rocephin.      (01 Nov 2020 23:27)    Patient is a 69y old  Female who presents with a chief complaint of UTI, Obstructive uropathy secondary to calculi (11 Nov 2020 14:25)      INTERVAL HPI/OVERNIGHT EVENTS:no acute events     MEDICATIONS  (STANDING):  amLODIPine   Tablet 10 milliGRAM(s) Oral daily  amoxicillin  875 milliGRAM(s)/clavulanate 1 Tablet(s) Oral two times a day  cloNIDine 0.1 milliGRAM(s) Oral every 8 hours  dextrose 5%. 1000 milliLiter(s) (50 mL/Hr) IV Continuous <Continuous>  dextrose 50% Injectable 12.5 Gram(s) IV Push once  dextrose 50% Injectable 25 Gram(s) IV Push once  dextrose 50% Injectable 25 Gram(s) IV Push once  fluconAZOLE   Tablet 200 milliGRAM(s) Oral <User Schedule>  heparin   Injectable 5000 Unit(s) SubCutaneous every 8 hours  hydrALAZINE 100 milliGRAM(s) Oral every 8 hours  insulin lispro (ADMELOG) corrective regimen sliding scale   SubCutaneous three times a day before meals  insulin lispro (ADMELOG) corrective regimen sliding scale   SubCutaneous at bedtime  levETIRAcetam 750 milliGRAM(s) Oral two times a day  metoprolol tartrate 50 milliGRAM(s) Oral two times a day  QUEtiapine 25 milliGRAM(s) Oral at bedtime  simvastatin 20 milliGRAM(s) Oral at bedtime  tamsulosin 0.4 milliGRAM(s) Oral at bedtime    MEDICATIONS  (PRN):  acetaminophen   Tablet .. 650 milliGRAM(s) Oral every 6 hours PRN Temp greater or equal to 38C (100.4F), Mild Pain (1 - 3)  dextrose 40% Gel 15 Gram(s) Oral once PRN Blood Glucose LESS THAN 70 milliGRAM(s)/deciliter  glucagon  Injectable 1 milliGRAM(s) IntraMuscular once PRN Glucose LESS THAN 70 milligrams/deciliter  haloperidol    Injectable 2.5 milliGRAM(s) IntraMuscular every 12 hours PRN agitation  ondansetron Injectable 4 milliGRAM(s) IV Push every 6 hours PRN Nausea and/or Vomiting      Allergies    No Known Allergies    Intolerances        REVIEW OF SYSTEMS:  unable to provide     Vital Signs Last 24 Hrs  T(C): 37.4 (13 Nov 2020 14:39), Max: 37.4 (13 Nov 2020 11:25)  T(F): 99.4 (13 Nov 2020 14:39), Max: 99.4 (13 Nov 2020 11:25)  HR: 62 (13 Nov 2020 14:39) (59 - 81)  BP: 146/65 (13 Nov 2020 14:39) (115/54 - 154/65)  BP(mean): --  RR: 18 (13 Nov 2020 14:39) (18 - 20)  SpO2: 99% (13 Nov 2020 14:39) (96% - 99%)    PHYSICAL EXAM:  GENERAL: NAD,  HEAD:  Atraumatic, Normocephalic  EYES: EOMI, PERRLA, conjunctiva and sclera clear  ENMT: No tonsillar erythema, exudates, or enlargement; Moist mucous membranes, Good dentition, No lesions  NECK: Supple, No JVD, Normal thyroid  NERVOUS SYSTEM:  Awake   CHEST/LUNG: Clear to percussion bilaterally; No rales, rhonchi, wheezing, or rubs  HEART: Regular rate and rhythm; No murmurs, rubs, or gallops  ABDOMEN: Soft, Nontender, Nondistended; Bowel sounds present  EXTREMITIES:  2+ Peripheral Pulses, No clubbing, cyanosis, or edema  LYMPH: No lymphadenopathy noted  SKIN: decubs present on admission                           9.3    7.45  )-----------( 308      ( 12 Nov 2020 07:19 )             30.0     11-12    141  |  107  |  16  ----------------------------<  149<H>  3.8   |  26  |  1.02    Ca    8.5      12 Nov 2020 07:19  Phos  3.3     11-12  Mg     2.3     11-12                  CAPILLARY BLOOD GLUCOSE      POCT Blood Glucose.: 164 mg/dL (11 Nov 2020 15:34)  POCT Blood Glucose.: 175 mg/dL (11 Nov 2020 11:14)  POCT Blood Glucose.: 164 mg/dL (11 Nov 2020 07:52)  POCT Blood Glucose.: 141 mg/dL (10 Nov 2020 20:57)  POCT Blood Glucose.: 222 mg/dL (10 Nov 2020 17:08)      RADIOLOGY & ADDITIONAL TESTS:    Imaging Personally Reviewed:  [ X] YES  [ ] NO    Consultant(s) Notes Reviewed:  [X ] YES  [ ] NO    Care Discussed with Consultants/Other Providers [ X] YES  [ ] NO

## 2020-11-13 NOTE — PROGRESS NOTE ADULT - ASSESSMENT
70 y/o female with PMHx of dementia (minimally verbal per daughter occasional gives 1 word answers of yes/no), CVA, Seizure disorder, HTN, DM type 2 (off meds per daughter), admission 2 months ago for renal colic s/p lithotripsy in 8/24 w/ indwelling milner presents to the ED due to hematuria.           Problem/Plan - 1:  ·  Problem: Acute  cystitis with hematuria and associated with indwelling chronic cathter.  Plan: - CT scan w/ likely cystitis  - urine culture showed klebsiella and enterococcus and fungal   cont augmentin per ID. cont diflucan   -milner fro discharge home       Problem/Plan - 2:  ·  Problem: Calculus of ureter.  Plan: - 6mm stone at the UVJ  -S/p cystoscopy and stone removal.   stents  removed   urologist following.     Problem/Plan - 3:  ·  Problem: Seizure disorder.  Plan: - c/w Keppra    Problem/Plan - 4:  ·  Problem: Acute kidney injury. improved.      Problem/Plan - 5:  ·  Problem: Essential hypertension. controlled. cont current meds and monitor.     Problem/Plan - 6:  Problem: Type 2 diabetes mellitus with other specified complication, without long-term current use of insulin. controlled. Plan: - FS q6 w/ SSI.     Agitation. better. cont seroquel.       Problem/Plan - 7:  ·  Problem: DVT prophylaxis.  HSQ     Hypokalemia. Repleted    Pressure injury (stage 2) bilateral buttocks. cont local care  Hypophosphatemia. repleted      Dysphagia. cont dysphagia diet with aspiration precautions as per swallow eval.    Plan for discharge home on Saturday

## 2020-11-13 NOTE — PROGRESS NOTE ADULT - REASON FOR ADMISSION
UTI, Obstructive uropathy secondary to calculi

## 2020-11-14 VITALS
TEMPERATURE: 98 F | DIASTOLIC BLOOD PRESSURE: 55 MMHG | SYSTOLIC BLOOD PRESSURE: 118 MMHG | OXYGEN SATURATION: 97 % | RESPIRATION RATE: 17 BRPM | HEART RATE: 60 BPM

## 2020-11-14 LAB — GLUCOSE BLDC GLUCOMTR-MCNC: 189 MG/DL — HIGH (ref 70–99)

## 2020-11-14 PROCEDURE — 99239 HOSP IP/OBS DSCHRG MGMT >30: CPT

## 2020-11-14 RX ADMIN — LEVETIRACETAM 750 MILLIGRAM(S): 250 TABLET, FILM COATED ORAL at 06:38

## 2020-11-14 RX ADMIN — HEPARIN SODIUM 5000 UNIT(S): 5000 INJECTION INTRAVENOUS; SUBCUTANEOUS at 06:38

## 2020-11-14 RX ADMIN — AMLODIPINE BESYLATE 10 MILLIGRAM(S): 2.5 TABLET ORAL at 06:38

## 2020-11-14 RX ADMIN — Medication 50 MILLIGRAM(S): at 06:38

## 2020-11-14 RX ADMIN — Medication 2: at 08:03

## 2020-11-14 RX ADMIN — Medication 1 TABLET(S): at 06:38

## 2020-11-14 RX ADMIN — Medication 100 MILLIGRAM(S): at 06:38

## 2020-11-14 RX ADMIN — Medication 0.1 MILLIGRAM(S): at 06:38

## 2020-11-18 ENCOUNTER — APPOINTMENT (OUTPATIENT)
Dept: UROLOGY | Facility: CLINIC | Age: 70
End: 2020-11-18
Payer: MEDICARE

## 2020-11-18 VITALS
SYSTOLIC BLOOD PRESSURE: 170 MMHG | DIASTOLIC BLOOD PRESSURE: 73 MMHG | HEART RATE: 71 BPM | RESPIRATION RATE: 17 BRPM | TEMPERATURE: 98.5 F

## 2020-11-18 DIAGNOSIS — N30.01 ACUTE CYSTITIS WITH HEMATURIA: ICD-10-CM

## 2020-11-18 DIAGNOSIS — I24.8 OTHER FORMS OF ACUTE ISCHEMIC HEART DISEASE: ICD-10-CM

## 2020-11-18 DIAGNOSIS — R45.1 RESTLESSNESS AND AGITATION: ICD-10-CM

## 2020-11-18 DIAGNOSIS — N31.9 NEUROMUSCULAR DYSFUNCTION OF BLADDER, UNSPECIFIED: ICD-10-CM

## 2020-11-18 DIAGNOSIS — E83.39 OTHER DISORDERS OF PHOSPHORUS METABOLISM: ICD-10-CM

## 2020-11-18 DIAGNOSIS — R53.2 FUNCTIONAL QUADRIPLEGIA: ICD-10-CM

## 2020-11-18 DIAGNOSIS — R33.8 OTHER RETENTION OF URINE: ICD-10-CM

## 2020-11-18 DIAGNOSIS — L89.312 PRESSURE ULCER OF RIGHT BUTTOCK, STAGE 2: ICD-10-CM

## 2020-11-18 DIAGNOSIS — T83.511A INFECTION AND INFLAMMATORY REACTION DUE TO INDWELLING URETHRAL CATHETER, INITIAL ENCOUNTER: ICD-10-CM

## 2020-11-18 DIAGNOSIS — M47.816 SPONDYLOSIS WITHOUT MYELOPATHY OR RADICULOPATHY, LUMBAR REGION: ICD-10-CM

## 2020-11-18 DIAGNOSIS — R33.9 RETENTION OF URINE, UNSPECIFIED: ICD-10-CM

## 2020-11-18 DIAGNOSIS — E43 UNSPECIFIED SEVERE PROTEIN-CALORIE MALNUTRITION: ICD-10-CM

## 2020-11-18 DIAGNOSIS — J45.909 UNSPECIFIED ASTHMA, UNCOMPLICATED: ICD-10-CM

## 2020-11-18 DIAGNOSIS — M19.011 PRIMARY OSTEOARTHRITIS, RIGHT SHOULDER: ICD-10-CM

## 2020-11-18 DIAGNOSIS — M17.0 BILATERAL PRIMARY OSTEOARTHRITIS OF KNEE: ICD-10-CM

## 2020-11-18 DIAGNOSIS — N39.0 URINARY TRACT INFECTION, SITE NOT SPECIFIED: ICD-10-CM

## 2020-11-18 DIAGNOSIS — E87.6 HYPOKALEMIA: ICD-10-CM

## 2020-11-18 DIAGNOSIS — N21.0 CALCULUS IN BLADDER: ICD-10-CM

## 2020-11-18 DIAGNOSIS — I69.351 HEMIPLEGIA AND HEMIPARESIS FOLLOWING CEREBRAL INFARCTION AFFECTING RIGHT DOMINANT SIDE: ICD-10-CM

## 2020-11-18 DIAGNOSIS — N20.0 CALCULUS OF KIDNEY: ICD-10-CM

## 2020-11-18 DIAGNOSIS — M19.012 PRIMARY OSTEOARTHRITIS, LEFT SHOULDER: ICD-10-CM

## 2020-11-18 DIAGNOSIS — B96.1 KLEBSIELLA PNEUMONIAE [K. PNEUMONIAE] AS THE CAUSE OF DISEASES CLASSIFIED ELSEWHERE: ICD-10-CM

## 2020-11-18 DIAGNOSIS — Z79.82 LONG TERM (CURRENT) USE OF ASPIRIN: ICD-10-CM

## 2020-11-18 DIAGNOSIS — G40.909 EPILEPSY, UNSPECIFIED, NOT INTRACTABLE, WITHOUT STATUS EPILEPTICUS: ICD-10-CM

## 2020-11-18 DIAGNOSIS — B95.2 ENTEROCOCCUS AS THE CAUSE OF DISEASES CLASSIFIED ELSEWHERE: ICD-10-CM

## 2020-11-18 DIAGNOSIS — N20.1 CALCULUS OF URETER: ICD-10-CM

## 2020-11-18 DIAGNOSIS — I10 ESSENTIAL (PRIMARY) HYPERTENSION: ICD-10-CM

## 2020-11-18 DIAGNOSIS — N17.9 ACUTE KIDNEY FAILURE, UNSPECIFIED: ICD-10-CM

## 2020-11-18 DIAGNOSIS — N20.2 CALCULUS OF KIDNEY WITH CALCULUS OF URETER: ICD-10-CM

## 2020-11-18 DIAGNOSIS — L89.322 PRESSURE ULCER OF LEFT BUTTOCK, STAGE 2: ICD-10-CM
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
## 2020-11-18 DIAGNOSIS — F03.90 UNSPECIFIED DEMENTIA WITHOUT BEHAVIORAL DISTURBANCE: ICD-10-CM

## 2020-11-18 DIAGNOSIS — E11.9 TYPE 2 DIABETES MELLITUS WITHOUT COMPLICATIONS: ICD-10-CM

## 2020-11-18 PROCEDURE — 99213 OFFICE O/P EST LOW 20 MIN: CPT

## 2020-11-18 PROCEDURE — 99072 ADDL SUPL MATRL&STAF TM PHE: CPT

## 2020-11-18 PROCEDURE — 76775 US EXAM ABDO BACK WALL LIM: CPT

## 2020-11-18 NOTE — HISTORY OF PRESENT ILLNESS
[FreeTextEntry1] : 70 yo s/p bilat uscope-both stents out in hospital\par \par Here for f/u sono\par \par also for tov-per pt family-qadvised against removing milner(5 pm) but they are insistent.\par \par currently taking poly citra for low citrate\par

## 2020-11-18 NOTE — LETTER BODY
[Dear  ___] : Dear  [unfilled], [Courtesy Letter:] : I had the pleasure of seeing your patient, [unfilled], in my office today. [Please see my note below.] : Please see my note below. [Sincerely,] : Sincerely, [FreeTextEntry1] : see below\par \par trial of void underway\par  [FreeTextEntry3] : Todd Caicedo MD FACS\par \par Attending Urologist-[facility]\par Director - Strategic Operations Urology\par Assistant Clinical Professor-[facility]\par \par

## 2020-11-18 NOTE — PHYSICAL EXAM
[General Appearance - Well Developed] : well developed [General Appearance - Well Nourished] : well nourished [Normal Appearance] : normal appearance [Well Groomed] : well groomed [General Appearance - In No Acute Distress] : no acute distress [Abdomen Soft] : soft [Abdomen Tenderness] : non-tender [Costovertebral Angle Tenderness] : no ~M costovertebral angle tenderness [Edema] : no peripheral edema [FreeTextEntry1] : non verbal

## 2020-11-18 NOTE — ASSESSMENT
[FreeTextEntry1] : 69 year old female for cath removal\par \par \par Explained she will need to go to ER if she cannot urinate or is experiencing any pain. Patient and her family understood.\par \par Renal US: few tiny calyceal calcification on right\par left with resdual fragments-all tiny based on recent uscop\par  \par f/u Tues-check labs and then in 6 months\par

## 2020-11-18 NOTE — END OF VISIT
[FreeTextEntry3] : Medical record entries made by the scribe today, were at my direction and personally dictated to them by me, Dr. Todd Caicedo on 11/18/2020. I have reviewed the chart and agree that the record accurately reflects my personal performance of the history, physical exam, assessment, and plan.

## 2021-03-13 ENCOUNTER — INPATIENT (INPATIENT)
Facility: HOSPITAL | Age: 71
LOS: 5 days | Discharge: NURSING, REHAB OR LONG-TERM CARE | End: 2021-03-19
Attending: INTERNAL MEDICINE | Admitting: INTERNAL MEDICINE
Payer: MEDICARE

## 2021-03-13 VITALS
DIASTOLIC BLOOD PRESSURE: 80 MMHG | HEIGHT: 64 IN | HEART RATE: 72 BPM | RESPIRATION RATE: 18 BRPM | SYSTOLIC BLOOD PRESSURE: 157 MMHG | WEIGHT: 130.07 LBS | OXYGEN SATURATION: 99 % | TEMPERATURE: 98 F

## 2021-03-13 DIAGNOSIS — E11.65 TYPE 2 DIABETES MELLITUS WITH HYPERGLYCEMIA: ICD-10-CM

## 2021-03-13 DIAGNOSIS — A41.9 SEPSIS, UNSPECIFIED ORGANISM: ICD-10-CM

## 2021-03-13 DIAGNOSIS — R33.9 RETENTION OF URINE, UNSPECIFIED: ICD-10-CM

## 2021-03-13 DIAGNOSIS — N30.01 ACUTE CYSTITIS WITH HEMATURIA: ICD-10-CM

## 2021-03-13 DIAGNOSIS — N17.9 ACUTE KIDNEY FAILURE, UNSPECIFIED: ICD-10-CM

## 2021-03-13 DIAGNOSIS — G93.41 METABOLIC ENCEPHALOPATHY: ICD-10-CM

## 2021-03-13 DIAGNOSIS — N20.0 CALCULUS OF KIDNEY: Chronic | ICD-10-CM

## 2021-03-13 LAB
ALBUMIN SERPL ELPH-MCNC: 3.2 G/DL — LOW (ref 3.3–5)
ALP SERPL-CCNC: 80 U/L — SIGNIFICANT CHANGE UP (ref 40–120)
ALT FLD-CCNC: 38 U/L — SIGNIFICANT CHANGE UP (ref 12–78)
ANION GAP SERPL CALC-SCNC: 12 MMOL/L — SIGNIFICANT CHANGE UP (ref 5–17)
APPEARANCE UR: CLEAR — SIGNIFICANT CHANGE UP
APTT BLD: 29 SEC — SIGNIFICANT CHANGE UP (ref 27.5–35.5)
AST SERPL-CCNC: 18 U/L — SIGNIFICANT CHANGE UP (ref 15–37)
BASOPHILS # BLD AUTO: 0.04 K/UL — SIGNIFICANT CHANGE UP (ref 0–0.2)
BASOPHILS NFR BLD AUTO: 0.2 % — SIGNIFICANT CHANGE UP (ref 0–2)
BILIRUB SERPL-MCNC: 0.8 MG/DL — SIGNIFICANT CHANGE UP (ref 0.2–1.2)
BILIRUB UR-MCNC: NEGATIVE — SIGNIFICANT CHANGE UP
BUN SERPL-MCNC: 100 MG/DL — HIGH (ref 7–23)
CALCIUM SERPL-MCNC: 9.1 MG/DL — SIGNIFICANT CHANGE UP (ref 8.5–10.1)
CHLORIDE SERPL-SCNC: 116 MMOL/L — HIGH (ref 96–108)
CO2 SERPL-SCNC: 17 MMOL/L — LOW (ref 22–31)
COLOR SPEC: YELLOW — SIGNIFICANT CHANGE UP
COMMENT - URINE: SIGNIFICANT CHANGE UP
CREAT SERPL-MCNC: 6.27 MG/DL — HIGH (ref 0.5–1.3)
DIFF PNL FLD: ABNORMAL
EOSINOPHIL # BLD AUTO: 0 K/UL — SIGNIFICANT CHANGE UP (ref 0–0.5)
EOSINOPHIL NFR BLD AUTO: 0 % — SIGNIFICANT CHANGE UP (ref 0–6)
GLUCOSE BLDC GLUCOMTR-MCNC: 109 MG/DL — HIGH (ref 70–99)
GLUCOSE BLDC GLUCOMTR-MCNC: 264 MG/DL — HIGH (ref 70–99)
GLUCOSE BLDC GLUCOMTR-MCNC: 286 MG/DL — HIGH (ref 70–99)
GLUCOSE BLDC GLUCOMTR-MCNC: 344 MG/DL — HIGH (ref 70–99)
GLUCOSE SERPL-MCNC: 362 MG/DL — HIGH (ref 70–99)
GLUCOSE UR QL: 50 MG/DL
HCT VFR BLD CALC: 40.8 % — SIGNIFICANT CHANGE UP (ref 34.5–45)
HGB BLD-MCNC: 13.3 G/DL — SIGNIFICANT CHANGE UP (ref 11.5–15.5)
IMM GRANULOCYTES NFR BLD AUTO: 1.1 % — SIGNIFICANT CHANGE UP (ref 0–1.5)
INR BLD: 1.14 RATIO — SIGNIFICANT CHANGE UP (ref 0.88–1.16)
KETONES UR-MCNC: NEGATIVE — SIGNIFICANT CHANGE UP
LACTATE SERPL-SCNC: 2.1 MMOL/L — HIGH (ref 0.7–2)
LACTATE SERPL-SCNC: 2.2 MMOL/L — HIGH (ref 0.7–2)
LEUKOCYTE ESTERASE UR-ACNC: NEGATIVE — SIGNIFICANT CHANGE UP
LYMPHOCYTES # BLD AUTO: 1.02 K/UL — SIGNIFICANT CHANGE UP (ref 1–3.3)
LYMPHOCYTES # BLD AUTO: 4.7 % — LOW (ref 13–44)
MCHC RBC-ENTMCNC: 28.5 PG — SIGNIFICANT CHANGE UP (ref 27–34)
MCHC RBC-ENTMCNC: 32.6 GM/DL — SIGNIFICANT CHANGE UP (ref 32–36)
MCV RBC AUTO: 87.4 FL — SIGNIFICANT CHANGE UP (ref 80–100)
MONOCYTES # BLD AUTO: 2.44 K/UL — HIGH (ref 0–0.9)
MONOCYTES NFR BLD AUTO: 11.2 % — SIGNIFICANT CHANGE UP (ref 2–14)
NEUTROPHILS # BLD AUTO: 18.01 K/UL — HIGH (ref 1.8–7.4)
NEUTROPHILS NFR BLD AUTO: 82.8 % — HIGH (ref 43–77)
NITRITE UR-MCNC: NEGATIVE — SIGNIFICANT CHANGE UP
NRBC # BLD: 0 /100 WBCS — SIGNIFICANT CHANGE UP (ref 0–0)
PH UR: 6 — SIGNIFICANT CHANGE UP (ref 5–8)
PLATELET # BLD AUTO: 225 K/UL — SIGNIFICANT CHANGE UP (ref 150–400)
POTASSIUM SERPL-MCNC: 4.4 MMOL/L — SIGNIFICANT CHANGE UP (ref 3.5–5.3)
POTASSIUM SERPL-SCNC: 4.4 MMOL/L — SIGNIFICANT CHANGE UP (ref 3.5–5.3)
PROT SERPL-MCNC: 8.3 GM/DL — SIGNIFICANT CHANGE UP (ref 6–8.3)
PROT UR-MCNC: 500 MG/DL
PROTHROM AB SERPL-ACNC: 13.1 SEC — SIGNIFICANT CHANGE UP (ref 10.6–13.6)
RAPID RVP RESULT: SIGNIFICANT CHANGE UP
RBC # BLD: 4.67 M/UL — SIGNIFICANT CHANGE UP (ref 3.8–5.2)
RBC # FLD: 13.9 % — SIGNIFICANT CHANGE UP (ref 10.3–14.5)
RBC CASTS # UR COMP ASSIST: SIGNIFICANT CHANGE UP /HPF (ref 0–4)
SARS-COV-2 RNA SPEC QL NAA+PROBE: SIGNIFICANT CHANGE UP
SODIUM SERPL-SCNC: 145 MMOL/L — SIGNIFICANT CHANGE UP (ref 135–145)
SP GR SPEC: 1.01 — SIGNIFICANT CHANGE UP (ref 1.01–1.02)
TROPONIN I SERPL-MCNC: 0.03 NG/ML — SIGNIFICANT CHANGE UP (ref 0.01–0.04)
UROBILINOGEN FLD QL: NEGATIVE MG/DL — SIGNIFICANT CHANGE UP
WBC # BLD: 21.76 K/UL — HIGH (ref 3.8–10.5)
WBC # FLD AUTO: 21.76 K/UL — HIGH (ref 3.8–10.5)
WBC UR QL: ABNORMAL

## 2021-03-13 PROCEDURE — 74176 CT ABD & PELVIS W/O CONTRAST: CPT | Mod: 26,MA

## 2021-03-13 PROCEDURE — 71045 X-RAY EXAM CHEST 1 VIEW: CPT | Mod: 26

## 2021-03-13 PROCEDURE — 93010 ELECTROCARDIOGRAM REPORT: CPT

## 2021-03-13 PROCEDURE — 70450 CT HEAD/BRAIN W/O DYE: CPT | Mod: 26,MA

## 2021-03-13 PROCEDURE — 99285 EMERGENCY DEPT VISIT HI MDM: CPT

## 2021-03-13 RX ORDER — ASPIRIN/CALCIUM CARB/MAGNESIUM 324 MG
81 TABLET ORAL DAILY
Refills: 0 | Status: DISCONTINUED | OUTPATIENT
Start: 2021-03-13 | End: 2021-03-19

## 2021-03-13 RX ORDER — HEPARIN SODIUM 5000 [USP'U]/ML
5000 INJECTION INTRAVENOUS; SUBCUTANEOUS EVERY 12 HOURS
Refills: 0 | Status: DISCONTINUED | OUTPATIENT
Start: 2021-03-13 | End: 2021-03-19

## 2021-03-13 RX ORDER — CEFTRIAXONE 500 MG/1
1000 INJECTION, POWDER, FOR SOLUTION INTRAMUSCULAR; INTRAVENOUS EVERY 24 HOURS
Refills: 0 | Status: DISCONTINUED | OUTPATIENT
Start: 2021-03-14 | End: 2021-03-14

## 2021-03-13 RX ORDER — PANTOPRAZOLE SODIUM 20 MG/1
40 TABLET, DELAYED RELEASE ORAL DAILY
Refills: 0 | Status: DISCONTINUED | OUTPATIENT
Start: 2021-03-13 | End: 2021-03-19

## 2021-03-13 RX ORDER — CEFTRIAXONE 500 MG/1
1000 INJECTION, POWDER, FOR SOLUTION INTRAMUSCULAR; INTRAVENOUS ONCE
Refills: 0 | Status: COMPLETED | OUTPATIENT
Start: 2021-03-13 | End: 2021-03-13

## 2021-03-13 RX ORDER — DEXTROSE 50 % IN WATER 50 %
12.5 SYRINGE (ML) INTRAVENOUS ONCE
Refills: 0 | Status: DISCONTINUED | OUTPATIENT
Start: 2021-03-13 | End: 2021-03-19

## 2021-03-13 RX ORDER — PIPERACILLIN AND TAZOBACTAM 4; .5 G/20ML; G/20ML
3.38 INJECTION, POWDER, LYOPHILIZED, FOR SOLUTION INTRAVENOUS ONCE
Refills: 0 | Status: COMPLETED | OUTPATIENT
Start: 2021-03-13 | End: 2021-03-13

## 2021-03-13 RX ORDER — DEXTROSE 50 % IN WATER 50 %
25 SYRINGE (ML) INTRAVENOUS ONCE
Refills: 0 | Status: DISCONTINUED | OUTPATIENT
Start: 2021-03-13 | End: 2021-03-19

## 2021-03-13 RX ORDER — ACETAMINOPHEN 500 MG
650 TABLET ORAL ONCE
Refills: 0 | Status: COMPLETED | OUTPATIENT
Start: 2021-03-13 | End: 2021-03-13

## 2021-03-13 RX ORDER — METOPROLOL TARTRATE 50 MG
5 TABLET ORAL EVERY 6 HOURS
Refills: 0 | Status: DISCONTINUED | OUTPATIENT
Start: 2021-03-13 | End: 2021-03-13

## 2021-03-13 RX ORDER — GLUCAGON INJECTION, SOLUTION 0.5 MG/.1ML
1 INJECTION, SOLUTION SUBCUTANEOUS ONCE
Refills: 0 | Status: DISCONTINUED | OUTPATIENT
Start: 2021-03-13 | End: 2021-03-19

## 2021-03-13 RX ORDER — METOPROLOL TARTRATE 50 MG
5 TABLET ORAL EVERY 6 HOURS
Refills: 0 | Status: DISCONTINUED | OUTPATIENT
Start: 2021-03-13 | End: 2021-03-15

## 2021-03-13 RX ORDER — CEFTRIAXONE 500 MG/1
INJECTION, POWDER, FOR SOLUTION INTRAMUSCULAR; INTRAVENOUS
Refills: 0 | Status: DISCONTINUED | OUTPATIENT
Start: 2021-03-13 | End: 2021-03-14

## 2021-03-13 RX ORDER — SODIUM CHLORIDE 9 MG/ML
800 INJECTION INTRAMUSCULAR; INTRAVENOUS; SUBCUTANEOUS ONCE
Refills: 0 | Status: COMPLETED | OUTPATIENT
Start: 2021-03-13 | End: 2021-03-13

## 2021-03-13 RX ORDER — INSULIN LISPRO 100/ML
VIAL (ML) SUBCUTANEOUS
Refills: 0 | Status: DISCONTINUED | OUTPATIENT
Start: 2021-03-13 | End: 2021-03-19

## 2021-03-13 RX ORDER — TAMSULOSIN HYDROCHLORIDE 0.4 MG/1
0.4 CAPSULE ORAL AT BEDTIME
Refills: 0 | Status: DISCONTINUED | OUTPATIENT
Start: 2021-03-13 | End: 2021-03-19

## 2021-03-13 RX ORDER — SIMVASTATIN 20 MG/1
20 TABLET, FILM COATED ORAL AT BEDTIME
Refills: 0 | Status: DISCONTINUED | OUTPATIENT
Start: 2021-03-13 | End: 2021-03-19

## 2021-03-13 RX ORDER — SODIUM CHLORIDE 9 MG/ML
1000 INJECTION, SOLUTION INTRAVENOUS
Refills: 0 | Status: DISCONTINUED | OUTPATIENT
Start: 2021-03-13 | End: 2021-03-15

## 2021-03-13 RX ORDER — SODIUM CHLORIDE 9 MG/ML
1000 INJECTION INTRAMUSCULAR; INTRAVENOUS; SUBCUTANEOUS ONCE
Refills: 0 | Status: COMPLETED | OUTPATIENT
Start: 2021-03-13 | End: 2021-03-13

## 2021-03-13 RX ORDER — PANTOPRAZOLE SODIUM 20 MG/1
40 TABLET, DELAYED RELEASE ORAL
Refills: 0 | Status: DISCONTINUED | OUTPATIENT
Start: 2021-03-13 | End: 2021-03-13

## 2021-03-13 RX ORDER — SODIUM CHLORIDE 9 MG/ML
1000 INJECTION INTRAMUSCULAR; INTRAVENOUS; SUBCUTANEOUS
Refills: 0 | Status: DISCONTINUED | OUTPATIENT
Start: 2021-03-13 | End: 2021-03-13

## 2021-03-13 RX ORDER — SODIUM CHLORIDE 9 MG/ML
1000 INJECTION, SOLUTION INTRAVENOUS
Refills: 0 | Status: DISCONTINUED | OUTPATIENT
Start: 2021-03-13 | End: 2021-03-19

## 2021-03-13 RX ORDER — VANCOMYCIN HCL 1 G
1000 VIAL (EA) INTRAVENOUS ONCE
Refills: 0 | Status: COMPLETED | OUTPATIENT
Start: 2021-03-13 | End: 2021-03-13

## 2021-03-13 RX ORDER — PANTOPRAZOLE SODIUM 20 MG/1
40 TABLET, DELAYED RELEASE ORAL DAILY
Refills: 0 | Status: DISCONTINUED | OUTPATIENT
Start: 2021-03-13 | End: 2021-03-13

## 2021-03-13 RX ORDER — METOPROLOL TARTRATE 50 MG
50 TABLET ORAL
Refills: 0 | Status: DISCONTINUED | OUTPATIENT
Start: 2021-03-13 | End: 2021-03-13

## 2021-03-13 RX ORDER — LEVETIRACETAM 250 MG/1
750 TABLET, FILM COATED ORAL EVERY 12 HOURS
Refills: 0 | Status: DISCONTINUED | OUTPATIENT
Start: 2021-03-13 | End: 2021-03-19

## 2021-03-13 RX ORDER — DEXTROSE 50 % IN WATER 50 %
15 SYRINGE (ML) INTRAVENOUS ONCE
Refills: 0 | Status: DISCONTINUED | OUTPATIENT
Start: 2021-03-13 | End: 2021-03-19

## 2021-03-13 RX ADMIN — SODIUM CHLORIDE 1000 MILLILITER(S): 9 INJECTION INTRAMUSCULAR; INTRAVENOUS; SUBCUTANEOUS at 09:50

## 2021-03-13 RX ADMIN — PIPERACILLIN AND TAZOBACTAM 200 GRAM(S): 4; .5 INJECTION, POWDER, LYOPHILIZED, FOR SOLUTION INTRAVENOUS at 09:55

## 2021-03-13 RX ADMIN — SODIUM CHLORIDE 1000 MILLILITER(S): 9 INJECTION INTRAMUSCULAR; INTRAVENOUS; SUBCUTANEOUS at 10:27

## 2021-03-13 RX ADMIN — Medication 250 MILLIGRAM(S): at 10:24

## 2021-03-13 RX ADMIN — SODIUM CHLORIDE 80 MILLILITER(S): 9 INJECTION INTRAMUSCULAR; INTRAVENOUS; SUBCUTANEOUS at 13:34

## 2021-03-13 RX ADMIN — Medication 6: at 17:46

## 2021-03-13 RX ADMIN — CEFTRIAXONE 100 MILLIGRAM(S): 500 INJECTION, POWDER, FOR SOLUTION INTRAMUSCULAR; INTRAVENOUS at 21:37

## 2021-03-13 RX ADMIN — Medication 650 MILLIGRAM(S): at 10:50

## 2021-03-13 RX ADMIN — LEVETIRACETAM 400 MILLIGRAM(S): 250 TABLET, FILM COATED ORAL at 17:46

## 2021-03-13 RX ADMIN — PIPERACILLIN AND TAZOBACTAM 3.38 GRAM(S): 4; .5 INJECTION, POWDER, LYOPHILIZED, FOR SOLUTION INTRAVENOUS at 10:27

## 2021-03-13 RX ADMIN — Medication 650 MILLIGRAM(S): at 09:50

## 2021-03-13 RX ADMIN — HEPARIN SODIUM 5000 UNIT(S): 5000 INJECTION INTRAVENOUS; SUBCUTANEOUS at 17:44

## 2021-03-13 RX ADMIN — SODIUM CHLORIDE 800 MILLILITER(S): 9 INJECTION INTRAMUSCULAR; INTRAVENOUS; SUBCUTANEOUS at 10:26

## 2021-03-13 NOTE — ED PROVIDER NOTE - CARE PLAN
Principal Discharge DX:	Altered mental status  Secondary Diagnosis:	UTI (urinary tract infection)  Secondary Diagnosis:	Sepsis  Secondary Diagnosis:	Urinary retention  Secondary Diagnosis:	Acute renal failure

## 2021-03-13 NOTE — H&P ADULT - ASSESSMENT
Admitted for AMS due to Septic Encephalopathy 2/2 Sepsis with UTI, Urinary Retention, AMADOU and DM2.

## 2021-03-13 NOTE — ED PROVIDER NOTE - OBJECTIVE STATEMENT
70 year old female with h/o HTN, DM, CVA, gout, asthma, seizure and urinary incontinence presents today biba from home for evaluation for altered mental status, pt was last seen at her baseline at 7pm, yesterday, pt's sister  (Amaya [phone]) called in and reports that she at baseline communicates very little, she may say yes or no or points, however, this morning she noticed she is not as responsive, in the ER pt is not responsive to verbal stimuli and very little response to painful stimuli and warm to touch, abdomen also noted to be distended and firm to touch, pt is unable to follow any commands

## 2021-03-13 NOTE — ED ADULT NURSE NOTE - NSIMPLEMENTINTERV_GEN_ALL_ED
Implemented All Fall with Harm Risk Interventions:  [place] to call system. Call bell, personal items and telephone within reach. Instruct patient to call for assistance. Room bathroom lighting operational. Non-slip footwear when patient is off stretcher. Physically safe environment: no spills, clutter or unnecessary equipment. Stretcher in lowest position, wheels locked, appropriate side rails in place. Provide visual cue, wrist band, yellow gown, etc. Monitor gait and stability. Monitor for mental status changes and reorient to person, place, and time. Review medications for side effects contributing to fall risk. Reinforce activity limits and safety measures with patient and family. Provide visual clues: red socks.

## 2021-03-13 NOTE — PHARMACY COMMUNICATION NOTE - COMMENTS
Spoke to PA and added parameters for IV metoprolol for patient. patient was on po metoprolol and is NPO.

## 2021-03-13 NOTE — ED PROVIDER NOTE - PMH
Asthma    CVA (cerebral infarction)  right side weakness  Diabetes    Gout    Hypertension    OA (osteoarthritis)  bautista knees, low back  and  bautista shoulders  Seizure disorder    Urinary incontinence    Vision changes  lt eye  
IV discontinued, cath removed intact

## 2021-03-13 NOTE — ED ADULT TRIAGE NOTE - CHIEF COMPLAINT QUOTE
pt BIBA from home per report with AMS since 7pm last night getting progressively worse, leaning to the Rt side  no tlking hx CVA, HLD, seizures, DM HTN

## 2021-03-13 NOTE — H&P ADULT - HISTORY OF PRESENT ILLNESS
73yo, BF with h/o HTN, DM2, CVA, gout, asthma, seizure and urinary incontinence presents today biba from home for evaluation for altered mental status, pt was last seen at her baseline at 7pm, yesterday, pt's sister  (Amaya [phone]) called in and reports that she at baseline communicates very little, she may say yes or no or points, however, this morning she noticed she is not as responsive, in the ER pt is not responsive to verbal stimuli and very little response to painful stimuli and warm to touch, abdomen also noted to be distended and firm to touch, pt is unable to follow any commands.  In ER, Mayberry placed with 2500 ml urine noted.

## 2021-03-13 NOTE — ED ADULT NURSE NOTE - OBJECTIVE STATEMENT
Received patient in bed 5. A&O, nonverbal. pt admitted to ED for AMS. pt's daughter stated last night 7p was last time she spoke. Pt not talking or ambulating well. 70 year old female with h/o HTN, DM, CVA, gout, asthma, seizure and urinary incontinence presents today biba from home for evaluation for altered mental status, pt was last seen at her baseline at 7pm, yesterday, pt's sister  (Amaya [phone]) called in and reports that she at baseline communicates very little, she may say yes or no or points, however, this morning she noticed she is not as responsive, in the ER pt is not responsive to verbal stimuli and very little response to painful stimuli and warm to touch, abdomen also noted to be distended and firm to touch, pt is unable to follow any commands. Skin audit= IAD bilat groin/perineum.

## 2021-03-13 NOTE — ED PROVIDER NOTE - CLINICAL SUMMARY MEDICAL DECISION MAKING FREE TEXT BOX
pt presented with AMS, found to be in acute renal failure, milner placed total of 2500ml drained (300ml straight cath), sepsis workup, ivf hydration and antibiotics, pt admitted for further treatment

## 2021-03-13 NOTE — H&P ADULT - NSHPPHYSICALEXAM_GEN_ALL_CORE
PHYSICAL EXAM:  GENERAL: NAD, Thin  HEAD:  Atraumatic, Normocephalic  EYES:  conjunctiva and sclera clear  ENMT: Intact  NECK: Supple, No JVD, Normal thyroid  NERVOUS SYSTEM: Obtunded;  Motor Strength 3/5.  CHEST/LUNG: Clear bilaterally; No rales, rhonchi, wheezing, or rubs  HEART: Regular rate and rhythm; No murmurs, rubs, or gallops  ABDOMEN: Soft, Nontender, Nondistended; Bowel sounds present  EXTREMITIES:  No clubbing, cyanosis, or edema  SKIN: No rashes or lesions

## 2021-03-13 NOTE — H&P ADULT - NSHPLABSRESULTS_GEN_ALL_CORE
LABS:                        13.3   21.76 )-----------( 225      ( 13 Mar 2021 09:39 )             40.8     03-13    145  |  116<H>  |  100<H>  ----------------------------<  362<H>  4.4   |  17<L>  |  6.27<H>    Ca    9.1      13 Mar 2021 09:39    TPro  8.3  /  Alb  3.2<L>  /  TBili  0.8  /  DBili  x   /  AST  18  /  ALT  38  /  AlkPhos  80  03-13    PT/INR - ( 13 Mar 2021 09:39 )   PT: 13.1 sec;   INR: 1.14 ratio         PTT - ( 13 Mar 2021 09:39 )  PTT:29.0 sec  Urinalysis Basic - ( 13 Mar 2021 09:32 )    Color: Yellow / Appearance: Clear / S.015 / pH: x  Gluc: x / Ketone: Negative  / Bili: Negative / Urobili: Negative mg/dL   Blood: x / Protein: 500 mg/dL / Nitrite: Negative   Leuk Esterase: Negative / RBC: 0-2 /HPF / WBC 6-10   Sq Epi: x / Non Sq Epi: x / Bacteria: x      CAPILLARY BLOOD GLUCOSE      POCT Blood Glucose.: 344 mg/dL (13 Mar 2021 09:05)    CARDIAC MARKERS ( 13 Mar 2021 09:39 )  .033 ng/mL / x     / x     / x     / x        Lactate, Blood: 2.2: Elevated lactate. Consider ordering follow-up lactate to trend. mmol/L (21 @ 09:48)         < from: CT Abdomen and Pelvis No Cont (21 @ 11:17) >    FINDINGS:  LOWER CHEST: Faint groundglass opacities at the lung bases likely represents atelectasis. Heart size is mildly enlarged.    Evaluation of the abdominal organs is somewhat limited without intravenous contrast  LIVER: Within normal limits.  BILE DUCTS: Normal caliber.  GALLBLADDER: Within normal limits.  SPLEEN: Within normal limits.  PANCREAS: Within normal limits.  ADRENALS: 1.5 cm right adrenal lesion, previously characterized as an adenoma. Mild nonspecific thickening of the left adrenal gland.  KIDNEYS/URETERS: Mild bilateral hydroureteronephrosis. Conglomerate of nonobstructing stones within the left renal pelvis measuring up to 1.3 cm. Multiple additional bilateral nonobstructing renal calculi largest on the right measures 7 mm. Right renal cyst as well as bilateral subcentimeter hypodensities, too small to characterize.    BLADDER: Collapsed around a Mayberry catheter. Punctate calcification within the urinary bladder.  REPRODUCTIVE ORGANS: Heterogeneous, fibroid uterus with multiple calcified fibroids.    BOWEL: No bowel obstruction. Large amount of stool within the proximal colon.  PERITONEUM: Trace pelvic ascites, nonspecific.  VESSELS: Marked atherosclerotic changes of the aorta and branching vessels.  RETROPERITONEUM/LYMPH NODES: No lymphadenopathy.  ABDOMINAL WALL: Mild anasarca.  BONES: Mild degenerative changes of the spine.    IMPRESSION:  Mild bilateral hydroureteronephrosis without definite obstructing stone identified. However, there are multiple bilateral nonobstructing renal calculi, as described above.    Urinary bladder is collapsed around a Mayberry catheter. Small stone within the urinary bladder may present a recently passed stone.    < end of copied text >        < from: CT Head No Cont (21 @ 11:17) >    IMPRESSION:  Moderate to severe chronic microvascular changes without evidence of an acute transcortical infarction or hemorrhage.      < end of copied text >        < from: Xray Chest 1 View- PORTABLE-Urgent (Xray Chest 1 View- PORTABLE-Urgent .) (21 @ 09:47) >    IMPRESSION: No focal consolidation is seen.    < end of copied text >

## 2021-03-14 DIAGNOSIS — N39.0 URINARY TRACT INFECTION, SITE NOT SPECIFIED: ICD-10-CM

## 2021-03-14 DIAGNOSIS — I10 ESSENTIAL (PRIMARY) HYPERTENSION: ICD-10-CM

## 2021-03-14 LAB
-  CANDIDA ALBICANS: SIGNIFICANT CHANGE UP
A1C WITH ESTIMATED AVERAGE GLUCOSE RESULT: 6.5 % — HIGH (ref 4–5.6)
ALBUMIN SERPL ELPH-MCNC: 2.5 G/DL — LOW (ref 3.3–5)
ALP SERPL-CCNC: 64 U/L — SIGNIFICANT CHANGE UP (ref 40–120)
ALT FLD-CCNC: 31 U/L — SIGNIFICANT CHANGE UP (ref 12–78)
ANION GAP SERPL CALC-SCNC: 8 MMOL/L — SIGNIFICANT CHANGE UP (ref 5–17)
AST SERPL-CCNC: 17 U/L — SIGNIFICANT CHANGE UP (ref 15–37)
BASOPHILS # BLD AUTO: 0.04 K/UL — SIGNIFICANT CHANGE UP (ref 0–0.2)
BASOPHILS NFR BLD AUTO: 0.3 % — SIGNIFICANT CHANGE UP (ref 0–2)
BILIRUB SERPL-MCNC: 0.6 MG/DL — SIGNIFICANT CHANGE UP (ref 0.2–1.2)
BUN SERPL-MCNC: 73 MG/DL — HIGH (ref 7–23)
CALCIUM SERPL-MCNC: 8.7 MG/DL — SIGNIFICANT CHANGE UP (ref 8.5–10.1)
CHLORIDE SERPL-SCNC: 123 MMOL/L — HIGH (ref 96–108)
CO2 SERPL-SCNC: 22 MMOL/L — SIGNIFICANT CHANGE UP (ref 22–31)
CREAT SERPL-MCNC: 3.04 MG/DL — HIGH (ref 0.5–1.3)
CULTURE RESULTS: SIGNIFICANT CHANGE UP
EOSINOPHIL # BLD AUTO: 0 K/UL — SIGNIFICANT CHANGE UP (ref 0–0.5)
EOSINOPHIL NFR BLD AUTO: 0 % — SIGNIFICANT CHANGE UP (ref 0–6)
ESTIMATED AVERAGE GLUCOSE: 140 MG/DL — HIGH (ref 68–114)
GLUCOSE BLDC GLUCOMTR-MCNC: 164 MG/DL — HIGH (ref 70–99)
GLUCOSE BLDC GLUCOMTR-MCNC: 173 MG/DL — HIGH (ref 70–99)
GLUCOSE BLDC GLUCOMTR-MCNC: 203 MG/DL — HIGH (ref 70–99)
GLUCOSE BLDC GLUCOMTR-MCNC: 219 MG/DL — HIGH (ref 70–99)
GLUCOSE SERPL-MCNC: 180 MG/DL — HIGH (ref 70–99)
GRAM STN FLD: SIGNIFICANT CHANGE UP
GRAM STN FLD: SIGNIFICANT CHANGE UP
HCT VFR BLD CALC: 37.5 % — SIGNIFICANT CHANGE UP (ref 34.5–45)
HGB BLD-MCNC: 12.4 G/DL — SIGNIFICANT CHANGE UP (ref 11.5–15.5)
IMM GRANULOCYTES NFR BLD AUTO: 0.3 % — SIGNIFICANT CHANGE UP (ref 0–1.5)
LACTATE SERPL-SCNC: 0.9 MMOL/L — SIGNIFICANT CHANGE UP (ref 0.7–2)
LYMPHOCYTES # BLD AUTO: 1.2 K/UL — SIGNIFICANT CHANGE UP (ref 1–3.3)
LYMPHOCYTES # BLD AUTO: 8.2 % — LOW (ref 13–44)
MCHC RBC-ENTMCNC: 28.8 PG — SIGNIFICANT CHANGE UP (ref 27–34)
MCHC RBC-ENTMCNC: 33.1 GM/DL — SIGNIFICANT CHANGE UP (ref 32–36)
MCV RBC AUTO: 87.2 FL — SIGNIFICANT CHANGE UP (ref 80–100)
METHOD TYPE: SIGNIFICANT CHANGE UP
MONOCYTES # BLD AUTO: 1.77 K/UL — HIGH (ref 0–0.9)
MONOCYTES NFR BLD AUTO: 12.1 % — SIGNIFICANT CHANGE UP (ref 2–14)
NEUTROPHILS # BLD AUTO: 11.61 K/UL — HIGH (ref 1.8–7.4)
NEUTROPHILS NFR BLD AUTO: 79.1 % — HIGH (ref 43–77)
NRBC # BLD: 0 /100 WBCS — SIGNIFICANT CHANGE UP (ref 0–0)
PLATELET # BLD AUTO: 183 K/UL — SIGNIFICANT CHANGE UP (ref 150–400)
POTASSIUM SERPL-MCNC: 3.1 MMOL/L — LOW (ref 3.5–5.3)
POTASSIUM SERPL-SCNC: 3.1 MMOL/L — LOW (ref 3.5–5.3)
PROCALCITONIN SERPL-MCNC: 0.61 NG/ML — HIGH (ref 0.02–0.1)
PROT SERPL-MCNC: 7.1 GM/DL — SIGNIFICANT CHANGE UP (ref 6–8.3)
RBC # BLD: 4.3 M/UL — SIGNIFICANT CHANGE UP (ref 3.8–5.2)
RBC # FLD: 13.9 % — SIGNIFICANT CHANGE UP (ref 10.3–14.5)
SODIUM SERPL-SCNC: 153 MMOL/L — HIGH (ref 135–145)
SPECIMEN SOURCE: SIGNIFICANT CHANGE UP
WBC # BLD: 14.67 K/UL — HIGH (ref 3.8–10.5)
WBC # FLD AUTO: 14.67 K/UL — HIGH (ref 3.8–10.5)

## 2021-03-14 RX ORDER — FLUCONAZOLE 150 MG/1
200 TABLET ORAL DAILY
Refills: 0 | Status: DISCONTINUED | OUTPATIENT
Start: 2021-03-14 | End: 2021-03-14

## 2021-03-14 RX ORDER — AMLODIPINE BESYLATE 2.5 MG/1
10 TABLET ORAL DAILY
Refills: 0 | Status: DISCONTINUED | OUTPATIENT
Start: 2021-03-14 | End: 2021-03-19

## 2021-03-14 RX ORDER — FLUCONAZOLE 150 MG/1
200 TABLET ORAL DAILY
Refills: 0 | Status: DISCONTINUED | OUTPATIENT
Start: 2021-03-14 | End: 2021-03-19

## 2021-03-14 RX ADMIN — SIMVASTATIN 20 MILLIGRAM(S): 20 TABLET, FILM COATED ORAL at 21:06

## 2021-03-14 RX ADMIN — FLUCONAZOLE 200 MILLIGRAM(S): 150 TABLET ORAL at 17:42

## 2021-03-14 RX ADMIN — AMLODIPINE BESYLATE 10 MILLIGRAM(S): 2.5 TABLET ORAL at 17:49

## 2021-03-14 RX ADMIN — LEVETIRACETAM 400 MILLIGRAM(S): 250 TABLET, FILM COATED ORAL at 17:47

## 2021-03-14 RX ADMIN — TAMSULOSIN HYDROCHLORIDE 0.4 MILLIGRAM(S): 0.4 CAPSULE ORAL at 21:06

## 2021-03-14 RX ADMIN — Medication 4: at 16:51

## 2021-03-14 RX ADMIN — LEVETIRACETAM 400 MILLIGRAM(S): 250 TABLET, FILM COATED ORAL at 05:51

## 2021-03-14 RX ADMIN — Medication 2: at 11:44

## 2021-03-14 RX ADMIN — Medication 2: at 08:02

## 2021-03-14 RX ADMIN — SODIUM CHLORIDE 70 MILLILITER(S): 9 INJECTION, SOLUTION INTRAVENOUS at 17:37

## 2021-03-14 RX ADMIN — HEPARIN SODIUM 5000 UNIT(S): 5000 INJECTION INTRAVENOUS; SUBCUTANEOUS at 17:37

## 2021-03-14 RX ADMIN — SODIUM CHLORIDE 70 MILLILITER(S): 9 INJECTION, SOLUTION INTRAVENOUS at 01:40

## 2021-03-14 RX ADMIN — HEPARIN SODIUM 5000 UNIT(S): 5000 INJECTION INTRAVENOUS; SUBCUTANEOUS at 05:21

## 2021-03-14 RX ADMIN — Medication 1 TABLET(S): at 11:51

## 2021-03-14 RX ADMIN — Medication 81 MILLIGRAM(S): at 11:50

## 2021-03-14 RX ADMIN — Medication 5 MILLIGRAM(S): at 05:21

## 2021-03-14 RX ADMIN — PANTOPRAZOLE SODIUM 40 MILLIGRAM(S): 20 TABLET, DELAYED RELEASE ORAL at 11:50

## 2021-03-14 NOTE — CONSULT NOTE ADULT - ASSESSMENT
fungemia   source likely milner   milner was placed in august 2020   notes reviewed from prev admission   had > 1700 ml retension when milner placed   admitted in august then november 2020 and now with milner related utis   diflucan iv   blood culture   echo   urology follow up for ch milner ?? can we try voiding trial ;   will follow with you thanks   has been on diflucan will need susceptibilty data in order to continue   appears clinically ok   will follow with you thanks

## 2021-03-14 NOTE — PROVIDER CONTACT NOTE (CRITICAL VALUE NOTIFICATION) - SITUATION
Result of blood culture yeast like cells in Aerobic  bottle
Blood culture preliminary result of yeast like cells in Aerobic bottle.

## 2021-03-14 NOTE — CONSULT NOTE ADULT - SUBJECTIVE AND OBJECTIVE BOX
HPI:  71yo, BF with h/o HTN, DM2, CVA, gout, asthma, seizure and urinary incontinence presents today biba from home for evaluation for altered mental status, pt was last seen at her baseline at 7pm, yesterday, pt's sister  (Amaya [phone]) called in and reports that she at baseline communicates very little, she may say yes or no or points, however, this morning she noticed she is not as responsive, in the ER pt is not responsive to verbal stimuli and very little response to painful stimuli and warm to touch, abdomen also noted to be distended and firm to touch, pt is unable to follow any commands.  In ER, Mayberry placed with 2500 ml urine noted.   (13 Mar 2021 16:21)      PAST MEDICAL & SURGICAL HISTORY:  CVA (cerebral infarction)  right side weakness    Vision changes  lt eye    Urinary incontinence    Seizure disorder    Gout    OA (osteoarthritis)  bautista knees, low back  and  bautista shoulders    Asthma    Diabetes    Hypertension    Kidney stone        SOCHX:   tobacco,  -  alcohol    FMHX: FA/MO  - contributory       Recent Travel:    Immunizations:    Allergies    No Known Allergies    Intolerances        MEDICATIONS  (STANDING):  amLODIPine   Tablet 10 milliGRAM(s) Oral daily  aspirin enteric coated 81 milliGRAM(s) Oral daily  cefTRIAXone   IVPB 1000 milliGRAM(s) IV Intermittent every 24 hours  cefTRIAXone   IVPB      dextrose 40% Gel 15 Gram(s) Oral once  dextrose 5% + sodium chloride 0.45%. 1000 milliLiter(s) (70 mL/Hr) IV Continuous <Continuous>  dextrose 5%. 1000 milliLiter(s) (50 mL/Hr) IV Continuous <Continuous>  dextrose 5%. 1000 milliLiter(s) (100 mL/Hr) IV Continuous <Continuous>  dextrose 50% Injectable 25 Gram(s) IV Push once  dextrose 50% Injectable 12.5 Gram(s) IV Push once  dextrose 50% Injectable 25 Gram(s) IV Push once  fluconAZOLE   Tablet 200 milliGRAM(s) Oral daily  glucagon  Injectable 1 milliGRAM(s) IntraMuscular once  heparin   Injectable 5000 Unit(s) SubCutaneous every 12 hours  insulin lispro (ADMELOG) corrective regimen sliding scale   SubCutaneous three times a day before meals  levETIRAcetam  IVPB 750 milliGRAM(s) IV Intermittent every 12 hours  multivitamin 1 Tablet(s) Oral daily  pantoprazole  Injectable 40 milliGRAM(s) IV Push daily  simvastatin 20 milliGRAM(s) Oral at bedtime  tamsulosin 0.4 milliGRAM(s) Oral at bedtime    MEDICATIONS  (PRN):  metoprolol tartrate Injectable 5 milliGRAM(s) IV Push every 6 hours PRN for sbp >160      REVIEW OF SYSTEMS:  CONSTITUTIONAL: No fever, weight loss, or fatigue  EYES: No eye pain, visual disturbances, or discharge  ENMT:  No difficulty hearing, tinnitus, vertigo; No sinus or throat pain  NECK: No pain or stiffness  BREASTS: No pain, masses, or nipple discharge  RESPIRATORY: No cough, wheezing, chills or hemoptysis; No shortness of breath  CARDIOVASCULAR: No chest pain, palpitations, dizziness, or leg swelling  GASTROINTESTINAL: No abdominal or epigastric pain. No nausea, vomiting, or hematemesis; No diarrhea or constipation. No melena or hematochezia.  GENITOURINARY: No dysuria, frequency, hematuria, or incontinence  NEUROLOGICAL: No headaches, memory loss, loss of strength, numbness, or tremors  SKIN: No itching, burning, rashes, or lesions   LYMPH NODES: No enlarged glands  ENDOCRINE: No heat or cold intolerance; No hair loss  MUSCULOSKELETAL: No joint pain or swelling; No muscle, back, or extremity pain  PSYCHIATRIC: No depression, anxiety, mood swings, or difficulty sleeping  HEME/LYMPH: No easy bruising, or bleeding gums  ALLERGY AND IMMUNOLOGIC: No hives or eczema    VITAL SIGNS:    T(C): 37 (21 @ 16:18), Max: 37.1 (21 @ 00:16)  T(F): 98.6 (21 @ 16:18), Max: 98.8 (21 @ 00:16)  HR: 87 (21 @ 16:18) (69 - 87)  BP: 146/66 (21 @ 16:18) (146/66 - 178/70)  RR: 17 (21 @ 16:18) (17 - 18)  SpO2: 100% (21 @ 16:18) (99% - 100%)    PHYSICAL EXAM:    GENERAL: NAD, well-groomed, well-developed  HEAD:  Atraumatic, Normocephalic  EYES: EOMI, PERRLA, conjunctiva and sclera clear  ENMT: No tonsillar erythema, exudates, or enlargement; Moist mucous membranes, Good dentition, No lesions  NECK: Supple, No JVD, Normal thyroid  NERVOUS SYSTEM:  Alert & Oriented X3, Good concentration; Motor Strength 5/5 B/L upper and lower extremities; DTRs 2+ intact and symmetric  CHEST/LUNG: Clear to auscultation bilaterally; No rales, rhonchi, wheezing bilaterally  HEART: Regular rate and rhythm; No murmurs, rubs, or gallops  ABDOMEN: Soft, Nontender, Nondistended; Bowel sounds present  EXTREMITIES:  2+ Peripheral Pulses, No clubbing, cyanosis, or edema  LYMPH: No lymphadenopathy noted  SKIN: No rashes or lesions  BACK: no pressor sore     LABS:                         12.4   14.67 )-----------( 183      ( 14 Mar 2021 06:35 )             37.5     03-14    153<H>  |  123<H>  |  73<H>  ----------------------------<  180<H>  3.1<L>   |  22  |  3.04<H>    Ca    8.7      14 Mar 2021 06:35    TPro  7.1  /  Alb  2.5<L>  /  TBili  0.6  /  DBili  x   /  AST  17  /  ALT  31  /  AlkPhos  64  03-14    LIVER FUNCTIONS - ( 14 Mar 2021 06:35 )  Alb: 2.5 g/dL / Pro: 7.1 gm/dL / ALK PHOS: 64 U/L / ALT: 31 U/L / AST: 17 U/L / GGT: x           PT/INR - ( 13 Mar 2021 09:39 )   PT: 13.1 sec;   INR: 1.14 ratio         PTT - ( 13 Mar 2021 09:39 )  PTT:29.0 sec  Urinalysis Basic - ( 13 Mar 2021 09:32 )    Color: Yellow / Appearance: Clear / S.015 / pH: x  Gluc: x / Ketone: Negative  / Bili: Negative / Urobili: Negative mg/dL   Blood: x / Protein: 500 mg/dL / Nitrite: Negative   Leuk Esterase: Negative / RBC: 0-2 /HPF / WBC 6-10   Sq Epi: x / Non Sq Epi: x / Bacteria: x        CARDIAC MARKERS ( 13 Mar 2021 09:39 )  .033 ng/mL / x     / x     / x     / x                        Culture Results:   Growth in aerobic bottle: Yeast like cells ( @ 12:17)  Culture Results:   Growth in aerobic bottle: Yeast like cells  ***Blood Panel PCR results on this specimen are available  approximately 3 hours after the Gram stain result.***  Gram stain, PCR, and/or culture results may not always  correspond due to difference in methodologies.  ************************************************************  This PCR assay was performed by multiplex PCR. This  Assay tests for 66 bacterial and resistance gene targets.  Please refer to the [facility] test directory  at [email or web address] for details. ( @ 12:17)                Radiology:       HPI:  71yo, BF with h/o HTN, DM2, CVA, gout, asthma, seizure and urinary incontinence presents today biba from home for evaluation for altered mental status, pt was last seen at her baseline at 7pm, yesterday, pt's sister  (Amaya [phone]) called in and reports that she at baseline communicates very little, she may say yes or no or points, however, this morning she noticed she is not as responsive, in the ER pt is not responsive to verbal stimuli and very little response to painful stimuli and warm to touch, abdomen also noted to be distended and firm to touch, pt is unable to follow any commands.  In ER, Milner placed with 2500 ml urine noted.   (13 Mar 2021 16:21)      PAST MEDICAL & SURGICAL HISTORY:  CVA (cerebral infarction)  right side weakness    Vision changes  lt eye    Urinary incontinence    Seizure disorder    Gout    OA (osteoarthritis)  bautista knees, low back  and  bautista shoulders    Asthma    Diabetes    Hypertension    Kidney stone        SOCHX: unable to assess    tobacco,  -  alcohol    FMHX: FA/MO  -non  contributory       Recent Travel:    Immunizations:    Allergies    No Known Allergies    Intolerances        MEDICATIONS  (STANDING):  amLODIPine   Tablet 10 milliGRAM(s) Oral daily  aspirin enteric coated 81 milliGRAM(s) Oral daily  cefTRIAXone   IVPB 1000 milliGRAM(s) IV Intermittent every 24 hours  cefTRIAXone   IVPB      dextrose 40% Gel 15 Gram(s) Oral once  dextrose 5% + sodium chloride 0.45%. 1000 milliLiter(s) (70 mL/Hr) IV Continuous <Continuous>  dextrose 5%. 1000 milliLiter(s) (50 mL/Hr) IV Continuous <Continuous>  dextrose 5%. 1000 milliLiter(s) (100 mL/Hr) IV Continuous <Continuous>  dextrose 50% Injectable 25 Gram(s) IV Push once  dextrose 50% Injectable 12.5 Gram(s) IV Push once  dextrose 50% Injectable 25 Gram(s) IV Push once  fluconAZOLE   Tablet 200 milliGRAM(s) Oral daily  glucagon  Injectable 1 milliGRAM(s) IntraMuscular once  heparin   Injectable 5000 Unit(s) SubCutaneous every 12 hours  insulin lispro (ADMELOG) corrective regimen sliding scale   SubCutaneous three times a day before meals  levETIRAcetam  IVPB 750 milliGRAM(s) IV Intermittent every 12 hours  multivitamin 1 Tablet(s) Oral daily  pantoprazole  Injectable 40 milliGRAM(s) IV Push daily  simvastatin 20 milliGRAM(s) Oral at bedtime  tamsulosin 0.4 milliGRAM(s) Oral at bedtime    MEDICATIONS  (PRN):  metoprolol tartrate Injectable 5 milliGRAM(s) IV Push every 6 hours PRN for sbp >160      REVIEW OF SYSTEMS:  unable to assess from patient   notes reviewed  VITAL SIGNS:    T(C): 37 (21 @ 16:18), Max: 37.1 (21 @ 00:16)  T(F): 98.6 (21 @ 16:18), Max: 98.8 (21 @ 00:16)  HR: 87 (21 @ 16:18) (69 - 87)  BP: 146/66 (21 @ 16:18) (146/66 - 178/70)  RR: 17 (21 @ 16:18) (17 - 18)  SpO2: 100% (21 @ 16:18) (99% - 100%)    PHYSICAL EXAM:    GENERAL: NAD, well-groomed, well-developed  HEAD:  Atraumatic, Normocephalic  EYES: EOMI, PERRLA, conjunctiva and sclera clear  ENMT: No tonsillar erythema, exudates, or enlargement   NECK: Supple, No JVD, Normal thyroid  NERVOUS SYSTEM:  Alert & responsive ; non vebal   CHEST/LUNG: Clear to auscultation bilaterally; No rales, rhonchi, wheezing bilaterally  HEART: Regular rate and rhythm; No murmurs, rubs, or gallops  ABDOMEN: Soft, Nontender, Nondistended; Bowel sounds present  EXTREMITIES:  2+ Peripheral Pulses, No clubbing, cyanosis, or edema  LYMPH: No lymphadenopathy noted  SKIN: No rashes or lesions  BACK: no pressor sore   milner plus   LABS:                         12.4   14.67 )-----------( 183      ( 14 Mar 2021 06:35 )             37.5     03-14    153<H>  |  123<H>  |  73<H>  ----------------------------<  180<H>  3.1<L>   |  22  |  3.04<H>    Ca    8.7      14 Mar 2021 06:35    TPro  7.1  /  Alb  2.5<L>  /  TBili  0.6  /  DBili  x   /  AST  17  /  ALT  31  /  AlkPhos  64  03-14    LIVER FUNCTIONS - ( 14 Mar 2021 06:35 )  Alb: 2.5 g/dL / Pro: 7.1 gm/dL / ALK PHOS: 64 U/L / ALT: 31 U/L / AST: 17 U/L / GGT: x           PT/INR - ( 13 Mar 2021 09:39 )   PT: 13.1 sec;   INR: 1.14 ratio         PTT - ( 13 Mar 2021 09:39 )  PTT:29.0 sec  Urinalysis Basic - ( 13 Mar 2021 09:32 )    Color: Yellow / Appearance: Clear / S.015 / pH: x  Gluc: x / Ketone: Negative  / Bili: Negative / Urobili: Negative mg/dL   Blood: x / Protein: 500 mg/dL / Nitrite: Negative   Leuk Esterase: Negative / RBC: 0-2 /HPF / WBC 6-10   Sq Epi: x / Non Sq Epi: x / Bacteria: x        CARDIAC MARKERS ( 13 Mar 2021 09:39 )  .033 ng/mL / x     / x     / x     / x                        Culture Results:   Growth in aerobic bottle: Yeast like cells ( @ 12:17)  Culture Results:   Growth in aerobic bottle: Yeast like cells  ***Blood Panel PCR results on this specimen are available  approximately 3 hours after the Gram stain result.***  Gram stain, PCR, and/or culture results may not always  correspond due to difference in methodologies.  ************************************************************  This PCR assay was performed by multiplex PCR. This  Assay tests for 66 bacterial and resistance gene targets.  Please refer to the [facility] test directory  at [email or web address] for details. ( @ 12:17)                Radiology:      t< from: CT Abdomen and Pelvis No Cont (21 @ 11:17) >  IMPRESSION:  Mild bilateral hydroureteronephrosis without definite obstructing stone identified. However, there are multiple bilateral nonobstructing renal calculi, as described above.    Urinary bladder is collapsed around a Milner catheter. Small stone within the urinary bladder may present a recently passed stone.    Bulky, fibroid uterus.    Additional findings as above.          < end of copied text >

## 2021-03-15 LAB
ANION GAP SERPL CALC-SCNC: 8 MMOL/L — SIGNIFICANT CHANGE UP (ref 5–17)
BUN SERPL-MCNC: 39 MG/DL — HIGH (ref 7–23)
CALCIUM SERPL-MCNC: 8.5 MG/DL — SIGNIFICANT CHANGE UP (ref 8.5–10.1)
CHLORIDE SERPL-SCNC: 122 MMOL/L — HIGH (ref 96–108)
CO2 SERPL-SCNC: 24 MMOL/L — SIGNIFICANT CHANGE UP (ref 22–31)
CREAT SERPL-MCNC: 1.67 MG/DL — HIGH (ref 0.5–1.3)
CULTURE RESULTS: SIGNIFICANT CHANGE UP
GLUCOSE BLDC GLUCOMTR-MCNC: 180 MG/DL — HIGH (ref 70–99)
GLUCOSE BLDC GLUCOMTR-MCNC: 206 MG/DL — HIGH (ref 70–99)
GLUCOSE BLDC GLUCOMTR-MCNC: 246 MG/DL — HIGH (ref 70–99)
GLUCOSE BLDC GLUCOMTR-MCNC: 318 MG/DL — HIGH (ref 70–99)
GLUCOSE SERPL-MCNC: 259 MG/DL — HIGH (ref 70–99)
GRAM STN FLD: SIGNIFICANT CHANGE UP
HCT VFR BLD CALC: 36.7 % — SIGNIFICANT CHANGE UP (ref 34.5–45)
HGB BLD-MCNC: 11.9 G/DL — SIGNIFICANT CHANGE UP (ref 11.5–15.5)
MCHC RBC-ENTMCNC: 28.5 PG — SIGNIFICANT CHANGE UP (ref 27–34)
MCHC RBC-ENTMCNC: 32.4 GM/DL — SIGNIFICANT CHANGE UP (ref 32–36)
MCV RBC AUTO: 87.8 FL — SIGNIFICANT CHANGE UP (ref 80–100)
NRBC # BLD: 0 /100 WBCS — SIGNIFICANT CHANGE UP (ref 0–0)
ORGANISM # SPEC MICROSCOPIC CNT: SIGNIFICANT CHANGE UP
ORGANISM # SPEC MICROSCOPIC CNT: SIGNIFICANT CHANGE UP
PLATELET # BLD AUTO: 183 K/UL — SIGNIFICANT CHANGE UP (ref 150–400)
POTASSIUM SERPL-MCNC: 3 MMOL/L — LOW (ref 3.5–5.3)
POTASSIUM SERPL-SCNC: 3 MMOL/L — LOW (ref 3.5–5.3)
RBC # BLD: 4.18 M/UL — SIGNIFICANT CHANGE UP (ref 3.8–5.2)
RBC # FLD: 13.6 % — SIGNIFICANT CHANGE UP (ref 10.3–14.5)
SODIUM SERPL-SCNC: 154 MMOL/L — HIGH (ref 135–145)
SPECIMEN SOURCE: SIGNIFICANT CHANGE UP
WBC # BLD: 9.02 K/UL — SIGNIFICANT CHANGE UP (ref 3.8–10.5)
WBC # FLD AUTO: 9.02 K/UL — SIGNIFICANT CHANGE UP (ref 3.8–10.5)

## 2021-03-15 PROCEDURE — 93306 TTE W/DOPPLER COMPLETE: CPT | Mod: 26

## 2021-03-15 PROCEDURE — 99232 SBSQ HOSP IP/OBS MODERATE 35: CPT

## 2021-03-15 RX ORDER — METOPROLOL TARTRATE 50 MG
25 TABLET ORAL
Refills: 0 | Status: DISCONTINUED | OUTPATIENT
Start: 2021-03-15 | End: 2021-03-16

## 2021-03-15 RX ORDER — POTASSIUM CHLORIDE 20 MEQ
40 PACKET (EA) ORAL EVERY 4 HOURS
Refills: 0 | Status: COMPLETED | OUTPATIENT
Start: 2021-03-15 | End: 2021-03-15

## 2021-03-15 RX ORDER — SODIUM CHLORIDE 9 MG/ML
1000 INJECTION, SOLUTION INTRAVENOUS
Refills: 0 | Status: DISCONTINUED | OUTPATIENT
Start: 2021-03-15 | End: 2021-03-19

## 2021-03-15 RX ADMIN — Medication 4: at 18:28

## 2021-03-15 RX ADMIN — LEVETIRACETAM 400 MILLIGRAM(S): 250 TABLET, FILM COATED ORAL at 06:14

## 2021-03-15 RX ADMIN — Medication 4: at 07:59

## 2021-03-15 RX ADMIN — SODIUM CHLORIDE 60 MILLILITER(S): 9 INJECTION, SOLUTION INTRAVENOUS at 15:04

## 2021-03-15 RX ADMIN — FLUCONAZOLE 200 MILLIGRAM(S): 150 TABLET ORAL at 11:56

## 2021-03-15 RX ADMIN — SIMVASTATIN 20 MILLIGRAM(S): 20 TABLET, FILM COATED ORAL at 21:17

## 2021-03-15 RX ADMIN — HEPARIN SODIUM 5000 UNIT(S): 5000 INJECTION INTRAVENOUS; SUBCUTANEOUS at 06:15

## 2021-03-15 RX ADMIN — HEPARIN SODIUM 5000 UNIT(S): 5000 INJECTION INTRAVENOUS; SUBCUTANEOUS at 18:30

## 2021-03-15 RX ADMIN — Medication 25 MILLIGRAM(S): at 18:25

## 2021-03-15 RX ADMIN — PANTOPRAZOLE SODIUM 40 MILLIGRAM(S): 20 TABLET, DELAYED RELEASE ORAL at 12:03

## 2021-03-15 RX ADMIN — TAMSULOSIN HYDROCHLORIDE 0.4 MILLIGRAM(S): 0.4 CAPSULE ORAL at 21:17

## 2021-03-15 RX ADMIN — Medication 8: at 11:57

## 2021-03-15 RX ADMIN — Medication 40 MILLIEQUIVALENT(S): at 15:00

## 2021-03-15 RX ADMIN — Medication 40 MILLIEQUIVALENT(S): at 18:24

## 2021-03-15 RX ADMIN — Medication 40 MILLIEQUIVALENT(S): at 11:56

## 2021-03-15 RX ADMIN — Medication 1 TABLET(S): at 11:56

## 2021-03-15 RX ADMIN — Medication 81 MILLIGRAM(S): at 11:55

## 2021-03-15 RX ADMIN — LEVETIRACETAM 400 MILLIGRAM(S): 250 TABLET, FILM COATED ORAL at 18:29

## 2021-03-15 RX ADMIN — AMLODIPINE BESYLATE 10 MILLIGRAM(S): 2.5 TABLET ORAL at 06:15

## 2021-03-15 NOTE — PROGRESS NOTE ADULT - ATTENDING COMMENTS
ID following; sepsis from urinary tract infection likely  has candida albicans   no stigmata of infectious endocarditis   await repeat blood culture   echo   has urinary retension   mantain milner   ? gu input     consult  K replacement  PT  Continue current care and treatment.

## 2021-03-15 NOTE — CONSULT NOTE ADULT - SUBJECTIVE AND OBJECTIVE BOX
UROLOGY CONSULT NOTE    HPI:  71yo, BF with h/o HTN, DM2, CVA, gout, asthma, seizure and urinary incontinence presents today biba from home for evaluation for altered mental status, pt was last seen at her baseline at 7pm, yesterday, pt's sister  (Amaya [phone]) called in and reports that she at baseline communicates very little, she may say yes or no or points, however, this morning she noticed she is not as responsive, in the ER pt is not responsive to verbal stimuli and very little response to painful stimuli and warm to touch, abdomen also noted to be distended and firm to touch, pt is unable to follow any commands.  In ER, Mayberry placed with 2500 ml urine noted.   (13 Mar 2021 16:21)      PAST MEDICAL & SURGICAL HISTORY:  CVA (cerebral infarction)  right side weakness  Vision changes  lt eye  Urinary incontinence  Seizure disorder  Gout  OA (osteoarthritis)  bautista knees, low back  and  bautista shoulders  Asthma  Diabetes  Hypertension  Kidney stone    Review of Systems:  Negative unless otherwise stated in HPI    MEDICATIONS  (STANDING):  amLODIPine   Tablet 10 milliGRAM(s) Oral daily  aspirin enteric coated 81 milliGRAM(s) Oral daily  dextrose 40% Gel 15 Gram(s) Oral once  dextrose 5%. 1000 milliLiter(s) (50 mL/Hr) IV Continuous <Continuous>  dextrose 5%. 1000 milliLiter(s) (100 mL/Hr) IV Continuous <Continuous>  dextrose 5%. 1000 milliLiter(s) (60 mL/Hr) IV Continuous <Continuous>  dextrose 50% Injectable 25 Gram(s) IV Push once  dextrose 50% Injectable 12.5 Gram(s) IV Push once  dextrose 50% Injectable 25 Gram(s) IV Push once  fluconAZOLE   Tablet 200 milliGRAM(s) Oral daily  glucagon  Injectable 1 milliGRAM(s) IntraMuscular once  heparin   Injectable 5000 Unit(s) SubCutaneous every 12 hours  insulin lispro (ADMELOG) corrective regimen sliding scale   SubCutaneous three times a day before meals  levETIRAcetam  IVPB 750 milliGRAM(s) IV Intermittent every 12 hours  metoprolol tartrate 25 milliGRAM(s) Oral two times a day  multivitamin 1 Tablet(s) Oral daily  pantoprazole  Injectable 40 milliGRAM(s) IV Push daily  potassium chloride   Powder 40 milliEquivalent(s) Oral every 4 hours  simvastatin 20 milliGRAM(s) Oral at bedtime  tamsulosin 0.4 milliGRAM(s) Oral at bedtime    MEDICATIONS  (PRN):      Allergies  No Known Allergies    SOCIAL HISTORY          Smoking: Yes [ ]  No [x]   ______pk yrs          ETOH  Yes [ ]  No [x]  Social [ ]          DRUGS:  Yes [ ]  No [x]  if so what______________    FAMILY HISTORY:  No pertinent family history in first degree relatives    Vital Signs Last 24 Hrs  T(C): 37.1 (15 Mar 2021 11:49), Max: 37.6 (15 Mar 2021 04:57)  T(F): 98.8 (15 Mar 2021 11:49), Max: 99.7 (15 Mar 2021 04:57)  HR: 104 (15 Mar 2021 11:49) (72 - 104)  BP: 158/75 (15 Mar 2021 11:49) (146/66 - 159/72)  BP(mean): --  RR: 16 (15 Mar 2021 11:49) (16 - 18)  SpO2: 100% (15 Mar 2021 11:49) (98% - 100%)    Physical Exam:    General:  Appears stated age, well-groomed, well-nourished, no distress  Eyes : NATALIA  HENT:  WNL, no JVD  Chest:  clear breath sounds  Cardiovascular:  Regular rate & rhythm  Abdomen:    :  Extremities:    Skin:    Musculoskeletal:    Neuro/Psych:        LABS:                        11.9   9.02  )-----------( 183      ( 15 Mar 2021 07:24 )             36.7     03-15    154<H>  |  122<H>  |  39<H>  ----------------------------<  259<H>  3.0<L>   |  24  |  1.67<H>    Ca    8.5      15 Mar 2021 07:24    TPro  7.1  /  Alb  2.5<L>  /  TBili  0.6  /  DBili  x   /  AST  17  /  ALT  31  /  AlkPhos  64  03-14          RADIOLOGY & ADDITIONAL STUDIES: UROLOGY CONSULT NOTE    HPI:  71yo, BF with h/o HTN, DM2, CVA, gout, asthma, seizure and urinary incontinence presented to the ED two days ago from home for evaluation for altered mental status. Pt was last seen at her baseline at 7pm, the day before. Pt's sister  (Amaya [phone]) called in and reports that she at baseline communicates very little. She may say yes or no or points, however, she noticed she is not as responsive, in the ER pt was not responsive to verbal stimuli and very little response to painful stimuli and warm to touch, abdomen also noted to be distended and firm to touch. Pt is unable to follow any commands.  In ER, Milner placed with 2500 ml urine noted.    Urology consulted for UTI. Patient is non verbal when seen, but makes eye contact. Patient has been admitted in the past for UTI, obstructing renal stones and has undergone laser lithotripsy with stent placement. Most recently Aug 24, 2020. Stent was removed in the outpatient setting two weeks later.   Milner catheter in place from ER with cloudy yellow urine output with sediment. UO:1800cc/24hrs. Ucx from 3/13 grew candida albicans, patient started on diflucan.   Labs showed elevated WBC on admission of 21--> now 9  Cr on admission 6.27--> now 1.67 (baseline of 1.02 on previous admission)  CT abd and pelvis shows "mild bilateral hydroureteronephrosis. Conglomerate of nonobstructing stones within the left renal pelvis measuring up to 1.3 cm. Multiple additional bilateral nonobstructing renal calculi largest on the right measures 7 mm. Right renal cyst as well as bilateral subcentimeter hypodensities, too small to characterize. Heterogeneous, fibroid uterus with multiple calcified fibroids. Punctate calcification within the urinary bladder."      PAST MEDICAL & SURGICAL HISTORY:  CVA (cerebral infarction)  right side weakness  Vision changes-lt eye  Urinary incontinence  Seizure disorder  Gout  OA (osteoarthritis)  bautista knees, low back  and  bautista shoulders  Asthma  Diabetes  Hypertension  Kidney stone    Review of Systems:  Negative unless otherwise stated in HPI    MEDICATIONS  (STANDING):  amLODIPine   Tablet 10 milliGRAM(s) Oral daily  aspirin enteric coated 81 milliGRAM(s) Oral daily  dextrose 40% Gel 15 Gram(s) Oral once  dextrose 5%. 1000 milliLiter(s) (50 mL/Hr) IV Continuous <Continuous>  dextrose 5%. 1000 milliLiter(s) (100 mL/Hr) IV Continuous <Continuous>  dextrose 5%. 1000 milliLiter(s) (60 mL/Hr) IV Continuous <Continuous>  dextrose 50% Injectable 25 Gram(s) IV Push once  dextrose 50% Injectable 12.5 Gram(s) IV Push once  dextrose 50% Injectable 25 Gram(s) IV Push once  fluconAZOLE   Tablet 200 milliGRAM(s) Oral daily  glucagon  Injectable 1 milliGRAM(s) IntraMuscular once  heparin   Injectable 5000 Unit(s) SubCutaneous every 12 hours  insulin lispro (ADMELOG) corrective regimen sliding scale   SubCutaneous three times a day before meals  levETIRAcetam  IVPB 750 milliGRAM(s) IV Intermittent every 12 hours  metoprolol tartrate 25 milliGRAM(s) Oral two times a day  multivitamin 1 Tablet(s) Oral daily  pantoprazole  Injectable 40 milliGRAM(s) IV Push daily  potassium chloride   Powder 40 milliEquivalent(s) Oral every 4 hours  simvastatin 20 milliGRAM(s) Oral at bedtime  tamsulosin 0.4 milliGRAM(s) Oral at bedtime    MEDICATIONS  (PRN):      Allergies  No Known Allergies    SOCIAL HISTORY          Smoking: Yes [ ]  No [x]   ______pk yrs          ETOH  Yes [ ]  No [x]  Social [ ]          DRUGS:  Yes [ ]  No [x]  if so what______________    FAMILY HISTORY:  No pertinent family history in first degree relatives    Vital Signs Last 24 Hrs  T(C): 37.1 (15 Mar 2021 11:49), Max: 37.6 (15 Mar 2021 04:57)  T(F): 98.8 (15 Mar 2021 11:49), Max: 99.7 (15 Mar 2021 04:57)  HR: 104 (15 Mar 2021 11:49) (72 - 104)  BP: 158/75 (15 Mar 2021 11:49) (146/66 - 159/72)  BP(mean): --  RR: 16 (15 Mar 2021 11:49) (16 - 18)  SpO2: 100% (15 Mar 2021 11:49) (98% - 100%)    Physical Exam:    General:  Appears stated age, well-groomed, well-nourished, no distress  Eyes : NATALIA  HENT:  WNL, no JVD  Chest:  clear breath sounds  Cardiovascular:  Regular rate & rhythm  Abdomen: soft, NT ND  : Indwelling milner in place draining clear yellow urine, with visible sediment. No suprapubic pain tenderness. No bladder distention, no CVA tenderness  Extremities:  bedbound, contracted  Skin:  warm, dry, unremarkable   Musculoskeletal:  no joint swelling, tenderness, redness  Neuro/Psych:  Non verbal       LABS:                        11.9   9.02  )-----------( 183      ( 15 Mar 2021 07:24 )             36.7     03-15    154<H>  |  122<H>  |  39<H>  ----------------------------<  259<H>  3.0<L>   |  24  |  1.67<H>    Ca    8.5      15 Mar 2021 07:24    TPro  7.1  /  Alb  2.5<L>  /  TBili  0.6  /  DBili  x   /  AST  17  /  ALT  31  /  AlkPhos  64  03-14          RADIOLOGY & ADDITIONAL STUDIES:  EXAM:  CT ABDOMEN AND PELVIS                            PROCEDURE DATE:  03/13/2021          INTERPRETATION:  CLINICAL INFORMATION: Abdominal distention, fever to palpation, evaluate for mass    COMPARISON: 11/01/2020    CONTRAST/COMPLICATIONS:  IVContrast: None / / .  Oral Contrast: None  Complications: None    PROCEDURE:  CT of the Abdomen and Pelvis was performed.  Sagittal and coronal reformats were performed.    FINDINGS:  LOWER CHEST: Faint groundglass opacities at the lung bases likely represents atelectasis. Heart size is mildly enlarged.    Evaluation of the abdominal organs is somewhat limited without intravenous contrast  LIVER: Within normal limits.  BILE DUCTS: Normal caliber.  GALLBLADDER: Within normal limits.  SPLEEN: Within normal limits.  PANCREAS: Within normal limits.  ADRENALS: 1.5 cm right adrenal lesion, previously characterized as an adenoma. Mild nonspecific thickening of the left adrenal gland.  KIDNEYS/URETERS: Mild bilateral hydroureteronephrosis. Conglomerate of nonobstructing stones within the left renal pelvis measuring up to 1.3 cm. Multiple additional bilateral nonobstructing renal calculi largest on the right measures 7 mm. Right renal cyst as well as bilateral subcentimeter hypodensities, too small to characterize.    BLADDER: Collapsed around a Milner catheter. Punctate calcification within the urinary bladder.  REPRODUCTIVE ORGANS: Heterogeneous, fibroid uterus with multiple calcified fibroids.    BOWEL: No bowel obstruction. Large amount of stool within the proximal colon.  PERITONEUM: Trace pelvic ascites, nonspecific.  VESSELS: Marked atherosclerotic changes of the aorta and branching vessels.  RETROPERITONEUM/LYMPH NODES: No lymphadenopathy.  ABDOMINAL WALL: Mild anasarca.  BONES: Mild degenerative changes of the spine.    IMPRESSION:  Mild bilateral hydroureteronephrosis without definite obstructing stone identified. However, there are multiple bilateral nonobstructing renal calculi, as described above.    Urinary bladder is collapsed around a Milner catheter. Small stone within the urinary bladder may present a recently passed stone.    Bulky, fibroid uterus.    Additional findings as above.   UROLOGY CONSULT NOTE    HPI:  69yo, BF with h/o HTN, DM2, CVA, gout, asthma, seizure and urinary incontinence presented to the ED two days ago from home for evaluation for altered mental status. Pt was last seen at her baseline at 7pm, the day before. Pt's sister  (Amaya [phone]) called in and reports that she at baseline communicates very little. She may say yes or no or points, however, she noticed she is not as responsive, in the ER pt was not responsive to verbal stimuli and very little response to painful stimuli and warm to touch, abdomen also noted to be distended and firm to touch. Pt is unable to follow any commands.  In ER, Milner placed with 2500 ml urine noted.    Urology consulted for UTI. Patient is non verbal when seen, but makes eye contact. Patient has been admitted in the past for UTI, obstructing renal stones and has undergone laser lithotripsy with stent placement. Most recently Aug 24, 2020. Stent was removed in the outpatient setting two weeks later.   Milner catheter in place from ER with cloudy yellow urine output with sediment. UO:1800cc/24hrs. Ucx from 3/13 grew candida albicans, patient started on diflucan.   Labs showed elevated WBC on admission of 21--> now 9  Cr on admission 6.27--> now 1.67 (baseline of 1.02 on previous admission)  CT abd and pelvis shows "mild bilateral hydroureteronephrosis. Conglomerate of nonobstructing stones within the left renal pelvis measuring up to 1.3 cm. Multiple additional bilateral nonobstructing renal calculi largest on the right measures 7 mm. Right renal cyst as well as bilateral subcentimeter hypodensities, too small to characterize. Heterogeneous, fibroid uterus with multiple calcified fibroids. Punctate calcification within the urinary bladder."      PAST MEDICAL & SURGICAL HISTORY:  CVA (cerebral infarction)  right side weakness  Vision changes-lt eye  Urinary incontinence  Seizure disorder  Gout  OA (osteoarthritis)  bautista knees, low back  and  bautista shoulders  Asthma  Diabetes  Hypertension  Kidney stone    Review of Systems:  Negative unless otherwise stated in HPI    MEDICATIONS  (STANDING):  amLODIPine   Tablet 10 milliGRAM(s) Oral daily  aspirin enteric coated 81 milliGRAM(s) Oral daily  dextrose 40% Gel 15 Gram(s) Oral once  dextrose 5%. 1000 milliLiter(s) (50 mL/Hr) IV Continuous <Continuous>  dextrose 5%. 1000 milliLiter(s) (100 mL/Hr) IV Continuous <Continuous>  dextrose 5%. 1000 milliLiter(s) (60 mL/Hr) IV Continuous <Continuous>  dextrose 50% Injectable 25 Gram(s) IV Push once  dextrose 50% Injectable 12.5 Gram(s) IV Push once  dextrose 50% Injectable 25 Gram(s) IV Push once  fluconAZOLE   Tablet 200 milliGRAM(s) Oral daily  glucagon  Injectable 1 milliGRAM(s) IntraMuscular once  heparin   Injectable 5000 Unit(s) SubCutaneous every 12 hours  insulin lispro (ADMELOG) corrective regimen sliding scale   SubCutaneous three times a day before meals  levETIRAcetam  IVPB 750 milliGRAM(s) IV Intermittent every 12 hours  metoprolol tartrate 25 milliGRAM(s) Oral two times a day  multivitamin 1 Tablet(s) Oral daily  pantoprazole  Injectable 40 milliGRAM(s) IV Push daily  potassium chloride   Powder 40 milliEquivalent(s) Oral every 4 hours  simvastatin 20 milliGRAM(s) Oral at bedtime  tamsulosin 0.4 milliGRAM(s) Oral at bedtime    MEDICATIONS  (PRN):      Allergies  No Known Allergies    SOCIAL HISTORY          Smoking: Yes [ ]  No [x]   ______pk yrs          ETOH  Yes [ ]  No [x]  Social [ ]          DRUGS:  Yes [ ]  No [x]  if so what______________    FAMILY HISTORY:  No pertinent family history in first degree relatives    Vital Signs Last 24 Hrs  T(C): 37.1 (15 Mar 2021 11:49), Max: 37.6 (15 Mar 2021 04:57)  T(F): 98.8 (15 Mar 2021 11:49), Max: 99.7 (15 Mar 2021 04:57)  HR: 104 (15 Mar 2021 11:49) (72 - 104)  BP: 158/75 (15 Mar 2021 11:49) (146/66 - 159/72)  BP(mean): --  RR: 16 (15 Mar 2021 11:49) (16 - 18)  SpO2: 100% (15 Mar 2021 11:49) (98% - 100%)    Physical Exam:    General:  Appears stated age, well-groomed, well-nourished, no distress  Eyes : NATALIA  HENT:  WNL, no JVD  Chest:  clear breath sounds  Cardiovascular:  Regular rate & rhythm  Abdomen: soft, NT ND  : Indwelling milner in place draining clear yellow urine, with visible sediment. No suprapubic pain tenderness. No bladder distention, no CVA tenderness  Extremities:  bedbound, contracted  Skin:  warm, dry, unremarkable   Musculoskeletal:  no joint swelling, tenderness, redness  Neuro/Psych:  Non verbal       LABS:                        11.9   9.02  )-----------( 183      ( 15 Mar 2021 07:24 )             36.7     03-15    154<H>  |  122<H>  |  39<H>  ----------------------------<  259<H>  3.0<L>   |  24  |  1.67<H>    Ca    8.5      15 Mar 2021 07:24    TPro  7.1  /  Alb  2.5<L>  /  TBili  0.6  /  DBili  x   /  AST  17  /  ALT  31  /  AlkPhos  64  03-14          RADIOLOGY & ADDITIONAL STUDIES:  EXAM:  CT ABDOMEN AND PELVIS                            PROCEDURE DATE:  03/13/2021          INTERPRETATION:  CLINICAL INFORMATION: Abdominal distention, fever to palpation, evaluate for mass    COMPARISON: 11/01/2020    CONTRAST/COMPLICATIONS:  IVContrast: None / / .  Oral Contrast: None  Complications: None    PROCEDURE:  CT of the Abdomen and Pelvis was performed.  Sagittal and coronal reformats were performed.    FINDINGS:  LOWER CHEST: Faint groundglass opacities at the lung bases likely represents atelectasis. Heart size is mildly enlarged.    Evaluation of the abdominal organs is somewhat limited without intravenous contrast  LIVER: Within normal limits.  BILE DUCTS: Normal caliber.  GALLBLADDER: Within normal limits.  SPLEEN: Within normal limits.  PANCREAS: Within normal limits.  ADRENALS: 1.5 cm right adrenal lesion, previously characterized as an adenoma. Mild nonspecific thickening of the left adrenal gland.  KIDNEYS/URETERS: Mild bilateral hydroureteronephrosis. Conglomerate of nonobstructing stones within the left renal pelvis measuring up to 1.3 cm. Multiple additional bilateral nonobstructing renal calculi largest on the right measures 7 mm. Right renal cyst as well as bilateral subcentimeter hypodensities, too small to characterize.    BLADDER: Collapsed around a Milner catheter. Punctate calcification within the urinary bladder.  REPRODUCTIVE ORGANS: Heterogeneous, fibroid uterus with multiple calcified fibroids.    BOWEL: No bowel obstruction. Large amount of stool within the proximal colon.  PERITONEUM: Trace pelvic ascites, nonspecific.  VESSELS: Marked atherosclerotic changes of the aorta and branching vessels.  RETROPERITONEUM/LYMPH NODES: No lymphadenopathy.  ABDOMINAL WALL: Mild anasarca.  BONES: Mild degenerative changes of the spine.    IMPRESSION:  Mild bilateral hydroureteronephrosis without definite obstructing stone identified. However, there are multiple bilateral nonobstructing renal calculi, as described above.    Urinary bladder is collapsed around a Milner catheter. Small stone within the urinary bladder may present a recently passed stone.    Bulky, fibroid uterus.    Additional findings as above.

## 2021-03-15 NOTE — CONSULT NOTE ADULT - ASSESSMENT
Impression: 73yo, BF with h/o HTN, DM2, CVA, gout, asthma, seizure and urinary incontinence presented to the ED two days ago from home for evaluation for altered mental status. Urology consulted for UTI.     Recommendations:  --Maintain milner catheter until Cr normalizes   --Continue diflucan  --Continue IVF   --Continue with tamsulosin       Discussed with Dr. Hernandez Impression: 69yo, BF with h/o HTN, DM2, CVA, gout, asthma, seizure and urinary incontinence presented to the ED two days ago from home for evaluation for altered mental status. Urology consulted for UTI.     Recommendations:  --Maintain milner catheter until Cr normalizes   --Continue diflucan  --Continue IVF   --Continue with tamsulosin       Discussed with Dr. Hernandez

## 2021-03-16 LAB
ANION GAP SERPL CALC-SCNC: 6 MMOL/L — SIGNIFICANT CHANGE UP (ref 5–17)
BUN SERPL-MCNC: 22 MG/DL — SIGNIFICANT CHANGE UP (ref 7–23)
CALCIUM SERPL-MCNC: 8.1 MG/DL — LOW (ref 8.5–10.1)
CHLORIDE SERPL-SCNC: 116 MMOL/L — HIGH (ref 96–108)
CO2 SERPL-SCNC: 26 MMOL/L — SIGNIFICANT CHANGE UP (ref 22–31)
CREAT SERPL-MCNC: 1.38 MG/DL — HIGH (ref 0.5–1.3)
GLUCOSE BLDC GLUCOMTR-MCNC: 118 MG/DL — HIGH (ref 70–99)
GLUCOSE BLDC GLUCOMTR-MCNC: 157 MG/DL — HIGH (ref 70–99)
GLUCOSE BLDC GLUCOMTR-MCNC: 181 MG/DL — HIGH (ref 70–99)
GLUCOSE BLDC GLUCOMTR-MCNC: 254 MG/DL — HIGH (ref 70–99)
GLUCOSE SERPL-MCNC: 239 MG/DL — HIGH (ref 70–99)
HCT VFR BLD CALC: 39 % — SIGNIFICANT CHANGE UP (ref 34.5–45)
HGB BLD-MCNC: 12.5 G/DL — SIGNIFICANT CHANGE UP (ref 11.5–15.5)
MCHC RBC-ENTMCNC: 28.5 PG — SIGNIFICANT CHANGE UP (ref 27–34)
MCHC RBC-ENTMCNC: 32.1 GM/DL — SIGNIFICANT CHANGE UP (ref 32–36)
MCV RBC AUTO: 88.8 FL — SIGNIFICANT CHANGE UP (ref 80–100)
NRBC # BLD: 0 /100 WBCS — SIGNIFICANT CHANGE UP (ref 0–0)
PLATELET # BLD AUTO: 188 K/UL — SIGNIFICANT CHANGE UP (ref 150–400)
POTASSIUM SERPL-MCNC: 3.6 MMOL/L — SIGNIFICANT CHANGE UP (ref 3.5–5.3)
POTASSIUM SERPL-SCNC: 3.6 MMOL/L — SIGNIFICANT CHANGE UP (ref 3.5–5.3)
RBC # BLD: 4.39 M/UL — SIGNIFICANT CHANGE UP (ref 3.8–5.2)
RBC # FLD: 13.3 % — SIGNIFICANT CHANGE UP (ref 10.3–14.5)
SARS-COV-2 IGG SERPL QL IA: NEGATIVE — SIGNIFICANT CHANGE UP
SARS-COV-2 IGM SERPL IA-ACNC: 0.12 INDEX — SIGNIFICANT CHANGE UP
SODIUM SERPL-SCNC: 148 MMOL/L — HIGH (ref 135–145)
WBC # BLD: 8.75 K/UL — SIGNIFICANT CHANGE UP (ref 3.8–10.5)
WBC # FLD AUTO: 8.75 K/UL — SIGNIFICANT CHANGE UP (ref 3.8–10.5)

## 2021-03-16 PROCEDURE — 99231 SBSQ HOSP IP/OBS SF/LOW 25: CPT

## 2021-03-16 RX ORDER — METOPROLOL TARTRATE 50 MG
50 TABLET ORAL
Refills: 0 | Status: DISCONTINUED | OUTPATIENT
Start: 2021-03-16 | End: 2021-03-19

## 2021-03-16 RX ADMIN — Medication 25 MILLIGRAM(S): at 05:14

## 2021-03-16 RX ADMIN — TAMSULOSIN HYDROCHLORIDE 0.4 MILLIGRAM(S): 0.4 CAPSULE ORAL at 21:15

## 2021-03-16 RX ADMIN — SODIUM CHLORIDE 60 MILLILITER(S): 9 INJECTION, SOLUTION INTRAVENOUS at 06:43

## 2021-03-16 RX ADMIN — HEPARIN SODIUM 5000 UNIT(S): 5000 INJECTION INTRAVENOUS; SUBCUTANEOUS at 18:09

## 2021-03-16 RX ADMIN — SIMVASTATIN 20 MILLIGRAM(S): 20 TABLET, FILM COATED ORAL at 21:15

## 2021-03-16 RX ADMIN — Medication 2: at 12:21

## 2021-03-16 RX ADMIN — Medication 6: at 07:53

## 2021-03-16 RX ADMIN — AMLODIPINE BESYLATE 10 MILLIGRAM(S): 2.5 TABLET ORAL at 05:14

## 2021-03-16 RX ADMIN — LEVETIRACETAM 400 MILLIGRAM(S): 250 TABLET, FILM COATED ORAL at 05:13

## 2021-03-16 RX ADMIN — PANTOPRAZOLE SODIUM 40 MILLIGRAM(S): 20 TABLET, DELAYED RELEASE ORAL at 12:18

## 2021-03-16 RX ADMIN — HEPARIN SODIUM 5000 UNIT(S): 5000 INJECTION INTRAVENOUS; SUBCUTANEOUS at 05:14

## 2021-03-16 RX ADMIN — LEVETIRACETAM 400 MILLIGRAM(S): 250 TABLET, FILM COATED ORAL at 18:35

## 2021-03-16 RX ADMIN — Medication 50 MILLIGRAM(S): at 18:09

## 2021-03-16 NOTE — PROGRESS NOTE ADULT - ATTENDING COMMENTS
ID following; sepsis from urinary tract infection likely  has candida albicans   no stigmata of infectious endocarditis   await repeat blood culture   echo   has urinary retension   mantain milner   ? gu input     consult noted;  Recommendations:  --Maintain milner catheter until Cr normalizes   --Continue diflucan  --Continue IVF   --Continue with tamsulosin     PT  Continue IVF  Continue current care and treatment.

## 2021-03-17 ENCOUNTER — TRANSCRIPTION ENCOUNTER (OUTPATIENT)
Age: 71
End: 2021-03-17

## 2021-03-17 LAB
ANION GAP SERPL CALC-SCNC: 7 MMOL/L — SIGNIFICANT CHANGE UP (ref 5–17)
BUN SERPL-MCNC: 20 MG/DL — SIGNIFICANT CHANGE UP (ref 7–23)
CALCIUM SERPL-MCNC: 7.5 MG/DL — LOW (ref 8.5–10.1)
CHLORIDE SERPL-SCNC: 111 MMOL/L — HIGH (ref 96–108)
CO2 SERPL-SCNC: 27 MMOL/L — SIGNIFICANT CHANGE UP (ref 22–31)
CREAT SERPL-MCNC: 1.25 MG/DL — SIGNIFICANT CHANGE UP (ref 0.5–1.3)
GLUCOSE BLDC GLUCOMTR-MCNC: 149 MG/DL — HIGH (ref 70–99)
GLUCOSE BLDC GLUCOMTR-MCNC: 187 MG/DL — HIGH (ref 70–99)
GLUCOSE BLDC GLUCOMTR-MCNC: 203 MG/DL — HIGH (ref 70–99)
GLUCOSE BLDC GLUCOMTR-MCNC: 244 MG/DL — HIGH (ref 70–99)
GLUCOSE SERPL-MCNC: 223 MG/DL — HIGH (ref 70–99)
HCT VFR BLD CALC: 35.4 % — SIGNIFICANT CHANGE UP (ref 34.5–45)
HGB BLD-MCNC: 11.5 G/DL — SIGNIFICANT CHANGE UP (ref 11.5–15.5)
MCHC RBC-ENTMCNC: 28.6 PG — SIGNIFICANT CHANGE UP (ref 27–34)
MCHC RBC-ENTMCNC: 32.5 GM/DL — SIGNIFICANT CHANGE UP (ref 32–36)
MCV RBC AUTO: 88.1 FL — SIGNIFICANT CHANGE UP (ref 80–100)
NRBC # BLD: 0 /100 WBCS — SIGNIFICANT CHANGE UP (ref 0–0)
PLATELET # BLD AUTO: 194 K/UL — SIGNIFICANT CHANGE UP (ref 150–400)
POTASSIUM SERPL-MCNC: 3 MMOL/L — LOW (ref 3.5–5.3)
POTASSIUM SERPL-SCNC: 3 MMOL/L — LOW (ref 3.5–5.3)
RBC # BLD: 4.02 M/UL — SIGNIFICANT CHANGE UP (ref 3.8–5.2)
RBC # FLD: 13 % — SIGNIFICANT CHANGE UP (ref 10.3–14.5)
SODIUM SERPL-SCNC: 145 MMOL/L — SIGNIFICANT CHANGE UP (ref 135–145)
WBC # BLD: 8.77 K/UL — SIGNIFICANT CHANGE UP (ref 3.8–10.5)
WBC # FLD AUTO: 8.77 K/UL — SIGNIFICANT CHANGE UP (ref 3.8–10.5)

## 2021-03-17 PROCEDURE — 99222 1ST HOSP IP/OBS MODERATE 55: CPT

## 2021-03-17 PROCEDURE — 99232 SBSQ HOSP IP/OBS MODERATE 35: CPT

## 2021-03-17 PROCEDURE — 76775 US EXAM ABDO BACK WALL LIM: CPT | Mod: 26

## 2021-03-17 RX ORDER — POTASSIUM CHLORIDE 20 MEQ
10 PACKET (EA) ORAL
Refills: 0 | Status: COMPLETED | OUTPATIENT
Start: 2021-03-17 | End: 2021-03-17

## 2021-03-17 RX ORDER — POTASSIUM CHLORIDE 20 MEQ
40 PACKET (EA) ORAL ONCE
Refills: 0 | Status: COMPLETED | OUTPATIENT
Start: 2021-03-17 | End: 2021-03-17

## 2021-03-17 RX ORDER — FLUCONAZOLE 150 MG/1
1 TABLET ORAL
Qty: 21 | Refills: 0
Start: 2021-03-17 | End: 2021-04-06

## 2021-03-17 RX ADMIN — Medication 50 MILLIGRAM(S): at 05:08

## 2021-03-17 RX ADMIN — TAMSULOSIN HYDROCHLORIDE 0.4 MILLIGRAM(S): 0.4 CAPSULE ORAL at 22:11

## 2021-03-17 RX ADMIN — HEPARIN SODIUM 5000 UNIT(S): 5000 INJECTION INTRAVENOUS; SUBCUTANEOUS at 17:00

## 2021-03-17 RX ADMIN — Medication 100 MILLIEQUIVALENT(S): at 13:36

## 2021-03-17 RX ADMIN — AMLODIPINE BESYLATE 10 MILLIGRAM(S): 2.5 TABLET ORAL at 05:08

## 2021-03-17 RX ADMIN — LEVETIRACETAM 400 MILLIGRAM(S): 250 TABLET, FILM COATED ORAL at 05:08

## 2021-03-17 RX ADMIN — LEVETIRACETAM 400 MILLIGRAM(S): 250 TABLET, FILM COATED ORAL at 17:56

## 2021-03-17 RX ADMIN — HEPARIN SODIUM 5000 UNIT(S): 5000 INJECTION INTRAVENOUS; SUBCUTANEOUS at 05:08

## 2021-03-17 RX ADMIN — SIMVASTATIN 20 MILLIGRAM(S): 20 TABLET, FILM COATED ORAL at 22:11

## 2021-03-17 RX ADMIN — Medication 50 MILLIGRAM(S): at 17:00

## 2021-03-17 RX ADMIN — PANTOPRAZOLE SODIUM 40 MILLIGRAM(S): 20 TABLET, DELAYED RELEASE ORAL at 11:17

## 2021-03-17 RX ADMIN — Medication 2: at 11:18

## 2021-03-17 RX ADMIN — Medication 100 MILLIEQUIVALENT(S): at 11:17

## 2021-03-17 RX ADMIN — Medication 4: at 08:00

## 2021-03-17 RX ADMIN — Medication 100 MILLIEQUIVALENT(S): at 16:22

## 2021-03-17 RX ADMIN — SODIUM CHLORIDE 60 MILLILITER(S): 9 INJECTION, SOLUTION INTRAVENOUS at 22:11

## 2021-03-17 NOTE — PHYSICAL THERAPY INITIAL EVALUATION ADULT - STRENGTHENING, PT EVAL
Pt will improve muscle strength in all extremities to 4/5 in 1 to 2 weeks to perform Gait & ADL with min A

## 2021-03-17 NOTE — PHYSICAL THERAPY INITIAL EVALUATION ADULT - CRITERIA FOR SKILLED THERAPEUTIC INTERVENTIONS
[facility], pt has all DME/impairments found/functional limitations in following categories/risk reduction/prevention/rehab potential/therapy frequency/predicted duration of therapy intervention/anticipated equipment needs at discharge/anticipated discharge recommendation

## 2021-03-17 NOTE — SWALLOW BEDSIDE ASSESSMENT ADULT - SWALLOW EVAL: DIAGNOSIS
Oropharyngeal swallow mechanism is functional for modified consistencies; swallowing is efficient following safe feeding strategies; overt coughing is evident with straw drinking but eliminated given controlled amounts via spoon or cup drinking

## 2021-03-17 NOTE — DISCHARGE NOTE PROVIDER - NSDCMRMEDTOKEN_GEN_ALL_CORE_FT
amLODIPine 10 mg oral tablet: 1 tab(s) orally once a day  aspirin 81 mg oral delayed release tablet: 1 tab(s) orally once a day  cloNIDine 0.1 mg oral tablet: 1 tab(s) orally every 8 hours  fluconazole 200 mg oral tablet: 1 tab(s) orally once a day x 21 days   hydrALAZINE 100 mg oral tablet: 1 tab(s) orally every 8 hours  levETIRAcetam 750 mg oral tablet: 1 tab(s) orally 2 times a day  metoprolol tartrate 50 mg oral tablet: 1 tab(s) orally 2 times a day  QUEtiapine 25 mg oral tablet: 1 tab(s) orally once a day (at bedtime)  tamsulosin 0.4 mg oral capsule: 1 cap(s) orally once a day (at bedtime)  Zocor 20 mg oral tablet: 1 tab(s) orally once a day (at bedtime)

## 2021-03-17 NOTE — PHYSICAL THERAPY INITIAL EVALUATION ADULT - BALANCE TRAINING, PT EVAL
Pt will be able to maintain static, dynamic sitting balance to fair- to improve sitting tolerance to prepare for transfers in 2 to 3 weeks

## 2021-03-17 NOTE — PHYSICAL THERAPY INITIAL EVALUATION ADULT - LEVEL OF INDEPENDENCE: SCOOT/BRIDGE, REHAB EVAL
attempted with 1 person, however with ots of difficulty , needs Ax2/maximum assist (25% patients effort)

## 2021-03-17 NOTE — DISCHARGE NOTE PROVIDER - CARE PROVIDERS DIRECT ADDRESSES
,DirectAddress_Unknown ,DirectAddress_Unknown,DirectAddress_Unknown,[email or web address]

## 2021-03-17 NOTE — DISCHARGE NOTE PROVIDER - CARE PROVIDER_API CALL
[place] [address]  Phone: [phone]  Fax: [phone]  Follow Up Time:    Yani Thomson  MEDICINE  [address], [address]  [place] [address]  Phone: [phone]  Fax: [phone]  Follow Up Time:     Jessica Hernandez  INFECTIOUS DISEASE  [address], [place] [address]  Phone: [phone]  Fax: [phone]  Follow Up Time:     Todd Caicedo)  Urology  [address] [place] [address]  Phone: [phone]  Fax: [phone]  Follow Up Time:

## 2021-03-17 NOTE — PHYSICAL THERAPY INITIAL EVALUATION ADULT - ADDITIONAL COMMENTS
As per daughter Gali, Pt lives alone in a house with 7steps to enter with bilateral rails, once inside there are another 7 steps  with bilateral rails to go to her bed room, she has 24 hour HHA, who assist with all functional mobility. She has a hospital bed, RW, 3:1, shower chair, sit to stand transfer equipment. She used to ambulate from bed to bathroom using RW, limited assist from HHA .

## 2021-03-17 NOTE — DISCHARGE NOTE PROVIDER - NSDCCPCAREPLAN_GEN_ALL_CORE_FT
PRINCIPAL DISCHARGE DIAGNOSIS  Diagnosis: Sepsis  Assessment and Plan of Treatment:       SECONDARY DISCHARGE DIAGNOSES  Diagnosis: Acute renal failure  Assessment and Plan of Treatment:     Diagnosis: Urinary retention  Assessment and Plan of Treatment:     Diagnosis: UTI (urinary tract infection)  Assessment and Plan of Treatment:     Diagnosis: Sepsis  Assessment and Plan of Treatment:

## 2021-03-17 NOTE — SWALLOW BEDSIDE ASSESSMENT ADULT - NS SPL SWALLOW CLINIC TRIAL FT
pt needs max assist to place cup to mouth and then drinks effectively; straw drinking with overt s/s aspiration as pt takes multiple large bolus with reduced control

## 2021-03-17 NOTE — SWALLOW BEDSIDE ASSESSMENT ADULT - COMMENTS
pt is edentulous but completed 4 oz portion fruit cup with functional swallow efficiency PMH HTN, DM2, CVA, gout, asthma, seizure and urinary incontinence   3/13 CT brain mod-severe microvascular changes without evidence of acute findings; CXR lungs are clear

## 2021-03-17 NOTE — SWALLOW BEDSIDE ASSESSMENT ADULT - SLP PERTINENT HISTORY OF CURRENT PROBLEM
AMS; Septic Encephalopathy 2/2 Sepsis with UTI, Urinary Retention, AMADOU and DM2;  at baseline communicates very little, may say yes or no or points, however, was noticed not as responsive;  ER pt is not responsive to verbal stimuli and very little response to painful stimuli, warm to touch, abdomen distended and firm to touch, pt is unable to follow any commands

## 2021-03-17 NOTE — DISCHARGE NOTE PROVIDER - PROVIDER TOKENS
PROVIDER:[TOKEN:[[id number]]] PROVIDER:[TOKEN:[[id number]]],PROVIDER:[TOKEN:[[id number]]],PROVIDER:[TOKEN:[[id number]]]

## 2021-03-17 NOTE — DISCHARGE NOTE PROVIDER - HOSPITAL COURSE
69 y/o F with h/o HTN, DM2, CVA, gout, asthma, seizure and urinary incontinence presents today biba from home for evaluation for altered mental status, pt was last seen at her baseline at 7pm, yesterday, pt's sister  (Amaya [phone]) called in and reports that she at baseline communicates very little, she may say yes or no or points, however, this morning she noticed she is not as responsive, in the ER pt is not responsive to verbal stimuli and very little response to painful stimuli and warm to touch, abdomen also noted to be distended and firm to touch, pt is unable to follow any commands.    1) AMS 2ndary UTI/Sepsis  2) Acute Cystitis  3) Urinary Retention w/ milner   71 y/o F with h/o HTN, DM2, CVA, gout, asthma, seizure and urinary incontinence presents today biba from home for evaluation for altered mental status, pt was last seen at her baseline at 7pm, yesterday, pt's sister  (Amaya [phone]) called in and reports that she at baseline communicates very little, she may say yes or no or points, however, this morning she noticed she is not as responsive, in the ER pt is not responsive to verbal stimuli and very little response to painful stimuli and warm to touch, abdomen also noted to be distended and firm to touch, pt is unable to follow any commands.    1) AMS 2ndary UTI/Sepsis  2) Acute Cystitis  3) Urinary Retention w/ milner  pt will need labs each week while on diflucan (fluconazole) for the next 3 weeks to include liver function tests or comprehensive metabolic panel

## 2021-03-17 NOTE — DISCHARGE NOTE PROVIDER - NSDCFUADDAPPT_GEN_ALL_CORE_FT
patient has appointment with dr Hernandez 4/4/21 at 11:30 am  patient has appointment with Dr Caicedo 5/6/2021 at 2:20 pm   the addresses of the above 2 doctors are  listed in the follow up region  please call Dr Thomson office and make appointment for 3 weeks from now     pt will need labs each week while on diflucan (fluconazole) for the next 3 weeks to include liver function tests or comprehensive metabolic panel

## 2021-03-17 NOTE — PROGRESS NOTE ADULT - ATTENDING COMMENTS
ID following;sepsis from urinary tract infection likely  has candida albicans   no stigmata of infectious endocarditis   await repeat blood culture   echo noted  has urinary retension   mantain milner   noted gu input  if repeat bcs NEGATIVE DIFLUCAN X 3 WEEKS   please consider ophtho eval perhaps as an out patient   recommended for all pts with candidemia        consult noted;  Recommendations:  --Maintain milner catheter until Cr normalizes   --Continue diflucan  --Continue IVF   --Continue with tamsulosin     PT  Continue IVF  Cr normalized, ? Milner  Continue current care and treatment.

## 2021-03-17 NOTE — PROGRESS NOTE ADULT - ATTENDING COMMENTS
see above  renal u/s-no hydro, has stone fragments  yeast urosepsis likely resulting form retention  had milner removed 11/20-family did not bring patient for f/u and were going to a different urologist    Suggest that she maintain milner with monthly  exchange  f/u as out pt

## 2021-03-17 NOTE — SWALLOW BEDSIDE ASSESSMENT ADULT - SLP GENERAL OBSERVATIONS
alert, cooperative for exam and willingly accepted all po trials presented; poor following specific directions but demonstrated awareness of eating contexts

## 2021-03-17 NOTE — SWALLOW BEDSIDE ASSESSMENT ADULT - ADDITIONAL RECOMMENDATIONS
NO STRAWS; ONE DRINK AT A TIME FROM CUP; ALLOW EXTRA TIME BETWEEN SWALLOWS; MEDICATIONS WITH PUREE FOR SAFETY

## 2021-03-18 DIAGNOSIS — B37.7 CANDIDAL SEPSIS: ICD-10-CM

## 2021-03-18 LAB
ALBUMIN SERPL ELPH-MCNC: 2 G/DL — LOW (ref 3.3–5)
ALP SERPL-CCNC: 58 U/L — SIGNIFICANT CHANGE UP (ref 40–120)
ALT FLD-CCNC: 24 U/L — SIGNIFICANT CHANGE UP (ref 12–78)
ANION GAP SERPL CALC-SCNC: 4 MMOL/L — LOW (ref 5–17)
ANION GAP SERPL CALC-SCNC: 7 MMOL/L — SIGNIFICANT CHANGE UP (ref 5–17)
AST SERPL-CCNC: 9 U/L — LOW (ref 15–37)
BASOPHILS # BLD AUTO: 0.05 K/UL — SIGNIFICANT CHANGE UP (ref 0–0.2)
BASOPHILS NFR BLD AUTO: 0.6 % — SIGNIFICANT CHANGE UP (ref 0–2)
BILIRUB SERPL-MCNC: 0.6 MG/DL — SIGNIFICANT CHANGE UP (ref 0.2–1.2)
BUN SERPL-MCNC: 13 MG/DL — SIGNIFICANT CHANGE UP (ref 7–23)
BUN SERPL-MCNC: 16 MG/DL — SIGNIFICANT CHANGE UP (ref 7–23)
CALCIUM SERPL-MCNC: 7.4 MG/DL — LOW (ref 8.5–10.1)
CALCIUM SERPL-MCNC: 7.5 MG/DL — LOW (ref 8.5–10.1)
CHLORIDE SERPL-SCNC: 109 MMOL/L — HIGH (ref 96–108)
CHLORIDE SERPL-SCNC: 111 MMOL/L — HIGH (ref 96–108)
CO2 SERPL-SCNC: 27 MMOL/L — SIGNIFICANT CHANGE UP (ref 22–31)
CO2 SERPL-SCNC: 29 MMOL/L — SIGNIFICANT CHANGE UP (ref 22–31)
CREAT SERPL-MCNC: 1.06 MG/DL — SIGNIFICANT CHANGE UP (ref 0.5–1.3)
CREAT SERPL-MCNC: 1.21 MG/DL — SIGNIFICANT CHANGE UP (ref 0.5–1.3)
EOSINOPHIL # BLD AUTO: 0.02 K/UL — SIGNIFICANT CHANGE UP (ref 0–0.5)
EOSINOPHIL NFR BLD AUTO: 0.3 % — SIGNIFICANT CHANGE UP (ref 0–6)
GLUCOSE BLDC GLUCOMTR-MCNC: 145 MG/DL — HIGH (ref 70–99)
GLUCOSE BLDC GLUCOMTR-MCNC: 165 MG/DL — HIGH (ref 70–99)
GLUCOSE BLDC GLUCOMTR-MCNC: 171 MG/DL — HIGH (ref 70–99)
GLUCOSE BLDC GLUCOMTR-MCNC: 205 MG/DL — HIGH (ref 70–99)
GLUCOSE SERPL-MCNC: 187 MG/DL — HIGH (ref 70–99)
GLUCOSE SERPL-MCNC: 201 MG/DL — HIGH (ref 70–99)
HCT VFR BLD CALC: 32.3 % — LOW (ref 34.5–45)
HCT VFR BLD CALC: 34.9 % — SIGNIFICANT CHANGE UP (ref 34.5–45)
HGB BLD-MCNC: 10.4 G/DL — LOW (ref 11.5–15.5)
HGB BLD-MCNC: 11.5 G/DL — SIGNIFICANT CHANGE UP (ref 11.5–15.5)
IMM GRANULOCYTES NFR BLD AUTO: 1.4 % — SIGNIFICANT CHANGE UP (ref 0–1.5)
LACTATE SERPL-SCNC: 0.8 MMOL/L — SIGNIFICANT CHANGE UP (ref 0.7–2)
LYMPHOCYTES # BLD AUTO: 2.08 K/UL — SIGNIFICANT CHANGE UP (ref 1–3.3)
LYMPHOCYTES # BLD AUTO: 26.8 % — SIGNIFICANT CHANGE UP (ref 13–44)
MAGNESIUM SERPL-MCNC: 1.1 MG/DL — LOW (ref 1.6–2.6)
MCHC RBC-ENTMCNC: 28.3 PG — SIGNIFICANT CHANGE UP (ref 27–34)
MCHC RBC-ENTMCNC: 28.5 PG — SIGNIFICANT CHANGE UP (ref 27–34)
MCHC RBC-ENTMCNC: 32.2 GM/DL — SIGNIFICANT CHANGE UP (ref 32–36)
MCHC RBC-ENTMCNC: 33 GM/DL — SIGNIFICANT CHANGE UP (ref 32–36)
MCV RBC AUTO: 86.4 FL — SIGNIFICANT CHANGE UP (ref 80–100)
MCV RBC AUTO: 88 FL — SIGNIFICANT CHANGE UP (ref 80–100)
MONOCYTES # BLD AUTO: 1.03 K/UL — HIGH (ref 0–0.9)
MONOCYTES NFR BLD AUTO: 13.3 % — SIGNIFICANT CHANGE UP (ref 2–14)
NEUTROPHILS # BLD AUTO: 4.47 K/UL — SIGNIFICANT CHANGE UP (ref 1.8–7.4)
NEUTROPHILS NFR BLD AUTO: 57.6 % — SIGNIFICANT CHANGE UP (ref 43–77)
NRBC # BLD: 0 /100 WBCS — SIGNIFICANT CHANGE UP (ref 0–0)
NRBC # BLD: 0 /100 WBCS — SIGNIFICANT CHANGE UP (ref 0–0)
PHOSPHATE SERPL-MCNC: 2.7 MG/DL — SIGNIFICANT CHANGE UP (ref 2.5–4.5)
PLATELET # BLD AUTO: 110 K/UL — LOW (ref 150–400)
PLATELET # BLD AUTO: 194 K/UL — SIGNIFICANT CHANGE UP (ref 150–400)
POTASSIUM SERPL-MCNC: 2.9 MMOL/L — CRITICAL LOW (ref 3.5–5.3)
POTASSIUM SERPL-MCNC: 3.2 MMOL/L — LOW (ref 3.5–5.3)
POTASSIUM SERPL-SCNC: 2.9 MMOL/L — CRITICAL LOW (ref 3.5–5.3)
POTASSIUM SERPL-SCNC: 3.2 MMOL/L — LOW (ref 3.5–5.3)
PROCALCITONIN SERPL-MCNC: 0.2 NG/ML — HIGH (ref 0.02–0.1)
PROT SERPL-MCNC: 6.2 GM/DL — SIGNIFICANT CHANGE UP (ref 6–8.3)
RBC # BLD: 3.67 M/UL — LOW (ref 3.8–5.2)
RBC # BLD: 4.04 M/UL — SIGNIFICANT CHANGE UP (ref 3.8–5.2)
RBC # FLD: 12.4 % — SIGNIFICANT CHANGE UP (ref 10.3–14.5)
RBC # FLD: 12.5 % — SIGNIFICANT CHANGE UP (ref 10.3–14.5)
SODIUM SERPL-SCNC: 143 MMOL/L — SIGNIFICANT CHANGE UP (ref 135–145)
SODIUM SERPL-SCNC: 144 MMOL/L — SIGNIFICANT CHANGE UP (ref 135–145)
WBC # BLD: 7.76 K/UL — SIGNIFICANT CHANGE UP (ref 3.8–10.5)
WBC # BLD: 8.7 K/UL — SIGNIFICANT CHANGE UP (ref 3.8–10.5)
WBC # FLD AUTO: 7.76 K/UL — SIGNIFICANT CHANGE UP (ref 3.8–10.5)
WBC # FLD AUTO: 8.7 K/UL — SIGNIFICANT CHANGE UP (ref 3.8–10.5)

## 2021-03-18 PROCEDURE — 99232 SBSQ HOSP IP/OBS MODERATE 35: CPT

## 2021-03-18 RX ORDER — POTASSIUM CHLORIDE 20 MEQ
10 PACKET (EA) ORAL
Refills: 0 | Status: COMPLETED | OUTPATIENT
Start: 2021-03-18 | End: 2021-03-18

## 2021-03-18 RX ORDER — HYDRALAZINE HCL 50 MG
25 TABLET ORAL EVERY 8 HOURS
Refills: 0 | Status: DISCONTINUED | OUTPATIENT
Start: 2021-03-18 | End: 2021-03-19

## 2021-03-18 RX ORDER — MAGNESIUM SULFATE 500 MG/ML
2 VIAL (ML) INJECTION
Refills: 0 | Status: COMPLETED | OUTPATIENT
Start: 2021-03-18 | End: 2021-03-18

## 2021-03-18 RX ORDER — POTASSIUM CHLORIDE 20 MEQ
40 PACKET (EA) ORAL ONCE
Refills: 0 | Status: COMPLETED | OUTPATIENT
Start: 2021-03-18 | End: 2021-03-18

## 2021-03-18 RX ORDER — MAGNESIUM SULFATE 500 MG/ML
1 VIAL (ML) INJECTION ONCE
Refills: 0 | Status: DISCONTINUED | OUTPATIENT
Start: 2021-03-18 | End: 2021-03-18

## 2021-03-18 RX ORDER — ACETAMINOPHEN 500 MG
650 TABLET ORAL EVERY 6 HOURS
Refills: 0 | Status: DISCONTINUED | OUTPATIENT
Start: 2021-03-18 | End: 2021-03-19

## 2021-03-18 RX ADMIN — LEVETIRACETAM 400 MILLIGRAM(S): 250 TABLET, FILM COATED ORAL at 05:25

## 2021-03-18 RX ADMIN — Medication 25 MILLIGRAM(S): at 15:00

## 2021-03-18 RX ADMIN — TAMSULOSIN HYDROCHLORIDE 0.4 MILLIGRAM(S): 0.4 CAPSULE ORAL at 21:23

## 2021-03-18 RX ADMIN — HEPARIN SODIUM 5000 UNIT(S): 5000 INJECTION INTRAVENOUS; SUBCUTANEOUS at 05:27

## 2021-03-18 RX ADMIN — Medication 50 GRAM(S): at 07:07

## 2021-03-18 RX ADMIN — Medication 50 MILLIGRAM(S): at 18:27

## 2021-03-18 RX ADMIN — Medication 50 MILLIGRAM(S): at 05:27

## 2021-03-18 RX ADMIN — Medication 2: at 18:24

## 2021-03-18 RX ADMIN — Medication 100 MILLIEQUIVALENT(S): at 10:23

## 2021-03-18 RX ADMIN — Medication 650 MILLIGRAM(S): at 00:29

## 2021-03-18 RX ADMIN — Medication 100 MILLIEQUIVALENT(S): at 13:15

## 2021-03-18 RX ADMIN — Medication 81 MILLIGRAM(S): at 12:17

## 2021-03-18 RX ADMIN — Medication 4: at 07:46

## 2021-03-18 RX ADMIN — FLUCONAZOLE 200 MILLIGRAM(S): 150 TABLET ORAL at 12:17

## 2021-03-18 RX ADMIN — Medication 25 MILLIGRAM(S): at 21:24

## 2021-03-18 RX ADMIN — Medication 650 MILLIGRAM(S): at 01:00

## 2021-03-18 RX ADMIN — Medication 50 GRAM(S): at 09:00

## 2021-03-18 RX ADMIN — LEVETIRACETAM 400 MILLIGRAM(S): 250 TABLET, FILM COATED ORAL at 18:22

## 2021-03-18 RX ADMIN — AMLODIPINE BESYLATE 10 MILLIGRAM(S): 2.5 TABLET ORAL at 05:27

## 2021-03-18 RX ADMIN — SIMVASTATIN 20 MILLIGRAM(S): 20 TABLET, FILM COATED ORAL at 21:24

## 2021-03-18 RX ADMIN — PANTOPRAZOLE SODIUM 40 MILLIGRAM(S): 20 TABLET, DELAYED RELEASE ORAL at 12:13

## 2021-03-18 RX ADMIN — HEPARIN SODIUM 5000 UNIT(S): 5000 INJECTION INTRAVENOUS; SUBCUTANEOUS at 18:25

## 2021-03-18 RX ADMIN — SODIUM CHLORIDE 60 MILLILITER(S): 9 INJECTION, SOLUTION INTRAVENOUS at 05:27

## 2021-03-18 RX ADMIN — Medication 100 MILLIEQUIVALENT(S): at 11:58

## 2021-03-18 RX ADMIN — Medication 1 TABLET(S): at 12:16

## 2021-03-18 NOTE — PROVIDER CONTACT NOTE (CRITICAL VALUE NOTIFICATION) - NAME OF MD/NP/PA/DO NOTIFIED:
Laci Peters
Dr Thomson
Torsten
Dr Thomson
Color consistent with ethnicity/race, warm, dry intact, resilient.

## 2021-03-18 NOTE — PROGRESS NOTE ADULT - ATTENDING COMMENTS
noted, maintain [place]  ID noted.  Spoke with daughter, wants Rehab  Discharge planning for Rehab  Continue current care and treatment.  noted, maintain [place]  ID noted.  K, Magnesium replacement   Spoke with daughter, wants Rehab  Discharge planning for Rehab  Continue current care and treatment.

## 2021-03-19 ENCOUNTER — TRANSCRIPTION ENCOUNTER (OUTPATIENT)
Age: 71
End: 2021-03-19

## 2021-03-19 VITALS
HEART RATE: 80 BPM | OXYGEN SATURATION: 98 % | SYSTOLIC BLOOD PRESSURE: 158 MMHG | DIASTOLIC BLOOD PRESSURE: 82 MMHG | TEMPERATURE: 99 F | RESPIRATION RATE: 16 BRPM

## 2021-03-19 LAB
-  FLUCONAZOLE: SIGNIFICANT CHANGE UP
-  INTERPRETATION: SIGNIFICANT CHANGE UP
ALBUMIN SERPL ELPH-MCNC: 2 G/DL — LOW (ref 3.3–5)
ALP SERPL-CCNC: 57 U/L — SIGNIFICANT CHANGE UP (ref 40–120)
ALT FLD-CCNC: 22 U/L — SIGNIFICANT CHANGE UP (ref 12–78)
ANION GAP SERPL CALC-SCNC: 5 MMOL/L — SIGNIFICANT CHANGE UP (ref 5–17)
AST SERPL-CCNC: 12 U/L — LOW (ref 15–37)
BILIRUB SERPL-MCNC: 0.4 MG/DL — SIGNIFICANT CHANGE UP (ref 0.2–1.2)
BUN SERPL-MCNC: 10 MG/DL — SIGNIFICANT CHANGE UP (ref 7–23)
CALCIUM SERPL-MCNC: 7.6 MG/DL — LOW (ref 8.5–10.1)
CHLORIDE SERPL-SCNC: 108 MMOL/L — SIGNIFICANT CHANGE UP (ref 96–108)
CO2 SERPL-SCNC: 27 MMOL/L — SIGNIFICANT CHANGE UP (ref 22–31)
CREAT SERPL-MCNC: 1.13 MG/DL — SIGNIFICANT CHANGE UP (ref 0.5–1.3)
CULTURE RESULTS: SIGNIFICANT CHANGE UP
GLUCOSE BLDC GLUCOMTR-MCNC: 148 MG/DL — HIGH (ref 70–99)
GLUCOSE BLDC GLUCOMTR-MCNC: 150 MG/DL — HIGH (ref 70–99)
GLUCOSE BLDC GLUCOMTR-MCNC: 183 MG/DL — HIGH (ref 70–99)
GLUCOSE SERPL-MCNC: 182 MG/DL — HIGH (ref 70–99)
GRAM STN FLD: SIGNIFICANT CHANGE UP
HCT VFR BLD CALC: 33.1 % — LOW (ref 34.5–45)
HGB BLD-MCNC: 10.6 G/DL — LOW (ref 11.5–15.5)
MAGNESIUM SERPL-MCNC: 2 MG/DL — SIGNIFICANT CHANGE UP (ref 1.6–2.6)
MCHC RBC-ENTMCNC: 28 PG — SIGNIFICANT CHANGE UP (ref 27–34)
MCHC RBC-ENTMCNC: 32 GM/DL — SIGNIFICANT CHANGE UP (ref 32–36)
MCV RBC AUTO: 87.6 FL — SIGNIFICANT CHANGE UP (ref 80–100)
METHOD TYPE: SIGNIFICANT CHANGE UP
NRBC # BLD: 0 /100 WBCS — SIGNIFICANT CHANGE UP (ref 0–0)
ORGANISM # SPEC MICROSCOPIC CNT: SIGNIFICANT CHANGE UP
ORGANISM # SPEC MICROSCOPIC CNT: SIGNIFICANT CHANGE UP
PLATELET # BLD AUTO: 230 K/UL — SIGNIFICANT CHANGE UP (ref 150–400)
POTASSIUM SERPL-MCNC: 3.2 MMOL/L — LOW (ref 3.5–5.3)
POTASSIUM SERPL-SCNC: 3.2 MMOL/L — LOW (ref 3.5–5.3)
PROT SERPL-MCNC: 6.4 GM/DL — SIGNIFICANT CHANGE UP (ref 6–8.3)
RAPID RVP RESULT: SIGNIFICANT CHANGE UP
RBC # BLD: 3.78 M/UL — LOW (ref 3.8–5.2)
RBC # FLD: 12.3 % — SIGNIFICANT CHANGE UP (ref 10.3–14.5)
SARS-COV-2 RNA SPEC QL NAA+PROBE: SIGNIFICANT CHANGE UP
SODIUM SERPL-SCNC: 140 MMOL/L — SIGNIFICANT CHANGE UP (ref 135–145)
SPECIMEN SOURCE: SIGNIFICANT CHANGE UP
WBC # BLD: 9.37 K/UL — SIGNIFICANT CHANGE UP (ref 3.8–10.5)
WBC # FLD AUTO: 9.37 K/UL — SIGNIFICANT CHANGE UP (ref 3.8–10.5)

## 2021-03-19 RX ORDER — POTASSIUM CHLORIDE 20 MEQ
40 PACKET (EA) ORAL EVERY 4 HOURS
Refills: 0 | Status: COMPLETED | OUTPATIENT
Start: 2021-03-19 | End: 2021-03-19

## 2021-03-19 RX ADMIN — Medication 25 MILLIGRAM(S): at 05:22

## 2021-03-19 RX ADMIN — LEVETIRACETAM 400 MILLIGRAM(S): 250 TABLET, FILM COATED ORAL at 05:23

## 2021-03-19 RX ADMIN — HEPARIN SODIUM 5000 UNIT(S): 5000 INJECTION INTRAVENOUS; SUBCUTANEOUS at 05:22

## 2021-03-19 RX ADMIN — Medication 2: at 08:50

## 2021-03-19 RX ADMIN — Medication 25 MILLIGRAM(S): at 14:54

## 2021-03-19 RX ADMIN — Medication 1 TABLET(S): at 12:34

## 2021-03-19 RX ADMIN — Medication 40 MILLIEQUIVALENT(S): at 14:54

## 2021-03-19 RX ADMIN — Medication 81 MILLIGRAM(S): at 12:34

## 2021-03-19 RX ADMIN — AMLODIPINE BESYLATE 10 MILLIGRAM(S): 2.5 TABLET ORAL at 05:22

## 2021-03-19 RX ADMIN — SODIUM CHLORIDE 60 MILLILITER(S): 9 INJECTION, SOLUTION INTRAVENOUS at 12:34

## 2021-03-19 RX ADMIN — Medication 50 MILLIGRAM(S): at 05:22

## 2021-03-19 RX ADMIN — SODIUM CHLORIDE 60 MILLILITER(S): 9 INJECTION, SOLUTION INTRAVENOUS at 05:22

## 2021-03-19 RX ADMIN — PANTOPRAZOLE SODIUM 40 MILLIGRAM(S): 20 TABLET, DELAYED RELEASE ORAL at 12:34

## 2021-03-19 RX ADMIN — Medication 40 MILLIEQUIVALENT(S): at 12:33

## 2021-03-19 RX ADMIN — FLUCONAZOLE 200 MILLIGRAM(S): 150 TABLET ORAL at 12:34

## 2021-03-19 NOTE — PROGRESS NOTE ADULT - REASON FOR ADMISSION
Altered mental status.

## 2021-03-19 NOTE — PROGRESS NOTE ADULT - PROBLEM SELECTOR PLAN 5
IVF  Renal US  Monitoring renal function
IVF  Renal US  Monitoring renal function  Improving
IVF  Renal US  Monitoring renal function
IVF  Renal US  Monitoring renal function  Improving
IVF  Renal US  Monitoring renal function  Improving
IVF  Renal US  Monitoring renal function

## 2021-03-19 NOTE — PROGRESS NOTE ADULT - PROBLEM SELECTOR PLAN 2
IVF  IV abx  Blood and urine cultures  ID consult  Follow procalcitonin, lactate

## 2021-03-19 NOTE — PROGRESS NOTE ADULT - SUBJECTIVE AND OBJECTIVE BOX
HPI:  71yo, BF with h/o HTN, DM2, CVA, gout, asthma, seizure and urinary incontinence presents today biba from home for evaluation for altered mental status, pt was last seen at her baseline at 7pm, yesterday, pt's sister  (Amaya [phone]) called in and reports that she at baseline communicates very little, she may say yes or no or points, however, this morning she noticed she is not as responsive, in the ER pt is not responsive to verbal stimuli and very little response to painful stimuli and warm to touch, abdomen also noted to be distended and firm to touch, pt is unable to follow any commands.  In ER, Milner placed with 2500 ml urine noted.   (13 Mar 2021 16:21)  work up here consistent with fungemia and urinary tract infection     Allergies    No Known Allergies    Intolerances        MEDICATIONS  (STANDING):  amLODIPine   Tablet 10 milliGRAM(s) Oral daily  aspirin enteric coated 81 milliGRAM(s) Oral daily  dextrose 40% Gel 15 Gram(s) Oral once  dextrose 5%. 1000 milliLiter(s) (100 mL/Hr) IV Continuous <Continuous>  dextrose 5%. 1000 milliLiter(s) (50 mL/Hr) IV Continuous <Continuous>  dextrose 5%. 1000 milliLiter(s) (60 mL/Hr) IV Continuous <Continuous>  dextrose 50% Injectable 25 Gram(s) IV Push once  dextrose 50% Injectable 12.5 Gram(s) IV Push once  dextrose 50% Injectable 25 Gram(s) IV Push once  fluconAZOLE   Tablet 200 milliGRAM(s) Oral daily  glucagon  Injectable 1 milliGRAM(s) IntraMuscular once  heparin   Injectable 5000 Unit(s) SubCutaneous every 12 hours  insulin lispro (ADMELOG) corrective regimen sliding scale   SubCutaneous three times a day before meals  levETIRAcetam  IVPB 750 milliGRAM(s) IV Intermittent every 12 hours  metoprolol tartrate 50 milliGRAM(s) Oral two times a day  multivitamin 1 Tablet(s) Oral daily  pantoprazole  Injectable 40 milliGRAM(s) IV Push daily  simvastatin 20 milliGRAM(s) Oral at bedtime  tamsulosin 0.4 milliGRAM(s) Oral at bedtime    MEDICATIONS  (PRN):      REVIEW OF SYSTEMS:    unable to assess     VITAL SIGNS:  T(C): 37.3 (03-16-21 @ 11:00), Max: 37.5 (03-15-21 @ 23:47)  T(F): 99.1 (03-16-21 @ 11:00), Max: 99.5 (03-15-21 @ 23:47)  HR: 88 (03-16-21 @ 11:00) (85 - 109)  BP: 169/77 (03-16-21 @ 11:00) (163/79 - 170/90)  RR: 16 (03-16-21 @ 11:00) (16 - 18)  SpO2: 99% (03-16-21 @ 11:00) (98% - 99%)  Wt(kg): --    PHYSICAL EXAM:    GENERAL: not in any distress  HEENT: Neck is supple, normocephalic, atraumatic   CHEST/LUNG: Clear to auscultation bilaterally; No rales, rhonchi, wheezing  HEART: Regular rate and rhythm; No murmurs, rubs, or gallops  ABDOMEN: Soft, Nontender, Nondistended; Bowel sounds present, no rebound   EXTREMITIES:  2+ Peripheral Pulses, No clubbing, cyanosis, or edema  GENITOURINARY: milner ;bladder not palpable   SKIN: No rashes or lesions  BACK: no pressor sore   NERVOUS SYSTEM:  Alert & responsive   no stigmata of infectious endocarditis   LABS:                         12.5   8.75  )-----------( 188      ( 16 Mar 2021 06:57 )             39.0     03-16    148<H>  |  116<H>  |  22  ----------------------------<  239<H>  3.6   |  26  |  1.38<H>    Ca    8.1<L>      16 Mar 2021 06:57                                Culture Results:   No growth to date. (03-15 @ 12:23)  Culture Results:   No growth to date. (03-15 @ 00:37)  Culture Results:   10,000 - 49,000 CFU/mL Presumptive Candida albicans "Susceptibilities not  performed" (03-13 @ 16:26)  Culture Results:   Growth in aerobic bottle: Candida albicans Referred to Mycology (03-13 @ 12:17)  Culture Results:   Growth in aerobic bottle: Candida albicans See previous culture  [id number]    ***Blood Panel PCR results on this specimen are available  approximately 3 hours after the Gram stain result.***  Gram stain, PCR, and/or culture results may not always  correspond due to difference in methodologies.  ************************************************************  This PCR assay was performed by multiplex PCR. This  Assay tests for 66 bacterial and resistance gene targets.  Please refer to the [id number] test directory  at [email or web address] for details. (03-13 @ 12:17)                Radiology:      < from: TTE Echo Complete w/o Contrast w/ Doppler (03.15.21 @ 19:56) >  Summary:   1. Left ventricular ejection fraction, by visual estimation, is 60 to 65%.   2. Normal global left ventricular systolic function.   3. Mildly enlarged left atrium.   4. Mild mitral valve regurgitation.   5. Mild-moderate tricuspid regurgitation.   6. Mild aortic regurgitation.   7. Sclerotic aortic valve with normal opening.   8. Mild pulmonic valve regurgitation.   9. Estimated pulmonary artery systolic pressure is 43.2 mmHg assuming a right atrial pressure of 5 mmHg, which is consistent with mild pulmonary hypertension.      [id number] Arlene Persaud MD, [facility]  Electronically signed on 3/16/2021 at 8:11:16 AM            *** Final ***                ARLENE PERSAUD MD; Attending Cardiologist    < end of copied text >  
Patient seen and examined bedside resting comfortably.  Non verbal  Indwelling milner draining clear yellow urine with sediment.    T(F): 99.1 (03-16-21 @ 11:00), Max: 99.5 (03-15-21 @ 23:47)  HR: 88 (03-16-21 @ 11:00) (85 - 109)  BP: 169/77 (03-16-21 @ 11:00) (163/79 - 170/90)  RR: 16 (03-16-21 @ 11:00) (16 - 18)  SpO2: 99% (03-16-21 @ 11:00) (98% - 99%)        POCT Blood Glucose.: 157 mg/dL (16 Mar 2021 11:37)  POCT Blood Glucose.: 254 mg/dL (16 Mar 2021 07:13)  POCT Blood Glucose.: 206 mg/dL (15 Mar 2021 18:00)  POCT Blood Glucose.: 180 mg/dL (15 Mar 2021 16:29)      PHYSICAL EXAM:    General: NAD, nonverbal  HEENT: NCAT, EOMI, conjunctiva clear  Chest: nonlabored respirations, CTA b/l.  Abdomen: soft, NT/ND.   Extremities: Calf soft, nontender b/l.   : No suprapubic tenderness or bladder distention.  Indwelling milner with clear yellow urine with sediment    LABS:                        12.5   8.75  )-----------( 188      ( 16 Mar 2021 06:57 )             39.0   03-16    148<H>  |  116<H>  |  22  ----------------------------<  239<H>  3.6   |  26  |  1.38<H>    Ca    8.1<L>      16 Mar 2021 06:57      I&O's Detail    15 Mar 2021 07:01  -  16 Mar 2021 07:00  --------------------------------------------------------  IN:    Oral Fluid: 808 mL  Total IN: 808 mL    OUT:    Indwelling Catheter - Urethral (mL): 2350 mL  Total OUT: 2350 mL    Total NET: -1542 mL      
HPI:  73yo, BF with h/o HTN, DM2, CVA, gout, asthma, seizure and urinary incontinence presents today biba from home for evaluation for altered mental status, pt was last seen at her baseline at 7pm, yesterday, pt's sister  (Amaya [phone]) called in and reports that she at baseline communicates very little, she may say yes or no or points, however, this morning she noticed she is not as responsive, in the ER pt is not responsive to verbal stimuli and very little response to painful stimuli and warm to touch, abdomen also noted to be distended and firm to touch, pt is unable to follow any commands.  In ER, [place]berry placed with 2500 ml urine noted.   (13 Mar 2021 16:21)      Allergies    No Known Allergies    Intolerances        MEDICATIONS  (STANDING):  amLODIPine   Tablet 10 milliGRAM(s) Oral daily  aspirin enteric coated 81 milliGRAM(s) Oral daily  dextrose 40% Gel 15 Gram(s) Oral once  dextrose 5%. 1000 milliLiter(s) (100 mL/Hr) IV Continuous <Continuous>  dextrose 5%. 1000 milliLiter(s) (50 mL/Hr) IV Continuous <Continuous>  dextrose 5%. 1000 milliLiter(s) (60 mL/Hr) IV Continuous <Continuous>  dextrose 50% Injectable 25 Gram(s) IV Push once  dextrose 50% Injectable 12.5 Gram(s) IV Push once  dextrose 50% Injectable 25 Gram(s) IV Push once  fluconAZOLE   Tablet 200 milliGRAM(s) Oral daily  glucagon  Injectable 1 milliGRAM(s) IntraMuscular once  heparin   Injectable 5000 Unit(s) SubCutaneous every 12 hours  hydrALAZINE 25 milliGRAM(s) Oral every 8 hours  insulin lispro (ADMELOG) corrective regimen sliding scale   SubCutaneous three times a day before meals  levETIRAcetam  IVPB 750 milliGRAM(s) IV Intermittent every 12 hours  metoprolol tartrate 50 milliGRAM(s) Oral two times a day  multivitamin 1 Tablet(s) Oral daily  pantoprazole  Injectable 40 milliGRAM(s) IV Push daily  potassium chloride   Powder 40 milliEquivalent(s) Oral every 4 hours  simvastatin 20 milliGRAM(s) Oral at bedtime  tamsulosin 0.4 milliGRAM(s) Oral at bedtime    MEDICATIONS  (PRN):  acetaminophen  Suppository .. 650 milliGRAM(s) Rectal every 6 hours PRN Temp greater or equal to 38C (100.4F), Mild Pain (1 - 3)      REVIEW OF SYSTEMS:    cannot communicate     VITAL SIGNS:  T(C): 37.3 (03-19-21 @ 04:35), Max: 37.3 (03-19-21 @ 04:35)  T(F): 99.1 (03-19-21 @ 04:35), Max: 99.1 (03-19-21 @ 04:35)  HR: 70 (03-19-21 @ 04:35) (70 - 76)  BP: 154/63 (03-19-21 @ 04:35) (143/74 - 177/75)  RR: 18 (03-19-21 @ 04:35) (17 - 18)  SpO2: 95% (03-19-21 @ 04:35) (95% - 99%)  Wt(kg): --    PHYSICAL EXAM:    GENERAL: not in any distress  HEENT: Neck is supple, normocephalic, atraumatic   CHEST/LUNG: Clear   HEART: Regular rate and rhythm; No murmurs, rubs, or gallops  ABDOMEN: Soft, Nontender, Nondistended; Bowel sounds present, no rebound   EXTREMITIES:  2+ Peripheral Pulses, No clubbing, cyanosis, or edema  GENITOURINARY:   SKIN: No rashes or lesions  BACK: no pressor sore   NERVOUS SYSTEM:  demented     LABS:                         10.6   9.37  )-----------( 230      ( 19 Mar 2021 09:04 )             33.1     03-19    140  |  108  |  10  ----------------------------<  182<H>  3.2<L>   |  27  |  1.13    Ca    7.6<L>      19 Mar 2021 09:04  Phos  2.7     03-18  Mg     2.0     03-19    TPro  6.4  /  Alb  2.0<L>  /  TBili  0.4  /  DBili  x   /  AST  12<L>  /  ALT  22  /  AlkPhos  57  03-19    LIVER FUNCTIONS - ( 19 Mar 2021 09:04 )  Alb: 2.0 g/dL / Pro: 6.4 gm/dL / ALK PHOS: 57 U/L / ALT: 22 U/L / AST: 12 U/L / GGT: x                                   Culture Results:   No growth to date. (03-18 @ 08:38)  Culture Results:   No growth to date. (03-18 @ 08:38)  Culture Results:   No growth to date. (03-15 @ 12:23)  Culture Results:   No growth to date. (03-15 @ 00:37)  Culture Results:   10,000 - 49,000 CFU/mL Presumptive Candida albicans "Susceptibilities not  performed" (03-13 @ 16:26)  Culture Results:   Growth in aerobic bottle: Candida albicans  Delay in susceptibility testing due to a nationwide supply shortage. (03-13 @ 12:17)  Culture Results:   Growth in aerobic bottle: Candida albicans See previous culture  [id number]    ***Blood Panel PCR results on this specimen are available  approximately 3 hours after the Gram stain result.***  Gram stain, PCR, and/or culture results may not always  correspond due to difference in methodologies.  ************************************************************  This PCR assay was performed by multiplex PCR. This  Assay tests for 66 bacterial and resistance gene targets.  Please refer to the [facility] test directory  at [email or web address] for details. (03-13 @ 12:17)                Radiology:      
Patient seen and examined bedside resting comfortably.  Non verbal  Indwelling milner draining clear yellow urine with sediment.      T(F): 100.2 (03-17-21 @ 05:34), Max: 100.2 (03-17-21 @ 05:34)  HR: 82 (03-17-21 @ 05:34) (78 - 94)  BP: 172/71 (03-17-21 @ 05:34) (148/71 - 172/71)  RR: 16 (03-17-21 @ 05:34) (16 - 17)  SpO2: 97% (03-17-21 @ 05:34) (97% - 99%)    POCT Blood Glucose.: 244 mg/dL (17 Mar 2021 07:53)  POCT Blood Glucose.: 181 mg/dL (16 Mar 2021 21:15)  POCT Blood Glucose.: 118 mg/dL (16 Mar 2021 16:34)  POCT Blood Glucose.: 157 mg/dL (16 Mar 2021 11:37)      PHYSICAL EXAM:    General: NAD, nonverbal  HEENT: NCAT, EOMI, conjunctiva clear  Chest: nonlabored respirations, CTA b/l.  Abdomen: soft, NT/ND.   Extremities: Calf soft, nontender b/l.   : No suprapubic tenderness or bladder distention.  Indwelling milner with clear yellow urine with sediment    LABS:                        11.5   8.77  )-----------( 194      ( 17 Mar 2021 06:10 )             35.4   03-17    145  |  111<H>  |  20  ----------------------------<  223<H>  3.0<L>   |  27  |  1.25    Ca    7.5<L>      17 Mar 2021 06:10      I&O's Detail    16 Mar 2021 07:01  -  17 Mar 2021 07:00  --------------------------------------------------------  IN:    Oral Fluid: 200 mL  Total IN: 200 mL    OUT:    Indwelling Catheter - Urethral (mL): 1940 mL  Total OUT: 1940 mL    Total NET: -1740 mL    
Patient seen and examined bedside resting comfortably.  Non verbal  Indwelling milner draining clear yellow urine with sediment.      T(F): 98.2 (03-18-21 @ 11:12), Max: 101.4 (03-17-21 @ 23:49)  HR: 76 (03-18-21 @ 11:12) (76 - 90)  BP: 174/69 (03-18-21 @ 11:12) (158/78 - 174/69)  RR: 17 (03-18-21 @ 11:12) (17 - 18)  SpO2: 98% (03-18-21 @ 11:12) (95% - 99%)        POCT Blood Glucose.: 145 mg/dL (18 Mar 2021 12:03)  POCT Blood Glucose.: 205 mg/dL (18 Mar 2021 07:32)  POCT Blood Glucose.: 203 mg/dL (17 Mar 2021 21:16)  POCT Blood Glucose.: 149 mg/dL (17 Mar 2021 16:37)    PHYSICAL EXAM:    General: NAD, nonverbal  HEENT: NCAT, EOMI, conjunctiva clear  Chest: nonlabored respirations, CTA b/l.  Abdomen: soft, NT/ND.   Extremities: Calf soft, nontender b/l.   : No suprapubic tenderness or bladder distention.  Indwelling milner with clear yellow urine with sediment      LABS:                        10.4   8.70  )-----------( 194      ( 18 Mar 2021 06:56 )             32.3   03-18    144  |  111<H>  |  13  ----------------------------<  201<H>  2.9<LL>   |  29  |  1.06    Ca    7.4<L>      18 Mar 2021 06:56  Phos  2.7     03-18  Mg     1.1     03-18    TPro  6.2  /  Alb  2.0<L>  /  TBili  0.6  /  DBili  x   /  AST  9<L>  /  ALT  24  /  AlkPhos  58  03-18    I&O's Detail    17 Mar 2021 07:01  -  18 Mar 2021 07:00  --------------------------------------------------------  IN:    Oral Fluid: 200 mL  Total IN: 200 mL    OUT:    Indwelling Catheter - Urethral (mL): 1325 mL  Total OUT: 1325 mL    Total NET: -1125 mL      18 Mar 2021 07:01  -  18 Mar 2021 12:55  --------------------------------------------------------  IN:  Total IN: 0 mL    OUT:    Voided (mL): 1100 mL  Total OUT: 1100 mL    Total NET: -1100 mL    
HPI:  73yo, BF with h/o HTN, DM2, CVA, gout, asthma, seizure and urinary incontinence presents today biba from home for evaluation for altered mental status, pt was last seen at her baseline at 7pm, yesterday, pt's sister  (Amaya [phone]) called in and reports that she at baseline communicates very little, she may say yes or no or points, however, this morning she noticed she is not as responsive, in the ER pt is not responsive to verbal stimuli and very little response to painful stimuli and warm to touch, abdomen also noted to be distended and firm to touch, pt is unable to follow any commands.  In ER, Milner placed with 2500 ml urine noted.   (13 Mar 2021 16:21)  work up consistent with urinary tract infection with candedemia     Allergies    No Known Allergies    Intolerances        MEDICATIONS  (STANDING):  amLODIPine   Tablet 10 milliGRAM(s) Oral daily  aspirin enteric coated 81 milliGRAM(s) Oral daily  dextrose 40% Gel 15 Gram(s) Oral once  dextrose 5%. 1000 milliLiter(s) (50 mL/Hr) IV Continuous <Continuous>  dextrose 5%. 1000 milliLiter(s) (100 mL/Hr) IV Continuous <Continuous>  dextrose 5%. 1000 milliLiter(s) (60 mL/Hr) IV Continuous <Continuous>  dextrose 50% Injectable 25 Gram(s) IV Push once  dextrose 50% Injectable 12.5 Gram(s) IV Push once  dextrose 50% Injectable 25 Gram(s) IV Push once  fluconAZOLE   Tablet 200 milliGRAM(s) Oral daily  glucagon  Injectable 1 milliGRAM(s) IntraMuscular once  heparin   Injectable 5000 Unit(s) SubCutaneous every 12 hours  insulin lispro (ADMELOG) corrective regimen sliding scale   SubCutaneous three times a day before meals  levETIRAcetam  IVPB 750 milliGRAM(s) IV Intermittent every 12 hours  metoprolol tartrate 50 milliGRAM(s) Oral two times a day  multivitamin 1 Tablet(s) Oral daily  pantoprazole  Injectable 40 milliGRAM(s) IV Push daily  simvastatin 20 milliGRAM(s) Oral at bedtime  tamsulosin 0.4 milliGRAM(s) Oral at bedtime    MEDICATIONS  (PRN):  acetaminophen  Suppository .. 650 milliGRAM(s) Rectal every 6 hours PRN Temp greater or equal to 38C (100.4F), Mild Pain (1 - 3)      REVIEW OF SYSTEMS:    unable to assess   VITAL SIGNS:  T(C): 37.4 (03-18-21 @ 05:24), Max: 38.6 (03-17-21 @ 23:49)  T(F): 99.4 (03-18-21 @ 05:24), Max: 101.4 (03-17-21 @ 23:49)  HR: 77 (03-18-21 @ 05:24) (77 - 90)  BP: 168/75 (03-18-21 @ 05:24) (145/94 - 168/75)  RR: 18 (03-18-21 @ 05:24) (16 - 18)  SpO2: 95% (03-18-21 @ 05:24) (95% - 99%)  Wt(kg): --    PHYSICAL EXAM:    GENERAL: not in any distress  HEENT: Neck is supple, normocephalic, atraumatic   CHEST/LUNG: Clear to auscultation bilaterally; No rales, rhonchi, wheezing  HEART: Regular rate and rhythm; No murmurs, rubs, or gallops  ABDOMEN: Soft, Nontender, Nondistended; Bowel sounds present, no rebound   EXTREMITIES:  2+ Peripheral Pulses, No clubbing, cyanosis, or edema  GENITOURINARY: milner   SKIN: No rashes or lesions  BACK: no pressor sore   NERVOUS SYSTEM:  Alert & non verbal     LABS:                         11.5   7.76  )-----------( 110      ( 18 Mar 2021 01:07 )             34.9     03-18    143  |  109<H>  |  16  ----------------------------<  187<H>  3.2<L>   |  27  |  1.21    Ca    7.5<L>      18 Mar 2021 01:07  Phos  2.7     03-18  Mg     1.1     03-18                                Culture Results:   No growth to date. (03-15 @ 12:23)  Culture Results:   No growth to date. (03-15 @ 00:37)  Culture Results:   10,000 - 49,000 CFU/mL Presumptive Candida albicans "Susceptibilities not  performed" (03-13 @ 16:26)  Culture Results:   Growth in aerobic bottle: Candida albicans  Delay in susceptibility testing due to a nationwide supply shortage. (03-13 @ 12:17)  Culture Results:   Growth in aerobic bottle: Candida albicans See previous culture  [id number]    ***Blood Panel PCR results on this specimen are available  approximately 3 hours after the Gram stain result.***  Gram stain, PCR, and/or culture results may not always  correspond due to difference in methodologies.  ************************************************************  This PCR assay was performed by multiplex PCR. This  Assay tests for 66 bacterial and resistance gene targets.  Please refer to the [facility] test directory  at [email or web address] for details. (03-13 @ 12:17)                Radiology:      
HPI:  73yo, BF with h/o HTN, DM2, CVA, gout, asthma, seizure and urinary incontinence presents today biba from home for evaluation for altered mental status, pt was last seen at her baseline at 7pm, yesterday, pt's sister  (Amaya [phone]) called in and reports that she at baseline communicates very little, she may say yes or no or points, however, this morning she noticed she is not as responsive, in the ER pt is not responsive to verbal stimuli and very little response to painful stimuli and warm to touch, abdomen also noted to be distended and firm to touch, pt is unable to follow any commands.  In ER, Milner placed with 2500 ml urine noted.   (13 Mar 2021 16:21)  here candidemia   has done well    Allergies    No Known Allergies    Intolerances        MEDICATIONS  (STANDING):  amLODIPine   Tablet 10 milliGRAM(s) Oral daily  aspirin enteric coated 81 milliGRAM(s) Oral daily  dextrose 40% Gel 15 Gram(s) Oral once  dextrose 5%. 1000 milliLiter(s) (50 mL/Hr) IV Continuous <Continuous>  dextrose 5%. 1000 milliLiter(s) (100 mL/Hr) IV Continuous <Continuous>  dextrose 5%. 1000 milliLiter(s) (60 mL/Hr) IV Continuous <Continuous>  dextrose 50% Injectable 25 Gram(s) IV Push once  dextrose 50% Injectable 12.5 Gram(s) IV Push once  dextrose 50% Injectable 25 Gram(s) IV Push once  fluconAZOLE   Tablet 200 milliGRAM(s) Oral daily  glucagon  Injectable 1 milliGRAM(s) IntraMuscular once  heparin   Injectable 5000 Unit(s) SubCutaneous every 12 hours  insulin lispro (ADMELOG) corrective regimen sliding scale   SubCutaneous three times a day before meals  levETIRAcetam  IVPB 750 milliGRAM(s) IV Intermittent every 12 hours  metoprolol tartrate 50 milliGRAM(s) Oral two times a day  multivitamin 1 Tablet(s) Oral daily  pantoprazole  Injectable 40 milliGRAM(s) IV Push daily  simvastatin 20 milliGRAM(s) Oral at bedtime  tamsulosin 0.4 milliGRAM(s) Oral at bedtime    MEDICATIONS  (PRN):      REVIEW OF SYSTEMS:    unable to assess     VITAL SIGNS:  T(C): 36.9 (03-17-21 @ 16:26), Max: 37.9 (03-17-21 @ 05:34)  T(F): 98.5 (03-17-21 @ 16:26), Max: 100.2 (03-17-21 @ 05:34)  HR: 90 (03-17-21 @ 16:26) (78 - 90)  BP: 158/78 (03-17-21 @ 16:26) (145/94 - 172/71)  RR: 18 (03-17-21 @ 16:26) (16 - 18)  SpO2: 99% (03-17-21 @ 16:26) (97% - 99%)  Wt(kg): --    PHYSICAL EXAM:    GENERAL: not in any distress  HEENT: Neck is supple, normocephalic, atraumatic   CHEST/LUNG: Clear to auscultation bilaterally; No rales, rhonchi, wheezing  HEART: Regular rate and rhythm; No murmurs, rubs, or gallops  ABDOMEN: Soft, Nontender, Nondistended; Bowel sounds present, no rebound   EXTREMITIES:  2+ Peripheral Pulses, No clubbing, cyanosis, or edema  milner   SKIN: No rashes or lesions  BACK: no pressor sore   NERVOUS SYSTEM:  Alert & responsive non verbal     LABS:                         11.5   8.77  )-----------( 194      ( 17 Mar 2021 06:10 )             35.4     03-17    145  |  111<H>  |  20  ----------------------------<  223<H>  3.0<L>   |  27  |  1.25    Ca    7.5<L>      17 Mar 2021 06:10                                Culture Results:   No growth to date. (03-15 @ 12:23)  Culture Results:   No growth to date. (03-15 @ 00:37)  Culture Results:   10,000 - 49,000 CFU/mL Presumptive Candida albicans "Susceptibilities not  performed" (03-13 @ 16:26)  Culture Results:   Growth in aerobic bottle: Candida albicans  Delay in susceptibility testing due to a nationwide supply shortage. (03-13 @ 12:17)  Culture Results:   Growth in aerobic bottle: Candida albicans See previous culture  [id number]    ***Blood Panel PCR results on this specimen are available  approximately 3 hours after the Gram stain result.***  Gram stain, PCR, and/or culture results may not always  correspond due to difference in methodologies.  ************************************************************  This PCR assay was performed by multiplex PCR. This  Assay tests for 66 bacterial and resistance gene targets.  Please refer to the [facility] test directory  at [email or web address] for details. (03-13 @ 12:17)                Radiology:      
HPI:  71yo, BF with h/o HTN, DM2, CVA, gout, asthma, seizure and urinary incontinence presents today biba from home for evaluation for altered mental status, pt was last seen at her baseline at 7pm, yesterday, pt's sister  (Amaya [phone]) called in and reports that she at baseline communicates very little, she may say yes or no or points, however, this morning she noticed she is not as responsive, in the ER pt is not responsive to verbal stimuli and very little response to painful stimuli and warm to touch, abdomen also noted to be distended and firm to touch, pt is unable to follow any commands.  In ER, Milner placed with 2500 ml urine noted.   (13 Mar 2021 16:21)  work up here consistent with urinary tract infection and candida albicans bacteremia   as bioavailability of po diflucan is as good as iv on diflucan po     Allergies    No Known Allergies    Intolerances        MEDICATIONS  (STANDING):  amLODIPine   Tablet 10 milliGRAM(s) Oral daily  aspirin enteric coated 81 milliGRAM(s) Oral daily  dextrose 40% Gel 15 Gram(s) Oral once  dextrose 5% + sodium chloride 0.45%. 1000 milliLiter(s) (70 mL/Hr) IV Continuous <Continuous>  dextrose 5%. 1000 milliLiter(s) (50 mL/Hr) IV Continuous <Continuous>  dextrose 5%. 1000 milliLiter(s) (100 mL/Hr) IV Continuous <Continuous>  dextrose 50% Injectable 25 Gram(s) IV Push once  dextrose 50% Injectable 12.5 Gram(s) IV Push once  dextrose 50% Injectable 25 Gram(s) IV Push once  fluconAZOLE   Tablet 200 milliGRAM(s) Oral daily  glucagon  Injectable 1 milliGRAM(s) IntraMuscular once  heparin   Injectable 5000 Unit(s) SubCutaneous every 12 hours  insulin lispro (ADMELOG) corrective regimen sliding scale   SubCutaneous three times a day before meals  levETIRAcetam  IVPB 750 milliGRAM(s) IV Intermittent every 12 hours  multivitamin 1 Tablet(s) Oral daily  pantoprazole  Injectable 40 milliGRAM(s) IV Push daily  potassium chloride   Powder 40 milliEquivalent(s) Oral every 4 hours  simvastatin 20 milliGRAM(s) Oral at bedtime  tamsulosin 0.4 milliGRAM(s) Oral at bedtime    MEDICATIONS  (PRN):  metoprolol tartrate Injectable 5 milliGRAM(s) IV Push every 6 hours PRN for sbp >160      REVIEW OF SYSTEMS:    unable to assess   has no complaints     VITAL SIGNS:  T(C): 37.6 (03-15-21 @ 04:57), Max: 37.6 (03-15-21 @ 04:57)  T(F): 99.7 (03-15-21 @ 04:57), Max: 99.7 (03-15-21 @ 04:57)  HR: 72 (03-15-21 @ 04:57) (72 - 98)  BP: 159/72 (03-15-21 @ 04:57) (146/66 - 159/72)  RR: 18 (03-15-21 @ 04:57) (17 - 18)  SpO2: 100% (03-15-21 @ 04:57) (98% - 100%)  Wt(kg): --    PHYSICAL EXAM:    GENERAL: not in any distress  HEENT: Neck is supple, normocephalic, atraumatic   CHEST/LUNG: Clear to auscultation bilaterally; No rales, rhonchi, wheezing  HEART: Regular rate and rhythm; No murmurs, rubs, or gallops  ABDOMEN: Soft, Nontender, Nondistended; Bowel sounds present, no rebound   EXTREMITIES:  2+ Peripheral Pulses, No clubbing, cyanosis, or edema  GENITOURINARY: milner   SKIN: No rashes or lesions  BACK: no pressor sore   NERVOUS SYSTEM:  Alert & responsive     LABS:                         11.9   9.02  )-----------( 183      ( 15 Mar 2021 07:24 )             36.7     03-15    154<H>  |  122<H>  |  39<H>  ----------------------------<  259<H>  3.0<L>   |  24  |  1.67<H>    Ca    8.5      15 Mar 2021 07:24    TPro  7.1  /  Alb  2.5<L>  /  TBili  0.6  /  DBili  x   /  AST  17  /  ALT  31  /  AlkPhos  64  03-14    LIVER FUNCTIONS - ( 14 Mar 2021 06:35 )  Alb: 2.5 g/dL / Pro: 7.1 gm/dL / ALK PHOS: 64 U/L / ALT: 31 U/L / AST: 17 U/L / GGT: x                                   Culture Results:   10,000 - 49,000 CFU/mL Presumptive Candida albicans "Susceptibilities not  performed" (03-13 @ 16:26)  Culture Results:   Growth in aerobic bottle: Yeast like cells (03-13 @ 12:17)  Culture Results:   Growth in aerobic bottle: Yeast like cells  ***Blood Panel PCR results on this specimen are available  approximately 3 hours after the Gram stain result.***  Gram stain, PCR, and/or culture results may not always  correspond due to difference in methodologies.  ************************************************************  This PCR assay was performed by multiplex PCR. This  Assay tests for 66 bacterial and resistance gene targets.  Please refer to the [facility] test directory  at [email or web address] for details. (03-13 @ 12:17)                Radiology:      < from: CT Abdomen and Pelvis No Cont (03.13.21 @ 11:17) >  IMPRESSION:  Mild bilateral hydroureteronephrosis without definite obstructing stone identified. However, there are multiple bilateral nonobstructing renal calculi, as described above.    Urinary bladder is collapsed around a Milner catheter. Small stone within the urinary bladder may present a recently passed stone.    Bulky, fibroid uterus.    Additional findings as above.        < end of copied text >  
Patient is a 70y old  Female who presents with a chief complaint of Altered mental status. (15 Mar 2021 15:18)      SUBJECTIVE/OBJECTIVE:     Patient resting comfortably.     MEDICATIONS  (STANDING):  amLODIPine   Tablet 10 milliGRAM(s) Oral daily  aspirin enteric coated 81 milliGRAM(s) Oral daily  dextrose 40% Gel 15 Gram(s) Oral once  dextrose 5%. 1000 milliLiter(s) (50 mL/Hr) IV Continuous <Continuous>  dextrose 5%. 1000 milliLiter(s) (100 mL/Hr) IV Continuous <Continuous>  dextrose 5%. 1000 milliLiter(s) (60 mL/Hr) IV Continuous <Continuous>  dextrose 50% Injectable 25 Gram(s) IV Push once  dextrose 50% Injectable 12.5 Gram(s) IV Push once  dextrose 50% Injectable 25 Gram(s) IV Push once  fluconAZOLE   Tablet 200 milliGRAM(s) Oral daily  glucagon  Injectable 1 milliGRAM(s) IntraMuscular once  heparin   Injectable 5000 Unit(s) SubCutaneous every 12 hours  insulin lispro (ADMELOG) corrective regimen sliding scale   SubCutaneous three times a day before meals  levETIRAcetam  IVPB 750 milliGRAM(s) IV Intermittent every 12 hours  metoprolol tartrate 50 milliGRAM(s) Oral two times a day  multivitamin 1 Tablet(s) Oral daily  pantoprazole  Injectable 40 milliGRAM(s) IV Push daily  simvastatin 20 milliGRAM(s) Oral at bedtime  tamsulosin 0.4 milliGRAM(s) Oral at bedtime    MEDICATIONS  (PRN):      Allergies    No Known Allergies    Intolerances          Vital Signs Last 24 Hrs  T(C): 37.3 (16 Mar 2021 11:00), Max: 37.5 (15 Mar 2021 23:47)  T(F): 99.1 (16 Mar 2021 11:00), Max: 99.5 (15 Mar 2021 23:47)  HR: 88 (16 Mar 2021 11:00) (85 - 109)  BP: 169/77 (16 Mar 2021 11:00) (158/75 - 170/90)  BP(mean): --  RR: 16 (16 Mar 2021 11:00) (16 - 18)  SpO2: 99% (16 Mar 2021 11:00) (98% - 100%)    PHYSICAL EXAM:  GENERAL: NAD, well-groomed, well-developed  HEAD:  Atraumatic, Normocephalic  EYES: EOMI, PERRLA, conjunctiva and sclera clear  ENMT: No tonsillar erythema, exudates, or enlargement; Moist mucous membranes, No lesions  NECK: Supple,   NERVOUS SYSTEM:  Alert ; Motor Strength 5/5   CHEST/LUNG: Clear bilaterally; No rales, rhonchi, wheezing, or rubs  HEART: Regular rate and rhythm; No murmurs, rubs, or gallops  ABDOMEN: Soft, Nontender, Nondistended; Bowel sounds present  EXTREMITIES:  2+ Peripheral Pulses, No clubbing, cyanosis, or edema  LYMPH: No lymphadenopathy noted  SKIN: No rashes or lesions    LABS:                        12.5   8.75  )-----------( 188      ( 16 Mar 2021 06:57 )             39.0     03-16    148<H>  |  116<H>  |  22  ----------------------------<  239<H>  3.6   |  26  |  1.38<H>    Ca    8.1<L>      16 Mar 2021 06:57          CAPILLARY BLOOD GLUCOSE      POCT Blood Glucose.: 254 mg/dL (16 Mar 2021 07:13)  POCT Blood Glucose.: 206 mg/dL (15 Mar 2021 18:00)  POCT Blood Glucose.: 180 mg/dL (15 Mar 2021 16:29)          RADIOLOGY & ADDITIONAL TESTS:        Consultant(s) Notes Reviewed:  [xx ] YES  [ ] NO  
Patient is a 70y old  Female who presents with a chief complaint of Altered mental status. (13 Mar 2021 16:21)      SUBJECTIVE/OBJECTIVE:    Awake and comfortble    MEDICATIONS  (STANDING):  amLODIPine   Tablet 10 milliGRAM(s) Oral daily  aspirin enteric coated 81 milliGRAM(s) Oral daily  cefTRIAXone   IVPB 1000 milliGRAM(s) IV Intermittent every 24 hours  cefTRIAXone   IVPB      dextrose 40% Gel 15 Gram(s) Oral once  dextrose 5% + sodium chloride 0.45%. 1000 milliLiter(s) (70 mL/Hr) IV Continuous <Continuous>  dextrose 5%. 1000 milliLiter(s) (50 mL/Hr) IV Continuous <Continuous>  dextrose 5%. 1000 milliLiter(s) (100 mL/Hr) IV Continuous <Continuous>  dextrose 50% Injectable 25 Gram(s) IV Push once  dextrose 50% Injectable 12.5 Gram(s) IV Push once  dextrose 50% Injectable 25 Gram(s) IV Push once  fluconAZOLE   Tablet 200 milliGRAM(s) Oral daily  glucagon  Injectable 1 milliGRAM(s) IntraMuscular once  heparin   Injectable 5000 Unit(s) SubCutaneous every 12 hours  insulin lispro (ADMELOG) corrective regimen sliding scale   SubCutaneous three times a day before meals  levETIRAcetam  IVPB 750 milliGRAM(s) IV Intermittent every 12 hours  multivitamin 1 Tablet(s) Oral daily  pantoprazole  Injectable 40 milliGRAM(s) IV Push daily  simvastatin 20 milliGRAM(s) Oral at bedtime  tamsulosin 0.4 milliGRAM(s) Oral at bedtime    MEDICATIONS  (PRN):  metoprolol tartrate Injectable 5 milliGRAM(s) IV Push every 6 hours PRN for sbp >160      Allergies    No Known Allergies    Intolerances          Vital Signs Last 24 Hrs  T(C): 36.7 (14 Mar 2021 10:51), Max: 37.6 (13 Mar 2021 14:51)  T(F): 98.1 (14 Mar 2021 10:51), Max: 99.7 (13 Mar 2021 14:51)  HR: 87 (14 Mar 2021 10:51) (69 - 87)  BP: 169/68 (14 Mar 2021 10:51) (161/79 - 178/70)  BP(mean): --  RR: 18 (14 Mar 2021 10:51) (18 - 20)  SpO2: 99% (14 Mar 2021 10:51) (98% - 100%)    PHYSICAL EXAM:  GENERAL: NAD,   HEAD:  Atraumatic, Normocephalic  EYES: , conjunctiva and sclera clear  ENMT: Intact  NECK: Supple, No JVD, Normal thyroid  NERVOUS SYSTEM:  Alert ; Motor Strength 4/5   CHEST/LUNG: Clear bilaterally; No rales, rhonchi, wheezing, or rubs  HEART: Regular rate and rhythm; No murmurs, rubs, or gallops  ABDOMEN: Soft, Nontender, Nondistended; Bowel sounds present  EXTREMITIES:   No clubbing, cyanosis, or edema  LYMPH: No lymphadenopathy noted  SKIN: No rashes or lesions    LABS:                        12.4   14.67 )-----------( 183      ( 14 Mar 2021 06:35 )             37.5     03-14    153<H>  |  123<H>  |  73<H>  ----------------------------<  180<H>  3.1<L>   |  22  |  3.04<H>    Ca    8.7      14 Mar 2021 06:35    TPro  7.1  /  Alb  2.5<L>  /  TBili  0.6  /  DBili  x   /  AST  17  /  ALT  31  /  AlkPhos  64  03-14    PT/INR - ( 13 Mar 2021 09:39 )   PT: 13.1 sec;   INR: 1.14 ratio         PTT - ( 13 Mar 2021 09:39 )  PTT:29.0 sec  Urinalysis Basic - ( 13 Mar 2021 09:32 )    Color: Yellow / Appearance: Clear / S.015 / pH: x  Gluc: x / Ketone: Negative  / Bili: Negative / Urobili: Negative mg/dL   Blood: x / Protein: 500 mg/dL / Nitrite: Negative   Leuk Esterase: Negative / RBC: 0-2 /HPF / WBC 6-10   Sq Epi: x / Non Sq Epi: x / Bacteria: x      CAPILLARY BLOOD GLUCOSE      POCT Blood Glucose.: 164 mg/dL (14 Mar 2021 11:27)  POCT Blood Glucose.: 173 mg/dL (14 Mar 2021 08:00)  POCT Blood Glucose.: 109 mg/dL (13 Mar 2021 21:44)  POCT Blood Glucose.: 264 mg/dL (13 Mar 2021 17:45)  POCT Blood Glucose.: 286 mg/dL (13 Mar 2021 16:39)    CARDIAC MARKERS ( 13 Mar 2021 09:39 )  .033 ng/mL / x     / x     / x     / x        Lactate, Blood: 0.9 mmol/L (21 @ 06:24)   Procalcitonin, Serum: 0.61: Procalcitonin (PCT) Interpretation (ng/mL) - Diagnosis of systemic   RADIOLOGY & ADDITIONAL TESTS:        Consultant(s) Notes Reviewed:  [ ] YES  [ ] NO  
Patient is a 70y old  Female who presents with a chief complaint of Altered mental status. (15 Mar 2021 11:11)      SUBJECTIVE/OBJECTIVE:  Patient resting comfortably.     MEDICATIONS  (STANDING):  amLODIPine   Tablet 10 milliGRAM(s) Oral daily  aspirin enteric coated 81 milliGRAM(s) Oral daily  dextrose 40% Gel 15 Gram(s) Oral once  dextrose 5% + sodium chloride 0.45%. 1000 milliLiter(s) (70 mL/Hr) IV Continuous <Continuous>  dextrose 5%. 1000 milliLiter(s) (50 mL/Hr) IV Continuous <Continuous>  dextrose 5%. 1000 milliLiter(s) (100 mL/Hr) IV Continuous <Continuous>  dextrose 50% Injectable 25 Gram(s) IV Push once  dextrose 50% Injectable 12.5 Gram(s) IV Push once  dextrose 50% Injectable 25 Gram(s) IV Push once  fluconAZOLE   Tablet 200 milliGRAM(s) Oral daily  glucagon  Injectable 1 milliGRAM(s) IntraMuscular once  heparin   Injectable 5000 Unit(s) SubCutaneous every 12 hours  insulin lispro (ADMELOG) corrective regimen sliding scale   SubCutaneous three times a day before meals  levETIRAcetam  IVPB 750 milliGRAM(s) IV Intermittent every 12 hours  metoprolol tartrate 25 milliGRAM(s) Oral two times a day  multivitamin 1 Tablet(s) Oral daily  pantoprazole  Injectable 40 milliGRAM(s) IV Push daily  potassium chloride   Powder 40 milliEquivalent(s) Oral every 4 hours  simvastatin 20 milliGRAM(s) Oral at bedtime  tamsulosin 0.4 milliGRAM(s) Oral at bedtime    MEDICATIONS  (PRN):      Allergies    No Known Allergies    Intolerances          Vital Signs Last 24 Hrs  T(C): 37.1 (15 Mar 2021 11:49), Max: 37.6 (15 Mar 2021 04:57)  T(F): 98.8 (15 Mar 2021 11:49), Max: 99.7 (15 Mar 2021 04:57)  HR: 104 (15 Mar 2021 11:49) (72 - 104)  BP: 158/75 (15 Mar 2021 11:49) (146/66 - 159/72)  BP(mean): --  RR: 16 (15 Mar 2021 11:49) (16 - 18)  SpO2: 100% (15 Mar 2021 11:49) (98% - 100%)    PHYSICAL EXAM:  GENERAL: NAD, well-groomed, well-developed  HEAD:  Atraumatic, Normocephalic  EYES: EOMI, PERRLA, conjunctiva and sclera clear  ENMT: No tonsillar erythema, exudates, or enlargement; Moist mucous membranes, No lesions  NECK: Supple, No JVD, Normal thyroid  NERVOUS SYSTEM:  Alert ; Motor Strength 5/5   CHEST/LUNG: Clear bilaterally; No rales, rhonchi, wheezing, or rubs  HEART: Regular rate and rhythm; No murmurs, rubs, or gallops  ABDOMEN: Soft, Nontender, Nondistended; Bowel sounds present  EXTREMITIES:  2+ Peripheral Pulses, No clubbing, cyanosis, or edema  LYMPH: No lymphadenopathy noted  SKIN: No rashes or lesions    LABS:                        11.9   9.02  )-----------( 183      ( 15 Mar 2021 07:24 )             36.7     03-15    154<H>  |  122<H>  |  39<H>  ----------------------------<  259<H>  3.0<L>   |  24  |  1.67<H>    Ca    8.5      15 Mar 2021 07:24    TPro  7.1  /  Alb  2.5<L>  /  TBili  0.6  /  DBili  x   /  AST  17  /  ALT  31  /  AlkPhos  64  03-14        CAPILLARY BLOOD GLUCOSE      POCT Blood Glucose.: 318 mg/dL (15 Mar 2021 11:15)  POCT Blood Glucose.: 246 mg/dL (15 Mar 2021 07:27)  POCT Blood Glucose.: 203 mg/dL (14 Mar 2021 21:50)  POCT Blood Glucose.: 219 mg/dL (14 Mar 2021 16:41)          RADIOLOGY & ADDITIONAL TESTS:        Consultant(s) Notes Reviewed:  [ xx] YES  [ ] NO  
Patient is a 70y old  Female who presents with a chief complaint of Altered mental status. (16 Mar 2021 13:54)      SUBJECTIVE/OBJECTIVE:    Comfortable    MEDICATIONS  (STANDING):  amLODIPine   Tablet 10 milliGRAM(s) Oral daily  aspirin enteric coated 81 milliGRAM(s) Oral daily  dextrose 40% Gel 15 Gram(s) Oral once  dextrose 5%. 1000 milliLiter(s) (50 mL/Hr) IV Continuous <Continuous>  dextrose 5%. 1000 milliLiter(s) (100 mL/Hr) IV Continuous <Continuous>  dextrose 5%. 1000 milliLiter(s) (60 mL/Hr) IV Continuous <Continuous>  dextrose 50% Injectable 25 Gram(s) IV Push once  dextrose 50% Injectable 12.5 Gram(s) IV Push once  dextrose 50% Injectable 25 Gram(s) IV Push once  fluconAZOLE   Tablet 200 milliGRAM(s) Oral daily  glucagon  Injectable 1 milliGRAM(s) IntraMuscular once  heparin   Injectable 5000 Unit(s) SubCutaneous every 12 hours  insulin lispro (ADMELOG) corrective regimen sliding scale   SubCutaneous three times a day before meals  levETIRAcetam  IVPB 750 milliGRAM(s) IV Intermittent every 12 hours  metoprolol tartrate 50 milliGRAM(s) Oral two times a day  multivitamin 1 Tablet(s) Oral daily  pantoprazole  Injectable 40 milliGRAM(s) IV Push daily  potassium chloride    Tablet ER 40 milliEquivalent(s) Oral once  potassium chloride  10 mEq/100 mL IVPB 10 milliEquivalent(s) IV Intermittent every 1 hour  simvastatin 20 milliGRAM(s) Oral at bedtime  tamsulosin 0.4 milliGRAM(s) Oral at bedtime    MEDICATIONS  (PRN):      Allergies    No Known Allergies    Intolerances          Vital Signs Last 24 Hrs  T(C): 37.9 (17 Mar 2021 05:34), Max: 37.9 (17 Mar 2021 05:34)  T(F): 100.2 (17 Mar 2021 05:34), Max: 100.2 (17 Mar 2021 05:34)  HR: 82 (17 Mar 2021 05:34) (78 - 94)  BP: 172/71 (17 Mar 2021 05:34) (148/71 - 172/71)  BP(mean): --  RR: 16 (17 Mar 2021 05:34) (16 - 17)  SpO2: 97% (17 Mar 2021 05:34) (97% - 99%)    PHYSICAL EXAM:  GENERAL: NAD, well-groomed, well-developed  HEAD:  Atraumatic, Normocephalic  EYES:  conjunctiva and sclera clear  ENMT:Intact  NECK: Supple, No JVD, Normal thyroid  NERVOUS SYSTEM:  Awake; Motor Strength 4/5  CHEST/LUNG: Clear bilaterally; No rales, rhonchi, wheezing, or rubs  HEART: Regular rate and rhythm; No murmurs, rubs, or gallops  ABDOMEN: Soft, Nontender, Nondistended; Bowel sounds present  EXTREMITIES:  No clubbing, cyanosis, or edema  LYMPH: No lymphadenopathy noted  SKIN: No rashes or lesions    LABS:                        11.5   8.77  )-----------( 194      ( 17 Mar 2021 06:10 )             35.4     03-17    145  |  111<H>  |  20  ----------------------------<  223<H>  3.0<L>   |  27  |  1.25    Ca    7.5<L>      17 Mar 2021 06:10          CAPILLARY BLOOD GLUCOSE      POCT Blood Glucose.: 244 mg/dL (17 Mar 2021 07:53)  POCT Blood Glucose.: 181 mg/dL (16 Mar 2021 21:15)  POCT Blood Glucose.: 118 mg/dL (16 Mar 2021 16:34)  POCT Blood Glucose.: 157 mg/dL (16 Mar 2021 11:37)          RADIOLOGY & ADDITIONAL TESTS:        Consultant(s) Notes Reviewed:  [xxx ] YES  [ ] NO  
Patient is a 70y old  Female who presents with a chief complaint of Altered mental status. (18 Mar 2021 06:31)      SUBJECTIVE/OBJECTIVE:     Patient resting comfortably.     MEDICATIONS  (STANDING):  amLODIPine   Tablet 10 milliGRAM(s) Oral daily  aspirin enteric coated 81 milliGRAM(s) Oral daily  dextrose 40% Gel 15 Gram(s) Oral once  dextrose 5%. 1000 milliLiter(s) (50 mL/Hr) IV Continuous <Continuous>  dextrose 5%. 1000 milliLiter(s) (100 mL/Hr) IV Continuous <Continuous>  dextrose 5%. 1000 milliLiter(s) (60 mL/Hr) IV Continuous <Continuous>  dextrose 50% Injectable 25 Gram(s) IV Push once  dextrose 50% Injectable 12.5 Gram(s) IV Push once  dextrose 50% Injectable 25 Gram(s) IV Push once  fluconAZOLE   Tablet 200 milliGRAM(s) Oral daily  glucagon  Injectable 1 milliGRAM(s) IntraMuscular once  heparin   Injectable 5000 Unit(s) SubCutaneous every 12 hours  hydrALAZINE 25 milliGRAM(s) Oral every 8 hours  insulin lispro (ADMELOG) corrective regimen sliding scale   SubCutaneous three times a day before meals  levETIRAcetam  IVPB 750 milliGRAM(s) IV Intermittent every 12 hours  metoprolol tartrate 50 milliGRAM(s) Oral two times a day  multivitamin 1 Tablet(s) Oral daily  pantoprazole  Injectable 40 milliGRAM(s) IV Push daily  potassium chloride  10 mEq/100 mL IVPB 10 milliEquivalent(s) IV Intermittent every 1 hour  simvastatin 20 milliGRAM(s) Oral at bedtime  tamsulosin 0.4 milliGRAM(s) Oral at bedtime    MEDICATIONS  (PRN):  acetaminophen  Suppository .. 650 milliGRAM(s) Rectal every 6 hours PRN Temp greater or equal to 38C (100.4F), Mild Pain (1 - 3)      Allergies    No Known Allergies    Intolerances          Vital Signs Last 24 Hrs  T(C): 36.8 (18 Mar 2021 11:12), Max: 38.6 (17 Mar 2021 23:49)  T(F): 98.2 (18 Mar 2021 11:12), Max: 101.4 (17 Mar 2021 23:49)  HR: 76 (18 Mar 2021 11:12) (76 - 90)  BP: 174/69 (18 Mar 2021 11:12) (145/94 - 174/69)  BP(mean): --  RR: 17 (18 Mar 2021 11:12) (16 - 18)  SpO2: 98% (18 Mar 2021 11:12) (95% - 99%)    PHYSICAL EXAM:  GENERAL: NAD, well-groomed, well-developed  HEAD:  Atraumatic, Normocephalic  EYES: EOMI, PERRLA, conjunctiva and sclera clear  ENMT: No tonsillar erythema, exudates, or enlargement; Moist mucous membranes, No lesions  NECK: Supple, No JVD, Normal thyroid  NERVOUS SYSTEM:  Alert & Oriented X3; Motor Strength 5/5 B/L upper and lower extremities; DTRs 2+ intact and symmetric  CHEST/LUNG: Clear bilaterally; No rales, rhonchi, wheezing, or rubs  HEART: Regular rate and rhythm; No murmurs, rubs, or gallops  ABDOMEN: Soft, Nontender, Nondistended; Bowel sounds present  EXTREMITIES:  2+ Peripheral Pulses, No clubbing, cyanosis, or edema  LYMPH: No lymphadenopathy noted  SKIN: No rashes or lesions    LABS:                        10.4   8.70  )-----------( 194      ( 18 Mar 2021 06:56 )             32.3     03-18    144  |  111<H>  |  13  ----------------------------<  201<H>  2.9<LL>   |  29  |  1.06    Ca    7.4<L>      18 Mar 2021 06:56  Phos  2.7     03-18  Mg     1.1     03-18    TPro  6.2  /  Alb  2.0<L>  /  TBili  0.6  /  DBili  x   /  AST  9<L>  /  ALT  24  /  AlkPhos  58  03-18        CAPILLARY BLOOD GLUCOSE      POCT Blood Glucose.: 145 mg/dL (18 Mar 2021 12:03)  POCT Blood Glucose.: 205 mg/dL (18 Mar 2021 07:32)  POCT Blood Glucose.: 203 mg/dL (17 Mar 2021 21:16)  POCT Blood Glucose.: 149 mg/dL (17 Mar 2021 16:37)          RADIOLOGY & ADDITIONAL TESTS:        Consultant(s) Notes Reviewed:  [xx ] YES  [ ] NO  
Patient is a 70y old  Female who presents with a chief complaint of Altered mental status. (19 Mar 2021 10:19)      SUBJECTIVE/OBJECTIVE:     Patient resting comfortably.     MEDICATIONS  (STANDING):  amLODIPine   Tablet 10 milliGRAM(s) Oral daily  aspirin enteric coated 81 milliGRAM(s) Oral daily  dextrose 40% Gel 15 Gram(s) Oral once  dextrose 5%. 1000 milliLiter(s) (60 mL/Hr) IV Continuous <Continuous>  dextrose 5%. 1000 milliLiter(s) (50 mL/Hr) IV Continuous <Continuous>  dextrose 5%. 1000 milliLiter(s) (100 mL/Hr) IV Continuous <Continuous>  dextrose 50% Injectable 25 Gram(s) IV Push once  dextrose 50% Injectable 12.5 Gram(s) IV Push once  dextrose 50% Injectable 25 Gram(s) IV Push once  fluconAZOLE   Tablet 200 milliGRAM(s) Oral daily  glucagon  Injectable 1 milliGRAM(s) IntraMuscular once  heparin   Injectable 5000 Unit(s) SubCutaneous every 12 hours  hydrALAZINE 25 milliGRAM(s) Oral every 8 hours  insulin lispro (ADMELOG) corrective regimen sliding scale   SubCutaneous three times a day before meals  levETIRAcetam  IVPB 750 milliGRAM(s) IV Intermittent every 12 hours  metoprolol tartrate 50 milliGRAM(s) Oral two times a day  multivitamin 1 Tablet(s) Oral daily  pantoprazole  Injectable 40 milliGRAM(s) IV Push daily  potassium chloride   Powder 40 milliEquivalent(s) Oral every 4 hours  simvastatin 20 milliGRAM(s) Oral at bedtime  tamsulosin 0.4 milliGRAM(s) Oral at bedtime    MEDICATIONS  (PRN):  acetaminophen  Suppository .. 650 milliGRAM(s) Rectal every 6 hours PRN Temp greater or equal to 38C (100.4F), Mild Pain (1 - 3)      Allergies    No Known Allergies    Intolerances          Vital Signs Last 24 Hrs  T(C): 37.3 (19 Mar 2021 04:35), Max: 37.3 (19 Mar 2021 04:35)  T(F): 99.1 (19 Mar 2021 04:35), Max: 99.1 (19 Mar 2021 04:35)  HR: 70 (19 Mar 2021 04:35) (70 - 75)  BP: 154/63 (19 Mar 2021 04:35) (143/74 - 177/75)  BP(mean): --  RR: 18 (19 Mar 2021 04:35) (18 - 18)  SpO2: 95% (19 Mar 2021 04:35) (95% - 99%)    PHYSICAL EXAM:  GENERAL: NAD,   HEAD:  Atraumatic, Normocephalic  EYES: EOMI, PERRLA, conjunctiva and sclera clear  ENMT: Intact  NECK: Supple, No JVD, Normal thyroid  NERVOUS SYSTEM:  Awake ; Motor Strength 4/5   CHEST/LUNG: Clear bilaterally; No rales, rhonchi, wheezing, or rubs  HEART: Regular rate and rhythm; No murmurs, rubs, or gallops  ABDOMEN: Soft, Nontender, Nondistended; Bowel sounds present  EXTREMITIES: , No clubbing, cyanosis, or edema  LYMPH: No lymphadenopathy noted  SKIN: No rashes or lesions    LABS:                        10.6   9.37  )-----------( 230      ( 19 Mar 2021 09:04 )             33.1     03-19    140  |  108  |  10  ----------------------------<  182<H>  3.2<L>   |  27  |  1.13    Ca    7.6<L>      19 Mar 2021 09:04  Phos  2.7     03-18  Mg     2.0     03-19    TPro  6.4  /  Alb  2.0<L>  /  TBili  0.4  /  DBili  x   /  AST  12<L>  /  ALT  22  /  AlkPhos  57  03-19        CAPILLARY BLOOD GLUCOSE      POCT Blood Glucose.: 183 mg/dL (19 Mar 2021 08:48)  POCT Blood Glucose.: 171 mg/dL (18 Mar 2021 18:05)  POCT Blood Glucose.: 165 mg/dL (18 Mar 2021 16:20)  POCT Blood Glucose.: 145 mg/dL (18 Mar 2021 12:03)          RADIOLOGY & ADDITIONAL TESTS:        Consultant(s) Notes Reviewed:  [xxx ] YES  [ ] NO

## 2021-03-19 NOTE — PROGRESS NOTE ADULT - PROBLEM SELECTOR PROBLEM 1
Essential hypertension
Essential hypertension
Septic encephalopathy
Essential hypertension
Essential hypertension
Septic encephalopathy

## 2021-03-19 NOTE — PROGRESS NOTE ADULT - ASSESSMENT
Sepsis from urinary tract infection likely   urinary incontinence   AMS likely toxic metabolic encephalopathy   complicated with candida albicans bacteremia   no stigmata of infectious endocarditis   negative surveillance blood cultures NEGATIVE    echo NEGATIVE vegetation   please consider ophtho eval perhaps as an out patient   usu candida albicans are susceptible to diflucan   if repeat blood cultures NEGATIVE DIFLUCAN X 3 WEEKS   monitor liver function while on diflucan   
Admitted for AMS due to Septic Encephalopathy 2/2 Sepsis with UTI, Urinary Retention, AMADOU and DM2. 
Impression: 69yo, BF with h/o HTN, DM2, CVA, gout, asthma, seizure and urinary incontinence presented to the ED two days ago from home for evaluation for altered mental status. Urology consulted for UTI.     Recommendations:  --Continue diflucan per ID  --Continue IVF   --Continue with tamsulosin   --Renal US    --Patient must be d/c'd with milner.  
Impression: 69yo, BF with h/o HTN, DM2, CVA, gout, asthma, seizure and urinary incontinence presented to the ED two days ago from home for evaluation for altered mental status. Urology consulted for UTI.     Recommendations:  --Maintain milner catheter until Cr normalizes   --Continue diflucan  --Continue IVF   --Continue with tamsulosin   --Renal US to reevaluate hydronephrosis.     Discussed with Dr. Hernandez  
Impression: 69yo, BF with h/o HTN, DM2, CVA, gout, asthma, seizure and urinary incontinence presented to the ED two days ago from home for evaluation for altered mental status. Urology consulted for UTI. Renal US 3/17 shows resolution of hydro and unobstructed stones    Recommendations:  --Continue diflucan per ID  --Continue IVF   --Continue with tamsulosin       --Patient must be d/c'd with milner and can follow up in the outpatient office with Dr. Caicedo    Urology to sign off, please reconsult as needed.   
sepsis from urinary tract infection likely  has candida albicans   no stigmata of infectious endocarditis   await repeat blood culture   echo   has urinary retension   mantain milner   ? gu input
sepsis from urinary tract infection likely  has candida albicans   no stigmata of infectious endocarditis   await repeat blood culture   echo noted; NEGATIVE   has urinary retension   mantain milner   noted gu input  if repeat bcs NEGATIVE DIFLUCAN X 3 WEEKS   please consider ophtho eval perhaps as an out patient   recommended for all pts with candidemia 
sepsis from urinary tract infection likely  has candida albicans   no stigmata of infectious endocarditis    repeat blood cultures NEGATIVE    echo noted; NEGATIVE   has urinary retension   mantain milner   noted gu input  if repeat bcs NEGATIVE DIFLUCAN X 3 WEEKS   please consider ophtho eval perhaps as an out patient   recommended for all pts with candidemia   usu candida albicans are susceptible to diflucan 
sepsis from urinary tract infection likely  has candida albicans   no stigmata of infectious endocarditis   await repeat blood culture   echo noted  has urinary retension   mantain milner   noted gu input  if repeat bcs NEGATIVE DIFLUCAN X 3 WEEKS   please consider ophtho eval perhaps as an out patient   recommended for all pts with candidemia 
Admitted for AMS due to Septic Encephalopathy 2/2 Sepsis with UTI, Urinary Retention, AMADOU and DM2. 

## 2021-03-19 NOTE — DISCHARGE NOTE NURSING/CASE MANAGEMENT/SOCIAL WORK - PATIENT PORTAL LINK FT
You can access the FollowMyHealth Patient Portal offered by [facility] by registering at the following website: [email or web address]. By joining [employer]’s FollowMyHealth portal, you will also be able to view your health information using other applications (apps) compatible with our system.

## 2021-03-19 NOTE — PROGRESS NOTE ADULT - ATTENDING COMMENTS
ID following;   noted;Recommendations:  --Continue diflucan per ID  --Continue IVF   --Continue with tamsulosin     Discharge to Rehab pending.  Waiting for auth.  Continue current care and treatment.

## 2021-03-19 NOTE — PROGRESS NOTE ADULT - PROBLEM SELECTOR PROBLEM 5
AMADOU (acute kidney injury)

## 2021-03-19 NOTE — PROGRESS NOTE ADULT - PROBLEM SELECTOR PROBLEM 2
Type 2 diabetes mellitus with hyperglycemia, unspecified whether long term insulin use
Sepsis, due to unspecified organism, unspecified whether acute organ dysfunction present
Type 2 diabetes mellitus with hyperglycemia, unspecified whether long term insulin use
Sepsis, due to unspecified organism, unspecified whether acute organ dysfunction present

## 2021-03-19 NOTE — PROGRESS NOTE ADULT - PROBLEM SELECTOR PROBLEM 6
Type 2 diabetes mellitus with hyperglycemia, unspecified whether long term insulin use

## 2021-03-19 NOTE — PROGRESS NOTE ADULT - PROBLEM SELECTOR PLAN 6
FS with Insulin and sliding scale

## 2021-03-19 NOTE — PROGRESS NOTE ADULT - PROBLEM SELECTOR PLAN 1
Treat underlying cause  Swallowing evaluation passed.

## 2021-03-20 LAB
CULTURE RESULTS: SIGNIFICANT CHANGE UP
CULTURE RESULTS: SIGNIFICANT CHANGE UP
SPECIMEN SOURCE: SIGNIFICANT CHANGE UP
SPECIMEN SOURCE: SIGNIFICANT CHANGE UP

## 2021-03-26 DIAGNOSIS — A41.9 SEPSIS, UNSPECIFIED ORGANISM: ICD-10-CM

## 2021-03-26 DIAGNOSIS — G40.909 EPILEPSY, UNSPECIFIED, NOT INTRACTABLE, WITHOUT STATUS EPILEPTICUS: ICD-10-CM

## 2021-03-26 DIAGNOSIS — Z79.82 LONG TERM (CURRENT) USE OF ASPIRIN: ICD-10-CM

## 2021-03-26 DIAGNOSIS — I10 ESSENTIAL (PRIMARY) HYPERTENSION: ICD-10-CM

## 2021-03-26 DIAGNOSIS — B37.41 CANDIDAL CYSTITIS AND URETHRITIS: ICD-10-CM

## 2021-03-26 DIAGNOSIS — E78.5 HYPERLIPIDEMIA, UNSPECIFIED: ICD-10-CM

## 2021-03-26 DIAGNOSIS — N17.9 ACUTE KIDNEY FAILURE, UNSPECIFIED: ICD-10-CM

## 2021-03-26 DIAGNOSIS — J45.909 UNSPECIFIED ASTHMA, UNCOMPLICATED: ICD-10-CM

## 2021-03-26 DIAGNOSIS — G93.41 METABOLIC ENCEPHALOPATHY: ICD-10-CM

## 2021-03-26 DIAGNOSIS — E11.65 TYPE 2 DIABETES MELLITUS WITH HYPERGLYCEMIA: ICD-10-CM

## 2021-03-26 DIAGNOSIS — N30.11 INTERSTITIAL CYSTITIS (CHRONIC) WITH HEMATURIA: ICD-10-CM

## 2021-03-26 DIAGNOSIS — R33.9 RETENTION OF URINE, UNSPECIFIED: ICD-10-CM

## 2021-04-06 ENCOUNTER — APPOINTMENT (OUTPATIENT)
Dept: UROLOGY | Facility: CLINIC | Age: 71
End: 2021-04-06

## 2021-04-13 ENCOUNTER — NON-APPOINTMENT (OUTPATIENT)
Age: 71
End: 2021-04-13

## 2021-06-28 ENCOUNTER — EMERGENCY (EMERGENCY)
Facility: HOSPITAL | Age: 71
LOS: 0 days | Discharge: HOME | End: 2021-06-28
Attending: STUDENT IN AN ORGANIZED HEALTH CARE EDUCATION/TRAINING PROGRAM
Payer: MEDICARE

## 2021-06-28 VITALS
WEIGHT: 164.91 LBS | HEART RATE: 79 BPM | TEMPERATURE: 98 F | HEIGHT: 65 IN | SYSTOLIC BLOOD PRESSURE: 152 MMHG | DIASTOLIC BLOOD PRESSURE: 84 MMHG | RESPIRATION RATE: 16 BRPM | OXYGEN SATURATION: 100 %

## 2021-06-28 VITALS
OXYGEN SATURATION: 98 % | RESPIRATION RATE: 16 BRPM | DIASTOLIC BLOOD PRESSURE: 80 MMHG | SYSTOLIC BLOOD PRESSURE: 170 MMHG | HEART RATE: 90 BPM
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
## 2021-06-28 DIAGNOSIS — Z79.82 LONG TERM (CURRENT) USE OF ASPIRIN: ICD-10-CM
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
## 2021-06-28 DIAGNOSIS — R32 UNSPECIFIED URINARY INCONTINENCE: ICD-10-CM

## 2021-06-28 DIAGNOSIS — T83.021A DISPLACEMENT OF INDWELLING URETHRAL CATHETER, INITIAL ENCOUNTER: ICD-10-CM

## 2021-06-28 DIAGNOSIS — N20.0 CALCULUS OF KIDNEY: Chronic | ICD-10-CM

## 2021-06-28 DIAGNOSIS — Z86.73 PERSONAL HISTORY OF TRANSIENT ISCHEMIC ATTACK (TIA), AND CEREBRAL INFARCTION WITHOUT RESIDUAL DEFICITS: ICD-10-CM

## 2021-06-28 DIAGNOSIS — I10 ESSENTIAL (PRIMARY) HYPERTENSION: ICD-10-CM

## 2021-06-28 DIAGNOSIS — E11.9 TYPE 2 DIABETES MELLITUS WITHOUT COMPLICATIONS: ICD-10-CM

## 2021-06-28 DIAGNOSIS — M10.9 GOUT, UNSPECIFIED: ICD-10-CM

## 2021-06-28 DIAGNOSIS — J45.909 UNSPECIFIED ASTHMA, UNCOMPLICATED: ICD-10-CM

## 2021-06-28 PROCEDURE — 99284 EMERGENCY DEPT VISIT MOD MDM: CPT

## 2021-06-28 NOTE — ED PROVIDER NOTE - PATIENT PORTAL LINK FT
You can access the FollowMyHealth Patient Portal offered by [facility] by registering at the following website: [email or web address]. By joining [employer]’s FollowMyHealth portal, you will also be able to view your health information using other applications (apps) compatible with our system.

## 2021-06-28 NOTE — ED ADULT NURSE NOTE - NSFALLRSKPASTHIST_ED_ALL_ED
no Partially impaired: cannot see medication labels or newsprint, but can see obstacles in path, and the surrounding layout; can count fingers at arm's length/reading

## 2021-06-28 NOTE — ED PROVIDER NOTE - OBJECTIVE STATEMENT
71 y/o F Hx of CVA, DM, HTN, Seizure presents with catheter complication. per pt sister pt has dementia and removed catheter early morning yesterday and has been able to pee minimally and not empty her bladder. 71 y/o F Hx of CVA, DM, HTN, Seizure presents with catheter complication. per pt sister pt has dementia and removed catheter early morning yesterday and has been able to urinate minimally and not empty her bladder.

## 2021-06-28 NOTE — ED ADULT TRIAGE NOTE - CHIEF COMPLAINT QUOTE
pt a&O x4 biba from home, with 24 hour care. Pt  accidental dislodge urinary cathter. no blood or trauma noted by aide. Pt baseline dementia, pt non verbal triage, awake sitting up, doesn't follow commands.  HX dementia, dm, hx,

## 2021-07-14 NOTE — ED ADULT TRIAGE NOTE - ARRIVAL FROM
Data: Vital signs within normal limits. Postpartum checks within normal limits - see flow record. Patient eating and drinking normally. Patient able to empty bladder independently and is up ambulating. No apparent signs of infection. Incision healing well. Patient performing self cares and is able to care for infant.  Action: Patient medicated during the shift for pain with Tylenol and Ibuprofen. See MAR. Patient reassessed within 1 hour after each medication and pain was improved - patient stated she was comfortable. Patient education done about expected physiological changes after delivery and basic infant care.   Response: Positive attachment behaviors observed with infant. Spouse, Robert, present.   Plan: Continue plan of care.   Home

## 2021-12-07 NOTE — H&P ADULT - ASSESSMENT
Diagnosis:   1. MDS (myelodysplastic syndrome) (CMS/HCC)    2. Large granular lymphocytic leukemia (CMS/HCC)       Regimen: Vidaza  Cycle/Day: day 2  Is this a C1D1 appt?  No    Donny Hanna is supervising clinician today.    Vital Signs:   Vitals:    21 1232   BP: 121/66   BP Location: LUE - Left upper extremity   Patient Position: Sitting   Cuff Size: Regular   Pulse: 75   Temp: 97.8 °F (36.6 °C)        Allergies:    ALLERGIES:   Allergen Reactions   • Codeine Other (See Comments)     Jittery and nervous   • Red Dye Other (See Comments)   • Sunflower Oil   (Food Or Med) Other (See Comments)        Medications:  The medication list was reviewed. No changes noted.     ECOG:   ECOG   ECOG Performance Status        Distress Screening: Is this day one of cycle or a new regimen? No No distress screening completed    Toxicity Assessment:  n/a - no new complaints        Prechemo Checklist  Chemo Consent Signed: Yes  Protocol Verified: Yes  Is Protocol Standard of Care or Research?: Standard of Care  Pre-Chemo Labs Reviewed?: Yes  Provider Notified of Abnormal Labs Not Meeting Treatment Conditions: N/A  Pregnancy Screening Performed (if indicated): Not applicable  All Treatment Conditions Met?: Yes  BSA/weight in Orders Verified for Weight-Based Drugs?: Yes  Chemo Dose Calculations Verified: Yes  Second RN Verified Calculations: Jessica Pedersen     Pre-Treatment: Patient has valid pre-authorization  Premed orders, including hydration, are verified prior to administration  Chemotherapy doses independently doubled checked & verified by two practitioners  I have reviewed the following with the patient:  Name of chemo drug, duration and route of infusion, and reportable infusion-related symptoms.     Treatment: Refer to LDA and MAR for line assessment and medication administration  Chemotherapy has not ; double checked & verified by two practitioners  Appearance and physical integrity of drugs meets standard of  drug monograph; double checked & verified by two practitioners  Rate set on infusion pump is in alignment with ordered rate; double checked & verified by two practitioners  Blood return confirmed before, during and after treatment administered    Post Treatment: pt tolerated tx well without issue  Oral Chemotherapy: No    Education: No new instructions needed    Next appointment scheduled: tomorrow  Patient instructed to call the office with any questions or concerns.    Patient Discharged: patient discharged to home per self, ambulatory   68 y/o female w/pmhx of cva, seizure disorder on keppra,  htn, dm2 w/?tia

## 2021-12-27 ENCOUNTER — INPATIENT (INPATIENT)
Facility: HOSPITAL | Age: 71
LOS: 8 days | Discharge: HOME HEALTH | End: 2022-01-05
Attending: INTERNAL MEDICINE | Admitting: INTERNAL MEDICINE
Payer: MEDICARE

## 2021-12-27 VITALS
TEMPERATURE: 100 F | HEIGHT: 65 IN | RESPIRATION RATE: 18 BRPM | WEIGHT: 125 LBS | SYSTOLIC BLOOD PRESSURE: 196 MMHG | DIASTOLIC BLOOD PRESSURE: 92 MMHG | HEART RATE: 93 BPM | OXYGEN SATURATION: 98 %

## 2021-12-27 DIAGNOSIS — N20.0 CALCULUS OF KIDNEY: Chronic | ICD-10-CM

## 2021-12-27 LAB
ALBUMIN SERPL ELPH-MCNC: 2.9 G/DL — LOW (ref 3.3–5)
ALP SERPL-CCNC: 99 U/L — SIGNIFICANT CHANGE UP (ref 40–120)
ALT FLD-CCNC: 14 U/L — SIGNIFICANT CHANGE UP (ref 12–78)
ANION GAP SERPL CALC-SCNC: 9 MMOL/L — SIGNIFICANT CHANGE UP (ref 5–17)
APTT BLD: 31.1 SEC — SIGNIFICANT CHANGE UP (ref 27.5–35.5)
AST SERPL-CCNC: 22 U/L — SIGNIFICANT CHANGE UP (ref 15–37)
BASOPHILS # BLD AUTO: 0.02 K/UL — SIGNIFICANT CHANGE UP (ref 0–0.2)
BASOPHILS NFR BLD AUTO: 0.1 % — SIGNIFICANT CHANGE UP (ref 0–2)
BILIRUB SERPL-MCNC: 0.8 MG/DL — SIGNIFICANT CHANGE UP (ref 0.2–1.2)
BUN SERPL-MCNC: 29 MG/DL — HIGH (ref 7–23)
CALCIUM SERPL-MCNC: 9.3 MG/DL — SIGNIFICANT CHANGE UP (ref 8.5–10.1)
CHLORIDE SERPL-SCNC: 112 MMOL/L — HIGH (ref 96–108)
CO2 SERPL-SCNC: 23 MMOL/L — SIGNIFICANT CHANGE UP (ref 22–31)
CREAT SERPL-MCNC: 1.78 MG/DL — HIGH (ref 0.5–1.3)
EOSINOPHIL # BLD AUTO: 0 K/UL — SIGNIFICANT CHANGE UP (ref 0–0.5)
EOSINOPHIL NFR BLD AUTO: 0 % — SIGNIFICANT CHANGE UP (ref 0–6)
FLUAV AG NPH QL: SIGNIFICANT CHANGE UP
FLUBV AG NPH QL: SIGNIFICANT CHANGE UP
GLUCOSE BLDC GLUCOMTR-MCNC: 266 MG/DL — HIGH (ref 70–99)
GLUCOSE SERPL-MCNC: 241 MG/DL — HIGH (ref 70–99)
HCT VFR BLD CALC: 40.6 % — SIGNIFICANT CHANGE UP (ref 34.5–45)
HGB BLD-MCNC: 13.2 G/DL — SIGNIFICANT CHANGE UP (ref 11.5–15.5)
IMM GRANULOCYTES NFR BLD AUTO: 0.6 % — SIGNIFICANT CHANGE UP (ref 0–1.5)
INR BLD: 1.13 RATIO — SIGNIFICANT CHANGE UP (ref 0.88–1.16)
LYMPHOCYTES # BLD AUTO: 1.17 K/UL — SIGNIFICANT CHANGE UP (ref 1–3.3)
LYMPHOCYTES # BLD AUTO: 5.4 % — LOW (ref 13–44)
MCHC RBC-ENTMCNC: 28.3 PG — SIGNIFICANT CHANGE UP (ref 27–34)
MCHC RBC-ENTMCNC: 32.5 GM/DL — SIGNIFICANT CHANGE UP (ref 32–36)
MCV RBC AUTO: 87.1 FL — SIGNIFICANT CHANGE UP (ref 80–100)
MONOCYTES # BLD AUTO: 1.47 K/UL — HIGH (ref 0–0.9)
MONOCYTES NFR BLD AUTO: 6.8 % — SIGNIFICANT CHANGE UP (ref 2–14)
NEUTROPHILS # BLD AUTO: 18.91 K/UL — HIGH (ref 1.8–7.4)
NEUTROPHILS NFR BLD AUTO: 87.1 % — HIGH (ref 43–77)
NRBC # BLD: 0 /100 WBCS — SIGNIFICANT CHANGE UP (ref 0–0)
NT-PROBNP SERPL-SCNC: 7252 PG/ML — HIGH (ref 0–125)
PLATELET # BLD AUTO: 300 K/UL — SIGNIFICANT CHANGE UP (ref 150–400)
POTASSIUM SERPL-MCNC: 3.8 MMOL/L — SIGNIFICANT CHANGE UP (ref 3.5–5.3)
POTASSIUM SERPL-SCNC: 3.8 MMOL/L — SIGNIFICANT CHANGE UP (ref 3.5–5.3)
PROT SERPL-MCNC: 8.7 GM/DL — HIGH (ref 6–8.3)
PROTHROM AB SERPL-ACNC: 13 SEC — SIGNIFICANT CHANGE UP (ref 10.6–13.6)
RBC # BLD: 4.66 M/UL — SIGNIFICANT CHANGE UP (ref 3.8–5.2)
RBC # FLD: 13.2 % — SIGNIFICANT CHANGE UP (ref 10.3–14.5)
SARS-COV-2 RNA SPEC QL NAA+PROBE: SIGNIFICANT CHANGE UP
SODIUM SERPL-SCNC: 144 MMOL/L — SIGNIFICANT CHANGE UP (ref 135–145)
TROPONIN I, HIGH SENSITIVITY RESULT: 31.4 NG/L — SIGNIFICANT CHANGE UP
WBC # BLD: 21.7 K/UL — HIGH (ref 3.8–10.5)
WBC # FLD AUTO: 21.7 K/UL — HIGH (ref 3.8–10.5)

## 2021-12-27 PROCEDURE — 70450 CT HEAD/BRAIN W/O DYE: CPT | Mod: 26,MA

## 2021-12-27 PROCEDURE — 93010 ELECTROCARDIOGRAM REPORT: CPT

## 2021-12-27 PROCEDURE — 99223 1ST HOSP IP/OBS HIGH 75: CPT

## 2021-12-27 PROCEDURE — 71045 X-RAY EXAM CHEST 1 VIEW: CPT | Mod: 26

## 2021-12-27 PROCEDURE — 99285 EMERGENCY DEPT VISIT HI MDM: CPT

## 2021-12-27 RX ORDER — HEPARIN SODIUM 5000 [USP'U]/ML
5000 INJECTION INTRAVENOUS; SUBCUTANEOUS EVERY 12 HOURS
Refills: 0 | Status: DISCONTINUED | OUTPATIENT
Start: 2021-12-27 | End: 2022-01-05

## 2021-12-27 RX ORDER — SIMVASTATIN 20 MG/1
20 TABLET, FILM COATED ORAL AT BEDTIME
Refills: 0 | Status: DISCONTINUED | OUTPATIENT
Start: 2021-12-27 | End: 2022-01-05

## 2021-12-27 RX ORDER — ASPIRIN/CALCIUM CARB/MAGNESIUM 324 MG
81 TABLET ORAL DAILY
Refills: 0 | Status: DISCONTINUED | OUTPATIENT
Start: 2021-12-27 | End: 2022-01-05

## 2021-12-27 RX ORDER — HYDRALAZINE HCL 50 MG
10 TABLET ORAL ONCE
Refills: 0 | Status: DISCONTINUED | OUTPATIENT
Start: 2021-12-27 | End: 2021-12-27

## 2021-12-27 RX ORDER — SODIUM CHLORIDE 9 MG/ML
1000 INJECTION INTRAMUSCULAR; INTRAVENOUS; SUBCUTANEOUS ONCE
Refills: 0 | Status: COMPLETED | OUTPATIENT
Start: 2021-12-27 | End: 2021-12-27

## 2021-12-27 RX ORDER — SODIUM CHLORIDE 9 MG/ML
1750 INJECTION INTRAMUSCULAR; INTRAVENOUS; SUBCUTANEOUS ONCE
Refills: 0 | Status: DISCONTINUED | OUTPATIENT
Start: 2021-12-27 | End: 2021-12-27

## 2021-12-27 RX ORDER — LEVETIRACETAM 250 MG/1
750 TABLET, FILM COATED ORAL
Refills: 0 | Status: DISCONTINUED | OUTPATIENT
Start: 2021-12-27 | End: 2021-12-28

## 2021-12-27 RX ORDER — AMLODIPINE BESYLATE 2.5 MG/1
10 TABLET ORAL DAILY
Refills: 0 | Status: DISCONTINUED | OUTPATIENT
Start: 2021-12-27 | End: 2021-12-28

## 2021-12-27 RX ORDER — HYDRALAZINE HCL 50 MG
100 TABLET ORAL THREE TIMES A DAY
Refills: 0 | Status: DISCONTINUED | OUTPATIENT
Start: 2021-12-27 | End: 2021-12-28

## 2021-12-27 RX ORDER — QUETIAPINE FUMARATE 200 MG/1
25 TABLET, FILM COATED ORAL AT BEDTIME
Refills: 0 | Status: DISCONTINUED | OUTPATIENT
Start: 2021-12-27 | End: 2021-12-27

## 2021-12-27 RX ORDER — CEFTRIAXONE 500 MG/1
1000 INJECTION, POWDER, FOR SOLUTION INTRAMUSCULAR; INTRAVENOUS ONCE
Refills: 0 | Status: COMPLETED | OUTPATIENT
Start: 2021-12-27 | End: 2021-12-27

## 2021-12-27 RX ORDER — METOPROLOL TARTRATE 50 MG
50 TABLET ORAL
Refills: 0 | Status: DISCONTINUED | OUTPATIENT
Start: 2021-12-27 | End: 2021-12-28

## 2021-12-27 RX ORDER — CEFTRIAXONE 500 MG/1
1000 INJECTION, POWDER, FOR SOLUTION INTRAMUSCULAR; INTRAVENOUS EVERY 24 HOURS
Refills: 0 | Status: DISCONTINUED | OUTPATIENT
Start: 2021-12-27 | End: 2022-01-02

## 2021-12-27 RX ORDER — SODIUM CHLORIDE 9 MG/ML
1000 INJECTION, SOLUTION INTRAVENOUS
Refills: 0 | Status: DISCONTINUED | OUTPATIENT
Start: 2021-12-27 | End: 2021-12-28

## 2021-12-27 RX ADMIN — CEFTRIAXONE 100 MILLIGRAM(S): 500 INJECTION, POWDER, FOR SOLUTION INTRAMUSCULAR; INTRAVENOUS at 16:14

## 2021-12-27 RX ADMIN — Medication 100 MILLIGRAM(S): at 23:22

## 2021-12-27 RX ADMIN — SODIUM CHLORIDE 1000 MILLILITER(S): 9 INJECTION INTRAMUSCULAR; INTRAVENOUS; SUBCUTANEOUS at 17:15

## 2021-12-27 RX ADMIN — CEFTRIAXONE 1000 MILLIGRAM(S): 500 INJECTION, POWDER, FOR SOLUTION INTRAMUSCULAR; INTRAVENOUS at 16:45

## 2021-12-27 RX ADMIN — CEFTRIAXONE 100 MILLIGRAM(S): 500 INJECTION, POWDER, FOR SOLUTION INTRAMUSCULAR; INTRAVENOUS at 23:23

## 2021-12-27 RX ADMIN — SODIUM CHLORIDE 1000 MILLILITER(S): 9 INJECTION INTRAMUSCULAR; INTRAVENOUS; SUBCUTANEOUS at 16:14

## 2021-12-27 RX ADMIN — HEPARIN SODIUM 5000 UNIT(S): 5000 INJECTION INTRAVENOUS; SUBCUTANEOUS at 23:22

## 2021-12-27 RX ADMIN — SIMVASTATIN 20 MILLIGRAM(S): 20 TABLET, FILM COATED ORAL at 23:23

## 2021-12-27 NOTE — ED PROVIDER NOTE - PHYSICAL EXAMINATION
PE:   GEN: Lethargic, responsive to firm tactile stimuli  HEAD AND NECK: NC/AT. Airway patent. Neck supple.   EYES: Clear b/l. PERRL  CARDIAC: Tachycardic, reg rhyhtm S1, S2. No evident pedal edema.    RESP: Normal respiratory effort with no use of accessory muscles or retractions. Clear throughout on auscultation.  ABD: soft, non-distended, non-tender. No rebound, no guarding.   NEURO: Not responding to questions or commands. Responds to noxious stimuli  MSK: Moving all extremities with no apparent deformities.   SKIN: Warm, dry, intact normal color

## 2021-12-27 NOTE — ED ADULT TRIAGE NOTE - CHIEF COMPLAINT QUOTE
as per emt, pt seen by VNS this morning. milner changed today by vns with cloudy urine and decreased output. pt also report having increased lethargy. milner from home. hx: dementia as per emt, pt seen by VNS this morning. milner changed today by vns with cloudy urine and decreased output. pt also report having increased lethargy. milner from home. FS- 312 in field. hx: dementia

## 2021-12-27 NOTE — H&P ADULT - NSHPPHYSICALEXAM_GEN_ALL_CORE
PHYSICAL EXAMINATION:  Vital Signs Last 24 Hrs  T(C): 37.6 (27 Dec 2021 11:59), Max: 37.6 (27 Dec 2021 11:59)  T(F): 99.6 (27 Dec 2021 11:59), Max: 99.6 (27 Dec 2021 11:59)  HR: 95 (27 Dec 2021 15:58) (93 - 95)  BP: 183/81 (27 Dec 2021 15:58) (183/81 - 196/92)  BP(mean): --  RR: 14 (27 Dec 2021 15:58) (14 - 18)  SpO2: 98% (27 Dec 2021 11:59) (98% - 98%)  CAPILLARY BLOOD GLUCOSE      POCT Blood Glucose.: 266 mg/dL (27 Dec 2021 13:13)      GENERAL: NAD, well-groomed, well-developed  HEAD:  atraumatic, normocephalic  EYES: sclera anicteric  ENMT: mucous membranes moist  NECK: supple, No JVD  CHEST/LUNG: clear to auscultation bilaterally; no rales, rhonchi, or wheezing b/l  HEART: normal S1, S2  ABDOMEN: BS+, soft, ND, NT   EXTREMITIES:  pulses palpable; no clubbing, cyanosis, or edema b/l LEs  NEURO: awake, alert, interactive; moves all extremities  SKIN: no rashes or lesions PHYSICAL EXAMINATION:  Vital Signs Last 24 Hrs  T(C): 37.6 (27 Dec 2021 11:59), Max: 37.6 (27 Dec 2021 11:59)  T(F): 99.6 (27 Dec 2021 11:59), Max: 99.6 (27 Dec 2021 11:59)  HR: 95 (27 Dec 2021 15:58) (93 - 95)  BP: 183/81 (27 Dec 2021 15:58) (183/81 - 196/92)  BP(mean): --  RR: 14 (27 Dec 2021 15:58) (14 - 18)  SpO2: 98% (27 Dec 2021 11:59) (98% - 98%)  CAPILLARY BLOOD GLUCOSE      POCT Blood Glucose.: 266 mg/dL (27 Dec 2021 13:13)      GENERAL: NAD, in bed, non-verbal, likely baseline, no fevers or SOB, milner in place.   HEAD:  atraumatic, normocephalic  EYES: sclera anicteric  ENMT: mucous membranes moist  NECK: supple, No JVD  CHEST/LUNG: clear to auscultation bilaterally; no rales, rhonchi, or wheezing b/l  HEART: normal S1, S2  ABDOMEN: BS+, soft, ND, NT   EXTREMITIES:  pulses palpable; no clubbing, cyanosis, or edema b/l LEs  NEURO: awake, alert, interactive; moves all extremities  SKIN: no rashes or lesions

## 2021-12-27 NOTE — H&P ADULT - NSHPLABSRESULTS_GEN_ALL_CORE
LABS:                        13.2   21.70 )-----------( 300      ( 27 Dec 2021 14:46 )             40.6     12-27    144  |  112<H>  |  29<H>  ----------------------------<  241<H>  3.8   |  23  |  1.78<H>    Ca    9.3      27 Dec 2021 14:43    TPro  8.7<H>  /  Alb  2.9<L>  /  TBili  0.8  /  DBili  x   /  AST  22  /  ALT  14  /  AlkPhos  99  12-27    PT/INR - ( 27 Dec 2021 14:43 )   PT: 13.0 sec;   INR: 1.13 ratio         PTT - ( 27 Dec 2021 14:43 )  PTT:31.1 sec        RADIOLOGY & ADDITIONAL TESTS:

## 2021-12-27 NOTE — ED PROVIDER NOTE - CLINICAL SUMMARY MEDICAL DECISION MAKING FREE TEXT BOX
72yo female with pmh of "heart dz", diabetes, asthma, CVA, dementia BIBA for change in mental status this AM. As per family, patient is normally oriented x1, awake, alert and follows commands. Pt temp 99.4 Arousable to firm tactile stimuli. Does not respond to questions or verbal commands. Concerning for infectious etiology. Will get infectious work up, CTH. Will get CE given patient family stating "unknown heart dz" and reporting sympotoms of fluid retention although patient does not having any edema on exam. Anticipate admission.

## 2021-12-27 NOTE — ED PROVIDER NOTE - ATTENDING CONTRIBUTION TO CARE
70yo female with pmh of cardaic issues, diabetes, asthma, CVA, dementia presenting with AMs since thisn AM. darker urine than usual. fatigued.     on exam patient fatigued but reactive to stimulation.   General: lethargic, No acute distress. Well developed, hydrated and nourished. Appears stated age.   Skin: Skin in warm, dry and intact without rashes or lesions. Appropriate color for ethnicity  HENMT: head normocephalic and atraumatic; bilateral external ears without swelling. no nasal discharge. moist oral mucosa. supple neck, trachea midline  EYES: Conjunctiva clear. nonicteric sclera. EOM intact, Eyelids are normal in appearance without swelling or lesions.  Cardiac: well perfused  Respiratory: breathing comfortably on room air. no audible wheezing or stridor  Abdominal: nondistended, nontendeer. catherter in palce draining dark urine.   MSK: atraumatic  Neurological: lethargic, spontaneously moving all 4 extremities    leukocytosis present, will empirically treat for UTI. ct negative for intracrnial pahtology. will require admission.

## 2021-12-27 NOTE — ED PROVIDER NOTE - OBJECTIVE STATEMENT
70yo female with pmh of "heart dz", diabetes, asthma, CVA, dementia BIBA for change in mental status this AM. Family states patient has seemed more weak and tired than normal, requiring more assistance. Reports she began to look more swollen and her urinary output in milner decreased. WEnt to the doctor who replaced the milner. A large amount of dark urine came out. Pt this morning required noxious stimuli to wake up prompting ambulance call. As per family, patient is normally oriented x1, awake, alert and follows commands.

## 2021-12-27 NOTE — ED ADULT NURSE NOTE - OBJECTIVE STATEMENT
Patient present to ed with increased lethargy, cloudy urine via milner catheter as per her visiting nurse yesterday at which time catheter was changed , size 16 f noted with dark cloudy urine.

## 2021-12-27 NOTE — H&P ADULT - HISTORY OF PRESENT ILLNESS
72 yo Female PMH "heart dz", diabetes, asthma, CVA, htn, dementia BIBA for change in mental status this AM. Family states patient has seemed more weak and tired than normal, requiring more assistance. Reports she began to look more swollen and her urinary output in milner decreased. Went to the doctor who replaced the milner. A large amount of dark urine came out. Pt this morning required noxious stimuli to wake up prompting ambulance call. As per family, patient is normally oriented x1, awake, alert and follows commands. Admitted for likey UTI.  72 yo Female PMH "heart dz", diabetes, asthma, CVA, seizure disorder, HTN, Dementia BIBA for change in mental status this AM. Family states patient has seemed more weak and tired than normal, requiring more assistance. Reports she began to look more swollen and her urinary output in milner decreased. Went to the doctor who replaced the milner. A large amount of dark urine came out. Pt this morning required noxious stimuli to wake up prompting ambulance call. As per family, patient is normally oriented x1, awake, alert and follows commands.     Admitted for likely UTI.  72 yo Female PMH "heart dz", diabetes, asthma, CVA, seizure disorder, HTN, Dementia BIBA for change in mental status this AM. Family states patient has seemed more weak and tired than normal, requiring more assistance. Reports she began to look more swollen and her urinary output in milner decreased. Went to the doctor who replaced the chronic milner today. A large amount of dark urine came out. Pt this morning required noxious stimuli to wake up prompting ambulance call. As per family, patient is normally oriented x1, awake, alert and follows commands.     Admitted for likely catheter associated UTI. Milner was changed this AM.

## 2021-12-27 NOTE — ED PROVIDER NOTE - PROGRESS NOTE DETAILS
/90. Changing fluid resus to 1L instead of 2L as per weight for sepsis given BP. WIll give hydralazine 10 and reassess.

## 2021-12-27 NOTE — ED ADULT NURSE NOTE - CHIEF COMPLAINT QUOTE
as per emt, pt seen by VNS this morning. milner changed today by vns with cloudy urine and decreased output. pt also report having increased lethargy. milner from home. FS- 312 in field. hx: dementia

## 2021-12-27 NOTE — H&P ADULT - ASSESSMENT
70 yo Female PMH "heart dz", diabetes, asthma, CVA, seizure disorder, HTN, Dementia BIBA for change in mental status this AM. Family states patient has seemed more weak and tired than normal, requiring more assistance. Reports she began to look more swollen and her urinary output in milner decreased. Went to the doctor who replaced the chronic milner today. A large amount of dark urine came out. Pt this morning required noxious stimuli to wake up prompting ambulance call. As per family, patient is normally oriented x1, awake, alert and follows commands.  Admitted for likely catheter associated UTI. Milner was changed this AM.      Plan: Admit to medicine for catheter associated UTI. IV Ceftriaxone daily, follow urine culture. AMADOU on arrival from dehydration with creatinine 1.78. Will start IVF  half normal at 75/h, follow creatinine in AM. CXR clear, COVID negative. CT head notes old right frontal CVA.     Speech eval in AM to recommend diet. NPO now except for meds.     Continue home BP meds of Hydralazine, Norvasc, Toprol, Kepra, ASA, Clonidine and Zocur. Stop Seraquel given sedation.     Glucose 266, add SSC coverage for now.

## 2021-12-28 LAB
ANION GAP SERPL CALC-SCNC: 6 MMOL/L — SIGNIFICANT CHANGE UP (ref 5–17)
BUN SERPL-MCNC: 24 MG/DL — HIGH (ref 7–23)
CALCIUM SERPL-MCNC: 8.6 MG/DL — SIGNIFICANT CHANGE UP (ref 8.5–10.1)
CHLORIDE SERPL-SCNC: 115 MMOL/L — HIGH (ref 96–108)
CO2 SERPL-SCNC: 26 MMOL/L — SIGNIFICANT CHANGE UP (ref 22–31)
CREAT SERPL-MCNC: 1.47 MG/DL — HIGH (ref 0.5–1.3)
GLUCOSE SERPL-MCNC: 161 MG/DL — HIGH (ref 70–99)
POTASSIUM SERPL-MCNC: 3 MMOL/L — LOW (ref 3.5–5.3)
POTASSIUM SERPL-SCNC: 3 MMOL/L — LOW (ref 3.5–5.3)
SODIUM SERPL-SCNC: 147 MMOL/L — HIGH (ref 135–145)

## 2021-12-28 PROCEDURE — 76700 US EXAM ABDOM COMPLETE: CPT | Mod: 26

## 2021-12-28 PROCEDURE — 99232 SBSQ HOSP IP/OBS MODERATE 35: CPT

## 2021-12-28 RX ORDER — LEVETIRACETAM 250 MG/1
750 TABLET, FILM COATED ORAL
Refills: 0 | Status: DISCONTINUED | OUTPATIENT
Start: 2021-12-28 | End: 2021-12-29

## 2021-12-28 RX ORDER — POTASSIUM CHLORIDE 20 MEQ
10 PACKET (EA) ORAL
Refills: 0 | Status: COMPLETED | OUTPATIENT
Start: 2021-12-28 | End: 2021-12-28

## 2021-12-28 RX ORDER — POTASSIUM CHLORIDE 20 MEQ
20 PACKET (EA) ORAL EVERY 6 HOURS
Refills: 0 | Status: DISCONTINUED | OUTPATIENT
Start: 2021-12-28 | End: 2021-12-28

## 2021-12-28 RX ORDER — METOPROLOL TARTRATE 50 MG
5 TABLET ORAL EVERY 8 HOURS
Refills: 0 | Status: DISCONTINUED | OUTPATIENT
Start: 2021-12-28 | End: 2021-12-29

## 2021-12-28 RX ORDER — SODIUM CHLORIDE 9 MG/ML
1000 INJECTION, SOLUTION INTRAVENOUS
Refills: 0 | Status: DISCONTINUED | OUTPATIENT
Start: 2021-12-28 | End: 2021-12-29

## 2021-12-28 RX ORDER — HYDRALAZINE HCL 50 MG
10 TABLET ORAL THREE TIMES A DAY
Refills: 0 | Status: DISCONTINUED | OUTPATIENT
Start: 2021-12-28 | End: 2021-12-29

## 2021-12-28 RX ORDER — HYDRALAZINE HCL 50 MG
10 TABLET ORAL ONCE
Refills: 0 | Status: COMPLETED | OUTPATIENT
Start: 2021-12-28 | End: 2021-12-28

## 2021-12-28 RX ADMIN — CEFTRIAXONE 100 MILLIGRAM(S): 500 INJECTION, POWDER, FOR SOLUTION INTRAMUSCULAR; INTRAVENOUS at 22:58

## 2021-12-28 RX ADMIN — LEVETIRACETAM 400 MILLIGRAM(S): 250 TABLET, FILM COATED ORAL at 17:36

## 2021-12-28 RX ADMIN — HEPARIN SODIUM 5000 UNIT(S): 5000 INJECTION INTRAVENOUS; SUBCUTANEOUS at 07:02

## 2021-12-28 RX ADMIN — HEPARIN SODIUM 5000 UNIT(S): 5000 INJECTION INTRAVENOUS; SUBCUTANEOUS at 17:36

## 2021-12-28 RX ADMIN — Medication 5 MILLIGRAM(S): at 13:23

## 2021-12-28 RX ADMIN — Medication 10 MILLIGRAM(S): at 07:02

## 2021-12-28 RX ADMIN — SODIUM CHLORIDE 75 MILLILITER(S): 9 INJECTION, SOLUTION INTRAVENOUS at 10:57

## 2021-12-28 RX ADMIN — Medication 10 MILLIGRAM(S): at 13:23

## 2021-12-28 RX ADMIN — Medication 5 MILLIGRAM(S): at 22:58

## 2021-12-28 RX ADMIN — Medication 100 MILLIEQUIVALENT(S): at 14:38

## 2021-12-28 RX ADMIN — Medication 100 MILLIEQUIVALENT(S): at 11:44

## 2021-12-28 RX ADMIN — SODIUM CHLORIDE 70 MILLILITER(S): 9 INJECTION, SOLUTION INTRAVENOUS at 13:21

## 2021-12-28 RX ADMIN — Medication 10 MILLIGRAM(S): at 22:58

## 2021-12-28 RX ADMIN — Medication 100 MILLIEQUIVALENT(S): at 13:22

## 2021-12-28 NOTE — CONSULT NOTE ADULT - REASON FOR ADMISSION
Altered mental status from catheter associated UTI
Altered mental status from catheter associated UTI
N/A

## 2021-12-28 NOTE — PROGRESS NOTE ADULT - ASSESSMENT
72 yo Female      PMH "heart dz", diabetes, asthma, CVA, seizure disorder, HTN, Dementia     BIBA for change in mental status      Family states patient has seemed more weak and tired than normal, requiring more assistance.    Reports she began to look more swollen and her urinary output in milner decreased.    Went to the doctor who replaced the chronic milner  , dark urine came out.    then, required noxious stimuli to wake up prompting ambulance call.    As per family, patient is normally oriented x1, awake, alert and follows commands.    Admitted for likely catheter associated UTI. Milner was changed  on arrival     *  catheter associated UTI/ on arrival . has   chronoc  milner   ha s  seen urology in past   . IV Ceftriaxone daily, follow urine culture.   *  AMADOU on arrival from dehydration with creatinine 1.78.    . CXR clear, COVID negative  * . CT head notes old right frontal CVA.   *  Speech eval , recommended  NPO   *  HTN,  Hydralazine, Norvasc, Toprol, Kepra, ASA, Clonidine and Zocur.   Stop Seraquel given sedation.   *  dm,  FOLWO FS   ON DVT PPX       RAD< from: TTE Echo Complete w/o Contrast w/ Doppler (03.15.21 @ 19:56) >  Summary:   1. Left ventricular ejection fraction, by visual estimation, is 60 to 65%.   2. Normal global left ventricular systolic function.   3. Mildly enlarged left atrium.   4. Mild mitral valve regurgitation.   5. Mild-moderate tricuspid regurgitation.   6. Mild aortic regurgitation.   7. Sclerotic aortic valve with normal opening.   8. Mild pulmonic valve regurgitation.   9. Estimated pulmonary artery systolic pressure is 43.2 mmHg assuming a right atrial pressure of 5 mmHg, which is consistent with mild pulmonary hypertension  < end of copied text >         RAD 72 yo Female      PMH "heart dz", diabetes, asthma, CVA, seizure disorder, HTN, Dementia     BIBA for change in mental status      Family states patient has seemed more weak and tired than normal, requiring more assistance.    Reports she began to look more swollen and her urinary output in milner decreased.    Went to the doctor who replaced the chronic milner  , dark urine came out.    then, required noxious stimuli to wake up prompting ambulance call.    As per family, patient is normally oriented x1, awake, alert and follows commands.    Admitted for likely catheter associated UTI. Milner was changed  on arrival     *  catheter associated UTI/ on arrival . has   chronoc  milner   ha s  seen urology in past   . IV Ceftriaxone daily, follow urine culture.   *  AMADOU on arrival from dehydration with creatinine 1.78.    . CXR clear, COVID negative  * . CT head notes old right frontal CVA.   pt with  dementia,  non verbal, appears   bed bound. functional  quadriplegia  *  Speech eval , recommended  NPO   *  HTN,      home  emds, Hydralazine, Norvasc, Toprol, Kepra, ASA, Clonidine and Zocur.   Stop Seraquel given sedation.   on iv bp meds  now  gi for ?  peg  *  DM, follow  fs  on dvt ppx    family called/  unable to recah       RAD< from: TTE Echo Complete w/o Contrast w/ Doppler (03.15.21 @ 19:56) >  Summary:   1. Left ventricular ejection fraction, by visual estimation, is 60 to 65%.   2. Normal global left ventricular systolic function.   3. Mildly enlarged left atrium.   4. Mild mitral valve regurgitation.   5. Mild-moderate tricuspid regurgitation.   6. Mild aortic regurgitation.   7. Sclerotic aortic valve with normal opening.   8. Mild pulmonic valve regurgitation.   9. Estimated pulmonary artery systolic pressure is 43.2 mmHg assuming a right atrial pressure of 5 mmHg, which is consistent with mild pulmonary hypertension  < end of copied text >         RAD

## 2021-12-28 NOTE — SWALLOW BEDSIDE ASSESSMENT ADULT - COMMENTS
CXR 12/27/2021IMPRESSION:No acute radiographic findings    CT head no contrast 12/27/2021 IMPRESSION:   Mild to moderate periventricular white matter ischemia. Old infarction is seen in the RIGHT frontal lobe.    Moderate global atrophy

## 2021-12-28 NOTE — SWALLOW BEDSIDE ASSESSMENT ADULT - SWALLOW EVAL: DIAGNOSIS
pt presented with decreased cognition, therefore limiting eval however oropharyngeal phases of swallow marked by fair oral opening, slow oral transport posterior with suspected delay in swallow trigger and decreased hyolaryngeal elevation/excursion to palpation. no overt signs of aspiration with consistencies trialed

## 2021-12-28 NOTE — PROGRESS NOTE ADULT - SUBJECTIVE AND OBJECTIVE BOX
afebrile  REVIEW OF SYSTEMS:  GEN: no fever,    no chills  RESP: no SOB,   no cough  CVS: no chest pain,   no palpitations  GI: no abdominal pain,   no nausea,   no vomiting,   no constipation,   no diarrhea  : no dysuria,   no frequency  NEURO: no headache,   no dizziness  PSYCH: no depression,   not anxious  Derm : no rash    MEDICATIONS  (STANDING):  amLODIPine   Tablet 10 milliGRAM(s) Oral daily  aspirin enteric coated 81 milliGRAM(s) Oral daily  cefTRIAXone   IVPB 1000 milliGRAM(s) IV Intermittent every 24 hours  cloNIDine 0.1 milliGRAM(s) Oral three times a day  heparin   Injectable 5000 Unit(s) SubCutaneous every 12 hours  hydrALAZINE 100 milliGRAM(s) Oral three times a day  levETIRAcetam 750 milliGRAM(s) Oral two times a day  metoprolol succinate ER 50 milliGRAM(s) Oral two times a day  potassium chloride   Powder 20 milliEquivalent(s) Oral every 6 hours  simvastatin 20 milliGRAM(s) Oral at bedtime  sodium chloride 0.45%. 1000 milliLiter(s) (75 mL/Hr) IV Continuous <Continuous>    MEDICATIONS  (PRN):      Vital Signs Last 24 Hrs  T(C): 36.7 (28 Dec 2021 05:18), Max: 37.6 (27 Dec 2021 11:59)  T(F): 98.1 (28 Dec 2021 05:18), Max: 99.6 (27 Dec 2021 11:59)  HR: 97 (28 Dec 2021 05:18) (72 - 97)  BP: 183/81 (28 Dec 2021 05:18) (176/69 - 196/92)  BP(mean): --  RR: 16 (28 Dec 2021 05:18) (14 - 18)  SpO2: 100% (28 Dec 2021 05:18) (98% - 100%)  CAPILLARY BLOOD GLUCOSE      POCT Blood Glucose.: 266 mg/dL (27 Dec 2021 13:13)    I&O's Summary    27 Dec 2021 07:01  -  28 Dec 2021 07:00  --------------------------------------------------------  IN: 900 mL / OUT: 1400 mL / NET: -500 mL        PHYSICAL EXAM:  HEAD:  Atraumatic, Normocephalic  NECK: Supple, No   JVD  CHEST/LUNG:   no     rales,     no,    rhonchi  HEART: Regular rate and rhythm;         murmur  ABDOMEN: Soft, Nontender, ;   EXTREMITIES:     no   edema  NEUROLOGY:  alert    LABS:                        13.2   21.70 )-----------( 300      ( 27 Dec 2021 14:46 )             40.6     12-28    147<H>  |  115<H>  |  24<H>  ----------------------------<  161<H>  3.0<L>   |  26  |  1.47<H>    Ca    8.6      28 Dec 2021 08:05    TPro  8.7<H>  /  Alb  2.9<L>  /  TBili  0.8  /  DBili  x   /  AST  22  /  ALT  14  /  AlkPhos  99  12-27    PT/INR - ( 27 Dec 2021 14:43 )   PT: 13.0 sec;   INR: 1.13 ratio         PTT - ( 27 Dec 2021 14:43 )  PTT:31.1 sec                        Consultant(s) Notes Reviewed:      Care Discussed with Consultants/Other Providers:

## 2021-12-28 NOTE — PATIENT PROFILE ADULT - FALL HARM RISK - HARM RISK INTERVENTIONS

## 2021-12-28 NOTE — SWALLOW BEDSIDE ASSESSMENT ADULT - SLP GENERAL OBSERVATIONS
pt seen bedside, alert and ?oriented to self. pt nonverbal, essentially noncommunicative accept moaning/facial grimace when initially aroused, and she did not follow directions[facility]. pt visually located to SLP- improved as session progressed.

## 2021-12-28 NOTE — CONSULT NOTE ADULT - ASSESSMENT
sepsis from urinary tract infection ; acute renal failure dehydration likely   barely waking up   has a chronic milner  early stage 2 sacrum   dementia   will repeat renal us ; report from 3/2021 acknowledge   antibiotics   iv fluids per PMD   follow up culture   will follow with you thanks   local care to sacrum 
HPI:  72 yo Female PMH "heart dz", diabetes, asthma, CVA, seizure disorder, HTN, Dementia BIBA for change in mental status this AM. Family states patient has seemed more weak and tired than normal, requiring more assistance. Reports she began to look more swollen and her urinary output in milner decreased. Went to the doctor who replaced the chronic milner today. A large amount of dark urine came out. Pt this morning required noxious stimuli to wake up prompting ambulance call. As per family, patient is normally oriented x1, awake, alert and follows commands.     Admitted for likely catheter associated UTI. Milner was changed this AM.   (27 Dec 2021 17:59)  -- As Above -----  Patient can not communicate. History obtained from sister, chart  The patient presented with a change in MS. The etiology is probably sepsis. Patient is refusing to eat.   The patient does not have any GI history. Last colonoscopy was a while ago.  Sister denies all GI surgeries  The nurse denies melena, hematochezia, hematemesis, nausea, vomiting, abdominal pain, constipation, diarrhea, or change in bowel movements     Poor PO intake - Patient has a history of dementia worsened with sepsis- Patient would benefit with enteral feedings, NGT if necessary. Discussed with sister ( [phone] ) who referred me to the patient's daughter ( Gali [phone] ). Message left. Possible PEG tube if no improvement.   === Will follow.

## 2021-12-28 NOTE — SWALLOW BEDSIDE ASSESSMENT ADULT - H & P REVIEW
70 yo Female PMH "heart dz", diabetes, asthma, CVA, seizure disorder, HTN, Dementia BIBA for change in mental status this AM. Family states patient has seemed more weak and tired than normal, requiring more assistance. Reports she began to look more swollen and her urinary output in milner decreased. Went to the doctor who replaced the chronic milner today. A large amount of dark urine came out. Pt this morning required noxious stimuli to wake up prompting ambulance call. As per family, patient is normally oriented x1, awake, alert and follows commands./yes

## 2021-12-28 NOTE — CONSULT NOTE ADULT - SUBJECTIVE AND OBJECTIVE BOX
HPI:  72 yo Female PMH "heart dz", diabetes, asthma, CVA, seizure disorder, HTN, Dementia BIBA for change in mental status this AM. Family states patient has seemed more weak and tired than normal, requiring more assistance. Reports she began to look more swollen and her urinary output in milner decreased. Went to the doctor who replaced the chronic milner today. A large amount of dark urine came out. Pt this morning required noxious stimuli to wake up prompting ambulance call. As per family, patient is normally oriented x1, awake, alert and follows commands.     Admitted for likely catheter associated UTI. Milner was changed this AM.   (27 Dec 2021 17:59)      PAST MEDICAL & SURGICAL HISTORY:  Hypertension    Diabetes    Asthma    OA (osteoarthritis)  bautista knees, low back  and  bautista shoulders    Gout    Seizure disorder    Urinary incontinence    Vision changes  lt eye    CVA (cerebral infarction)  right side weakness    Kidney stone        SOCHX:  unable to assess   lives with famlily   tobacco,  -  alcohol    FMHX: FA/MO  - non contributory       Recent Travel:    Immunizations:    Allergies    No Known Allergies    Intolerances        MEDICATIONS  (STANDING):  amLODIPine   Tablet 10 milliGRAM(s) Oral daily  aspirin enteric coated 81 milliGRAM(s) Oral daily  cefTRIAXone   IVPB 1000 milliGRAM(s) IV Intermittent every 24 hours  cloNIDine 0.1 milliGRAM(s) Oral three times a day  heparin   Injectable 5000 Unit(s) SubCutaneous every 12 hours  hydrALAZINE 100 milliGRAM(s) Oral three times a day  levETIRAcetam 750 milliGRAM(s) Oral two times a day  metoprolol succinate ER 50 milliGRAM(s) Oral two times a day  potassium chloride  10 mEq/100 mL IVPB 10 milliEquivalent(s) IV Intermittent every 1 hour  simvastatin 20 milliGRAM(s) Oral at bedtime  sodium chloride 0.45%. 1000 milliLiter(s) (75 mL/Hr) IV Continuous <Continuous>    MEDICATIONS  (PRN):      REVIEW OF SYSTEMS:  unable to assess as patient is barely arousable   responds to noxious stimuli      VITAL SIGNS:    T(C): 36.8 (12-28-21 @ 11:12), Max: 37.6 (12-27-21 @ 11:59)  T(F): 98.2 (12-28-21 @ 11:12), Max: 99.6 (12-27-21 @ 11:59)  HR: 89 (12-28-21 @ 11:12) (72 - 97)  BP: 173/83 (12-28-21 @ 11:12) (173/83 - 196/92)  RR: 17 (12-28-21 @ 11:12) (14 - 18)  SpO2: 98% (12-28-21 @ 11:12) (98% - 100%)    PHYSICAL EXAM:    GENERAL: NAD, well-groomed, well-developed  feels very warm  HEAD:  Atraumatic, Normocephalic  EYES: EOMI, PERRLA, conjunctiva and sclera clear  ENMT:  Moist mucous membranes dry mouth  NECK: Supple, No JVD, Normal thyroid  NERVOUS SYSTEM:  arousable responds to pain  CHEST/LUNG: Clear to auscultation bilaterally; No rales, rhonchi, wheezing bilaterally  HEART: Regular rate and rhythm; No murmurs, rubs, or gallops  ABDOMEN: Soft, Nontender, Nondistended; Bowel sounds present  EXTREMITIES:  2+ Peripheral Pulses, No clubbing, cyanosis, or edema  LYMPH: No lymphadenopathy noted  SKIN: stage 2 sacrum adjascent to anus likely from stool  milner with dark cloudy urine   BACK: small stage 2  pressor sore     LABS:                         13.2   21.70 )-----------( 300      ( 27 Dec 2021 14:46 )             40.6     12-28    147<H>  |  115<H>  |  24<H>  ----------------------------<  161<H>  3.0<L>   |  26  |  1.47<H>    Ca    8.6      28 Dec 2021 08:05    TPro  8.7<H>  /  Alb  2.9<L>  /  TBili  0.8  /  DBili  x   /  AST  22  /  ALT  14  /  AlkPhos  99  12-27    LIVER FUNCTIONS - ( 27 Dec 2021 14:43 )  Alb: 2.9 g/dL / Pro: 8.7 gm/dL / ALK PHOS: 99 U/L / ALT: 14 U/L / AST: 22 U/L / GGT: x           PT/INR - ( 27 Dec 2021 14:43 )   PT: 13.0 sec;   INR: 1.13 ratio         PTT - ( 27 Dec 2021 14:43 )  PTT:31.1 sec                                < from: US Renal (03.17.21 @ 08:46) >  IMPRESSION:    Lateral nonobstructive calculi and cysts. No evidence of hydronephrosis.  Nodule seen at the posterior aspect of the right hepatic lobe likely corresponds to known adrenal nodule.            ROGERIO MAN MD; Attending Radiologist  This document has been electronically signed. Mar 17 2021  1:44PM    < end of copied text >        Radiology:      < from: CT Head No Cont (12.27.21 @ 15:50) >  IMPRESSION:   Mild to moderate periventricular white matter ischemia. Old   infarction is seen in the RIGHT frontal lobe.  Moderate global atrophy    --- End of Report ---            JAMAR VÁSQUEZ MD; Attending Radiologist  This document has been electronically signed. Dec 27 2021  3:51PM    < end of copied text >  
HPI:  70 yo Female PMH "heart dz", diabetes, asthma, CVA, seizure disorder, HTN, Dementia BIBA for change in mental status this AM. Family states patient has seemed more weak and tired than normal, requiring more assistance. Reports she began to look more swollen and her urinary output in milner decreased. Went to the doctor who replaced the chronic milner today. A large amount of dark urine came out. Pt this morning required noxious stimuli to wake up prompting ambulance call. As per family, patient is normally oriented x1, awake, alert and follows commands.     Admitted for likely catheter associated UTI. Milner was changed this AM.   (27 Dec 2021 17:59)  -- As Above -----  Patient can not communicate. History obtained from sister, chart  The patient presented with a change in MS. The etiology is probably sepsis. Patient is refusing to eat.   The patient does not have any GI history. Last colonoscopy was a while ago.  Sister denies all GI surgeries  The nurse denies melena, hematochezia, hematemesis, nausea, vomiting, abdominal pain, constipation, diarrhea, or change in bowel movements       PAST MEDICAL & SURGICAL HISTORY:  Hypertension    Diabetes    Asthma    OA (osteoarthritis)  bautista knees, low back  and  bautista shoulders    Gout    Seizure disorder    Urinary incontinence    Vision changes  lt eye    CVA (cerebral infarction)  right side weakness    Kidney stone        MEDICATIONS  (STANDING):  aspirin enteric coated 81 milliGRAM(s) Oral daily  cefTRIAXone   IVPB 1000 milliGRAM(s) IV Intermittent every 24 hours  dextrose 5%. 1000 milliLiter(s) (70 mL/Hr) IV Continuous <Continuous>  heparin   Injectable 5000 Unit(s) SubCutaneous every 12 hours  hydrALAZINE Injectable 10 milliGRAM(s) IV Push three times a day  levETIRAcetam  IVPB 750 milliGRAM(s) IV Intermittent two times a day  metoprolol tartrate Injectable 5 milliGRAM(s) IV Push every 8 hours  simvastatin 20 milliGRAM(s) Oral at bedtime    MEDICATIONS  (PRN):      Allergies    No Known Allergies    Intolerances        FAMILY HISTORY:  No pertinent family history in first degree relatives        REVIEW OF SYSTEMS:    CONSTITUTIONAL: No fever, weight loss,   EYES: No eye pain, visual disturbances, or discharge  ENMT:  No difficulty hearing, tinnitus, vertigo; No sinus or throat pain  NECK: No pain or stiffness  BREASTS: No pain, masses, or nipple discharge  RESPIRATORY: No cough, wheezing, chills or hemoptysis; No shortness of breath  CARDIOVASCULAR: No chest pain, palpitations, dizziness, or leg swelling  GASTROINTESTINAL: see above   GENITOURINARY: No dysuria, frequency, hematuria, or incontinence  NEUROLOGICAL: No headaches, numbness, or tremors  SKIN: No itching, burning, rashes, or lesions   LYMPH NODES: No enlarged glands  ENDOCRINE: No heat or cold intolerance; No hair loss  MUSCULOSKELETAL: No joint pain or swelling; No muscle, back, or extremity pain  PSYCHIATRIC: No depression, anxiety, mood swings, or difficulty sleeping  HEME/LYMPH: No easy bruising, or bleeding gums  ALLERGY AND IMMUNOLOGIC: No hives or eczema          SOCIAL HISTORY:    FAMILY HISTORY:  No pertinent family history in first degree relatives        Vital Signs Last 24 Hrs  T(C): 36.8 (28 Dec 2021 11:12), Max: 37.3 (27 Dec 2021 20:17)  T(F): 98.2 (28 Dec 2021 11:12), Max: 99.1 (27 Dec 2021 20:17)  HR: 93 (28 Dec 2021 13:26) (72 - 97)  BP: 170/68 (28 Dec 2021 13:26) (170/68 - 185/76)  BP(mean): --  RR: 17 (28 Dec 2021 11:12) (16 - 18)  SpO2: 98% (28 Dec 2021 11:12) (98% - 100%)    PHYSICAL EXAM:    GENERAL: NAD, well-groomed, well-developed  HEAD:  Atraumatic, Normocephalic  EYES: EOMI, PERRLA, conjunctiva and sclera clear  NECK: Supple, No JVD, Normal thyroid  NERVOUS SYSTEM:  Uncommunicative   CHEST/LUNG: Clear to percussion bilaterally; No rales, rhonchi, wheezing, or rubs  HEART: Regular rate and rhythm; No murmurs, rubs, or gallops  ABDOMEN: Soft, Nontender, Nondistended; Bowel sounds present  EXTREMITIES:  2+ Peripheral Pulses, No clubbing, cyanosis, or edema  LYMPH: No lymphadenopathy noted   RECTAL: See above   SKIN: No rashes or lesions    LABS:                        13.2   21.70 )-----------( 300      ( 27 Dec 2021 14:46 )             40.6       CBC:  12-27 @ 14:46  WBC  21.70  HGB 13.2  HCT 40.6 Plate 300  MCV 87.1           28 Dec 2021 08:05    147    |  115    |  24     ----------------------------<  161    3.0     |  26     |  1.47   27 Dec 2021 14:43    144    |  112    |  29     ----------------------------<  241    3.8     |  23     |  1.78     Ca    8.6        28 Dec 2021 08:05  Ca    9.3        27 Dec 2021 14:43    TPro  8.7    /  Alb  2.9    /  TBili  0.8    /  DBili  x      /  AST  22     /  ALT  14     /  AlkPhos  99     27 Dec 2021 14:43    PT/INR - ( 27 Dec 2021 14:43 )   PT: 13.0 sec;   INR: 1.13 ratio         PTT - ( 27 Dec 2021 14:43 )  PTT:31.1 sec        RADIOLOGY & ADDITIONAL STUDIES:

## 2021-12-29 LAB
ANION GAP SERPL CALC-SCNC: 6 MMOL/L — SIGNIFICANT CHANGE UP (ref 5–17)
BUN SERPL-MCNC: 20 MG/DL — SIGNIFICANT CHANGE UP (ref 7–23)
CALCIUM SERPL-MCNC: 7.6 MG/DL — LOW (ref 8.5–10.1)
CHLORIDE SERPL-SCNC: 111 MMOL/L — HIGH (ref 96–108)
CO2 SERPL-SCNC: 24 MMOL/L — SIGNIFICANT CHANGE UP (ref 22–31)
CREAT SERPL-MCNC: 1.52 MG/DL — HIGH (ref 0.5–1.3)
GLUCOSE SERPL-MCNC: 157 MG/DL — HIGH (ref 70–99)
HCT VFR BLD CALC: 31.9 % — LOW (ref 34.5–45)
HGB BLD-MCNC: 10.3 G/DL — LOW (ref 11.5–15.5)
MCHC RBC-ENTMCNC: 28.5 PG — SIGNIFICANT CHANGE UP (ref 27–34)
MCHC RBC-ENTMCNC: 32.3 GM/DL — SIGNIFICANT CHANGE UP (ref 32–36)
MCV RBC AUTO: 88.4 FL — SIGNIFICANT CHANGE UP (ref 80–100)
NRBC # BLD: 0 /100 WBCS — SIGNIFICANT CHANGE UP (ref 0–0)
PLATELET # BLD AUTO: 253 K/UL — SIGNIFICANT CHANGE UP (ref 150–400)
POTASSIUM SERPL-MCNC: 3.1 MMOL/L — LOW (ref 3.5–5.3)
POTASSIUM SERPL-SCNC: 3.1 MMOL/L — LOW (ref 3.5–5.3)
RBC # BLD: 3.61 M/UL — LOW (ref 3.8–5.2)
RBC # FLD: 13.2 % — SIGNIFICANT CHANGE UP (ref 10.3–14.5)
SODIUM SERPL-SCNC: 141 MMOL/L — SIGNIFICANT CHANGE UP (ref 135–145)
WBC # BLD: 11 K/UL — HIGH (ref 3.8–10.5)
WBC # FLD AUTO: 11 K/UL — HIGH (ref 3.8–10.5)

## 2021-12-29 PROCEDURE — 99232 SBSQ HOSP IP/OBS MODERATE 35: CPT

## 2021-12-29 RX ORDER — HYDRALAZINE HCL 50 MG
50 TABLET ORAL EVERY 8 HOURS
Refills: 0 | Status: DISCONTINUED | OUTPATIENT
Start: 2021-12-29 | End: 2021-12-29

## 2021-12-29 RX ORDER — LEVETIRACETAM 250 MG/1
750 TABLET, FILM COATED ORAL
Refills: 0 | Status: DISCONTINUED | OUTPATIENT
Start: 2021-12-29 | End: 2021-12-30

## 2021-12-29 RX ORDER — POTASSIUM CHLORIDE 20 MEQ
10 PACKET (EA) ORAL
Refills: 0 | Status: COMPLETED | OUTPATIENT
Start: 2021-12-29 | End: 2021-12-29

## 2021-12-29 RX ORDER — SODIUM CHLORIDE 9 MG/ML
1000 INJECTION, SOLUTION INTRAVENOUS
Refills: 0 | Status: DISCONTINUED | OUTPATIENT
Start: 2021-12-29 | End: 2022-01-05

## 2021-12-29 RX ORDER — ACETAMINOPHEN 500 MG
650 TABLET ORAL EVERY 6 HOURS
Refills: 0 | Status: DISCONTINUED | OUTPATIENT
Start: 2021-12-29 | End: 2021-12-30

## 2021-12-29 RX ORDER — METOPROLOL TARTRATE 50 MG
5 TABLET ORAL EVERY 8 HOURS
Refills: 0 | Status: DISCONTINUED | OUTPATIENT
Start: 2021-12-29 | End: 2021-12-30

## 2021-12-29 RX ORDER — HYDRALAZINE HCL 50 MG
10 TABLET ORAL THREE TIMES A DAY
Refills: 0 | Status: DISCONTINUED | OUTPATIENT
Start: 2021-12-29 | End: 2021-12-30

## 2021-12-29 RX ORDER — ACETAMINOPHEN 500 MG
650 TABLET ORAL EVERY 6 HOURS
Refills: 0 | Status: DISCONTINUED | OUTPATIENT
Start: 2021-12-29 | End: 2021-12-29

## 2021-12-29 RX ORDER — POTASSIUM CHLORIDE 20 MEQ
40 PACKET (EA) ORAL ONCE
Refills: 0 | Status: DISCONTINUED | OUTPATIENT
Start: 2021-12-29 | End: 2021-12-29

## 2021-12-29 RX ORDER — ACETAMINOPHEN 500 MG
1000 TABLET ORAL ONCE
Refills: 0 | Status: DISCONTINUED | OUTPATIENT
Start: 2021-12-29 | End: 2021-12-29

## 2021-12-29 RX ORDER — METOPROLOL TARTRATE 50 MG
25 TABLET ORAL
Refills: 0 | Status: DISCONTINUED | OUTPATIENT
Start: 2021-12-29 | End: 2021-12-29

## 2021-12-29 RX ORDER — LEVETIRACETAM 250 MG/1
750 TABLET, FILM COATED ORAL
Refills: 0 | Status: DISCONTINUED | OUTPATIENT
Start: 2021-12-29 | End: 2021-12-29

## 2021-12-29 RX ADMIN — Medication 10 MILLIGRAM(S): at 14:24

## 2021-12-29 RX ADMIN — SIMVASTATIN 20 MILLIGRAM(S): 20 TABLET, FILM COATED ORAL at 21:31

## 2021-12-29 RX ADMIN — LEVETIRACETAM 400 MILLIGRAM(S): 250 TABLET, FILM COATED ORAL at 05:37

## 2021-12-29 RX ADMIN — Medication 5 MILLIGRAM(S): at 05:37

## 2021-12-29 RX ADMIN — Medication 100 MILLIEQUIVALENT(S): at 10:46

## 2021-12-29 RX ADMIN — Medication 5 MILLIGRAM(S): at 14:25

## 2021-12-29 RX ADMIN — HEPARIN SODIUM 5000 UNIT(S): 5000 INJECTION INTRAVENOUS; SUBCUTANEOUS at 17:49

## 2021-12-29 RX ADMIN — CEFTRIAXONE 100 MILLIGRAM(S): 500 INJECTION, POWDER, FOR SOLUTION INTRAMUSCULAR; INTRAVENOUS at 21:33

## 2021-12-29 RX ADMIN — Medication 10 MILLIGRAM(S): at 21:31

## 2021-12-29 RX ADMIN — Medication 10 MILLIGRAM(S): at 05:37

## 2021-12-29 RX ADMIN — Medication 5 MILLIGRAM(S): at 21:31

## 2021-12-29 RX ADMIN — Medication 100 MILLIEQUIVALENT(S): at 11:44

## 2021-12-29 RX ADMIN — Medication 100 MILLIEQUIVALENT(S): at 14:38

## 2021-12-29 RX ADMIN — HEPARIN SODIUM 5000 UNIT(S): 5000 INJECTION INTRAVENOUS; SUBCUTANEOUS at 05:37

## 2021-12-29 RX ADMIN — SODIUM CHLORIDE 70 MILLILITER(S): 9 INJECTION, SOLUTION INTRAVENOUS at 21:32

## 2021-12-29 RX ADMIN — LEVETIRACETAM 400 MILLIGRAM(S): 250 TABLET, FILM COATED ORAL at 17:50

## 2021-12-29 NOTE — PROGRESS NOTE ADULT - SUBJECTIVE AND OBJECTIVE BOX
afebrile/  lethargic, non verbal  REVIEW OF SYSTEMS:  GEN: no fever,    no chills  RESP: no SOB,   no cough  CVS: no chest pain,   no palpitations  GI: no abdominal pain,   no nausea,   no vomiting,   no constipation,   no diarrhea  : no dysuria,   no frequency  NEURO: no headache,   no dizziness  PSYCH: no depression,   not anxious  Derm : no rash    MEDICATIONS  (STANDING):  aspirin enteric coated 81 milliGRAM(s) Oral daily  cefTRIAXone   IVPB 1000 milliGRAM(s) IV Intermittent every 24 hours  dextrose 5% + lactated ringers. 1000 milliLiter(s) (70 mL/Hr) IV Continuous <Continuous>  heparin   Injectable 5000 Unit(s) SubCutaneous every 12 hours  hydrALAZINE 50 milliGRAM(s) Oral every 8 hours  levETIRAcetam 750 milliGRAM(s) Oral two times a day  metoprolol tartrate 25 milliGRAM(s) Oral two times a day  potassium chloride  10 mEq/100 mL IVPB 10 milliEquivalent(s) IV Intermittent every 1 hour  simvastatin 20 milliGRAM(s) Oral at bedtime    MEDICATIONS  (PRN):  acetaminophen     Tablet .. 650 milliGRAM(s) Oral every 6 hours PRN Temp greater or equal to 38C (100.4F), Mild Pain (1 - 3)      Vital Signs Last 24 Hrs  T(C): 37.1 (29 Dec 2021 11:10), Max: 37.6 (28 Dec 2021 23:55)  T(F): 98.8 (29 Dec 2021 11:10), Max: 99.6 (28 Dec 2021 23:55)  HR: 75 (29 Dec 2021 11:10) (75 - 94)  BP: 157/76 (29 Dec 2021 11:10) (146/74 - 171/78)  BP(mean): --  RR: 17 (29 Dec 2021 11:10) (16 - 18)  SpO2: 95% (29 Dec 2021 11:10) (95% - 99%)  CAPILLARY BLOOD GLUCOSE        I&O's Summary    28 Dec 2021 07:01  -  29 Dec 2021 07:00  --------------------------------------------------------  IN: 1790 mL / OUT: 925 mL / NET: 865 mL        PHYSICAL EXAM:  HEAD:  Atraumatic, Normocephalic  NECK: Supple, No   JVD  CHEST/LUNG:   no     rales,     no,    rhonchi  HEART: Regular rate and rhythm;         murmur  ABDOMEN: Soft, Nontender, ;   EXTREMITIES:      mo  edema  NEUROLOGY:    non  verbal   milner    LABS:                        10.3   11.00 )-----------( 253      ( 29 Dec 2021 07:34 )             31.9     12-29    141  |  111<H>  |  20  ----------------------------<  157<H>  3.1<L>   |  24  |  1.52<H>    Ca    7.6<L>      29 Dec 2021 07:34    TPro  8.7<H>  /  Alb  2.9<L>  /  TBili  0.8  /  DBili  x   /  AST  22  /  ALT  14  /  AlkPhos  99  12-27    PT/INR - ( 27 Dec 2021 14:43 )   PT: 13.0 sec;   INR: 1.13 ratio         PTT - ( 27 Dec 2021 14:43 )  PTT:31.1 sec                        Consultant(s) Notes Reviewed:      Care Discussed with Consultants/Other Providers:

## 2021-12-29 NOTE — DIETITIAN INITIAL EVALUATION ADULT. - PERTINENT LABORATORY DATA
12-29 Na141 mmol/L Glu 157 mg/dL<H> K+ 3.1 mmol/L<L> Cr  1.52 mg/dL<H> BUN 20 mg/dL 12-27 Alb 2.9 g/dL<L>

## 2021-12-29 NOTE — DIETITIAN INITIAL EVALUATION ADULT. - ETIOLOGY
Inadequate protein-energy intake and increased protein-energy needs in setting of dementia, stage II pressure injuries

## 2021-12-29 NOTE — DIETITIAN INITIAL EVALUATION ADULT. - OTHER INFO
Per chart pt with PMH dementia, heart disease, CVA, T2DM, asthma, presents with AMS, found with UTI. Pt alert and confused at time of visit.  Pt with poor PO intake during LOS per flow sheets. s/p swallow evaluation (12/28) with recommendations for puree, mildly thickened liquids. No reports of nausea, vomiting, constipation, or diarrhea. Weight history per previous RD notes: (11/04/2020) 52kg (08/15/2020) 56.9kg (07/30/2020) 59.7kg (01/24/2020) 57.2kg.   RD remains available.

## 2021-12-29 NOTE — PROGRESS NOTE ADULT - ASSESSMENT
70 yo Female   with  h/o  DM,  asthma, CVA, seizure disorder, HTN, Dementia     BIBA for change in mental status      Family states patient has seemed more weak and tired than normal, requiring more assistance,  her urinary output in milner decreased.    Went to the doctor who replaced the chronic milner  , dark urine came out. then, required noxious stimuli to wake up prompting ambulance call.    As per family, patient is normally oriented . awake, alert and follows commands.    Admitted for likely catheter associated UTI. Milner was changed  on arrival  await culture  shai current antibiotics for now  will follow with you thanks

## 2021-12-29 NOTE — PROGRESS NOTE ADULT - ASSESSMENT
HPI:  70 yo Female PMH "heart dz", diabetes, asthma, CVA, seizure disorder, HTN, Dementia BIBA for change in mental status this AM. Family states patient has seemed more weak and tired than normal, requiring more assistance. Reports she began to look more swollen and her urinary output in milner decreased. Went to the doctor who replaced the chronic milner today. A large amount of dark urine came out. Pt this morning required noxious stimuli to wake up prompting ambulance call. As per family, patient is normally oriented x1, awake, alert and follows commands.     Admitted for likely catheter associated UTI. Milner was changed this AM.   (27 Dec 2021 17:59)  -- As Above -----  Patient can not communicate. History obtained from sister, chart  The patient presented with a change in MS. The etiology is probably sepsis. Patient is refusing to eat.   The patient does not have any GI history. Last colonoscopy was a while ago.  Sister denies all GI surgeries  The nurse denies melena, hematochezia, hematemesis, nausea, vomiting, abdominal pain, constipation, diarrhea, or change in bowel movements     Poor PO intake - Patient has a history of dementia worsened with sepsis- Patient would benefit with enteral feedings, NGT if necessary. Discussed with sister ( [phone] ) who referred me to the patient's daughter ( Gali [phone] ). Message left. Possible PEG tube if no improvement.   === Will follow.     [id number]  ---------  Patient passed swallow evaluation by speech therapist. Recommended puree with mildly thickened liquids. MS improving. Spoke with daughter yesterday. She does not want NGT / PEG tube .

## 2021-12-29 NOTE — DIETITIAN INITIAL EVALUATION ADULT. - OTHER CALCULATIONS
Ht (cm): 165.1cm   Wt (kg): 56.7kg (dosing weight 12/27)   BMI: 20.8     IBW: 56.6kg +/- 10% %IBW: 100% UBW: 52kg %UBW:109%

## 2021-12-29 NOTE — PROGRESS NOTE ADULT - SUBJECTIVE AND OBJECTIVE BOX
Patient is a 71y old  Female who presents with a chief complaint of Altered mental status from catheter associated UTI (29 Dec 2021 12:12)      HPI:  70 yo Female PMH "heart dz", diabetes, asthma, CVA, seizure disorder, HTN, Dementia BIBA for change in mental status this AM. Family states patient has seemed more weak and tired than normal, requiring more assistance. Reports she began to look more swollen and her urinary output in milner decreased. Went to the doctor who replaced the chronic milner today. A large amount of dark urine came out. Pt this morning required noxious stimuli to wake up prompting ambulance call. As per family, patient is normally oriented x1, awake, alert and follows commands.     Admitted for likely catheter associated UTI. Milner was changed this AM.   (27 Dec 2021 17:59)      INTERVAL HPI/OVERNIGHT EVENTS:  MS better, now conversing a little.  The patient / nurse denies melena, hematochezia, hematemesis, nausea, vomiting, abdominal pain, constipation, diarrhea, or change in bowel movements     MEDICATIONS  (STANDING):  aspirin enteric coated 81 milliGRAM(s) Oral daily  cefTRIAXone   IVPB 1000 milliGRAM(s) IV Intermittent every 24 hours  dextrose 5% + lactated ringers. 1000 milliLiter(s) (70 mL/Hr) IV Continuous <Continuous>  heparin   Injectable 5000 Unit(s) SubCutaneous every 12 hours  hydrALAZINE Injectable 10 milliGRAM(s) IV Push three times a day  levETIRAcetam  IVPB 750 milliGRAM(s) IV Intermittent two times a day  metoprolol tartrate Injectable 5 milliGRAM(s) IV Push every 8 hours  potassium chloride  10 mEq/100 mL IVPB 10 milliEquivalent(s) IV Intermittent every 1 hour  simvastatin 20 milliGRAM(s) Oral at bedtime    MEDICATIONS  (PRN):  acetaminophen  Suppository .. 650 milliGRAM(s) Rectal every 6 hours PRN Temp greater or equal to 38C (100.4F), Mild Pain (1 - 3)      FAMILY HISTORY:  No pertinent family history in first degree relatives        Allergies    No Known Allergies    Intolerances        PMH/PSH:  Hypertension    Diabetes    Asthma    OA (osteoarthritis)    Gout    Seizure disorder    Urinary incontinence    Vision changes    CVA (cerebral infarction)    Kidney stone          REVIEW OF SYSTEMS:  CONSTITUTIONAL: No fever, weight loss,   EYES: No eye pain, visual disturbances, or discharge  ENMT:  No difficulty hearing, tinnitus, vertigo; No sinus or throat pain  NECK: No pain or stiffness  BREASTS: No pain, masses, or nipple discharge  RESPIRATORY: No cough, wheezing, chills or hemoptysis; No shortness of breath  CARDIOVASCULAR: No chest pain, palpitations, dizziness, or leg swelling  GASTROINTESTINAL: See above   GENITOURINARY: No dysuria, frequency, hematuria, or incontinence  NEUROLOGICAL: No headaches,  numbness, or tremors  SKIN: No itching, burning, rashes, or lesions   LYMPH NODES: No enlarged glands  ENDOCRINE: No heat or cold intolerance; No hair loss  MUSCULOSKELETAL: No joint pain or swelling; No muscle, back, or extremity pain  PSYCHIATRIC: No depression, anxiety, mood swings, or difficulty sleeping  HEME/LYMPH: No easy bruising, or bleeding gums  ALLERGY AND IMMUNOLOGIC: No hives or eczema    Vital Signs Last 24 Hrs  T(C): 37.1 (29 Dec 2021 11:10), Max: 37.6 (28 Dec 2021 23:55)  T(F): 98.8 (29 Dec 2021 11:10), Max: 99.6 (28 Dec 2021 23:55)  HR: 75 (29 Dec 2021 11:10) (75 - 94)  BP: 157/76 (29 Dec 2021 11:10) (146/74 - 171/78)  BP(mean): --  RR: 17 (29 Dec 2021 11:10) (16 - 18)  SpO2: 95% (29 Dec 2021 11:10) (95% - 99%)    PHYSICAL EXAM:  GENERAL: NAD, well-groomed, well-developed  HEAD:  Atraumatic, Normocephalic  EYES: EOMI, PERRLA, conjunctiva and sclera clear  NECK: Supple, No JVD, Normal thyroid  NERVOUS SYSTEM:  Much more alert this AM   CHEST/LUNG: Clear to percussion bilaterally; No rales, rhonchi, wheezing, or rubs  HEART: Regular rate and rhythm; No murmurs, rubs, or gallops  ABDOMEN: Soft, Nontender, Nondistended; Bowel sounds present  EXTREMITIES:  2+ Peripheral Pulses, No clubbing, cyanosis, or edema  LYMPH: No lymphadenopathy noted  SKIN: No rashes or lesions    LAB                          10.3   11.00 )-----------( 253      ( 29 Dec 2021 07:34 )             31.9       CBC:  12-29 @ 07:34  WBC 11.00   Hgb 10.3   Hct 31.9   Plts 253  MCV 88.4  12-27 @ 14:46  WBC 21.70   Hgb 13.2   Hct 40.6   Plts 300  MCV 87.1      Chemistry:  12-29 @ 07:34  Na+ 141  K+ 3.1  Cl- 111  CO2 24  BUN 20  Cr 1.52     12-28 @ 08:05  Na+ 147  K+ 3.0  Cl- 115  CO2 26  BUN 24  Cr 1.47     12-27 @ 14:43  Na+ 144  K+ 3.8  Cl- 112  CO2 23  BUN 29  Cr 1.78         Glucose, Serum: 157 mg/dL (12-29 @ 07:34)  Glucose, Serum: 161 mg/dL (12-28 @ 08:05)  Glucose, Serum: 241 mg/dL (12-27 @ 14:43)      29 Dec 2021 07:34    141    |  111    |  20     ----------------------------<  157    3.1     |  24     |  1.52   28 Dec 2021 08:05    147    |  115    |  24     ----------------------------<  161    3.0     |  26     |  1.47   27 Dec 2021 14:43    144    |  112    |  29     ----------------------------<  241    3.8     |  23     |  1.78     Ca    7.6        29 Dec 2021 07:34  Ca    8.6        28 Dec 2021 08:05  Ca    9.3        27 Dec 2021 14:43    TPro  8.7    /  Alb  2.9    /  TBili  0.8    /  DBili  x      /  AST  22     /  ALT  14     /  AlkPhos  99     27 Dec 2021 14:43      PT/INR - ( 27 Dec 2021 14:43 )   PT: 13.0 sec;   INR: 1.13 ratio         PTT - ( 27 Dec 2021 14:43 )  PTT:31.1 sec        CAPILLARY BLOOD GLUCOSE              RADIOLOGY & ADDITIONAL TESTS:    Imaging Personally Reviewed:  [ ] YES  [ ] NO    Consultant(s) Notes Reviewed:  [ ] YES  [ ] NO    Care Discussed with Consultants/Other Providers [ ] YES  [ ] NO

## 2021-12-29 NOTE — PROGRESS NOTE ADULT - SUBJECTIVE AND OBJECTIVE BOX
HPI:  72 yo Female PMH "heart dz", diabetes, asthma, CVA, seizure disorder, HTN, Dementia BIBA for change in mental status this AM. Family states patient has seemed more weak and tired than normal, requiring more assistance. Reports she began to look more swollen and her urinary output in milner decreased. Went to the doctor who replaced the chronic milner today. A large amount of dark urine came out. Pt this morning required noxious stimuli to wake up prompting ambulance call. As per family, patient is normally oriented x1, awake, alert and follows commands.     Admitted for likely catheter associated UTI. Milner was changed this AM.   (27 Dec 2021 17:59)      Allergies    No Known Allergies    Intolerances        MEDICATIONS  (STANDING):  aspirin enteric coated 81 milliGRAM(s) Oral daily  cefTRIAXone   IVPB 1000 milliGRAM(s) IV Intermittent every 24 hours  dextrose 5% + lactated ringers. 1000 milliLiter(s) (70 mL/Hr) IV Continuous <Continuous>  heparin   Injectable 5000 Unit(s) SubCutaneous every 12 hours  hydrALAZINE Injectable 10 milliGRAM(s) IV Push three times a day  levETIRAcetam  IVPB 750 milliGRAM(s) IV Intermittent two times a day  metoprolol tartrate Injectable 5 milliGRAM(s) IV Push every 8 hours  simvastatin 20 milliGRAM(s) Oral at bedtime    MEDICATIONS  (PRN):  acetaminophen  Suppository .. 650 milliGRAM(s) Rectal every 6 hours PRN Temp greater or equal to 38C (100.4F), Mild Pain (1 - 3)      REVIEW OF SYSTEMS:    CONSTITUTIONAL: No fever, chills, weight loss, or fatigue  HEENT: No sore throat, runny nose, ear ache  RESPIRATORY: No cough, wheezing, No shortness of breath  CARDIOVASCULAR: No chest pain, palpitations, dizziness  GASTROINTESTINAL: No abdominal pain. No nausea, vomiting, diarrhea  GENITOURINARY: No dysuria, increase frequency, hematuria, or incontinence  NEUROLOGICAL: No headaches, memory loss, loss of strength, numbness, or tremors, no weakness  EXTREMITY: No pedal edema BLE  SKIN: No itching, burning, rashes, or lesions     VITAL SIGNS:  T(C): 36.8 (12-29-21 @ 18:17), Max: 37.6 (12-28-21 @ 23:55)  T(F): 98.3 (12-29-21 @ 18:17), Max: 99.6 (12-28-21 @ 23:55)  HR: 92 (12-29-21 @ 18:17) (75 - 94)  BP: 167/75 (12-29-21 @ 18:17) (146/74 - 170/70)  RR: 18 (12-29-21 @ 18:17) (16 - 18)  SpO2: 99% (12-29-21 @ 18:17) (95% - 99%)  Wt(kg): --    PHYSICAL EXAM:    GENERAL: not in any distress  HEENT: Neck is supple, normocephalic, atraumatic   CHEST/LUNG: Clear to auscultation bilaterally; No rales, rhonchi, wheezing  HEART: Regular rate and rhythm; No murmurs, rubs, or gallops  ABDOMEN: Soft, Nontender, Nondistended; Bowel sounds present, no rebound   EXTREMITIES:  2+ Peripheral Pulses, No clubbing, cyanosis, or edema  GENITOURINARY:   SKIN: No rashes or lesions  BACK: no pressor sore   NERVOUS SYSTEM:  Alert & Oriented X3, Good concentration  PSYCH: normal affect     LABS:                         10.3   11.00 )-----------( 253      ( 29 Dec 2021 07:34 )             31.9     12-29    141  |  111<H>  |  20  ----------------------------<  157<H>  3.1<L>   |  24  |  1.52<H>    Ca    7.6<L>      29 Dec 2021 07:34                                Culture Results:   >100,000 CFU/ml Klebsiella pneumoniae (12-28 @ 00:54)  Culture Results:   No growth to date. (12-28 @ 00:38)  Culture Results:   No growth to date. (12-28 @ 00:38)                Radiology:       HPI:  70 yo Female PMH "heart dz", diabetes, asthma, CVA, seizure disorder, HTN, Dementia BIBA for change in mental status this AM. Family states patient has seemed more weak and tired than normal, requiring more assistance. Reports she began to look more swollen and her urinary output in milner decreased. Went to the doctor who replaced the chronic milner today. A large amount of dark urine came out. Pt this morning required noxious stimuli to wake up prompting ambulance call. As per family, patient is normally oriented x1, awake, alert and follows commands.     Admitted for likely catheter associated UTI. Milner was changed this AM.   (27 Dec 2021 17:59)      Allergies    No Known Allergies    Intolerances        MEDICATIONS  (STANDING):  aspirin enteric coated 81 milliGRAM(s) Oral daily  cefTRIAXone   IVPB 1000 milliGRAM(s) IV Intermittent every 24 hours  dextrose 5% + lactated ringers. 1000 milliLiter(s) (70 mL/Hr) IV Continuous <Continuous>  heparin   Injectable 5000 Unit(s) SubCutaneous every 12 hours  hydrALAZINE Injectable 10 milliGRAM(s) IV Push three times a day  levETIRAcetam  IVPB 750 milliGRAM(s) IV Intermittent two times a day  metoprolol tartrate Injectable 5 milliGRAM(s) IV Push every 8 hours  simvastatin 20 milliGRAM(s) Oral at bedtime    MEDICATIONS  (PRN):  acetaminophen  Suppository .. 650 milliGRAM(s) Rectal every 6 hours PRN Temp greater or equal to 38C (100.4F), Mild Pain (1 - 3)      REVIEW OF SYSTEMS:    unable to assess from patient     VITAL SIGNS:  T(C): 36.8 (12-29-21 @ 18:17), Max: 37.6 (12-28-21 @ 23:55)  T(F): 98.3 (12-29-21 @ 18:17), Max: 99.6 (12-28-21 @ 23:55)  HR: 92 (12-29-21 @ 18:17) (75 - 94)  BP: 167/75 (12-29-21 @ 18:17) (146/74 - 170/70)  RR: 18 (12-29-21 @ 18:17) (16 - 18)  SpO2: 99% (12-29-21 @ 18:17) (95% - 99%)  Wt(kg): --    PHYSICAL EXAM:    GENERAL: not in any distress  HEENT: Neck is supple, normocephalic, atraumatic   CHEST/LUNG: Clear to auscultation bilaterally; No rales, rhonchi, wheezing  HEART: Regular rate and rhythm; No murmurs, rubs, or gallops  ABDOMEN: Soft, Nontender, Nondistended; Bowel sounds present, no rebound   EXTREMITIES:  2+ Peripheral Pulses, No clubbing, cyanosis, or edema  SKIN: No rashes or lesions  BACK: no pressor sore   NERVOUS SYSTEM:  more alert but still not much verbalizing    LABS:                         10.3   11.00 )-----------( 253      ( 29 Dec 2021 07:34 )             31.9     12-29    141  |  111<H>  |  20  ----------------------------<  157<H>  3.1<L>   |  24  |  1.52<H>    Ca    7.6<L>      29 Dec 2021 07:34                                Culture Results:   >100,000 CFU/ml Klebsiella pneumoniae (12-28 @ 00:54)  Culture Results:   No growth to date. (12-28 @ 00:38)  Culture Results:   No growth to date. (12-28 @ 00:38)                Radiology:

## 2021-12-29 NOTE — DIETITIAN INITIAL EVALUATION ADULT. - MALNUTRITION
Severe malnutrition in the context of chronic illness Moderate malnutrition in the context of chronic illness

## 2021-12-29 NOTE — DIETITIAN INITIAL EVALUATION ADULT. - SIGNS/SYMPTOMS
Physical findings of severe muscle wasting and fat loss Physical findings of mild-moderate muscle wasting and fat loss

## 2021-12-29 NOTE — PROGRESS NOTE ADULT - ASSESSMENT
72 yo Female      h/o  DM,  asthma, CVA, seizure disorder, HTN, Dementia     BIBA for change in mental status      Family states patient has seemed more weak and tired than normal, requiring more assistance,  her urinary output in milner decreased.    Went to the doctor who replaced the chronic milner  , dark urine came out. then, required noxious stimuli to wake up prompting ambulance call.    As per family, patient is normally oriented . awake, alert and follows commands.    Admitted for likely catheter associated UTI. Milner was changed  on arrival     *  catheter associated UTI/ on arrival . has   chronoc  milner   has  seen urology in past   wbc  was  21,000  on arrival, now si 11,000  blood  c/s, negtaive and, urine  c/s, Klebsiella     IV Ceftriaxone  qd     *  AMADOU on arrival from dehydration with creatinine 1.78.   now  is  1.5  * . CXR clear, COVID negative  * . CT head notes old right frontal CVA.   pt  appears   to have with  dementia,  non verbal, appears   bed bound,  functional  quadriplegia  however, daughter   states,  that pt was  able to ambulate   with assistance at home/  and  was  verbal  *  Speech eval , initially  recommended  NPO . then on po feeds  *  HTN/ HLD      home  meds  Hydralazine, Norvasc, Toprol, Kepra, ASA, Clonidine and Zocur.   Stop Seraquel given sedation.   on iv bp meds  now,   as  pt  able to take po food  , but has trouble  with swallowing pills per nursing  discussed  with daughter  when pt improves, then will  switch to oral bp meds  *  c/c anemia, ,  seen by gi   *  DM, follow  fs  on dvt ppx    Hypokalemia, repleted       RAD< from: TTE Echo Complete w/o Contrast w/ Doppler (03.15.21 @ 19:56) >  Summary:   1. Left ventricular ejection fraction, by visual estimation, is 60 to 65%.   2. Normal global left ventricular systolic function.   3. Mildly enlarged left atrium.   4. Mild mitral valve regurgitation.   5. Mild-moderate tricuspid regurgitation.   6. Mild aortic regurgitation.   7. Sclerotic aortic valve with normal opening.   8. Mild pulmonic valve regurgitation.   9. Estimated pulmonary artery systolic pressure is 43.2 mmHg assuming a right atrial pressure of 5 mmHg, which is consistent with mild pulmonary hypertension  < end of copied text >         RAD

## 2021-12-29 NOTE — DIETITIAN INITIAL EVALUATION ADULT. - PERTINENT MEDS FT
MEDICATIONS  (STANDING):  aspirin enteric coated 81 milliGRAM(s) Oral daily  cefTRIAXone   IVPB 1000 milliGRAM(s) IV Intermittent every 24 hours  dextrose 5% + lactated ringers. 1000 milliLiter(s) (70 mL/Hr) IV Continuous <Continuous>  heparin   Injectable 5000 Unit(s) SubCutaneous every 12 hours  hydrALAZINE Injectable 10 milliGRAM(s) IV Push three times a day  levETIRAcetam  IVPB 750 milliGRAM(s) IV Intermittent two times a day  metoprolol tartrate Injectable 5 milliGRAM(s) IV Push every 8 hours  simvastatin 20 milliGRAM(s) Oral at bedtime    MEDICATIONS  (PRN):  acetaminophen  Suppository .. 650 milliGRAM(s) Rectal every 6 hours PRN Temp greater or equal to 38C (100.4F), Mild Pain (1 - 3)

## 2021-12-30 LAB
ANION GAP SERPL CALC-SCNC: 6 MMOL/L — SIGNIFICANT CHANGE UP (ref 5–17)
BUN SERPL-MCNC: 16 MG/DL — SIGNIFICANT CHANGE UP (ref 7–23)
CALCIUM SERPL-MCNC: 8.3 MG/DL — LOW (ref 8.5–10.1)
CHLORIDE SERPL-SCNC: 109 MMOL/L — HIGH (ref 96–108)
CO2 SERPL-SCNC: 25 MMOL/L — SIGNIFICANT CHANGE UP (ref 22–31)
CREAT SERPL-MCNC: 1.42 MG/DL — HIGH (ref 0.5–1.3)
CULTURE RESULTS: SIGNIFICANT CHANGE UP
GLUCOSE SERPL-MCNC: 167 MG/DL — HIGH (ref 70–99)
HCT VFR BLD CALC: 35.9 % — SIGNIFICANT CHANGE UP (ref 34.5–45)
HGB BLD-MCNC: 11.9 G/DL — SIGNIFICANT CHANGE UP (ref 11.5–15.5)
MAGNESIUM SERPL-MCNC: 2.5 MG/DL — SIGNIFICANT CHANGE UP (ref 1.6–2.6)
MCHC RBC-ENTMCNC: 28.8 PG — SIGNIFICANT CHANGE UP (ref 27–34)
MCHC RBC-ENTMCNC: 33.1 GM/DL — SIGNIFICANT CHANGE UP (ref 32–36)
MCV RBC AUTO: 86.9 FL — SIGNIFICANT CHANGE UP (ref 80–100)
NRBC # BLD: 0 /100 WBCS — SIGNIFICANT CHANGE UP (ref 0–0)
PHOSPHATE SERPL-MCNC: 2.8 MG/DL — SIGNIFICANT CHANGE UP (ref 2.5–4.5)
PLATELET # BLD AUTO: 266 K/UL — SIGNIFICANT CHANGE UP (ref 150–400)
POTASSIUM SERPL-MCNC: 3.2 MMOL/L — LOW (ref 3.5–5.3)
POTASSIUM SERPL-SCNC: 3.2 MMOL/L — LOW (ref 3.5–5.3)
RBC # BLD: 4.13 M/UL — SIGNIFICANT CHANGE UP (ref 3.8–5.2)
RBC # FLD: 12.8 % — SIGNIFICANT CHANGE UP (ref 10.3–14.5)
SODIUM SERPL-SCNC: 140 MMOL/L — SIGNIFICANT CHANGE UP (ref 135–145)
SPECIMEN SOURCE: SIGNIFICANT CHANGE UP
WBC # BLD: 7.13 K/UL — SIGNIFICANT CHANGE UP (ref 3.8–10.5)
WBC # FLD AUTO: 7.13 K/UL — SIGNIFICANT CHANGE UP (ref 3.8–10.5)

## 2021-12-30 PROCEDURE — 99232 SBSQ HOSP IP/OBS MODERATE 35: CPT

## 2021-12-30 RX ORDER — HYDRALAZINE HCL 50 MG
50 TABLET ORAL EVERY 8 HOURS
Refills: 0 | Status: DISCONTINUED | OUTPATIENT
Start: 2021-12-30 | End: 2022-01-04

## 2021-12-30 RX ORDER — POTASSIUM CHLORIDE 20 MEQ
10 PACKET (EA) ORAL
Refills: 0 | Status: COMPLETED | OUTPATIENT
Start: 2021-12-30 | End: 2021-12-30

## 2021-12-30 RX ORDER — ACETAMINOPHEN 500 MG
650 TABLET ORAL EVERY 6 HOURS
Refills: 0 | Status: DISCONTINUED | OUTPATIENT
Start: 2021-12-30 | End: 2022-01-05

## 2021-12-30 RX ORDER — LEVETIRACETAM 250 MG/1
750 TABLET, FILM COATED ORAL
Refills: 0 | Status: DISCONTINUED | OUTPATIENT
Start: 2021-12-30 | End: 2022-01-05

## 2021-12-30 RX ORDER — METOPROLOL TARTRATE 50 MG
25 TABLET ORAL
Refills: 0 | Status: DISCONTINUED | OUTPATIENT
Start: 2021-12-30 | End: 2022-01-04

## 2021-12-30 RX ADMIN — Medication 10 MILLIGRAM(S): at 05:47

## 2021-12-30 RX ADMIN — SIMVASTATIN 20 MILLIGRAM(S): 20 TABLET, FILM COATED ORAL at 21:59

## 2021-12-30 RX ADMIN — Medication 100 MILLIEQUIVALENT(S): at 17:21

## 2021-12-30 RX ADMIN — Medication 0.1 MILLIGRAM(S): at 14:34

## 2021-12-30 RX ADMIN — LEVETIRACETAM 750 MILLIGRAM(S): 250 TABLET, FILM COATED ORAL at 17:39

## 2021-12-30 RX ADMIN — HEPARIN SODIUM 5000 UNIT(S): 5000 INJECTION INTRAVENOUS; SUBCUTANEOUS at 05:47

## 2021-12-30 RX ADMIN — HEPARIN SODIUM 5000 UNIT(S): 5000 INJECTION INTRAVENOUS; SUBCUTANEOUS at 17:39

## 2021-12-30 RX ADMIN — Medication 50 MILLIGRAM(S): at 21:59

## 2021-12-30 RX ADMIN — Medication 100 MILLIEQUIVALENT(S): at 17:20

## 2021-12-30 RX ADMIN — Medication 5 MILLIGRAM(S): at 05:47

## 2021-12-30 RX ADMIN — Medication 50 MILLIGRAM(S): at 14:35

## 2021-12-30 RX ADMIN — Medication 81 MILLIGRAM(S): at 14:34

## 2021-12-30 RX ADMIN — Medication 100 MILLIEQUIVALENT(S): at 14:37

## 2021-12-30 RX ADMIN — LEVETIRACETAM 400 MILLIGRAM(S): 250 TABLET, FILM COATED ORAL at 05:48

## 2021-12-30 RX ADMIN — Medication 25 MILLIGRAM(S): at 17:39

## 2021-12-30 RX ADMIN — Medication 0.1 MILLIGRAM(S): at 21:59

## 2021-12-30 NOTE — PROGRESS NOTE ADULT - ASSESSMENT
HPI:  70 yo Female PMH "heart dz", diabetes, asthma, CVA, seizure disorder, HTN, Dementia BIBA for change in mental status this AM. Family states patient has seemed more weak and tired than normal, requiring more assistance. Reports she began to look more swollen and her urinary output in milner decreased. Went to the doctor who replaced the chronic milner today. A large amount of dark urine came out. Pt this morning required noxious stimuli to wake up prompting ambulance call. As per family, patient is normally oriented x1, awake, alert and follows commands.     Admitted for likely catheter associated UTI. Milner was changed this AM.   (27 Dec 2021 17:59)  -- As Above -----  Patient can not communicate. History obtained from sister, chart  The patient presented with a change in MS. The etiology is probably sepsis. Patient is refusing to eat.   The patient does not have any GI history. Last colonoscopy was a while ago.  Sister denies all GI surgeries  The nurse denies melena, hematochezia, hematemesis, nausea, vomiting, abdominal pain, constipation, diarrhea, or change in bowel movements     Poor PO intake - Patient has a history of dementia worsened with sepsis- Patient would benefit with enteral feedings, NGT if necessary. Discussed with sister ( [phone] ) who referred me to the patient's daughter ( Gali [phone] ). Message left. Possible PEG tube if no improvement.   === Will follow.     [id number]  ---------  Patient passed swallow evaluation by speech therapist. Recommended puree with mildly thickened liquids. MS improving. Tolerated all of breakfast this AM  ==== Spoke with sister and daughter  ==== Patient doing well fro GI point of view. Will follow on a PRN basis.

## 2021-12-30 NOTE — PHYSICAL THERAPY INITIAL EVALUATION ADULT - ADDITIONAL COMMENTS
Per daughter Michelle: Pt lives in a 3 family home and resides on  the second floor with her sister Michelle living below her. There are 7 entry steps with bilateral rails and 7-8 steps with B rails inside to get to second floor. Expresses that pt was unable to do stairs and was carried up/down stairs if needed to leave home. Prior to admission, patient was a one person assist with rolling walker for mobility. Owns a wheel chair. Has a live in [facility] 24 hours a day 7 days a week that assists with functional mobility and ADLs as needed. Per daughter Gali: Pt lives in a 3 family home and resides on  the second floor with her sister Michelle living below her. There are 7 entry steps with bilateral rails and 7-8 steps with B rails inside to get to second floor. Expresses that pt was unable to do stairs and was carried up/down stairs if needed to leave home. Prior to admission, patient was a one person assist with rolling walker for mobility. Owns a wheel chair. Has a live in [facility] 24 hours a day 7 days a week that assists with functional mobility and ADLs as needed.

## 2021-12-30 NOTE — PHYSICAL THERAPY INITIAL EVALUATION ADULT - TRANSFER TRAINING, PT EVAL
Patient will be able to perform sit to stand transfer with rolling walker with min A x1 in 3-4 weeks.

## 2021-12-30 NOTE — PHYSICAL THERAPY INITIAL EVALUATION ADULT - PERTINENT HX OF CURRENT PROBLEM, REHAB EVAL
Patient presents with weakness, milner catheter assocaited UTI, and septic. PMH includes seizures, frontal lobe CVA, gout, OA, DM, HTN, asthma, and urinary incontinence.

## 2021-12-30 NOTE — PROGRESS NOTE ADULT - SUBJECTIVE AND OBJECTIVE BOX
Patient is a 71y old  Female who presents with a chief complaint of Altered mental status from catheter associated UTI (30 Dec 2021 13:43)      HPI:  72 yo Female PMH "heart dz", diabetes, asthma, CVA, seizure disorder, HTN, Dementia BIBA for change in mental status this AM. Family states patient has seemed more weak and tired than normal, requiring more assistance. Reports she began to look more swollen and her urinary output in milner decreased. Went to the doctor who replaced the chronic milner today. A large amount of dark urine came out. Pt this morning required noxious stimuli to wake up prompting ambulance call. As per family, patient is normally oriented x1, awake, alert and follows commands.     Admitted for likely catheter associated UTI. Milner was changed this AM.   (27 Dec 2021 17:59)      INTERVAL HPI/OVERNIGHT EVENTS:    MEDICATIONS  (STANDING):  aspirin enteric coated 81 milliGRAM(s) Oral daily  cefTRIAXone   IVPB 1000 milliGRAM(s) IV Intermittent every 24 hours  cloNIDine 0.1 milliGRAM(s) Oral three times a day  dextrose 5% + lactated ringers. 1000 milliLiter(s) (70 mL/Hr) IV Continuous <Continuous>  heparin   Injectable 5000 Unit(s) SubCutaneous every 12 hours  hydrALAZINE 50 milliGRAM(s) Oral every 8 hours  levETIRAcetam 750 milliGRAM(s) Oral two times a day  metoprolol tartrate 25 milliGRAM(s) Oral two times a day  potassium chloride  10 mEq/100 mL IVPB 10 milliEquivalent(s) IV Intermittent every 1 hour  simvastatin 20 milliGRAM(s) Oral at bedtime    MEDICATIONS  (PRN):  acetaminophen     Tablet .. 650 milliGRAM(s) Oral every 6 hours PRN Temp greater or equal to 38C (100.4F), Mild Pain (1 - 3)      FAMILY HISTORY:  No pertinent family history in first degree relatives        Allergies    No Known Allergies    Intolerances        PMH/PSH:  Hypertension    Diabetes    Asthma    OA (osteoarthritis)    Gout    Seizure disorder    Urinary incontinence    Vision changes    CVA (cerebral infarction)    Kidney stone          REVIEW OF SYSTEMS:  CONSTITUTIONAL: No fever, weight loss, or fatigue  EYES: No eye pain, visual disturbances, or discharge  ENMT:  No difficulty hearing, tinnitus, vertigo; No sinus or throat pain  NECK: No pain or stiffness  BREASTS: No pain, masses, or nipple discharge  RESPIRATORY: No cough, wheezing, chills or hemoptysis; No shortness of breath  CARDIOVASCULAR: No chest pain, palpitations, dizziness, or leg swelling  GASTROINTESTINAL: No abdominal or epigastric pain. No nausea, vomiting, or hematemesis; No diarrhea or constipation. No melena or hematochezia.  GENITOURINARY: No dysuria, frequency, hematuria, or incontinence  NEUROLOGICAL: No headaches, memory loss, loss of strength, numbness, or tremors  SKIN: No itching, burning, rashes, or lesions   LYMPH NODES: No enlarged glands  ENDOCRINE: No heat or cold intolerance; No hair loss  MUSCULOSKELETAL: No joint pain or swelling; No muscle, back, or extremity pain  PSYCHIATRIC: No depression, anxiety, mood swings, or difficulty sleeping  HEME/LYMPH: No easy bruising, or bleeding gums  ALLERGY AND IMMUNOLOGIC: No hives or eczema    Vital Signs Last 24 Hrs  T(C): 36.8 (30 Dec 2021 13:07), Max: 37.8 (30 Dec 2021 05:09)  T(F): 98.3 (30 Dec 2021 13:07), Max: 100.1 (30 Dec 2021 05:09)  HR: 73 (30 Dec 2021 13:07) (73 - 93)  BP: 177/72 (30 Dec 2021 13:07) (123/65 - 177/72)  BP(mean): --  RR: 19 (30 Dec 2021 13:07) (16 - 19)  SpO2: 98% (30 Dec 2021 13:07) (98% - 99%)    PHYSICAL EXAM:  GENERAL: NAD, well-groomed, well-developed  HEAD:  Atraumatic, Normocephalic  EYES: EOMI, PERRLA, conjunctiva and sclera clear  ENMT: No tonsillar erythema, exudates, or enlargement; Moist mucous membranes, Good dentition, No lesions  NECK: Supple, No JVD, Normal thyroid  NERVOUS SYSTEM:  Alert & Oriented X3, Good concentration; Motor Strength 5/5 B/L upper and lower extremities; DTRs 2+ intact and symmetric  CHEST/LUNG: Clear to percussion bilaterally; No rales, rhonchi, wheezing, or rubs  HEART: Regular rate and rhythm; No murmurs, rubs, or gallops  ABDOMEN: Soft, Nontender, Nondistended; Bowel sounds present  EXTREMITIES:  2+ Peripheral Pulses, No clubbing, cyanosis, or edema  LYMPH: No lymphadenopathy noted  SKIN: No rashes or lesions    LAB                          11.9   7.13  )-----------( 266      ( 30 Dec 2021 08:26 )             35.9       CBC:  12-30 @ 08:26  WBC 7.13   Hgb 11.9   Hct 35.9   Plts 266  MCV 86.9  12-29 @ 07:34  WBC 11.00   Hgb 10.3   Hct 31.9   Plts 253  MCV 88.4  12-27 @ 14:46  WBC 21.70   Hgb 13.2   Hct 40.6   Plts 300  MCV 87.1      Chemistry:  12-30 @ 08:26  Na+ 140  K+ 3.2  Cl- 109  CO2 25  BUN 16  Cr 1.42     12-29 @ 07:34  Na+ 141  K+ 3.1  Cl- 111  CO2 24  BUN 20  Cr 1.52     12-28 @ 08:05  Na+ 147  K+ 3.0  Cl- 115  CO2 26  BUN 24  Cr 1.47     12-27 @ 14:43  Na+ 144  K+ 3.8  Cl- 112  CO2 23  BUN 29  Cr 1.78         Glucose, Serum: 167 mg/dL (12-30 @ 08:26)  Glucose, Serum: 157 mg/dL (12-29 @ 07:34)  Glucose, Serum: 161 mg/dL (12-28 @ 08:05)  Glucose, Serum: 241 mg/dL (12-27 @ 14:43)      30 Dec 2021 08:26    140    |  109    |  16     ----------------------------<  167    3.2     |  25     |  1.42   29 Dec 2021 07:34    141    |  111    |  20     ----------------------------<  157    3.1     |  24     |  1.52   28 Dec 2021 08:05    147    |  115    |  24     ----------------------------<  161    3.0     |  26     |  1.47   27 Dec 2021 14:43    144    |  112    |  29     ----------------------------<  241    3.8     |  23     |  1.78     Ca    8.3        30 Dec 2021 08:26  Ca    7.6        29 Dec 2021 07:34  Ca    8.6        28 Dec 2021 08:05  Ca    9.3        27 Dec 2021 14:43  Phos  2.8       30 Dec 2021 08:26  Mg     2.5       30 Dec 2021 08:26    TPro  8.7    /  Alb  2.9    /  TBili  0.8    /  DBili  x      /  AST  22     /  ALT  14     /  AlkPhos  99     27 Dec 2021 14:43              CAPILLARY BLOOD GLUCOSE              RADIOLOGY & ADDITIONAL TESTS:    Imaging Personally Reviewed:  [ ] YES  [ ] NO    Consultant(s) Notes Reviewed:  [ ] YES  [ ] NO    Care Discussed with Consultants/Other Providers [ ] YES  [ ] NO Patient is a 71y old  Female who presents with a chief complaint of Altered mental status from catheter associated UTI (30 Dec 2021 13:43)      HPI:  70 yo Female PMH "heart dz", diabetes, asthma, CVA, seizure disorder, HTN, Dementia BIBA for change in mental status this AM. Family states patient has seemed more weak and tired than normal, requiring more assistance. Reports she began to look more swollen and her urinary output in milner decreased. Went to the doctor who replaced the chronic milner today. A large amount of dark urine came out. Pt this morning required noxious stimuli to wake up prompting ambulance call. As per family, patient is normally oriented x1, awake, alert and follows commands.     Admitted for likely catheter associated UTI. Milner was changed this AM.   (27 Dec 2021 17:59)      INTERVAL HPI/OVERNIGHT EVENTS:  MS better. Patient passed speech evaluation. Ate all of her breakfast.     MEDICATIONS  (STANDING):  aspirin enteric coated 81 milliGRAM(s) Oral daily  cefTRIAXone   IVPB 1000 milliGRAM(s) IV Intermittent every 24 hours  cloNIDine 0.1 milliGRAM(s) Oral three times a day  dextrose 5% + lactated ringers. 1000 milliLiter(s) (70 mL/Hr) IV Continuous <Continuous>  heparin   Injectable 5000 Unit(s) SubCutaneous every 12 hours  hydrALAZINE 50 milliGRAM(s) Oral every 8 hours  levETIRAcetam 750 milliGRAM(s) Oral two times a day  metoprolol tartrate 25 milliGRAM(s) Oral two times a day  potassium chloride  10 mEq/100 mL IVPB 10 milliEquivalent(s) IV Intermittent every 1 hour  simvastatin 20 milliGRAM(s) Oral at bedtime    MEDICATIONS  (PRN):  acetaminophen     Tablet .. 650 milliGRAM(s) Oral every 6 hours PRN Temp greater or equal to 38C (100.4F), Mild Pain (1 - 3)      FAMILY HISTORY:  No pertinent family history in first degree relatives        Allergies    No Known Allergies    Intolerances        PMH/PSH:  Hypertension    Diabetes    Asthma    OA (osteoarthritis)    Gout    Seizure disorder    Urinary incontinence    Vision changes    CVA (cerebral infarction)    Kidney stone          REVIEW OF SYSTEMS:  CONSTITUTIONAL: No fever, weight loss,  EYES: No eye pain, visual disturbances, or discharge  ENMT:  No difficulty hearing, tinnitus, vertigo; No sinus or throat pain  NECK: No pain or stiffness  BREASTS: No pain, masses, or nipple discharge  RESPIRATORY: No cough, wheezing, chills or hemoptysis; No shortness of breath  CARDIOVASCULAR: No chest pain, palpitations, dizziness, or leg swelling  GASTROINTESTINAL: See above   GENITOURINARY: No dysuria, frequency, hematuria, or incontinence  NEUROLOGICAL: No headaches, numbness, or tremors  SKIN: No itching, burning, rashes, or lesions   LYMPH NODES: No enlarged glands  ENDOCRINE: No heat or cold intolerance; No hair loss  MUSCULOSKELETAL: No joint pain or swelling; No muscle, back, or extremity pain  PSYCHIATRIC: No depression, anxiety, mood swings, or difficulty sleeping  HEME/LYMPH: No easy bruising, or bleeding gums  ALLERGY AND IMMUNOLOGIC: No hives or eczema    Vital Signs Last 24 Hrs  T(C): 36.8 (30 Dec 2021 13:07), Max: 37.8 (30 Dec 2021 05:09)  T(F): 98.3 (30 Dec 2021 13:07), Max: 100.1 (30 Dec 2021 05:09)  HR: 73 (30 Dec 2021 13:07) (73 - 93)  BP: 177/72 (30 Dec 2021 13:07) (123/65 - 177/72)  BP(mean): --  RR: 19 (30 Dec 2021 13:07) (16 - 19)  SpO2: 98% (30 Dec 2021 13:07) (98% - 99%)    PHYSICAL EXAM:  GENERAL: NAD, well-groomed, well-developed  HEAD:  Atraumatic, Normocephalic  EYES: EOMI, PERRLA, conjunctiva and sclera clear  NECK: Supple, No JVD, Normal thyroid  NERVOUS SYSTEM: MS better  CHEST/LUNG: Clear to percussion bilaterally; No rales, rhonchi, wheezing, or rubs  HEART: Regular rate and rhythm; No murmurs, rubs, or gallops  ABDOMEN: Soft, Nontender, Nondistended; Bowel sounds present  EXTREMITIES:  2+ Peripheral Pulses, No clubbing, cyanosis, or edema  LYMPH: No lymphadenopathy noted  SKIN: No rashes or lesions    LAB                          11.9   7.13  )-----------( 266      ( 30 Dec 2021 08:26 )             35.9       CBC:  12-30 @ 08:26  WBC 7.13   Hgb 11.9   Hct 35.9   Plts 266  MCV 86.9  12-29 @ 07:34  WBC 11.00   Hgb 10.3   Hct 31.9   Plts 253  MCV 88.4  12-27 @ 14:46  WBC 21.70   Hgb 13.2   Hct 40.6   Plts 300  MCV 87.1      Chemistry:  12-30 @ 08:26  Na+ 140  K+ 3.2  Cl- 109  CO2 25  BUN 16  Cr 1.42     12-29 @ 07:34  Na+ 141  K+ 3.1  Cl- 111  CO2 24  BUN 20  Cr 1.52     12-28 @ 08:05  Na+ 147  K+ 3.0  Cl- 115  CO2 26  BUN 24  Cr 1.47     12-27 @ 14:43  Na+ 144  K+ 3.8  Cl- 112  CO2 23  BUN 29  Cr 1.78         Glucose, Serum: 167 mg/dL (12-30 @ 08:26)  Glucose, Serum: 157 mg/dL (12-29 @ 07:34)  Glucose, Serum: 161 mg/dL (12-28 @ 08:05)  Glucose, Serum: 241 mg/dL (12-27 @ 14:43)      30 Dec 2021 08:26    140    |  109    |  16     ----------------------------<  167    3.2     |  25     |  1.42   29 Dec 2021 07:34    141    |  111    |  20     ----------------------------<  157    3.1     |  24     |  1.52   28 Dec 2021 08:05    147    |  115    |  24     ----------------------------<  161    3.0     |  26     |  1.47   27 Dec 2021 14:43    144    |  112    |  29     ----------------------------<  241    3.8     |  23     |  1.78     Ca    8.3        30 Dec 2021 08:26  Ca    7.6        29 Dec 2021 07:34  Ca    8.6        28 Dec 2021 08:05  Ca    9.3        27 Dec 2021 14:43  Phos  2.8       30 Dec 2021 08:26  Mg     2.5       30 Dec 2021 08:26    TPro  8.7    /  Alb  2.9    /  TBili  0.8    /  DBili  x      /  AST  22     /  ALT  14     /  AlkPhos  99     27 Dec 2021 14:43              CAPILLARY BLOOD GLUCOSE              RADIOLOGY & ADDITIONAL TESTS:    Imaging Personally Reviewed:  [ ] YES  [ ] NO    Consultant(s) Notes Reviewed:  [ ] YES  [ ] NO    Care Discussed with Consultants/Other Providers [ ] YES  [ ] NO

## 2021-12-30 NOTE — PROGRESS NOTE ADULT - ASSESSMENT
72 yo Female   with  h/o  DM,  asthma, CVA, seizure disorder, HTN, Dementia     BIBA for change in mental status      Family states patient has seemed more weak and tired than normal, requiring more assistance,  her urinary output in milner decreased.    Went to the doctor who replaced the chronic milner  , dark urine came out. then, required noxious stimuli to wake up prompting ambulance call.    As per family, patient is normally oriented . awake, alert and follows commands.    Admitted for likely catheter associated UTI. Milner was changed  on arrival  await culture > 3 org   has responded  discharge plan on po vantin x 4 more days

## 2021-12-30 NOTE — PROGRESS NOTE ADULT - SUBJECTIVE AND OBJECTIVE BOX
HPI:  72 yo Female PMH "heart dz", diabetes, asthma, CVA, seizure disorder, HTN, Dementia BIBA for change in mental status this AM. Family states patient has seemed more weak and tired than normal, requiring more assistance. Reports she began to look more swollen and her urinary output in milner decreased. Went to the doctor who replaced the chronic milner today. A large amount of dark urine came out. Pt this morning required noxious stimuli to wake up prompting ambulance call. As per family, patient is normally oriented x1, awake, alert and follows commands.     Admitted for likely catheter associated UTI. Milner was changed this AM.   (27 Dec 2021 17:59)    Patient is a 71y old  Female who presents with a chief complaint of Altered mental status from catheter associated UTI (29 Dec 2021 18:54)      INTERVAL HPI/OVERNIGHT EVENTS:    MEDICATIONS  (STANDING):  aspirin enteric coated 81 milliGRAM(s) Oral daily  cefTRIAXone   IVPB 1000 milliGRAM(s) IV Intermittent every 24 hours  dextrose 5% + lactated ringers. 1000 milliLiter(s) (70 mL/Hr) IV Continuous <Continuous>  heparin   Injectable 5000 Unit(s) SubCutaneous every 12 hours  hydrALAZINE 50 milliGRAM(s) Oral every 8 hours  levETIRAcetam 750 milliGRAM(s) Oral two times a day  metoprolol tartrate 25 milliGRAM(s) Oral two times a day  potassium chloride  10 mEq/100 mL IVPB 10 milliEquivalent(s) IV Intermittent every 1 hour  simvastatin 20 milliGRAM(s) Oral at bedtime    MEDICATIONS  (PRN):  acetaminophen     Tablet .. 650 milliGRAM(s) Oral every 6 hours PRN Temp greater or equal to 38C (100.4F), Mild Pain (1 - 3)      Allergies    No Known Allergies    Intolerances        REVIEW OF SYSTEMS:  awake alert unable to accurately review systems     Vital Signs Last 24 Hrs  T(C): 36.8 (30 Dec 2021 13:07), Max: 37.8 (30 Dec 2021 05:09)  T(F): 98.3 (30 Dec 2021 13:07), Max: 100.1 (30 Dec 2021 05:09)  HR: 73 (30 Dec 2021 13:07) (73 - 93)  BP: 177/72 (30 Dec 2021 13:07) (123/65 - 177/72)  BP(mean): --  RR: 19 (30 Dec 2021 13:07) (16 - 19)  SpO2: 98% (30 Dec 2021 13:07) (98% - 99%)    PHYSICAL EXAM:  GENERAL: NAD, well-groomed, well-developed  HEAD:  Atraumatic, Normocephalic  EYES: EOMI, PERRLA, conjunctiva and sclera clear  ENMT: No tonsillar erythema, exudates, or enlargement; Moist mucous membranes, Good dentition, No lesions  NECK: Supple, No JVD, Normal thyroid  NERVOUS SYSTEM:  Alert & awake   CHEST/LUNG: Clear to percussion bilaterally; No rales, rhonchi, wheezing, or rubs  HEART: Regular rate and rhythm; No murmurs, rubs, or gallops  ABDOMEN: Soft, Nontender, Nondistended; Bowel sounds present milner catheter  EXTREMITIES:  2+ Peripheral Pulses, No clubbing, cyanosis, or edema  LYMPH: No lymphadenopathy noted      LABS:                        11.9   7.13  )-----------( 266      ( 30 Dec 2021 08:26 )             35.9     12-30    140  |  109<H>  |  16  ----------------------------<  167<H>  3.2<L>   |  25  |  1.42<H>    Ca    8.3<L>      30 Dec 2021 08:26  Phos  2.8     12-30  Mg     2.5     12-30          CAPILLARY BLOOD GLUCOSE          RADIOLOGY & ADDITIONAL TESTS:    Imaging Personally Reviewed:  [ ] YES  [ ] NO    Consultant(s) Notes Reviewed:  [ ] YES  [ ] NO    Care Discussed with Consultants/Other Providers [ ] YES  [ ] NO

## 2021-12-30 NOTE — PROGRESS NOTE ADULT - SUBJECTIVE AND OBJECTIVE BOX
HPI:  70 yo Female PMH "heart dz", diabetes, asthma, CVA, seizure disorder, HTN, Dementia BIBA for change in mental status this AM. Family states patient has seemed more weak and tired than normal, requiring more assistance. Reports she began to look more swollen and her urinary output in milner decreased. Went to the doctor who replaced the chronic milner today. A large amount of dark urine came out. Pt this morning required noxious stimuli to wake up prompting ambulance call. As per family, patient is normally oriented x1, awake, alert and follows commands.     Admitted for likely catheter associated UTI. Milner was changed this AM.   (27 Dec 2021 17:59)  all events noted   gi noted    Allergies    No Known Allergies    Intolerances        MEDICATIONS  (STANDING):  aspirin enteric coated 81 milliGRAM(s) Oral daily  cefTRIAXone   IVPB 1000 milliGRAM(s) IV Intermittent every 24 hours  cloNIDine 0.1 milliGRAM(s) Oral three times a day  dextrose 5% + lactated ringers. 1000 milliLiter(s) (70 mL/Hr) IV Continuous <Continuous>  heparin   Injectable 5000 Unit(s) SubCutaneous every 12 hours  hydrALAZINE 50 milliGRAM(s) Oral every 8 hours  levETIRAcetam 750 milliGRAM(s) Oral two times a day  metoprolol tartrate 25 milliGRAM(s) Oral two times a day  simvastatin 20 milliGRAM(s) Oral at bedtime    MEDICATIONS  (PRN):  acetaminophen     Tablet .. 650 milliGRAM(s) Oral every 6 hours PRN Temp greater or equal to 38C (100.4F), Mild Pain (1 - 3)      REVIEW OF SYSTEMS:    unable to assess     VITAL SIGNS:  T(C): 36.6 (12-30-21 @ 16:41), Max: 37.8 (12-30-21 @ 05:09)  T(F): 97.9 (12-30-21 @ 16:41), Max: 100.1 (12-30-21 @ 05:09)  HR: 76 (12-30-21 @ 16:41) (73 - 95)  BP: 143/74 (12-30-21 @ 16:41) (123/65 - 177/72)  RR: 18 (12-30-21 @ 16:41) (16 - 19)  SpO2: 97% (12-30-21 @ 16:41) (97% - 98%)  Wt(kg): --    PHYSICAL EXAM:    GENERAL: not in any distress  HEENT: Neck is supple, normocephalic, atraumatic   CHEST/LUNG: Clear to auscultation bilaterally; No rales, rhonchi, wheezing  HEART: Regular rate and rhythm; No murmurs, rubs, or gallops  ABDOMEN: Soft, Nontender, Nondistended; Bowel sounds present, no rebound   EXTREMITIES:  2+ Peripheral Pulses, No clubbing, cyanosis, or edema  GENITOURINARY: milner   SKIN: No rashes or lesions  BACK: no pressor sore   NERVOUS SYSTEM  alert confused  PSYCH: normal affect     LABS:                         11.9   7.13  )-----------( 266      ( 30 Dec 2021 08:26 )             35.9     12-30    140  |  109<H>  |  16  ----------------------------<  167<H>  3.2<L>   |  25  |  1.42<H>    Ca    8.3<L>      30 Dec 2021 08:26  Phos  2.8     12-30  Mg     2.5     12-30                                Culture Results:   >=3 organisms. Probable collection contamination. (12-28 @ 00:54)  Culture Results:   No growth to date. (12-28 @ 00:38)  Culture Results:   No growth to date. (12-28 @ 00:38)                Radiology:

## 2021-12-30 NOTE — PHYSICAL THERAPY INITIAL EVALUATION ADULT - BALANCE TRAINING, PT EVAL
Patient will improve static/dynamic seated balance by one level in 2-3 weeks in order to improve functional mobility.

## 2021-12-31 LAB
ALBUMIN SERPL ELPH-MCNC: 2.1 G/DL — LOW (ref 3.3–5)
ALP SERPL-CCNC: 74 U/L — SIGNIFICANT CHANGE UP (ref 40–120)
ALT FLD-CCNC: 12 U/L — SIGNIFICANT CHANGE UP (ref 12–78)
ANION GAP SERPL CALC-SCNC: 8 MMOL/L — SIGNIFICANT CHANGE UP (ref 5–17)
AST SERPL-CCNC: 24 U/L — SIGNIFICANT CHANGE UP (ref 15–37)
BILIRUB SERPL-MCNC: 0.5 MG/DL — SIGNIFICANT CHANGE UP (ref 0.2–1.2)
BUN SERPL-MCNC: 11 MG/DL — SIGNIFICANT CHANGE UP (ref 7–23)
CALCIUM SERPL-MCNC: 8.3 MG/DL — LOW (ref 8.5–10.1)
CHLORIDE SERPL-SCNC: 107 MMOL/L — SIGNIFICANT CHANGE UP (ref 96–108)
CO2 SERPL-SCNC: 25 MMOL/L — SIGNIFICANT CHANGE UP (ref 22–31)
CREAT SERPL-MCNC: 1.37 MG/DL — HIGH (ref 0.5–1.3)
GLUCOSE BLDC GLUCOMTR-MCNC: 165 MG/DL — HIGH (ref 70–99)
GLUCOSE BLDC GLUCOMTR-MCNC: 172 MG/DL — HIGH (ref 70–99)
GLUCOSE SERPL-MCNC: 172 MG/DL — HIGH (ref 70–99)
HCT VFR BLD CALC: 36.6 % — SIGNIFICANT CHANGE UP (ref 34.5–45)
HGB BLD-MCNC: 11.9 G/DL — SIGNIFICANT CHANGE UP (ref 11.5–15.5)
MAGNESIUM SERPL-MCNC: 2.4 MG/DL — SIGNIFICANT CHANGE UP (ref 1.6–2.6)
MCHC RBC-ENTMCNC: 28.1 PG — SIGNIFICANT CHANGE UP (ref 27–34)
MCHC RBC-ENTMCNC: 32.5 GM/DL — SIGNIFICANT CHANGE UP (ref 32–36)
MCV RBC AUTO: 86.3 FL — SIGNIFICANT CHANGE UP (ref 80–100)
NRBC # BLD: 0 /100 WBCS — SIGNIFICANT CHANGE UP (ref 0–0)
PHOSPHATE SERPL-MCNC: 2.8 MG/DL — SIGNIFICANT CHANGE UP (ref 2.5–4.5)
PLATELET # BLD AUTO: 265 K/UL — SIGNIFICANT CHANGE UP (ref 150–400)
POTASSIUM SERPL-MCNC: 3.4 MMOL/L — LOW (ref 3.5–5.3)
POTASSIUM SERPL-SCNC: 3.4 MMOL/L — LOW (ref 3.5–5.3)
PROT SERPL-MCNC: 7 GM/DL — SIGNIFICANT CHANGE UP (ref 6–8.3)
RBC # BLD: 4.24 M/UL — SIGNIFICANT CHANGE UP (ref 3.8–5.2)
RBC # FLD: 12.7 % — SIGNIFICANT CHANGE UP (ref 10.3–14.5)
SODIUM SERPL-SCNC: 140 MMOL/L — SIGNIFICANT CHANGE UP (ref 135–145)
WBC # BLD: 5.68 K/UL — SIGNIFICANT CHANGE UP (ref 3.8–10.5)
WBC # FLD AUTO: 5.68 K/UL — SIGNIFICANT CHANGE UP (ref 3.8–10.5)

## 2021-12-31 PROCEDURE — 99232 SBSQ HOSP IP/OBS MODERATE 35: CPT

## 2021-12-31 RX ORDER — HYDRALAZINE HCL 50 MG
10 TABLET ORAL ONCE
Refills: 0 | Status: COMPLETED | OUTPATIENT
Start: 2021-12-31 | End: 2021-12-31

## 2021-12-31 RX ADMIN — Medication 10 MILLIGRAM(S): at 06:48

## 2021-12-31 RX ADMIN — Medication 0.1 MILLIGRAM(S): at 21:18

## 2021-12-31 RX ADMIN — Medication 81 MILLIGRAM(S): at 11:29

## 2021-12-31 RX ADMIN — HEPARIN SODIUM 5000 UNIT(S): 5000 INJECTION INTRAVENOUS; SUBCUTANEOUS at 05:41

## 2021-12-31 RX ADMIN — SIMVASTATIN 20 MILLIGRAM(S): 20 TABLET, FILM COATED ORAL at 21:18

## 2021-12-31 RX ADMIN — Medication 50 MILLIGRAM(S): at 13:26

## 2021-12-31 RX ADMIN — Medication 25 MILLIGRAM(S): at 17:04

## 2021-12-31 RX ADMIN — Medication 50 MILLIGRAM(S): at 21:18

## 2021-12-31 RX ADMIN — LEVETIRACETAM 750 MILLIGRAM(S): 250 TABLET, FILM COATED ORAL at 17:04

## 2021-12-31 RX ADMIN — HEPARIN SODIUM 5000 UNIT(S): 5000 INJECTION INTRAVENOUS; SUBCUTANEOUS at 17:04

## 2021-12-31 RX ADMIN — CEFTRIAXONE 100 MILLIGRAM(S): 500 INJECTION, POWDER, FOR SOLUTION INTRAMUSCULAR; INTRAVENOUS at 23:31

## 2021-12-31 RX ADMIN — CEFTRIAXONE 100 MILLIGRAM(S): 500 INJECTION, POWDER, FOR SOLUTION INTRAMUSCULAR; INTRAVENOUS at 00:10

## 2021-12-31 RX ADMIN — Medication 0.1 MILLIGRAM(S): at 13:26

## 2021-12-31 NOTE — PROGRESS NOTE ADULT - SUBJECTIVE AND OBJECTIVE BOX
Patient is a 71y old  Female who presents with a chief complaint of Altered mental status from catheter associated UTI (30 Dec 2021 20:30)      INTERVAL HPI/OVERNIGHT EVENTS: Patient was agitated overnight and not tolerating PO medications.     MEDICATIONS  (STANDING):  aspirin enteric coated 81 milliGRAM(s) Oral daily  cefTRIAXone   IVPB 1000 milliGRAM(s) IV Intermittent every 24 hours  cloNIDine 0.1 milliGRAM(s) Oral three times a day  dextrose 5% + lactated ringers. 1000 milliLiter(s) (70 mL/Hr) IV Continuous <Continuous>  heparin   Injectable 5000 Unit(s) SubCutaneous every 12 hours  hydrALAZINE 50 milliGRAM(s) Oral every 8 hours  levETIRAcetam 750 milliGRAM(s) Oral two times a day  metoprolol tartrate 25 milliGRAM(s) Oral two times a day  simvastatin 20 milliGRAM(s) Oral at bedtime    MEDICATIONS  (PRN):  acetaminophen     Tablet .. 650 milliGRAM(s) Oral every 6 hours PRN Temp greater or equal to 38C (100.4F), Mild Pain (1 - 3)      Allergies    No Known Allergies    Intolerances        REVIEW OF SYSTEMS: all negative with exception of above    Vital Signs Last 24 Hrs  T(C): 37.1 (31 Dec 2021 05:39), Max: 37.1 (31 Dec 2021 05:39)  T(F): 98.7 (31 Dec 2021 05:39), Max: 98.7 (31 Dec 2021 05:39)  HR: 78 (31 Dec 2021 11:50) (73 - 95)  BP: 185/90 (31 Dec 2021 11:50) (128/77 - 195/80)  BP(mean): --  RR: 18 (31 Dec 2021 05:48) (18 - 19)  SpO2: 92% (31 Dec 2021 05:48) (92% - 100%)    PHYSICAL EXAM:  GENERAL: NAD, alert/awake  NERVOUS SYSTEM:  non-verbal w/ functional quadriplegia  CHEST/LUNG: Clear to percussion bilaterally; No rales, rhonchi, wheezing, or rubs  HEART: Regular rate and rhythm; No murmurs, rubs, or gallops  ABDOMEN: Soft, Nontender, Nondistended; Bowel sounds present    LABS:                        11.9   5.68  )-----------( 265      ( 31 Dec 2021 08:16 )             36.6     12-31    140  |  107  |  11  ----------------------------<  172<H>  3.4<L>   |  25  |  1.37<H>    Ca    8.3<L>      31 Dec 2021 08:16  Phos  2.8     12-31  Mg     2.4     12-31    TPro  7.0  /  Alb  2.1<L>  /  TBili  0.5  /  DBili  x   /  AST  24  /  ALT  12  /  AlkPhos  74  12-31        CAPILLARY BLOOD GLUCOSE      POCT Blood Glucose.: 172 mg/dL (31 Dec 2021 11:26)  POCT Blood Glucose.: 165 mg/dL (31 Dec 2021 08:10)      RADIOLOGY & ADDITIONAL TESTS:    Imaging Personally Reviewed:  [ ] YES  [ ] NO    Consultant(s) Notes Reviewed:  [ ] YES  [ ] NO    Care Discussed with Consultants/Other Providers [ ] YES  [ ] NO

## 2021-12-31 NOTE — PROGRESS NOTE ADULT - ASSESSMENT
72 yo Female      h/o  DM,  asthma, CVA, seizure disorder, HTN, Dementia     BIBA for change in mental status      Family states patient has seemed more weak and tired than normal, requiring more assistance,  her urinary output in milner decreased.    Went to the doctor who replaced the chronic milner  , dark urine came out. then, required noxious stimuli to wake up prompting ambulance call.    As per family, patient is normally oriented . awake, alert and follows commands.    Admitted for likely catheter associated UTI. Milner was changed  on arrival     *  catheter associated UTI/ on arrival . has   chronoc  milner   has  seen urology in past   wbc  was  21,000  on arrival, now normal   blood  c/s, negtaive and, urine  c/s, Klebsiella     IV Ceftriaxone  qd. Appreciate ID recs. Patient refusing PO medications. Will c/w IV CTX.      *  AMADOU on arrival from dehydration with creatinine 1.78.   on IVF   * . CXR clear, COVID negative  * . CT head notes old right frontal CVA.   pt  appears   to have with  dementia,  non verbal, appears   bed bound,  functional  quadriplegia  however, daughter   states,  that pt was  able to ambulate   with assistance at home/  and  was  verbal  *  Speech eval , initially  recommended  NPO . then on po feeds  *  HTN/ HLD      home  meds  Hydralazine, Norvasc, Toprol, Kepra, ASA, Clonidine and Zocur.   Stop Seraquel given sedation.   continue with oral  hypertensive medications   *  c/c anemia, ,  seen by gi   *  DM, follow  fs  on dvt ppx  Discussed with CM, plan for d/c home w/ 24 hour live-in aide. Pending home care set up given patient is requiring milner for retention.

## 2022-01-01 LAB
ANION GAP SERPL CALC-SCNC: 7 MMOL/L — SIGNIFICANT CHANGE UP (ref 5–17)
BUN SERPL-MCNC: 13 MG/DL — SIGNIFICANT CHANGE UP (ref 7–23)
CALCIUM SERPL-MCNC: 7.9 MG/DL — LOW (ref 8.5–10.1)
CHLORIDE SERPL-SCNC: 109 MMOL/L — HIGH (ref 96–108)
CO2 SERPL-SCNC: 27 MMOL/L — SIGNIFICANT CHANGE UP (ref 22–31)
CREAT SERPL-MCNC: 1.36 MG/DL — HIGH (ref 0.5–1.3)
GLUCOSE SERPL-MCNC: 182 MG/DL — HIGH (ref 70–99)
POTASSIUM SERPL-MCNC: 3.5 MMOL/L — SIGNIFICANT CHANGE UP (ref 3.5–5.3)
POTASSIUM SERPL-SCNC: 3.5 MMOL/L — SIGNIFICANT CHANGE UP (ref 3.5–5.3)
SODIUM SERPL-SCNC: 143 MMOL/L — SIGNIFICANT CHANGE UP (ref 135–145)

## 2022-01-01 PROCEDURE — 99233 SBSQ HOSP IP/OBS HIGH 50: CPT

## 2022-01-01 RX ADMIN — HEPARIN SODIUM 5000 UNIT(S): 5000 INJECTION INTRAVENOUS; SUBCUTANEOUS at 17:53

## 2022-01-01 RX ADMIN — SODIUM CHLORIDE 70 MILLILITER(S): 9 INJECTION, SOLUTION INTRAVENOUS at 05:46

## 2022-01-01 RX ADMIN — LEVETIRACETAM 750 MILLIGRAM(S): 250 TABLET, FILM COATED ORAL at 17:53

## 2022-01-01 RX ADMIN — CEFTRIAXONE 100 MILLIGRAM(S): 500 INJECTION, POWDER, FOR SOLUTION INTRAMUSCULAR; INTRAVENOUS at 23:25

## 2022-01-01 RX ADMIN — Medication 81 MILLIGRAM(S): at 11:59

## 2022-01-01 RX ADMIN — Medication 50 MILLIGRAM(S): at 13:23

## 2022-01-01 RX ADMIN — Medication 0.1 MILLIGRAM(S): at 05:33

## 2022-01-01 RX ADMIN — Medication 50 MILLIGRAM(S): at 21:14

## 2022-01-01 RX ADMIN — Medication 0.1 MILLIGRAM(S): at 13:24

## 2022-01-01 RX ADMIN — HEPARIN SODIUM 5000 UNIT(S): 5000 INJECTION INTRAVENOUS; SUBCUTANEOUS at 05:34

## 2022-01-01 RX ADMIN — SIMVASTATIN 20 MILLIGRAM(S): 20 TABLET, FILM COATED ORAL at 21:14

## 2022-01-01 RX ADMIN — Medication 25 MILLIGRAM(S): at 17:53

## 2022-01-01 RX ADMIN — LEVETIRACETAM 750 MILLIGRAM(S): 250 TABLET, FILM COATED ORAL at 05:33

## 2022-01-01 RX ADMIN — Medication 0.1 MILLIGRAM(S): at 21:14

## 2022-01-01 RX ADMIN — Medication 50 MILLIGRAM(S): at 05:33

## 2022-01-01 RX ADMIN — Medication 25 MILLIGRAM(S): at 05:34

## 2022-01-01 NOTE — PROGRESS NOTE ADULT - SUBJECTIVE AND OBJECTIVE BOX
Patient is a 71y old  Female who presents with a chief complaint of Altered mental status from catheter associated UTI (31 Dec 2021 12:32)      OVERNIGHT EVENTS:    REVIEW OF SYSTEMS: denies chest pain/SOB, diaphoresis, no F/C, cough, dizziness, headache, blurry vision, nausea, vomiting, abdominal pain. All others review of systems negative     MEDICATIONS  (STANDING):  aspirin enteric coated 81 milliGRAM(s) Oral daily  cefTRIAXone   IVPB 1000 milliGRAM(s) IV Intermittent every 24 hours  cloNIDine 0.1 milliGRAM(s) Oral three times a day  dextrose 5% + lactated ringers. 1000 milliLiter(s) (70 mL/Hr) IV Continuous <Continuous>  heparin   Injectable 5000 Unit(s) SubCutaneous every 12 hours  hydrALAZINE 50 milliGRAM(s) Oral every 8 hours  levETIRAcetam 750 milliGRAM(s) Oral two times a day  metoprolol tartrate 25 milliGRAM(s) Oral two times a day  simvastatin 20 milliGRAM(s) Oral at bedtime    MEDICATIONS  (PRN):  acetaminophen     Tablet .. 650 milliGRAM(s) Oral every 6 hours PRN Temp greater or equal to 38C (100.4F), Mild Pain (1 - 3)      Allergies    No Known Allergies    Intolerances        T(F): 97.9 (01-01-22 @ 11:20), Max: 99 (12-31-21 @ 17:22)  HR: 57 (01-01-22 @ 11:20) (57 - 75)  BP: 167/66 (01-01-22 @ 11:20) (130/60 - 173/69)  RR: 18 (01-01-22 @ 11:20) (18 - 18)  SpO2: 98% (01-01-22 @ 11:20) (95% - 100%)  Wt(kg): --    PHYSICAL EXAM:  GENERAL: NAD  HEAD:  Atraumatic, Normocephalic  EYES: PERRLA, conjunctiva and sclera clear  ENMT: Moist mucous membranes  NECK: Supple, No JVD, Normal thyroid  NERVOUS SYSTEM:  Alert & Awake  CHEST/LUNG: Clear to percussion bilaterally;   HEART: Regular rate and rhythm;   ABDOMEN: Soft, Nontender, Nondistended; Bowel sounds present  EXTREMITIES:  no edema BL LE  SKIN: moist    LABS:                        11.9   5.68  )-----------( 265      ( 31 Dec 2021 08:16 )             36.6     01-01    143  |  109<H>  |  13  ----------------------------<  182<H>  3.5   |  27  |  1.36<H>    Ca    7.9<L>      01 Jan 2022 08:40  Phos  2.8     12-31  Mg     2.4     12-31    TPro  7.0  /  Alb  2.1<L>  /  TBili  0.5  /  DBili  x   /  AST  24  /  ALT  12  /  AlkPhos  74  12-31        Cultures;   CAPILLARY BLOOD GLUCOSE        Lipid panel:           RADIOLOGY & ADDITIONAL TESTS:    Imaging Personally Reviewed:  [x ] YES      Consultant(s) Notes Reviewed:  [x ] YES     Care Discussed with [x ] Consultants [X ] Patient [ ] Family  [x ]    [x ]  Other; RN

## 2022-01-01 NOTE — PROGRESS NOTE ADULT - ASSESSMENT
72 yo Female    h/o  DM,  asthma, CVA, seizure disorder, HTN, Dementia  BIBA for change in mental status. Family states patient has seemed more weak and tired than normal, requiring more assistance,  her urinary output in milner decreased. Went to the doctor who replaced the chronic milner  , dark urine came out. then, required noxious stimuli to wake up prompting ambulance call. As per family, patient is normally oriented . awake, alert and follows commands. Admitted for likely catheter associated UTI. Milner was changed  on arrival     *  catheter associated UTI/ on arrival . has   chronoc  milner   has  seen urology in past   wbc  was  21,000  on arrival, now normal   blood  c/s, negtaive and, urine  c/s, Klebsiella     IV Ceftriaxone  qd. Appreciate ID recs. Patient refusing PO medications. Will c/w IV CTX.      *  AMADOU on arrival from dehydration with creatinine 1.78.   on IVF   * . CXR clear, COVID negative  * . CT head notes old right frontal CVA.   pt  appears   to have with  dementia,  non verbal, appears   bed bound,  functional  quadriplegia, Moderate protein-calorie malnutrition.  however, daughter   states,  that pt was  able to ambulate   with assistance at home/  and  was  verbal  *  Dysphagia- Speech eval , initially  recommended  NPO . then on po feeds  *  HTN/ HLD      home  meds  Hydralazine, Norvasc, Toprol, Kepra, ASA, Clonidine and Zocur.   Stop Seraquel given sedation.   continue with oral  hypertensive medications   *  c/c anemia, ,  seen by gi   *  DM, follow  fs  on dvt ppx  Discussed with CM, plan for d/c home w/ 24 hour live-in aide. Pending home care set up given patient is requiring milner for retention.  CM- 'Pt is cleared for discharge today. NICHOLAS contacted pt's dtr Gali  [phone] who stated that pt's HHA is not available until Tuesday 1/4/2022  and pt cannot come home until then. NICHOLAS attempted to reach pt's   from [facility] [phone] but she is out until next week. SW to arrange  an ambulance home for pt on Tuesday 1/4/2022 at 9AM. Pt's HHA will be at the  home.'

## 2022-01-01 NOTE — ED ADULT NURSE NOTE - DOES PATIENT HAVE ADVANCE DIRECTIVE
Problem: Physiological Stability  Goal: Remains free of signs and symptoms of infection  Outcome: Outcome Met, Continue evaluating goal progress toward completion      unknown/No

## 2022-01-02 PROCEDURE — 99233 SBSQ HOSP IP/OBS HIGH 50: CPT

## 2022-01-02 PROCEDURE — 93971 EXTREMITY STUDY: CPT | Mod: 26,RT

## 2022-01-02 RX ADMIN — Medication 0.1 MILLIGRAM(S): at 14:34

## 2022-01-02 RX ADMIN — Medication 0.1 MILLIGRAM(S): at 21:30

## 2022-01-02 RX ADMIN — Medication 0.1 MILLIGRAM(S): at 06:19

## 2022-01-02 RX ADMIN — HEPARIN SODIUM 5000 UNIT(S): 5000 INJECTION INTRAVENOUS; SUBCUTANEOUS at 17:09

## 2022-01-02 RX ADMIN — Medication 25 MILLIGRAM(S): at 06:19

## 2022-01-02 RX ADMIN — Medication 50 MILLIGRAM(S): at 14:34

## 2022-01-02 RX ADMIN — SODIUM CHLORIDE 70 MILLILITER(S): 9 INJECTION, SOLUTION INTRAVENOUS at 17:09

## 2022-01-02 RX ADMIN — LEVETIRACETAM 750 MILLIGRAM(S): 250 TABLET, FILM COATED ORAL at 06:19

## 2022-01-02 RX ADMIN — LEVETIRACETAM 750 MILLIGRAM(S): 250 TABLET, FILM COATED ORAL at 17:09

## 2022-01-02 RX ADMIN — Medication 25 MILLIGRAM(S): at 17:09

## 2022-01-02 RX ADMIN — SIMVASTATIN 20 MILLIGRAM(S): 20 TABLET, FILM COATED ORAL at 21:30

## 2022-01-02 RX ADMIN — HEPARIN SODIUM 5000 UNIT(S): 5000 INJECTION INTRAVENOUS; SUBCUTANEOUS at 06:19

## 2022-01-02 RX ADMIN — Medication 50 MILLIGRAM(S): at 21:30

## 2022-01-02 RX ADMIN — Medication 50 MILLIGRAM(S): at 06:19

## 2022-01-02 NOTE — PROGRESS NOTE ADULT - SUBJECTIVE AND OBJECTIVE BOX
HPI:  70 yo Female PMH "heart dz", diabetes, asthma, CVA, seizure disorder, HTN, Dementia BIBA for change in mental status this AM. Family states patient has seemed more weak and tired than normal, requiring more assistance. Reports she began to look more swollen and her urinary output in milner decreased. Went to the doctor who replaced the chronic milner today. A large amount of dark urine came out. Pt this morning required noxious stimuli to wake up prompting ambulance call. As per family, patient is normally oriented x1, awake, alert and follows commands.     Admitted for likely catheter associated UTI. Milner was changed this AM.   (27 Dec 2021 17:59)  doing well     Allergies    No Known Allergies    Intolerances        MEDICATIONS  (STANDING):  aspirin enteric coated 81 milliGRAM(s) Oral daily  cefTRIAXone   IVPB 1000 milliGRAM(s) IV Intermittent every 24 hours  cloNIDine 0.1 milliGRAM(s) Oral three times a day  dextrose 5% + lactated ringers. 1000 milliLiter(s) (70 mL/Hr) IV Continuous <Continuous>  heparin   Injectable 5000 Unit(s) SubCutaneous every 12 hours  hydrALAZINE 50 milliGRAM(s) Oral every 8 hours  levETIRAcetam 750 milliGRAM(s) Oral two times a day  metoprolol tartrate 25 milliGRAM(s) Oral two times a day  simvastatin 20 milliGRAM(s) Oral at bedtime    MEDICATIONS  (PRN):  acetaminophen     Tablet .. 650 milliGRAM(s) Oral every 6 hours PRN Temp greater or equal to 38C (100.4F), Mild Pain (1 - 3)      REVIEW OF SYSTEMS:  unable to assess   VITAL SIGNS:  T(C): 36.7 (01-02-22 @ 11:38), Max: 37.2 (01-02-22 @ 06:37)  T(F): 98 (01-02-22 @ 11:38), Max: 99 (01-02-22 @ 06:37)  HR: 62 (01-02-22 @ 11:38) (56 - 77)  BP: 145/60 (01-02-22 @ 11:38) (128/60 - 167/70)  RR: 16 (01-02-22 @ 11:38) (16 - 18)  SpO2: 100% (01-02-22 @ 11:38) (96% - 100%)  Wt(kg): --    PHYSICAL EXAM:  eats well  GENERAL: not in any distress  HEENT: Neck is supple, normocephalic, atraumatic   CHEST/LUNG: Clear to auscultation bilaterally; No rales, rhonchi, wheezing  HEART: Regular rate and rhythm; No murmurs, rubs, or gallops  ABDOMEN: Soft, Nontender, Nondistended; Bowel sounds present, no rebound   EXTREMITIES:  2+ Peripheral Pulses, No clubbing, cyanosis, or edema  GENITOURINARY: milner   SKIN: blisters and swelling entire rt hand and arm   BACK: no pressor sore   NERVOUS SYSTEM:  Alert & Oriented X1  LABS:     01-01    143  |  109<H>  |  13  ----------------------------<  182<H>  3.5   |  27  |  1.36<H>    Ca    7.9<L>      01 Jan 2022 08:40                                Culture Results:   >=3 organisms. Probable collection contamination. (12-28 @ 00:54)  Culture Results:   No Growth Final (12-28 @ 00:38)  Culture Results:   No Growth Final (12-28 @ 00:38)                Radiology:

## 2022-01-02 NOTE — PROGRESS NOTE ADULT - TIME BILLING
GI
min spent reviewing chart, examining patient, discussing plan with patient and family
51
GI
min spent reviewing chart, examining patient, discussing plan with patient and family

## 2022-01-02 NOTE — PROGRESS NOTE ADULT - ASSESSMENT
70 yo Female   with  h/o  DM,  asthma, CVA, seizure disorder, HTN, Dementia     BIBA for change in mental status      Family states patient has seemed more weak and tired than normal, requiring more assistance,  her urinary output in milner decreased.    Went to the doctor who replaced the chronic milner  , dark urine came out. then, required noxious stimuli to wake up prompting ambulance call.    As per family, patient is normally oriented . awake, alert and follows commands.    Admitted for likely catheter associated UTI. Milner was changed  on arrival  await culture > 3 org   has responded  dc antibiotics by am   venous duplex entire arm is swollen

## 2022-01-02 NOTE — PROGRESS NOTE ADULT - ASSESSMENT
72 yo Female    h/o  DM,  asthma, CVA, seizure disorder, HTN, Dementia  BIBA for change in mental status. Family states patient has seemed more weak and tired than normal, requiring more assistance,  her urinary output in milner decreased. Went to the doctor who replaced the chronic milner  , dark urine came out. then, required noxious stimuli to wake up prompting ambulance call. As per family, patient is normally oriented . awake, alert and follows commands. Admitted for likely catheter associated UTI. Milner was changed  on arrival     *  catheter associated UTI/ on arrival . has   chronoc  milner   has  seen urology in past   wbc  was  21,000  on arrival, now normal   blood  c/s, negtaive and, urine  c/s, Klebsiella     IV Ceftriaxone  qd. Appreciate ID recs. Patient refusing PO medications. Will c/w IV CTX.      *  AMADOU on arrival from dehydration with creatinine 1.78.   on IVF   * . CXR clear, COVID negative  * . CT head notes old right frontal CVA.   pt  appears   to have with  dementia,  non verbal, appears   bed bound,  functional  quadriplegia, Moderate protein-calorie malnutrition.  however, daughter   states,  that pt was  able to ambulate   with assistance at home/  and  was  verbal  *  Dysphagia- Speech eval , initially  recommended  NPO . then on po feeds  *  HTN/ HLD      home  meds  Hydralazine, Norvasc, Toprol, Kepra, ASA, Clonidine and Zocur.   Stop Seraquel given sedation.   continue with oral  hypertensive medications   *  c/c anemia, ,  seen by gi   *  DM, follow  fs  on dvt ppx  Discussed with CM, plan for d/c home w/ 24 hour live-in aide. Pending home care set up given patient is requiring milner for retention.  CM- 'Pt is cleared for discharge. NICHOLAS contacted pt's dtr Gali  [phone] who stated that pt's HHA is not available until Tuesday 1/4/2022  and pt cannot come home until then. NICHOLAS attempted to reach pt's   from [facility] [phone] but she is out until next week. SW to arrange  an ambulance home for pt on Tuesday 1/4/2022 at 9AM. Pt's HHA will be at the  home.'

## 2022-01-02 NOTE — PROGRESS NOTE ADULT - SUBJECTIVE AND OBJECTIVE BOX
Patient is a 71y old  Female who presents with a chief complaint of Altered mental status from catheter associated UTI (01 Jan 2022 11:54)      OVERNIGHT EVENTS:    REVIEW OF SYSTEMS: denies chest pain/SOB, diaphoresis, no F/C, cough, dizziness, headache, blurry vision, nausea, vomiting, abdominal pain. All others review of systems negative     MEDICATIONS  (STANDING):  aspirin enteric coated 81 milliGRAM(s) Oral daily  cefTRIAXone   IVPB 1000 milliGRAM(s) IV Intermittent every 24 hours  cloNIDine 0.1 milliGRAM(s) Oral three times a day  dextrose 5% + lactated ringers. 1000 milliLiter(s) (70 mL/Hr) IV Continuous <Continuous>  heparin   Injectable 5000 Unit(s) SubCutaneous every 12 hours  hydrALAZINE 50 milliGRAM(s) Oral every 8 hours  levETIRAcetam 750 milliGRAM(s) Oral two times a day  metoprolol tartrate 25 milliGRAM(s) Oral two times a day  simvastatin 20 milliGRAM(s) Oral at bedtime    MEDICATIONS  (PRN):  acetaminophen     Tablet .. 650 milliGRAM(s) Oral every 6 hours PRN Temp greater or equal to 38C (100.4F), Mild Pain (1 - 3)      Allergies    No Known Allergies    Intolerances        T(F): 99 (01-02-22 @ 06:37), Max: 99 (01-02-22 @ 06:37)  HR: 62 (01-02-22 @ 06:37) (56 - 77)  BP: 166/68 (01-02-22 @ 06:37) (128/60 - 167/70)  RR: 17 (01-02-22 @ 06:37) (17 - 18)  SpO2: 96% (01-02-22 @ 06:37) (96% - 100%)  Wt(kg): --    PHYSICAL EXAM:  GENERAL: NAD  HEAD:  Atraumatic, Normocephalic  EYES: PERRLA, conjunctiva and sclera clear  ENMT: Moist mucous membranes  NECK: Supple, No JVD, Normal thyroid  NERVOUS SYSTEM:  Alert & Awake  CHEST/LUNG: Clear to percussion bilaterally;   HEART: Regular rate and rhythm;   ABDOMEN: Soft, Nontender, Nondistended; Bowel sounds present  EXTREMITIES:  no edema BL LE  SKIN: moist    LABS:    01-01    143  |  109<H>  |  13  ----------------------------<  182<H>  3.5   |  27  |  1.36<H>    Ca    7.9<L>      01 Jan 2022 08:40          Cultures;   CAPILLARY BLOOD GLUCOSE        Lipid panel:           RADIOLOGY & ADDITIONAL TESTS:    Imaging Personally Reviewed:  [x ] YES      Consultant(s) Notes Reviewed:  [x ] YES     Care Discussed with [x ] Consultants [X ] Patient [ ] Family  [x ]    [x ]  Other; RN

## 2022-01-03 LAB
ANION GAP SERPL CALC-SCNC: 6 MMOL/L — SIGNIFICANT CHANGE UP (ref 5–17)
BLD GP AB SCN SERPL QL: SIGNIFICANT CHANGE UP
BUN SERPL-MCNC: 14 MG/DL — SIGNIFICANT CHANGE UP (ref 7–23)
CALCIUM SERPL-MCNC: 8.2 MG/DL — LOW (ref 8.5–10.1)
CHLORIDE SERPL-SCNC: 113 MMOL/L — HIGH (ref 96–108)
CO2 SERPL-SCNC: 26 MMOL/L — SIGNIFICANT CHANGE UP (ref 22–31)
CREAT SERPL-MCNC: 1.31 MG/DL — HIGH (ref 0.5–1.3)
FERRITIN SERPL-MCNC: 122 NG/ML — SIGNIFICANT CHANGE UP (ref 15–150)
FLUAV AG NPH QL: SIGNIFICANT CHANGE UP
FLUBV AG NPH QL: SIGNIFICANT CHANGE UP
FOLATE SERPL-MCNC: 6.8 NG/ML — SIGNIFICANT CHANGE UP
GLUCOSE SERPL-MCNC: 169 MG/DL — HIGH (ref 70–99)
HCT VFR BLD CALC: 30 % — LOW (ref 34.5–45)
HGB BLD-MCNC: 9.5 G/DL — LOW (ref 11.5–15.5)
IRON SATN MFR SERPL: 33 % — SIGNIFICANT CHANGE UP (ref 14–50)
IRON SATN MFR SERPL: 69 UG/DL — SIGNIFICANT CHANGE UP (ref 30–160)
MCHC RBC-ENTMCNC: 27.9 PG — SIGNIFICANT CHANGE UP (ref 27–34)
MCHC RBC-ENTMCNC: 31.7 GM/DL — LOW (ref 32–36)
MCV RBC AUTO: 88.2 FL — SIGNIFICANT CHANGE UP (ref 80–100)
NRBC # BLD: 0 /100 WBCS — SIGNIFICANT CHANGE UP (ref 0–0)
PLATELET # BLD AUTO: 193 K/UL — SIGNIFICANT CHANGE UP (ref 150–400)
POTASSIUM SERPL-MCNC: 3.3 MMOL/L — LOW (ref 3.5–5.3)
POTASSIUM SERPL-SCNC: 3.3 MMOL/L — LOW (ref 3.5–5.3)
RBC # BLD: 3.4 M/UL — LOW (ref 3.8–5.2)
RBC # BLD: 3.77 M/UL — LOW (ref 3.8–5.2)
RBC # FLD: 12.7 % — SIGNIFICANT CHANGE UP (ref 10.3–14.5)
RETICS #: 49 K/UL — SIGNIFICANT CHANGE UP (ref 25–125)
RETICS/RBC NFR: 1.3 % — SIGNIFICANT CHANGE UP (ref 0.5–2.5)
SARS-COV-2 RNA SPEC QL NAA+PROBE: SIGNIFICANT CHANGE UP
SODIUM SERPL-SCNC: 145 MMOL/L — SIGNIFICANT CHANGE UP (ref 135–145)
TIBC SERPL-MCNC: 208 UG/DL — LOW (ref 220–430)
UIBC SERPL-MCNC: 140 UG/DL — SIGNIFICANT CHANGE UP (ref 110–370)
VIT B12 SERPL-MCNC: 441 PG/ML — SIGNIFICANT CHANGE UP (ref 232–1245)
WBC # BLD: 5.81 K/UL — SIGNIFICANT CHANGE UP (ref 3.8–10.5)
WBC # FLD AUTO: 5.81 K/UL — SIGNIFICANT CHANGE UP (ref 3.8–10.5)

## 2022-01-03 PROCEDURE — 99232 SBSQ HOSP IP/OBS MODERATE 35: CPT

## 2022-01-03 RX ORDER — POTASSIUM CHLORIDE 20 MEQ
40 PACKET (EA) ORAL ONCE
Refills: 0 | Status: COMPLETED | OUTPATIENT
Start: 2022-01-03 | End: 2022-01-03

## 2022-01-03 RX ADMIN — Medication 50 MILLIGRAM(S): at 21:12

## 2022-01-03 RX ADMIN — Medication 0.1 MILLIGRAM(S): at 05:25

## 2022-01-03 RX ADMIN — Medication 81 MILLIGRAM(S): at 13:55

## 2022-01-03 RX ADMIN — LEVETIRACETAM 750 MILLIGRAM(S): 250 TABLET, FILM COATED ORAL at 17:36

## 2022-01-03 RX ADMIN — Medication 40 MILLIEQUIVALENT(S): at 18:46

## 2022-01-03 RX ADMIN — LEVETIRACETAM 750 MILLIGRAM(S): 250 TABLET, FILM COATED ORAL at 05:25

## 2022-01-03 RX ADMIN — Medication 25 MILLIGRAM(S): at 05:49

## 2022-01-03 RX ADMIN — Medication 0.1 MILLIGRAM(S): at 13:55

## 2022-01-03 RX ADMIN — Medication 50 MILLIGRAM(S): at 05:25

## 2022-01-03 RX ADMIN — SIMVASTATIN 20 MILLIGRAM(S): 20 TABLET, FILM COATED ORAL at 21:12

## 2022-01-03 RX ADMIN — HEPARIN SODIUM 5000 UNIT(S): 5000 INJECTION INTRAVENOUS; SUBCUTANEOUS at 05:25

## 2022-01-03 RX ADMIN — Medication 50 MILLIGRAM(S): at 13:55

## 2022-01-03 RX ADMIN — Medication 25 MILLIGRAM(S): at 17:36

## 2022-01-03 RX ADMIN — Medication 0.2 MILLIGRAM(S): at 21:12

## 2022-01-03 RX ADMIN — HEPARIN SODIUM 5000 UNIT(S): 5000 INJECTION INTRAVENOUS; SUBCUTANEOUS at 17:36

## 2022-01-03 NOTE — PROGRESS NOTE ADULT - SUBJECTIVE AND OBJECTIVE BOX
Patient: FATOU MCBRIDE [id number] 71y Female                            Hospitalist Attending Note    No bleeding reported.    ____________________PHYSICAL EXAM:  GENERAL:  NAD, awake, confused.    HEENT: NCAT  CARDIOVASCULAR:  S1, S2  LUNGS: CTAB  ABDOMEN:  soft, (-) tenderness, (-) distension, (+) bowel sounds, (-) guarding, (-) rebound (-) rigidity  EXTREMITIES:  no cyanosis / clubbing / edema.   ____________________     VITALS:  Vital Signs Last 24 Hrs  T(C): 36.9 (2022 11:41), Max: 37 (2022 05:28)  T(F): 98.5 (2022 11:41), Max: 98.6 (2022 05:28)  HR: 57 (2022 11:41) (57 - 63)  BP: 149/53 (2022 11:41) (122/60 - 192/76)  BP(mean): --  RR: 18 (2022 11:41) (18 - 18)  SpO2: 100% (2022 11:41) (96% - 100%) Daily     Daily Weight in k.5 (2022 05:28)  CAPILLARY BLOOD GLUCOSE        I&O's Summary    2022 07:01  -  2022 07:00  --------------------------------------------------------  IN: 840 mL / OUT: 2100 mL / NET: -1260 mL        HISTORY:  PAST MEDICAL & SURGICAL HISTORY:  Hypertension    Diabetes    Asthma    OA (osteoarthritis)  bautista knees, low back  and  bautista shoulders    Gout    Seizure disorder    Urinary incontinence    Vision changes  lt eye    CVA (cerebral infarction)  right side weakness    Kidney stone    Allergies    No Known Allergies    Intolerances       LABS:                        9.5    5.81  )-----------( 193      ( 2022 08:40 )             30.0     01-03    145  |  113<H>  |  14  ----------------------------<  169<H>  3.3<L>   |  26  |  1.31<H>    Ca    8.2<L>      2022 08:40                    MEDICATIONS:  MEDICATIONS  (STANDING):  aspirin enteric coated 81 milliGRAM(s) Oral daily  cloNIDine 0.1 milliGRAM(s) Oral three times a day  dextrose 5% + lactated ringers. 1000 milliLiter(s) (70 mL/Hr) IV Continuous <Continuous>  heparin   Injectable 5000 Unit(s) SubCutaneous every 12 hours  hydrALAZINE 50 milliGRAM(s) Oral every 8 hours  levETIRAcetam 750 milliGRAM(s) Oral two times a day  metoprolol tartrate 25 milliGRAM(s) Oral two times a day  simvastatin 20 milliGRAM(s) Oral at bedtime    MEDICATIONS  (PRN):  acetaminophen     Tablet .. 650 milliGRAM(s) Oral every 6 hours PRN Temp greater or equal to 38C (100.4F), Mild Pain (1 - 3)

## 2022-01-03 NOTE — PROGRESS NOTE ADULT - ASSESSMENT
70 yo Female    h/o  DM,  asthma, CVA, seizure disorder, HTN, Dementia  BIBA for change in mental status.  Treated for catheter associated UTI.      # Anemia - Hgb 13.6 on admission, suspect some degree of hemoconcentration.  Baseline Hgb 3/2021 was 10.6.  Repeat Hgb in am.  Fe profile, stool OB.  No s/s of active bleeding.  Hemodynamically stable.   # Catheter associated UTI - WBC 21k on admission, now normalized.  Course of IV Rocephin per ID.  # Chronic Urinary Retention - continue Mayberry  # AMADOU on arrival from dehydration with creatinine 1.78.  Cr improved -> 1.3.    # Functional Quadriplegia - supportive care  # Essential HTN - Monitor BP.  Continue antihypertensives. BP elevated.  Increased Clonidine.   on IVF   # h/o CVA - on ASA, Statin.     Per chart, [facility] available 1/4/2022.  Plan d/c in am if Hgb stable.

## 2022-01-04 LAB
HCT VFR BLD CALC: 30.1 % — LOW (ref 34.5–45)
HGB BLD-MCNC: 9.8 G/DL — LOW (ref 11.5–15.5)
MCHC RBC-ENTMCNC: 28.6 PG — SIGNIFICANT CHANGE UP (ref 27–34)
MCHC RBC-ENTMCNC: 32.6 GM/DL — SIGNIFICANT CHANGE UP (ref 32–36)
MCV RBC AUTO: 87.8 FL — SIGNIFICANT CHANGE UP (ref 80–100)
NRBC # BLD: 0 /100 WBCS — SIGNIFICANT CHANGE UP (ref 0–0)
PLATELET # BLD AUTO: 269 K/UL — SIGNIFICANT CHANGE UP (ref 150–400)
RBC # BLD: 3.43 M/UL — LOW (ref 3.8–5.2)
RBC # FLD: 12.9 % — SIGNIFICANT CHANGE UP (ref 10.3–14.5)
T4 FREE SERPL-MCNC: 1.4 NG/DL — SIGNIFICANT CHANGE UP (ref 0.9–1.8)
TSH SERPL-MCNC: 2.06 UU/ML — SIGNIFICANT CHANGE UP (ref 0.36–3.74)
WBC # BLD: 5.56 K/UL — SIGNIFICANT CHANGE UP (ref 3.8–10.5)
WBC # FLD AUTO: 5.56 K/UL — SIGNIFICANT CHANGE UP (ref 3.8–10.5)

## 2022-01-04 PROCEDURE — 93010 ELECTROCARDIOGRAM REPORT: CPT

## 2022-01-04 PROCEDURE — 99232 SBSQ HOSP IP/OBS MODERATE 35: CPT

## 2022-01-04 RX ORDER — HYDRALAZINE HCL 50 MG
75 TABLET ORAL EVERY 8 HOURS
Refills: 0 | Status: DISCONTINUED | OUTPATIENT
Start: 2022-01-04 | End: 2022-01-05

## 2022-01-04 RX ORDER — AMLODIPINE BESYLATE 2.5 MG/1
5 TABLET ORAL DAILY
Refills: 0 | Status: DISCONTINUED | OUTPATIENT
Start: 2022-01-04 | End: 2022-01-05

## 2022-01-04 RX ORDER — CX-024414 0.2 MG/ML
0.25 INJECTION, SUSPENSION INTRAMUSCULAR ONCE
Refills: 0 | Status: COMPLETED | OUTPATIENT
Start: 2022-01-04 | End: 2022-01-05

## 2022-01-04 RX ADMIN — Medication 25 MILLIGRAM(S): at 09:38

## 2022-01-04 RX ADMIN — Medication 0.2 MILLIGRAM(S): at 06:16

## 2022-01-04 RX ADMIN — Medication 0.1 MILLIGRAM(S): at 14:38

## 2022-01-04 RX ADMIN — AMLODIPINE BESYLATE 5 MILLIGRAM(S): 2.5 TABLET ORAL at 11:17

## 2022-01-04 RX ADMIN — LEVETIRACETAM 750 MILLIGRAM(S): 250 TABLET, FILM COATED ORAL at 17:15

## 2022-01-04 RX ADMIN — SODIUM CHLORIDE 70 MILLILITER(S): 9 INJECTION, SOLUTION INTRAVENOUS at 06:16

## 2022-01-04 RX ADMIN — Medication 75 MILLIGRAM(S): at 14:37

## 2022-01-04 RX ADMIN — Medication 75 MILLIGRAM(S): at 22:24

## 2022-01-04 RX ADMIN — Medication 50 MILLIGRAM(S): at 06:17

## 2022-01-04 RX ADMIN — Medication 0.1 MILLIGRAM(S): at 22:24

## 2022-01-04 RX ADMIN — LEVETIRACETAM 750 MILLIGRAM(S): 250 TABLET, FILM COATED ORAL at 06:17

## 2022-01-04 RX ADMIN — HEPARIN SODIUM 5000 UNIT(S): 5000 INJECTION INTRAVENOUS; SUBCUTANEOUS at 17:15

## 2022-01-04 RX ADMIN — Medication 81 MILLIGRAM(S): at 11:17

## 2022-01-04 RX ADMIN — HEPARIN SODIUM 5000 UNIT(S): 5000 INJECTION INTRAVENOUS; SUBCUTANEOUS at 06:17

## 2022-01-04 RX ADMIN — SIMVASTATIN 20 MILLIGRAM(S): 20 TABLET, FILM COATED ORAL at 22:25

## 2022-01-04 NOTE — PROGRESS NOTE ADULT - ASSESSMENT
Seen for catheter associated UTI  s/p Mayberry exchanged on arrival      Afebrile  No leukocytosis    BC Negative  UC: 3 org ---Contaminated    Venous duplex Negative for DVT    s/p one week of antibiotics  Observe off abx

## 2022-01-04 NOTE — PROGRESS NOTE ADULT - SUBJECTIVE AND OBJECTIVE BOX
Patient: FATOU MCBRIDE [id number] 71y Female                            Hospitalist Attending Note    Bradycardia noted this am, HR now 58.     ____________________PHYSICAL EXAM:  GENERAL:  NAD, awake, confused.    HEENT: NCAT  CARDIOVASCULAR:  S1, S2  LUNGS: CTAB  ABDOMEN:  soft, (-) tenderness, (-) distension, (+) bowel sounds, (-) guarding, (-) rebound (-) rigidity  EXTREMITIES:  no cyanosis / clubbing / edema.   ____________________    VITALS:  Vital Signs Last 24 Hrs  T(C): 36.7 (2022 08:50), Max: 36.9 (2022 17:17)  T(F): 98 (2022 08:50), Max: 98.4 (2022 17:17)  HR: 58 (2022 08:50) (45 - 58)  BP: 175/65 (2022 08:50) (144/58 - 183/69)  BP(mean): --  RR: 18 (2022 08:50) (18 - 18)  SpO2: 98% (2022 08:50) (94% - 98%) Daily     Daily Weight in k.6 (2022 05:18)  CAPILLARY BLOOD GLUCOSE        I&O's Summary    2022 07:01  -  2022 07:00  --------------------------------------------------------  IN: 0 mL / OUT: 700 mL / NET: -700 mL        LABS:                        9.8    5.56  )-----------( 269      ( 2022 10:14 )             30.1     01-03    145  |  113<H>  |  14  ----------------------------<  169<H>  3.3<L>   |  26  |  1.31<H>    Ca    8.2<L>      2022 08:40                    MEDICATIONS:  acetaminophen     Tablet .. 650 milliGRAM(s) Oral every 6 hours PRN  amLODIPine   Tablet 5 milliGRAM(s) Oral daily  aspirin enteric coated 81 milliGRAM(s) Oral daily  cloNIDine 0.1 milliGRAM(s) Oral every 8 hours  coronavirus (EUA) Booster Vaccine (MODERNA) 0.25 milliLiter(s) IntraMuscular once  dextrose 5% + lactated ringers. 1000 milliLiter(s) IV Continuous <Continuous>  heparin   Injectable 5000 Unit(s) SubCutaneous every 12 hours  hydrALAZINE 75 milliGRAM(s) Oral every 8 hours  levETIRAcetam 750 milliGRAM(s) Oral two times a day  simvastatin 20 milliGRAM(s) Oral at bedtime

## 2022-01-04 NOTE — PROGRESS NOTE ADULT - ASSESSMENT
70 yo Female    h/o  DM,  asthma, CVA, seizure disorder, HTN, Dementia  BIBA for change in mental status.  Treated for catheter associated UTI.  Noted to be bradycardic after Lopressor dose, EKG showed sinus bradycardia.    # Sinus Bradycardia - likely due to Lopressor this am.  Dosage stopped.  HR is improving.  Hold discharge x 24h.   # Anemia - Hgb 13.6 on admission, suspect some degree of hemoconcentration.  Baseline Hgb 3/2021 was 10.6.  Repeat Hgb 9.8, stable.  Fe profile, stool OB.  No s/s of active bleeding.  Hemodynamically stable.   # Catheter associated UTI - WBC 21k on admission, now normalized.  Course of IV Rocephin per ID.  # Chronic Urinary Retention - continue Mayberry  # AMADOU on arrival from dehydration with creatinine 1.78.  Cr improved -> 1.3.    # Functional Quadriplegia - supportive care  # Essential HTN - Monitor BP.  Started on Amlodipine.  Due to bradycardia, Metoprolol stopped, Clonidine decreased.   on IVF   # h/o CVA - on ASA, Statin.     Plan d/c in am if HR stable.

## 2022-01-04 NOTE — PROGRESS NOTE ADULT - SUBJECTIVE AND OBJECTIVE BOX
HPI:  70 yo Female PMH "heart dz", diabetes, asthma, CVA, seizure disorder, HTN, Dementia BIBA for change in mental status this AM. Family states patient has seemed more weak and tired than normal, requiring more assistance. Reports she began to look more swollen and her urinary output in milner decreased. Went to the doctor who replaced the chronic milner today. A large amount of dark urine came out. Pt this morning required noxious stimuli to wake up prompting ambulance call. As per family, patient is normally oriented x1, awake, alert and follows commands.     Admitted for likely catheter associated UTI. Milner was changed this AM.   (27 Dec 2021 17:59)      Allergies    No Known Allergies    Intolerances        MEDICATIONS  (STANDING):  amLODIPine   Tablet 5 milliGRAM(s) Oral daily  aspirin enteric coated 81 milliGRAM(s) Oral daily  cloNIDine 0.1 milliGRAM(s) Oral every 8 hours  dextrose 5% + lactated ringers. 1000 milliLiter(s) (70 mL/Hr) IV Continuous <Continuous>  heparin   Injectable 5000 Unit(s) SubCutaneous every 12 hours  hydrALAZINE 75 milliGRAM(s) Oral every 8 hours  levETIRAcetam 750 milliGRAM(s) Oral two times a day  simvastatin 20 milliGRAM(s) Oral at bedtime    MEDICATIONS  (PRN):  acetaminophen     Tablet .. 650 milliGRAM(s) Oral every 6 hours PRN Temp greater or equal to 38C (100.4F), Mild Pain (1 - 3)      REVIEW OF SYSTEMS:    CONSTITUTIONAL: No fever, chills, weight loss, or fatigue  HEENT: No sore throat, runny nose, ear ache  RESPIRATORY: No cough, wheezing, No shortness of breath  CARDIOVASCULAR: No chest pain, palpitations, dizziness  GASTROINTESTINAL: No abdominal pain. No nausea, vomiting, diarrhea  GENITOURINARY: No dysuria, increase frequency, hematuria, or incontinence  NEUROLOGICAL: No headaches, memory loss, loss of strength, numbness, or tremors, no weakness  EXTREMITY: No pedal edema BLE  SKIN: No itching, burning, rashes, or lesions     VITAL SIGNS:  T(C): 36.7 (01-04-22 @ 05:18), Max: 36.9 (01-03-22 @ 11:41)  T(F): 98 (01-04-22 @ 05:18), Max: 98.5 (01-03-22 @ 11:41)  HR: 45 (01-04-22 @ 05:18) (45 - 57)  BP: 183/69 (01-04-22 @ 05:18) (144/58 - 183/69)  RR: 18 (01-04-22 @ 05:18) (18 - 18)  SpO2: 94% (01-04-22 @ 05:18) (94% - 100%)  Wt(kg): --    PHYSICAL EXAM:    GENERAL: not in any distress  HEENT: Neck is supple, normocephalic, atraumatic   CHEST/LUNG: Clear to percussion bilaterally; No rales, rhonchi, wheezing  HEART: Regular rate and rhythm; No murmurs, rubs, or gallops  ABDOMEN: Soft, Nontender, Nondistended; Bowel sounds present, no rebound   EXTREMITIES:  2+ Peripheral Pulses, No clubbing, cyanosis, or edema  GENITOURINARY:   SKIN: No rashes or lesions  BACK: no pressor sore   NERVOUS SYSTEM:  Alert & Oriented X3, Good concentration  PSYCH: normal affect     LABS:                         9.5    5.81  )-----------( 193      ( 03 Jan 2022 08:40 )             30.0     01-03    145  |  113<H>  |  14  ----------------------------<  169<H>  3.3<L>   |  26  |  1.31<H>    Ca    8.2<L>      03 Jan 2022 08:40      Radiology:       HPI:  72 yo Female PMH "heart dz", diabetes, asthma, CVA, seizure disorder, HTN, Dementia BIBA for change in mental status this AM. Family states patient has seemed more weak and tired than normal, requiring more assistance. Reports she began to look more swollen and her urinary output in milner decreased. Went to the doctor who replaced the chronic milner today. A large amount of dark urine came out. Pt this morning required noxious stimuli to wake up prompting ambulance call. As per family, patient is normally oriented x1, awake, alert and follows commands.     Admitted for likely catheter associated UTI. Milner was changed this AM.   (27 Dec 2021 17:59)      Allergies    No Known Allergies    Intolerances        MEDICATIONS  (STANDING):  amLODIPine   Tablet 5 milliGRAM(s) Oral daily  aspirin enteric coated 81 milliGRAM(s) Oral daily  cloNIDine 0.1 milliGRAM(s) Oral every 8 hours  dextrose 5% + lactated ringers. 1000 milliLiter(s) (70 mL/Hr) IV Continuous <Continuous>  heparin   Injectable 5000 Unit(s) SubCutaneous every 12 hours  hydrALAZINE 75 milliGRAM(s) Oral every 8 hours  levETIRAcetam 750 milliGRAM(s) Oral two times a day  simvastatin 20 milliGRAM(s) Oral at bedtime    MEDICATIONS  (PRN):  acetaminophen     Tablet .. 650 milliGRAM(s) Oral every 6 hours PRN Temp greater or equal to 38C (100.4F), Mild Pain (1 - 3)      REVIEW OF SYSTEMS:    CONSTITUTIONAL: No fever, chills, weight loss, or fatigue  HEENT: No sore throat, runny nose, ear ache  RESPIRATORY: No cough, wheezing, No shortness of breath  CARDIOVASCULAR: No chest pain, palpitations, dizziness  GASTROINTESTINAL: No abdominal pain. No nausea, vomiting, diarrhea  GENITOURINARY: No dysuria, increase frequency, hematuria, or incontinence  NEUROLOGICAL: No headaches, memory loss, loss of strength, numbness, or tremors, no weakness  EXTREMITY: No pedal edema BLE  SKIN: No itching, burning, rashes, or lesions     VITAL SIGNS:  T(C): 36.7 (01-04-22 @ 05:18), Max: 36.9 (01-03-22 @ 11:41)  T(F): 98 (01-04-22 @ 05:18), Max: 98.5 (01-03-22 @ 11:41)  HR: 45 (01-04-22 @ 05:18) (45 - 57)  BP: 183/69 (01-04-22 @ 05:18) (144/58 - 183/69)  RR: 18 (01-04-22 @ 05:18) (18 - 18)  SpO2: 94% (01-04-22 @ 05:18) (94% - 100%)  Wt(kg): --    PHYSICAL EXAM:    GENERAL: not in any distress  HEENT: Neck is supple, normocephalic, atraumatic   CHEST/LUNG: Clear to ausculation B/L  HEART: Regular rate and rhythm  ABDOMEN: BS present, soft, depressible, no tender  EXTREMITIES:  No edema  GENITOURINARY: Milner on place      LABS:                         9.5    5.81  )-----------( 193      ( 03 Jan 2022 08:40 )             30.0     01-03    145  |  113<H>  |  14  ----------------------------<  169<H>  3.3<L>   |  26  |  1.31<H>    Ca    8.2<L>      03 Jan 2022 08:40      Radiology:

## 2022-01-05 ENCOUNTER — TRANSCRIPTION ENCOUNTER (OUTPATIENT)
Age: 72
End: 2022-01-05

## 2022-01-05 VITALS — SYSTOLIC BLOOD PRESSURE: 123 MMHG | DIASTOLIC BLOOD PRESSURE: 49 MMHG | HEART RATE: 56 BPM

## 2022-01-05 LAB
HCT VFR BLD CALC: 30 % — LOW (ref 34.5–45)
HGB BLD-MCNC: 9.8 G/DL — LOW (ref 11.5–15.5)
MCHC RBC-ENTMCNC: 28.7 PG — SIGNIFICANT CHANGE UP (ref 27–34)
MCHC RBC-ENTMCNC: 32.7 GM/DL — SIGNIFICANT CHANGE UP (ref 32–36)
MCV RBC AUTO: 87.7 FL — SIGNIFICANT CHANGE UP (ref 80–100)
NRBC # BLD: 0 /100 WBCS — SIGNIFICANT CHANGE UP (ref 0–0)
PLATELET # BLD AUTO: 276 K/UL — SIGNIFICANT CHANGE UP (ref 150–400)
RBC # BLD: 3.42 M/UL — LOW (ref 3.8–5.2)
RBC # FLD: 13.2 % — SIGNIFICANT CHANGE UP (ref 10.3–14.5)
WBC # BLD: 6.83 K/UL — SIGNIFICANT CHANGE UP (ref 3.8–10.5)
WBC # FLD AUTO: 6.83 K/UL — SIGNIFICANT CHANGE UP (ref 3.8–10.5)

## 2022-01-05 PROCEDURE — 99239 HOSP IP/OBS DSCHRG MGMT >30: CPT

## 2022-01-05 PROCEDURE — 93010 ELECTROCARDIOGRAM REPORT: CPT

## 2022-01-05 RX ORDER — AMLODIPINE BESYLATE 2.5 MG/1
10 TABLET ORAL DAILY
Refills: 0 | Status: DISCONTINUED | OUTPATIENT
Start: 2022-01-05 | End: 2022-01-05

## 2022-01-05 RX ADMIN — LEVETIRACETAM 750 MILLIGRAM(S): 250 TABLET, FILM COATED ORAL at 06:18

## 2022-01-05 RX ADMIN — CX-024414 0.25 MILLILITER(S): 0.2 INJECTION, SUSPENSION INTRAMUSCULAR at 14:11

## 2022-01-05 RX ADMIN — Medication 0.1 MILLIGRAM(S): at 06:18

## 2022-01-05 RX ADMIN — SODIUM CHLORIDE 70 MILLILITER(S): 9 INJECTION, SOLUTION INTRAVENOUS at 06:19

## 2022-01-05 RX ADMIN — Medication 81 MILLIGRAM(S): at 11:40

## 2022-01-05 RX ADMIN — AMLODIPINE BESYLATE 5 MILLIGRAM(S): 2.5 TABLET ORAL at 06:17

## 2022-01-05 RX ADMIN — HEPARIN SODIUM 5000 UNIT(S): 5000 INJECTION INTRAVENOUS; SUBCUTANEOUS at 06:18

## 2022-01-05 RX ADMIN — Medication 75 MILLIGRAM(S): at 06:17

## 2022-01-05 NOTE — DISCHARGE NOTE NURSING/CASE MANAGEMENT/SOCIAL WORK - NSDCVIVACCINE_GEN_ALL_CORE_FT
influenza, injectable, quadrivalent, preservative free; 11-Oct-2017 11:36; Denia Duron (RN); [employer]; 572kt; IntraMuscular; Deltoid Left.; 0.5 milliLiter(s); VIS (VIS Published: 07-Aug-2015, VIS Presented: 11-Oct-2017);    COVID-19, mRNA, LNP-S, PF, 100 mcg/ 0.5 mL dose (Moderna); 05-Jan-2022 14:11; Catherine Crawley (RN); [employer] [id number] (Exp. Date: 12-Jan-2022); IntraMuscular; Deltoid Left.; 0.25 milliLiter(s);   influenza, injectable, quadrivalent, preservative free; 11-Oct-2017 11:36; Denia Duron (RN); [employer]; 572kt; IntraMuscular; Deltoid Left.; 0.5 milliLiter(s); VIS (VIS Published: 07-Aug-2015, VIS Presented: 11-Oct-2017);

## 2022-01-05 NOTE — PROGRESS NOTE ADULT - ASSESSMENT
Seen for catheter associated UTI  s/p Mayberry exchanged on arrival    Afebrile  No leukocytosis    BC Negative  UC: 3 org ---Contaminated    Venous duplex Negative for DVT    s/p one week of antibiotics  Stable off antibiotics  Will sign off the case, please reconsult if need it  Thank you

## 2022-01-05 NOTE — DISCHARGE NOTE NURSING/CASE MANAGEMENT/SOCIAL WORK - PATIENT PORTAL LINK FT
You can access the FollowMyHealth Patient Portal offered by [facility] by registering at the following website: [email or web address]. By joining [employer]’s FollowMyHealth portal, you will also be able to view your health information using other applications (apps) compatible with our system.

## 2022-01-05 NOTE — PROGRESS NOTE ADULT - ASSESSMENT
70 yo Female    h/o  DM,  asthma, CVA, seizure disorder, HTN, Dementia  BIBA for change in mental status.  Treated for catheter associated UTI.  Noted to be bradycardic after Lopressor dose, EKG showed sinus bradycardia.    # Sinus Bradycardia - likely due to Lopressor dosing, now resolved.  EKG showing NSR at 60bpm this am.  HR stable.    # Anemia - Hgb 13.6 on admission, suspect some degree of hemoconcentration.  Baseline Hgb 3/2021 was 10.6.  Repeat Hgbs have been stable over multiple days.  Fe profile wnl.  No s/s of active bleeding.  Hemodynamically stable.   # Catheter associated UTI - WBC 21k on admission, now normalized.  Completed IV Rocephin per ID.  # Chronic Urinary Retention - continue Mayberry  # AMADOU on arrival from dehydration with creatinine 1.78.  Cr improved -> 1.3.    # Functional Quadriplegia - supportive care  # Essential HTN - Monitor BP.  Continue Amlodipine, Hydralazine.   # h/o CVA - on ASA, Statin.     Stable for d/c home with homecare.   I left a message for pt's daughter Gali at [phone]

## 2022-01-05 NOTE — PROGRESS NOTE ADULT - NUTRITIONAL ASSESSMENT
This patient has been assessed with a concern for Malnutrition and has been determined to have a diagnosis/diagnoses of Moderate protein-calorie malnutrition.    This patient is being managed with:   Diet Pureed-  Mildly Thick Liquids (MILDTHICKLIQS)  Entered: Dec 29 2021 12:18PM    

## 2022-01-05 NOTE — PROGRESS NOTE ADULT - PROVIDER SPECIALTY LIST ADULT
Hospitalist
Hospitalist
Infectious Disease
Hospitalist
Infectious Disease
Gastroenterology
Hospitalist
Infectious Disease
Infectious Disease
Internal Medicine
Gastroenterology
Infectious Disease
Internal Medicine
Hospitalist

## 2022-01-05 NOTE — PROGRESS NOTE ADULT - SUBJECTIVE AND OBJECTIVE BOX
HPI:  72 yo Female PMH "heart dz", diabetes, asthma, CVA, seizure disorder, HTN, Dementia BIBA for change in mental status this AM. Family states patient has seemed more weak and tired than normal, requiring more assistance. Reports she began to look more swollen and her urinary output in milner decreased. Went to the doctor who replaced the chronic milner today. A large amount of dark urine came out. Pt this morning required noxious stimuli to wake up prompting ambulance call. As per family, patient is normally oriented x1, awake, alert and follows commands.     Admitted for likely catheter associated UTI. Milner was changed this AM.   (27 Dec 2021 17:59)      Allergies    No Known Allergies    Intolerances        MEDICATIONS  (STANDING):  amLODIPine   Tablet 5 milliGRAM(s) Oral daily  aspirin enteric coated 81 milliGRAM(s) Oral daily  cloNIDine 0.1 milliGRAM(s) Oral every 8 hours  coronavirus (EUA) Booster Vaccine (MODERNA) 0.25 milliLiter(s) IntraMuscular once  dextrose 5% + lactated ringers. 1000 milliLiter(s) (70 mL/Hr) IV Continuous <Continuous>  heparin   Injectable 5000 Unit(s) SubCutaneous every 12 hours  hydrALAZINE 75 milliGRAM(s) Oral every 8 hours  levETIRAcetam 750 milliGRAM(s) Oral two times a day  simvastatin 20 milliGRAM(s) Oral at bedtime    MEDICATIONS  (PRN):  acetaminophen     Tablet .. 650 milliGRAM(s) Oral every 6 hours PRN Temp greater or equal to 38C (100.4F), Mild Pain (1 - 3)      REVIEW OF SYSTEMS:    CONSTITUTIONAL: No fever, chills, weight loss, or fatigue  HEENT: No sore throat, runny nose, ear ache  RESPIRATORY: No cough, wheezing, No shortness of breath  CARDIOVASCULAR: No chest pain, palpitations, dizziness  GASTROINTESTINAL: No abdominal pain. No nausea, vomiting, diarrhea  GENITOURINARY: No dysuria, increase frequency, hematuria, or incontinence  NEUROLOGICAL: No headaches, memory loss, loss of strength, numbness, or tremors, no weakness  EXTREMITY: No pedal edema BLE  SKIN: No itching, burning, rashes, or lesions     VITAL SIGNS:  T(C): 36.7 (01-05-22 @ 05:09), Max: 36.8 (01-05-22 @ 00:11)  T(F): 98 (01-05-22 @ 05:09), Max: 98.2 (01-05-22 @ 00:11)  HR: 62 (01-05-22 @ 05:09) (57 - 62)  BP: 151/69 (01-05-22 @ 05:09) (124/68 - 193/81)  RR: 18 (01-05-22 @ 05:09) (18 - 19)  SpO2: 99% (01-05-22 @ 05:09) (96% - 99%)  Wt(kg): --    PHYSICAL EXAM:      GENERAL: not in any distress  HEENT: Neck is supple, normocephalic, atraumatic   CHEST/LUNG: Clear to ausculation B/L  HEART: Regular rate and rhythm  ABDOMEN: BS present, soft, depressible, no tender  EXTREMITIES:  No edema  GENITOURINARY: Milner on place    LABS:                         9.8    6.83  )-----------( 276      ( 05 Jan 2022 08:06 )             30.0       Thyroid Stimulating Hormone, Serum: 2.060 uU/mL (01-04 @ 10:50)        Radiology:

## 2022-01-05 NOTE — DISCHARGE NOTE PROVIDER - NSDCFUADDINST_GEN_ALL_CORE_FT
Activity with assistance    It is important to see your primary physician as well as the physicians noted below within the next week to perform a comprehensive medical review.  Call their offices for an appointment as soon as you leave the hospital.  You will also need to see them for renewal of your medications.  If you do not have a primary physician, contact the [facility] Physician Referral Service at [phone].  To obtain your results, you can access the FollowACTV8 Patient Portal at [email or web address].  Your medical issues appear to be stable at this time, but if your symptoms recur or worsen, contact your physicians and/or return to the hospital if necessary.  If you encounter any issues or questions with your medication, call your physicians before stopping the medication.  Do not drive.  Limit your diet to 2 grams of sodium daily.

## 2022-01-05 NOTE — PROGRESS NOTE ADULT - SUBJECTIVE AND OBJECTIVE BOX
Patient: FATOU CMBRIDE [id number] 71y Female                            Hospitalist Attending Note    HR now 60  Appears comfortable.     ____________________PHYSICAL EXAM:  GENERAL:  NAD, awake, confused.    HEENT: NCAT  CARDIOVASCULAR:  S1, S2  LUNGS: CTAB  ABDOMEN:  soft, (-) tenderness, (-) distension, (+) bowel sounds, (-) guarding, (-) rebound (-) rigidity  EXTREMITIES:  no cyanosis / clubbing / edema.   ____________________    VITALS:  Vital Signs Last 24 Hrs  T(C): 36.2 (2022 12:03), Max: 36.8 (2022 00:11)  T(F): 97.2 (2022 12:03), Max: 98.2 (2022 00:11)  HR: 58 (2022 12:03) (57 - 62)  BP: 126/48 (2022 12:03) (124/68 - 193/81)  BP(mean): --  RR: 17 (2022 12:03) (17 - 19)  SpO2: 97% (2022 12:03) (96% - 99%) Daily     Daily Weight in k (2022 05:09)  CAPILLARY BLOOD GLUCOSE        I&O's Summary    2022 07:01  -  2022 07:00  --------------------------------------------------------  IN: 840 mL / OUT: 1000 mL / NET: -160 mL        LABS:                        9.8    6.83  )-----------( 276      ( 2022 08:06 )             30.0                         MEDICATIONS:  acetaminophen     Tablet .. 650 milliGRAM(s) Oral every 6 hours PRN  amLODIPine   Tablet 10 milliGRAM(s) Oral daily  aspirin enteric coated 81 milliGRAM(s) Oral daily  cloNIDine 0.1 milliGRAM(s) Oral every 8 hours  coronavirus (EUA) Booster Vaccine (MODERNA) 0.25 milliLiter(s) IntraMuscular once  dextrose 5% + lactated ringers. 1000 milliLiter(s) IV Continuous <Continuous>  heparin   Injectable 5000 Unit(s) SubCutaneous every 12 hours  hydrALAZINE 75 milliGRAM(s) Oral every 8 hours  levETIRAcetam 750 milliGRAM(s) Oral two times a day  simvastatin 20 milliGRAM(s) Oral at bedtime

## 2022-01-05 NOTE — DISCHARGE NOTE PROVIDER - HOSPITAL COURSE
70 yo Female    h/o  DM,  asthma, CVA, seizure disorder, HTN, Dementia  BIBA for change in mental status.  Treated for catheter associated UTI.  Noted to be bradycardic after Lopressor dose, EKG showed sinus bradycardia.    # Sinus Bradycardia - likely due to Lopressor dosing, now resolved.  EKG showing NSR at 60bpm this am.  HR stable.    # Anemia - Hgb 13.6 on admission, suspect some degree of hemoconcentration.  Baseline Hgb 3/2021 was 10.6.  Repeat Hgbs have been stable over multiple days.  Fe profile wnl.  No s/s of active bleeding.  Hemodynamically stable.   # Catheter associated UTI - WBC 21k on admission, now normalized.  Completed IV Rocephin per ID.  # Chronic Urinary Retention - continue Mayberry  # AMADOU on arrival from dehydration with creatinine 1.78.  Cr improved -> 1.3.    # Functional Quadriplegia - supportive care  # Essential HTN - Monitor BP.  Continue Amlodipine, Hydralazine.   # h/o CVA - on ASA, Statin.     Stable for d/c home with homecare.   I left a message for pt's daughter Gali at [phone]    Disposition: Stable for discharge.  Outpatient followup discussed.  Total time spent on discharge is  40 minutes.

## 2022-01-05 NOTE — DISCHARGE NOTE NURSING/CASE MANAGEMENT/SOCIAL WORK - NSDCPEFALRISK_GEN_ALL_CORE
For information on Fall & Injury Prevention, visit: [email or web address] OR  [email or web address] OR  https://www.cdc.gov/steadi/patient.html

## 2022-01-05 NOTE — PROGRESS NOTE ADULT - REASON FOR ADMISSION
Altered mental status from catheter associated UTI

## 2022-01-05 NOTE — DISCHARGE NOTE PROVIDER - NSDCMRMEDTOKEN_GEN_ALL_CORE_FT
amLODIPine 10 mg oral tablet: 1 tab(s) orally once a day  aspirin 81 mg oral delayed release tablet: 1 tab(s) orally once a day  cloNIDine 0.1 mg oral tablet: 1 tab(s) orally every 8 hours  hydrALAZINE 100 mg oral tablet: 1 tab(s) orally every 8 hours  levETIRAcetam 750 mg oral tablet: 1 tab(s) orally 2 times a day  Multiple Vitamins oral tablet: 1 tab(s) orally once a day  QUEtiapine 25 mg oral tablet: 1 tab(s) orally once a day (at bedtime)  Zocor 20 mg oral tablet: 1 tab(s) orally once a day (at bedtime)

## 2022-01-05 NOTE — DISCHARGE NOTE PROVIDER - NSDCCPCAREPLAN_GEN_ALL_CORE_FT
PRINCIPAL DISCHARGE DIAGNOSIS  Diagnosis: Urinary tract infection associated with indwelling urethral catheter  Assessment and Plan of Treatment:       SECONDARY DISCHARGE DIAGNOSES  Diagnosis: AMADOU (acute kidney injury)  Assessment and Plan of Treatment:     Diagnosis: Functional quadriplegia  Assessment and Plan of Treatment:     Diagnosis: Anemia  Assessment and Plan of Treatment:     Diagnosis: Sinus bradycardia  Assessment and Plan of Treatment:      PRINCIPAL DISCHARGE DIAGNOSIS  Diagnosis: Urinary tract infection associated with indwelling urethral catheter  Assessment and Plan of Treatment:       SECONDARY DISCHARGE DIAGNOSES  Diagnosis: AMADOU (acute kidney injury)  Assessment and Plan of Treatment:     Diagnosis: Functional quadriplegia  Assessment and Plan of Treatment:     Diagnosis: Anemia  Assessment and Plan of Treatment:     Diagnosis: Sinus bradycardia  Assessment and Plan of Treatment:     Diagnosis: Dehydration  Assessment and Plan of Treatment:

## 2022-01-06 NOTE — DISCHARGE NOTE NURSING/CASE MANAGEMENT/SOCIAL WORK - PATIENT PORTAL LINK FT
You can access the FollowMyHealth Patient Portal offered by [facility] by registering at the following website: [email or web address]. By joining [employer]’s FollowMyHealth portal, you will also be able to view your health information using other applications (apps) compatible with our system.
(1) Outpatient Area

## 2022-01-07 DIAGNOSIS — R53.2 FUNCTIONAL QUADRIPLEGIA: ICD-10-CM

## 2022-01-07 DIAGNOSIS — N39.0 URINARY TRACT INFECTION, SITE NOT SPECIFIED: ICD-10-CM

## 2022-01-07 DIAGNOSIS — Z87.442 PERSONAL HISTORY OF URINARY CALCULI: ICD-10-CM

## 2022-01-07 DIAGNOSIS — R00.1 BRADYCARDIA, UNSPECIFIED: ICD-10-CM
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
## 2022-01-07 DIAGNOSIS — Z79.82 LONG TERM (CURRENT) USE OF ASPIRIN: ICD-10-CM

## 2022-01-07 DIAGNOSIS — G40.909 EPILEPSY, UNSPECIFIED, NOT INTRACTABLE, WITHOUT STATUS EPILEPTICUS: ICD-10-CM

## 2022-01-07 DIAGNOSIS — R33.8 OTHER RETENTION OF URINE: ICD-10-CM

## 2022-01-07 DIAGNOSIS — F03.90 UNSPECIFIED DEMENTIA WITHOUT BEHAVIORAL DISTURBANCE: ICD-10-CM

## 2022-01-07 DIAGNOSIS — I10 ESSENTIAL (PRIMARY) HYPERTENSION: ICD-10-CM

## 2022-01-07 DIAGNOSIS — I69.351 HEMIPLEGIA AND HEMIPARESIS FOLLOWING CEREBRAL INFARCTION AFFECTING RIGHT DOMINANT SIDE: ICD-10-CM

## 2022-01-07 DIAGNOSIS — M15.9 POLYOSTEOARTHRITIS, UNSPECIFIED: ICD-10-CM

## 2022-01-07 DIAGNOSIS — T83.511A INFECTION AND INFLAMMATORY REACTION DUE TO INDWELLING URETHRAL CATHETER, INITIAL ENCOUNTER: ICD-10-CM

## 2022-01-07 DIAGNOSIS — E87.6 HYPOKALEMIA: ICD-10-CM

## 2022-01-07 DIAGNOSIS — A41.9 SEPSIS, UNSPECIFIED ORGANISM: ICD-10-CM

## 2022-01-07 DIAGNOSIS — E44.0 MODERATE PROTEIN-CALORIE MALNUTRITION: ICD-10-CM

## 2022-01-07 DIAGNOSIS — E11.9 TYPE 2 DIABETES MELLITUS WITHOUT COMPLICATIONS: ICD-10-CM

## 2022-01-07 DIAGNOSIS — Z92.29 PERSONAL HISTORY OF OTHER DRUG THERAPY: ICD-10-CM

## 2022-01-07 DIAGNOSIS — E86.0 DEHYDRATION: ICD-10-CM

## 2022-01-07 DIAGNOSIS — J45.909 UNSPECIFIED ASTHMA, UNCOMPLICATED: ICD-10-CM

## 2022-01-07 DIAGNOSIS — D64.9 ANEMIA, UNSPECIFIED: ICD-10-CM

## 2022-01-07 DIAGNOSIS — M10.9 GOUT, UNSPECIFIED: ICD-10-CM

## 2022-01-07 DIAGNOSIS — N17.9 ACUTE KIDNEY FAILURE, UNSPECIFIED: ICD-10-CM

## 2022-01-07 DIAGNOSIS — T44.7X5A ADVERSE EFFECT OF BETA-ADRENORECEPTOR ANTAGONISTS, INITIAL ENCOUNTER: ICD-10-CM
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
## 2022-01-12 NOTE — ED PROVIDER NOTE - CAS EDP CONSULT WILL SEE PT
Stroke Note - [facility]      1/12/2022   [id number]   Dk Lo     Diagnosis:  Hemorrhagic Mets, old right ICA and right frontal gyrus infarct, old hemorrhagic right capsular infarct  Requesting Doctor: CONSULTATION - REFERRAL  Melguizo-Gavilanes, Isa*    Chief Complaint:  Rigidity        HPI:  Dk Lo is a 67 year old male who  has a past medical history of bladder cancer (10/12/2015), Renal cell carcinoma with mets to the brain and spine, Chronic kidney disease (CKD), stage III (moderate) COPD (chronic obstructive pulmonary disease) , Coronary artery disease, History of right ICA BG stroke (2002), Hyperparathyroidism  He was diagnosed with bladder cancer in 2015.    He was diagnosed with renal cell carcinoma in December 2018 after presenting with back pain. CT showed suspicious malignant lesions. Dr. Dobson saw him immediately and had him wear a brace. He then had surgery in December 2018.  Unfortunately he was admitted in late 2020 with dehiscence of his back wound.  Found to have osteomyelitis, treated with antibiotics and HBO treatment.  He had VAC placed in March 2021.  He states it is now healed.    Also had immunotherapy, nivolumab. He had adjuvant whole brain radiation.  He stopped immunotherapy in November 2021.     In early 2019 he suddenly had slurred speech. He was found to have cerebellar and skull mets. Now with left leg weakness. Also with left foot drop, catching on the floor.     Recent ureter biopsy in October 2021 showed noninvasive papillary low-grade urothelial carcinoma.  Tumor board considered right ureter nephrectomy.  He has PET scan and renal ultrasound later this month.     After his renal cell cancer diagnosis & hospitalization he went to as assisted living so he could be outpatient to get his immunotherapy. Prior to this he lived in a house with his brother.  There have been issues at the facility with might being able to get his medications properly.  He states  that the assisted living staff do nothing to help him any does not need to be, however he gave away all of his furniture and that is why he remains at the assisted living rather than living in his own home.    He had an ischemic stroke in 2002 of the right ICA with chronic right middle frontal gyrus infarct and hemorrhagic right capsular infarct. He had some left sided weakness but recovered very well with minimal residual deficit.   The mets to the brain seem to be causing more weakness.     No falls recently.     Dk Lo is sleeping poorly. He states his days and nights get mixed up. He is bored during the day and falls asleep.      Dk Lo denies vision changes.    Dk Lo is eating well, good appetite. However he has lost over 50 pounds over the past 4 years.     Dk Lo denies chest pain, shortness of breath, palpitations, calf pain, nausea, vomiting, constipation.     Followed in PCP by Dr. Crump.  Followed in Neuro onc by Dr. Zhao.  Followed in Rad Onc by Dr. Hyde.  Followed in oncology by Dr. Reynoso.     His goal is to improve transfers and walking.     Pertinent meds:  Tylenol PRN  Keppra 500 mg BID      Current Functional Status:    ADLs- I  Mobility- rollator walker since 2020 through insurance  Transfers- I, some unsteadiness  Bracing-  -  Speech- I  Cognition/memory- some short term memory issues, otherwise thinks he is at baseline  Swallowing- some dysphagia, general diet  Equipment- none  Bladder- morneo catheter since 2018  Bowel- continent      Social:  Current residency is at assisted living with 0 steps to enter.  Patient worked as a  and in management.   He has 2 sons and 1 daughter. He has 6 grandchildren.     Past Medical History:   Diagnosis Date   • Bilateral inguinal hernia with obstruction 09/2015   • Bladder tumor 10/12/2015    renal cell carcinoma   • Brain metastases (CMS/HCC) 12/16/2019   • Chronic kidney disease (CKD),  stage III (moderate) (CMS/HCC) 04/11/2016   • Chronic kidney disease, stage II (mild) 09/24/2015    acute renal failure with hydronephrosis  Stage 2   • Chronic pain     left arm pain with movement   • Cocaine abuse (CMS/[facility])     none since 2015   • Complete lesion at T11 level of thoracic spinal cord (CMS/HCC) 12/01/2018   • Constipation    • Constipation 12/2018   • COPD (chronic obstructive pulmonary disease) (CMS/[facility])     prn inhaler   • Coronary artery disease    • Decreased activities of daily living (ADL)     uses walker at [facility] [phone] ask for 1st floor, was homeless since 2015   • Difficult intubation    • Essential (primary) hypertension    • Fracture 1980    Right wrist  Casted   • Gastroesophageal reflux disease    • Hematuria 12/05/2018    bladder cancer, numerous TURBT procedures   • High cholesterol    • History of right ICA BG stroke 2002    R BG stroke 2002 due to ROME occlusion   • Hyperparathyroidism (CMS/HCC) 04/11/2016   • Hypertensive urgency 02/24/2016   • Impaired mobility and ADLs     [facility] patient DNR meg, resident of [facility] in [place] (floor 1) [phone]   • Incarcerated hernia 09/17/2015   • Lives in assisted living facility     [facility] [phone]   • Malignant neoplasm (CMS/HCC) 10/20/2015    bladder cancer   • Metastatic cancer (CMS/HCC) 12/01/2018    thoracic spine, lungs, cellebellum, bone- sees Dr. Reynoso   • Open wound of tissue overlying spine 09/2019    small open area on spinal incision not healing probably due to bone protusion post 12/2018 surgery   • Right carotid artery occlusion 2002    aumarosis short period then sudden stroke, Tx [facility] Plavix and statin   • Right renal mass 10/10/2016   • Smoker     for past 43 years   • Tobacco use    • Urinary tract infection 08/2015   • Vitamin D deficiency 10/01/2015   • Walker as ambulation aid    • Wears glasses    • Wound infection after surgery 02/2019    had back surgery in 12/2018       Past Surgical History:   Procedure Laterality Date   • Cysto w/ureteroscopy  9/17/15   • Cystoscopy w/ retrogrades Left 09/20/2016    Ureteral biopsy   • Hernia repair Bilateral 9/17/15    inguinal hernia repar with mesh with Dr. Gonzalez   • Orif femur fracture Left 03/07/2019    impending fracture. Dr Bajwa. [facility]   • Orif hip fracture Right 03/18/2019    impending fracture. Dr Bajwa. [facility]   • Orif humerus fracture Left 03/07/2019    impending fracture. Dr Bajwa. [facility]   • Service to urology  1974    cysto   • Thoracic fusion  12/31/2018    THORACIC 9 - LUMBAR 1 POSTERIOR FUSION (Dr. Kali Dobson, [facility])    • Transurethral resection of bladder tumor  10/20/15     Family History   Problem Relation Age of Onset   • Cancer Mother         bronchial (smoker)   • Heart disease Father    • Diabetes Father    • Obesity Brother    • Asthma Brother    • Stroke Brother    • Heart disease Brother    • Heart disease Brother    • Alcohol Abuse Brother      Social History     Tobacco Use   • Smoking status: Current Every Day Smoker     Packs/day: 0.50     Years: 43.00     Pack years: 21.50     Types: Cigarettes     Start date: 12/19/1975   • Smokeless tobacco: Never Used   • Tobacco comment: smoking history is approximate per patient report; 1 pack per month; states he quit from 2015-May 2018 then restarted   Vaping Use   • Vaping Use: never used   Substance Use Topics   • Alcohol use: No     Alcohol/week: 0.0 standard drinks   • Drug use: No      ALLERGIES:  No Known Allergies    Current Outpatient Medications   Medication Sig Dispense Refill   • tamsulosin (FLOMAX) 0.4 MG Cap GIVE 1 CAPSULE BY MOUTH ONCE DAILY AFTER A MEAL 30 capsule 10   • metoPROLOL tartrate (LOPRESSOR) 25 MG tablet Take 1 tablet by mouth every 12 hours. 60 tablet 11   • aspirin 81 MG chewable tablet Chew 1 tablet by mouth daily. 90 tablet 3   • levETIRAcetam (KepPRA) 500 MG tablet Take 1 tablet by mouth 2 times daily. 60 tablet 6   • fluticasone  (FLONASE) 50 MCG/ACT nasal spray Spray 1 spray in each nostril daily.     • Calcium Carb-Cholecalciferol (Calcium-Vitamin D) 600-400 MG-UNIT Tab Take 1 tablet by mouth 2 times daily.      • acetaminophen (TYLENOL) 325 MG tablet Take 650 mg by mouth every 4 hours as needed.  30 tablet 0   • finasteride (PROSCAR) 5 MG tablet Take 1 tablet by mouth daily. 30 tablet 0   • atorvastatin (LIPITOR) 10 MG tablet Take 1 tablet by mouth daily. 90 tablet 3     No current facility-administered medications for this visit.          Review of Systems:  A 12 point comprehensive review of systems was performed and was negative except for those pertinent items noted in the HPI.        PHQ-9 DEPRESSION MONITORING QUESTIONNAIRE    Question Answer   Over the LAST 2 WEEKS, how often have you been bothered by any of the following problems?    1.  Little interest or pleasure in doing things 0   2.  Feeling down, depressed, or hopeless 0   3.  Trouble falling/staying asleep, sleeping too much 2   4.  Feeling tired or having little energy 0   5.  Poor appetite or overeating 1   6.  Feeling bad about yourself - or that you are a failure or have let yourself or your family down 0   7.  Trouble concentrating on things, such as reading the newspaper or watching television 0   8.  Moving or speaking so slowly that other people could have noticed or the opposite - being so fidgety or restless that you have been moving around a lot more than usual 0   9.  Thoughts that you would be better off dead or of hurting yourself in some way 0   If you checked off ANY problem on this questionnaire so far, how DIFFICULT have these problems made it for you to do your work, take care of things at home, or get along with other people? -       TOTAL SCORE 3       Interpretation of Total Scores*  Minimal Depression:  1-4  Mild Depression:  5-9  Moderate Depression:  10-14  Moderately Severe Depression:  15-19  Severe Depression:  20-27    * From Cole Shaw  RL, Psychiatric Annals 2002;32:509-521   Based off of the PHQ9 Copyright © [employer] All rights reserved. Reproduced with permission. PRIME-MD ® is a trademark of [employer].      Proposed Treatment Action:  Monitor      [place] SYMPTOM SCALE:  Pain-0  Tired-3  Nauseated-0  Depressed-0  Anxious-0  Drowsy-0  Appetite-2  Feeling of well being-1  Dyspnea-0        EXAM:  Visit Vitals  Pulse 67   Ht 6' 1\" (1.854 m)   Wt 74.2 kg (163 lb 9.6 oz)   SpO2 97%   BMI 21.58 kg/m²       GEN:  Patient is in no acute distress  EYES:  Sclerae anicteric, pupils symmetric  NECK:  Supple, symmetric, no masses, no dystonia   SKIN:  Inspection of exposed skin reveals no rash, no ulcers  CV:  2/4 radial pulses bilaterally, no edema in extremities   PULM:  Unlabored breathing, no visible retractions  ABD:  soft, non-tender   PSYCH:  Pleasant, mood & affect congruent, responds to questions appropriately     NEURO:  Gen - Alert and oriented x3.  Affect - pleasant  Cranial Nerves - EOMI, VF full, facial symmetry  Cognitive:  Speech is clear. Patient is able to repeat. Patient is able to name.   Sensory - Sensation to light touch is intact in right upper extremity and right lower extremity  and reduced in left lower extremity .  Proprioception is intact in right lower extremity, inconsistent in left lower extremity .    Balance/Coordination-  Fast Alternating Movements:  Right intact, left slow.  Gait - He ambulates without assistive device with a narrow base, short stride, some decreased left heel strike, no loss of balance.     Passive Range of Motion (PROM) and Nikki Scale (FAVIOLA):  Revised Nikki Tardieu Scale (RATS) Key:  0:  No increase in muscle tone.  1:  Slight increase in tone manifested by a \"catch\" followed by release.  2:  Mild increase in tone.  3:  Moderate increase in tone.  4:  Severe increase in tone.  *: <10s clonus.   **: >10s clonus.  R1: Angle first catch.   R2: End range of motion.    Patient seated for arm  testing. Patient supine for leg testing     Normal strength and tone right arm and leg.    LEFT  PROM R1  LEFT  FAVIOLA  0-4   LEFT  PROM R2    ARM      Shoulder adductors:   0° to 180°      Elbow flexors:   150° to 0°      Elbow extensors:   0° to 150°      Forearm pronators:   90° P to 90° S      Wrist flexors:   90° F to 90° E      Finger flexors:  90° F to 0°      LEG      Hip flexors:  120° F to 30° E       Hip adductors:   30° Add to 50° Abd      Knee extensors:   0° to 135°      Knee flexors:   135° to 0°      Ankle plantar flexors:   50° PF to 20° DF  1    Ankle invertors:   40° Inv to 20° Ev      Strength:  Right Left Site Right Left Site   5 5 S Ab:  Shoulder Abductors 5 5 HF:  Hip Flexors   5 5 EF:  Elbow Flexors 5 5 H Ab:  Hip Abductors   5 5 EE:  Elbow Extensors 5 5 KF: Knee Flexors   5 5 WE:  Wrist Extensors 5 5 KE:  Knee Extensors   5 5 FF:  Finger Flexors 5 4+ DR:  Dorsi Flexors   5 5 HI:  Hand Intrinsics 5 5 PF:  Plantar Flexors         Imaging:  MRI brain 11/15/21:  IMPRESSION:       1.  Unchanged previously treated hemorrhagic metastases.  No new enhancing  intracranial metastasis.       2.  Redemonstrated chronic right ICA occlusion and chronic right middle  frontal gyrus infarct and chronic hemorrhagic right striatocapsular  Infarct.    Impression/Plan:  Dk Lo is 67 year old male with bladder cancer, metastatic renal cell carcinoma with mets to the brain & spine, who presents with functional decline and balance changes.  He would like to maintain as independent as possible. Unclear if he would be able to live independently. Upcoming scan in a couple weeks. He would like to remain healthy and functional enough to continue life-prolonging treatments if possible. He seems lonely at times and regrets not being able to visit with his family especially grandchildren more often.       #  Tobacco Use - yes; not interested in quitting at this time    #  Depression - mood ok, frustrated by his  assisted living facility and disease    #  Function - ECOG 2  - order placed for outpatient PT for gait training, advanced balance work, perturbation training, aerobic activity  - could consider wheelchair clinic in the future if needed  - referral to [facility] for eyeglasses prescription upgrade    #  Spasticity - mild increased tone in left plantarflexors, some catching of the left foot  - could consider botox injections  - order for left AFO     #  Bladder/Bowel -  continent    #  Pressure Ulcers - none    #  Nutrition - ok    # Fall risk/Bone density - Educated patient and family about environmental risk reduction strategies.     #  Sleep - poor   - start melatonin 3-6 mg at bedtime to improve circadian rhythm    #  Caregiver support - none present    #  Advanced care planning -  Documents scanned  - will address code status next visit  - no HCPOA identified    #  Return to clinic in 2 MONTHS    Orquidea Gordon MD    60 minutes of face to face time was spent with the patient of which >50% of the time was spent in patient education and counseling.    The consultation report was sent back to the requesting provider.    Cc: Melguizo-Gavilanes, Isa*   shortly

## 2022-01-26 NOTE — PROGRESS NOTE ADULT - PROBLEM/PLAN-3
DISPLAY PLAN FREE TEXT
Negative

## 2022-02-06 ENCOUNTER — INPATIENT (INPATIENT)
Facility: HOSPITAL | Age: 72
LOS: 4 days | Discharge: HOME | End: 2022-02-11
Attending: INTERNAL MEDICINE | Admitting: INTERNAL MEDICINE
Payer: MEDICARE

## 2022-02-06 VITALS
HEART RATE: 117 BPM | SYSTOLIC BLOOD PRESSURE: 187 MMHG | DIASTOLIC BLOOD PRESSURE: 98 MMHG | OXYGEN SATURATION: 99 % | HEIGHT: 65 IN | TEMPERATURE: 98 F | WEIGHT: 119.93 LBS | RESPIRATION RATE: 18 BRPM

## 2022-02-06 DIAGNOSIS — N17.9 ACUTE KIDNEY FAILURE, UNSPECIFIED: ICD-10-CM

## 2022-02-06 DIAGNOSIS — E86.0 DEHYDRATION: ICD-10-CM

## 2022-02-06 DIAGNOSIS — A41.9 SEPSIS, UNSPECIFIED ORGANISM: ICD-10-CM

## 2022-02-06 DIAGNOSIS — N20.0 CALCULUS OF KIDNEY: Chronic | ICD-10-CM

## 2022-02-06 DIAGNOSIS — N39.0 URINARY TRACT INFECTION, SITE NOT SPECIFIED: ICD-10-CM

## 2022-02-06 LAB
ALBUMIN SERPL ELPH-MCNC: 3.1 G/DL — LOW (ref 3.3–5)
ALP SERPL-CCNC: 91 U/L — SIGNIFICANT CHANGE UP (ref 40–120)
ALT FLD-CCNC: 13 U/L — SIGNIFICANT CHANGE UP (ref 12–78)
ANION GAP SERPL CALC-SCNC: 9 MMOL/L — SIGNIFICANT CHANGE UP (ref 5–17)
ANISOCYTOSIS BLD QL: SLIGHT — SIGNIFICANT CHANGE UP
APPEARANCE UR: ABNORMAL
APTT BLD: 35.7 SEC — HIGH (ref 27.5–35.5)
AST SERPL-CCNC: 18 U/L — SIGNIFICANT CHANGE UP (ref 15–37)
BACTERIA # UR AUTO: ABNORMAL
BASOPHILS # BLD AUTO: 0 K/UL — SIGNIFICANT CHANGE UP (ref 0–0.2)
BASOPHILS NFR BLD AUTO: 0 % — SIGNIFICANT CHANGE UP (ref 0–2)
BILIRUB SERPL-MCNC: 0.8 MG/DL — SIGNIFICANT CHANGE UP (ref 0.2–1.2)
BILIRUB UR-MCNC: NEGATIVE — SIGNIFICANT CHANGE UP
BUN SERPL-MCNC: 39 MG/DL — HIGH (ref 7–23)
CALCIUM SERPL-MCNC: 9.3 MG/DL — SIGNIFICANT CHANGE UP (ref 8.5–10.1)
CHLORIDE SERPL-SCNC: 114 MMOL/L — HIGH (ref 96–108)
CO2 SERPL-SCNC: 21 MMOL/L — LOW (ref 22–31)
COLOR SPEC: YELLOW — SIGNIFICANT CHANGE UP
CREAT SERPL-MCNC: 2.72 MG/DL — HIGH (ref 0.5–1.3)
DIFF PNL FLD: ABNORMAL
EOSINOPHIL # BLD AUTO: 0 K/UL — SIGNIFICANT CHANGE UP (ref 0–0.5)
EOSINOPHIL NFR BLD AUTO: 0 % — SIGNIFICANT CHANGE UP (ref 0–6)
EPI CELLS # UR: SIGNIFICANT CHANGE UP
FLUAV AG NPH QL: SIGNIFICANT CHANGE UP
FLUBV AG NPH QL: SIGNIFICANT CHANGE UP
GLUCOSE SERPL-MCNC: 244 MG/DL — HIGH (ref 70–99)
GLUCOSE UR QL: NEGATIVE MG/DL — SIGNIFICANT CHANGE UP
HCT VFR BLD CALC: 38.3 % — SIGNIFICANT CHANGE UP (ref 34.5–45)
HGB BLD-MCNC: 12.3 G/DL — SIGNIFICANT CHANGE UP (ref 11.5–15.5)
INR BLD: 1.17 RATIO — HIGH (ref 0.88–1.16)
KETONES UR-MCNC: NEGATIVE — SIGNIFICANT CHANGE UP
LACTATE SERPL-SCNC: 1.6 MMOL/L — SIGNIFICANT CHANGE UP (ref 0.7–2)
LEUKOCYTE ESTERASE UR-ACNC: ABNORMAL
LG PLATELETS BLD QL AUTO: SLIGHT — SIGNIFICANT CHANGE UP
LYMPHOCYTES # BLD AUTO: 0.7 K/UL — LOW (ref 1–3.3)
LYMPHOCYTES # BLD AUTO: 3 % — LOW (ref 13–44)
MANUAL SMEAR VERIFICATION: SIGNIFICANT CHANGE UP
MCHC RBC-ENTMCNC: 28.1 PG — SIGNIFICANT CHANGE UP (ref 27–34)
MCHC RBC-ENTMCNC: 32.1 G/DL — SIGNIFICANT CHANGE UP (ref 32–36)
MCV RBC AUTO: 87.4 FL — SIGNIFICANT CHANGE UP (ref 80–100)
MONOCYTES # BLD AUTO: 0.94 K/UL — HIGH (ref 0–0.9)
MONOCYTES NFR BLD AUTO: 4 % — SIGNIFICANT CHANGE UP (ref 2–14)
NEUTROPHILS # BLD AUTO: 21.56 K/UL — HIGH (ref 1.8–7.4)
NEUTROPHILS NFR BLD AUTO: 89 % — HIGH (ref 43–77)
NEUTS BAND # BLD: 3 % — SIGNIFICANT CHANGE UP (ref 0–8)
NEUTS HYPERSEG # BLD: PRESENT — SIGNIFICANT CHANGE UP
NITRITE UR-MCNC: NEGATIVE — SIGNIFICANT CHANGE UP
NRBC # BLD: 0 /100 — SIGNIFICANT CHANGE UP (ref 0–0)
NRBC # BLD: SIGNIFICANT CHANGE UP /100 WBCS (ref 0–0)
PH UR: 8 — SIGNIFICANT CHANGE UP (ref 5–8)
PLAT MORPH BLD: NORMAL — SIGNIFICANT CHANGE UP
PLATELET # BLD AUTO: 339 K/UL — SIGNIFICANT CHANGE UP (ref 150–400)
POTASSIUM SERPL-MCNC: 3.7 MMOL/L — SIGNIFICANT CHANGE UP (ref 3.5–5.3)
POTASSIUM SERPL-SCNC: 3.7 MMOL/L — SIGNIFICANT CHANGE UP (ref 3.5–5.3)
PROT SERPL-MCNC: 9.1 GM/DL — HIGH (ref 6–8.3)
PROT UR-MCNC: 500 MG/DL
PROTHROM AB SERPL-ACNC: 13.5 SEC — SIGNIFICANT CHANGE UP (ref 10.6–13.6)
RBC # BLD: 4.38 M/UL — SIGNIFICANT CHANGE UP (ref 3.8–5.2)
RBC # FLD: 14.3 % — SIGNIFICANT CHANGE UP (ref 10.3–14.5)
RBC BLD AUTO: ABNORMAL
RBC CASTS # UR COMP ASSIST: SIGNIFICANT CHANGE UP /HPF (ref 0–4)
SARS-COV-2 RNA SPEC QL NAA+PROBE: SIGNIFICANT CHANGE UP
SODIUM SERPL-SCNC: 144 MMOL/L — SIGNIFICANT CHANGE UP (ref 135–145)
SP GR SPEC: 1.01 — SIGNIFICANT CHANGE UP (ref 1.01–1.02)
UROBILINOGEN FLD QL: NEGATIVE MG/DL — SIGNIFICANT CHANGE UP
VARIANT LYMPHS # BLD: 1 % — SIGNIFICANT CHANGE UP (ref 0–6)
WBC # BLD: 23.44 K/UL — HIGH (ref 3.8–10.5)
WBC # FLD AUTO: 23.44 K/UL — HIGH (ref 3.8–10.5)
WBC UR QL: ABNORMAL

## 2022-02-06 PROCEDURE — 74176 CT ABD & PELVIS W/O CONTRAST: CPT | Mod: 26,MA

## 2022-02-06 PROCEDURE — 93010 ELECTROCARDIOGRAM REPORT: CPT

## 2022-02-06 PROCEDURE — 99291 CRITICAL CARE FIRST HOUR: CPT

## 2022-02-06 PROCEDURE — 71045 X-RAY EXAM CHEST 1 VIEW: CPT | Mod: 26

## 2022-02-06 RX ORDER — CEFTRIAXONE 500 MG/1
1000 INJECTION, POWDER, FOR SOLUTION INTRAMUSCULAR; INTRAVENOUS EVERY 24 HOURS
Refills: 0 | Status: DISCONTINUED | OUTPATIENT
Start: 2022-02-07 | End: 2022-02-07

## 2022-02-06 RX ORDER — SODIUM CHLORIDE 9 MG/ML
1000 INJECTION INTRAMUSCULAR; INTRAVENOUS; SUBCUTANEOUS
Refills: 0 | Status: DISCONTINUED | OUTPATIENT
Start: 2022-02-06 | End: 2022-02-09

## 2022-02-06 RX ORDER — QUETIAPINE FUMARATE 200 MG/1
25 TABLET, FILM COATED ORAL AT BEDTIME
Refills: 0 | Status: DISCONTINUED | OUTPATIENT
Start: 2022-02-06 | End: 2022-02-11

## 2022-02-06 RX ORDER — ACETAMINOPHEN 500 MG
325 TABLET ORAL ONCE
Refills: 0 | Status: COMPLETED | OUTPATIENT
Start: 2022-02-06 | End: 2022-02-06

## 2022-02-06 RX ORDER — CEFTRIAXONE 500 MG/1
1000 INJECTION, POWDER, FOR SOLUTION INTRAMUSCULAR; INTRAVENOUS ONCE
Refills: 0 | Status: COMPLETED | OUTPATIENT
Start: 2022-02-06 | End: 2022-02-06

## 2022-02-06 RX ORDER — ASPIRIN/CALCIUM CARB/MAGNESIUM 324 MG
81 TABLET ORAL DAILY
Refills: 0 | Status: DISCONTINUED | OUTPATIENT
Start: 2022-02-06 | End: 2022-02-11

## 2022-02-06 RX ORDER — AMLODIPINE BESYLATE 2.5 MG/1
10 TABLET ORAL DAILY
Refills: 0 | Status: DISCONTINUED | OUTPATIENT
Start: 2022-02-06 | End: 2022-02-11

## 2022-02-06 RX ORDER — SODIUM CHLORIDE 9 MG/ML
1700 INJECTION INTRAMUSCULAR; INTRAVENOUS; SUBCUTANEOUS ONCE
Refills: 0 | Status: COMPLETED | OUTPATIENT
Start: 2022-02-06 | End: 2022-02-06

## 2022-02-06 RX ORDER — HYDRALAZINE HCL 50 MG
10 TABLET ORAL ONCE
Refills: 0 | Status: COMPLETED | OUTPATIENT
Start: 2022-02-06 | End: 2022-02-06

## 2022-02-06 RX ORDER — HYDRALAZINE HCL 50 MG
100 TABLET ORAL EVERY 8 HOURS
Refills: 0 | Status: DISCONTINUED | OUTPATIENT
Start: 2022-02-06 | End: 2022-02-11

## 2022-02-06 RX ORDER — LEVETIRACETAM 250 MG/1
750 TABLET, FILM COATED ORAL
Refills: 0 | Status: DISCONTINUED | OUTPATIENT
Start: 2022-02-06 | End: 2022-02-11

## 2022-02-06 RX ORDER — SIMVASTATIN 20 MG/1
20 TABLET, FILM COATED ORAL AT BEDTIME
Refills: 0 | Status: DISCONTINUED | OUTPATIENT
Start: 2022-02-06 | End: 2022-02-11

## 2022-02-06 RX ADMIN — SODIUM CHLORIDE 1700 MILLILITER(S): 9 INJECTION INTRAMUSCULAR; INTRAVENOUS; SUBCUTANEOUS at 13:08

## 2022-02-06 RX ADMIN — CEFTRIAXONE 100 MILLIGRAM(S): 500 INJECTION, POWDER, FOR SOLUTION INTRAMUSCULAR; INTRAVENOUS at 13:07

## 2022-02-06 RX ADMIN — LEVETIRACETAM 750 MILLIGRAM(S): 250 TABLET, FILM COATED ORAL at 21:38

## 2022-02-06 RX ADMIN — Medication 325 MILLIGRAM(S): at 13:12

## 2022-02-06 RX ADMIN — Medication 325 MILLIGRAM(S): at 14:19

## 2022-02-06 RX ADMIN — SIMVASTATIN 20 MILLIGRAM(S): 20 TABLET, FILM COATED ORAL at 21:40

## 2022-02-06 RX ADMIN — Medication 100 MILLIGRAM(S): at 21:41

## 2022-02-06 RX ADMIN — Medication 1 ENEMA: at 20:38

## 2022-02-06 RX ADMIN — Medication 10 MILLIGRAM(S): at 14:28

## 2022-02-06 RX ADMIN — QUETIAPINE FUMARATE 25 MILLIGRAM(S): 200 TABLET, FILM COATED ORAL at 21:40

## 2022-02-06 RX ADMIN — AMLODIPINE BESYLATE 10 MILLIGRAM(S): 2.5 TABLET ORAL at 21:47

## 2022-02-06 RX ADMIN — Medication 0.1 MILLIGRAM(S): at 21:42

## 2022-02-06 NOTE — H&P ADULT - ASSESSMENT
malfunction of Mayberry catheter   complicated Uti, pyelopnephritis.   elevated wbc count- with fever r/o sepsis. Functional quadriplegia   Fecal impaction  HTN   valdo

## 2022-02-06 NOTE — ED ADULT NURSE NOTE - OBJECTIVE STATEMENT
pt is 70 y/o female c/c of urinary catheter complications. pt's sister Nalini states her milner came out 2 days ago. +abd distention noted, Dr. Segura made aware. pt is AAOX0, nonverbal at bedside. decreased appetite. pt placed on cardiac monitor and . pt sees a urologist Dr. Francisco Ghosh. PMH of seizures, HTN, diabetes, HDL, dementia, strokes.

## 2022-02-06 NOTE — ED ADULT NURSE NOTE - NSIMPLEMENTINTERV_GEN_ALL_ED
Implemented All Fall with Harm Risk Interventions:  Martinez to call system. Call bell, personal items and telephone within reach. Instruct patient to call for assistance. Room bathroom lighting operational. Non-slip footwear when patient is off stretcher. Physically safe environment: no spills, clutter or unnecessary equipment. Stretcher in lowest position, wheels locked, appropriate side rails in place. Provide visual cue, wrist band, yellow gown, etc. Monitor gait and stability. Monitor for mental status changes and reorient to person, place, and time. Review medications for side effects contributing to fall risk. Reinforce activity limits and safety measures with patient and family. Provide visual clues: red socks.

## 2022-02-06 NOTE — ED PROVIDER NOTE - OBJECTIVE STATEMENT
72 yo F w/PMH of dementia (minimally verbal) presents to the ED for urinary retention. Despite triage note family stated pt doesn't have fever. Pt has chronic indwelling folly. Pt accidentally pulled out catheter x1 week but pt has been able to urinate on her own. Pt followed up with her doctor who made the decision to keep her off the folly. family reports 1:00 AM last time pt able to urinate. Pt has  decreased appetite and family brought pt in to be seen. Pt recently discharged x4 weeks ago. 72 yo F w/PMH of dementia (minimally verbal) presents to the ED for urinary retention. Despite triage note family stated pt doesn't have fever. Pt has chronic indwelling folly. Pt accidentally pulled out catheter x1 week but pt has been able to urinate on her own. Pt followed up with her doctor who made the decision to keep her off the milner. family reports 1:00 AM last time pt able to urinate. Pt has  decreased appetite and family brought pt in to be seen. Pt recently discharged x4 weeks ago.

## 2022-02-06 NOTE — H&P ADULT - NSHPPHYSICALEXAM_GEN_ALL_CORE
General: A/ox 3, No acute Distress, malnourished  skin : Normal  Head. Hsira. No lacerations  Neck: Supple, NO JVD  Cardiac: S1 S2, No M/R/G  Pulmonary: CTAP, Breathing unlabored, No Rhonchi/Rales/Wheezing  Abdomen: Soft, Non -tender, +BS x 4 quads. Indwelling Mayberry cateheter- The catheter was replaced in the ED, due to misplacement  Neuro: A/o x 3, No focal deficits. Normal Motor and sensory exam. functional quadriplegia  Vascular: Normal distal pulses.  Extremities: . No rashes. No edema  Decubiti ; None

## 2022-02-06 NOTE — ED ADULT NURSE REASSESSMENT NOTE - NS ED NURSE REASSESS COMMENT FT1
initial foely taken out. 16 Hebrew milner re-placed x1 attempt by RN, no resistance met. pt draining cloudy yellow urine. UA and culture sent.

## 2022-02-06 NOTE — ED PROVIDER NOTE - CLINICAL SUMMARY MEDICAL DECISION MAKING FREE TEXT BOX
Pt with urinary retention, tachycardiac on arrival, noted to have x100.8 temp, code sepsis activated, IV fluids, antibiotics, will check labs, have folly placed for urinary retention and admit.

## 2022-02-06 NOTE — H&P ADULT - NSHPREVIEWOFSYSTEMS_GEN_ALL_CORE
REVIEW OF SYSTEMS:    CONSTITUTIONAL:  Has, fevers and chills, moderate protien , calorie malnutrition, frail,  weak  EYES/ENT: No visual changes;  No vertigo or throat pain   NECK: No pain or stiffness  RESPIRATORY: No cough, wheezing, hemoptysis; No shortness of breath  CARDIOVASCULAR: No chest pain or palpitations [ ] CHF [ ] MI [ ] CABG [ ]  GASTROINTESTINAL: No abdominal or epigastric pain. No nausea, vomiting, or hematemesis; No diarrhea or constipation. No melena or hematochezia. [ ]abdominal surgery [ ] prostrate problems   GENITOURINARY: No dysuria, frequency or hematuria .  Neurogeenic bladder with indwelling urinary catheter  NEUROLOGICAL: No numbness or weakness.  hx of seizures. functional quadriplegia  SKIN: No itching, burning, rashes, or lesions   All other review of systems is negative unless indicated above.

## 2022-02-06 NOTE — H&P ADULT - NSHPLABSRESULTS_GEN_ALL_CORE
12.3                 144  | 21   | 39           23.44 >-----------< 339     ------------------------< 244                   38.3                 3.7  | 114  | 2.72                                         Ca 9.3   Mg x     Ph x      PT/INR - ( 06 Feb 2022 13:20 )   PT: 13.5 sec;   INR: 1.17 ratio         PTT - ( 06 Feb 2022 13:20 )  PTT:35.7 sec      < from: CT Abdomen and Pelvis No Cont (02.06.22 @ 15:00) >    ACC: [id number] EXAM:  CT ABDOMEN AND PELVIS                          PROCEDURE DATE:  02/06/2022          INTERPRETATION:  CLINICAL INFORMATION: Sepsis.  Urinary catheter complication.    COMPARISON: CT scan abdomen pelvis 3/13/2021.    CONTRAST/COMPLICATIONS:  IV Contrast: None.  Oral Contrast: None.  Complications: None known at time of exam completion.    PROCEDURE:  CT of the Abdomen and Pelvis was performed.  Sagittal and coronal reformats were performed.    FINDINGS:    LOWER CHEST:  Smallhiatal hernia.    Streak artifact degrades image quality.    Evaluation of the solid organ parenchyma is limited without intravenous   contrast.    LIVER: Within normal limits.  BILE DUCTS: Normal caliber.  GALLBLADDER: Within normal limits.  SPLEEN:Within normal limits.  PANCREAS: Within normal limits.  ADRENALS:  Stable low-density right adrenal nodule.  KIDNEYS/URETERS: Air present within the left renal pelvis and collecting   system.  There is moderate to severe bilateral hydroureteronephrosis without   obstructing ureteral calculus.  There are bilateral nonobstructing intrarenal calcifications largest on   the left measuring 10 mm within the renal pelvis    BLADDER: There is marked distention of the urinary bladder above the   level of the umbilicus, and containing small amount of nondependent air.  REPRODUCTIVE ORGANS:  Balloon Mayberry catheter is inflated within the vagina.  Calcified fibroid uterus.    BOWEL: No bowel obstruction.  Stool within the rectal vault with probable rectalwall thickening.  PERITONEUM: No ascites.    VESSELS: Marked atherosclerotic changes of the aorta and branching   vessels.    RETROPERITONEUM/LYMPH NODES: No lymphadenopathy.    ABDOMINAL WALL: Within normal limits.    BONES: Within normal limits.    IMPRESSION:    Mayberry catheter balloon  inflated within the vagina.  Markedly distended urinary bladder.  Moderate to severe bilateral hydroureteronephrosis with bilateral   nonobstructing intrarenal calcifications.  Air present within the left renal collecting system, ureter and bladder,   may be related to recent instrumentation.  Correlate clinically for acute infection/emphysematous pyelitis.  This finding was discussed with Dr. Segura  by telephone at the time of   interpretation on 2/6/2022.      Fecal retention with rectal wall thickening, finding may be associated   with stercoral proctitis.    < end of copied text >    EKG sinus rhythm, No acute chnges, , tachycardia

## 2022-02-06 NOTE — H&P ADULT - HISTORY OF PRESENT ILLNESS
· Chief Complaint Quote	p/w milner cath dislodged, fevers, poor appetite x2 days.. Patient fund to have fever,  urinary retention, , displaced urinary catheter on admission. Patient is functionally quadriplegic and is bedbound, with recent hcx of admission for dehydration and UTI and sepsis and amadou   Blood work shows patient is dehydrated and has AMADOU, again

## 2022-02-07 LAB
-  K. PNEUMONIAE GROUP: SIGNIFICANT CHANGE UP
ANION GAP SERPL CALC-SCNC: 7 MMOL/L — SIGNIFICANT CHANGE UP (ref 5–17)
BUN SERPL-MCNC: 34 MG/DL — HIGH (ref 7–23)
CALCIUM SERPL-MCNC: 8.3 MG/DL — LOW (ref 8.5–10.1)
CHLORIDE SERPL-SCNC: 119 MMOL/L — HIGH (ref 96–108)
CO2 SERPL-SCNC: 22 MMOL/L — SIGNIFICANT CHANGE UP (ref 22–31)
CREAT SERPL-MCNC: 2.12 MG/DL — HIGH (ref 0.5–1.3)
E FAECALIS DNA BLD POS QL NAA+NON-PROBE: SIGNIFICANT CHANGE UP
GLUCOSE SERPL-MCNC: 146 MG/DL — HIGH (ref 70–99)
GRAM STN FLD: SIGNIFICANT CHANGE UP
GRAM STN FLD: SIGNIFICANT CHANGE UP
HCT VFR BLD CALC: 30.1 % — LOW (ref 34.5–45)
HGB BLD-MCNC: 9.7 G/DL — LOW (ref 11.5–15.5)
MCHC RBC-ENTMCNC: 28.4 PG — SIGNIFICANT CHANGE UP (ref 27–34)
MCHC RBC-ENTMCNC: 32.2 G/DL — SIGNIFICANT CHANGE UP (ref 32–36)
MCV RBC AUTO: 88.3 FL — SIGNIFICANT CHANGE UP (ref 80–100)
METHOD TYPE: SIGNIFICANT CHANGE UP
NRBC # BLD: 0 /100 WBCS — SIGNIFICANT CHANGE UP (ref 0–0)
PLATELET # BLD AUTO: 240 K/UL — SIGNIFICANT CHANGE UP (ref 150–400)
POTASSIUM SERPL-MCNC: 3.2 MMOL/L — LOW (ref 3.5–5.3)
POTASSIUM SERPL-SCNC: 3.2 MMOL/L — LOW (ref 3.5–5.3)
RBC # BLD: 3.41 M/UL — LOW (ref 3.8–5.2)
RBC # FLD: 14.2 % — SIGNIFICANT CHANGE UP (ref 10.3–14.5)
SODIUM SERPL-SCNC: 148 MMOL/L — HIGH (ref 135–145)
SPECIMEN SOURCE: SIGNIFICANT CHANGE UP
SPECIMEN SOURCE: SIGNIFICANT CHANGE UP
WBC # BLD: 16.2 K/UL — HIGH (ref 3.8–10.5)
WBC # FLD AUTO: 16.2 K/UL — HIGH (ref 3.8–10.5)

## 2022-02-07 RX ORDER — PIPERACILLIN AND TAZOBACTAM 4; .5 G/20ML; G/20ML
3.38 INJECTION, POWDER, LYOPHILIZED, FOR SOLUTION INTRAVENOUS ONCE
Refills: 0 | Status: COMPLETED | OUTPATIENT
Start: 2022-02-07 | End: 2022-02-07

## 2022-02-07 RX ORDER — POTASSIUM CHLORIDE 20 MEQ
40 PACKET (EA) ORAL ONCE
Refills: 0 | Status: DISCONTINUED | OUTPATIENT
Start: 2022-02-07 | End: 2022-02-07

## 2022-02-07 RX ORDER — PIPERACILLIN AND TAZOBACTAM 4; .5 G/20ML; G/20ML
3.38 INJECTION, POWDER, LYOPHILIZED, FOR SOLUTION INTRAVENOUS EVERY 12 HOURS
Refills: 0 | Status: DISCONTINUED | OUTPATIENT
Start: 2022-02-07 | End: 2022-02-11

## 2022-02-07 RX ORDER — POTASSIUM CHLORIDE 20 MEQ
40 PACKET (EA) ORAL ONCE
Refills: 0 | Status: COMPLETED | OUTPATIENT
Start: 2022-02-07 | End: 2022-02-07

## 2022-02-07 RX ADMIN — Medication 0.1 MILLIGRAM(S): at 22:03

## 2022-02-07 RX ADMIN — SIMVASTATIN 20 MILLIGRAM(S): 20 TABLET, FILM COATED ORAL at 22:04

## 2022-02-07 RX ADMIN — Medication 100 MILLIGRAM(S): at 22:04

## 2022-02-07 RX ADMIN — Medication 81 MILLIGRAM(S): at 11:54

## 2022-02-07 RX ADMIN — QUETIAPINE FUMARATE 25 MILLIGRAM(S): 200 TABLET, FILM COATED ORAL at 22:03

## 2022-02-07 RX ADMIN — SODIUM CHLORIDE 100 MILLILITER(S): 9 INJECTION INTRAMUSCULAR; INTRAVENOUS; SUBCUTANEOUS at 02:02

## 2022-02-07 RX ADMIN — LEVETIRACETAM 750 MILLIGRAM(S): 250 TABLET, FILM COATED ORAL at 05:58

## 2022-02-07 RX ADMIN — AMLODIPINE BESYLATE 10 MILLIGRAM(S): 2.5 TABLET ORAL at 05:58

## 2022-02-07 RX ADMIN — Medication 100 MILLIGRAM(S): at 14:37

## 2022-02-07 RX ADMIN — Medication 0.1 MILLIGRAM(S): at 05:58

## 2022-02-07 RX ADMIN — Medication 1 TABLET(S): at 11:54

## 2022-02-07 RX ADMIN — Medication 100 MILLIGRAM(S): at 05:58

## 2022-02-07 RX ADMIN — PIPERACILLIN AND TAZOBACTAM 25 GRAM(S): 4; .5 INJECTION, POWDER, LYOPHILIZED, FOR SOLUTION INTRAVENOUS at 17:48

## 2022-02-07 RX ADMIN — PIPERACILLIN AND TAZOBACTAM 200 GRAM(S): 4; .5 INJECTION, POWDER, LYOPHILIZED, FOR SOLUTION INTRAVENOUS at 07:12

## 2022-02-07 RX ADMIN — SODIUM CHLORIDE 100 MILLILITER(S): 9 INJECTION INTRAMUSCULAR; INTRAVENOUS; SUBCUTANEOUS at 14:37

## 2022-02-07 RX ADMIN — LEVETIRACETAM 750 MILLIGRAM(S): 250 TABLET, FILM COATED ORAL at 17:48

## 2022-02-07 RX ADMIN — Medication 40 MILLIEQUIVALENT(S): at 17:48

## 2022-02-07 RX ADMIN — Medication 0.1 MILLIGRAM(S): at 14:37

## 2022-02-07 NOTE — PATIENT PROFILE ADULT - FALL HARM RISK - HARM RISK INTERVENTIONS
Assistance with ambulation/Assistance OOB with selected safe patient handling equipment/Communicate Risk of Fall with Harm to all staff/Discuss with provider need for PT consult/Monitor gait and stability/Reinforce activity limits and safety measures with patient and family/Tailored Fall Risk Interventions/Visual Cue: Yellow wristband and red socks/Bed in lowest position, wheels locked, appropriate side rails in place/Call bell, personal items and telephone in reach/Instruct patient to call for assistance before getting out of bed or chair/Non-slip footwear when patient is out of bed/Romeo to call system/Physically safe environment - no spills, clutter or unnecessary equipment/Purposeful Proactive Rounding/Room/bathroom lighting operational, light cord in reach

## 2022-02-07 NOTE — PROVIDER CONTACT NOTE (CRITICAL VALUE NOTIFICATION) - TEST AND RESULT REPORTED:
2/6 12:45 - preliminary growth in both anaerobic and aerobic bottles- gram negative rods and gram positive cocci in pairs and chains

## 2022-02-08 PROCEDURE — 99222 1ST HOSP IP/OBS MODERATE 55: CPT

## 2022-02-08 RX ADMIN — PIPERACILLIN AND TAZOBACTAM 25 GRAM(S): 4; .5 INJECTION, POWDER, LYOPHILIZED, FOR SOLUTION INTRAVENOUS at 05:41

## 2022-02-08 RX ADMIN — SIMVASTATIN 20 MILLIGRAM(S): 20 TABLET, FILM COATED ORAL at 21:50

## 2022-02-08 RX ADMIN — AMLODIPINE BESYLATE 10 MILLIGRAM(S): 2.5 TABLET ORAL at 05:41

## 2022-02-08 RX ADMIN — Medication 81 MILLIGRAM(S): at 11:20

## 2022-02-08 RX ADMIN — LEVETIRACETAM 750 MILLIGRAM(S): 250 TABLET, FILM COATED ORAL at 17:19

## 2022-02-08 RX ADMIN — Medication 0.1 MILLIGRAM(S): at 14:41

## 2022-02-08 RX ADMIN — Medication 1 TABLET(S): at 11:20

## 2022-02-08 RX ADMIN — SODIUM CHLORIDE 100 MILLILITER(S): 9 INJECTION INTRAMUSCULAR; INTRAVENOUS; SUBCUTANEOUS at 05:42

## 2022-02-08 RX ADMIN — Medication 100 MILLIGRAM(S): at 21:51

## 2022-02-08 RX ADMIN — PIPERACILLIN AND TAZOBACTAM 25 GRAM(S): 4; .5 INJECTION, POWDER, LYOPHILIZED, FOR SOLUTION INTRAVENOUS at 17:19

## 2022-02-08 RX ADMIN — Medication 0.1 MILLIGRAM(S): at 05:42

## 2022-02-08 RX ADMIN — QUETIAPINE FUMARATE 25 MILLIGRAM(S): 200 TABLET, FILM COATED ORAL at 21:50

## 2022-02-08 RX ADMIN — Medication 0.1 MILLIGRAM(S): at 21:50

## 2022-02-08 RX ADMIN — LEVETIRACETAM 750 MILLIGRAM(S): 250 TABLET, FILM COATED ORAL at 05:41

## 2022-02-08 RX ADMIN — Medication 100 MILLIGRAM(S): at 05:41

## 2022-02-08 RX ADMIN — Medication 100 MILLIGRAM(S): at 14:41

## 2022-02-08 RX ADMIN — SODIUM CHLORIDE 100 MILLILITER(S): 9 INJECTION INTRAMUSCULAR; INTRAVENOUS; SUBCUTANEOUS at 11:20

## 2022-02-08 NOTE — CONSULT NOTE ADULT - ATTENDING COMMENTS
Reviewed case and imaging and agree with above.  Cr seems to cory when catheter in  Needs chronic milner and/or SPT

## 2022-02-08 NOTE — CONSULT NOTE ADULT - SUBJECTIVE AND OBJECTIVE BOX
UROLOGY CONSULT NOTE    Patient is a 71y old  Female who presents with a chief complaint of sepsis. urinary retention/ catheter malfunction, neurogenic bladder, HTn/ old cva/ seizures (2022 10:50)      HPI:    Patient is a 72yo F /w milner cath dislodged, fevers, poor appetite x2 days.. Patient fund to have fever,  urinary retention, , displaced urinary catheter on admission. Patient is functionally quadriplegic and is bedbound, with recent hcx of admission for dehydration and UTI and sepsis and amadou     Blood work shows patient is dehydrated and has AMADOU, again    Urology consulted for urinary retention, catheter malfunction, neurogenic bladder. Patient is well known to the urology service and has recently been seen for UTI, urinary retention, stones in the past. Patient is quadriplegic, non-verbal at baseline. Based on previous notes, patient has had milner removed in the past at the request of family. CT on admission shows:    "KIDNEYS/URETERS: Air present within the left renal pelvis and collecting   system.  There is moderate to severe bilateral hydroureteronephrosis without   obstructing ureteral calculus.  There are bilateral nonobstructing intrarenal calcifications largest on   the left measuring 10 mm within the renal pelvis    BLADDER: There is marked distention of the urinary bladder above the   level of the umbilicus, and containing small amount of nondependent air.  REPRODUCTIVE ORGANS:  Balloon Milner catheter is inflated within the vagina.  Calcified fibroid uterus."    Milner was removed and replaced following CT.   UCX prelim shows GNR, BCX- Klebsiella, Enterococcus       PAST MEDICAL & SURGICAL HISTORY:  Hypertension  Diabetes  Asthma  OA (osteoarthritis)  bautista knees, low back  and  bautista shoulders  Gout  Seizure disorder  Urinary incontinence  Vision changes  lt eye  CVA (cerebral infarction)  right side weakness  Kidney stone    Review of Systems:      MEDICATIONS  (STANDING):  amLODIPine   Tablet 10 milliGRAM(s) Oral daily  aspirin enteric coated 81 milliGRAM(s) Oral daily  cloNIDine 0.1 milliGRAM(s) Oral every 8 hours  hydrALAZINE 100 milliGRAM(s) Oral every 8 hours  levETIRAcetam 750 milliGRAM(s) Oral two times a day  multivitamin 1 Tablet(s) Oral daily  piperacillin/tazobactam IVPB.. 3.375 Gram(s) IV Intermittent every 12 hours  QUEtiapine 25 milliGRAM(s) Oral at bedtime  simvastatin 20 milliGRAM(s) Oral at bedtime  sodium chloride 0.9%. 1000 milliLiter(s) (100 mL/Hr) IV Continuous <Continuous>    MEDICATIONS  (PRN):      Allergies  No Known Allergies    SOCIAL HISTORY          Smoking: Yes [ ]  No [x ]   ______pk yrs          ETOH  Yes [ ]  No [x ]  Social [ ]          DRUGS:  Yes [ ]  No [x ]  if so what______________    FAMILY HISTORY:      Vital Signs Last 24 Hrs  T(C): 36.9 (2022 11:00), Max: 37.2 (2022 17:51)  T(F): 98.4 (2022 11:00), Max: 98.9 (2022 17:51)  HR: 89 (2022 11:00) (70 - 100)  BP: 128/67 (2022 11:00) (111/71 - 157/72)  BP(mean): --  RR: 18 (2022 11:00) (18 - 18)  SpO2: 99% (2022 11:00) (97% - 99%)    Physical Exam:    GENERAL: NAD, well-groomed, thin  HEAD:  Atraumatic, Normocephalic  EYES: EOMI, PERRLA, conjunctiva and sclera clear  NECK: Supple, No JVD, Normal thyroid  NERVOUS SYSTEM:  Alert & non-verbal at baseline  CHEST/LUNG: Clear to percussion bilaterally  HEART: Regular rate and rhythm  ABDOMEN: Soft, mildly diffuse tenderness, Nondistended  EXTREMITIES:  2+ Peripheral Pulses  LYMPH: No cervical adenopathy  : No suprapubic tenderness, no bladder distention, no gross hematuria. Indwelling milner in place draining clear yellow urine   SKIN: No rashes or lesions      LABS:                        9.7    16.20 )-----------( 240      ( 2022 07:13 )             30.1     02-07    148<H>  |  119<H>  |  34<H>  ----------------------------<  146<H>  3.2<L>   |  22  |  2.12<H>    Ca    8.3<L>      2022 07:13    TPro  9.1<H>  /  Alb  3.1<L>  /  TBili  0.8  /  DBili  x   /  AST  18  /  ALT  13  /  AlkPhos  91  02-06    PT/INR - ( 2022 13:20 )   PT: 13.5 sec;   INR: 1.17 ratio         PTT - ( 2022 13:20 )  PTT:35.7 sec  Urinalysis Basic - ( 2022 16:40 )    Color: Yellow / Appearance: Slightly Turbid / S.010 / pH: x  Gluc: x / Ketone: Negative  / Bili: Negative / Urobili: Negative mg/dL   Blood: x / Protein: 500 mg/dL / Nitrite: Negative   Leuk Esterase: Moderate / RBC: 0-2 /HPF / WBC 11-25   Sq Epi: x / Non Sq Epi: Occasional / [facility]acteria[facility]        RADIOLOGY & ADDITIONAL STUDIES:  ACC: [id number] EXAM:  CT ABDOMEN AND PELVIS                          PROCEDURE DATE:  2022          INTERPRETATION:  CLINICAL INFORMATION: Sepsis.  Urinary catheter complication.    COMPARISON: CT scan abdomen pelvis 3/13/2021.    CONTRAST/COMPLICATIONS:  IV Contrast: None.  Oral Contrast: None.  Complications: None known at time of exam completion.    PROCEDURE:  CT of the Abdomen and Pelvis was performed.  Sagittal and coronal reformats were performed.    FINDINGS:    LOWER CHEST:  Smallhiatal hernia.    Streak artifact degrades image quality.    Evaluation of the solid organ parenchyma is limited without intravenous   contrast.    LIVER: Within normal limits.  BILE DUCTS: Normal caliber.  GALLBLADDER: Within normal limits.  SPLEEN:Within normal limits.  PANCREAS: Within normal limits.  ADRENALS:  Stable low-density right adrenal nodule.  KIDNEYS/URETERS: Air present within the left renal pelvis and collecting   system.  There is moderate to severe bilateral hydroureteronephrosis without   obstructing ureteral calculus.  There are bilateral nonobstructing intrarenal calcifications largest on   the left measuring 10 mm within the renal pelvis    BLADDER: There is marked distention of the urinary bladder above the   level of the umbilicus, and containing small amount of nondependent air.  REPRODUCTIVE ORGANS:  Balloon Milner catheter is inflated within the vagina.  Calcified fibroid uterus.    BOWEL: No bowel obstruction.  Stool within the rectal vault with probable rectalwall thickening.  PERITONEUM: No ascites.    VESSELS: Marked atherosclerotic changes of the aorta and branching   vessels.    RETROPERITONEUM/LYMPH NODES: No lymphadenopathy.    ABDOMINAL WALL: Within normal limits.    BONES: Within normal limits.    IMPRESSION:    Milner catheter balloon  inflated within the vagina.  Markedly distended urinary bladder.  Moderate to severe bilateral hydroureteronephrosis with bilateral   nonobstructing intrarenal calcifications.  Air present within the left renal collecting system, ureter and bladder,   may be related to recent instrumentation.  Correlate clinically for acute infection/emphysematous pyelitis.  This finding was discussed with Dr. Segura  by telephone at the time of   interpretation on 2022.      Fecal retention with rectal wall thickening, finding may be associated   with stercoral proctitis.    --- End of Report ---

## 2022-02-08 NOTE — CONSULT NOTE ADULT - ASSESSMENT
Impression: Patient is a 72yo F /w milner cath dislodged, fevers, poor appetite x2 days. Patient fund to have fever, urinary retention, displaced urinary catheter on admission. Urology consulted for urinary retention, catheter malfunction, neurogenic bladder.    Recommendations:  --Maintain milner  --F/u Ucx for sensitivities final  --Will evaluate for possible SPT placement at a later date

## 2022-02-09 LAB
-  AMIKACIN: SIGNIFICANT CHANGE UP
-  AMIKACIN: SIGNIFICANT CHANGE UP
-  AMOXICILLIN/CLAVULANIC ACID: SIGNIFICANT CHANGE UP
-  AMPICILLIN/SULBACTAM: SIGNIFICANT CHANGE UP
-  AMPICILLIN/SULBACTAM: SIGNIFICANT CHANGE UP
-  AMPICILLIN: SIGNIFICANT CHANGE UP
-  AZTREONAM: SIGNIFICANT CHANGE UP
-  AZTREONAM: SIGNIFICANT CHANGE UP
-  CEFAZOLIN: SIGNIFICANT CHANGE UP
-  CEFAZOLIN: SIGNIFICANT CHANGE UP
-  CEFEPIME: SIGNIFICANT CHANGE UP
-  CEFEPIME: SIGNIFICANT CHANGE UP
-  CEFOXITIN: SIGNIFICANT CHANGE UP
-  CEFOXITIN: SIGNIFICANT CHANGE UP
-  CEFTRIAXONE: SIGNIFICANT CHANGE UP
-  CEFTRIAXONE: SIGNIFICANT CHANGE UP
-  CIPROFLOXACIN: SIGNIFICANT CHANGE UP
-  CIPROFLOXACIN: SIGNIFICANT CHANGE UP
-  ERTAPENEM: SIGNIFICANT CHANGE UP
-  ERTAPENEM: SIGNIFICANT CHANGE UP
-  GENTAMICIN SYNERGY: SIGNIFICANT CHANGE UP
-  GENTAMICIN: SIGNIFICANT CHANGE UP
-  GENTAMICIN: SIGNIFICANT CHANGE UP
-  IMIPENEM: SIGNIFICANT CHANGE UP
-  IMIPENEM: SIGNIFICANT CHANGE UP
-  LEVOFLOXACIN: SIGNIFICANT CHANGE UP
-  LEVOFLOXACIN: SIGNIFICANT CHANGE UP
-  MEROPENEM: SIGNIFICANT CHANGE UP
-  MEROPENEM: SIGNIFICANT CHANGE UP
-  NITROFURANTOIN: SIGNIFICANT CHANGE UP
-  PIPERACILLIN/TAZOBACTAM: SIGNIFICANT CHANGE UP
-  PIPERACILLIN/TAZOBACTAM: SIGNIFICANT CHANGE UP
-  TIGECYCLINE: SIGNIFICANT CHANGE UP
-  TOBRAMYCIN: SIGNIFICANT CHANGE UP
-  TOBRAMYCIN: SIGNIFICANT CHANGE UP
-  TRIMETHOPRIM/SULFAMETHOXAZOLE: SIGNIFICANT CHANGE UP
-  TRIMETHOPRIM/SULFAMETHOXAZOLE: SIGNIFICANT CHANGE UP
-  VANCOMYCIN: SIGNIFICANT CHANGE UP
ANION GAP SERPL CALC-SCNC: 6 MMOL/L — SIGNIFICANT CHANGE UP (ref 5–17)
BUN SERPL-MCNC: 26 MG/DL — HIGH (ref 7–23)
CALCIUM SERPL-MCNC: 7.8 MG/DL — LOW (ref 8.5–10.1)
CHLORIDE SERPL-SCNC: 124 MMOL/L — HIGH (ref 96–108)
CO2 SERPL-SCNC: 22 MMOL/L — SIGNIFICANT CHANGE UP (ref 22–31)
CREAT SERPL-MCNC: 1.93 MG/DL — HIGH (ref 0.5–1.3)
CULTURE RESULTS: SIGNIFICANT CHANGE UP
GLUCOSE SERPL-MCNC: 168 MG/DL — HIGH (ref 70–99)
GRAM STN FLD: SIGNIFICANT CHANGE UP
GRAM STN FLD: SIGNIFICANT CHANGE UP
HCT VFR BLD CALC: 35.4 % — SIGNIFICANT CHANGE UP (ref 34.5–45)
HGB BLD-MCNC: 10.8 G/DL — LOW (ref 11.5–15.5)
MCHC RBC-ENTMCNC: 27.5 PG — SIGNIFICANT CHANGE UP (ref 27–34)
MCHC RBC-ENTMCNC: 30.5 G/DL — LOW (ref 32–36)
MCV RBC AUTO: 90.1 FL — SIGNIFICANT CHANGE UP (ref 80–100)
METHOD TYPE: SIGNIFICANT CHANGE UP
NRBC # BLD: 0 /100 WBCS — SIGNIFICANT CHANGE UP (ref 0–0)
ORGANISM # SPEC MICROSCOPIC CNT: SIGNIFICANT CHANGE UP
PLATELET # BLD AUTO: 268 K/UL — SIGNIFICANT CHANGE UP (ref 150–400)
POTASSIUM SERPL-MCNC: 3.3 MMOL/L — LOW (ref 3.5–5.3)
POTASSIUM SERPL-SCNC: 3.3 MMOL/L — LOW (ref 3.5–5.3)
RBC # BLD: 3.93 M/UL — SIGNIFICANT CHANGE UP (ref 3.8–5.2)
RBC # FLD: 14.3 % — SIGNIFICANT CHANGE UP (ref 10.3–14.5)
SODIUM SERPL-SCNC: 152 MMOL/L — HIGH (ref 135–145)
SPECIMEN SOURCE: SIGNIFICANT CHANGE UP
WBC # BLD: 9.03 K/UL — SIGNIFICANT CHANGE UP (ref 3.8–10.5)
WBC # FLD AUTO: 9.03 K/UL — SIGNIFICANT CHANGE UP (ref 3.8–10.5)

## 2022-02-09 PROCEDURE — 99231 SBSQ HOSP IP/OBS SF/LOW 25: CPT

## 2022-02-09 RX ORDER — SODIUM CHLORIDE 9 MG/ML
1000 INJECTION, SOLUTION INTRAVENOUS
Refills: 0 | Status: DISCONTINUED | OUTPATIENT
Start: 2022-02-09 | End: 2022-02-11

## 2022-02-09 RX ADMIN — SODIUM CHLORIDE 100 MILLILITER(S): 9 INJECTION INTRAMUSCULAR; INTRAVENOUS; SUBCUTANEOUS at 06:19

## 2022-02-09 RX ADMIN — PIPERACILLIN AND TAZOBACTAM 25 GRAM(S): 4; .5 INJECTION, POWDER, LYOPHILIZED, FOR SOLUTION INTRAVENOUS at 06:17

## 2022-02-09 RX ADMIN — AMLODIPINE BESYLATE 10 MILLIGRAM(S): 2.5 TABLET ORAL at 06:18

## 2022-02-09 RX ADMIN — Medication 100 MILLIGRAM(S): at 06:18

## 2022-02-09 RX ADMIN — SODIUM CHLORIDE 100 MILLILITER(S): 9 INJECTION, SOLUTION INTRAVENOUS at 11:10

## 2022-02-09 RX ADMIN — QUETIAPINE FUMARATE 25 MILLIGRAM(S): 200 TABLET, FILM COATED ORAL at 21:18

## 2022-02-09 RX ADMIN — Medication 0.1 MILLIGRAM(S): at 21:18

## 2022-02-09 RX ADMIN — Medication 1 TABLET(S): at 11:15

## 2022-02-09 RX ADMIN — LEVETIRACETAM 750 MILLIGRAM(S): 250 TABLET, FILM COATED ORAL at 06:18

## 2022-02-09 RX ADMIN — SODIUM CHLORIDE 100 MILLILITER(S): 9 INJECTION, SOLUTION INTRAVENOUS at 20:21

## 2022-02-09 RX ADMIN — Medication 100 MILLIGRAM(S): at 21:18

## 2022-02-09 RX ADMIN — SIMVASTATIN 20 MILLIGRAM(S): 20 TABLET, FILM COATED ORAL at 21:18

## 2022-02-09 RX ADMIN — PIPERACILLIN AND TAZOBACTAM 25 GRAM(S): 4; .5 INJECTION, POWDER, LYOPHILIZED, FOR SOLUTION INTRAVENOUS at 20:20

## 2022-02-09 RX ADMIN — Medication 81 MILLIGRAM(S): at 11:15

## 2022-02-09 RX ADMIN — LEVETIRACETAM 750 MILLIGRAM(S): 250 TABLET, FILM COATED ORAL at 20:20

## 2022-02-09 RX ADMIN — Medication 100 MILLIGRAM(S): at 14:36

## 2022-02-09 RX ADMIN — Medication 0.1 MILLIGRAM(S): at 06:17

## 2022-02-09 RX ADMIN — Medication 0.1 MILLIGRAM(S): at 14:36

## 2022-02-09 NOTE — PROGRESS NOTE ADULT - ATTENDING COMMENTS
as above-family has in the past withdrawn patient from my care    advise chronic milner-SP tube also can be considered    rx appropriate atb-no further active urologic input this admission

## 2022-02-10 ENCOUNTER — TRANSCRIPTION ENCOUNTER (OUTPATIENT)
Age: 72
End: 2022-02-10

## 2022-02-10 LAB
ANION GAP SERPL CALC-SCNC: 6 MMOL/L — SIGNIFICANT CHANGE UP (ref 5–17)
BUN SERPL-MCNC: 20 MG/DL — SIGNIFICANT CHANGE UP (ref 7–23)
CALCIUM SERPL-MCNC: 7.6 MG/DL — LOW (ref 8.5–10.1)
CHLORIDE SERPL-SCNC: 118 MMOL/L — HIGH (ref 96–108)
CO2 SERPL-SCNC: 22 MMOL/L — SIGNIFICANT CHANGE UP (ref 22–31)
CREAT SERPL-MCNC: 1.76 MG/DL — HIGH (ref 0.5–1.3)
GLUCOSE SERPL-MCNC: 195 MG/DL — HIGH (ref 70–99)
HCT VFR BLD CALC: 32.3 % — LOW (ref 34.5–45)
HGB BLD-MCNC: 10.1 G/DL — LOW (ref 11.5–15.5)
MCHC RBC-ENTMCNC: 28 PG — SIGNIFICANT CHANGE UP (ref 27–34)
MCHC RBC-ENTMCNC: 31.3 G/DL — LOW (ref 32–36)
MCV RBC AUTO: 89.5 FL — SIGNIFICANT CHANGE UP (ref 80–100)
NRBC # BLD: 0 /100 WBCS — SIGNIFICANT CHANGE UP (ref 0–0)
PLATELET # BLD AUTO: 250 K/UL — SIGNIFICANT CHANGE UP (ref 150–400)
POTASSIUM SERPL-MCNC: 3 MMOL/L — LOW (ref 3.5–5.3)
POTASSIUM SERPL-SCNC: 3 MMOL/L — LOW (ref 3.5–5.3)
RBC # BLD: 3.61 M/UL — LOW (ref 3.8–5.2)
RBC # FLD: 13.9 % — SIGNIFICANT CHANGE UP (ref 10.3–14.5)
SODIUM SERPL-SCNC: 146 MMOL/L — HIGH (ref 135–145)
WBC # BLD: 6.44 K/UL — SIGNIFICANT CHANGE UP (ref 3.8–10.5)
WBC # FLD AUTO: 6.44 K/UL — SIGNIFICANT CHANGE UP (ref 3.8–10.5)

## 2022-02-10 RX ORDER — POTASSIUM CHLORIDE 20 MEQ
20 PACKET (EA) ORAL
Refills: 0 | Status: COMPLETED | OUTPATIENT
Start: 2022-02-10 | End: 2022-02-10

## 2022-02-10 RX ORDER — CIPROFLOXACIN LACTATE 400MG/40ML
1 VIAL (ML) INTRAVENOUS
Qty: 14 | Refills: 0
Start: 2022-02-10 | End: 2022-02-16

## 2022-02-10 RX ADMIN — Medication 1 ENEMA: at 12:21

## 2022-02-10 RX ADMIN — LEVETIRACETAM 750 MILLIGRAM(S): 250 TABLET, FILM COATED ORAL at 05:33

## 2022-02-10 RX ADMIN — Medication 0.1 MILLIGRAM(S): at 22:30

## 2022-02-10 RX ADMIN — Medication 0.1 MILLIGRAM(S): at 13:55

## 2022-02-10 RX ADMIN — Medication 81 MILLIGRAM(S): at 12:19

## 2022-02-10 RX ADMIN — SODIUM CHLORIDE 100 MILLILITER(S): 9 INJECTION, SOLUTION INTRAVENOUS at 17:01

## 2022-02-10 RX ADMIN — Medication 100 MILLIGRAM(S): at 13:55

## 2022-02-10 RX ADMIN — Medication 1 ENEMA: at 16:54

## 2022-02-10 RX ADMIN — Medication 0.1 MILLIGRAM(S): at 05:33

## 2022-02-10 RX ADMIN — Medication 100 MILLIGRAM(S): at 22:30

## 2022-02-10 RX ADMIN — AMLODIPINE BESYLATE 10 MILLIGRAM(S): 2.5 TABLET ORAL at 05:33

## 2022-02-10 RX ADMIN — SODIUM CHLORIDE 100 MILLILITER(S): 9 INJECTION, SOLUTION INTRAVENOUS at 05:32

## 2022-02-10 RX ADMIN — Medication 20 MILLIEQUIVALENT(S): at 13:55

## 2022-02-10 RX ADMIN — Medication 1 TABLET(S): at 12:19

## 2022-02-10 RX ADMIN — QUETIAPINE FUMARATE 25 MILLIGRAM(S): 200 TABLET, FILM COATED ORAL at 22:29

## 2022-02-10 RX ADMIN — PIPERACILLIN AND TAZOBACTAM 25 GRAM(S): 4; .5 INJECTION, POWDER, LYOPHILIZED, FOR SOLUTION INTRAVENOUS at 05:32

## 2022-02-10 RX ADMIN — SIMVASTATIN 20 MILLIGRAM(S): 20 TABLET, FILM COATED ORAL at 22:30

## 2022-02-10 RX ADMIN — Medication 20 MILLIEQUIVALENT(S): at 12:19

## 2022-02-10 RX ADMIN — Medication 100 MILLIGRAM(S): at 05:33

## 2022-02-10 RX ADMIN — PIPERACILLIN AND TAZOBACTAM 25 GRAM(S): 4; .5 INJECTION, POWDER, LYOPHILIZED, FOR SOLUTION INTRAVENOUS at 17:01

## 2022-02-10 RX ADMIN — LEVETIRACETAM 750 MILLIGRAM(S): 250 TABLET, FILM COATED ORAL at 17:01

## 2022-02-10 NOTE — DISCHARGE NOTE PROVIDER - CARE PROVIDERS DIRECT ADDRESSES
,DirectAddress_Unknown,[email or web address]

## 2022-02-10 NOTE — PROGRESS NOTE ADULT - TIME BILLING
clinical eval, discuss with family/ rn
clincl eval, review labs, discuss with team/ ID. /
clinical eval, discuss with RN/team/ family/ 
clinicl mary, review labs, bluuss with ID. , family

## 2022-02-10 NOTE — DISCHARGE NOTE PROVIDER - HOSPITAL COURSE
71y YOF with PMHx of asthma, CVA, DM, Gout, HTN, OA, Seizure, urinary incontinence who presents with a chief complaint of sepsis with urinary retention/ catheter malfunction, neurogenic bladders. Pt found to be bacteremic treated with IV antibiotics. Pt stable, alert, eating well. Pending ECHO. Potassium repleted. Awaiting BM s/p fleet enema. D/c on augmentin and cipro x 7days if echo normal and has a BM. Follow up with PMD and Urology in 1 week. Continue home meds.       71y YOF with PMHx of asthma, CVA, DM, Gout, HTN, OA, Seizure, urinary incontinence who presents with a chief complaint of sepsis with urinary retention/ catheter malfunction, neurogenic bladders. Pt found to be bacteremic treated with IV antibiotics. Pt stable, alert, eating well. Pending ECHO. Potassium repleted. Awaiting BM s/p fleet enema. D/c on augmentin and cipro x 7days if echo normal and has a BM. Follow up with PMD and Urology in 1 week. Continue home meds. . Urinary Catheter was changed in the ED on admission. abdominal pain resolved after patient had BM

## 2022-02-10 NOTE — DISCHARGE NOTE PROVIDER - CARE PROVIDER_API CALL
PMD,   Phone: (   )    -  Fax: (   )    -  Follow Up Time: 1 week    Todd Caicedo)  Urology  [address] [facility], [address] [place] [address]  Phone: [phone]  Fax: [phone]  Follow Up Time: 1 week

## 2022-02-10 NOTE — DISCHARGE NOTE PROVIDER - PROVIDER TOKENS
FREE:[LAST:[PMD],PHONE:[(   )    -],FAX:[(   )    -],FOLLOWUP:[1 week]],PROVIDER:[TOKEN:[[id number]],FOLLOWUP:[1 week]]

## 2022-02-10 NOTE — DISCHARGE NOTE PROVIDER - NSDCMRMEDTOKEN_GEN_ALL_CORE_FT
amLODIPine 10 mg oral tablet: 1 tab(s) orally once a day  amoxicillin-clavulanate 875 mg-125 mg oral tablet: 1 tab(s) orally 2 times a day   aspirin 81 mg oral delayed release tablet: 1 tab(s) orally once a day  ciprofloxacin 500 mg oral tablet: 1 tab(s) orally 2 times a day   cloNIDine 0.1 mg oral tablet: 1 tab(s) orally every 8 hours  hydrALAZINE 100 mg oral tablet: 1 tab(s) orally every 8 hours  levETIRAcetam 750 mg oral tablet: 1 tab(s) orally 2 times a day  Multiple Vitamins oral tablet: 1 tab(s) orally once a day  QUEtiapine 25 mg oral tablet: 1 tab(s) orally once a day (at bedtime)  Zocor 20 mg oral tablet: 1 tab(s) orally once a day (at bedtime)   amLODIPine 10 mg oral tablet: 1 tab(s) orally once a day  amoxicillin-clavulanate 875 mg-125 mg oral tablet: 1 tab(s) orally 2 times a day   aspirin 81 mg oral delayed release tablet: 1 tab(s) orally once a day  ciprofloxacin 500 mg oral tablet: 1 tab(s) orally 2 times a day   cloNIDine 0.1 mg oral tablet: 1 tab(s) orally every 8 hours  Colace 100 mg oral capsule: 1 cap(s) orally 2 times a day  hydrALAZINE 100 mg oral tablet: 1 tab(s) orally every 8 hours  levETIRAcetam 750 mg oral tablet: 1 tab(s) orally 2 times a day  Multiple Vitamins oral tablet: 1 tab(s) orally once a day  QUEtiapine 25 mg oral tablet: 1 tab(s) orally once a day (at bedtime)  Zocor 20 mg oral tablet: 1 tab(s) orally once a day (at bedtime)

## 2022-02-10 NOTE — DISCHARGE NOTE PROVIDER - NSDCCPCAREPLAN_GEN_ALL_CORE_FT
PRINCIPAL DISCHARGE DIAGNOSIS  Diagnosis: Sepsis, unspecified organism  Assessment and Plan of Treatment: 71y YOF with PMHx of asthma, CVA, DM, Gout, HTN, OA, Seizure, urinary incontinence who presents with a chief complaint of sepsis with urinary retention/ catheter malfunction, neurogenic bladders. Pt found to be bacteremic treated with IV antibiotics. Pt stable, alert, eating well. Pending ECHO. Potassium repleted. Awaiting BM s/p fleet enema. D/c on augmentin and cipro x 7days if echo normal and has a BM. Follow up with PMD and Urology in 1 week. Continue home meds.         SECONDARY DISCHARGE DIAGNOSES  Diagnosis: UTI (urinary tract infection)  Assessment and Plan of Treatment:

## 2022-02-11 ENCOUNTER — TRANSCRIPTION ENCOUNTER (OUTPATIENT)
Age: 72
End: 2022-02-11

## 2022-02-11 VITALS
DIASTOLIC BLOOD PRESSURE: 70 MMHG | OXYGEN SATURATION: 98 % | SYSTOLIC BLOOD PRESSURE: 157 MMHG | RESPIRATION RATE: 18 BRPM | HEART RATE: 96 BPM | TEMPERATURE: 98 F

## 2022-02-11 LAB
ANION GAP SERPL CALC-SCNC: 5 MMOL/L — SIGNIFICANT CHANGE UP (ref 5–17)
BUN SERPL-MCNC: 19 MG/DL — SIGNIFICANT CHANGE UP (ref 7–23)
CALCIUM SERPL-MCNC: 7.7 MG/DL — LOW (ref 8.5–10.1)
CHLORIDE SERPL-SCNC: 115 MMOL/L — HIGH (ref 96–108)
CO2 SERPL-SCNC: 22 MMOL/L — SIGNIFICANT CHANGE UP (ref 22–31)
CREAT SERPL-MCNC: 1.82 MG/DL — HIGH (ref 0.5–1.3)
GLUCOSE SERPL-MCNC: 166 MG/DL — HIGH (ref 70–99)
HCT VFR BLD CALC: 29.7 % — LOW (ref 34.5–45)
HGB BLD-MCNC: 9.6 G/DL — LOW (ref 11.5–15.5)
MCHC RBC-ENTMCNC: 28 PG — SIGNIFICANT CHANGE UP (ref 27–34)
MCHC RBC-ENTMCNC: 32.3 G/DL — SIGNIFICANT CHANGE UP (ref 32–36)
MCV RBC AUTO: 86.6 FL — SIGNIFICANT CHANGE UP (ref 80–100)
NRBC # BLD: 0 /100 WBCS — SIGNIFICANT CHANGE UP (ref 0–0)
PLATELET # BLD AUTO: 247 K/UL — SIGNIFICANT CHANGE UP (ref 150–400)
POTASSIUM SERPL-MCNC: 3.2 MMOL/L — LOW (ref 3.5–5.3)
POTASSIUM SERPL-SCNC: 3.2 MMOL/L — LOW (ref 3.5–5.3)
RBC # BLD: 3.43 M/UL — LOW (ref 3.8–5.2)
RBC # FLD: 13.7 % — SIGNIFICANT CHANGE UP (ref 10.3–14.5)
SODIUM SERPL-SCNC: 142 MMOL/L — SIGNIFICANT CHANGE UP (ref 135–145)
WBC # BLD: 7.22 K/UL — SIGNIFICANT CHANGE UP (ref 3.8–10.5)
WBC # FLD AUTO: 7.22 K/UL — SIGNIFICANT CHANGE UP (ref 3.8–10.5)

## 2022-02-11 RX ORDER — DOCUSATE SODIUM 100 MG
1 CAPSULE ORAL
Qty: 60 | Refills: 0
Start: 2022-02-11 | End: 2022-03-12

## 2022-02-11 RX ORDER — POTASSIUM CHLORIDE 20 MEQ
20 PACKET (EA) ORAL
Refills: 0 | Status: DISCONTINUED | OUTPATIENT
Start: 2022-02-11 | End: 2022-02-11

## 2022-02-11 RX ADMIN — PIPERACILLIN AND TAZOBACTAM 25 GRAM(S): 4; .5 INJECTION, POWDER, LYOPHILIZED, FOR SOLUTION INTRAVENOUS at 06:05

## 2022-02-11 RX ADMIN — LEVETIRACETAM 750 MILLIGRAM(S): 250 TABLET, FILM COATED ORAL at 06:04

## 2022-02-11 RX ADMIN — SODIUM CHLORIDE 100 MILLILITER(S): 9 INJECTION, SOLUTION INTRAVENOUS at 06:06

## 2022-02-11 RX ADMIN — Medication 0.1 MILLIGRAM(S): at 06:05

## 2022-02-11 RX ADMIN — Medication 1 TABLET(S): at 11:39

## 2022-02-11 RX ADMIN — Medication 81 MILLIGRAM(S): at 11:39

## 2022-02-11 RX ADMIN — Medication 100 MILLIGRAM(S): at 06:05

## 2022-02-11 RX ADMIN — Medication 20 MILLIEQUIVALENT(S): at 11:39

## 2022-02-11 RX ADMIN — AMLODIPINE BESYLATE 10 MILLIGRAM(S): 2.5 TABLET ORAL at 06:05

## 2022-02-11 NOTE — PROGRESS NOTE ADULT - PROVIDER SPECIALTY LIST ADULT
Infectious Disease
Urology
Internal Medicine
Infectious Disease
Infectious Disease
Internal Medicine

## 2022-02-11 NOTE — DISCHARGE NOTE NURSING/CASE MANAGEMENT/SOCIAL WORK - NSDCPEFALRISK_GEN_ALL_CORE
For information on Fall & Injury Prevention, visit: [email or web address] OR  [email or web address] OR  https://www.cdc.gov/steadi/patient.html

## 2022-02-11 NOTE — DISCHARGE NOTE NURSING/CASE MANAGEMENT/SOCIAL WORK - NSDCVIVACCINE_GEN_ALL_CORE_FT
COVID-19, mRNA, LNP-S, PF, 100 mcg/ 0.5 mL dose (Moderna); 05-Jan-2022 14:11; Catherine Crawley (RN); [employer] [id number] (Exp. Date: 12-Jan-2022); IntraMuscular; Deltoid Left.; 0.25 milliLiter(s);   influenza, injectable, quadrivalent, preservative free; 11-Oct-2017 11:36; Denia Duron (RN); [employer]; 572kt; IntraMuscular; Deltoid Left.; 0.5 milliLiter(s); VIS (VIS Published: 07-Aug-2015, VIS Presented: 11-Oct-2017);

## 2022-02-11 NOTE — PROGRESS NOTE ADULT - ASSESSMENT
Impression: Patient is a 72yo F /w milner cath dislodged, fevers, poor appetite x2 days. Patient fund to have fever, urinary retention, displaced urinary catheter on admission. Urology consulted for urinary retention, catheter malfunction, neurogenic bladder.    Recommendations:  --Maintain milner  --F/u Ucx for sensitivities final  --Will evaluate for possible SPT placement at a later date  
gram negative rods bacteremia   urinary tract infection milner  \kleb in blood but vss   continue zosyn  narrow coverage as patient gets better and culture are ready   will follow with you thanks   
sepsis due to polymicrobial bacteremia   obstructive uropathy bilateral now milner  air in collecting system from recent instrumentation? milner?  but concerning as pt with polymicrobial bacteremia       plan    follow susceptibility and identification of pathogens and adjust antibiotic spectrum accordingly   cont antibiotics   check surveillance blood cultures   urology eval   case d/w primary physician  will follow with you. Thank you for the courtesy of this consultation. 
sepsis without shock.    neurogenic bladder   dehydration   improving   Catheter malfunction- catheter changed in ED.   functional quadrdiplegia   seizure disorder      prognosis fair. 
gram negative rods/polymicrobial  bacteremia   urinary tract infection milner  \kleb in blood but vss   continue zosyn  augmentin may cover both org but unsure about kleb   for now continue current treatment   
sepsis without shock\  Positive blood cultures - klebsiella and Enterococcus   HTn dementia  neurogenic bladder   dehydration   improving   Catheter malfunction- catheter changed in ED.   functional quadrdiplegia   seizure disorder      prognosis  -fair   waiting for TTE  
1.   polymicrobial bacteremia         - klebsiella and e faecalis         - sensitive strain unclear source        - case discuss with primary physician         - echo         - surveillance blood cultures        - cont antibiotics likely po switch cipro and augmentin to complete 2 weeks total         - discuss with primary physician     2.   complicated urinary tract infection        - air in collecting system,  on the case    3.   sepsis resolving        leukocytosis improving 
assessment:     polymicrobial bacteremia         - klebsiella and e faecalis         - sensitive strain unclear source        - case discuss with primary physician         - echo         - surveillance blood cultures        - cont antibiotics likely po switch     2.   complicated urinary tract infection        - air in collecting system,  on the case    3.   sepsis resolving        leukocytosis improving       
sepsis without shock\  Positive blood cultures - klebsiella and Enterococcus   HTn dementia  neurogenic bladder   dehydration   improving   Catheter malfunction- catheter changed in ED.   functional quadrdiplegia   seizure disorder      progno
sepsis without shock\  Positive blood cultures - klebsiella and Enterococcus   HTn dementia  neurogenic bladder   dehydration   improving   Catheter malfunction- catheter changed in ED.   functional quadrdiplegia   seizure disorder      prognosis  -fair

## 2022-02-11 NOTE — PROGRESS NOTE ADULT - SUBJECTIVE AND OBJECTIVE BOX
HPI:    · Chief Complaint Quote	p/w milner cath dislodged, fevers, poor appetite x2 days.. Patient fund to have fever,  urinary retention, , displaced urinary catheter on admission. Patient is functionally quadriplegic and is bedbound, with recent hcx of admission for dehydration and UTI and sepsis and amadou   Blood work shows patient is dehydrated and has AMADOU, again   (06 Feb 2022 17:28)      Allergies    No Known Allergies    Intolerances        MEDICATIONS  (STANDING):  amLODIPine   Tablet 10 milliGRAM(s) Oral daily  aspirin enteric coated 81 milliGRAM(s) Oral daily  cloNIDine 0.1 milliGRAM(s) Oral every 8 hours  dextrose 5%. 1000 milliLiter(s) (100 mL/Hr) IV Continuous <Continuous>  hydrALAZINE 100 milliGRAM(s) Oral every 8 hours  levETIRAcetam 750 milliGRAM(s) Oral two times a day  multivitamin 1 Tablet(s) Oral daily  piperacillin/tazobactam IVPB.. 3.375 Gram(s) IV Intermittent every 12 hours  potassium chloride    Tablet ER 20 milliEquivalent(s) Oral every 2 hours  QUEtiapine 25 milliGRAM(s) Oral at bedtime  simvastatin 20 milliGRAM(s) Oral at bedtime    MEDICATIONS  (PRN):      REVIEW OF SYSTEMS:  unable to obtain      VITAL SIGNS:  T(C): 36.7 (02-11-22 @ 05:13), Max: 36.8 (02-10-22 @ 23:35)  T(F): 98 (02-11-22 @ 05:13), Max: 98.3 (02-10-22 @ 23:35)  HR: 63 (02-11-22 @ 05:13) (63 - 73)  BP: 140/62 (02-11-22 @ 05:13) (105/57 - 151/73)  RR: 18 (02-11-22 @ 05:13) (18 - 18)  SpO2: 100% (02-11-22 @ 05:13) (97% - 100%)  Wt(kg): --    PHYSICAL EXAM:    Gen:No acute distress  Heent: PERRLA EOMI  Heart: RRR S1S2 no murmur  Lungs: Clear to auscultation  Abd: BS + soft and depressible non tender  Ext: No cyanosis or edema      LABS:                         9.6    7.22  )-----------( 247      ( 11 Feb 2022 08:11 )             29.7     02-11    142  |  115<H>  |  19  ----------------------------<  166<H>  3.2<L>   |  22  |  1.82<H>    Ca    7.7<L>      11 Feb 2022 08:11                                Culture Results:   No growth to date. (02-08 @ 10:53)  Culture Results:   >100,000 CFU/ml Klebsiella pneumoniae (02-07 @ 01:35)  Culture Results:   Growth in aerobic and anaerobic bottles: Klebsiella pneumoniae  Growth in aerobic and anaerobic bottles: Enterococcus faecalis  ***Blood Panel PCR results on this specimen are available  approximately 3 hours after the Gram stain result.***  Gram stain, PCR, and/or culture results may not always  correspond due to difference in methodologies.  ************************************************************  This PCR assay was performed by multiplex PCR. This  Assay tests for 66 bacterial and resistance gene targets.  Please refer to the [facility] Labs test directory  at [email or web address] for details. (02-06 @ 18:10)  Culture Results:   Growth in aerobic and anaerobic bottles: Klebsiella pneumoniae  Growth in aerobic and anaerobic bottles: Enterococcus faecalis  See previous culture [id number] (02-06 @ 18:10)                      
HPI:    · Chief Complaint Quote	p/w milner cath dislodged, fevers, poor appetite x2 days.. Patient fund to have fever,  urinary retention, , displaced urinary catheter on admission. Patient is functionally quadriplegic and is bedbound, with recent hcx of admission for dehydration and UTI and sepsis and amadou   Blood work shows patient is dehydrated and has AMADOU, again   (06 Feb 2022 17:28)  much better  gu note seen     Allergies    No Known Allergies    Intolerances        MEDICATIONS  (STANDING):  amLODIPine   Tablet 10 milliGRAM(s) Oral daily  aspirin enteric coated 81 milliGRAM(s) Oral daily  cloNIDine 0.1 milliGRAM(s) Oral every 8 hours  hydrALAZINE 100 milliGRAM(s) Oral every 8 hours  levETIRAcetam 750 milliGRAM(s) Oral two times a day  multivitamin 1 Tablet(s) Oral daily  piperacillin/tazobactam IVPB.. 3.375 Gram(s) IV Intermittent every 12 hours  QUEtiapine 25 milliGRAM(s) Oral at bedtime  simvastatin 20 milliGRAM(s) Oral at bedtime  sodium chloride 0.9%. 1000 milliLiter(s) (100 mL/Hr) IV Continuous <Continuous>    MEDICATIONS  (PRN):      REVIEW OF SYSTEMS:    unable to assess   VITAL SIGNS:  T(C): 37.1 (02-08-22 @ 17:15), Max: 37.1 (02-08-22 @ 17:15)  T(F): 98.8 (02-08-22 @ 17:15), Max: 98.8 (02-08-22 @ 17:15)  HR: 80 (02-08-22 @ 17:15) (70 - 100)  BP: 155/72 (02-08-22 @ 17:15) (111/71 - 157/72)  RR: 18 (02-08-22 @ 17:15) (18 - 18)  SpO2: 97% (02-08-22 @ 17:15) (97% - 99%)  Wt(kg): --    PHYSICAL EXAM:  non verbal   tremor plus   GENERAL: not in any distress  HEENT: Neck is supple, normocephalic, atraumatic   CHEST/LUNG: Clear to auscultation bilaterally; No rales, rhonchi, wheezing  HEART: Regular rate and rhythm; No murmurs, rubs, or gallops  ABDOMEN: Soft, Nontender, Nondistended; Bowel sounds present, no rebound   EXTREMITIES:  2+ Peripheral Pulses, No clubbing, cyanosis, or edema  GENITOURINARY: milner clear urine   SKIN: No rashes or lesions  BACK: no pressor sore   NERVOUS SYSTEM:  Alert &responsive cant assess more    LABS:                         9.7    16.20 )-----------( 240      ( 07 Feb 2022 07:13 )             30.1     02-07    148<H>  |  119<H>  |  34<H>  ----------------------------<  146<H>  3.2<L>   |  22  |  2.12<H>    Ca    8.3<L>      07 Feb 2022 07:13                                Culture Results:   >100,000 CFU/ml Klebsiella pneumoniae (02-07 @ 01:35)  Culture Results:   Growth in aerobic and anaerobic bottles: Klebsiella pneumoniae  Growth in aerobic and anaerobic bottles: Enterococcus faecalis  ***Blood Panel PCR results on this specimen are available  approximately 3 hours after the Gram stain result.***  Gram stain, PCR, and/or culture results may not always  correspond due to difference in methodologies.  ************************************************************  This PCR assay was performed by multiplex PCR. This  Assay tests for 66 bacterial and resistance gene targets.  Please refer to the [facility] Labs test directory  at [email or web address] for details. (02-06 @ 18:10)  Culture Results:   Growth in aerobic and anaerobic bottles: Klebsiella pneumoniae  Growth in aerobic and anaerobic bottles: Enterococcus faecalis  See previous culture [id number] (02-06 @ 18:10)                Radiology:      
HPI:    · Chief Complaint Quote	p/w milner cath dislodged, fevers, poor appetite x2 days.. Patient fund to have fever,  urinary retention, , displaced urinary catheter on admission. Patient is functionally quadriplegic and is bedbound, with recent hcx of admission for dehydration and UTI and sepsis and amadou   Blood work shows patient is dehydrated and has AMADOU, again   (2022 17:28)      PAST MEDICAL & SURGICAL HISTORY:  Hypertension    Diabetes    Asthma    OA (osteoarthritis)  bautista knees, low back  and  bautista shoulders    Gout    Seizure disorder    Urinary incontinence    Vision changes  lt eye    CVA (cerebral infarction)  right side weakness    Kidney stone        SOCHX:   tobacco,  -  alcohol    FMHX: FA/MO  - contributory       Recent Travel:    Immunizations:    Allergies    No Known Allergies    Intolerances        MEDICATIONS  (STANDING):  amLODIPine   Tablet 10 milliGRAM(s) Oral daily  aspirin enteric coated 81 milliGRAM(s) Oral daily  cloNIDine 0.1 milliGRAM(s) Oral every 8 hours  hydrALAZINE 100 milliGRAM(s) Oral every 8 hours  levETIRAcetam 750 milliGRAM(s) Oral two times a day  multivitamin 1 Tablet(s) Oral daily  piperacillin/tazobactam IVPB.. 3.375 Gram(s) IV Intermittent every 12 hours  QUEtiapine 25 milliGRAM(s) Oral at bedtime  simvastatin 20 milliGRAM(s) Oral at bedtime  sodium chloride 0.9%. 1000 milliLiter(s) (100 mL/Hr) IV Continuous <Continuous>    MEDICATIONS  (PRN):      REVIEW OF SYSTEMS:  VITAL SIGNS:    T(C): 37.4 (22 @ 05:08), Max: 38.2 (22 @ 11:50)  T(F): 99.3 (22 @ 05:08), Max: 100.8 (22 @ 11:50)  HR: 69 (22 @ 05:08) (69 - 123)  BP: 176/78 (22 @ 05:08) (150/78 - 211/113)  RR: 18 (22 @ 05:08) (18 - 23)  SpO2: 100% (22 @ 05:08) (98% - 100%)  Wt(kg): --    PHYSICAL EXAM:    Gen: not answering questions appeared lethargic but arousable and upset as I woke her up appears chronically ill   Heent: PERRLA EOMI  Heart: RRR S1S2 no murmur  Lungs: Clear to auscultation  Abd: BS + soft and depressible non tender  Ext: No cyanosis or edema contracted?    LABS:                         9.7    16.20 )-----------( 240      ( 2022 07:13 )             30.1     02-07    148<H>  |  119<H>  |  34<H>  ----------------------------<  146<H>  3.2<L>   |  22  |  2.12<H>    Ca    8.3<L>      2022 07:13    TPro  9.1<H>  /  Alb  3.1<L>  /  TBili  0.8  /  DBili  x   /  AST  18  /  ALT  13  /  AlkPhos  91  02-06    LIVER FUNCTIONS - ( 2022 13:20 )  Alb: 3.1 g/dL / Pro: 9.1 gm/dL / ALK PHOS: 91 U/L / ALT: 13 U/L / AST: 18 U/L / GGT: x           PT/INR - ( 2022 13:20 )   PT: 13.5 sec;   INR: 1.17 ratio         PTT - ( 2022 13:20 )  PTT:35.7 sec  Urinalysis Basic - ( 2022 16:40 )    Color: Yellow / Appearance: Slightly Turbid / S.010 / pH: x  Gluc: x / Ketone: Negative  / Bili: Negative / Urobili: Negative mg/dL   Blood: x / Protein: 500 mg/dL / Nitrite: Negative   Leuk Esterase: Moderate / RBC: 0-2 /HPF / WBC 11-25   Sq Epi: x / Non Sq Epi: Occasional / Bacteria: TNTC      Culture Results:   Growth in aerobic and anaerobic bottles: Gram Negative Rods and Gram  Positive Cocci in Pairs and Chains  ***Blood Panel PCR results on this specimen are available  approximately 3 hours after the Gram stain result.***  Gram stain, PCR, and/or culture results may not always  correspond due to difference in methodologies.  ************************************************************  This PCR assay was performed by multiplex PCR. This  Assay tests for 66 bacterial and resistance gene targets.  Please refer to the [facility] Labs test directory  at [email or web address] for details. ( @ 18:10)  Culture Results:   Growth in aerobic and anaerobic bottles: Gram Negative Rods and Gram  Positive Cocci in Pairs and Chains ( @ 18:10)                      
Patient is a 71y old  Female who presents with a chief complaint of sepsis. urinary retention/ catheter malfunction, neurogenic bladder, HTn/ old cva/ seizures (2022 13:03)  stable   afebrile    blood culture- klebsiella and enterococci    notes appreciated  INTERVAL HPI/OVERNIGHT EVENTS: un eventful  PAST MEDICAL & SURGICAL HISTORY:  Hypertension    Diabetes    Asthma    OA (osteoarthritis)  bautista knees, low back  and  bautista shoulders    Gout    Seizure disorder    Urinary incontinence    Vision changes  lt eye    CVA (cerebral infarction)  right side weakness    Kidney stone        MEDICATIONS  (STANDING):  amLODIPine   Tablet 10 milliGRAM(s) Oral daily  aspirin enteric coated 81 milliGRAM(s) Oral daily  cloNIDine 0.1 milliGRAM(s) Oral every 8 hours  hydrALAZINE 100 milliGRAM(s) Oral every 8 hours  levETIRAcetam 750 milliGRAM(s) Oral two times a day  multivitamin 1 Tablet(s) Oral daily  piperacillin/tazobactam IVPB.. 3.375 Gram(s) IV Intermittent every 12 hours  QUEtiapine 25 milliGRAM(s) Oral at bedtime  simvastatin 20 milliGRAM(s) Oral at bedtime  sodium chloride 0.9%. 1000 milliLiter(s) (100 mL/Hr) IV Continuous <Continuous>    MEDICATIONS  (PRN):      Allergies    No Known Allergies    Intolerances        REVIEW OF SYSTEMS:  CONSTITUTIONAL: No fever, weight loss, or fatigue  EYES: No eye pain, visual disturbances, or discharge  ENMT:  No difficulty hearing, tinnitus, vertigo; No sinus or throat pain  NECK: No pain or stiffness  BREASTS: No pain, masses, or nipple discharge  RESPIRATORY: No cough, wheezing, chills or hemoptysis; No shortness of breath  CARDIOVASCULAR: No chest pain, palpitations, dizziness, or leg swelling  GASTROINTESTINAL: No abdominal or epigastric pain. No nausea, vomiting, or hematemesis; No diarrhea or constipation. No melena or hematochezia.  GENITOURINARY: No dysuria, frequency, hematuria, or incontinence  NEUROLOGICAL: No headaches, memory loss, loss of strength, numbness, or tremors  SKIN: No itching, burning, rashes, or lesions   LYMPH NODES: No enlarged glands  ENDOCRINE: No heat or cold intolerance; No hair loss  MUSCULOSKELETAL: No joint pain or swelling; No muscle, back, or extremity pain  PSYCHIATRIC: No depression, anxiety, mood swings, or difficulty sleeping  HEME/LYMPH: No easy bruising, or bleeding gums  ALLERY AND IMMUNOLOGIC: No hives or eczema    Vital Signs Last 24 Hrs  T(C): 36.9 (2022 11:00), Max: 37.2 (2022 17:51)  T(F): 98.4 (2022 11:00), Max: 98.9 (2022 17:51)  HR: 89 (2022 11:00) (70 - 100)  BP: 128/67 (2022 11:00) (111/71 - 157/72)  BP(mean): --  RR: 18 (2022 11:00) (18 - 18)  SpO2: 99% (2022 11:00) (97% - 99%)    PHYSICAL EXAM:  GENERAL: NAD, well-groomed, well-developed  HEAD:  Atraumatic, Normocephalic  EYES: EOMI, PERRLA, conjunctiva and sclera clear  ENMT: No tonsillar erythema, exudates, or enlargement; Moist mucous membranes, Good dentition, No lesions  NECK: Supple, No JVD, Normal thyroid  NERVOUS SYSTEM:  Alert & Oriented X3, Good concentration; Motor Strength 5/5 B/L upper and lower extremities; DTRs 2+ intact and symmetric  CHEST/LUNG: Clear to percussion bilaterally; No rales, rhonchi, wheezing, or rubs  HEART: Regular rate and rhythm; No murmurs, rubs, or gallops  ABDOMEN: Soft, Nontender, Nondistended; Bowel sounds present. milner cath draining clear urine  EXTREMITIES:  2+ Peripheral Pulses, No clubbing, cyanosis, or edema  LYMPH: No lymphadenopathy noted  SKIN: No rashes or lesions    LABS:                        9.7    16.20 )-----------( 240      ( 2022 07:13 )             30.1     02-07    148<H>  |  119<H>  |  34<H>  ----------------------------<  146<H>  3.2<L>   |  22  |  2.12<H>    Ca    8.3<L>      2022 07:13          Urinalysis Basic - ( 2022 16:40 )    Color: Yellow / Appearance: Slightly Turbid / S.010 / pH: x  Gluc: x / Ketone: Negative  / Bili: Negative / Urobili: Negative mg/dL   Blood: x / Protein: 500 mg/dL / Nitrite: Negative   Leuk Esterase: Moderate / RBC: 0-2 /HPF / WBC 11-25   Sq Epi: x / Non Sq Epi: Occasional / Bacteria: TNTC      CAPILLARY BLOOD GLUCOSE                    RADIOLOGY & ADDITIONAL TESTS:    Imaging Personally Reviewed:  [ ] YES  [ ] NO    Consultant(s) Notes Reviewed:  [ ] YES  [ ] NO    Care Discussed with Consultants/Other Providers [ ] YES  [ ] NO    Care discussed with family,         [ x ]   yes  [  ]  No    imp:    xx]    unstable[  ]     improving [   ]       unchanged  [  ]                Plans:  Continue present plans  [x  ] as per ID/                New consult [  ]   specialty  .......               order test[  ]    test name. cbc in am                 Discharge Planning  [  ]           
Patient is a 71y old  Female who presents with a chief complaint of sepsis. urinary retention/ catheter malfunction, neurogenic bladder, HTn/ old cva/ seizures (09 Feb 2022 10:22)  Patient is a 71y old  Female who presents with a chief complaint of sepsis. urinary retention/ catheter malfunction, neurogenic bladder, HTn/ old cva/ seizures (08 Feb 2022 13:03)  stable, alert. eating well.  vitals stable  labs reviewed. wbc normal   afebrile    blood culture- klebsiella and enterococci    notes appreciated   ID notes appreciated.    Hypernatremic- will start on   free water iv    INTERVAL HPI/OVERNIGHT EVENTS:  PAST MEDICAL & SURGICAL HISTORY:  Hypertension    Diabetes    Asthma    OA (osteoarthritis)  bautista knees, low back  and  bautista shoulders    Gout    Seizure disorder    Urinary incontinence    Vision changes  lt eye    CVA (cerebral infarction)  right side weakness    Kidney stone        MEDICATIONS  (STANDING):  amLODIPine   Tablet 10 milliGRAM(s) Oral daily  aspirin enteric coated 81 milliGRAM(s) Oral daily  cloNIDine 0.1 milliGRAM(s) Oral every 8 hours  dextrose 5%. 1000 milliLiter(s) (100 mL/Hr) IV Continuous <Continuous>  hydrALAZINE 100 milliGRAM(s) Oral every 8 hours  levETIRAcetam 750 milliGRAM(s) Oral two times a day  multivitamin 1 Tablet(s) Oral daily  piperacillin/tazobactam IVPB.. 3.375 Gram(s) IV Intermittent every 12 hours  QUEtiapine 25 milliGRAM(s) Oral at bedtime  simvastatin 20 milliGRAM(s) Oral at bedtime    MEDICATIONS  (PRN):      Allergies    No Known Allergies    Intolerances        REVIEW OF SYSTEMS:  CONSTITUTIONAL: No fever, weight loss, or fatigue  EYES: No eye pain, visual disturbances, or discharge  ENMT:  No difficulty hearing, tinnitus, vertigo; No sinus or throat pain  NECK: No pain or stiffness  BREASTS: No pain, masses, or nipple discharge  RESPIRATORY: No cough, wheezing, chills or hemoptysis; No shortness of breath  CARDIOVASCULAR: No chest pain, palpitations, dizziness, or leg swelling  GASTROINTESTINAL: No abdominal or epigastric pain. No nausea, vomiting, or hematemesis; No diarrhea or constipation. No melena or hematochezia.  GENITOURINARY: No dysuria, frequency, hematuria, or incontinence  NEUROLOGICAL: No headaches, memory loss, loss of strength, numbness, or tremors  SKIN: No itching, burning, rashes, or lesions   LYMPH NODES: No enlarged glands  ENDOCRINE: No heat or cold intolerance; No hair loss  MUSCULOSKELETAL: No joint pain or swelling; No muscle, back, or extremity pain  PSYCHIATRIC: No depression, anxiety, mood swings, or difficulty sleeping  HEME/LYMPH: No easy bruising, or bleeding gums  ALLERY AND IMMUNOLOGIC: No hives or eczema    Vital Signs Last 24 Hrs  T(C): 36.3 (09 Feb 2022 05:08), Max: 37.1 (08 Feb 2022 17:15)  T(F): 97.4 (09 Feb 2022 05:08), Max: 98.8 (08 Feb 2022 17:15)  HR: 83 (09 Feb 2022 05:08) (80 - 89)  BP: 152/59 (09 Feb 2022 05:08) (128/67 - 155/72)  BP(mean): --  RR: 18 (09 Feb 2022 05:08) (18 - 18)  SpO2: 99% (09 Feb 2022 05:08) (97% - 99%)    PHYSICAL EXAM:  GENERAL: NAD, well-groomed, well-developed  HEAD:  Atraumatic, Normocephalic  EYES: EOMI, PERRLA, conjunctiva and sclera clear  ENMT: No tonsillar erythema, exudates, or enlargement; Moist mucous membranes, Good dentition, No lesions  NECK: Supple, No JVD, Normal thyroid  NERVOUS SYSTEM:  Alert & Oriented X3, Good concentration; Motor Strength 5/5 B/L upper and lower extremities; DTRs 2+ intact and symmetric  CHEST/LUNG: Clear to percussion bilaterally; No rales, rhonchi, wheezing, or rubs  HEART: Regular rate and rhythm; No murmurs, rubs, or gallops  ABDOMEN: Soft, Nontender, Nondistended; Bowel sounds present  EXTREMITIES:  2+ Peripheral Pulses, No clubbing, cyanosis, or edema  LYMPH: No lymphadenopathy noted  SKIN: No rashes or lesions    LABS:                        10.8   9.03  )-----------( 268      ( 09 Feb 2022 07:37 )             35.4     02-09    152<H>  |  124<H>  |  26<H>  ----------------------------<  168<H>  3.3<L>   |  22  |  1.93<H>    Ca    7.8<L>      09 Feb 2022 07:37            CAPILLARY BLOOD GLUCOSE                    RADIOLOGY & ADDITIONAL TESTS:    Imaging Personally Reviewed:  [ ] YES  [ ] NO    Consultant(s) Notes Reviewed:  [ ] YES  [ ] NO    Care Discussed with Consultants/Other Providers [ ] YES  [ ] NO    Care discussed with family,         [ x ]   yes  [  ]  No    imp:    stable[x ]    unstable[  ]     improving [   ]       unchanged  [  ]                Plans:  Continue present plans  [  x] continue  zosyn, TTE, IV Free water for hypernatremia               New consult [  ]   specialty  .......               order test[  ]    test name.  serum sodium in am                Discharge Planning  [  ]           
HPI:    · Chief Complaint Quote	p/w milner cath dislodged, fevers, poor appetite x2 days.. Patient fund to have fever,  urinary retention, , displaced urinary catheter on admission. Patient is functionally quadriplegic and is bedbound, with recent hcx of admission for dehydration and UTI and sepsis and amadou   Blood work shows patient is dehydrated and has AMADOU, again   (06 Feb 2022 17:28)      Allergies    No Known Allergies    Intolerances        MEDICATIONS  (STANDING):  amLODIPine   Tablet 10 milliGRAM(s) Oral daily  aspirin enteric coated 81 milliGRAM(s) Oral daily  cloNIDine 0.1 milliGRAM(s) Oral every 8 hours  dextrose 5%. 1000 milliLiter(s) (100 mL/Hr) IV Continuous <Continuous>  hydrALAZINE 100 milliGRAM(s) Oral every 8 hours  levETIRAcetam 750 milliGRAM(s) Oral two times a day  multivitamin 1 Tablet(s) Oral daily  piperacillin/tazobactam IVPB.. 3.375 Gram(s) IV Intermittent every 12 hours  QUEtiapine 25 milliGRAM(s) Oral at bedtime  simvastatin 20 milliGRAM(s) Oral at bedtime    MEDICATIONS  (PRN):      REVIEW OF SYSTEMS:    unable to obtain     VITAL SIGNS:  T(C): 36.3 (02-09-22 @ 05:08), Max: 37.1 (02-08-22 @ 17:15)  T(F): 97.4 (02-09-22 @ 05:08), Max: 98.8 (02-08-22 @ 17:15)  HR: 83 (02-09-22 @ 05:08) (80 - 89)  BP: 152/59 (02-09-22 @ 05:08) (128/67 - 155/72)  RR: 18 (02-09-22 @ 05:08) (18 - 18)  SpO2: 99% (02-09-22 @ 05:08) (97% - 99%)  Wt(kg): --    PHYSICAL EXAM:  Gen:No acute distress  Heent: PERRLA EOMI  Heart: RRR S1S2 no murmur  Lungs: Clear to auscultation  Abd: BS + soft and depressible non tender  Ext: No cyanosis or edema      LABS:                         10.8   9.03  )-----------( 268      ( 09 Feb 2022 07:37 )             35.4     02-09    152<H>  |  124<H>  |  26<H>  ----------------------------<  168<H>  3.3<L>   |  22  |  1.93<H>    Ca    7.8<L>      09 Feb 2022 07:37              Culture Results:   >100,000 CFU/ml Klebsiella pneumoniae (02-07 @ 01:35)  Culture Results:   Growth in aerobic and anaerobic bottles: Klebsiella pneumoniae  Growth in aerobic and anaerobic bottles: Enterococcus faecalis  ***Blood Panel PCR results on this specimen are available  approximately 3 hours after the Gram stain result.***  Gram stain, PCR, and/or culture results may not always  correspond due to difference in methodologies.  ************************************************************  This PCR assay was performed by multiplex PCR. This  Assay tests for 66 bacterial and resistance gene targets.  Please refer to the [facility] Labs test directory  at [email or web address] for details. (02-06 @ 18:10)  Culture Results:   Growth in aerobic and anaerobic bottles: Klebsiella pneumoniae  Growth in aerobic and anaerobic bottles: Enterococcus faecalis  See previous culture [id number] (02-06 @ 18:10)                
Patient seen and examined bedside resting comfortably.  Indwelling milner with clear yellow urine  Denies hematuria and dysuria. Denies nausea and vomiting. Tolerating diet.      T(F): 97.4 (02-09-22 @ 05:08), Max: 98.8 (02-08-22 @ 17:15)  HR: 83 (02-09-22 @ 05:08) (80 - 89)  BP: 152/59 (02-09-22 @ 05:08) (128/67 - 155/72)  RR: 18 (02-09-22 @ 05:08) (18 - 18)  SpO2: 99% (02-09-22 @ 05:08) (97% - 99%)    PHYSICAL EXAM:    General: NAD, alert and awake  HEENT: NCAT, EOMI, conjunctiva clear  Chest: nonlabored respirations, CTA b/l.  Abdomen: soft, NT/ND.   Extremities: Calf soft, nontender b/l.   : No suprapubic tenderness or bladder distention.  Indwelling milner with clear yellow urine    LABS:                        10.8   9.03  )-----------( 268      ( 09 Feb 2022 07:37 )             35.4   02-09    152<H>  |  124<H>  |  26<H>  ----------------------------<  168<H>  3.3<L>   |  22  |  1.93<H>    Ca    7.8<L>      09 Feb 2022 07:37      I&O's Detail    08 Feb 2022 07:01  -  09 Feb 2022 07:00  --------------------------------------------------------  IN:    IV PiggyBack: 200 mL    Oral Fluid: 480 mL    sodium chloride 0.9%: 1200 mL  Total IN: 1880 mL    OUT:    Indwelling Catheter - Urethral (mL): 2000 mL  Total OUT: 2000 mL    Total NET: -120 mL  
HPI:    · Chief Complaint Quote	p/w milner cath dislodged, fevers, poor appetite x2 days.. Patient fund to have fever,  urinary retention, , displaced urinary catheter on admission. Patient is functionally quadriplegic and is bedbound, with recent hcx of admission for dehydration and UTI and sepsis and amadou   Blood work shows patient is dehydrated and has AMADOU, again   (06 Feb 2022 17:28)  work up consistent with urinary tract infection     Allergies    No Known Allergies    Intolerances        MEDICATIONS  (STANDING):  amLODIPine   Tablet 10 milliGRAM(s) Oral daily  aspirin enteric coated 81 milliGRAM(s) Oral daily  cloNIDine 0.1 milliGRAM(s) Oral every 8 hours  dextrose 5%. 1000 milliLiter(s) (100 mL/Hr) IV Continuous <Continuous>  hydrALAZINE 100 milliGRAM(s) Oral every 8 hours  levETIRAcetam 750 milliGRAM(s) Oral two times a day  multivitamin 1 Tablet(s) Oral daily  piperacillin/tazobactam IVPB.. 3.375 Gram(s) IV Intermittent every 12 hours  QUEtiapine 25 milliGRAM(s) Oral at bedtime  simvastatin 20 milliGRAM(s) Oral at bedtime    MEDICATIONS  (PRN):      REVIEW OF SYSTEMS:    unable to assess     VITAL SIGNS:  T(C): 36.6 (02-10-22 @ 11:01), Max: 37.8 (02-09-22 @ 18:15)  T(F): 97.9 (02-10-22 @ 11:01), Max: 100 (02-09-22 @ 18:15)  HR: 73 (02-10-22 @ 11:01) (65 - 73)  BP: 127/79 (02-10-22 @ 11:01) (115/66 - 170/77)  RR: 18 (02-10-22 @ 11:01) (18 - 18)  SpO2: 97% (02-10-22 @ 11:01) (97% - 99%)  Wt(kg): --    PHYSICAL EXAM:    GENERAL: not in any distress  HEENT: Neck is supple, normocephalic, atraumatic   CHEST/LUNG: Clear to auscultation bilaterally; No rales, rhonchi, wheezing  HEART: Regular rate and rhythm; No murmurs, rubs, or gallops  ABDOMEN: Soft, Nontender, Nondistended; Bowel sounds present, no rebound   EXTREMITIES:  2+ Peripheral Pulses, No clubbing, cyanosis, or edema  non verbal   SKIN: No rashes or lesions  BACK: no pressor sore   NERVOUS SYSTEM:arousable non verbal     LABS:                         10.1   6.44  )-----------( 250      ( 10 Feb 2022 08:08 )             32.3     02-10    146<H>  |  118<H>  |  20  ----------------------------<  195<H>  3.0<L>   |  22  |  1.76<H>    Ca    7.6<L>      10 Feb 2022 08:08                                Culture Results:   No growth to date. (02-08 @ 10:53)  Culture Results:   >100,000 CFU/ml Klebsiella pneumoniae (02-07 @ 01:35)  Culture Results:   Growth in aerobic and anaerobic bottles: Klebsiella pneumoniae  Growth in aerobic and anaerobic bottles: Enterococcus faecalis  ***Blood Panel PCR results on this specimen are available  approximately 3 hours after the Gram stain result.***  Gram stain, PCR, and/or culture results may not always  correspond due to difference in methodologies.  ************************************************************  This PCR assay was performed by multiplex PCR. This  Assay tests for 66 bacterial and resistance gene targets.  Please refer to the [facility] test directory  at [email or web address] for details. (02-06 @ 18:10)  Culture Results:   Growth in aerobic and anaerobic bottles: Klebsiella pneumoniae  Growth in aerobic and anaerobic bottles: Enterococcus faecalis  See previous culture [id number] (02-06 @ 18:10)                Radiology:      
Patient is a 71y old  Female who presents with a chief complaint of sepsis. urinary retention/ catheter malfunction, neurogenic bladder, HTn/ old cva/ seizures (2022 17:28)  patient is clinically improved.  milner changed in the ED   Vitals stable   fever  defervescing   blood cultures positive  for aerobic and anaerobic organisms,  rocephin changed to zosyn   ID consult pending   functionally weak.   WBC trending down  INTERVAL HPI/OVERNIGHT EVENTS:  PAST MEDICAL & SURGICAL HISTORY:  Hypertension    Diabetes    Asthma    OA (osteoarthritis)  bautista knees, low back  and  bautista shoulders    Gout    Seizure disorder    Urinary incontinence    Vision changes  lt eye    CVA (cerebral infarction)  right side weakness    Kidney stone        MEDICATIONS  (STANDING):  amLODIPine   Tablet 10 milliGRAM(s) Oral daily  aspirin enteric coated 81 milliGRAM(s) Oral daily  cloNIDine 0.1 milliGRAM(s) Oral every 8 hours  hydrALAZINE 100 milliGRAM(s) Oral every 8 hours  levETIRAcetam 750 milliGRAM(s) Oral two times a day  multivitamin 1 Tablet(s) Oral daily  piperacillin/tazobactam IVPB.. 3.375 Gram(s) IV Intermittent every 12 hours  QUEtiapine 25 milliGRAM(s) Oral at bedtime  simvastatin 20 milliGRAM(s) Oral at bedtime  sodium chloride 0.9%. 1000 milliLiter(s) (100 mL/Hr) IV Continuous <Continuous>    MEDICATIONS  (PRN):      Allergies    No Known Allergies    Intolerances        REVIEW OF SYSTEMS:  CONSTITUTIONAL: No fever, weight loss, or fatigue  EYES: No eye pain, visual disturbances, or discharge  ENMT:  No difficulty hearing, tinnitus, vertigo; No sinus or throat pain  NECK: No pain or stiffness  BREASTS: No pain, masses, or nipple discharge  RESPIRATORY: No cough, wheezing, chills or hemoptysis; No shortness of breath  CARDIOVASCULAR: No chest pain, palpitations, dizziness, or leg swelling  GASTROINTESTINAL: No abdominal or epigastric pain. No nausea, vomiting, or hematemesis; No diarrhea or constipation. No melena or hematochezia.  GENITOURINARY: No dysuria, frequency, hematuria, or incontinence  NEUROLOGICAL: No headaches, memory loss, loss of strength, numbness, or tremors  SKIN: No itching, burning, rashes, or lesions   LYMPH NODES: No enlarged glands  ENDOCRINE: No heat or cold intolerance; No hair loss  MUSCULOSKELETAL: No joint pain or swelling; No muscle, back, or extremity pain  PSYCHIATRIC: No depression, anxiety, mood swings, or difficulty sleeping  HEME/LYMPH: No easy bruising, or bleeding gums  ALLERY AND IMMUNOLOGIC: No hives or eczema    Vital Signs Last 24 Hrs  T(C): 37.4 (2022 05:08), Max: 38.2 (2022 11:50)  T(F): 99.3 (2022 05:08), Max: 100.8 (2022 11:50)  HR: 69 (2022 05:08) (69 - 123)  BP: 176/78 (2022 05:08) (150/78 - 211/113)  BP(mean): --  RR: 18 (2022 05:08) (18 - 23)  SpO2: 100% (2022 05:08) (98% - 100%)    PHYSICAL EXAM:  GENERAL: NAD, well-groomed, well-developed  HEAD:  Atraumatic, Normocephalic  EYES: EOMI, PERRLA, conjunctiva and sclera clear  ENMT: No tonsillar erythema, exudates, or enlargement; Moist mucous membranes, Good dentition, No lesions  NECK: Supple, No JVD, Normal thyroid  NERVOUS SYSTEM:  Alert & Oriented X3, Good concentration; Motor Strength 5/5 B/L upper and lower extremities; DTRs 2+ intact and symmetric  CHEST/LUNG: Clear to percussion bilaterally; No rales, rhonchi, wheezing, or rubs  HEART: Regular rate and rhythm; No murmurs, rubs, or gallops  ABDOMEN: Soft, Nontender, Nondistended; Bowel sounds present  EXTREMITIES:  2+ Peripheral Pulses, No clubbing, cyanosis, or edema  LYMPH: No lymphadenopathy noted  SKIN: No rashes or lesions    LABS:                        9.7    16.20 )-----------( 240      ( 2022 07:13 )             30.1     02-07    148<H>  |  119<H>  |  34<H>  ----------------------------<  146<H>  3.2<L>   |  22  |  2.12<H>    Ca    8.3<L>      2022 07:13    TPro  9.1<H>  /  Alb  3.1<L>  /  TBili  0.8  /  DBili  x   /  AST  18  /  ALT  13  /  AlkPhos  91  02-06      PT/INR - ( 2022 13:20 )   PT: 13.5 sec;   INR: 1.17 ratio         PTT - ( 2022 13:20 )  PTT:35.7 sec  Urinalysis Basic - ( 2022 16:40 )    Color: Yellow / Appearance: Slightly Turbid / S.010 / pH: x  Gluc: x / Ketone: Negative  / Bili: Negative / Urobili: Negative mg/dL   Blood: x / Protein: 500 mg/dL / Nitrite: Negative   Leuk Esterase: Moderate / RBC: 0-2 /HPF / WBC 11-25   Sq Epi: x / Non Sq Epi: Occasional / Bacteria: TNTC      CAPILLARY BLOOD GLUCOSE                    RADIOLOGY & ADDITIONAL TESTS:    Imaging Personally Reviewed:  [ ] YES  [ ] NO    Consultant(s) Notes Reviewed:  [ ] YES  [ ] NO    Care Discussed with Consultants/Other Providers [ x] YES  [ ] NO    Care discussed with family,         [ x ]   yes  [  ]  No    imp:    stable[ ]    unstable[  ]     improving [  x ]       unchanged  [  ]                Plans:  Continue present plans  [ x ] as per ID, sensitivities from blood cultures pending,/ Iv fluids.                New consult [  ]   specialty  .......               order test[  ]    test name.  cbc, bmp                Discharge Planning  [  ]           
Patient is a 71y old  Female who presents with a chief complaint of sepsis. urinary retention/ catheter malfunction, neurogenic bladder, HTn/ old cva/ seizures (09 Feb 2022 10:53)    stable, alert. eating well.  vitals stable  labs reviewed. wbc normal   afebrile    blood culture- klebsiella and enterococci    notes appreciated   ID notes appreciated.     Hypokalemic today,    waiting for Bms   TTe report pending        INTERVAL HPI/OVERNIGHT EVENTS:  PAST MEDICAL & SURGICAL HISTORY:  Hypertension    Diabetes    Asthma    OA (osteoarthritis)  bautista knees, low back  and  bautista shoulders    Gout    Seizure disorder    Urinary incontinence    Vision changes  lt eye    CVA (cerebral infarction)  right side weakness    Kidney stone        MEDICATIONS  (STANDING):  amLODIPine   Tablet 10 milliGRAM(s) Oral daily  aspirin enteric coated 81 milliGRAM(s) Oral daily  cloNIDine 0.1 milliGRAM(s) Oral every 8 hours  dextrose 5%. 1000 milliLiter(s) (100 mL/Hr) IV Continuous <Continuous>  hydrALAZINE 100 milliGRAM(s) Oral every 8 hours  levETIRAcetam 750 milliGRAM(s) Oral two times a day  multivitamin 1 Tablet(s) Oral daily  piperacillin/tazobactam IVPB.. 3.375 Gram(s) IV Intermittent every 12 hours  potassium chloride    Tablet ER 20 milliEquivalent(s) Oral every 2 hours  QUEtiapine 25 milliGRAM(s) Oral at bedtime  saline laxative (FLEET) Rectal Enema 1 Enema Rectal once  simvastatin 20 milliGRAM(s) Oral at bedtime    MEDICATIONS  (PRN):      Allergies    No Known Allergies    Intolerances        REVIEW OF SYSTEMS:  CONSTITUTIONAL: No fever, weight loss, or fatigue  EYES: No eye pain, visual disturbances, or discharge  ENMT:  No difficulty hearing, tinnitus, vertigo; No sinus or throat pain  NECK: No pain or stiffness  BREASTS: No pain, masses, or nipple discharge  RESPIRATORY: No cough, wheezing, chills or hemoptysis; No shortness of breath  CARDIOVASCULAR: No chest pain, palpitations, dizziness, or leg swelling  GASTROINTESTINAL: No abdominal or epigastric pain. No nausea, vomiting, or hematemesis; No diarrhea or constipation. No melena or hematochezia.  GENITOURINARY: No dysuria, frequency, hematuria, or incontinence  NEUROLOGICAL: No headaches, memory loss, loss of strength, numbness, or tremors  SKIN: No itching, burning, rashes, or lesions   LYMPH NODES: No enlarged glands  ENDOCRINE: No heat or cold intolerance; No hair loss  MUSCULOSKELETAL: No joint pain or swelling; No muscle, back, or extremity pain  PSYCHIATRIC: No depression, anxiety, mood swings, or difficulty sleeping  HEME/LYMPH: No easy bruising, or bleeding gums  ALLERY AND IMMUNOLOGIC: No hives or eczema    Vital Signs Last 24 Hrs  T(C): 37 (10 Feb 2022 05:02), Max: 37.8 (09 Feb 2022 18:15)  T(F): 98.6 (10 Feb 2022 05:02), Max: 100 (09 Feb 2022 18:15)  HR: 65 (10 Feb 2022 05:02) (65 - 91)  BP: 170/77 (10 Feb 2022 05:02) (115/66 - 170/77)  BP(mean): --  RR: 18 (10 Feb 2022 05:02) (17 - 20)  SpO2: 99% (10 Feb 2022 05:02) (97% - 99%)    PHYSICAL EXAM:  GENERAL: NAD, well-groomed, well-developed. demented.   HEAD:  Atraumatic, Normocephalic  EYES: EOMI, PERRLA, conjunctiva and sclera clear  ENMT: No tonsillar erythema, exudates, or enlargement; Moist mucous membranes, Good dentition, No lesions  NECK: Supple, No JVD, Normal thyroid  NERVOUS SYSTEM:  Alert & Oriented X3, Good concentration; Motor Strength 5/5 B/L upper and lower extremities; DTRs 2+ intact and symmetric  CHEST/LUNG: Clear to percussion bilaterally; No rales, rhonchi, wheezing, or rubs  HEART: Regular rate and rhythm; No murmurs, rubs, or gallops  ABDOMEN: Soft, Nontender, Nondistended; Bowel sounds present  EXTREMITIES:  2+ Peripheral Pulses, No clubbing, cyanosis, or edema  LYMPH: No lymphadenopathy noted  SKIN: No rashes or lesions    LABS:                        10.1   6.44  )-----------( 250      ( 10 Feb 2022 08:08 )             32.3     02-10    146<H>  |  118<H>  |  20  ----------------------------<  195<H>  3.0<L>   |  22  |  1.76<H>    Ca    7.6<L>      10 Feb 2022 08:08            CAPILLARY BLOOD GLUCOSE                    RADIOLOGY & ADDITIONAL TESTS:    Imaging Personally Reviewed:  [ ] YES  [ ] NO    Consultant(s) Notes Reviewed:  [ ] YES  [ ] NO    Care Discussed with Consultants/Other Providers [ ] YES  [ ] NO    Care discussed with family,         [ x ]   yes  [  ]  No    imp:    stable[ x]    unstable[  ]     improving [  x ]       unchanged  [  ]                Plans:  Continue present plans  [x  ], May be discharged on PO cipro and Augmentin for 7 days,  If TTE is negative for vegetations               New consult [  ]   specialty  .......               order test[  ]    test name.                  Discharge Planning  [  ]

## 2022-02-11 NOTE — DISCHARGE NOTE NURSING/CASE MANAGEMENT/SOCIAL WORK - PATIENT PORTAL LINK FT
You can access the FollowMyHealth Patient Portal offered by [facility] by registering at the following website: [email or web address]. By joining [employer]’s FollowMyHealth portal, you will also be able to view your health information using other applications (apps) compatible with our system.

## 2022-02-11 NOTE — PROGRESS NOTE ADULT - REASON FOR ADMISSION
sepsis. urinary retention/ catheter malfunction, neurogenic bladder, HTn/ old cva/ seizures

## 2022-02-13 LAB
CULTURE RESULTS: SIGNIFICANT CHANGE UP
SPECIMEN SOURCE: SIGNIFICANT CHANGE UP

## 2022-02-14 DIAGNOSIS — G40.909 EPILEPSY, UNSPECIFIED, NOT INTRACTABLE, WITHOUT STATUS EPILEPTICUS: ICD-10-CM

## 2022-02-14 DIAGNOSIS — T83.511A INFECTION AND INFLAMMATORY REACTION DUE TO INDWELLING URETHRAL CATHETER, INITIAL ENCOUNTER: ICD-10-CM

## 2022-02-14 DIAGNOSIS — M19.012 PRIMARY OSTEOARTHRITIS, LEFT SHOULDER: ICD-10-CM

## 2022-02-14 DIAGNOSIS — M17.0 BILATERAL PRIMARY OSTEOARTHRITIS OF KNEE: ICD-10-CM

## 2022-02-14 DIAGNOSIS — Z74.01 BED CONFINEMENT STATUS: ICD-10-CM

## 2022-02-14 DIAGNOSIS — J45.909 UNSPECIFIED ASTHMA, UNCOMPLICATED: ICD-10-CM

## 2022-02-14 DIAGNOSIS — I69.951 HEMIPLEGIA AND HEMIPARESIS FOLLOWING UNSPECIFIED CEREBROVASCULAR DISEASE AFFECTING RIGHT DOMINANT SIDE: ICD-10-CM

## 2022-02-14 DIAGNOSIS — N31.9 NEUROMUSCULAR DYSFUNCTION OF BLADDER, UNSPECIFIED: ICD-10-CM

## 2022-02-14 DIAGNOSIS — E11.9 TYPE 2 DIABETES MELLITUS WITHOUT COMPLICATIONS: ICD-10-CM

## 2022-02-14 DIAGNOSIS — N17.9 ACUTE KIDNEY FAILURE, UNSPECIFIED: ICD-10-CM
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
## 2022-02-14 DIAGNOSIS — M19.011 PRIMARY OSTEOARTHRITIS, RIGHT SHOULDER: ICD-10-CM

## 2022-02-14 DIAGNOSIS — I10 ESSENTIAL (PRIMARY) HYPERTENSION: ICD-10-CM

## 2022-02-14 DIAGNOSIS — N10 ACUTE PYELONEPHRITIS: ICD-10-CM

## 2022-02-14 DIAGNOSIS — A41.9 SEPSIS, UNSPECIFIED ORGANISM: ICD-10-CM

## 2022-02-14 DIAGNOSIS — R33.8 OTHER RETENTION OF URINE: ICD-10-CM

## 2022-02-14 DIAGNOSIS — A41.81 SEPSIS DUE TO ENTEROCOCCUS: ICD-10-CM

## 2022-02-14 DIAGNOSIS — F03.90 UNSPECIFIED DEMENTIA WITHOUT BEHAVIORAL DISTURBANCE: ICD-10-CM

## 2022-02-14 DIAGNOSIS — M10.9 GOUT, UNSPECIFIED: ICD-10-CM

## 2022-02-14 DIAGNOSIS — R53.2 FUNCTIONAL QUADRIPLEGIA: ICD-10-CM

## 2022-02-14 DIAGNOSIS — A41.59 OTHER GRAM-NEGATIVE SEPSIS: ICD-10-CM

## 2022-02-14 DIAGNOSIS — E86.0 DEHYDRATION: ICD-10-CM
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
## 2022-04-08 NOTE — PATIENT PROFILE ADULT - NSPROGENDIFFINTUB_GEN_A_NUR
"Chief Complaint   Patient presents with     Follow Up     Return [facility]       Vitals:    04/08/22 1128   Weight: 220 lb   Height: 5' 5\"       Body mass index is 36.61 kg/m .    Stephani Romo CMA    "
pt confused, unable to provide accurate answer/s

## 2022-04-21 NOTE — SWALLOW BEDSIDE ASSESSMENT ADULT - ASR SWALLOW DENTITION
Pt unsure what he dosed. I pieced together the story as best I could. Please review and advise rarely or never uses dentures to eat

## 2022-04-26 NOTE — PROGRESS NOTE ADULT - PROBLEM SELECTOR PROBLEM 3
[facility]  PROGRESS NOTE      Interval History  Patient seen and examined  Continues to feel short of breath  No other new complaints other than feeling a little bit burning sensation when he voids urine.  Does not complain of any increasing frequency or urgency.    Objective             I/O's    Intake/Output Summary (Last 24 hours) at 4/26/2022 1131  Last data filed at 4/26/2022 0600  Gross per 24 hour   Intake 400 ml   Output 550 ml   Net -150 ml       Last Recorded Vitals    Vitals:    04/25/22 2342 04/26/22 0328 04/26/22 0805 04/26/22 0830   BP: 121/70 125/75 113/74    BP Location:   LUE - Left upper extremity    Patient Position:   Sitting    Pulse: (!) 42 (!) 51 60    Resp:   16    Temp: 98.1 °F (36.7 °C) 98.1 °F (36.7 °C) 98.4 °F (36.9 °C)    TempSrc: Oral Oral Oral    SpO2: 95% 95% 98%    Weight:    97.6 kg (215 lb 2.7 oz)   Height:            Body mass index is 35.81 kg/m².    Physical Exam  Constitutional:       Appearance: Normal appearance.   HENT:      Head: Normocephalic.   Eyes:      Extraocular Movements: Extraocular movements intact.   Cardiovascular:      Rate and Rhythm: Normal rate and regular rhythm.   Pulmonary:      Effort: Pulmonary effort is normal.      Breath sounds: Normal breath sounds.      Comments: Normal breath sounds, no supplemental oxygen, breath sounds diminished but no added sounds  Abdominal:      General: There is no distension.      Palpations: Abdomen is soft.   Musculoskeletal:         General: No swelling or tenderness.      Comments: Bilateral lower extremities in JACKIE hose   Skin:     General: Skin is warm.   Neurological:      General: No focal deficit present.      Mental Status: He is alert. Mental status is at baseline.   Psychiatric:         Mood and Affect: Mood normal.         Current Facility-Administered Medications   Medication Dose Route Frequency Provider Last Rate Last Admin   • furosemide (LASIX INJECT) injection 40 mg  40 mg Intravenous BID Dario AGUIAR  MD Delvis       • milrinone (PRIMACOR) 20 mg/100 mL in dextrose 5 % infusion premix  0.125 mcg/kg/min (Dosing Weight) Intravenous Continuous Dario Edmondson MD       • sacubitril-valsartan (ENTRESTO) 24-26 MG per tablet 0.5 tablet  0.5 tablet Oral BID Dario Edmondson MD       • aspirin (ECOTRIN) enteric coated tablet 81 mg  81 mg Oral Daily Ty Sanchez, DO   81 mg at 04/26/22 0820   • empagliflozin (JARDIANCE) tablet 10 mg  10 mg Oral QAM AC Ty Sanchez DO   10 mg at 04/26/22 0629   • insulin glargine (LANTUS) injection 22 Units  22 Units Subcutaneous QAM Ty Sanchez DO   22 Units at 04/26/22 0629   • [Held by provider] metoPROLOL succinate (TOPROL-XL) ER tablet 12.5 mg  12.5 mg Oral Daily Ty Sanchez DO       • traZODone (DESYREL) tablet 50 mg  50 mg Oral Nightly Ty Sanchez DO       • rosuvastatin (CRESTOR) tablet 5 mg  5 mg Oral Nightly Ty Sanchez DO       • rOPINIRole (REQUIP) tablet 0.5 mg  0.5 mg Oral Nightly Ty Sanchez DO       • pantoprazole (PROTONIX) EC tablet 40 mg  40 mg Oral QAM AC Ty Sanchez, DO   40 mg at 04/26/22 0629   • imipramine (TOFRANIL) tablet 25 mg  25 mg Oral Nightly Ty Sanchez, DO   25 mg at 04/25/22 2344   • gabapentin (NEURONTIN) capsule 300 mg  300 mg Oral QAM Ty Sanchez DO   300 mg at 04/26/22 0629   • gabapentin (NEURONTIN) capsule 600 mg  600 mg Oral Nightly Ty Sanchez, DO   600 mg at 04/25/22 2344   • dextrose 50 % injection 25 g  25 g Intravenous PRN Ty Sanchez DO       • dextrose 50 % injection 12.5 g  12.5 g Intravenous PRN Ty Sanchez DO       • glucagon (GLUCAGEN) injection 1 mg  1 mg Intramuscular PRN Ty Sanchez DO       • dextrose (GLUTOSE) 40 % gel 15 g  15 g Oral PRN Ty Sanchez, DO       • dextrose (GLUTOSE) 40 % gel 30 g  30 g Oral PRN Ty Sanchez, DO       • sodium chloride 0.9 % flush bag 25 mL  25 mL Intravenous PRN Ty Sanchez, DO       • sodium  chloride (PF) 0.9 % injection 2 mL  2 mL Intracatheter 2 times per day Ty Sanchez, DO       • sodium chloride 0.9 % flush bag 25 mL  25 mL Intravenous PRN Ty Sanchez DO       • insulin glargine (LANTUS) injection 48 Units  48 Units Subcutaneous Nightly Ty Sanchez DO       • insulin lispro (ADMELOG,HumaLOG) - Correction Dose   Subcutaneous 4x Daily WC Ty Sanchez DO       • ondansetron (ZOFRAN) injection 4 mg  4 mg Intravenous BID PRN Ty Sanchez DO       • prochlorperazine (COMPAZINE) tablet 5 mg  5 mg Oral Q4H PRN Ty Sanchez DO       • docusate sodium-sennosides (SENOKOT S) 50-8.6 MG 2 tablet  2 tablet Oral Daily PRN Ty Sanchez DO       • polyethylene glycol (MIRALAX) packet 17 g  17 g Oral Daily PRN Ty Sanchez DO       • bisacodyl (DULCOLAX) suppository 10 mg  10 mg Rectal Daily PRN Ty Sanchez DO       • albuterol (VENTOLIN) nebulizer 2.5 mg  2.5 mg Nebulization Q4H Resp PRN Ty Sanchez DO           Labs     Recent Labs   Lab 04/26/22  0351 04/25/22  1757   WBC 9.1 8.7   HGB 12.5* 12.7*   HCT 38.7* 38.4*   MCV 92.8 93.9   MCH 30.0 31.1   MCHC 32.3 33.1    304       Recent Labs   Lab 04/26/22  0351 04/25/22  1757   SODIUM 138 135   POTASSIUM 3.8 4.4   CHLORIDE 102 99   CO2 29 32   BUN 34* 33*   CREATININE 1.42* 1.35*   GLUCOSE 144* 136*   CALCIUM 9.8 9.6   AST  --  15   GPT  --  43        Microbiology Results     None              Imaging  XR CHEST PA OR AP 1 VIEW    Result Date: 4/25/2022  EXAM: XR CHEST PA OR AP 1 VIEW INDICATION: Heart failure, shortness of breath. Bradycardia, sob, heart failure . COMPARISON: None TECHNIQUE: Portable upright AP radiograph of the chest FINDINGS: Device which is likely a life vest projects at the patient's chest. EKG leads also project at the patient's chest. The cardiomediastinal silhouette is not enlarged given AP technique. There is no pneumothorax. The costophrenic angles are sharp. There is  borderline perihilar haziness. Minimal linear densities are consistent with subsegmental atelectasis or scarring. A few small nodular densities are likely due to small calcified granulomas. No lung opacity concerning for consolidation is seen. ACDF changes are partially included on the field-of-view. Severe bilateral glenohumeral osteoarthritis is seen.     1. No lung opacity concerning for consolidation. 2. Findings of borderline perihilar interstitial edema are likely exaggerated due to AP technique. 3. Small nodules are likely calcified granulomas, and follow-up radiograph is recommended to ensure stability. Electronically Signed by: KEANU SAENZ M.D. Signed on: 4/25/2022 6:58 PM               Assessment and Plan:    73 year old male admitted for bradycardia. Patient, nursing, and family updated and agreeable to plan.       Bradycardia  [facility]  Cardiology managing  Heart rate in the 50s.     Acute systolic heart failure exacerbation/chronic systolic heart failure  Recent changes in his medications  Currently in fluid overload  Lasix changed to 40 twice daily, on milrinone drip per cardiology.     Elevated troponins  Trending down, no acute findings on ECG  Per cardiology DC heparin drip.  Continue aspirin, statin, per cardiology reevaluation by CV surgery     CAD  Stent from 2014  High risk surgical candidate for CABG  Cardiology following, CV surgery per cardiology.     MILI  CPAP     Hypertension  Presently under good control  Resume Entresto half tablet twice daily and hold metoprolol per cardiology until bradycardia has resolved.     Dyslipidemia  Crestor continued     Severe obesity  BMI 36     Restless leg syndrome  Ropinirole    Urinary symptoms  UA is negative  Check bladder scan and reevaluate.    Chronic kidney disease stage III  Creatinine is at his baseline.  Watch while on diuretics      DVT Prophylaxis    Lovenox                  Luis Mcqueen MD.  [facility] Hospitalist  Contact - by Perfect serve  [facility] [place] [place] [phone]        “This note was generated in part using \"Dragon\" voice recognition technology. The report was reviewed by this physician but still may have unintentional errors due to inherent limitations of voice recognition technology.”     **The date and time of this note may not reflect the actual time of the patient encounter         Seizure disorder

## 2022-05-16 ENCOUNTER — EMERGENCY (EMERGENCY)
Facility: HOSPITAL | Age: 72
LOS: 0 days | Discharge: HOME | End: 2022-05-16
Attending: STUDENT IN AN ORGANIZED HEALTH CARE EDUCATION/TRAINING PROGRAM
Payer: MEDICARE

## 2022-05-16 VITALS
HEIGHT: 65 IN | DIASTOLIC BLOOD PRESSURE: 89 MMHG | TEMPERATURE: 98 F | WEIGHT: 139.99 LBS | RESPIRATION RATE: 20 BRPM | OXYGEN SATURATION: 97 % | HEART RATE: 82 BPM | SYSTOLIC BLOOD PRESSURE: 138 MMHG

## 2022-05-16 DIAGNOSIS — M10.9 GOUT, UNSPECIFIED: ICD-10-CM

## 2022-05-16 DIAGNOSIS — M19.012 PRIMARY OSTEOARTHRITIS, LEFT SHOULDER: ICD-10-CM
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
## 2022-05-16 DIAGNOSIS — M46.90 UNSPECIFIED INFLAMMATORY SPONDYLOPATHY, SITE UNSPECIFIED: ICD-10-CM

## 2022-05-16 DIAGNOSIS — J45.909 UNSPECIFIED ASTHMA, UNCOMPLICATED: ICD-10-CM

## 2022-05-16 DIAGNOSIS — M19.011 PRIMARY OSTEOARTHRITIS, RIGHT SHOULDER: ICD-10-CM

## 2022-05-16 DIAGNOSIS — G40.909 EPILEPSY, UNSPECIFIED, NOT INTRACTABLE, WITHOUT STATUS EPILEPTICUS: ICD-10-CM

## 2022-05-16 DIAGNOSIS — R33.9 RETENTION OF URINE, UNSPECIFIED: ICD-10-CM
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
## 2022-05-16 DIAGNOSIS — Z86.73 PERSONAL HISTORY OF TRANSIENT ISCHEMIC ATTACK (TIA), AND CEREBRAL INFARCTION WITHOUT RESIDUAL DEFICITS: ICD-10-CM

## 2022-05-16 DIAGNOSIS — Z79.82 LONG TERM (CURRENT) USE OF ASPIRIN: ICD-10-CM

## 2022-05-16 DIAGNOSIS — I10 ESSENTIAL (PRIMARY) HYPERTENSION: ICD-10-CM

## 2022-05-16 DIAGNOSIS — T83.091A OTHER MECHANICAL COMPLICATION OF INDWELLING URETHRAL CATHETER, INITIAL ENCOUNTER: ICD-10-CM

## 2022-05-16 DIAGNOSIS — E11.9 TYPE 2 DIABETES MELLITUS WITHOUT COMPLICATIONS: ICD-10-CM

## 2022-05-16 DIAGNOSIS — N20.0 CALCULUS OF KIDNEY: Chronic | ICD-10-CM

## 2022-05-16 DIAGNOSIS — M17.0 BILATERAL PRIMARY OSTEOARTHRITIS OF KNEE: ICD-10-CM
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
## 2022-05-16 PROCEDURE — 99284 EMERGENCY DEPT VISIT MOD MDM: CPT

## 2022-05-16 NOTE — ED PROVIDER NOTE - CLINICAL SUMMARY MEDICAL DECISION MAKING FREE TEXT BOX
presenting for nonfunctioning milner. POCUS showing moderately full bladder, no balloon visualized. Will have nurse replace and reassess.

## 2022-05-16 NOTE — ED PROVIDER NOTE - PHYSICAL EXAMINATION
Constitutional: Lying comfortably in bed in NAD. Slight RUE tremor.   ENMT: Airway patent.  Eyes: Clear bilaterally, pupils equal, round and reactive to light.  Cardiac: Normal rate, regular rhythm.  Heart sounds S1, S2.  Respiratory: Breath sounds clear and equal bilaterally.  Gastrointestinal: Abdomen soft, non-tender, no guarding.  Neurological: Alert and oriented, no focal deficits, no motor or sensory deficits.  Skin: No evidence of rash.

## 2022-05-16 NOTE — ED ADULT NURSE NOTE - NSIMPLEMENTINTERV_GEN_ALL_ED
Implemented All Fall with Harm Risk Interventions:  [place] to call system. Call bell, personal items and telephone within reach. Instruct patient to call for assistance. Room bathroom lighting operational. Non-slip footwear when patient is off stretcher. Physically safe environment: no spills, clutter or unnecessary equipment. Stretcher in lowest position, wheels locked, appropriate side rails in place. Provide visual cue, wrist band, yellow gown, etc. Monitor gait and stability. Monitor for mental status changes and reorient to person, place, and time. Review medications for side effects contributing to fall risk. Reinforce activity limits and safety measures with patient and family. Provide visual clues: red socks.

## 2022-05-16 NOTE — ED PROVIDER NOTE - OBJECTIVE STATEMENT
71F pmhx CVA, HTN, seizures, nonverbal, urinary incontinence with milner catheter, presenting to ED with no urine output in her milner bag since this AM. Sister at bedside states that home nurse replaced it this AM (twice) and still no urine has come out since. Pt has not vomited or been in any distress in that time.

## 2022-05-16 NOTE — ED ADULT NURSE NOTE - VOIDING
[FreeTextEntry1] : MATHIEU ALLNE P                       1\par \par \par \par Surgical Final Report\par \par \par \par \par Final Diagnosis\par \par Gallbladder; laparoscopic cholecystectomy:\par Chronic cholecystitis and cholelithiasis\par \par Verified by: Jodee Hernandez M.D.\par (Electronic Signature)\par Reported on: 12/27/19 11:36 EST, [facility],\par [id number] [facility], [place] [address]\par Phone: [phone]   Fax: [phone]\par _________________________________________________________________\par \par Clinical History\par cholecystitis no void

## 2022-05-16 NOTE — ED ADULT NURSE NOTE - OBJECTIVE STATEMENT
pt came in complaining of urinary catheter complications. as per pts sister, pt pulled out catheter last night. pts visiting nurse replaced the catheter but there has been no output since.  pt has no other complaints at this time. pt is non-verbal at baseline. pt came in complaining of urinary catheter complications. as per pts sister, pt pulled out catheter last night. pts visiting nurse replaced the catheter but there has been no output since.  pt has no other complaints at this time.

## 2022-05-16 NOTE — ED ADULT NURSE NOTE - NS_SISCREENINGSR_GEN_ALL_ED
Problem: Ineffective Coping  Goal: Participates in unit activities  Description: Interventions:  - Provide therapeutic environment   - Provide required programming   - Redirect inappropriate behaviors   Outcome: Progressing Negative

## 2022-05-16 NOTE — ED PROVIDER NOTE - PATIENT PORTAL LINK FT
You can access the FollowMyHealth Patient Portal offered by [facility] by registering at the following website: [email or web address]. By joining [facility]’s FollowMyHealth portal, you will also be able to view your health information using other applications (apps) compatible with our system.

## 2022-08-14 NOTE — PATIENT PROFILE ADULT - ARRIVAL FROM
· Likely pseudohyponatremia in the setting of hyperglycemia  · Follow the sodium level as the hypoglycemia is corrected    Results from last 7 days   Lab Units 08/14/22  0436 08/13/22  1630   SODIUM mmol/L 134* 135   POTASSIUM mmol/L 4 0 3 4*   CHLORIDE mmol/L 92* 91*   CO2 mmol/L 34* 34*   BUN mg/dL 12 12   CREATININE mg/dL 0 97 1 08   CALCIUM mg/dL 9 0 9 0 pt confused, unable to provide accurate answer/s

## 2022-08-20 NOTE — ED ADULT NURSE NOTE - BREATH SOUNDS, LEFT
Fall down 6 stairs into a coffee table. Hit his head, left flank pain. States pain with breathing and coughing. States he takes some type of blood thinner and gout medication. States he had a stroke a month ago. diminished

## 2022-10-05 NOTE — PATIENT PROFILE ADULT - BRADEN MOISTURE
(3) occasionally moist
4 y/o M with no PMHx presenting with dad for a forehead laceration occurring 4 hours ago. pt was playing when he tripped and hit his head on a plastic toy. Pt did not cry and got up right away. Denies LOC, vomiting, fever, chills, headache, lethargy or any other complaints.

## 2022-12-20 ENCOUNTER — INPATIENT (INPATIENT)
Facility: HOSPITAL | Age: 72
LOS: 7 days | Discharge: HOME HEALTH | End: 2022-12-28
Attending: HOSPITALIST | Admitting: HOSPITALIST
Payer: MEDICARE

## 2022-12-20 VITALS
DIASTOLIC BLOOD PRESSURE: 78 MMHG | WEIGHT: 130.07 LBS | RESPIRATION RATE: 16 BRPM | OXYGEN SATURATION: 99 % | HEIGHT: 62 IN | SYSTOLIC BLOOD PRESSURE: 187 MMHG | HEART RATE: 76 BPM | TEMPERATURE: 98 F

## 2022-12-20 DIAGNOSIS — R56.9 UNSPECIFIED CONVULSIONS: ICD-10-CM

## 2022-12-20 DIAGNOSIS — I50.33 ACUTE ON CHRONIC DIASTOLIC (CONGESTIVE) HEART FAILURE: ICD-10-CM

## 2022-12-20 DIAGNOSIS — F03.90 UNSPECIFIED DEMENTIA WITHOUT BEHAVIORAL DISTURBANCE: ICD-10-CM

## 2022-12-20 DIAGNOSIS — N20.0 CALCULUS OF KIDNEY: Chronic | ICD-10-CM

## 2022-12-20 DIAGNOSIS — M79.89 OTHER SPECIFIED SOFT TISSUE DISORDERS: ICD-10-CM

## 2022-12-20 DIAGNOSIS — N17.9 ACUTE KIDNEY FAILURE, UNSPECIFIED: ICD-10-CM

## 2022-12-20 DIAGNOSIS — Z29.9 ENCOUNTER FOR PROPHYLACTIC MEASURES, UNSPECIFIED: ICD-10-CM

## 2022-12-20 DIAGNOSIS — I10 ESSENTIAL (PRIMARY) HYPERTENSION: ICD-10-CM

## 2022-12-20 LAB
ALBUMIN SERPL ELPH-MCNC: 2.6 G/DL — LOW (ref 3.3–5)
ALP SERPL-CCNC: 83 U/L — SIGNIFICANT CHANGE UP (ref 40–120)
ALT FLD-CCNC: 11 U/L — LOW (ref 12–78)
ANION GAP SERPL CALC-SCNC: 8 MMOL/L — SIGNIFICANT CHANGE UP (ref 5–17)
APPEARANCE UR: ABNORMAL
AST SERPL-CCNC: 21 U/L — SIGNIFICANT CHANGE UP (ref 15–37)
BACTERIA # UR AUTO: ABNORMAL
BASOPHILS # BLD AUTO: 0.06 K/UL — SIGNIFICANT CHANGE UP (ref 0–0.2)
BASOPHILS NFR BLD AUTO: 0.7 % — SIGNIFICANT CHANGE UP (ref 0–2)
BILIRUB SERPL-MCNC: 0.3 MG/DL — SIGNIFICANT CHANGE UP (ref 0.2–1.2)
BILIRUB UR-MCNC: NEGATIVE — SIGNIFICANT CHANGE UP
BUN SERPL-MCNC: 54 MG/DL — HIGH (ref 7–23)
CALCIUM SERPL-MCNC: 8 MG/DL — LOW (ref 8.5–10.1)
CHLORIDE SERPL-SCNC: 117 MMOL/L — HIGH (ref 96–108)
CK SERPL-CCNC: 84 U/L — SIGNIFICANT CHANGE UP (ref 26–192)
CO2 SERPL-SCNC: 17 MMOL/L — LOW (ref 22–31)
COLOR SPEC: YELLOW — SIGNIFICANT CHANGE UP
CREAT SERPL-MCNC: 6.05 MG/DL — HIGH (ref 0.5–1.3)
DIFF PNL FLD: ABNORMAL
EGFR: 7 ML/MIN/1.73M2 — LOW
EOSINOPHIL # BLD AUTO: 0.01 K/UL — SIGNIFICANT CHANGE UP (ref 0–0.5)
EOSINOPHIL NFR BLD AUTO: 0.1 % — SIGNIFICANT CHANGE UP (ref 0–6)
EPI CELLS # UR: SIGNIFICANT CHANGE UP
FLUAV AG NPH QL: SIGNIFICANT CHANGE UP
FLUBV AG NPH QL: SIGNIFICANT CHANGE UP
GLUCOSE SERPL-MCNC: 120 MG/DL — HIGH (ref 70–99)
GLUCOSE UR QL: NEGATIVE MG/DL — SIGNIFICANT CHANGE UP
HCT VFR BLD CALC: 33 % — LOW (ref 34.5–45)
HGB BLD-MCNC: 10.6 G/DL — LOW (ref 11.5–15.5)
IMM GRANULOCYTES NFR BLD AUTO: 0.2 % — SIGNIFICANT CHANGE UP (ref 0–0.9)
KETONES UR-MCNC: NEGATIVE — SIGNIFICANT CHANGE UP
LEUKOCYTE ESTERASE UR-ACNC: ABNORMAL
LYMPHOCYTES # BLD AUTO: 1.99 K/UL — SIGNIFICANT CHANGE UP (ref 1–3.3)
LYMPHOCYTES # BLD AUTO: 24.4 % — SIGNIFICANT CHANGE UP (ref 13–44)
MAGNESIUM SERPL-MCNC: 1.9 MG/DL — SIGNIFICANT CHANGE UP (ref 1.6–2.6)
MCHC RBC-ENTMCNC: 28.8 PG — SIGNIFICANT CHANGE UP (ref 27–34)
MCHC RBC-ENTMCNC: 32.1 G/DL — SIGNIFICANT CHANGE UP (ref 32–36)
MCV RBC AUTO: 89.7 FL — SIGNIFICANT CHANGE UP (ref 80–100)
MONOCYTES # BLD AUTO: 0.76 K/UL — SIGNIFICANT CHANGE UP (ref 0–0.9)
MONOCYTES NFR BLD AUTO: 9.3 % — SIGNIFICANT CHANGE UP (ref 2–14)
NEUTROPHILS # BLD AUTO: 5.3 K/UL — SIGNIFICANT CHANGE UP (ref 1.8–7.4)
NEUTROPHILS NFR BLD AUTO: 65.3 % — SIGNIFICANT CHANGE UP (ref 43–77)
NITRITE UR-MCNC: NEGATIVE — SIGNIFICANT CHANGE UP
NRBC # BLD: 0 /100 WBCS — SIGNIFICANT CHANGE UP (ref 0–0)
NT-PROBNP SERPL-SCNC: 5042 PG/ML — HIGH (ref 0–125)
PH UR: 6.5 — SIGNIFICANT CHANGE UP (ref 5–8)
PHOSPHATE SERPL-MCNC: 5 MG/DL — HIGH (ref 2.5–4.5)
PLATELET # BLD AUTO: 398 K/UL — SIGNIFICANT CHANGE UP (ref 150–400)
POTASSIUM SERPL-MCNC: 5 MMOL/L — SIGNIFICANT CHANGE UP (ref 3.5–5.3)
POTASSIUM SERPL-SCNC: 5 MMOL/L — SIGNIFICANT CHANGE UP (ref 3.5–5.3)
PROT SERPL-MCNC: 7.5 GM/DL — SIGNIFICANT CHANGE UP (ref 6–8.3)
PROT UR-MCNC: 500 MG/DL
RBC # BLD: 3.68 M/UL — LOW (ref 3.8–5.2)
RBC # FLD: 15.2 % — HIGH (ref 10.3–14.5)
RBC CASTS # UR COMP ASSIST: SIGNIFICANT CHANGE UP /HPF (ref 0–4)
SARS-COV-2 RNA SPEC QL NAA+PROBE: SIGNIFICANT CHANGE UP
SODIUM SERPL-SCNC: 142 MMOL/L — SIGNIFICANT CHANGE UP (ref 135–145)
SP GR SPEC: 1.01 — SIGNIFICANT CHANGE UP (ref 1.01–1.02)
TROPONIN I, HIGH SENSITIVITY RESULT: 26.7 NG/L — SIGNIFICANT CHANGE UP
UROBILINOGEN FLD QL: NEGATIVE MG/DL — SIGNIFICANT CHANGE UP
WBC # BLD: 8.14 K/UL — SIGNIFICANT CHANGE UP (ref 3.8–10.5)
WBC # FLD AUTO: 8.14 K/UL — SIGNIFICANT CHANGE UP (ref 3.8–10.5)
WBC UR QL: >50

## 2022-12-20 PROCEDURE — 74176 CT ABD & PELVIS W/O CONTRAST: CPT | Mod: 26

## 2022-12-20 PROCEDURE — 99223 1ST HOSP IP/OBS HIGH 75: CPT

## 2022-12-20 PROCEDURE — 93010 ELECTROCARDIOGRAM REPORT: CPT

## 2022-12-20 PROCEDURE — 71045 X-RAY EXAM CHEST 1 VIEW: CPT | Mod: 26

## 2022-12-20 PROCEDURE — 99285 EMERGENCY DEPT VISIT HI MDM: CPT

## 2022-12-20 RX ORDER — HEPARIN SODIUM 5000 [USP'U]/ML
5000 INJECTION INTRAVENOUS; SUBCUTANEOUS EVERY 12 HOURS
Refills: 0 | Status: DISCONTINUED | OUTPATIENT
Start: 2022-12-20 | End: 2022-12-20

## 2022-12-20 RX ORDER — HALOPERIDOL DECANOATE 100 MG/ML
2.5 INJECTION INTRAMUSCULAR ONCE
Refills: 0 | Status: COMPLETED | OUTPATIENT
Start: 2022-12-20 | End: 2022-12-20

## 2022-12-20 RX ORDER — HEPARIN SODIUM 5000 [USP'U]/ML
5000 INJECTION INTRAVENOUS; SUBCUTANEOUS EVERY 12 HOURS
Refills: 0 | Status: DISCONTINUED | OUTPATIENT
Start: 2022-12-20 | End: 2022-12-21

## 2022-12-20 RX ORDER — FUROSEMIDE 40 MG
40 TABLET ORAL ONCE
Refills: 0 | Status: COMPLETED | OUTPATIENT
Start: 2022-12-20 | End: 2022-12-20

## 2022-12-20 RX ORDER — AMLODIPINE BESYLATE 2.5 MG/1
10 TABLET ORAL DAILY
Refills: 0 | Status: DISCONTINUED | OUTPATIENT
Start: 2022-12-20 | End: 2022-12-28

## 2022-12-20 RX ORDER — ASPIRIN/CALCIUM CARB/MAGNESIUM 324 MG
81 TABLET ORAL DAILY
Refills: 0 | Status: DISCONTINUED | OUTPATIENT
Start: 2022-12-20 | End: 2022-12-21

## 2022-12-20 RX ORDER — LEVETIRACETAM 250 MG/1
500 TABLET, FILM COATED ORAL
Refills: 0 | Status: DISCONTINUED | OUTPATIENT
Start: 2022-12-20 | End: 2022-12-21

## 2022-12-20 RX ORDER — HYDRALAZINE HCL 50 MG
25 TABLET ORAL EVERY 8 HOURS
Refills: 0 | Status: DISCONTINUED | OUTPATIENT
Start: 2022-12-20 | End: 2022-12-24

## 2022-12-20 RX ORDER — SIMVASTATIN 20 MG/1
20 TABLET, FILM COATED ORAL AT BEDTIME
Refills: 0 | Status: DISCONTINUED | OUTPATIENT
Start: 2022-12-20 | End: 2022-12-28

## 2022-12-20 RX ORDER — LANOLIN ALCOHOL/MO/W.PET/CERES
3 CREAM (GRAM) TOPICAL AT BEDTIME
Refills: 0 | Status: DISCONTINUED | OUTPATIENT
Start: 2022-12-20 | End: 2022-12-28

## 2022-12-20 RX ORDER — METOPROLOL TARTRATE 50 MG
50 TABLET ORAL
Refills: 0 | Status: DISCONTINUED | OUTPATIENT
Start: 2022-12-20 | End: 2022-12-21

## 2022-12-20 RX ADMIN — Medication 25 MILLIGRAM(S): at 21:54

## 2022-12-20 RX ADMIN — SIMVASTATIN 20 MILLIGRAM(S): 20 TABLET, FILM COATED ORAL at 21:54

## 2022-12-20 RX ADMIN — HALOPERIDOL DECANOATE 2.5 MILLIGRAM(S): 100 INJECTION INTRAMUSCULAR at 23:41

## 2022-12-20 RX ADMIN — Medication 40 MILLIGRAM(S): at 21:38

## 2022-12-20 RX ADMIN — Medication 0.1 MILLIGRAM(S): at 21:55

## 2022-12-20 NOTE — ED PROVIDER NOTE - CLINICAL SUMMARY MEDICAL DECISION MAKING FREE TEXT BOX
72 y.o. female w/ PMHx dementia (nonverbal) , CVA w/ R. sided weakness, HTN, hx seizures, and Gout was BIBEMS w/ b/l lower extremity edema that are cool to touch.  History and ROS limited as pt is nonverbal w/ dementia and lives alone w/ 24/7 aides that vary day to day.  Exam positive for significant pitting b/l lower extremity edema w/ decreased cap refill time.  Will obtain labs and imaging for eval for CHF vs. hepatic disease vs CKD.  Will monitor and reassess. 72 y.o. female w/ PMHx dementia (nonverbal) , CVA w/ R. sided weakness, HTN, hx seizures, and Gout was BIBEMS w/ b/l lower extremity edema that are cool to touch.  History and ROS limited as pt is nonverbal w/ dementia and lives alone w/ 24/7 aides that vary day to day.  Exam positive for significant pitting b/l lower extremity edema. Will obtain labs and imaging for eval for CHF vs. hepatic disease vs CKD.  Will monitor and reassess.    Attending Nello: 71 y/o F w/ PMH of HTN, seizures, CVA, dementia presenting w/ worsening lower extremity edema. Agree w/ above documented HPI/ROS/PE/MDM. Pt chronically ill appearing, but no acute distress. B/L LE edema noted. Given primarily bed bound state, will obtain duplex to eval for DVT. Could also represent lymphedema. Will eval for CHF. Eval renal function. Plan for labs, imaging, EKG, meds PRN. May require admission. Will reassess the need for additional interventions as clinically warranted.

## 2022-12-20 NOTE — ED PROVIDER NOTE - NS ED ATTENDING STATEMENT MOD
This was a shared visit with the MARA. I reviewed and verified the documentation and independently performed the documented:

## 2022-12-20 NOTE — ED PROVIDER NOTE - PHYSICAL EXAMINATION
GEN: Pt in NAD, A&O x4  PSYCH: nonverbal  HEAD: Head NCAT, no cervical lymphadenopathy.  EYES: Sclera white w/o injection. PERRL.  ENT:  No nasal d/c. MMM.   RESP: CTA b/l, no wheezes, rales, or rhonchi.   CARDIAC: RRR, clear distinct S1, S2, no appreciable murmurs.  ABD: Abdomen soft, non-distended, no guarding. Bowel sounds x 4 quad.  MSK: Pt w/ b/l upper an dlower extremity contractures.  No joint erythema or obvious deformity. No obvious spinal deformity.    VASC: +3 pitting edema extending from feet to posterior thighs b/l; cap refill about 3 seconds in toenails; +2 radial and dorsalis pedis pulses b/l.   SKIN: No rashes, ecchymoses, or abrasions on the trunk or extremities. GEN: Pt in NAD, awake  PSYCH: nonverbal  HEAD: Head NCAT, no cervical lymphadenopathy.  EYES: Sclera white w/o injection. PERRL.  ENT:  No nasal d/c. MMM.   RESP: CTA b/l, no wheezes, rales, or rhonchi.   CARDIAC: RRR, clear distinct S1, S2, no appreciable murmurs.  ABD: Abdomen soft, non-distended, no guarding. Bowel sounds x 4 quad.  MSK: Pt w/ b/l upper an dlower extremity contractures.  No joint erythema or obvious deformity. No obvious spinal deformity.    VASC: +3 pitting edema extending from feet to posterior thighs b/l; +2 radial and dorsalis pedis pulses b/l.   SKIN: No rashes, ecchymoses, or abrasions on the trunk or extremities.

## 2022-12-20 NOTE — H&P ADULT - PROBLEM SELECTOR PLAN 2
Likely 2/2 from diastolic HF (grade 1 in the past)  -obtain LE dopplers given general decreased ambulation  -obtain TTE to reassess HF (last echo was 2/2022)

## 2022-12-20 NOTE — H&P ADULT - PROBLEM SELECTOR PLAN 1
Cr increased to 6.05 from baseline of 1.82  -Daughter reports that she has not been taking her furosemide/ "water pill" for some time and this was used for her LE swelling.  -Could be 2/2 volume overload vs obstruction, no s/s of hypotension  -obtain Renal US to r/o hydro, insert milner, monitor Is/Os carefully  -please call nephrology in the morning  -discussed options of HD with daughter - daughter is okay with HD if needed Cr increased to 6.05 from baseline of 1.82  -Daughter reports that she has not been taking her furosemide/ "water pill" for some time and this was used for her LE swelling.  -Could be 2/2 volume overload vs obstruction, no s/s of hypotension  -obtain Renal US to r/o hydro, insert milner, monitor Is/Os carefully  -please call nephrology in the morning.   -discussed options of HD with daughter - daughter is okay with HD if needed  -obtain urine studies  -for volume overload - trial of lasix 40 IV, if good response, would redose in Am

## 2022-12-20 NOTE — ED ADULT NURSE REASSESSMENT NOTE - NS ED NURSE REASSESS COMMENT FT1
pt received EKG and is resting in bed comfortably. pt is nonverbal. vitals obtained and recorded. labs drawn and sent.

## 2022-12-20 NOTE — H&P ADULT - NSHPPHYSICALEXAM_GEN_ALL_CORE
Vital Signs Last 24 Hrs  T(C): 37.2 (20 Dec 2022 21:44), Max: 37.2 (20 Dec 2022 21:44)  T(F): 99 (20 Dec 2022 21:44), Max: 99 (20 Dec 2022 21:44)  HR: 90 (20 Dec 2022 21:44) (76 - 90)  BP: 180/86 (20 Dec 2022 21:44) (180/86 - 187/78)  BP(mean): --  RR: 17 (20 Dec 2022 21:44) (16 - 17)  SpO2: 97% (20 Dec 2022 21:44) (97% - 99%)    Parameters below as of 20 Dec 2022 21:44  Patient On (Oxygen Delivery Method): room air

## 2022-12-20 NOTE — H&P ADULT - PROBLEM SELECTOR PROBLEM 4
Dementia [facility] Note    Chief Complaint      Chief Complaint   Patient presents with    Hypertension     6 month f/u       History of Present Illness      Beatriz Gan is a 68 y.o. female with chronic conditions of HTN, Anxiety, insomnia, osteoarthritis left shoulder, chronic low back pain, hx of RCC who presents today for: follow up chronic conditions.  HTN: BP at goal on losartan-hctz, amlodipine  Anxiety: Incompletely controlled on prozac, xanax as needed.  Will considering adjusting prozac dose but pt not sure what number of capsules she's taking daily.  Insomnia: Controlled on ambien.  No new or worsening symptoms  Osteoarthritis, shoulder, chronic low back pain:  Sees Dr. Martinez and Dr. Rosales.  Doing much better on gabapentin and diclofenac. Restarted PT.  Hx of RCC: Sees Dr. Morrissey for surveillance.    Osteoporosis: Previously on Prolia.  Bone density does not indicate restarting treatment.  Will repeat DEXA in 2024.  Flu shot due.  TdAP 2011.  Prevnar UTD.    Mammogram 2021.  DEXA 2019.    Cscope 15 yrs ago.  Cologuard UTD per pt?  Due for FOBT    Past Medical History:  Past Medical History:   Diagnosis Date    Alcohol dependence     DDD (degenerative disc disease), lumbosacral     DJD (degenerative joint disease), lumbosacral     Encounter for blood transfusion     GERD (gastroesophageal reflux disease)     Gout     Hypercholesterolemia     Hypertension     NATHALIE (iron deficiency anemia)     questionable white coat hypertension    Kidney carcinoma, left 2014    Pneumonia     Renal cell carcinoma     Shingles 10/13/2012       Past Surgical History:   has a past surgical history that includes Total abdominal hysterectomy w/ bilateral salpingoophorectomy (age 49); Appendectomy; Bladder repair; Colonoscopy (9/2011); Esophagogastroduodenoscopy (9/2011); Hysterectomy; Refractive surgery; Nephrectomy; Laparoscopic Nissen fundoplication; Eye surgery; Hernia repair; Skin biopsy; Rotator cuff  repair (Left, 10/31/2019); Distal clavicle excision (Left, 10/31/2019); Arthroscopy of shoulder with decompression of subacromial space (Left, 10/31/2019); Knee arthroscopy w/ meniscectomy (Left, 2020); Fixation Kyphoplasty (N/A, 2020); Radiofrequency ablation of lumbar medial branch nerve at single level (Bilateral, 2020); Injection of anesthetic agent around nerve (Left, 2020); Radiofrequency ablation (Left, 2020); Knee Arthroplasty (Left, 2020); Radiofrequency ablation of lumbar medial branch nerve at single level (Bilateral, 10/19/2020); Epidural steroid injection into lumbar spine (N/A, 2020); Epidural steroid injection into lumbar spine (N/A, 2021); Transforaminal epidural injection of steroid (Bilateral, 2021); and Transforaminal epidural injection of steroid (Bilateral, 3/28/2022).    Family History:  family history includes Hypertension in her father.     Social History:  Social History     Tobacco Use    Smoking status: Former     Packs/day: 1.00     Years: 40.00     Pack years: 40.00     Types: Cigarettes     Quit date: 2018     Years since quittin.7    Smokeless tobacco: Never    Tobacco comments:     smokes 2-3 cigarettes a night   Substance Use Topics    Alcohol use: Yes     Alcohol/week: 12.0 standard drinks     Types: 6 Cans of beer, 6 Standard drinks or equivalent per week     Comment: 2-3 a day/ social    Drug use: No       I personally reviewed all past medical, surgical, social and family history.    Review of Systems   Constitutional:  Negative for chills, fever and malaise/fatigue.   Respiratory:  Negative for shortness of breath.    Cardiovascular:  Negative for chest pain.   Gastrointestinal:  Negative for constipation, diarrhea, nausea and vomiting.   Skin:  Negative for rash.   Neurological:  Negative for weakness.   All other systems reviewed and are negative.     Medications:  Outpatient Encounter Medications as of 10/4/2022   Medication  Sig Note Dispense Refill    alendronate (FOSAMAX) 70 MG tablet Take 70 mg by mouth every 7 days.       ALPRAZolam (XANAX) 1 MG tablet Take 1 tablet by mouth twice daily as needed for anxiety  60 tablet 2    amLODIPine (NORVASC) 5 MG tablet Take 1 tablet by mouth once daily  90 tablet 2    azelastine (ASTELIN) 137 mcg (0.1 %) nasal spray Use 1 spray(s) in each nostril twice daily  30 mL 3    budesonide-formoterol 160-4.5 mcg (SYMBICORT) 160-4.5 mcg/actuation HFAA Inhale 2 puffs into the lungs every 12 (twelve) hours. Controller  10.2 g 11    calcium carbonate (OS-TERI) 600 mg calcium (1,500 mg) Tab Take 600 mg by mouth once.       celecoxib (CELEBREX) 200 MG capsule Take 1 capsule by mouth once daily  30 capsule 5    cholecalciferol, vitamin D3, (VITAMIN D3 ORAL) Take by mouth once daily.       FLUoxetine 40 MG capsule Take 1 capsule by mouth twice daily  180 capsule 3    fluticasone (FLONASE) 50 mcg/actuation nasal spray 1 spray by Nasal route daily as needed.  7/23/2020: Not taking      gabapentin (NEURONTIN) 600 MG tablet TAKE 1 TABLET BY MOUTH THREE TIMES DAILY  90 tablet 0    HYDROcodone-acetaminophen (NORCO) 7.5-325 mg per tablet Take 1 tablet by mouth every 6 (six) hours as needed for Pain.  21 tablet 0    losartan-hydrochlorothiazide 100-25 mg (HYZAAR) 100-25 mg per tablet Take 1 tablet by mouth once daily  90 tablet 1    magnesium 30 mg Tab Take by mouth once.       MULTIVIT-IRON-MIN-FOLIC ACID 3,500-18-0.4 UNIT-MG-MG ORAL CHEW Take by mouth once daily.       ondansetron (ZOFRAN) 4 MG tablet Take 1 tablet (4 mg total) by mouth as needed for Nausea.  60 tablet 1    polyethylene glycol (GLYCOLAX) 17 gram/dose powder Take 17 g by mouth once daily.  510 g 3    zolpidem (AMBIEN) 10 mg Tab TAKE 1 TABLET BY MOUTH ONCE DAILY AT NIGHT AS NEEDED  90 tablet 0     Facility-Administered Encounter Medications as of 10/4/2022   Medication Dose Route Frequency Provider Last Rate Last Admin    electrolyte-S (ISOLYTE)    "Intravenous Continuous Kota Ortega MD 10 mL/hr at 01/31/20 0624 New Bag at 01/31/20 0624    lactated ringers infusion   Intravenous Continuous Kota Ortega MD        pregabalin capsule 75 mg  75 mg Oral Once Shiela Mann MD           Allergies:  Review of patient's allergies indicates:   Allergen Reactions    Phenergan [promethazine] Hives and Rash    Latex, natural rubber Hives     Per pt report-many years    Morphine Hives    Nsaids (non-steroidal anti-inflammatory drug)      Due to "kidney cancer"       Health Maintenance:  Immunization History   Administered Date(s) Administered    COVID-19, MRNA, LN-S, PF (Pfizer) (Purple Cap) 02/10/2021, 03/03/2021, 10/05/2021    Influenza 10/16/2014    Influenza (FLUAD) - Quadrivalent - Adjuvanted - PF *Preferred* (65+) 09/29/2020, 11/16/2021    Influenza (FLUAD) - Trivalent - Adjuvanted - PF (65+) 09/11/2018    Influenza - High Dose - PF (65 years and older) 11/11/2019    Influenza - Quadrivalent - PF *Preferred* (6 months and older) 10/22/2008, 10/16/2014, 11/05/2015, 01/09/2017    Influenza A (H1N1) 2009 Monovalent - IM 01/08/2010    Influenza Split 11/05/2015, 01/09/2017    Pneumococcal Conjugate - 13 Valent 01/17/2014, 01/17/2014    Tdap 06/21/2011      Health Maintenance   Topic Date Due    LDCT Lung Screen  08/28/2016    TETANUS VACCINE  06/21/2021    Lipid Panel  10/01/2022    Mammogram  10/04/2022    DEXA Scan  03/30/2025    Hepatitis C Screening  Completed        Physical Exam      Vital Signs  Pulse: 72  SpO2: 97 %  BP: 136/80  BP Location: Right arm  Patient Position: Sitting  Pain Score: 0-No pain  Height and Weight  Height: 5' 4" (162.6 cm)  Weight: 79.6 kg (175 lb 7.8 oz)  BSA (Calculated - sq m): 1.9 sq meters  BMI (Calculated): 30.1  Weight in (lb) to have BMI = 25: 145.3]    Physical Exam  Vitals reviewed.   Constitutional:       Appearance: She is well-developed.   HENT:      Head: Normocephalic and atraumatic.      Right Ear: External ear normal. "      Left Ear: External ear normal.   Cardiovascular:      Rate and Rhythm: Normal rate and regular rhythm.      Heart sounds: Normal heart sounds. No murmur heard.  Pulmonary:      Effort: Pulmonary effort is normal.      Breath sounds: Normal breath sounds. No wheezing or rales.   Abdominal:      General: Bowel sounds are normal. There is no distension.      Palpations: Abdomen is soft.      Tenderness: There is no abdominal tenderness.        Laboratory:  CBC:  Recent Labs   Lab 08/03/20  0507 08/10/20  1233 03/30/21  1052   WBC 3.03 L 4.45 6.45   RBC 2.06 L 3.57 L 3.94 L   Hemoglobin 6.7 L 11.1 L 14.0   Hematocrit 21.4 L 35.5 L 40.0   Platelets 156 442 H 289    H 99 H 102 H   MCH 32.5 H 31.1 H 35.5 H   MCHC 31.3 L 31.3 L 35.0     CMP:  Recent Labs   Lab 08/10/20  1233 03/30/21  1052 10/01/21  0921 03/30/22  1138   Glucose 104 112 H 97 115 H   Calcium 10.3 10.3 9.8 9.4   Albumin 3.1 L 4.4 3.9  --    Total Protein 7.0 7.8 7.2  --    Sodium 140 139 137 136   Potassium 4.1 4.7 3.9 3.0 L   CO2 29 30 H 25 25   Chloride 101 98 99 100   BUN 10 18 H 15 23   Alkaline Phosphatase 131 123 109  --    ALT 18 15 25  --    AST 25 26 24  --    Total Bilirubin 0.4 0.7 0.6  --      URINALYSIS:  Recent Labs   Lab 08/12/20  0942 09/29/20  1553 03/30/21  1052   Color, UA Yellow  --  Yellow   Clarity, UA Clear  --   --    Specific Gravity, UA  --   --  1.015   Spec Grav UA 1.020   < >  --    pH, UA 7.5   < > 7.0   Protein, UA  --   --  Negative   Bacteria  --   --  Few A   Nitrite, UA neg   < > Negative   Leukocytes, UA  --   --  Trace A   Urobilinogen, UA 1.0   < > Negative    < > = values in this interval not displayed.      LIPIDS:  Recent Labs   Lab 05/18/20  0926 03/30/21  1052 10/01/21  0921   TSH  --  1.200  --    HDL 77 H 80 H 90 H   Cholesterol 253 H 223 H 215 H   Triglycerides 124 84 70   LDL Cholesterol 151.2 126.2 111.0   HDL/Cholesterol Ratio 30.4 35.9 41.9   Non-HDL Cholesterol 176 143 125   Total Cholesterol/HDL  Ratio 3.3 2.8 2.4     TSH:  Recent Labs   Lab 03/30/21  1052   TSH 1.200     A1C:  Recent Labs   Lab 10/01/21  0921   Hemoglobin A1C 4.8       Assessment/Plan     Beatriz Gan is a 68 y.o.female with:    1. Essential hypertension  Continue current meds.    2. Hyperlipidemia, mixed  - Comprehensive Metabolic Panel; Future  - Lipid Panel; Future  Continue current meds.    3. Anxiety  - Ambulatory referral/consult to Psychiatry; Future  4. Major depressive disorder, recurrent episode, moderate  - Ambulatory referral/consult to Psychiatry; Future  Refer her to psychiatry  5. Primary insomnia  Continue current meds.    6. Lumbar spondylosis  7. Cervical spondylitis with radiculitis  Continue current meds as needed  8. Impaired fasting glucose  - Hemoglobin A1C; Future  Counseled on diet  9. History of renal cell carcinoma  - CBC Auto Differential; Future  Stable.  10. Chronic bronchitis, unspecified chronic bronchitis type    11. Age-related osteoporosis without current pathological fracture    12. Screening mammogram, encounter for  - Mammo Digital Screening Bilat w/ Law; Future    13. Screen for colon cancer  - Ambulatory referral/consult to Gastroenterology; Future    14. Need for vaccination  - Influenza - Quadrivalent (Adjuvanted)     Chronic conditions status updated as per HPI.  Other than changes above, cont current medications and maintain follow up with specialists.  No follow-ups on file.    No future appointments.      Jorge Alonzo MD  [facility]

## 2022-12-20 NOTE — H&P ADULT - HISTORY OF PRESENT ILLNESS
72 y.o. female w/ PMHx dementia (nonverbal) , CVA w/ R. sided weakness, HTN, hx seizures, and Gout who presents with worsening swelling. patient cannot give any history. collateral obtained from daughter/HCP:  Gali (PH: [phone]). Today patient's LE swelling was getting worse, her home NP told her to increase her "water pill" but on review, lasix was not part of the medical records. One year ago, lasix was decreased to half a pill, daughter unsure of when lasix was stopped. Swelling has been increasing for 2-3 days. Last creatinine was 1.82 in february 2022. Mayberry removed over the summer (inserted in 2020). has not had any urinary changes per daughter. Denies fevers, chills, nausea, vomiting, constipation, diarrhea. Baseline is that patietn is interactive, often forgetful, but will eat soft foods, but non-verbal.     In the ED, patient was found to be BUN/Cr 6.05, BNP to 5042, admitted for further workup. Unable to obtain LE dopplers given increased agitation.

## 2022-12-20 NOTE — H&P ADULT - PROBLEM SELECTOR PLAN 3
grade 1 diastolic dysfunction, reportedly lasix stopped recently   -obtain repeat TTE  -monitor Is/Os  -standing weights if able

## 2022-12-20 NOTE — ED ADULT NURSE NOTE - NS ED NURSE PATIENT LEFT UNIT TIME
22:05 [Alert] : alert [No Acute Distress] : no acute distress [Normocephalic] : normocephalic [EOMI Bilateral] : EOMI bilateral [Clear tympanic membranes with bony landmarks and light reflex present bilaterally] : clear tympanic membranes with bony landmarks and light reflex present bilaterally  [Pink Nasal Mucosa] : pink nasal mucosa [Nonerythematous Oropharynx] : nonerythematous oropharynx [Supple, full passive range of motion] : supple, full passive range of motion [No Palpable Masses] : no palpable masses [Clear to Auscultation Bilaterally] : clear to auscultation bilaterally [Regular Rate and Rhythm] : regular rate and rhythm [Normal S1, S2 audible] : normal S1, S2 audible [No Murmurs] : no murmurs [+2 Femoral Pulses] : +2 femoral pulses [Soft] : soft [NonTender] : non tender [Non Distended] : non distended [Normoactive Bowel Sounds] : normoactive bowel sounds [No Hepatomegaly] : no hepatomegaly [No Splenomegaly] : no splenomegaly [Shadi: _____] : Shadi [unfilled] [Circumcised] : circumcised [Bilateral descended testes] : bilateral descended testes [No Abnormal Lymph Nodes Palpated] : no abnormal lymph nodes palpated [Normal Muscle Tone] : normal muscle tone [No Gait Asymmetry] : no gait asymmetry [No pain or deformities with palpation of bone, muscles, joints] : no pain or deformities with palpation of bone, muscles, joints [Straight] : straight [+2 Patella DTR] : +2 patella DTR [Cranial Nerves Grossly Intact] : cranial nerves grossly intact [No Rash or Lesions] : no rash or lesions

## 2022-12-20 NOTE — ED PROVIDER NOTE - OBJECTIVE STATEMENT
72 y.o. female w/ PMHx dementia (nonverbal) , CVA w/ R. sided weakness, HTN, hx seizures, and Gout was BIBEMS w/ b/l lower extremity edema.  Pt accompanied by sister and aide.  Sister reports it has been ongoing but Aide reports that edema has become worse over past few days.  Pt typically ambulates w/ minimal assistance but has not been seen walking today.  Pt lives at home w/ a 24/7 aide.  HPI limited as pt nonverbal w/ dementia and current aide has not been with her for past 3 days.  No reported fever, SOB, changes in LOC from baseline, or falls.

## 2022-12-20 NOTE — ED ADULT NURSE NOTE - ED STAT RN HANDOFF DETAILS 2
handoff report given to ADA Reid on 1D. RN made aware of pts current condition/lab values/reason for admission. pt is Aox0, nonverbal at baseline resting comfortably in stretcher at this time, no acute distress noted. pt has milner catheter is place. pts IV is patent and intact no redness/swelling noted at the site. rounding and safety checks complete. pt is vitally stable upon leaving ED. any issues endorsed to oncoming RN for followup.

## 2022-12-20 NOTE — H&P ADULT - ASSESSMENT
72 y.o. female w/ PMHx dementia (nonverbal) , CVA w/ R. sided weakness, HTN, hx seizures, and Gout who presents with worsening swelling. patient cannot give any history. collateral obtained from daughter/HCP:  Gali (PH: [phone]). patient admitted for AMADOU and LE swelling concerning for HF vs other etiology.

## 2022-12-20 NOTE — ED PROVIDER NOTE - PROGRESS NOTE DETAILS
JERRY Weller NP:  Elevated creatinine and BUN.  Cr 6.05 compared to Cr in Feb 2022 of 1.8. Updated pt's sister on current lab results and need for US. all questions answered.  Per pt's sister, pt is incontinent in diaper but has never had kidney problems or been told she has CKD.  Nephrology paged. JERRY Weller NP:  Per US, pt became very combative and was swinging arms and legs when they attempted to US her legs. Pt was sent back to ED w/o completion of US.  Will reassess and give meds if needed for anxiety. JERRY Weller, NP: Per US, they have a few expedited US prior to pt - the tech will call provider when it is almost pt's turn so the nurse can give the anxiolytic.  Informed Dr. Cruz at signed out and also notified pt's nurse. JERRY Weller, NP: Per US, they have a few expedited US prior to pt - the tech will call provider when it is almost pt's turn so the nurse can give the anxiolytic.  Informed Dr. Cruz at signed out and also notified pt's nurse.  Updated pt's sister on all results and current progress.  D/w pt's sister plan for admission. Pt's healthcare proxy is her daughter Gali (PH: [phone]) for any procedures requiring consent.  Pt's sister reports daughter is aware of updates.

## 2022-12-20 NOTE — H&P ADULT - PROBLEM SELECTOR PLAN 7
diet: dysphagia 2  dvt ppx: HSQ  Dispo :pending clinical improvement  Ethics: Spoke with Daughter Gali  12/20 at 930Pm regarding plan of care and further steps. Patient is full code, all aggressive treatment desired.

## 2022-12-20 NOTE — ED ADULT NURSE NOTE - NSIMPLEMENTINTERV_GEN_ALL_ED
Implemented All Fall Risk Interventions:  [place] to call system. Call bell, personal items and telephone within reach. Instruct patient to call for assistance. Room bathroom lighting operational. Non-slip footwear when patient is off stretcher. Physically safe environment: no spills, clutter or unnecessary equipment. Stretcher in lowest position, wheels locked, appropriate side rails in place. Provide visual cue, wrist band, yellow gown, etc. Monitor gait and stability. Monitor for mental status changes and reorient to person, place, and time. Review medications for side effects contributing to fall risk. Reinforce activity limits and safety measures with patient and family.

## 2022-12-20 NOTE — ED PROVIDER NOTE - CARE PLAN
1 Principal Discharge DX:	AMADOU (acute kidney injury)   Principal Discharge DX:	AMADOU (acute kidney injury)  Secondary Diagnosis:	Acute deep vein thrombosis of leg

## 2022-12-20 NOTE — ED ADULT NURSE NOTE - OBJECTIVE STATEMENT
72y female c/o edema of the legs, specifically the right lower leg. pt is nonverbal and has hx of HTN and seizures.

## 2022-12-20 NOTE — ED PROVIDER NOTE - ATTENDING APP SHARED VISIT CONTRIBUTION OF CARE
Attending Rica: I performed a history and physical exam of the patient and discussed their management with the MARA. I have reviewed the MARA note and agree with the documented findings and plan of care, except as noted. This was a shared visit with an MARA. I reviewed and verified the documentation and independently performed my own history/exam/medical decision making. My medical decision making and observations are found above. Please refer to any progress notes for updates on clinical course.

## 2022-12-21 DIAGNOSIS — R53.81 OTHER MALAISE: ICD-10-CM

## 2022-12-21 DIAGNOSIS — I82.409 ACUTE EMBOLISM AND THROMBOSIS OF UNSPECIFIED DEEP VEINS OF UNSPECIFIED LOWER EXTREMITY: ICD-10-CM

## 2022-12-21 DIAGNOSIS — I50.9 HEART FAILURE, UNSPECIFIED: ICD-10-CM

## 2022-12-21 DIAGNOSIS — Z51.5 ENCOUNTER FOR PALLIATIVE CARE: ICD-10-CM

## 2022-12-21 LAB
ANION GAP SERPL CALC-SCNC: 9 MMOL/L — SIGNIFICANT CHANGE UP (ref 5–17)
APTT BLD: 167 SEC — CRITICAL HIGH (ref 27.5–35.5)
APTT BLD: 22.7 SEC — LOW (ref 27.5–35.5)
BUN SERPL-MCNC: 54 MG/DL — HIGH (ref 7–23)
CALCIUM SERPL-MCNC: 7.7 MG/DL — LOW (ref 8.5–10.1)
CHLORIDE SERPL-SCNC: 117 MMOL/L — HIGH (ref 96–108)
CO2 SERPL-SCNC: 17 MMOL/L — LOW (ref 22–31)
CREAT SERPL-MCNC: 6.02 MG/DL — HIGH (ref 0.5–1.3)
EGFR: 7 ML/MIN/1.73M2 — LOW
GLUCOSE SERPL-MCNC: 129 MG/DL — HIGH (ref 70–99)
HCT VFR BLD CALC: 27.1 % — LOW (ref 34.5–45)
HGB BLD-MCNC: 8.8 G/DL — LOW (ref 11.5–15.5)
INR BLD: 1 RATIO — SIGNIFICANT CHANGE UP (ref 0.88–1.16)
KAPPA LC SER QL IFE: 17.76 MG/DL — HIGH (ref 0.33–1.94)
KAPPA/LAMBDA FREE LIGHT CHAIN RATIO, SERUM: 2.03 RATIO — HIGH (ref 0.26–1.65)
LAMBDA LC SER QL IFE: 8.75 MG/DL — HIGH (ref 0.57–2.63)
MAGNESIUM SERPL-MCNC: 1.8 MG/DL — SIGNIFICANT CHANGE UP (ref 1.6–2.6)
MCHC RBC-ENTMCNC: 29.3 PG — SIGNIFICANT CHANGE UP (ref 27–34)
MCHC RBC-ENTMCNC: 32.5 G/DL — SIGNIFICANT CHANGE UP (ref 32–36)
MCV RBC AUTO: 90.3 FL — SIGNIFICANT CHANGE UP (ref 80–100)
NRBC # BLD: 0 /100 WBCS — SIGNIFICANT CHANGE UP (ref 0–0)
PHOSPHATE SERPL-MCNC: 4.8 MG/DL — HIGH (ref 2.5–4.5)
PLATELET # BLD AUTO: 372 K/UL — SIGNIFICANT CHANGE UP (ref 150–400)
POTASSIUM SERPL-MCNC: 4.2 MMOL/L — SIGNIFICANT CHANGE UP (ref 3.5–5.3)
POTASSIUM SERPL-SCNC: 4.2 MMOL/L — SIGNIFICANT CHANGE UP (ref 3.5–5.3)
PROTHROM AB SERPL-ACNC: 12 SEC — SIGNIFICANT CHANGE UP (ref 10.5–13.4)
RBC # BLD: 3 M/UL — LOW (ref 3.8–5.2)
RBC # FLD: 15 % — HIGH (ref 10.3–14.5)
SODIUM SERPL-SCNC: 143 MMOL/L — SIGNIFICANT CHANGE UP (ref 135–145)
WBC # BLD: 7.56 K/UL — SIGNIFICANT CHANGE UP (ref 3.8–10.5)
WBC # FLD AUTO: 7.56 K/UL — SIGNIFICANT CHANGE UP (ref 3.8–10.5)

## 2022-12-21 PROCEDURE — 99232 SBSQ HOSP IP/OBS MODERATE 35: CPT

## 2022-12-21 PROCEDURE — 93306 TTE W/DOPPLER COMPLETE: CPT | Mod: 26

## 2022-12-21 PROCEDURE — 93970 EXTREMITY STUDY: CPT | Mod: 26

## 2022-12-21 PROCEDURE — 76775 US EXAM ABDO BACK WALL LIM: CPT | Mod: 26

## 2022-12-21 PROCEDURE — 99223 1ST HOSP IP/OBS HIGH 75: CPT

## 2022-12-21 RX ORDER — HEPARIN SODIUM 5000 [USP'U]/ML
4500 INJECTION INTRAVENOUS; SUBCUTANEOUS EVERY 6 HOURS
Refills: 0 | Status: DISCONTINUED | OUTPATIENT
Start: 2022-12-21 | End: 2022-12-22

## 2022-12-21 RX ORDER — METOPROLOL TARTRATE 50 MG
5 TABLET ORAL EVERY 6 HOURS
Refills: 0 | Status: DISCONTINUED | OUTPATIENT
Start: 2022-12-21 | End: 2022-12-21

## 2022-12-21 RX ORDER — INFLUENZA VIRUS VACCINE 15; 15; 15; 15 UG/.5ML; UG/.5ML; UG/.5ML; UG/.5ML
0.7 SUSPENSION INTRAMUSCULAR ONCE
Refills: 0 | Status: DISCONTINUED | OUTPATIENT
Start: 2022-12-21 | End: 2022-12-28

## 2022-12-21 RX ORDER — HEPARIN SODIUM 5000 [USP'U]/ML
INJECTION INTRAVENOUS; SUBCUTANEOUS
Qty: 25000 | Refills: 0 | Status: DISCONTINUED | OUTPATIENT
Start: 2022-12-21 | End: 2022-12-22

## 2022-12-21 RX ORDER — HEPARIN SODIUM 5000 [USP'U]/ML
2000 INJECTION INTRAVENOUS; SUBCUTANEOUS EVERY 6 HOURS
Refills: 0 | Status: DISCONTINUED | OUTPATIENT
Start: 2022-12-21 | End: 2022-12-22

## 2022-12-21 RX ORDER — METOPROLOL TARTRATE 50 MG
5 TABLET ORAL EVERY 6 HOURS
Refills: 0 | Status: DISCONTINUED | OUTPATIENT
Start: 2022-12-21 | End: 2022-12-28

## 2022-12-21 RX ORDER — POLYETHYLENE GLYCOL 3350 17 G/17G
17 POWDER, FOR SOLUTION ORAL EVERY 12 HOURS
Refills: 0 | Status: DISCONTINUED | OUTPATIENT
Start: 2022-12-21 | End: 2022-12-26

## 2022-12-21 RX ORDER — LEVETIRACETAM 250 MG/1
500 TABLET, FILM COATED ORAL EVERY 12 HOURS
Refills: 0 | Status: DISCONTINUED | OUTPATIENT
Start: 2022-12-21 | End: 2022-12-24

## 2022-12-21 RX ORDER — CEFTRIAXONE 500 MG/1
1000 INJECTION, POWDER, FOR SOLUTION INTRAMUSCULAR; INTRAVENOUS EVERY 24 HOURS
Refills: 0 | Status: DISCONTINUED | OUTPATIENT
Start: 2022-12-21 | End: 2022-12-22

## 2022-12-21 RX ORDER — SENNA PLUS 8.6 MG/1
2 TABLET ORAL AT BEDTIME
Refills: 0 | Status: DISCONTINUED | OUTPATIENT
Start: 2022-12-21 | End: 2022-12-26

## 2022-12-21 RX ORDER — ACETAMINOPHEN 500 MG
1000 TABLET ORAL ONCE
Refills: 0 | Status: COMPLETED | OUTPATIENT
Start: 2022-12-21 | End: 2022-12-21

## 2022-12-21 RX ORDER — ASPIRIN/CALCIUM CARB/MAGNESIUM 324 MG
81 TABLET ORAL DAILY
Refills: 0 | Status: DISCONTINUED | OUTPATIENT
Start: 2022-12-21 | End: 2022-12-28

## 2022-12-21 RX ORDER — HEPARIN SODIUM 5000 [USP'U]/ML
4500 INJECTION INTRAVENOUS; SUBCUTANEOUS ONCE
Refills: 0 | Status: COMPLETED | OUTPATIENT
Start: 2022-12-21 | End: 2022-12-21

## 2022-12-21 RX ORDER — BUMETANIDE 0.25 MG/ML
2 INJECTION INTRAMUSCULAR; INTRAVENOUS DAILY
Refills: 0 | Status: DISCONTINUED | OUTPATIENT
Start: 2022-12-21 | End: 2022-12-22

## 2022-12-21 RX ADMIN — CEFTRIAXONE 100 MILLIGRAM(S): 500 INJECTION, POWDER, FOR SOLUTION INTRAMUSCULAR; INTRAVENOUS at 04:32

## 2022-12-21 RX ADMIN — HEPARIN SODIUM 900 UNIT(S)/HR: 5000 INJECTION INTRAVENOUS; SUBCUTANEOUS at 23:06

## 2022-12-21 RX ADMIN — HEPARIN SODIUM 1100 UNIT(S)/HR: 5000 INJECTION INTRAVENOUS; SUBCUTANEOUS at 08:11

## 2022-12-21 RX ADMIN — Medication 10 MILLIGRAM(S): at 03:15

## 2022-12-21 RX ADMIN — LEVETIRACETAM 420 MILLIGRAM(S): 250 TABLET, FILM COATED ORAL at 18:19

## 2022-12-21 RX ADMIN — HEPARIN SODIUM 4500 UNIT(S): 5000 INJECTION INTRAVENOUS; SUBCUTANEOUS at 08:02

## 2022-12-21 RX ADMIN — Medication 400 MILLIGRAM(S): at 21:42

## 2022-12-21 RX ADMIN — HEPARIN SODIUM 900 UNIT(S)/HR: 5000 INJECTION INTRAVENOUS; SUBCUTANEOUS at 19:42

## 2022-12-21 RX ADMIN — HEPARIN SODIUM 0 UNIT(S)/HR: 5000 INJECTION INTRAVENOUS; SUBCUTANEOUS at 19:37

## 2022-12-21 RX ADMIN — HEPARIN SODIUM 0 UNIT(S)/HR: 5000 INJECTION INTRAVENOUS; SUBCUTANEOUS at 18:41

## 2022-12-21 NOTE — PROVIDER CONTACT NOTE (OTHER) - SITUATION
Patient refusing all PO medications since morning
Patient aptt came back below 40. Patient did not take morning medications including BP

## 2022-12-21 NOTE — CONSULT NOTE ADULT - PROBLEM SELECTOR RECOMMENDATION 4
nonverbal at baseline, has 24/7 live in [facility].  Per discussion with A patient able to express she has to go to the bathroom and able to ambulate with walker and assistance  per A, patient appears at baseline mental status nonverbal at baseline, has 24/7 live in [facility].  Per discussion with HHA patient able to express she has to go to the bathroom and able to ambulate with walker and assistance  per A, patient appears at baseline mental status  FAST 7B minimum  supportive care

## 2022-12-21 NOTE — CONSULT NOTE ADULT - SUBJECTIVE AND OBJECTIVE BOX
HPI:   72 y.o. female w/ PMHx dementia (nonverbal) , CVA w/ R. sided weakness, HTN, hx seizures, and Gout who presents with worsening swelling. patient cannot give any history. collateral obtained from daughter/HCP:  Gali (PH: [phone]). Today patient's LE swelling was getting worse, her home NP told her to increase her "water pill" but on review, lasix was not part of the medical records. One year ago, lasix was decreased to half a pill, daughter unsure of when lasix was stopped. Swelling has been increasing for 2-3 days. Last creatinine was 1.82 in 2022. Mayberry removed over the summer (inserted in ). has not had any urinary changes per daughter. Denies fevers, chills, nausea, vomiting, constipation, diarrhea. Baseline is that patietn is interactive, often forgetful, but will eat soft foods, but non-verbal.     In the ED, patient was found to be BUN/Cr 6.05, BNP to 5042, admitted for further workup. Unable to obtain LE dopplers given increased agitation.  (20 Dec 2022 22:14)    PERTINENT PM/SXH:   Hypertension    Diabetes    Asthma    OA (osteoarthritis)    Gout    Seizure disorder    Urinary incontinence    Vision changes    CVA (cerebral infarction)      Kidney stone      FAMILY HISTORY:  No pertinent family history in first degree relatives      ITEMS NOT CHECKED ARE NOT PRESENT    SOCIAL HISTORY:   Significant other/partner:  [ ]  Children: yes [ ]  Lutheran/Spirituality:  Substance hx:  [ ]   Tobacco hx:  [ ]   Alcohol hx: [ ]   Home Opioid hx:  [ ] I-Stop Reference No: Reference #: [id number]  Living Situation: [x ]Home  [ ]Long term care  [ ]Rehab [ ]Other    ADVANCE DIRECTIVES:    DNR  MOLST  [ ]  Living Will  [ ]   DECISION MAKER(s):  [ ] Health Care Proxy(s)  [ x] Surrogate(s)  [ ] Guardian           Name(s): Phone Number(s): Gali (daughter) [phone]    BASELINE (I)ADL(s) (prior to admission):  [place]: [ ]Total  [ x] Moderate [ ]Dependent    Allergies    No Known Allergies    Intolerances    MEDICATIONS  (STANDING):  amLODIPine   Tablet 10 milliGRAM(s) Oral daily  aspirin  chewable 81 milliGRAM(s) Oral daily  cefTRIAXone   IVPB 1000 milliGRAM(s) IV Intermittent every 24 hours  cloNIDine 0.1 milliGRAM(s) Oral every 8 hours  heparin  Infusion.  Unit(s)/Hr (11 mL/Hr) IV Continuous <Continuous>  hydrALAZINE 25 milliGRAM(s) Oral every 8 hours  influenza  Vaccine (HIGH DOSE) 0.7 milliLiter(s) IntraMuscular once  levETIRAcetam 500 milliGRAM(s) Oral two times a day  metoprolol tartrate 50 milliGRAM(s) Oral two times a day  polyethylene glycol 3350 17 Gram(s) Oral every 12 hours  senna 2 Tablet(s) Oral at bedtime  simvastatin 20 milliGRAM(s) Oral at bedtime    MEDICATIONS  (PRN):  heparin   Injectable 4500 Unit(s) IV Push every 6 hours PRN For aPTT less than 40  heparin   Injectable 2000 Unit(s) IV Push every 6 hours PRN For aPTT between 40 - 57  melatonin 3 milliGRAM(s) Oral at bedtime PRN Insomnia    PRESENT SYMPTOMS: [ x]Unable to obtain due to poor mentation   Source if other than patient:  [ ]Family   [ ]Team     Pain: [ ] yes [ ] no  QOL impact -   Location -                    Aggravating factors -  Quality -  Radiation -  Timing-  Severity (0-10 scale):  Minimal acceptable level (0-10 scale):     PAIN AD Score: 0    [email or web address]/[email or web address] (press ctrl +  left click to view)    Dyspnea:                           [ ]Mild [ ]Moderate [ ]Severe  Anxiety:                             [ ]Mild [ ]Moderate [ ]Severe  Fatigue:                             [ ]Mild [ ]Moderate [ ]Severe  Nausea:                             [ ]Mild [ ]Moderate [ ]Severe  Loss of appetite:              [ ]Mild [ ]Moderate [ ]Severe  Constipation:                    [ ]Mild [ ]Moderate [ ]Severe    Other Symptoms:  [ ]All other review of systems negative     Karnofsky Performance Score/Palliative Performance Status Version 2:       30-40  %    http://npcrc.org/files/news/palliative_performance_scale_ppsv2.pdf  PHYSICAL EXAM:  Vital Signs Last 24 Hrs  T(C): 36.6 (21 Dec 2022 11:42), Max: 37.2 (20 Dec 2022 21:44)  T(F): 97.8 (21 Dec 2022 11:42), Max: 99 (20 Dec 2022 21:44)  HR: 84 (21 Dec 2022 11:42) (76 - 90)  BP: 166/73 (21 Dec 2022 11:42) (159/69 - 187/78)  BP(mean): --  RR: 16 (21 Dec 2022 11:42) (15 - 17)  SpO2: 100% (21 Dec 2022 11:42) (97% - 100%)    Parameters below as of 21 Dec 2022 11:42  Patient On (Oxygen Delivery Method): room air     I&O's Summary  GENERAL:  [ x]Alert  [ ]Oriented x   [ ]Lethargic  [ ]Cachexia  [ ]Unarousable  [ ]Verbal  [x ]Non-Verbal  Behavioral:   [ ] Anxiety  [ ] Delirium [ ] Agitation [ ] Other  HEENT:  [ ]Normal   [ ]Dry mouth   [ ]ET Tube/Trach  [ ]Oral lesions  PULMONARY:   [x ]Clear diminished at the bases bilaterally  [ ]Tachypnea  [ ]Audible excessive secretions   [ ]Rhonchi        [ ]Right [ ]Left [ ]Bilateral  [ ]Crackles        [ ]Right [ ]Left [ ]Bilateral  [ ]Wheezing     [ ]Right [ ]Left [ ]Bilateral  CARDIOVASCULAR:    [x ]Regular [ ]Irregular [ ]Tachy  [ ]Flex [ ]Murmur [ ]Other  GASTROINTESTINAL:  [x]Soft  [ ]Distended   [ ]+BS  [xNon tender [ ]Tender  [ ]PEG [ ]OGT/ NGT  Last BM: prior to admission GENITOURINARY:  [ ]Normal [ ] Incontinent   [ ]Oliguria/Anuria   [ x]Mayberry  MUSCULOSKELETAL:   [ ]Normal   [ x]Weakness  [ ]Bed/Wheelchair bound [ ]Edema  NEUROLOGIC:   [ ]No focal deficits  [ ] Cognitive impairment  [ ] Dysphagia [ ]Dysarthria [ ] Paresis [ ]Other unable to assess, patient nonverbal  SKIN:   [ ]Normal   [ ]Pressure ulcer(s)  [ ]Rash    CRITICAL CARE:  [ ] Shock Present  [ ]Septic [ ]Cardiogenic [ ]Neurologic [ ]Hypovolemic  [ ]  Vasopressors [ ]  Inotropes   [ ] Respiratory failure present [ ] mechanical ventilation [ ] non-invasive ventilatory support [ ] High flow  [ ] Acute  [ ] Chronic [ ] Hypoxic  [ ] Hypercarbic [ ] Other  [ ] Other organ failure     LABS:                        10.6   8.14  )-----------( 398      ( 20 Dec 2022 17:18 )             33.0   12-21    143  |  117<H>  |  54<H>  ----------------------------<  129<H>  4.2   |  17<L>  |  6.02<H>    Ca    7.7<L>      21 Dec 2022 03:30  Phos  4.8       Mg     1.8         TPro  7.5  /  Alb  2.6<L>  /  TBili  0.3  /  DBili  x   /  AST  21  /  ALT  11<L>  /  AlkPhos  83    PT/INR - ( 21 Dec 2022 06:40 )   PT: 12.0 sec;   INR: 1.00 ratio         PTT - ( 21 Dec 2022 06:40 )  PTT:22.7 sec    Urinalysis Basic - ( 20 Dec 2022 21:00 )    Color: Yellow / Appearance: very cloudy / S.015 / pH: x  Gluc: x / Ketone: Negative  / Bili: Negative / Urobili: Negative mg/dL   Blood: x / Protein: 500 mg/dL / Nitrite: Negative   Leuk Esterase: Moderate / RBC: 0-2 /HPF / WBC >50   Sq Epi: x / Non Sq Epi: Few / Bacteria: Many    RADIOLOGY & ADDITIONAL STUDIES:   < from: US Renal (22 @ 01:23) >  FINDINGS:  Right kidney: 9.1 cm. No renal mass, hydronephrosis or calculi. Echogenic   kidney. Multiple cysts, the largest in the interpolar region measuring   2.4 cm.  Left kidney: 7.4 cm. No hydronephrosis. A 9 mm interpolar calculus. A 7   mm interpolar cortical cyst. Echogenic kidney.  Urinary bladder: Within normal limits.  IMPRESSION:  Atrophic echogenic kidneys suggesting chronic medical renal disease.  Nonobstructing left renal stone.  No hydronephrosis of either kidney.  --- End of Report ---  < end of copied text >    < from: US Duplex Venous Lower Ext Complete, Bilateral (22 @ 01:18) >  IMPRESSION:  Nonocclusive DVT is noted in the left popliteal vein.  --- End of Report ---  < end of copied text >    < from: CT Abdomen and Pelvis No Cont (22 @ 22:16) >  FINDINGS:  LOWER CHEST: Within normal limits.  LIVER: Within normal limits.  BILE DUCTS: Normal caliber.  GALLBLADDER: Within normal limits.  SPLEEN: Within normal limits.  PANCREAS: Within normal limits.  ADRENALS: Within normal limits.  KIDNEYS/URETERS: 1.2 cm stone left renal pelvis. 1.2 cm stone within the   lower pole left kidney. Trace amounts of gas within the left renal   collecting system and renal pelvis decreased from prior exam 2022. No evidence of renal parenchymal gas. Findings suggest recent   instrumentation. Nonobstructing 7.1 mm stone upper pole right kidney. No   hydronephrosis. No obstructing ureteral stones right or left side. Stable   right renal cyst.  BLADDER: Mayberry catheter in urinary bladder which is decompressed. Small   amounts of gas within the lumen of the urinary bladder.  REPRODUCTIVE ORGANS: Enlarged fibroid uterus.  Large fecal impaction rectosigmoid portion colon.  BOWEL: No bowel obstruction. Appendix is not visualized. No evidence of   inflammation in the pericecal region.  PERITONEUM: No ascites.  VESSELS: Atherosclerotic changes.  RETROPERITONEUM/LYMPH NODES: No lymphadenopathy.  ABDOMINAL WALL relatively diffuse subcutaneous soft tissue edema present..  BONES: Within normal limits.  IMPRESSION:  Bilateral nonobstructing renal stones. Trace amounts of gas within the   left renal collecting system and renal pelvis decreased from prior exam   2022 all. No evidence of renal parenchymal gas. Findings   suggest recent instrumentation. No hydronephrosis. No obstructing   ureteral stones right or left side.  Mayberry catheter in urinary bladder which is decompressed. Small amounts of   gas within the lumen of the urinary bladder.  Enlarged fibroid uterus.  Large fecal impaction rectosigmoid portion colon.  Please refer to detailed findings otherwise described above.  --- End of Report ---  < end of copied text >    < from: TTE Echo Complete w/o Contrast w/ Doppler (20 @ 12:44) >  Summary:   1. Left ventricular ejection fraction, by visual estimation, is 60 to 65%.   2. Normal global left ventricular systolic function.   3. There is mild concentric left ventricular hypertrophy.   4. Mild mitral annular calcification.   5. Mild thickening and calcification of the anterior and posterior mitral valve leaflets.   6. No evidence of mitral valve regurgitation.   7. Mild aortic regurgitation.   8. No evidence of systolic or diastolic heart failure.  < end of copied text >      PROTEIN CALORIE MALNUTRITION PRESENT: [ ] Yes [ ] No  [ ] PPSV2 < or = to 30% [ ] significant weight loss  [ ] poor nutritional intake [ ] catabolic state [ ] anasarca     Artificial Nutrition [ ]     REFERRALS:   [ ]Chaplaincy  [ ] Hospice  [ ]Child Life  [ ]Social Work  [ ]Case management [ ]Holistic Therapy     Goals of Care Document:     HPI:   72 y.o. female w/ PMHx dementia (nonverbal) , CVA w/ R. sided weakness, HTN, hx seizures, and Gout who presents with worsening swelling. patient cannot give any history. collateral obtained from daughter/HCP:  Gali (PH: [phone]). Today patient's LE swelling was getting worse, her home NP told her to increase her "water pill" but on review, lasix was not part of the medical records. One year ago, lasix was decreased to half a pill, daughter unsure of when lasix was stopped. Swelling has been increasing for 2-3 days. Last creatinine was 1.82 in 2022. Mayberry removed over the summer (inserted in ). has not had any urinary changes per daughter. Denies fevers, chills, nausea, vomiting, constipation, diarrhea. Baseline is that patietn is interactive, often forgetful, but will eat soft foods, but non-verbal.     In the ED, patient was found to be BUN/Cr 6.05, BNP to 5042, admitted for further workup. Unable to obtain LE dopplers given increased agitation.  (20 Dec 2022 22:14)    PERTINENT PM/SXH:   Hypertension    Diabetes    Asthma    OA (osteoarthritis)    Gout    Seizure disorder    Urinary incontinence    Vision changes    CVA (cerebral infarction)      Kidney stone      FAMILY HISTORY:  No pertinent family history in first degree relatives      ITEMS NOT CHECKED ARE NOT PRESENT    SOCIAL HISTORY:   Significant other/partner:  [ ]  Children: yes [ ]  Cheondoism/Spirituality:  Substance hx:  [ ]   Tobacco hx:  [ ]   Alcohol hx: [ ]   Home Opioid hx:  [ ] I-Stop Reference No: Reference #: [id number]  Living Situation: [x ]Home  [ ]Long term care  [ ]Rehab [ ]Other    ADVANCE DIRECTIVES:    DNR  MOLST  [ ]  Living Will  [ ]   DECISION MAKER(s):  [ ] Health Care Proxy(s)  [ x] Surrogate(s)  [ ] Guardian           Name(s): Phone Number(s): Gali (daughter) [phone]    BASELINE (I)ADL(s) (prior to admission):  [place]: [ ]Total  [ x] Moderate [ ]Dependent    Allergies    No Known Allergies    Intolerances    MEDICATIONS  (STANDING):  amLODIPine   Tablet 10 milliGRAM(s) Oral daily  aspirin  chewable 81 milliGRAM(s) Oral daily  cefTRIAXone   IVPB 1000 milliGRAM(s) IV Intermittent every 24 hours  cloNIDine 0.1 milliGRAM(s) Oral every 8 hours  heparin  Infusion.  Unit(s)/Hr (11 mL/Hr) IV Continuous <Continuous>  hydrALAZINE 25 milliGRAM(s) Oral every 8 hours  influenza  Vaccine (HIGH DOSE) 0.7 milliLiter(s) IntraMuscular once  levETIRAcetam 500 milliGRAM(s) Oral two times a day  metoprolol tartrate 50 milliGRAM(s) Oral two times a day  polyethylene glycol 3350 17 Gram(s) Oral every 12 hours  senna 2 Tablet(s) Oral at bedtime  simvastatin 20 milliGRAM(s) Oral at bedtime    MEDICATIONS  (PRN):  heparin   Injectable 4500 Unit(s) IV Push every 6 hours PRN For aPTT less than 40  heparin   Injectable 2000 Unit(s) IV Push every 6 hours PRN For aPTT between 40 - 57  melatonin 3 milliGRAM(s) Oral at bedtime PRN Insomnia    PRESENT SYMPTOMS: [ x]Unable to obtain due to poor mentation   Source if other than patient:  [ ]Family   [ ]Team     Pain: [ ] yes [ ] no  QOL impact -   Location -                    Aggravating factors -  Quality -  Radiation -  Timing-  Severity (0-10 scale):  Minimal acceptable level (0-10 scale):     PAIN AD Score: 0    [email or web address] (press ctrl +  left click to view)    Dyspnea:                           [ ]Mild [ ]Moderate [ ]Severe  Anxiety:                             [ ]Mild [ ]Moderate [ ]Severe  Fatigue:                             [ ]Mild [ ]Moderate [ ]Severe  Nausea:                             [ ]Mild [ ]Moderate [ ]Severe  Loss of appetite:              [ ]Mild [ ]Moderate [ ]Severe  Constipation:                    [ ]Mild [ ]Moderate [ ]Severe    Other Symptoms:  [ ]All other review of systems negative     Karnofsky Performance Score/Palliative Performance Status Version 2:       30-40  %    http://npcrc.org/files/news/palliative_performance_scale_ppsv2.pdf  PHYSICAL EXAM:  Vital Signs Last 24 Hrs  T(C): 36.6 (21 Dec 2022 11:42), Max: 37.2 (20 Dec 2022 21:44)  T(F): 97.8 (21 Dec 2022 11:42), Max: 99 (20 Dec 2022 21:44)  HR: 84 (21 Dec 2022 11:42) (76 - 90)  BP: 166/73 (21 Dec 2022 11:42) (159/69 - 187/78)  BP(mean): --  RR: 16 (21 Dec 2022 11:42) (15 - 17)  SpO2: 100% (21 Dec 2022 11:42) (97% - 100%)    Parameters below as of 21 Dec 2022 11:42  Patient On (Oxygen Delivery Method): room air     I&O's Summary  GENERAL:  [ x]Alert  [ ]Oriented x   [ ]Lethargic  [ ]Cachexia  [ ]Unarousable  [ ]Verbal  [x ]Non-Verbal  Behavioral:   [ ] Anxiety  [ ] Delirium [ ] Agitation [ ] Other  HEENT:  [ ]Normal   [ ]Dry mouth   [ ]ET Tube/Trach  [ ]Oral lesions  PULMONARY:   [x ]Clear diminished at the bases bilaterally  [ ]Tachypnea  [ ]Audible excessive secretions   [ ]Rhonchi        [ ]Right [ ]Left [ ]Bilateral  [ ]Crackles        [ ]Right [ ]Left [ ]Bilateral  [ ]Wheezing     [ ]Right [ ]Left [ ]Bilateral  CARDIOVASCULAR:    [x ]Regular [ ]Irregular [ ]Tachy  [ ]Flex [ ]Murmur [ ]Other  GASTROINTESTINAL:  [x]Soft  [ ]Distended   [ ]+BS  [xNon tender [ ]Tender  [ ]PEG [ ]OGT/ NGT  Last BM: prior to admission GENITOURINARY:  [ ]Normal [ ] Incontinent   [ ]Oliguria/Anuria   [ x]Mayberry  MUSCULOSKELETAL:   [ ]Normal   [ x]Weakness  [ ]Bed/Wheelchair bound [x ]Edema bilateral pedal edema  NEUROLOGIC:   [ ]No focal deficits  [ ] Cognitive impairment  [ ] Dysphagia [ ]Dysarthria [ ] Paresis [ ]Other unable to assess, patient nonverbal  SKIN:   [ ]Normal   [ ]Pressure ulcer(s)  [ ]Rash    CRITICAL CARE:  [ ] Shock Present  [ ]Septic [ ]Cardiogenic [ ]Neurologic [ ]Hypovolemic  [ ]  Vasopressors [ ]  Inotropes   [ ] Respiratory failure present [ ] mechanical ventilation [ ] non-invasive ventilatory support [ ] High flow  [ ] Acute  [ ] Chronic [ ] Hypoxic  [ ] Hypercarbic [ ] Other  [ ] Other organ failure     LABS:                        10.6   8.14  )-----------( 398      ( 20 Dec 2022 17:18 )             33.0   12-21    143  |  117<H>  |  54<H>  ----------------------------<  129<H>  4.2   |  17<L>  |  6.02<H>    Ca    7.7<L>      21 Dec 2022 03:30  Phos  4.8       Mg     1.8         TPro  7.5  /  Alb  2.6<L>  /  TBili  0.3  /  DBili  x   /  AST  21  /  ALT  11<L>  /  AlkPhos  83    PT/INR - ( 21 Dec 2022 06:40 )   PT: 12.0 sec;   INR: 1.00 ratio         PTT - ( 21 Dec 2022 06:40 )  PTT:22.7 sec    Urinalysis Basic - ( 20 Dec 2022 21:00 )    Color: Yellow / Appearance: very cloudy / S.015 / pH: x  Gluc: x / Ketone: Negative  / Bili: Negative / Urobili: Negative mg/dL   Blood: x / Protein: 500 mg/dL / Nitrite: Negative   Leuk Esterase: Moderate / RBC: 0-2 /HPF / WBC >50   Sq Epi: x / Non Sq Epi: Few / Bacteria: Many    RADIOLOGY & ADDITIONAL STUDIES:   < from: US Renal (22 @ 01:23) >  FINDINGS:  Right kidney: 9.1 cm. No renal mass, hydronephrosis or calculi. Echogenic   kidney. Multiple cysts, the largest in the interpolar region measuring   2.4 cm.  Left kidney: 7.4 cm. No hydronephrosis. A 9 mm interpolar calculus. A 7   mm interpolar cortical cyst. Echogenic kidney.  Urinary bladder: Within normal limits.  IMPRESSION:  Atrophic echogenic kidneys suggesting chronic medical renal disease.  Nonobstructing left renal stone.  No hydronephrosis of either kidney.  --- End of Report ---  < end of copied text >    < from: US Duplex Venous Lower Ext Complete, Bilateral (22 @ 01:18) >  IMPRESSION:  Nonocclusive DVT is noted in the left popliteal vein.  --- End of Report ---  < end of copied text >    < from: CT Abdomen and Pelvis No Cont (22 @ 22:16) >  FINDINGS:  LOWER CHEST: Within normal limits.  LIVER: Within normal limits.  BILE DUCTS: Normal caliber.  GALLBLADDER: Within normal limits.  SPLEEN: Within normal limits.  PANCREAS: Within normal limits.  ADRENALS: Within normal limits.  KIDNEYS/URETERS: 1.2 cm stone left renal pelvis. 1.2 cm stone within the   lower pole left kidney. Trace amounts of gas within the left renal   collecting system and renal pelvis decreased from prior exam 2022. No evidence of renal parenchymal gas. Findings suggest recent   instrumentation. Nonobstructing 7.1 mm stone upper pole right kidney. No   hydronephrosis. No obstructing ureteral stones right or left side. Stable   right renal cyst.  BLADDER: Mayberry catheter in urinary bladder which is decompressed. Small   amounts of gas within the lumen of the urinary bladder.  REPRODUCTIVE ORGANS: Enlarged fibroid uterus.  Large fecal impaction rectosigmoid portion colon.  BOWEL: No bowel obstruction. Appendix is not visualized. No evidence of   inflammation in the pericecal region.  PERITONEUM: No ascites.  VESSELS: Atherosclerotic changes.  RETROPERITONEUM/LYMPH NODES: No lymphadenopathy.  ABDOMINAL WALL relatively diffuse subcutaneous soft tissue edema present..  BONES: Within normal limits.  IMPRESSION:  Bilateral nonobstructing renal stones. Trace amounts of gas within the   left renal collecting system and renal pelvis decreased from prior exam   2022 all. No evidence of renal parenchymal gas. Findings   suggest recent instrumentation. No hydronephrosis. No obstructing   ureteral stones right or left side.  Mayberry catheter in urinary bladder which is decompressed. Small amounts of   gas within the lumen of the urinary bladder.  Enlarged fibroid uterus.  Large fecal impaction rectosigmoid portion colon.  Please refer to detailed findings otherwise described above.  --- End of Report ---  < end of copied text >    < from: TTE Echo Complete w/o Contrast w/ Doppler (20 @ 12:44) >  Summary:   1. Left ventricular ejection fraction, by visual estimation, is 60 to 65%.   2. Normal global left ventricular systolic function.   3. There is mild concentric left ventricular hypertrophy.   4. Mild mitral annular calcification.   5. Mild thickening and calcification of the anterior and posterior mitral valve leaflets.   6. No evidence of mitral valve regurgitation.   7. Mild aortic regurgitation.   8. No evidence of systolic or diastolic heart failure.  < end of copied text >      PROTEIN CALORIE MALNUTRITION PRESENT: [ ] Yes [ ] No  [ ] PPSV2 < or = to 30% [ ] significant weight loss  [ ] poor nutritional intake [ ] catabolic state [ ] anasarca     Artificial Nutrition [ ]     REFERRALS:   [ ]Chaplaincy  [ ] Hospice  [ ]Child Life  [ ]Social Work  [ ]Case management [ ]Holistic Therapy     Goals of Care Document:

## 2022-12-21 NOTE — CONSULT NOTE ADULT - PROBLEM SELECTOR RECOMMENDATION 9
creatinine >6, prior creatinine levels from 2/22 1.3-2.7  nonobstructive renal stones bilateral kidneys noted on CT scan of the abdomen, US renal with chronic medical renal disease  milner placed-monitor urine output   avoid nephrotoxic agents

## 2022-12-21 NOTE — CONSULT NOTE ADULT - CONVERSATION DETAILS
Spoke with patient's daughter, Gali who said her mother was living in a 3 story house and has 24 hour HHA with her sister living on the first floor.  Discussed current medical issues and concern about renal function.  Discussed that should HD be recommended, unclear how long it would be for and it is very taxing on the body.  Discussed the nephrology will evaluate patient and she is still pending further testing.   She said should HD be necessary she would want her mother to have it.  Asked bout life-sustaining interventions such as CPR and she said she would want any interventions to keep her mother alive.  Discussed that DNR does not preclude someone from aggressive care, but allows them to pass peacefully should those aggressive interventions not work.  She said she understood, but that would not work culturally.  She said she and her aunt make decisions for the patient together and said her brother is TIP.  Patient remains full code.

## 2022-12-21 NOTE — CONSULT NOTE ADULT - PROBLEM SELECTOR RECOMMENDATION 7
Called and left  for patient's daughter, Gali [phone].  Patient remains full code.  Palliative to follow.    Message sent to Dr. Belcher See GOC above.  Patient remains full code.      Discussed with Dr. Belcher

## 2022-12-21 NOTE — CONSULT NOTE ADULT - PROBLEM SELECTOR RECOMMENDATION 3
Echo from feb 2022 with grade I diastolic dysfunction  daughter reported lower extremity edema and said NP told her to increase "water pill", but unclear that patient was on lasix prior to admission  pending echo

## 2022-12-21 NOTE — PATIENT PROFILE ADULT - FALL HARM RISK - HARM RISK INTERVENTIONS

## 2022-12-21 NOTE — CONSULT NOTE ADULT - ASSESSMENT
72 year old female PMH of dementia (nonverbal at baseline), CVA (right sided weakness), HTn, seizures and gout presented to the ED with worsening lower extremity edema.  Patient with elevated creatinine >6 and positive left lower extremity DVT started on heparin gtt.  Palliative care consulted for GOC.

## 2022-12-21 NOTE — PROGRESS NOTE ADULT - SUBJECTIVE AND OBJECTIVE BOX
Patient is a 72y old  Female who presents with a chief complaint of valdo (21 Dec 2022 13:12)    INTERVAL HPI/OVERNIGHT EVENTS:    MEDICATIONS  (STANDING):  amLODIPine   Tablet 10 milliGRAM(s) Oral daily  aspirin  chewable 81 milliGRAM(s) Oral daily  cefTRIAXone   IVPB 1000 milliGRAM(s) IV Intermittent every 24 hours  cloNIDine 0.1 milliGRAM(s) Oral every 8 hours  heparin  Infusion.  Unit(s)/Hr (11 mL/Hr) IV Continuous <Continuous>  hydrALAZINE 25 milliGRAM(s) Oral every 8 hours  influenza  Vaccine (HIGH DOSE) 0.7 milliLiter(s) IntraMuscular once  levETIRAcetam 500 milliGRAM(s) Oral two times a day  metoprolol tartrate 50 milliGRAM(s) Oral two times a day  polyethylene glycol 3350 17 Gram(s) Oral every 12 hours  senna 2 Tablet(s) Oral at bedtime  simvastatin 20 milliGRAM(s) Oral at bedtime    MEDICATIONS  (PRN):  heparin   Injectable 4500 Unit(s) IV Push every 6 hours PRN For aPTT less than 40  heparin   Injectable 2000 Unit(s) IV Push every 6 hours PRN For aPTT between 40 - 57  melatonin 3 milliGRAM(s) Oral at bedtime PRN Insomnia    Allergies    No Known Allergies    Intolerances      REVIEW OF SYSTEMS:  All other systems reviewed and are negative    Vital Signs Last 24 Hrs  T(C): 36.6 (21 Dec 2022 11:42), Max: 37.2 (20 Dec 2022 21:44)  T(F): 97.8 (21 Dec 2022 11:42), Max: 99 (20 Dec 2022 21:44)  HR: 84 (21 Dec 2022 11:42) (76 - 90)  BP: 166/73 (21 Dec 2022 11:42) (159/69 - 187/78)  BP(mean): --  RR: 16 (21 Dec 2022 11:42) (15 - 17)  SpO2: 100% (21 Dec 2022 11:42) (97% - 100%)    Parameters below as of 21 Dec 2022 11:42  Patient On (Oxygen Delivery Method): room air      Daily Height in cm: 157.48 (20 Dec 2022 15:30)    Daily   I&O's Summary    CAPILLARY BLOOD GLUCOSE        PHYSICAL EXAM:  GENERAL: NAD, well-groomed, well-developed  HEAD:  Atraumatic, Normocephalic  EYES: EOMI, PERRLA, conjunctiva and sclera clear  ENMT: No tonsillar erythema, exudates, or enlargement; Moist mucous membranes, Good dentition, No lesions  NECK: Supple, No JVD, Normal thyroid  NERVOUS SYSTEM:  Alert & Oriented X3, Good concentration; Motor Strength 5/5 B/L upper and lower extremities; DTRs 2+ intact and symmetric  CHEST/LUNG: Clear to percussion bilaterally; No rales, rhonchi, wheezing, or rubs  HEART: Regular rate and rhythm; No murmurs, rubs, or gallops  ABDOMEN: Soft, Nontender, Nondistended; Bowel sounds present  EXTREMITIES:  2+ Peripheral Pulses, No clubbing, cyanosis, or edema  LYMPH: No lymphadenopathy noted  SKIN: No rashes or lesions    Labs                          10.6   8.14  )-----------( 398      ( 20 Dec 2022 17:18 )             33.0     12-    143  |  117<H>  |  54<H>  ----------------------------<  129<H>  4.2   |  17<L>  |  6.02<H>    Ca    7.7<L>      21 Dec 2022 03:30  Phos  4.8     12-21  Mg     1.8     12-    TPro  7.5  /  Alb  2.6<L>  /  TBili  0.3  /  DBili  x   /  AST  21  /  ALT  11<L>  /  AlkPhos  83  12-20    PT/INR - ( 21 Dec 2022 06:40 )   PT: 12.0 sec;   INR: 1.00 ratio         PTT - ( 21 Dec 2022 06:40 )  PTT:22.7 sec  CARDIAC MARKERS ( 20 Dec 2022 17:18 )  x     / x     / 84 U/L / x     / x          Urinalysis Basic - ( 20 Dec 2022 21:00 )    Color: Yellow / Appearance: very cloudy / S.015 / pH: x  Gluc: x / Ketone: Negative  / Bili: Negative / Urobili: Negative mg/dL   Blood: x / Protein: 500 mg/dL / Nitrite: Negative   Leuk Esterase: Moderate / RBC: 0-2 /HPF / WBC >50   Sq Epi: x / Non Sq Epi: Few / Bacteria: Many             Patient is a 72y old  Female who presents with a chief complaint of valdo (21 Dec 2022 13:12)    INTERVAL HPI/OVERNIGHT EVENTS: no acute events  SUBJECTIVE: unable to obtain as pt is non-verbal    MEDICATIONS  (STANDING):  amLODIPine   Tablet 10 milliGRAM(s) Oral daily  aspirin  chewable 81 milliGRAM(s) Oral daily  cefTRIAXone   IVPB 1000 milliGRAM(s) IV Intermittent every 24 hours  cloNIDine 0.1 milliGRAM(s) Oral every 8 hours  heparin  Infusion.  Unit(s)/Hr (11 mL/Hr) IV Continuous <Continuous>  hydrALAZINE 25 milliGRAM(s) Oral every 8 hours  influenza  Vaccine (HIGH DOSE) 0.7 milliLiter(s) IntraMuscular once  levETIRAcetam 500 milliGRAM(s) Oral two times a day  metoprolol tartrate 50 milliGRAM(s) Oral two times a day  polyethylene glycol 3350 17 Gram(s) Oral every 12 hours  senna 2 Tablet(s) Oral at bedtime  simvastatin 20 milliGRAM(s) Oral at bedtime    MEDICATIONS  (PRN):  heparin   Injectable 4500 Unit(s) IV Push every 6 hours PRN For aPTT less than 40  heparin   Injectable 2000 Unit(s) IV Push every 6 hours PRN For aPTT between 40 - 57  melatonin 3 milliGRAM(s) Oral at bedtime PRN Insomnia    Allergies    No Known Allergies    Intolerances      REVIEW OF SYSTEMS:  All other systems reviewed and are negative    Vital Signs Last 24 Hrs  T(C): 36.6 (21 Dec 2022 11:42), Max: 37.2 (20 Dec 2022 21:44)  T(F): 97.8 (21 Dec 2022 11:42), Max: 99 (20 Dec 2022 21:44)  HR: 84 (21 Dec 2022 11:42) (76 - 90)  BP: 166/73 (21 Dec 2022 11:42) (159/69 - 187/78)  BP(mean): --  RR: 16 (21 Dec 2022 11:42) (15 - 17)  SpO2: 100% (21 Dec 2022 11:42) (97% - 100%)    Parameters below as of 21 Dec 2022 11:42  Patient On (Oxygen Delivery Method): room air      Daily Height in cm: 157.48 (20 Dec 2022 15:30)    Daily   I&O's Summary    CAPILLARY BLOOD GLUCOSE        PHYSICAL EXAM:  GENERAL: NAD, well-groomed, well-developed. frail  HEAD:  Atraumatic, Normocephalic  EYES: EOMI, PERRLA, conjunctiva and sclera clear  ENMT: No tonsillar erythema, exudates, or enlargement; Moist mucous membranes, Good dentition, No lesions  NECK: Supple, No JVD, Normal thyroid  NERVOUS SYSTEM:  non-verbal.   CHEST/LUNG: Clear to percussion bilaterally; No rales, rhonchi, wheezing, or rubs  HEART: Regular rate and rhythm; No murmurs, rubs, or gallops  ABDOMEN: Soft, Nontender, Nondistended; Bowel sounds present  EXTREMITIES:  1.5+ ble stu      Labs                          10.6   8.14  )-----------( 398      ( 20 Dec 2022 17:18 )             33.0     12-    143  |  117<H>  |  54<H>  ----------------------------<  129<H>  4.2   |  17<L>  |  6.02<H>    Ca    7.7<L>      21 Dec 2022 03:30  Phos  4.8     12-  Mg     1.8     12-    TPro  7.5  /  Alb  2.6<L>  /  TBili  0.3  /  DBili  x   /  AST  21  /  ALT  11<L>  /  AlkPhos  83  12-20    PT/INR - ( 21 Dec 2022 06:40 )   PT: 12.0 sec;   INR: 1.00 ratio         PTT - ( 21 Dec 2022 06:40 )  PTT:22.7 sec  CARDIAC MARKERS ( 20 Dec 2022 17:18 )  x     / x     / 84 U/L / x     / x          Urinalysis Basic - ( 20 Dec 2022 21:00 )    Color: Yellow / Appearance: very cloudy / S.015 / pH: x  Gluc: x / Ketone: Negative  / Bili: Negative / Urobili: Negative mg/dL   Blood: x / Protein: 500 mg/dL / Nitrite: Negative   Leuk Esterase: Moderate / RBC: 0-2 /HPF / WBC >50   Sq Epi: x / Non Sq Epi: Few / Bacteria: Many

## 2022-12-21 NOTE — PROGRESS NOTE ADULT - ASSESSMENT
73 yo female w/ pmhx of dementia- nonverbal at baseline, CVA w/ resdiual right sided weakness, HTN, seizures, gout admitted to [facility] for AMADOU (creatinine baseline 1.82, now 6.05) and lower extremity edema    GENERAL MEDICINE  # lower extremity edema  - 1+ on time of examination  - sp lasix 40 mg IV x 1   - per daughter, edema has improved  - unclear if lower extremity edema is secondary to CHF vs renal failure    NEPHROLOGY  # AMADOU  - creatinine 6.05, baseline 1.8??  - US renal showed medical renal disease     VASCULAR  # lower extremity DVT  - on heparin drip due to renal failure     NEPHROLOGY  # dementia  # seizure  - keppra 500 mg po bid    CARDIOLOGY  # Hypertension.   - hydralazine 25 mg po TID, amlodipine 10 mg po daily, clonidine 0.1 mg po bid, metoprolol tartrate 50 mg po BID,    PLAN  - insert milner  - strict input and outpt  - consult NEPHROLOGY  - consult CARDIOLOGY for evaluation of possible decompensated CHF  - probnp  - c/w heparin drip   - consult PC for goals of care. dialysis?     - care spoke w/ pt's daughter Gali at bedside. updates given briefly. did not update daughter about dvt. attempted to call @ [phone]. and left voicemail    CODE: FULL   71 yo female w/ pmhx of dementia- nonverbal at baseline, CVA w/ resdiual right sided weakness, HTN, seizures, gout admitted to [facility] for AMADOU (creatinine baseline 1.82, now 6.05) and lower extremity edema    GENERAL MEDICINE  # lower extremity edema  - 1+ on time of examination  - sp lasix 40 mg IV x 1   - per daughter, edema has improved  - unclear if lower extremity edema is secondary to CHF vs renal failure    NEPHROLOGY  # AMADOU  - creatinine 6.05, baseline 1.8??  - US renal showed medical renal disease     VASCULAR  # lower extremity DVT  - on heparin drip due to renal failure     NEPHROLOGY  # dementia  # seizure  - keppra 500 mg po bid    CARDIOLOGY  # Hypertension.   - hydralazine 25 mg po TID, amlodipine 10 mg po daily, clonidine 0.1 mg po bid, metoprolol tartrate 50 mg po BID,    PLAN  - insert milner  - strict input and outpt  - consult NEPHROLOGY  - consult CARDIOLOGY for evaluation of possible decompensated CHF  - probnp  - c/w heparin drip   - consult PC for goals of care. dialysis?     - care spoke w/ pt's daughter Lisette @ [phone]. update given  on 12/21. did not update pt's daughter about pt refusing po medication. AOR tomorrow will need to update daughter    CODE: FULL   73 yo female w/ pmhx of dementia- nonverbal at baseline, CVA w/ resdiual right sided weakness, HTN, seizures, gout admitted to [facility] for AMADOU (creatinine baseline 1.82, now 6.05) and lower extremity edema    GENERAL MEDICINE  # lower extremity edema  - 1+ on time of examination  - sp lasix 40 mg IV x 1   - per daughter, edema has improved  - unclear if lower extremity edema is secondary to CHF vs renal failure    NEPHROLOGY  # AMADOU  - creatinine 6.05, baseline 1.8??  - US renal showed medical renal disease     VASCULAR  # lower extremity DVT  - on heparin drip due to renal failure     NEPHROLOGY  # dementia  # seizure  - keppra 500 mg po bid    CARDIOLOGY  # Hypertension.   - hydralazine 25 mg po TID, amlodipine 10 mg po daily, clonidine 0.1 mg po bid, metoprolol tartrate 50 mg po BID    INFECTIOUS DISEASE  # possible UTI  - rocephin day # 1    PLAN  - insert milner  - strict input and outpt  - consult NEPHROLOGY  - consult CARDIOLOGY for evaluation of possible decompensated CHF  - probnp  - c/w heparin drip   - consult PC for goals of care. dialysis?     - care spoke w/ pt's daughter Lisette @ [phone]. update given  on 12/21. did not update pt's daughter about pt refusing po medication. AOR tomorrow will need to update daughter    CODE: FULL

## 2022-12-22 LAB
ALBUMIN SERPL ELPH-MCNC: 2 G/DL — LOW (ref 3.3–5)
ALP SERPL-CCNC: 62 U/L — SIGNIFICANT CHANGE UP (ref 40–120)
ALT FLD-CCNC: 14 U/L — SIGNIFICANT CHANGE UP (ref 12–78)
ANION GAP SERPL CALC-SCNC: 14 MMOL/L — SIGNIFICANT CHANGE UP (ref 5–17)
APTT BLD: 44.8 SEC — HIGH (ref 27.5–35.5)
APTT BLD: 48.9 SEC — HIGH (ref 27.5–35.5)
APTT BLD: 82.6 SEC — HIGH (ref 27.5–35.5)
AST SERPL-CCNC: 20 U/L — SIGNIFICANT CHANGE UP (ref 15–37)
BILIRUB SERPL-MCNC: 0.2 MG/DL — SIGNIFICANT CHANGE UP (ref 0.2–1.2)
BLD GP AB SCN SERPL QL: SIGNIFICANT CHANGE UP
BUN SERPL-MCNC: 55 MG/DL — HIGH (ref 7–23)
C3 SERPL-MCNC: 87 MG/DL — SIGNIFICANT CHANGE UP (ref 81–157)
C4 SERPL-MCNC: 30 MG/DL — SIGNIFICANT CHANGE UP (ref 13–39)
CALCIUM SERPL-MCNC: 7.3 MG/DL — LOW (ref 8.5–10.1)
CHLORIDE SERPL-SCNC: 119 MMOL/L — HIGH (ref 96–108)
CO2 SERPL-SCNC: 13 MMOL/L — LOW (ref 22–31)
CREAT SERPL-MCNC: 6.15 MG/DL — HIGH (ref 0.5–1.3)
D DIMER BLD IA.RAPID-MCNC: 2455 NG/ML DDU — HIGH
EGFR: 7 ML/MIN/1.73M2 — LOW
FERRITIN SERPL-MCNC: 195 NG/ML — HIGH (ref 15–150)
FOLATE SERPL-MCNC: 4 NG/ML — LOW
GLUCOSE SERPL-MCNC: 123 MG/DL — HIGH (ref 70–99)
HAV IGM SER-ACNC: SIGNIFICANT CHANGE UP
HBV CORE IGM SER-ACNC: SIGNIFICANT CHANGE UP
HBV SURFACE AG SER-ACNC: SIGNIFICANT CHANGE UP
HCT VFR BLD CALC: 19.5 % — CRITICAL LOW (ref 34.5–45)
HCT VFR BLD CALC: 21 % — CRITICAL LOW (ref 34.5–45)
HCV AB S/CO SERPL IA: 0.08 S/CO — SIGNIFICANT CHANGE UP (ref 0–0.99)
HCV AB SERPL-IMP: SIGNIFICANT CHANGE UP
HGB BLD-MCNC: 6.3 G/DL — CRITICAL LOW (ref 11.5–15.5)
HGB BLD-MCNC: 6.8 G/DL — CRITICAL LOW (ref 11.5–15.5)
IGA FLD-MCNC: 341 MG/DL — SIGNIFICANT CHANGE UP (ref 84–499)
IGG FLD-MCNC: 946 MG/DL — SIGNIFICANT CHANGE UP (ref 610–1660)
IGM SERPL-MCNC: 62 MG/DL — SIGNIFICANT CHANGE UP (ref 35–242)
IRON SATN MFR SERPL: 10 UG/DL — LOW (ref 30–160)
IRON SATN MFR SERPL: 11 UG/DL — LOW (ref 30–160)
IRON SATN MFR SERPL: 7 % — LOW (ref 14–50)
KAPPA LC SER QL IFE: 14.7 MG/DL — HIGH (ref 0.33–1.94)
KAPPA/LAMBDA FREE LIGHT CHAIN RATIO, SERUM: 2.07 RATIO — HIGH (ref 0.26–1.65)
LAMBDA LC SER QL IFE: 7.1 MG/DL — HIGH (ref 0.57–2.63)
LDH SERPL L TO P-CCNC: 265 U/L — HIGH (ref 50–242)
MAGNESIUM SERPL-MCNC: 1.7 MG/DL — SIGNIFICANT CHANGE UP (ref 1.6–2.6)
MCHC RBC-ENTMCNC: 29.2 PG — SIGNIFICANT CHANGE UP (ref 27–34)
MCHC RBC-ENTMCNC: 29.3 PG — SIGNIFICANT CHANGE UP (ref 27–34)
MCHC RBC-ENTMCNC: 32.3 G/DL — SIGNIFICANT CHANGE UP (ref 32–36)
MCHC RBC-ENTMCNC: 32.4 G/DL — SIGNIFICANT CHANGE UP (ref 32–36)
MCV RBC AUTO: 90.3 FL — SIGNIFICANT CHANGE UP (ref 80–100)
MCV RBC AUTO: 90.5 FL — SIGNIFICANT CHANGE UP (ref 80–100)
NRBC # BLD: 0 /100 WBCS — SIGNIFICANT CHANGE UP (ref 0–0)
NRBC # BLD: 0 /100 WBCS — SIGNIFICANT CHANGE UP (ref 0–0)
NT-PROBNP SERPL-SCNC: 6445 PG/ML — HIGH (ref 0–125)
PHOSPHATE SERPL-MCNC: 4.8 MG/DL — HIGH (ref 2.5–4.5)
PLATELET # BLD AUTO: 217 K/UL — SIGNIFICANT CHANGE UP (ref 150–400)
PLATELET # BLD AUTO: 229 K/UL — SIGNIFICANT CHANGE UP (ref 150–400)
POTASSIUM SERPL-MCNC: 3.4 MMOL/L — LOW (ref 3.5–5.3)
POTASSIUM SERPL-SCNC: 3.4 MMOL/L — LOW (ref 3.5–5.3)
PROT SERPL-MCNC: 5.1 G/DL — LOW (ref 6–8.3)
PROT SERPL-MCNC: 5.1 G/DL — LOW (ref 6–8.3)
PROT SERPL-MCNC: 5.4 GM/DL — LOW (ref 6–8.3)
RBC # BLD: 2.16 M/UL — LOW (ref 3.8–5.2)
RBC # BLD: 2.23 M/UL — LOW (ref 3.8–5.2)
RBC # BLD: 2.32 M/UL — LOW (ref 3.8–5.2)
RBC # FLD: 15 % — HIGH (ref 10.3–14.5)
RBC # FLD: 15 % — HIGH (ref 10.3–14.5)
RETICS #: 33.9 K/UL — SIGNIFICANT CHANGE UP (ref 25–125)
RETICS/RBC NFR: 1.5 % — SIGNIFICANT CHANGE UP (ref 0.5–2.5)
SODIUM SERPL-SCNC: 146 MMOL/L — HIGH (ref 135–145)
TIBC SERPL-MCNC: 150 UG/DL — LOW (ref 220–430)
UIBC SERPL-MCNC: 140 UG/DL — SIGNIFICANT CHANGE UP (ref 110–370)
VIT B12 SERPL-MCNC: 342 PG/ML — SIGNIFICANT CHANGE UP (ref 232–1245)
WBC # BLD: 20.16 K/UL — HIGH (ref 3.8–10.5)
WBC # BLD: 22.09 K/UL — HIGH (ref 3.8–10.5)
WBC # FLD AUTO: 20.16 K/UL — HIGH (ref 3.8–10.5)
WBC # FLD AUTO: 22.09 K/UL — HIGH (ref 3.8–10.5)

## 2022-12-22 PROCEDURE — 99223 1ST HOSP IP/OBS HIGH 75: CPT

## 2022-12-22 PROCEDURE — 99232 SBSQ HOSP IP/OBS MODERATE 35: CPT

## 2022-12-22 PROCEDURE — 99233 SBSQ HOSP IP/OBS HIGH 50: CPT

## 2022-12-22 RX ORDER — POTASSIUM CHLORIDE 20 MEQ
20 PACKET (EA) ORAL
Refills: 0 | Status: COMPLETED | OUTPATIENT
Start: 2022-12-22 | End: 2022-12-22

## 2022-12-22 RX ORDER — PIPERACILLIN AND TAZOBACTAM 4; .5 G/20ML; G/20ML
3.38 INJECTION, POWDER, LYOPHILIZED, FOR SOLUTION INTRAVENOUS EVERY 12 HOURS
Refills: 0 | Status: DISCONTINUED | OUTPATIENT
Start: 2022-12-23 | End: 2022-12-26

## 2022-12-22 RX ORDER — PIPERACILLIN AND TAZOBACTAM 4; .5 G/20ML; G/20ML
3.38 INJECTION, POWDER, LYOPHILIZED, FOR SOLUTION INTRAVENOUS ONCE
Refills: 0 | Status: COMPLETED | OUTPATIENT
Start: 2022-12-22 | End: 2022-12-22

## 2022-12-22 RX ORDER — ACETAMINOPHEN 500 MG
650 TABLET ORAL EVERY 8 HOURS
Refills: 0 | Status: DISCONTINUED | OUTPATIENT
Start: 2022-12-22 | End: 2022-12-28

## 2022-12-22 RX ORDER — LACTULOSE 10 G/15ML
20 SOLUTION ORAL ONCE
Refills: 0 | Status: COMPLETED | OUTPATIENT
Start: 2022-12-22 | End: 2022-12-22

## 2022-12-22 RX ADMIN — LEVETIRACETAM 420 MILLIGRAM(S): 250 TABLET, FILM COATED ORAL at 06:55

## 2022-12-22 RX ADMIN — Medication 1 PATCH: at 15:54

## 2022-12-22 RX ADMIN — Medication 1000 MILLIGRAM(S): at 00:41

## 2022-12-22 RX ADMIN — AMLODIPINE BESYLATE 10 MILLIGRAM(S): 2.5 TABLET ORAL at 06:44

## 2022-12-22 RX ADMIN — Medication 25 MILLIGRAM(S): at 06:55

## 2022-12-22 RX ADMIN — Medication 5 MILLIGRAM(S): at 00:55

## 2022-12-22 RX ADMIN — Medication 20 MILLIEQUIVALENT(S): at 13:28

## 2022-12-22 RX ADMIN — PIPERACILLIN AND TAZOBACTAM 200 GRAM(S): 4; .5 INJECTION, POWDER, LYOPHILIZED, FOR SOLUTION INTRAVENOUS at 11:37

## 2022-12-22 RX ADMIN — Medication 650 MILLIGRAM(S): at 07:37

## 2022-12-22 RX ADMIN — HEPARIN SODIUM 900 UNIT(S)/HR: 5000 INJECTION INTRAVENOUS; SUBCUTANEOUS at 07:28

## 2022-12-22 RX ADMIN — HEPARIN SODIUM 900 UNIT(S)/HR: 5000 INJECTION INTRAVENOUS; SUBCUTANEOUS at 03:20

## 2022-12-22 RX ADMIN — SIMVASTATIN 20 MILLIGRAM(S): 20 TABLET, FILM COATED ORAL at 22:37

## 2022-12-22 RX ADMIN — CEFTRIAXONE 100 MILLIGRAM(S): 500 INJECTION, POWDER, FOR SOLUTION INTRAMUSCULAR; INTRAVENOUS at 03:17

## 2022-12-22 RX ADMIN — Medication 0.1 MILLIGRAM(S): at 22:37

## 2022-12-22 RX ADMIN — Medication 0.1 MILLIGRAM(S): at 06:55

## 2022-12-22 RX ADMIN — POLYETHYLENE GLYCOL 3350 17 GRAM(S): 17 POWDER, FOR SOLUTION ORAL at 17:01

## 2022-12-22 RX ADMIN — Medication 20 MILLIEQUIVALENT(S): at 11:37

## 2022-12-22 RX ADMIN — LEVETIRACETAM 420 MILLIGRAM(S): 250 TABLET, FILM COATED ORAL at 17:01

## 2022-12-22 RX ADMIN — Medication 0.1 MILLIGRAM(S): at 16:00

## 2022-12-22 RX ADMIN — BUMETANIDE 2 MILLIGRAM(S): 0.25 INJECTION INTRAMUSCULAR; INTRAVENOUS at 06:55

## 2022-12-22 RX ADMIN — Medication 81 MILLIGRAM(S): at 13:25

## 2022-12-22 RX ADMIN — SENNA PLUS 2 TABLET(S): 8.6 TABLET ORAL at 22:37

## 2022-12-22 RX ADMIN — LACTULOSE 20 GRAM(S): 10 SOLUTION ORAL at 13:26

## 2022-12-22 RX ADMIN — Medication 25 MILLIGRAM(S): at 15:54

## 2022-12-22 RX ADMIN — Medication 25 MILLIGRAM(S): at 22:37

## 2022-12-22 NOTE — PROGRESS NOTE ADULT - ASSESSMENT
73 yo female w/ pmhx of dementia- nonverbal at baseline, CVA w/ resdiual right sided weakness, HTN, seizures, gout admitted to [facility] for AMADOU (creatinine baseline 1.82, now 6.05) and lower extremity edema    GENERAL MEDICINE  # lower extremity edema  - 1+ on time of examination  - sp lasix 40 mg IV x 1   - unclear if lower extremity edema is secondary to CHF vs renal failure  Leg edema better on exam today.     NEPHROLOGY  # AMADOU  worsening creatinine.   - US renal showed medical renal disease   Renal following. follow BMP  DC bumex.     VASCULAR  # lower extremity DVT distal popliteal vein.   - iv heparin stopped due to worsening hemoglobin.   no source of active gross bleeding identified yet.   I consulted Dr. Garcia to further evaluate patient for DVT management. Follow recs.     NEPHROLOGY  # dementia  # seizure  - keppra 500 mg po bid    CARDIOLOGY  # Hypertension.   - uncontrolled. cont current meds with holding parameters.     INFECTIOUS DISEASE  # sepsis with GNR UTI (POA). still spiking fever and tachycardic. worsening leukocytosis.    - Patient has hx of enterococcal faecalis and Klebsiella UTI.   -change antibiotic to zosyn. follow cultures. de-escale therapy based on cultures and clinical improvement.     Urinary retention. voiding trial tomorrow.     Anemia. acute on chronic. no visible active gross bleeding. I discussed and updated sister in person, daughter Gali on phone about anemia and need to stop iv heparin. need to give blood transfusion. Consent obtained. Trend CBC.     Hypokalemia. Likely from diuretic use. Replete and recheck level.     Hypernatremia. DCed bumex. follow I and Os. follow BMP in am.     Faecal impaction. give lactulose and dulcolax.     Fibroid. outpatient gyn follow up is recommended.     CODE: FULL

## 2022-12-22 NOTE — OCCUPATIONAL THERAPY INITIAL EVALUATION ADULT - GENERAL OBSERVATIONS, REHAB EVAL
Pt was encountered supine in bed; NAD, sleepy (not arousable with verbal cues or sternal rubbing), unable to assess orientation, unable to follow commands or directions; No pain observed. **Updated note** Pt family was present at 11:30. OT was able to speak with family about PLOF and social history.

## 2022-12-22 NOTE — PROGRESS NOTE ADULT - SUBJECTIVE AND OBJECTIVE BOX
CHIEF COMPLAINT: worsening anemia reported this morning by nursing staff  no report of any hematuria, hematochezia, no hematemesis or vomiting   Fever spikes this morning.   Patient non verbal.   Family (sister) at bedside.     PHYSICAL EXAM:  GENERAL: Moderately built, no acute distress   CHEST/LUNG: Clear to ausculation bilaterally, no wheezing, no crackles   HEART: Regular rate and rhythm; No murmurs  ABDOMEN: Soft, Nontender, Nondistended; Bowel sounds present  EXTREMITIES:   No clubbing, cyanosis. + legs edema. not able to participate in ROM exam.   NERVOUS SYSTEM: Limited exam due to no patient participation. no facial droop observed.   Psychiatry: somnolent but arousable. non verbal.       OBJECTIVE DATA:   Vital Signs Last 24 Hrs  T(C): 37.3 (22 Dec 2022 04:58), Max: 39.2 (21 Dec 2022 21:03)  T(F): 99.2 (22 Dec 2022 04:58), Max: 102.5 (21 Dec 2022 21:03)  HR: 60 (22 Dec 2022 10:30) (60 - 126)  BP: 128/61 (22 Dec 2022 10:30) (128/61 - 192/79)  BP(mean): --  RR: 17 (22 Dec 2022 04:58) (17 - 20)  SpO2: 98% (22 Dec 2022 10:30) (95% - 99%)    Parameters below as of 22 Dec 2022 10:30  Patient On (Oxygen Delivery Method): room air               Daily     Daily   LABS:                        6.3    20.16 )-----------( 217      ( 22 Dec 2022 11:05 )             19.5             12-    146<H>  |  119<H>  |  55<H>  ----------------------------<  123<H>  3.4<L>   |  13<L>  |  6.15<H>    Ca    7.3<L>      22 Dec 2022 07:51  Phos  4.8     12-  Mg     1.7     12    TPro  5.4<L>  /  Alb  2.0<L>  /  TBili  0.2  /  DBili  x   /  AST  20  /  ALT  14  /  AlkPhos  62  12-              PT/INR - ( 21 Dec 2022 06:40 )   PT: 12.0 sec;   INR: 1.00 ratio         PTT - ( 22 Dec 2022 11:05 )  PTT:44.8 sec  Urinalysis Basic - ( 20 Dec 2022 21:00 )    Color: Yellow / Appearance: very cloudy / S.015 / pH: x  Gluc: x / Ketone: Negative  / Bili: Negative / Urobili: Negative mg/dL   Blood: x / Protein: 500 mg/dL / Nitrite: Negative   Leuk Esterase: Moderate / RBC: 0-2 /HPF / WBC >50   Sq Epi: x / Non Sq Epi: Few / Bacteria: Many        CARDIAC MARKERS ( 20 Dec 2022 17:18 )  x     / x     / 84 U/L / x     / x        Culture - Urine  Source: Catheterized Catheterized  Preliminary Report ():    >100,000 CFU/ml Gram Negative Rods      Interval Radiology studies: reviewed by me  < from:  Renal (22 @ 01:23) >  Atrophic echogenic kidneys suggesting chronic medical renal disease.    Nonobstructing left renal stone.    No hydronephrosis of either kidney.    < end of copied text >        MEDICATIONS  (STANDING):  amLODIPine   Tablet 10 milliGRAM(s) Oral daily  aspirin  chewable 81 milliGRAM(s) Oral daily  cloNIDine 0.1 milliGRAM(s) Oral every 8 hours  cloNIDine Patch 0.3 mG/24Hr(s) 1 patch Transdermal every 7 days  hydrALAZINE 25 milliGRAM(s) Oral every 8 hours  influenza  Vaccine (HIGH DOSE) 0.7 milliLiter(s) IntraMuscular once  lactulose Syrup 20 Gram(s) Oral once  levETIRAcetam  IVPB 500 milliGRAM(s) IV Intermittent every 12 hours  piperacillin/tazobactam IVPB. 3.375 Gram(s) IV Intermittent once  piperacillin/tazobactam IVPB.- 3.375 Gram(s) IV Intermittent once  polyethylene glycol 3350 17 Gram(s) Oral every 12 hours  potassium chloride    Tablet ER 20 milliEquivalent(s) Oral every 2 hours  senna 2 Tablet(s) Oral at bedtime  simvastatin 20 milliGRAM(s) Oral at bedtime    MEDICATIONS  (PRN):  acetaminophen  Suppository .. 650 milliGRAM(s) Rectal every 8 hours PRN Temp greater or equal to 38C (100.4F)  bisacodyl Suppository 10 milliGRAM(s) Rectal once PRN Constipation  melatonin 3 milliGRAM(s) Oral at bedtime PRN Insomnia  metoprolol tartrate Injectable 5 milliGRAM(s) IV Push every 6 hours PRN sbp>160 dbp >110

## 2022-12-22 NOTE — OCCUPATIONAL THERAPY INITIAL EVALUATION ADULT - ADDITIONAL COMMENTS
As per pts sister Amaya: Pt lives alone (HHA 24/7) in house with 15 steps with bilateral handrails to enter. Once inside, the pt has 12 steps with a R handrail to reach the main floor where the bedroom and bathroom is. The pt has a hospital bed at home which she resides in. The pt bathroom is a tub/shower combination, regular toilet seat, grab bars and a tub bench. The pt ambulates with CGA with a rolling walker and has a wheelchair.

## 2022-12-22 NOTE — OCCUPATIONAL THERAPY INITIAL EVALUATION ADULT - PERTINENT HX OF CURRENT PROBLEM, REHAB EVAL
72 y.o. female w/ PMHx dementia (nonverbal) , CVA w/ R. sided weakness, HTN, hx seizures, and Gout who presents with worsening swelling. patient cannot give any history. collateral obtained from daughter/HCP:  Gali (PH: [phone]). Today patient's LE swelling was getting worse, her home NP told her to increase her "water pill" but on review, lasix was not part of the medical records. One year ago, lasix was decreased to half a pill, daughter unsure of when lasix was stopped. Swelling has been increasing for 2-3 days. Last creatinine was 1.82 in february 2022. Mayberry removed over the summer (inserted in 2020). has not had any urinary changes per daughter. Denies fevers, chills, nausea, vomiting, constipation, diarrhea. Baseline is that patient is interactive, often forgetful, but will eat soft foods, but non-verbal.

## 2022-12-22 NOTE — CONSULT NOTE ADULT - ASSESSMENT
72 y.o. female w/ Non-occlusive L popliteal vein DVT. Per discussion with radiologist Dr. Yeh doesn't have the characteristics of a chronic DVT  PMHx dementia (nonverbal) , CVA w/ R. sided weakness, HTN, hx seizures, and Gout    Recommend stool guiac and trial of heparin drip  If hemoglobin continues to drop consider repeat CT  If patient has bleed, consider IVC filter placement by IR.  Discussed with Dr. Hodge 72 y.o. female w/ UTI due to Klebsiella pneumonia, Leukocytosis to 22, with AMADOU with Non-occlusive L popliteal vein DVT. Per discussion with radiologist Dr. Yeh doesn't have the characteristics of a chronic DVT.  PMHx dementia (nonverbal) , CVA w/ R. sided weakness, HTN, hx seizures, and Gout      Recommend stool guiac and trial of heparin drip  Blood transfusion per medical team  If hemoglobin continues to drop consider repeat CT  If patient has bleed, consider IVC filter placement by IR.  Discussed with Dr. Hodge

## 2022-12-22 NOTE — OCCUPATIONAL THERAPY INITIAL EVALUATION ADULT - RANGE OF MOTION EXAMINATION, UPPER EXTREMITY
Unable to assess BUE AROM due to arousal level/bilateral UE Passive ROM was WFL  (within functional limits)

## 2022-12-22 NOTE — PROGRESS NOTE ADULT - SUBJECTIVE AND OBJECTIVE BOX
NEPHROLOGY PROGRESS NOTE    CHIEF COMPLAINT:  AMADOU/CKD    HPI:  Spiked fever 102.5 today along with spike in WBC and drop in Hg.  Renal function w/o change but good urine output.   Family reports less swelling in legs today.      ROS:  denies SOB    EXAM:  T(F): 99.2 (22 @ 04:58)  HR: 60 (22 @ 10:30)  BP: 128/61 (22 @ 10:30)  RR: 17 (22 @ 04:58)  SpO2: 98% (22 @ 10:30)    Awake, alert  Normal respiratory effort, lungs clear bilaterally  Heart RRR with no murmur, soft peripheral edema         LABS                             6.3    20.16 )-----------( 217      ( 22 Dec 2022 11:05 )             19.5          12    146<H>  |  119<H>  |  55<H>  ----------------------------<  123<H>  3.4<L>   |  13<L>  |  6.15<H>    Ca    7.3<L>      22 Dec 2022 07:51  Phos  4.8     -  Mg     1.7         TPro  5.4<L>  /  Alb  2.0<L>  /  TBili  0.2  /  DBili  x   /  AST  20  /  ALT  14  /  AlkPhos  62  12-    Quant Ig's and K/L normal    C3/C4 normal        Renal sonogram shows atrophic, echogenic kidneys with no hydronephrosis    2D ECHO shows normal EF and valves    Urine culture - Klebsiella          Urinalysis Basic - ( 20 Dec 2022 21:00 )  Color: Yellow / Appearance: very cloudy / S.015 / pH: x  Gluc: x / Ketone: Negative  / Bili: Negative / Urobili: Negative mg/dL   Blood: x / Protein: 500 mg/dL / Nitrite: Negative   Leuk Esterase: Moderate / RBC: 0-2 /HPF / WBC >50   Sq Epi: x / Non Sq Epi: Few / Bacteria: Many        Assessment   AMADOU on CKD 3 ( Cr 1.8 ) HTN, DM and proteinuria   Nephrotic syndrome?  R/O occult GN unrelated to DM, HTN ( FSGS, MGN; MPGN, vasculitic, CTD related)  Klebsiella UTI - bacteremia?  Metabolic acidosis    Plan  Hold diuresis  Empiric antibiotics  Check lactate  Quantitate proteinuria   Follow serologies

## 2022-12-22 NOTE — OCCUPATIONAL THERAPY INITIAL EVALUATION ADULT - RANGE OF MOTION EXAMINATION, LOWER EXTREMITY
Unable to assess BLE AROM due to arousal level/bilateral LE Passive ROM was WFL  (within functional limits)

## 2022-12-22 NOTE — CONSULT NOTE ADULT - ASSESSMENT
73yo lady with a PMHx dementia (nonverbal), CVA w/ R. sided residual weakness, HTN, seizures, and gout who presented with worsening LE swelling; NP told her to increase her "water pill" but on review, lasix was not part of the medical records. One year ago, lasix was decreased to half a pill, daughter unsure of when lasix was stopped. Swelling has been increasing for 2-3 days. Last creatinine was 1.82 in february 2022. Mayberry removed over the summer (inserted in 2020).   Per chart, baseline is that patient is interactive, often forgetful, but will eat soft foods, but non-verbal.   In the ED, patient was found to be BUN/Cr 6.05, BNP to 5042, admitted for further workup.   Echo: LVEF 60%, DD, trace MR/TR  ECG: sinus 83bpm  U/S: L DVT    Asked to evaluate if source of LE edema 2/2 HF; doubt HF as echo unremarkable with a nl LVEF and no WMA.  More likely edema 2/2 progressive renal failure with superimposed DVT.  Would cont current meds and AC for DVT.  Holding diuretics as per renal.  Anemia workup as per primary team.  Will sign off for now; please call back with any further questions. 73yo lady with a PMHx dementia (nonverbal), CVA w/ R. sided residual weakness, HTN, seizures, and gout who presented with worsening LE swelling; NP told her to increase her "water pill" but on review, lasix was not part of the medical records. One year ago, lasix was decreased to half a pill, daughter unsure of when lasix was stopped. Swelling has been increasing for 2-3 days. Last creatinine was 1.82 in february 2022. Mayberry removed over the summer (inserted in 2020).   Per chart, baseline is that patient is interactive, often forgetful, but will eat soft foods, but non-verbal.   In the ED, patient was found to be BUN/Cr 6.05, BNP to 5042, admitted for further workup.   Echo: LVEF 60%, DD, trace MR/TR  ECG: sinus 83bpm  U/S: L DVT    Asked to evaluate if source of LE edema 2/2 HF; doubt HF as echo unremarkable with a nl LVEF and no WMA; EKG with no ischemic changes.  More likely edema 2/2 progressive renal failure with superimposed DVT.  Would cont current meds and AC for DVT -> if continues to drop HCT on AC, may need eval for IVC filter.  Holding diuretics as per renal.  Anemia workup as per primary team -> down to 19 and requiring PRBCS.  Will sign off for now; please call back with any further questions.

## 2022-12-22 NOTE — CONSULT NOTE ADULT - SUBJECTIVE AND OBJECTIVE BOX
Patient seen and examined with Dr. Hodge.    Chief Complaint:  Patient is a 72y old  Female who presents with a chief complaint of valdo (22 Dec 2022 12:25)      HPI:   72 y.o. female w/ PMHx dementia (nonverbal) , CVA w/ R. sided weakness, HTN, hx seizures, and Gout who presents with worsening swelling. patient cannot give any history. collateral obtained from daughter/HCP:  Gali (PH: [phone]). Today patient's LE swelling was getting worse, her home NP told her to increase her "water pill" but on review, lasix was not part of the medical records. One year ago, lasix was decreased to half a pill, daughter unsure of when lasix was stopped. Swelling has been increasing for 2-3 days. Last creatinine was 1.82 in 2022. Mayberry removed over the summer (inserted in ). has not had any urinary changes per daughter. Denies fevers, chills, nausea, vomiting, constipation, diarrhea. Baseline is that patietn is interactive, often forgetful, but will eat soft foods, but non-verbal.     In the ED, patient was found to be BUN/Cr 6.05, BNP to 5042, admitted for further workup. Unable to obtain LE dopplers given increased agitation.  (20 Dec 2022 22:14)      PMH/PSH:PAST MEDICAL & SURGICAL HISTORY:  Hypertension      Diabetes      Asthma      OA (osteoarthritis)  bautista knees, low back  and  bautista shoulders      Gout      Seizure disorder      Urinary incontinence      Vision changes  lt eye      CVA (cerebral infarction)  right side weakness      Kidney stone          Allergies:  No Known Allergies      Medications:  acetaminophen  Suppository .. 650 milliGRAM(s) Rectal every 8 hours PRN  amLODIPine   Tablet 10 milliGRAM(s) Oral daily  aspirin  chewable 81 milliGRAM(s) Oral daily  bisacodyl Suppository 10 milliGRAM(s) Rectal once PRN  cloNIDine 0.1 milliGRAM(s) Oral every 8 hours  cloNIDine Patch 0.3 mG/24Hr(s) 1 patch Transdermal every 7 days  hydrALAZINE 25 milliGRAM(s) Oral every 8 hours  influenza  Vaccine (HIGH DOSE) 0.7 milliLiter(s) IntraMuscular once  levETIRAcetam  IVPB 500 milliGRAM(s) IV Intermittent every 12 hours  melatonin 3 milliGRAM(s) Oral at bedtime PRN  metoprolol tartrate Injectable 5 milliGRAM(s) IV Push every 6 hours PRN  piperacillin/tazobactam IVPB.- 3.375 Gram(s) IV Intermittent once  polyethylene glycol 3350 17 Gram(s) Oral every 12 hours  senna 2 Tablet(s) Oral at bedtime  simvastatin 20 milliGRAM(s) Oral at bedtime      REVIEW OF SYSTEMS:  All other review of systems is negative unless indicated above.    Relevant Family History:   FAMILY HISTORY:  No pertinent family history in first degree relatives        Relevant Social History:  Denies ETOH or tobacco history    Physical Exam:    Vital Signs:  Vital Signs Last 24 Hrs  T(C): 37 (22 Dec 2022 11:02), Max: 39.2 (21 Dec 2022 21:03)  T(F): 98.6 (22 Dec 2022 11:02), Max: 102.5 (21 Dec 2022 21:03)  HR: 60 (22 Dec 2022 10:30) (60 - 126)  BP: 128/61 (22 Dec 2022 10:30) (128/61 - 192/79)  RR: 17 (22 Dec 2022 04:58) (17 - 20)  SpO2: 98% (22 Dec 2022 10:30) (95% - 99%)    Parameters below as of 22 Dec 2022 10:30  Patient On (Oxygen Delivery Method): room air        Constitutional: NAD  HEENT: NC/AT, PERRL, EOMI, anicteric sclera, no nasal discharge  Neck: supple; no JVD or thyromegaly  Respiratory: Good inspiratory effort  Cardiac: +S1/S2  Gastrointestinal: soft, NT/ND; no rebound or guarding; +BS   Extremities: No clubbing or cyanosis; edema of bilateral feet, calves without edema bilateral   Musculoskeletal:  no joint swelling, tenderness or erythema  Vascular: 2+ radial, femoral, DP/PT pulses B/L  Skin: warm, dry and intact; no rashes, wounds, or scars  Neurologic: Alert    Laboratory:                          6.3    20.16 )-----------( 217      ( 22 Dec 2022 11:05 )             19.5     12-22    146<H>  |  119<H>  |  55<H>  ----------------------------<  123<H>  3.4<L>   |  13<L>  |  6.15<H>    Ca    7.3<L>      22 Dec 2022 07:51  Phos  4.8       Mg     1.7         TPro  5.4<L>  /  Alb  2.0<L>  /  TBili  0.2  /  DBili  x   /  AST  20  /  ALT  14  /  AlkPhos  62      LIVER FUNCTIONS - ( 22 Dec 2022 07:51 )  Alb: 2.0 g/dL / Pro: 5.4 gm/dL / ALK PHOS: 62 U/L / ALT: 14 U/L / AST: 20 U/L / GGT: x           PT/INR - ( 21 Dec 2022 06:40 )   PT: 12.0 sec;   INR: 1.00 ratio         PTT - ( 22 Dec 2022 11:05 )  PTT:44.8 sec  Urinalysis Basic - ( 20 Dec 2022 21:00 )    Color: Yellow / Appearance: very cloudy / S.015 / pH: x  Gluc: x / Ketone: Negative  / Bili: Negative / Urobili: Negative mg/dL   Blood: x / Protein: 500 mg/dL / Nitrite: Negative   Leuk Esterase: Moderate / RBC: 0-2 /HPF / WBC >50   Sq Epi: x / Non Sq Epi: Few / Bacteria: Many        Imaging:    < from: US Duplex Venous Lower Ext Complete, Bilateral (22 @ 01:18) >    ACC: [id number] EXAM:  US DPLX LWR EXT VEINS COMPL BI                          *** ADDENDUM***    The findings were discussed with Dr. Bernstein on 2022 2:42 AM.  Hospital policies for call back including read back policies were   followed. The verbal communication call back supplements this written   report.    --- End of Report ---    *** END OF ADDENDUM***      PROCEDURE DATE:  2022          INTERPRETATION:  CLINICAL INFORMATION: Leg swelling    COMPARISON: None available.    TECHNIQUE: Duplex sonography of the BILATERAL LOWER extremity veins with   color and spectral Doppler, with and without compression.    FINDINGS:    RIGHT:  Normal compressibility of the RIGHT common femoral, femoral and popliteal   veins.  Doppler examinationshows normal spontaneous and phasic flow.  No RIGHT calf vein thrombosis is detected.    LEFT:  Normal compressibility of the LEFT common femoral and femoral veins.   Nonocclusive DVT is noted in the left popliteal vein.  Doppler examination shows normal spontaneous and phasic flow.  No LEFT calf vein thrombosis is detected.    IMPRESSION:  Nonocclusive DVT is noted in the left popliteal vein.

## 2022-12-22 NOTE — CONSULT NOTE ADULT - SUBJECTIVE AND OBJECTIVE BOX
CARDIOLOGY CONSULT NOTE    Patient is a 72y Female with a known history of :  Acute kidney injury superimposed on chronic kidney disease [N17.9]    Swelling of lower extremity [M79.89]    Acute on chronic diastolic heart failure [I50.33]    Dementia [F03.90]    Hypertension [I10]    Seizure [R56.9]    Need for prophylactic measure [Z29.9]    DVT, lower extremity [I82.409]    Debility [R53.81]    Encounter for palliative care [Z51.5]    HF (heart failure) [I50.9]      HPI:  73yo lady with a PMHx dementia (nonverbal), CVA w/ R. sided residual weakness, HTN, seizures, and gout who presented with worsening LE swelling; NP told her to increase her "water pill" but on review, lasix was not part of the medical records. One year ago, lasix was decreased to half a pill, daughter unsure of when lasix was stopped. Swelling has been increasing for 2-3 days. Last creatinine was 1.82 in february 2022. Mayberry removed over the summer (inserted in 2020).   Per chart, baseline is that patient is interactive, often forgetful, but will eat soft foods, but non-verbal.   In the ED, patient was found to be BUN/Cr 6.05, BNP to 5042, admitted for further workup.   Echo: LVEF 60%, DD, trace MR/TR  ECG: sinus 83bpm  U/S: L DVT    REVIEW OF SYSTEMS:  unable to obtain from pt    MEDICATIONS  (STANDING):  amLODIPine   Tablet 10 milliGRAM(s) Oral daily  aspirin  chewable 81 milliGRAM(s) Oral daily  cloNIDine 0.1 milliGRAM(s) Oral every 8 hours  cloNIDine Patch 0.3 mG/24Hr(s) 1 patch Transdermal every 7 days  hydrALAZINE 25 milliGRAM(s) Oral every 8 hours  influenza  Vaccine (HIGH DOSE) 0.7 milliLiter(s) IntraMuscular once  levETIRAcetam  IVPB 500 milliGRAM(s) IV Intermittent every 12 hours  piperacillin/tazobactam IVPB.- 3.375 Gram(s) IV Intermittent once  polyethylene glycol 3350 17 Gram(s) Oral every 12 hours  senna 2 Tablet(s) Oral at bedtime  simvastatin 20 milliGRAM(s) Oral at bedtime    MEDICATIONS  (PRN):  acetaminophen  Suppository .. 650 milliGRAM(s) Rectal every 8 hours PRN Temp greater or equal to 38C (100.4F)  bisacodyl Suppository 10 milliGRAM(s) Rectal once PRN Constipation  melatonin 3 milliGRAM(s) Oral at bedtime PRN Insomnia  metoprolol tartrate Injectable 5 milliGRAM(s) IV Push every 6 hours PRN sbp>160 dbp >110      ALLERGIES: No Known Allergies      FAMILY HISTORY:  No pertinent family history in first degree relatives        PHYSICAL EXAMINATION:  -----------------------------  T(C): 37 (12-22-22 @ 11:02), Max: 39.2 (12-21-22 @ 21:03)  HR: 60 (12-22-22 @ 10:30) (60 - 126)  BP: 128/61 (12-22-22 @ 10:30) (128/61 - 192/79)  RR: 17 (12-22-22 @ 04:58) (17 - 20)  SpO2: 98% (12-22-22 @ 10:30) (95% - 99%)      Constitutional: well developed, normal appearance, well groomed, well nourished, no deformities and no acute distress.   Eyes: the conjunctiva exhibited no abnormalities and the eyelids demonstrated no xanthelasmas.   HEENT: normal oral mucosa, no oral pallor and no oral cyanosis.   Neck: normal jugular venous A waves present, normal jugular venous V waves present and no jugular venous tirado A waves.   Pulmonary: no respiratory distress, normal respiratory rhythm and effort, no accessory muscle use and lungs were clear to auscultation bilaterally.   Cardiovascular: heart rate and rhythm were normal, normal S1 and S2 and no murmur, gallop, rub, heave or thrill are present.   Abdomen: soft, non-tender, no hepato-splenomegaly and no abdominal mass palpated.   Musculoskeletal: the gait could not be assessed..   Extremities: b/l LE edema L>R  Skin: normal skin color and pigmentation, no rash, no venous stasis, no skin lesions, no skin ulcer and no xanthoma was observed.   Psychiatric: oriented to person, place, and time, the affect was normal, the mood was normal and not feeling anxious.       LABS:   --------  12-22    146<H>  |  119<H>  |  55<H>  ----------------------------<  123<H>  3.4<L>   |  13<L>  |  6.15<H>    Ca    7.3<L>      22 Dec 2022 07:51  Phos  4.8     12-22  Mg     1.7     12-22    TPro  5.4<L>  /  Alb  2.0<L>  /  TBili  0.2  /  DBili  x   /  AST  20  /  ALT  14  /  AlkPhos  62  12-22                         6.3    20.16 )-----------( 217      ( 22 Dec 2022 11:05 )             19.5     PT/INR - ( 21 Dec 2022 06:40 )   PT: 12.0 sec;   INR: 1.00 ratio         PTT - ( 22 Dec 2022 11:05 )  PTT:44.8 sec  12-22 @ 07:51 BNP: 6445 pg/mL        Culture Results:   >100,000 CFU/ml Klebsiella pneumoniae (12-20 @ 21:00)      RADIOLOGY:  -----------------    ECG: sinus 83bpm    < from: TTE Echo Complete w/o Contrast w/ Doppler (12.21.22 @ 16:06) >  Summary:   1. Left ventricular ejection fraction, by visual estimation, is 60 to   65%.   2. Technically limitedstudy.   3. Normal global left ventricular systolic function.   4. Normal left ventricular internal cavity size.   5. Spectral Doppler shows impaired relaxation pattern of left   ventricular myocardial filling (Grade I diastolic dysfunction).   6. Normal right ventricular size and function.   7. Normal left atrial size.   8. Normal right atrial size.   9. There is no evidence of pericardial effusion.  10. Structurally normal mitral valve, with normal leaflet excursion.  11. Trace mitral valve regurgitation.  12. Mild tricuspid regurgitation.  13. Sclerotic aortic valve with normal opening.      [id number] Joseph Ojeda MD FACC, FASE, FACP  Electronically signed on 12/22/2022 at 1:52:21 PM      < end of copied text >      ultra< from: US Renal (12.21.22 @ 01:23) >  IMPRESSION:  Atrophic echogenic kidneys suggesting chronic medical renal disease.    Nonobstructing left renal stone.    No hydronephrosis of either kidney.        < end of copied text >  < from: US Duplex Venous Lower Ext Complete, Bilateral (12.21.22 @ 01:18) >  IMPRESSION:  Nonocclusive DVT is noted in the left popliteal vein.    < end of copied text >

## 2022-12-23 LAB
-  AMIKACIN: SIGNIFICANT CHANGE UP
-  AMOXICILLIN/CLAVULANIC ACID: SIGNIFICANT CHANGE UP
-  AMPICILLIN/SULBACTAM: SIGNIFICANT CHANGE UP
-  AMPICILLIN: SIGNIFICANT CHANGE UP
-  AZTREONAM: SIGNIFICANT CHANGE UP
-  CEFAZOLIN: SIGNIFICANT CHANGE UP
-  CEFEPIME: SIGNIFICANT CHANGE UP
-  CEFOXITIN: SIGNIFICANT CHANGE UP
-  CEFTRIAXONE: SIGNIFICANT CHANGE UP
-  CEFUROXIME: SIGNIFICANT CHANGE UP
-  CIPROFLOXACIN: SIGNIFICANT CHANGE UP
-  ERTAPENEM: SIGNIFICANT CHANGE UP
-  GENTAMICIN: SIGNIFICANT CHANGE UP
-  IMIPENEM: SIGNIFICANT CHANGE UP
-  LEVOFLOXACIN: SIGNIFICANT CHANGE UP
-  MEROPENEM: SIGNIFICANT CHANGE UP
-  NITROFURANTOIN: SIGNIFICANT CHANGE UP
-  PIPERACILLIN/TAZOBACTAM: SIGNIFICANT CHANGE UP
-  TOBRAMYCIN: SIGNIFICANT CHANGE UP
-  TRIMETHOPRIM/SULFAMETHOXAZOLE: SIGNIFICANT CHANGE UP
ALBUMIN SERPL ELPH-MCNC: 1.7 G/DL — LOW (ref 3.3–5)
ALP SERPL-CCNC: 52 U/L — SIGNIFICANT CHANGE UP (ref 40–120)
ALT FLD-CCNC: 10 U/L — LOW (ref 12–78)
ANION GAP SERPL CALC-SCNC: 9 MMOL/L — SIGNIFICANT CHANGE UP (ref 5–17)
APTT BLD: 62.1 SEC — HIGH (ref 27.5–35.5)
AST SERPL-CCNC: 16 U/L — SIGNIFICANT CHANGE UP (ref 15–37)
BILIRUB SERPL-MCNC: 0.3 MG/DL — SIGNIFICANT CHANGE UP (ref 0.2–1.2)
BUN SERPL-MCNC: 54 MG/DL — HIGH (ref 7–23)
CALCIUM SERPL-MCNC: 7.2 MG/DL — LOW (ref 8.5–10.1)
CHLORIDE SERPL-SCNC: 122 MMOL/L — HIGH (ref 96–108)
CO2 SERPL-SCNC: 17 MMOL/L — LOW (ref 22–31)
CREAT ?TM UR-MCNC: 59 MG/DL — SIGNIFICANT CHANGE UP
CREAT SERPL-MCNC: 6.11 MG/DL — HIGH (ref 0.5–1.3)
CULTURE RESULTS: SIGNIFICANT CHANGE UP
EGFR: 7 ML/MIN/1.73M2 — LOW
GLUCOSE SERPL-MCNC: 100 MG/DL — HIGH (ref 70–99)
HCT VFR BLD CALC: 22.8 % — LOW (ref 34.5–45)
HCT VFR BLD CALC: 29.4 % — LOW (ref 34.5–45)
HGB BLD-MCNC: 7.4 G/DL — LOW (ref 11.5–15.5)
HGB BLD-MCNC: 9.3 G/DL — LOW (ref 11.5–15.5)
LACTATE SERPL-SCNC: 0.5 MMOL/L — LOW (ref 0.7–2)
MCHC RBC-ENTMCNC: 28.9 PG — SIGNIFICANT CHANGE UP (ref 27–34)
MCHC RBC-ENTMCNC: 29.2 PG — SIGNIFICANT CHANGE UP (ref 27–34)
MCHC RBC-ENTMCNC: 31.6 G/DL — LOW (ref 32–36)
MCHC RBC-ENTMCNC: 32.5 G/DL — SIGNIFICANT CHANGE UP (ref 32–36)
MCV RBC AUTO: 89.1 FL — SIGNIFICANT CHANGE UP (ref 80–100)
MCV RBC AUTO: 92.5 FL — SIGNIFICANT CHANGE UP (ref 80–100)
METHOD TYPE: SIGNIFICANT CHANGE UP
NRBC # BLD: 0 /100 WBCS — SIGNIFICANT CHANGE UP (ref 0–0)
NRBC # BLD: 0 /100 WBCS — SIGNIFICANT CHANGE UP (ref 0–0)
ORGANISM # SPEC MICROSCOPIC CNT: SIGNIFICANT CHANGE UP
ORGANISM # SPEC MICROSCOPIC CNT: SIGNIFICANT CHANGE UP
PLATELET # BLD AUTO: 222 K/UL — SIGNIFICANT CHANGE UP (ref 150–400)
PLATELET # BLD AUTO: 250 K/UL — SIGNIFICANT CHANGE UP (ref 150–400)
POTASSIUM SERPL-MCNC: 4.2 MMOL/L — SIGNIFICANT CHANGE UP (ref 3.5–5.3)
POTASSIUM SERPL-SCNC: 4.2 MMOL/L — SIGNIFICANT CHANGE UP (ref 3.5–5.3)
PROT ?TM UR-MCNC: 403 MG/DL — HIGH (ref 0–12)
PROT SERPL-MCNC: 5.1 GM/DL — LOW (ref 6–8.3)
PROT/CREAT UR-RTO: 6.8 RATIO — HIGH (ref 0–0.2)
RBC # BLD: 2.56 M/UL — LOW (ref 3.8–5.2)
RBC # BLD: 3.18 M/UL — LOW (ref 3.8–5.2)
RBC # FLD: 15 % — HIGH (ref 10.3–14.5)
RBC # FLD: 15.2 % — HIGH (ref 10.3–14.5)
SODIUM SERPL-SCNC: 148 MMOL/L — HIGH (ref 135–145)
SPECIMEN SOURCE: SIGNIFICANT CHANGE UP
WBC # BLD: 10.6 K/UL — HIGH (ref 3.8–10.5)
WBC # BLD: 11.1 K/UL — HIGH (ref 3.8–10.5)
WBC # FLD AUTO: 10.6 K/UL — HIGH (ref 3.8–10.5)
WBC # FLD AUTO: 11.1 K/UL — HIGH (ref 3.8–10.5)

## 2022-12-23 PROCEDURE — 76937 US GUIDE VASCULAR ACCESS: CPT | Mod: 26,59

## 2022-12-23 PROCEDURE — 36569 INSJ PICC 5 YR+ W/O IMAGING: CPT

## 2022-12-23 PROCEDURE — 99233 SBSQ HOSP IP/OBS HIGH 50: CPT

## 2022-12-23 RX ORDER — HEPARIN SODIUM 5000 [USP'U]/ML
INJECTION INTRAVENOUS; SUBCUTANEOUS
Qty: 25000 | Refills: 0 | Status: DISCONTINUED | OUTPATIENT
Start: 2022-12-23 | End: 2022-12-25

## 2022-12-23 RX ORDER — HEPARIN SODIUM 5000 [USP'U]/ML
2000 INJECTION INTRAVENOUS; SUBCUTANEOUS EVERY 6 HOURS
Refills: 0 | Status: DISCONTINUED | OUTPATIENT
Start: 2022-12-23 | End: 2022-12-25

## 2022-12-23 RX ORDER — HEPARIN SODIUM 5000 [USP'U]/ML
4500 INJECTION INTRAVENOUS; SUBCUTANEOUS EVERY 6 HOURS
Refills: 0 | Status: DISCONTINUED | OUTPATIENT
Start: 2022-12-23 | End: 2022-12-25

## 2022-12-23 RX ORDER — PREGABALIN 225 MG/1
1000 CAPSULE ORAL DAILY
Refills: 0 | Status: DISCONTINUED | OUTPATIENT
Start: 2022-12-23 | End: 2022-12-28

## 2022-12-23 RX ORDER — FOLIC ACID 0.8 MG
1 TABLET ORAL DAILY
Refills: 0 | Status: DISCONTINUED | OUTPATIENT
Start: 2022-12-24 | End: 2022-12-28

## 2022-12-23 RX ORDER — FOLIC ACID 0.8 MG
1 TABLET ORAL ONCE
Refills: 0 | Status: COMPLETED | OUTPATIENT
Start: 2022-12-23 | End: 2022-12-23

## 2022-12-23 RX ORDER — LACTULOSE 10 G/15ML
20 SOLUTION ORAL ONCE
Refills: 0 | Status: COMPLETED | OUTPATIENT
Start: 2022-12-23 | End: 2022-12-23

## 2022-12-23 RX ADMIN — Medication 0.5 MILLIGRAM(S): at 22:06

## 2022-12-23 RX ADMIN — PIPERACILLIN AND TAZOBACTAM 25 GRAM(S): 4; .5 INJECTION, POWDER, LYOPHILIZED, FOR SOLUTION INTRAVENOUS at 05:54

## 2022-12-23 RX ADMIN — LEVETIRACETAM 420 MILLIGRAM(S): 250 TABLET, FILM COATED ORAL at 17:35

## 2022-12-23 RX ADMIN — Medication 1 PATCH: at 21:11

## 2022-12-23 RX ADMIN — LEVETIRACETAM 420 MILLIGRAM(S): 250 TABLET, FILM COATED ORAL at 05:36

## 2022-12-23 RX ADMIN — HEPARIN SODIUM 1100 UNIT(S)/HR: 5000 INJECTION INTRAVENOUS; SUBCUTANEOUS at 19:16

## 2022-12-23 RX ADMIN — Medication 81 MILLIGRAM(S): at 13:07

## 2022-12-23 RX ADMIN — Medication 0.1 MILLIGRAM(S): at 13:06

## 2022-12-23 RX ADMIN — Medication 25 MILLIGRAM(S): at 13:07

## 2022-12-23 RX ADMIN — Medication 0.1 MILLIGRAM(S): at 05:38

## 2022-12-23 RX ADMIN — Medication 5 MILLIGRAM(S): at 18:39

## 2022-12-23 RX ADMIN — Medication 0.1 MILLIGRAM(S): at 21:16

## 2022-12-23 RX ADMIN — HEPARIN SODIUM 1100 UNIT(S)/HR: 5000 INJECTION INTRAVENOUS; SUBCUTANEOUS at 16:24

## 2022-12-23 RX ADMIN — POLYETHYLENE GLYCOL 3350 17 GRAM(S): 17 POWDER, FOR SOLUTION ORAL at 17:35

## 2022-12-23 RX ADMIN — SIMVASTATIN 20 MILLIGRAM(S): 20 TABLET, FILM COATED ORAL at 21:16

## 2022-12-23 RX ADMIN — SENNA PLUS 2 TABLET(S): 8.6 TABLET ORAL at 21:16

## 2022-12-23 RX ADMIN — POLYETHYLENE GLYCOL 3350 17 GRAM(S): 17 POWDER, FOR SOLUTION ORAL at 05:37

## 2022-12-23 RX ADMIN — Medication 1 MILLIGRAM(S): at 16:28

## 2022-12-23 RX ADMIN — PREGABALIN 1000 MICROGRAM(S): 225 CAPSULE ORAL at 13:07

## 2022-12-23 RX ADMIN — PIPERACILLIN AND TAZOBACTAM 25 GRAM(S): 4; .5 INJECTION, POWDER, LYOPHILIZED, FOR SOLUTION INTRAVENOUS at 17:35

## 2022-12-23 RX ADMIN — HEPARIN SODIUM 1100 UNIT(S)/HR: 5000 INJECTION INTRAVENOUS; SUBCUTANEOUS at 23:56

## 2022-12-23 RX ADMIN — Medication 1 PATCH: at 07:16

## 2022-12-23 RX ADMIN — LACTULOSE 20 GRAM(S): 10 SOLUTION ORAL at 13:07

## 2022-12-23 RX ADMIN — Medication 25 MILLIGRAM(S): at 05:39

## 2022-12-23 RX ADMIN — Medication 25 MILLIGRAM(S): at 21:16

## 2022-12-23 RX ADMIN — AMLODIPINE BESYLATE 10 MILLIGRAM(S): 2.5 TABLET ORAL at 05:38

## 2022-12-23 NOTE — PHYSICAL THERAPY INITIAL EVALUATION ADULT - FOLLOWS COMMANDS/ANSWERS QUESTIONS, REHAB EVAL
needs constant verbal, visual and tactile cues/unable to follow commands/unable to follow multi-step instructions

## 2022-12-23 NOTE — PROGRESS NOTE ADULT - SUBJECTIVE AND OBJECTIVE BOX
[facility] NEPHROLOGY SERVICES, [facility]  NEPHROLOGY AND HYPERTENSION  [address]  [address]  [place] [address]  [phone]    MD YECENIA CRAVEN MD ANDREY GONCHARUK, MD MADHU KORRAPATI, MD YELENA ROSENBERG, MD BINNY KOSHY, MD CHRISTOPHER CAPUTO, MD EDWARD BOVER, MD          Patient events noted  No distress    MEDICATIONS  (STANDING):  amLODIPine   Tablet 10 milliGRAM(s) Oral daily  aspirin  chewable 81 milliGRAM(s) Oral daily  cloNIDine 0.1 milliGRAM(s) Oral every 8 hours  cloNIDine Patch 0.3 mG/24Hr(s) 1 patch Transdermal every 7 days  cyanocobalamin 1000 MICROGram(s) Oral daily  folic acid 1 milliGRAM(s) Oral daily  heparin  Infusion.  Unit(s)/Hr (10 mL/Hr) IV Continuous <Continuous>  hydrALAZINE 25 milliGRAM(s) Oral every 8 hours  influenza  Vaccine (HIGH DOSE) 0.7 milliLiter(s) IntraMuscular once  levETIRAcetam  IVPB 500 milliGRAM(s) IV Intermittent every 12 hours  piperacillin/tazobactam IVPB.. 3.375 Gram(s) IV Intermittent every 12 hours  polyethylene glycol 3350 17 Gram(s) Oral every 12 hours  senna 2 Tablet(s) Oral at bedtime  simvastatin 20 milliGRAM(s) Oral at bedtime    MEDICATIONS  (PRN):  acetaminophen  Suppository .. 650 milliGRAM(s) Rectal every 8 hours PRN Temp greater or equal to 38C (100.4F)  bisacodyl Suppository 10 milliGRAM(s) Rectal once PRN Constipation  heparin   Injectable 4500 Unit(s) IV Push every 6 hours PRN For aPTT less than 40  heparin   Injectable 2000 Unit(s) IV Push every 6 hours PRN For aPTT between 40 - 57  melatonin 3 milliGRAM(s) Oral at bedtime PRN Insomnia  metoprolol tartrate Injectable 5 milliGRAM(s) IV Push every 6 hours PRN sbp>160 dbp >110      12-22-22 @ 07:01  -  12-23-22 @ 07:00  --------------------------------------------------------  IN: 0 mL / OUT: 200 mL / NET: -200 mL    12-23-22 @ 07:01  -  12-23-22 @ 22:36  --------------------------------------------------------  IN: 0 mL / OUT: 500 mL / NET: -500 mL      PHYSICAL EXAM:      T(C): 36.7 (12-23-22 @ 16:27), Max: 37.2 (12-22-22 @ 23:45)  HR: 74 (12-23-22 @ 18:55) (54 - 74)  BP: 166/69 (12-23-22 @ 18:55) (136/63 - 186/71)  RR: 18 (12-23-22 @ 16:27) (16 - 18)  SpO2: 100% (12-23-22 @ 16:27) (98% - 100%)  Wt(kg): --  Lungs clear  Heart S1S2  Abd soft NT ND  Extremities:   tr edema                                    7.4    11.10 )-----------( 222      ( 23 Dec 2022 07:40 )             22.8     12-23    148<H>  |  122<H>  |  54<H>  ----------------------------<  100<H>  4.2   |  17<L>  |  6.11<H>    Ca    7.2<L>      23 Dec 2022 07:40  Phos  4.8     12-22  Mg     1.7     12-22    TPro  5.1<L>  /  Alb  1.7<L>  /  TBili  0.3  /  DBili  x   /  AST  16  /  ALT  10<L>  /  AlkPhos  52  12-23    Lactate, Blood (12.23.22 @ 08:40)    Lactate, Blood: 0.5 mmol/L        LIVER FUNCTIONS - ( 23 Dec 2022 07:40 )  Alb: 1.7 g/dL / Pro: 5.1 gm/dL / ALK PHOS: 52 U/L / ALT: 10 U/L / AST: 16 U/L / GGT: x           Creatinine Trend: 6.11<--, 6.15<--, 6.02<--, 6.05<--    Protein/Creatinine Ratio Calculation: 6.8: Test Repeated Ratio (12.23.22 @ 08:40)      Culture - Urine (12.20.22 @ 21:00)    Culture Results:   >100,000 CFU/ml Klebsiella pneumoniae    Organism Identification: Klebsiella pneumoniae    Organism: Klebsiella pneumoniae    Method Type: MELISA        Assessment   AMADOU on CKD 3 ( Cr 1.8 2022) HTN, DM and proteinuria   UTI, Klebsiella, ; risk fo infectious AIN  Nephrotic syndrome, elevated urine protein   R/O occult GN unrelated to DM, HTN (Infectious GN; FSGS, MGN; MPGN, vasculitic, CTD related)  Metabolic acidosis    Plan  Hold diuresis  Empiric antibiotic  Follow serologies        Luis Enrique Oakes MD

## 2022-12-23 NOTE — PHYSICAL THERAPY INITIAL EVALUATION ADULT - PERTINENT HX OF CURRENT PROBLEM, REHAB EVAL
Pt is admitted with dx AMADOU, Pmhx Dementia c R sided weakness, HTN, Seizure, Gout, worsening of swelling both LE.

## 2022-12-23 NOTE — PROCEDURE NOTE - NSTIMEOUT_GEN_A_CORE
PGY2/MD Geremias.   VITALS: reviewed  GEN: No apparent distress, A & O x 4  HEAD/EYES: NC/AT, PERRL, EOMI, anicteric sclerae, no conjunctival pallor  ENT: mucus membranes moist, oropharynx WNL, trachea midline, no JVD, neck is supple  RESP: lungs CTA with equal breath sounds bilaterally, chest wall nontender and atraumatic  CV: heart with reg rhythm S1, S2, no murmur; distal pulses intact and symmetric bilaterally  ABDOMEN: normoactive bowel sounds, soft, nondistended, nontender  MSK: extremities atraumatic and nontender, no edema, no asymmetry.  SKIN: warm, dry, no rash, no bruising, no cyanosis. color appropriate for ethnicity  NEURO: alert, mentating appropriately, no facial asymmetry. gross sensation, motor, coordination are intact  PSYCH: Affect appropriate Patient's first and last name, , procedure, and correct site confirmed prior to the start of procedure.

## 2022-12-23 NOTE — PHYSICAL THERAPY INITIAL EVALUATION ADULT - LIVES WITH, PROFILE
Pt lives alone ([facility] 24/7) in house with 15 steps with bilateral handrails to enter. Once inside, the pt has 12 steps with a R handrail to reach the main floor where the bedroom and bathroom is.

## 2022-12-23 NOTE — PHYSICAL THERAPY INITIAL EVALUATION ADULT - GENERAL OBSERVATIONS, REHAB EVAL
Pt is awake, non communicative, no verbal, confused, on room air, not in distress, sister Nalini and pvt aide present.

## 2022-12-23 NOTE — PROGRESS NOTE ADULT - ASSESSMENT
73 yo female w/ pmhx of dementia- nonverbal at baseline, CVA w/ resdiual right sided weakness, HTN, seizures, gout admitted to [facility] for AMADOU (creatinine baseline 1.82, now 6.05) and lower extremity edema    GENERAL MEDICINE  # lower extremity edema  - Likely from nephrotic syndrome. CHF is ruled out. no more diuretics.     NEPHROLOGY  # AMADOU superimposed on CKD.   no change in creatinine.   Renal serologies per nephrologist recs.     VASCULAR  # lower extremity DVT distal popliteal vein.   Discussed with Dr. Hodge on phone. In absence of any active gross bleeding, IVC filter not indicated.   Trial on iv heparin again later today. Trend CBC.     NEPHROLOGY  # dementia  # seizure  - keppra 500 mg po bid    CARDIOLOGY  # Hypertension.   - controlled cont current meds.     INFECTIOUS DISEASE  # sepsis with klebsiella pneumonia UTI. looks recurrent given past UTIs. cont zosyn. ??blood cultures not collected. ordered blood cultures today as patient does have hx of klebsiella bacteremia in the past.     Urinary retention. voiding trial today.     Anemia. acute on chronic. no active gross bleeding. restart iv heparin and monitor closely. Hb better today after blood transfusion. anemia work up showed folate deficiency and low normal vitamin b12 level. added supplements.     Hypokalemia. Likely from diuretic use. Repleted.    Hypernatremia. worse. start free water. check BMP in am.     Faecal impaction. better. BM x 1 in last 24 hours. give lactulose again today. check FOBT, discussed with nurse.     Fibroid. outpatient gyn follow up is recommended.     CODE: FULL

## 2022-12-23 NOTE — PROGRESS NOTE ADULT - SUBJECTIVE AND OBJECTIVE BOX
CHIEF COMPLAINT:   no report of any hematuria, hematochezia, no hematemesis or vomiting   no fever spikes   Patient non verbal.   no report of any vomiting or diarrhea     PHYSICAL EXAM:  GENERAL: Moderately built, no acute distress   CHEST/LUNG: Clear to ausculation bilaterally, no wheezing, no crackles   HEART: Regular rate and rhythm; No murmurs  ABDOMEN: Soft, Nontender, Nondistended; Bowel sounds present  EXTREMITIES:   No clubbing, cyanosis. + legs edema. not able to participate in ROM exam.   NERVOUS SYSTEM: Limited exam due to no patient participation. no facial droop observed.   Psychiatry: somnolent. non verbal.       OBJECTIVE DATA:     Vital Signs Last 24 Hrs  T(C): 36.5 (23 Dec 2022 10:57), Max: 37.2 (22 Dec 2022 19:40)  T(F): 97.7 (23 Dec 2022 10:57), Max: 98.9 (22 Dec 2022 19:40)  HR: 54 (23 Dec 2022 10:57) (54 - 93)  BP: 164/71 (23 Dec 2022 10:57) (136/63 - 172/72)  BP(mean): --  RR: 16 (23 Dec 2022 10:57) (16 - 18)  SpO2: 99% (23 Dec 2022 10:57) (99% - 99%)    Parameters below as of 23 Dec 2022 10:57  Patient On (Oxygen Delivery Method): room air               Daily     Daily   LABS:                        7.4    11.10 )-----------( 222      ( 23 Dec 2022 07:40 )             22.8             12-23    148<H>  |  122<H>  |  54<H>  ----------------------------<  100<H>  4.2   |  17<L>  |  6.11<H>    Ca    7.2<L>      23 Dec 2022 07:40  Phos  4.8     12-22  Mg     1.7     12-22    TPro  5.1<L>  /  Alb  1.7<L>  /  TBili  0.3  /  DBili  x   /  AST  16  /  ALT  10<L>  /  AlkPhos  52  12-23              PTT - ( 22 Dec 2022 11:05 )  PTT:44.8 sec           Culture - Urine (collected 12-20)  Source: Catheterized Catheterized  Final Report (12-23):    >100,000 CFU/ml Klebsiella pneumoniae  Organism: Klebsiella pneumoniae (12-23)  Organism: Klebsiella pneumoniae (12-23)    Sensitivities:      -  Amikacin: S <=16      -  Amoxicillin/Clavulanic Acid: S <=8/4      -  Ampicillin: R >16 These ampicillin results predict results for amoxicillin      -  Ampicillin/Sulbactam: S <=4/2 Enterobacter, Klebsiella aerogenes, Citrobacter, and Serratia may develop resistance during prolonged therapy (3-4 days)      -  Aztreonam: S <=4      -  Cefazolin: S <=2 For uncomplicated UTI with K. pneumoniae, E. coli, or P. mirablis: MELISA <=16 is sensitive and MELISA >=32 is resistant. This also predicts results for oral agents cefaclor, cefdinir, cefpodoxime, cefprozil, cefuroxime axetil, cephalexin and locarbef for uncomplicated UTI. Note that some isolates may be susceptible to these agents while testing resistant to cefazolin.      -  Cefepime: S <=2      -  Cefoxitin: S <=8      -  Ceftriaxone: S <=1 Enterobacter, Klebsiella aerogenes, Citrobacter, and Serratia may develop resistance during prolonged therapy      -  Cefuroxime: S <=4      -  Ciprofloxacin: S <=0.25      -  Ertapenem: S <=0.5      -  Gentamicin: S <=2      -  Imipenem: S <=1      -  Levofloxacin: S <=0.5      -  Meropenem: S <=1      -  Nitrofurantoin: S <=32 Should not be used to treat pyelonephritis      -  Piperacillin/Tazobactam: S <=8      -  Tobramycin: S <=2      -  Trimethoprim/Sulfamethoxazole: S <=0.5/9.5      Method Type: MELISA      MEDICATIONS  (STANDING):  amLODIPine   Tablet 10 milliGRAM(s) Oral daily  aspirin  chewable 81 milliGRAM(s) Oral daily  cloNIDine 0.1 milliGRAM(s) Oral every 8 hours  cloNIDine Patch 0.3 mG/24Hr(s) 1 patch Transdermal every 7 days  cyanocobalamin 1000 MICROGram(s) Oral daily  folic acid 1 milliGRAM(s) Oral daily  folic acid Injectable 1 milliGRAM(s) IV Push once  hydrALAZINE 25 milliGRAM(s) Oral every 8 hours  influenza  Vaccine (HIGH DOSE) 0.7 milliLiter(s) IntraMuscular once  lactulose Syrup 20 Gram(s) Oral once  levETIRAcetam  IVPB 500 milliGRAM(s) IV Intermittent every 12 hours  piperacillin/tazobactam IVPB.. 3.375 Gram(s) IV Intermittent every 12 hours  polyethylene glycol 3350 17 Gram(s) Oral every 12 hours  senna 2 Tablet(s) Oral at bedtime  simvastatin 20 milliGRAM(s) Oral at bedtime    MEDICATIONS  (PRN):  acetaminophen  Suppository .. 650 milliGRAM(s) Rectal every 8 hours PRN Temp greater or equal to 38C (100.4F)  bisacodyl Suppository 10 milliGRAM(s) Rectal once PRN Constipation  melatonin 3 milliGRAM(s) Oral at bedtime PRN Insomnia  metoprolol tartrate Injectable 5 milliGRAM(s) IV Push every 6 hours PRN sbp>160 dbp >110

## 2022-12-24 LAB
ANION GAP SERPL CALC-SCNC: 11 MMOL/L — SIGNIFICANT CHANGE UP (ref 5–17)
APTT BLD: 83.7 SEC — HIGH (ref 27.5–35.5)
BUN SERPL-MCNC: 50 MG/DL — HIGH (ref 7–23)
CALCIUM SERPL-MCNC: 6.8 MG/DL — LOW (ref 8.5–10.1)
CHLORIDE SERPL-SCNC: 117 MMOL/L — HIGH (ref 96–108)
CO2 SERPL-SCNC: 17 MMOL/L — LOW (ref 22–31)
CREAT SERPL-MCNC: 5.89 MG/DL — HIGH (ref 0.5–1.3)
EGFR: 7 ML/MIN/1.73M2 — LOW
GLUCOSE SERPL-MCNC: 91 MG/DL — SIGNIFICANT CHANGE UP (ref 70–99)
HCT VFR BLD CALC: 25.2 % — LOW (ref 34.5–45)
HGB BLD-MCNC: 8.1 G/DL — LOW (ref 11.5–15.5)
MCHC RBC-ENTMCNC: 29.1 PG — SIGNIFICANT CHANGE UP (ref 27–34)
MCHC RBC-ENTMCNC: 32.1 G/DL — SIGNIFICANT CHANGE UP (ref 32–36)
MCV RBC AUTO: 90.6 FL — SIGNIFICANT CHANGE UP (ref 80–100)
NRBC # BLD: 0 /100 WBCS — SIGNIFICANT CHANGE UP (ref 0–0)
PLATELET # BLD AUTO: 233 K/UL — SIGNIFICANT CHANGE UP (ref 150–400)
POTASSIUM SERPL-MCNC: 4.2 MMOL/L — SIGNIFICANT CHANGE UP (ref 3.5–5.3)
POTASSIUM SERPL-SCNC: 4.2 MMOL/L — SIGNIFICANT CHANGE UP (ref 3.5–5.3)
PROT ?TM UR-MCNC: 417 MG/DL — HIGH (ref 0–12)
RBC # BLD: 2.78 M/UL — LOW (ref 3.8–5.2)
RBC # FLD: 14.9 % — HIGH (ref 10.3–14.5)
SODIUM SERPL-SCNC: 145 MMOL/L — SIGNIFICANT CHANGE UP (ref 135–145)
WBC # BLD: 13.58 K/UL — HIGH (ref 3.8–10.5)
WBC # FLD AUTO: 13.58 K/UL — HIGH (ref 3.8–10.5)

## 2022-12-24 PROCEDURE — 99233 SBSQ HOSP IP/OBS HIGH 50: CPT

## 2022-12-24 RX ORDER — HYDRALAZINE HCL 50 MG
50 TABLET ORAL THREE TIMES A DAY
Refills: 0 | Status: DISCONTINUED | OUTPATIENT
Start: 2022-12-24 | End: 2022-12-24

## 2022-12-24 RX ORDER — SODIUM CHLORIDE 9 MG/ML
1000 INJECTION, SOLUTION INTRAVENOUS
Refills: 0 | Status: DISCONTINUED | OUTPATIENT
Start: 2022-12-24 | End: 2022-12-25

## 2022-12-24 RX ORDER — LEVETIRACETAM 250 MG/1
500 TABLET, FILM COATED ORAL
Refills: 0 | Status: DISCONTINUED | OUTPATIENT
Start: 2022-12-24 | End: 2022-12-28

## 2022-12-24 RX ORDER — HYDRALAZINE HCL 50 MG
75 TABLET ORAL EVERY 8 HOURS
Refills: 0 | Status: DISCONTINUED | OUTPATIENT
Start: 2022-12-24 | End: 2022-12-25

## 2022-12-24 RX ORDER — SODIUM BICARBONATE 1 MEQ/ML
650 SYRINGE (ML) INTRAVENOUS THREE TIMES A DAY
Refills: 0 | Status: DISCONTINUED | OUTPATIENT
Start: 2022-12-24 | End: 2022-12-25

## 2022-12-24 RX ADMIN — Medication 25 MILLIGRAM(S): at 05:52

## 2022-12-24 RX ADMIN — AMLODIPINE BESYLATE 10 MILLIGRAM(S): 2.5 TABLET ORAL at 05:52

## 2022-12-24 RX ADMIN — Medication 1 PATCH: at 07:08

## 2022-12-24 RX ADMIN — Medication 50 MILLIGRAM(S): at 14:12

## 2022-12-24 RX ADMIN — Medication 1 PATCH: at 19:48

## 2022-12-24 RX ADMIN — Medication 0.1 MILLIGRAM(S): at 05:52

## 2022-12-24 RX ADMIN — LEVETIRACETAM 500 MILLIGRAM(S): 250 TABLET, FILM COATED ORAL at 05:54

## 2022-12-24 RX ADMIN — Medication 75 MILLIGRAM(S): at 21:06

## 2022-12-24 RX ADMIN — PIPERACILLIN AND TAZOBACTAM 25 GRAM(S): 4; .5 INJECTION, POWDER, LYOPHILIZED, FOR SOLUTION INTRAVENOUS at 17:37

## 2022-12-24 RX ADMIN — PIPERACILLIN AND TAZOBACTAM 25 GRAM(S): 4; .5 INJECTION, POWDER, LYOPHILIZED, FOR SOLUTION INTRAVENOUS at 05:09

## 2022-12-24 RX ADMIN — HEPARIN SODIUM 1100 UNIT(S)/HR: 5000 INJECTION INTRAVENOUS; SUBCUTANEOUS at 19:19

## 2022-12-24 RX ADMIN — Medication 1 MILLIGRAM(S): at 12:02

## 2022-12-24 RX ADMIN — SENNA PLUS 2 TABLET(S): 8.6 TABLET ORAL at 21:06

## 2022-12-24 RX ADMIN — SODIUM CHLORIDE 50 MILLILITER(S): 9 INJECTION, SOLUTION INTRAVENOUS at 21:06

## 2022-12-24 RX ADMIN — LEVETIRACETAM 500 MILLIGRAM(S): 250 TABLET, FILM COATED ORAL at 17:36

## 2022-12-24 RX ADMIN — PREGABALIN 1000 MICROGRAM(S): 225 CAPSULE ORAL at 12:02

## 2022-12-24 RX ADMIN — HEPARIN SODIUM 1100 UNIT(S)/HR: 5000 INJECTION INTRAVENOUS; SUBCUTANEOUS at 07:22

## 2022-12-24 RX ADMIN — SIMVASTATIN 20 MILLIGRAM(S): 20 TABLET, FILM COATED ORAL at 21:06

## 2022-12-24 RX ADMIN — HEPARIN SODIUM 1100 UNIT(S)/HR: 5000 INJECTION INTRAVENOUS; SUBCUTANEOUS at 10:25

## 2022-12-24 RX ADMIN — Medication 81 MILLIGRAM(S): at 12:03

## 2022-12-24 RX ADMIN — POLYETHYLENE GLYCOL 3350 17 GRAM(S): 17 POWDER, FOR SOLUTION ORAL at 05:53

## 2022-12-24 RX ADMIN — POLYETHYLENE GLYCOL 3350 17 GRAM(S): 17 POWDER, FOR SOLUTION ORAL at 17:36

## 2022-12-24 NOTE — PROGRESS NOTE ADULT - SUBJECTIVE AND OBJECTIVE BOX
CHIEF COMPLAINT:   no report of any hematuria, hematochezia, no hematemesis or vomiting   no fever spikes   Patient non verbal.   + agitation last night and placed on wrist restraints.   s/p Midline     PHYSICAL EXAM:  GENERAL: Moderately built, no acute distress   CHEST/LUNG: Clear to ausculation bilaterally, no wheezing, no crackles   HEART: Regular rate and rhythm; No murmurs  ABDOMEN: Soft, Nontender, Nondistended; Bowel sounds present  EXTREMITIES:   No clubbing, cyanosis. + legs edema improved. not able to participate in ROM exam. + midline left arm   NERVOUS SYSTEM: Limited exam due to no patient participation. no facial droop observed.   Psychiatry: somnolent. non verbal.       OBJECTIVE DATA:       Vital Signs Last 24 Hrs  T(C): 36.7 (24 Dec 2022 16:48), Max: 37 (23 Dec 2022 22:20)  T(F): 98 (24 Dec 2022 16:48), Max: 98.6 (23 Dec 2022 22:20)  HR: 64 (24 Dec 2022 16:48) (56 - 70)  BP: 174/73 (24 Dec 2022 16:48) (160/60 - 174/73)  BP(mean): --  RR: 18 (24 Dec 2022 16:48) (17 - 18)  SpO2: 99% (24 Dec 2022 16:48) (97% - 100%)    Parameters below as of 24 Dec 2022 16:48  Patient On (Oxygen Delivery Method): room air               Daily     Daily   LABS:                        8.1    13.58 )-----------( 233      ( 24 Dec 2022 06:00 )             25.2             12-24    145  |  117<H>  |  50<H>  ----------------------------<  91  4.2   |  17<L>  |  5.89<H>    Ca    6.8<L>      24 Dec 2022 06:00    TPro  5.1<L>  /  Alb  1.7<L>  /  TBili  0.3  /  DBili  x   /  AST  16  /  ALT  10<L>  /  AlkPhos  52  12-23              PTT - ( 24 Dec 2022 06:00 )  PTT:83.7 sec         CAPILLARY BLOOD GLUCOSE          Culture - Blood (collected 12-23)  Source: .Blood Blood-Peripheral  Preliminary Report (12-24):    No growth to date.    Culture - Blood (collected 12-23)  Source: .Blood Blood-Peripheral  Preliminary Report (12-24):    No growth to date.    Culture - Urine (collected 12-20)  Source: Catheterized Catheterized  Final Report (12-23):    >100,000 CFU/ml Klebsiella pneumoniae  Organism: Klebsiella pneumoniae (12-23)  Organism: Klebsiella pneumoniae (12-23)    Sensitivities:      -  Amikacin: S <=16      -  Amoxicillin/Clavulanic Acid: S <=8/4      -  Ampicillin: R >16 These ampicillin results predict results for amoxicillin      -  Ampicillin/Sulbactam: S <=4/2 Enterobacter, Klebsiella aerogenes, Citrobacter, and Serratia may develop resistance during prolonged therapy (3-4 days)      -  Aztreonam: S <=4      -  Cefazolin: S <=2 For uncomplicated UTI with K. pneumoniae, E. coli, or P. mirablis: MELISA <=16 is sensitive and MELISA >=32 is resistant. This also predicts results for oral agents cefaclor, cefdinir, cefpodoxime, cefprozil, cefuroxime axetil, cephalexin and locarbef for uncomplicated UTI. Note that some isolates may be susceptible to these agents while testing resistant to cefazolin.      -  Cefepime: S <=2      -  Cefoxitin: S <=8      -  Ceftriaxone: S <=1 Enterobacter, Klebsiella aerogenes, Citrobacter, and Serratia may develop resistance during prolonged therapy      -  Cefuroxime: S <=4      -  Ciprofloxacin: S <=0.25      -  Ertapenem: S <=0.5      -  Gentamicin: S <=2      -  Imipenem: S <=1      -  Levofloxacin: S <=0.5      -  Meropenem: S <=1      -  Nitrofurantoin: S <=32 Should not be used to treat pyelonephritis      -  Piperacillin/Tazobactam: S <=8      -  Tobramycin: S <=2      -  Trimethoprim/Sulfamethoxazole: S <=0.5/9.5      Method Type: MELISA          MEDICATIONS  (STANDING):  amLODIPine   Tablet 10 milliGRAM(s) Oral daily  aspirin  chewable 81 milliGRAM(s) Oral daily  cloNIDine Patch 0.3 mG/24Hr(s) 1 patch Transdermal every 7 days  cyanocobalamin 1000 MICROGram(s) Oral daily  folic acid 1 milliGRAM(s) Oral daily  heparin  Infusion.  Unit(s)/Hr (10 mL/Hr) IV Continuous <Continuous>  hydrALAZINE 75 milliGRAM(s) Oral every 8 hours  influenza  Vaccine (HIGH DOSE) 0.7 milliLiter(s) IntraMuscular once  levETIRAcetam 500 milliGRAM(s) Oral two times a day  piperacillin/tazobactam IVPB.. 3.375 Gram(s) IV Intermittent every 12 hours  polyethylene glycol 3350 17 Gram(s) Oral every 12 hours  senna 2 Tablet(s) Oral at bedtime  simvastatin 20 milliGRAM(s) Oral at bedtime    MEDICATIONS  (PRN):  acetaminophen  Suppository .. 650 milliGRAM(s) Rectal every 8 hours PRN Temp greater or equal to 38C (100.4F)  bisacodyl Suppository 10 milliGRAM(s) Rectal once PRN Constipation  heparin   Injectable 4500 Unit(s) IV Push every 6 hours PRN For aPTT less than 40  heparin   Injectable 2000 Unit(s) IV Push every 6 hours PRN For aPTT between 40 - 57  melatonin 3 milliGRAM(s) Oral at bedtime PRN Insomnia  metoprolol tartrate Injectable 5 milliGRAM(s) IV Push every 6 hours PRN sbp>160 dbp >110

## 2022-12-24 NOTE — PROGRESS NOTE ADULT - SUBJECTIVE AND OBJECTIVE BOX
Resting    Vital Signs Last 24 Hrs  T(C): 36.7 (12-24-22 @ 16:48), Max: 37 (12-23-22 @ 22:20)  T(F): 98 (12-24-22 @ 16:48), Max: 98.6 (12-23-22 @ 22:20)  HR: 64 (12-24-22 @ 16:48) (56 - 70)  BP: 174/73 (12-24-22 @ 16:48) (160/60 - 174/73)  RR: 18 (12-24-22 @ 16:48) (17 - 18)  SpO2: 99% (12-24-22 @ 16:48) (97% - 100%)    I&O's Detail    23 Dec 2022 07:01  -  24 Dec 2022 07:00  --------------------------------------------------------  OUT:    Indwelling Catheter - Urethral (mL): 800 mL  Total OUT: 800 mL    Lungs b/l air entry  Heart S1S2  Abd soft NT ND  Extremities: tr edema                        8.1    13.58 )-----------( 233      ( 24 Dec 2022 06:00 )             25.2     24 Dec 2022 06:00    145    |  117    |  50     ----------------------------<  91     4.2     |  17     |  5.89     Ca    6.8        24 Dec 2022 06:00    TPro  5.1    /  Alb  1.7    /  TBili  0.3    /  DBili  x      /  AST  16     /  ALT  10     /  AlkPhos  52     23 Dec 2022 07:40    LIVER FUNCTIONS - ( 23 Dec 2022 07:40 )  Alb: 1.7 g/dL / Pro: 5.1 gm/dL / ALK PHOS: 52 U/L / ALT: 10 U/L / AST: 16 U/L / GGT: x           Culture - Blood (collected 23 Dec 2022 14:30)  Source: .Blood Blood-Peripheral  Preliminary Report (24 Dec 2022 19:01):    No growth to date.    Culture - Blood (collected 23 Dec 2022 14:30)  Source: .Blood Blood-Peripheral  Preliminary Report (24 Dec 2022 19:01):    No growth to date.    acetaminophen  Suppository .. 650 milliGRAM(s) Rectal every 8 hours PRN  amLODIPine   Tablet 10 milliGRAM(s) Oral daily  aspirin  chewable 81 milliGRAM(s) Oral daily  bisacodyl Suppository 10 milliGRAM(s) Rectal once PRN  cloNIDine Patch 0.3 mG/24Hr(s) 1 patch Transdermal every 7 days  cyanocobalamin 1000 MICROGram(s) Oral daily  folic acid 1 milliGRAM(s) Oral daily  heparin   Injectable 4500 Unit(s) IV Push every 6 hours PRN  heparin   Injectable 2000 Unit(s) IV Push every 6 hours PRN  heparin  Infusion.  Unit(s)/Hr IV Continuous <Continuous>  hydrALAZINE 75 milliGRAM(s) Oral every 8 hours  influenza  Vaccine (HIGH DOSE) 0.7 milliLiter(s) IntraMuscular once  levETIRAcetam 500 milliGRAM(s) Oral two times a day  melatonin 3 milliGRAM(s) Oral at bedtime PRN  metoprolol tartrate Injectable 5 milliGRAM(s) IV Push every 6 hours PRN  piperacillin/tazobactam IVPB.. 3.375 Gram(s) IV Intermittent every 12 hours  polyethylene glycol 3350 17 Gram(s) Oral every 12 hours  senna 2 Tablet(s) Oral at bedtime  simvastatin 20 milliGRAM(s) Oral at bedtime  sodium chloride 0.45%. 1000 milliLiter(s) IV Continuous <Continuous>    Assessment/Plan:    AMADOU on CKD 3 (Cr 1.8 - 2/11/2022)   Hx HTN, DM and proteinuria   Anemia  Nephrotic syndrome  Metabolic acidosis  Na Bicarb as ordered  F/u renal serologies, SPEP  Avoid nephrotoxins  F/u BMP, UO  Prognosis is guarded    [phone]

## 2022-12-24 NOTE — PROGRESS NOTE ADULT - ASSESSMENT
71 yo female w/ pmhx of dementia- nonverbal at baseline, CVA w/ resdiual right sided weakness, HTN, seizures, gout admitted to [facility] for AMADOU (creatinine baseline 1.82, now 6.05) and lower extremity edema    GENERAL MEDICINE  # lower extremity edema  - Likely from nephrotic syndrome. CHF is ruled out. no more diuretics. improved. Renal following.     NEPHROLOGY  # AMADOU superimposed on CKD.   no change in creatinine.   Renal serologies per nephrologist recs.     VASCULAR  # lower extremity DVT distal popliteal vein.   Discussed with Dr. Hodge on phone. In absence of any active gross bleeding, IVC filter not indicated.   Trial on iv heparin again later today. Trend CBC.     NEPHROLOGY  # dementia  # seizure  - keppra 500 mg po bid    CARDIOLOGY  # Hypertension.   - controlled cont current meds.     INFECTIOUS DISEASE  # sepsis with klebsiella pneumonia UTI. looks recurrent given past UTIs. cont zosyn. ??blood cultures not collected. ordered blood cultures today as patient does have hx of klebsiella bacteremia in the past.     Urinary retention. voiding trial today.     Anemia. acute on chronic. no active gross bleeding. restart iv heparin and monitor closely. Hb better today after blood transfusion. anemia work up showed folate deficiency and low normal vitamin b12 level. added supplements.     Hypokalemia. Likely from diuretic use. Repleted.    Hypernatremia. worse. start free water. check BMP in am.     Faecal impaction. better. BM x 1 in last 24 hours. give lactulose again today. check FOBT, discussed with nurse.     Fibroid. outpatient gyn follow up is recommended.     CODE: FULL     71 yo female w/ pmhx of dementia- nonverbal at baseline, CVA w/ resdiual right sided weakness, HTN, seizures, gout admitted to [facility] for AMADOU (creatinine baseline 1.82, now 6.05) and lower extremity edema    GENERAL MEDICINE  # lower extremity edema  - Likely from nephrotic syndrome. CHF is ruled out. no more diuretics. improved. Renal following.     NEPHROLOGY  # AMADOU superimposed on CKD.   small improvement in creatinine. renal following. avoid nephrotoxic agents. Trend.     VASCULAR  # lower extremity DVT distal popliteal vein.   Discussed with Dr. Hodge on phone. In absence of any active gross bleeding, IVC filter not indicated.   cont iv heparin x 24 hours more and if no gross visible bleeding and H and H remain stable, will start eliquis half dose due to unstable creatinine.     NEPHROLOGY  # dementia  # seizure  - keppra 500 mg po bid    CARDIOLOGY  # Hypertension.   - uncontrolled. Increase hydralazine and DC oral clonidine. cont other meds. Monitor.     INFECTIOUS DISEASE  # sepsis with klebsiella pneumonia UTI. looks recurrent given past UTIs. cont zosyn.    Urinary retention. voiding trial tomorrow.     Agitation. Family at bedside. can keep off restraints with clear instructions to report to nursing staff when not with the patient to avoid her pulling on iv line.     Anemia. acute on chronic. no active gross bleeding. cont folic acid for deficiency. cont vitamin B12.     Hypokalemia. Likely from diuretic use. Repleted.    Hypernatremia. Cont free water.     Faecal impaction. improved. cont senna and miralax.     Fibroid. outpatient gyn follow up is recommended.     CODE: FULL

## 2022-12-25 LAB
A1C WITH ESTIMATED AVERAGE GLUCOSE RESULT: 5.2 % — SIGNIFICANT CHANGE UP (ref 4–5.6)
ANION GAP SERPL CALC-SCNC: 9 MMOL/L — SIGNIFICANT CHANGE UP (ref 5–17)
AUTO DIFF PNL BLD: ABNORMAL
BUN SERPL-MCNC: 48 MG/DL — HIGH (ref 7–23)
C-ANCA SER-ACNC: NEGATIVE — SIGNIFICANT CHANGE UP
CALCIUM SERPL-MCNC: 6.7 MG/DL — LOW (ref 8.5–10.1)
CHLORIDE SERPL-SCNC: 118 MMOL/L — HIGH (ref 96–108)
CK SERPL-CCNC: 67 U/L — SIGNIFICANT CHANGE UP (ref 26–192)
CO2 SERPL-SCNC: 15 MMOL/L — LOW (ref 22–31)
CREAT SERPL-MCNC: 5.76 MG/DL — HIGH (ref 0.5–1.3)
EGFR: 7 ML/MIN/1.73M2 — LOW
ESTIMATED AVERAGE GLUCOSE: 103 MG/DL — SIGNIFICANT CHANGE UP (ref 68–114)
GLUCOSE SERPL-MCNC: 94 MG/DL — SIGNIFICANT CHANGE UP (ref 70–99)
HCT VFR BLD CALC: 28.9 % — LOW (ref 34.5–45)
HGB BLD-MCNC: 8.9 G/DL — LOW (ref 11.5–15.5)
MCHC RBC-ENTMCNC: 28.8 PG — SIGNIFICANT CHANGE UP (ref 27–34)
MCHC RBC-ENTMCNC: 30.8 G/DL — LOW (ref 32–36)
MCV RBC AUTO: 93.5 FL — SIGNIFICANT CHANGE UP (ref 80–100)
NRBC # BLD: 0 /100 WBCS — SIGNIFICANT CHANGE UP (ref 0–0)
P-ANCA SER-ACNC: NEGATIVE — SIGNIFICANT CHANGE UP
PLATELET # BLD AUTO: 274 K/UL — SIGNIFICANT CHANGE UP (ref 150–400)
POTASSIUM SERPL-MCNC: 4.7 MMOL/L — SIGNIFICANT CHANGE UP (ref 3.5–5.3)
POTASSIUM SERPL-SCNC: 4.7 MMOL/L — SIGNIFICANT CHANGE UP (ref 3.5–5.3)
RBC # BLD: 3.09 M/UL — LOW (ref 3.8–5.2)
RBC # FLD: 15.2 % — HIGH (ref 10.3–14.5)
SODIUM SERPL-SCNC: 142 MMOL/L — SIGNIFICANT CHANGE UP (ref 135–145)
URATE SERPL-MCNC: 8 MG/DL — HIGH (ref 2.5–7)
WBC # BLD: 8.29 K/UL — SIGNIFICANT CHANGE UP (ref 3.8–10.5)
WBC # FLD AUTO: 8.29 K/UL — SIGNIFICANT CHANGE UP (ref 3.8–10.5)

## 2022-12-25 PROCEDURE — 99233 SBSQ HOSP IP/OBS HIGH 50: CPT

## 2022-12-25 PROCEDURE — 74018 RADEX ABDOMEN 1 VIEW: CPT | Mod: 26

## 2022-12-25 RX ORDER — HYDRALAZINE HCL 50 MG
100 TABLET ORAL EVERY 8 HOURS
Refills: 0 | Status: DISCONTINUED | OUTPATIENT
Start: 2022-12-25 | End: 2022-12-28

## 2022-12-25 RX ORDER — SODIUM BICARBONATE 1 MEQ/ML
1300 SYRINGE (ML) INTRAVENOUS
Refills: 0 | Status: DISCONTINUED | OUTPATIENT
Start: 2022-12-25 | End: 2022-12-28

## 2022-12-25 RX ORDER — APIXABAN 2.5 MG/1
10 TABLET, FILM COATED ORAL
Refills: 0 | Status: DISCONTINUED | OUTPATIENT
Start: 2022-12-25 | End: 2022-12-28

## 2022-12-25 RX ADMIN — Medication 100 MILLIGRAM(S): at 13:28

## 2022-12-25 RX ADMIN — Medication 100 MILLIGRAM(S): at 21:10

## 2022-12-25 RX ADMIN — AMLODIPINE BESYLATE 10 MILLIGRAM(S): 2.5 TABLET ORAL at 05:15

## 2022-12-25 RX ADMIN — POLYETHYLENE GLYCOL 3350 17 GRAM(S): 17 POWDER, FOR SOLUTION ORAL at 05:22

## 2022-12-25 RX ADMIN — Medication 1 PATCH: at 07:27

## 2022-12-25 RX ADMIN — LEVETIRACETAM 500 MILLIGRAM(S): 250 TABLET, FILM COATED ORAL at 17:11

## 2022-12-25 RX ADMIN — Medication 81 MILLIGRAM(S): at 11:06

## 2022-12-25 RX ADMIN — SIMVASTATIN 20 MILLIGRAM(S): 20 TABLET, FILM COATED ORAL at 21:09

## 2022-12-25 RX ADMIN — PIPERACILLIN AND TAZOBACTAM 25 GRAM(S): 4; .5 INJECTION, POWDER, LYOPHILIZED, FOR SOLUTION INTRAVENOUS at 17:11

## 2022-12-25 RX ADMIN — Medication 1 MILLIGRAM(S): at 11:06

## 2022-12-25 RX ADMIN — APIXABAN 10 MILLIGRAM(S): 2.5 TABLET, FILM COATED ORAL at 21:10

## 2022-12-25 RX ADMIN — APIXABAN 10 MILLIGRAM(S): 2.5 TABLET, FILM COATED ORAL at 13:28

## 2022-12-25 RX ADMIN — PREGABALIN 1000 MICROGRAM(S): 225 CAPSULE ORAL at 11:06

## 2022-12-25 RX ADMIN — PIPERACILLIN AND TAZOBACTAM 25 GRAM(S): 4; .5 INJECTION, POWDER, LYOPHILIZED, FOR SOLUTION INTRAVENOUS at 05:17

## 2022-12-25 RX ADMIN — LEVETIRACETAM 500 MILLIGRAM(S): 250 TABLET, FILM COATED ORAL at 05:15

## 2022-12-25 RX ADMIN — Medication 650 MILLIGRAM(S): at 13:28

## 2022-12-25 RX ADMIN — HEPARIN SODIUM 1100 UNIT(S)/HR: 5000 INJECTION INTRAVENOUS; SUBCUTANEOUS at 07:17

## 2022-12-25 RX ADMIN — SENNA PLUS 2 TABLET(S): 8.6 TABLET ORAL at 21:12

## 2022-12-25 RX ADMIN — Medication 75 MILLIGRAM(S): at 05:15

## 2022-12-25 RX ADMIN — Medication 650 MILLIGRAM(S): at 05:15

## 2022-12-25 RX ADMIN — POLYETHYLENE GLYCOL 3350 17 GRAM(S): 17 POWDER, FOR SOLUTION ORAL at 17:13

## 2022-12-25 RX ADMIN — Medication 1 PATCH: at 19:42

## 2022-12-25 NOTE — PROGRESS NOTE ADULT - SUBJECTIVE AND OBJECTIVE BOX
CHIEF COMPLAINT:   no report of any hematuria, hematochezia, no hematemesis or vomiting   no fever spikes   Patient non verbal.   no more agitation  s/p Midline     PHYSICAL EXAM:  GENERAL: Moderately built, no acute distress   CHEST/LUNG: Clear to ausculation bilaterally, no wheezing, no crackles   HEART: Regular rate and rhythm; No murmurs  ABDOMEN: Soft, Nontender, Nondistended; Bowel sounds present  EXTREMITIES:   No clubbing, cyanosis. + legs edema improved. not able to participate in ROM exam. + midline left arm   NERVOUS SYSTEM: Limited exam due to no patient participation. no facial droop observed.   Psychiatry: somnolent. non verbal.       OBJECTIVE DATA:       Vital Signs Last 24 Hrs  T(C): 36.5 (25 Dec 2022 05:00), Max: 36.9 (24 Dec 2022 23:52)  T(F): 97.7 (25 Dec 2022 05:00), Max: 98.4 (24 Dec 2022 23:52)  HR: 69 (25 Dec 2022 05:00) (62 - 69)  BP: 168/84 (25 Dec 2022 05:00) (163/80 - 174/73)  BP(mean): --  RR: 18 (25 Dec 2022 05:00) (18 - 18)  SpO2: 98% (25 Dec 2022 05:00) (98% - 99%)    Parameters below as of 25 Dec 2022 05:00  Patient On (Oxygen Delivery Method): room air               Daily     Daily   LABS:                        8.9    8.29  )-----------( 274      ( 25 Dec 2022 10:08 )             28.9             12-25    142  |  118<H>  |  48<H>  ----------------------------<  94  4.7   |  15<L>  |  5.76<H>    Ca    6.7<L>      25 Dec 2022 08:15                PTT - ( 24 Dec 2022 06:00 )  PTT:83.7 sec     CARDIAC MARKERS ( 25 Dec 2022 08:15 )  x     / x     / 67 U/L / x     / x          CAPILLARY BLOOD GLUCOSE          Culture - Blood (collected 12-23)  Source: .Blood Blood-Peripheral  Preliminary Report (12-24):    No growth to date.    Culture - Blood (collected 12-23)  Source: .Blood Blood-Peripheral  Preliminary Report (12-24):    No growth to date.    Culture - Urine (collected 12-20)  Source: Catheterized Catheterized  Final Report (12-23):    >100,000 CFU/ml Klebsiella pneumoniae  Organism: Klebsiella pneumoniae (12-23)  Organism: Klebsiella pneumoniae (12-23)    Sensitivities:      -  Amikacin: S <=16      -  Amoxicillin/Clavulanic Acid: S <=8/4      -  Ampicillin: R >16 These ampicillin results predict results for amoxicillin      -  Ampicillin/Sulbactam: S <=4/2 Enterobacter, Klebsiella aerogenes, Citrobacter, and Serratia may develop resistance during prolonged therapy (3-4 days)      -  Aztreonam: S <=4      -  Cefazolin: S <=2 For uncomplicated UTI with K. pneumoniae, E. coli, or P. mirablis: MELISA <=16 is sensitive and MELISA >=32 is resistant. This also predicts results for oral agents cefaclor, cefdinir, cefpodoxime, cefprozil, cefuroxime axetil, cephalexin and locarbef for uncomplicated UTI. Note that some isolates may be susceptible to these agents while testing resistant to cefazolin.      -  Cefepime: S <=2      -  Cefoxitin: S <=8      -  Ceftriaxone: S <=1 Enterobacter, Klebsiella aerogenes, Citrobacter, and Serratia may develop resistance during prolonged therapy      -  Cefuroxime: S <=4      -  Ciprofloxacin: S <=0.25      -  Ertapenem: S <=0.5      -  Gentamicin: S <=2      -  Imipenem: S <=1      -  Levofloxacin: S <=0.5      -  Meropenem: S <=1      -  Nitrofurantoin: S <=32 Should not be used to treat pyelonephritis      -  Piperacillin/Tazobactam: S <=8      -  Tobramycin: S <=2      -  Trimethoprim/Sulfamethoxazole: S <=0.5/9.5      Method Type: MELISA      MEDICATIONS  (STANDING):  amLODIPine   Tablet 10 milliGRAM(s) Oral daily  apixaban 10 milliGRAM(s) Oral two times a day  aspirin  chewable 81 milliGRAM(s) Oral daily  cloNIDine Patch 0.3 mG/24Hr(s) 1 patch Transdermal every 7 days  cyanocobalamin 1000 MICROGram(s) Oral daily  folic acid 1 milliGRAM(s) Oral daily  hydrALAZINE 100 milliGRAM(s) Oral every 8 hours  influenza  Vaccine (HIGH DOSE) 0.7 milliLiter(s) IntraMuscular once  levETIRAcetam 500 milliGRAM(s) Oral two times a day  piperacillin/tazobactam IVPB.. 3.375 Gram(s) IV Intermittent every 12 hours  polyethylene glycol 3350 17 Gram(s) Oral every 12 hours  senna 2 Tablet(s) Oral at bedtime  simvastatin 20 milliGRAM(s) Oral at bedtime  sodium bicarbonate 650 milliGRAM(s) Oral three times a day    MEDICATIONS  (PRN):  acetaminophen  Suppository .. 650 milliGRAM(s) Rectal every 8 hours PRN Temp greater or equal to 38C (100.4F)  bisacodyl Suppository 10 milliGRAM(s) Rectal once PRN Constipation  melatonin 3 milliGRAM(s) Oral at bedtime PRN Insomnia  metoprolol tartrate Injectable 5 milliGRAM(s) IV Push every 6 hours PRN sbp>160 dbp >110

## 2022-12-25 NOTE — PROGRESS NOTE ADULT - SUBJECTIVE AND OBJECTIVE BOX
Resting, comfortable on RA    Vital Signs Last 24 Hrs  T(C): 36.6 (12-25-22 @ 18:10), Max: 36.9 (12-24-22 @ 23:52)  T(F): 97.9 (12-25-22 @ 18:10), Max: 98.4 (12-24-22 @ 23:52)  HR: 64 (12-25-22 @ 18:10) (62 - 83)  BP: 131/60 (12-25-22 @ 18:10) (101/63 - 168/84)  RR: 16 (12-25-22 @ 18:10) (16 - 18)  SpO2: 99% (12-25-22 @ 18:10) (96% - 99%)    I&O's Detail    24 Dec 2022 07:01  -  25 Dec 2022 07:00  --------------------------------------------------------  OUT:    Indwelling Catheter - Urethral (mL): 900 mL  Total OUT: 900 mL    Lungs b/l air entry  Heart S1S2  Abd soft NT ND  Extremities: tr edema                               8.9    8.29  )-----------( 274      ( 25 Dec 2022 10:08 )             28.9     25 Dec 2022 08:15    142    |  118    |  48     ----------------------------<  94     4.7     |  15     |  5.76     Ca    6.7        25 Dec 2022 08:15    Culture - Blood (collected 23 Dec 2022 14:30)  Source: .Blood Blood-Peripheral  Preliminary Report (24 Dec 2022 19:01):    No growth to date.    Culture - Blood (collected 23 Dec 2022 14:30)  Source: .Blood Blood-Peripheral  Preliminary Report (24 Dec 2022 19:01):    No growth to date.    acetaminophen  Suppository .. 650 milliGRAM(s) Rectal every 8 hours PRN  amLODIPine   Tablet 10 milliGRAM(s) Oral daily  apixaban 10 milliGRAM(s) Oral two times a day  aspirin  chewable 81 milliGRAM(s) Oral daily  bisacodyl Suppository 10 milliGRAM(s) Rectal once PRN  cloNIDine Patch 0.3 mG/24Hr(s) 1 patch Transdermal every 7 days  cyanocobalamin 1000 MICROGram(s) Oral daily  folic acid 1 milliGRAM(s) Oral daily  hydrALAZINE 100 milliGRAM(s) Oral every 8 hours  influenza  Vaccine (HIGH DOSE) 0.7 milliLiter(s) IntraMuscular once  levETIRAcetam 500 milliGRAM(s) Oral two times a day  melatonin 3 milliGRAM(s) Oral at bedtime PRN  metoprolol tartrate Injectable 5 milliGRAM(s) IV Push every 6 hours PRN  piperacillin/tazobactam IVPB.. 3.375 Gram(s) IV Intermittent every 12 hours  polyethylene glycol 3350 17 Gram(s) Oral every 12 hours  senna 2 Tablet(s) Oral at bedtime  simvastatin 20 milliGRAM(s) Oral at bedtime  sodium bicarbonate 650 milliGRAM(s) Oral three times a day    Assessment/Plan:    AMADOU/CKD 3 (Cr 1.8 - 2/11/2022)  Hx HTN, nephrotic range proteinuria   Metabolic acidosis iso AMADOU  Anemia  Na Bicarb as ordered  Will f/u renal serologies, SPEP  Avoid nephrotoxins  F/u BMP, UO  Cr is slowly improving    [phone]

## 2022-12-25 NOTE — PROGRESS NOTE ADULT - ASSESSMENT
73 yo female w/ pmhx of dementia- nonverbal at baseline, CVA w/ resdiual right sided weakness, HTN, seizures, gout admitted to [facility] for AMADOU (creatinine baseline 1.82, now 6.05) and lower extremity edema    GENERAL MEDICINE  # lower extremity edema  - Likely from nephrotic syndrome. CHF is ruled out. no more diuretics. improved. Renal following.     NEPHROLOGY  # AMADOU superimposed on CKD.   not much improvement in creatinine. renal following. patient on sodium bicarbonate for metabolic acidosis.     VASCULAR  # lower extremity DVT distal popliteal vein.   Change iv heparin to eliquis. watch for any active gross bleeding.     NEPHROLOGY  # dementia  # seizure  - keppra 500 mg po bid    CARDIOLOGY  # Hypertension.   - uncontrolled. Increase hydralazine again. cont other meds. Monitor.     INFECTIOUS DISEASE  # sepsis with klebsiella pneumonia UTI. looks recurrent given past UTIs. Finish zosyn today.     Urinary retention. start voiding trial.     Agitation. better.    Anemia. acute on chronic. no active gross bleeding. cont folic acid for deficiency. cont vitamin B12.     Hypokalemia. Likely from diuretic use. Repleted.    Hypernatremia. Cont free water.     Faecal impaction. improved. cont senna and miralax.     Fibroid. outpatient gyn follow up is recommended.     CODE: FULL

## 2022-12-26 LAB
ANION GAP SERPL CALC-SCNC: 10 MMOL/L — SIGNIFICANT CHANGE UP (ref 5–17)
BUN SERPL-MCNC: 52 MG/DL — HIGH (ref 7–23)
CALCIUM SERPL-MCNC: 7.2 MG/DL — LOW (ref 8.5–10.1)
CHLORIDE SERPL-SCNC: 116 MMOL/L — HIGH (ref 96–108)
CO2 SERPL-SCNC: 17 MMOL/L — LOW (ref 22–31)
CREAT SERPL-MCNC: 5.91 MG/DL — HIGH (ref 0.5–1.3)
EGFR: 7 ML/MIN/1.73M2 — LOW
GLUCOSE SERPL-MCNC: 144 MG/DL — HIGH (ref 70–99)
HCT VFR BLD CALC: 32.1 % — LOW (ref 34.5–45)
HGB BLD-MCNC: 10.2 G/DL — LOW (ref 11.5–15.5)
MCHC RBC-ENTMCNC: 28.9 PG — SIGNIFICANT CHANGE UP (ref 27–34)
MCHC RBC-ENTMCNC: 31.8 G/DL — LOW (ref 32–36)
MCV RBC AUTO: 90.9 FL — SIGNIFICANT CHANGE UP (ref 80–100)
NRBC # BLD: 0 /100 WBCS — SIGNIFICANT CHANGE UP (ref 0–0)
PLATELET # BLD AUTO: 329 K/UL — SIGNIFICANT CHANGE UP (ref 150–400)
POTASSIUM SERPL-MCNC: 4.5 MMOL/L — SIGNIFICANT CHANGE UP (ref 3.5–5.3)
POTASSIUM SERPL-SCNC: 4.5 MMOL/L — SIGNIFICANT CHANGE UP (ref 3.5–5.3)
RBC # BLD: 3.53 M/UL — LOW (ref 3.8–5.2)
RBC # FLD: 15.1 % — HIGH (ref 10.3–14.5)
SODIUM SERPL-SCNC: 143 MMOL/L — SIGNIFICANT CHANGE UP (ref 135–145)
WBC # BLD: 7.8 K/UL — SIGNIFICANT CHANGE UP (ref 3.8–10.5)
WBC # FLD AUTO: 7.8 K/UL — SIGNIFICANT CHANGE UP (ref 3.8–10.5)

## 2022-12-26 PROCEDURE — 99232 SBSQ HOSP IP/OBS MODERATE 35: CPT

## 2022-12-26 RX ADMIN — SIMVASTATIN 20 MILLIGRAM(S): 20 TABLET, FILM COATED ORAL at 21:14

## 2022-12-26 RX ADMIN — POLYETHYLENE GLYCOL 3350 17 GRAM(S): 17 POWDER, FOR SOLUTION ORAL at 06:44

## 2022-12-26 RX ADMIN — LEVETIRACETAM 500 MILLIGRAM(S): 250 TABLET, FILM COATED ORAL at 06:32

## 2022-12-26 RX ADMIN — LEVETIRACETAM 500 MILLIGRAM(S): 250 TABLET, FILM COATED ORAL at 18:13

## 2022-12-26 RX ADMIN — PREGABALIN 1000 MICROGRAM(S): 225 CAPSULE ORAL at 12:06

## 2022-12-26 RX ADMIN — Medication 1300 MILLIGRAM(S): at 06:32

## 2022-12-26 RX ADMIN — AMLODIPINE BESYLATE 10 MILLIGRAM(S): 2.5 TABLET ORAL at 06:32

## 2022-12-26 RX ADMIN — APIXABAN 10 MILLIGRAM(S): 2.5 TABLET, FILM COATED ORAL at 18:13

## 2022-12-26 RX ADMIN — APIXABAN 10 MILLIGRAM(S): 2.5 TABLET, FILM COATED ORAL at 06:31

## 2022-12-26 RX ADMIN — Medication 100 MILLIGRAM(S): at 15:56

## 2022-12-26 RX ADMIN — PIPERACILLIN AND TAZOBACTAM 25 GRAM(S): 4; .5 INJECTION, POWDER, LYOPHILIZED, FOR SOLUTION INTRAVENOUS at 06:32

## 2022-12-26 RX ADMIN — Medication 81 MILLIGRAM(S): at 12:06

## 2022-12-26 RX ADMIN — Medication 1 MILLIGRAM(S): at 12:06

## 2022-12-26 RX ADMIN — Medication 1 PATCH: at 07:31

## 2022-12-26 RX ADMIN — Medication 1300 MILLIGRAM(S): at 18:13

## 2022-12-26 RX ADMIN — Medication 100 MILLIGRAM(S): at 06:32

## 2022-12-26 RX ADMIN — Medication 100 MILLIGRAM(S): at 21:14

## 2022-12-26 RX ADMIN — Medication 1 PATCH: at 18:32

## 2022-12-26 NOTE — PROGRESS NOTE ADULT - ASSESSMENT
71 yo female w/ pmhx of dementia- nonverbal at baseline, CVA w/ resdiual right sided weakness, HTN, seizures, gout admitted to [facility] for AMADOU (creatinine baseline 1.82, now 6.05) and lower extremity edema    GENERAL MEDICINE  # lower extremity edema  - Likely from nephrotic syndrome. CHF is ruled out. no more diuretics. improved.     NEPHROLOGY  # AMADOU superimposed on CKD.   not much improvement in creatinine. renal on board. patient on sodium bicarbonate for metabolic acidosis.     VASCULAR  # lower extremity DVT distal popliteal vein.   Cont eliquis. watch for any active gross bleeding.     NEPHROLOGY  # dementia  # seizure  - keppra 500 mg po bid    CARDIOLOGY  # Hypertension.   - controlled Cont Increased hydralazine again. cont other meds. Monitor.     INFECTIOUS DISEASE  # sepsis with klebsiella pneumonia UTI. looks recurrent given past UTIs. Finished zosyn    Urinary retention. start voiding trial today. Discussed with nurse. .     Agitation. better.    Anemia. acute on chronic. no active gross bleeding. cont folic acid for deficiency. cont vitamin B12.     Hypokalemia. Likely from diuretic use. Repleted.    Hypernatremia. Cont free water.     Faecal impaction. improved.     Fibroid. outpatient gyn follow up is recommended.     CODE: FULL  Discussed with Gali on phone.

## 2022-12-26 NOTE — PROGRESS NOTE ADULT - SUBJECTIVE AND OBJECTIVE BOX
CHIEF COMPLAINT:   no fever spikes   Patient non verbal.   was on wrist restraints last night  s/p Midline   BM x 2 this morning    PHYSICAL EXAM:  GENERAL: Moderately built, no acute distress   CHEST/LUNG: Clear to ausculation bilaterally, no wheezing, no crackles   HEART: Regular rate and rhythm; No murmurs  ABDOMEN: Soft, Nontender, Nondistended; Bowel sounds present  EXTREMITIES:   No clubbing, cyanosis. + legs edema improved. not able to participate in ROM exam. + midline left arm   NERVOUS SYSTEM: Limited exam due to no patient participation. no facial droop observed.   Psychiatry: somnolent. non verbal.       OBJECTIVE DATA:     Vital Signs Last 24 Hrs  T(C): 36.9 (26 Dec 2022 06:27), Max: 36.9 (25 Dec 2022 23:36)  T(F): 98.4 (26 Dec 2022 06:27), Max: 98.4 (25 Dec 2022 23:36)  HR: 73 (26 Dec 2022 06:27) (60 - 83)  BP: 175/75 (26 Dec 2022 06:27) (101/63 - 175/75)  BP(mean): --  RR: 18 (26 Dec 2022 06:27) (16 - 18)  SpO2: 97% (26 Dec 2022 06:27) (96% - 99%)    Parameters below as of 26 Dec 2022 06:27  Patient On (Oxygen Delivery Method): room air               Daily     Daily   LABS:                        8.9    8.29  )-----------( 274      ( 25 Dec 2022 10:08 )             28.9             12-25    142  |  118<H>  |  48<H>  ----------------------------<  94  4.7   |  15<L>  |  5.76<H>    Ca    6.7<L>      25 Dec 2022 08:15                     CARDIAC MARKERS ( 25 Dec 2022 08:15 )  x     / x     / 67 U/L / x     / x          CAPILLARY BLOOD GLUCOSE          Culture - Blood (collected 12-23)  Source: .Blood Blood-Peripheral  Preliminary Report (12-24):    No growth to date.    Culture - Blood (collected 12-23)  Source: .Blood Blood-Peripheral  Preliminary Report (12-24):    No growth to date.          MEDICATIONS  (STANDING):  amLODIPine   Tablet 10 milliGRAM(s) Oral daily  apixaban 10 milliGRAM(s) Oral two times a day  aspirin  chewable 81 milliGRAM(s) Oral daily  cloNIDine Patch 0.3 mG/24Hr(s) 1 patch Transdermal every 7 days  cyanocobalamin 1000 MICROGram(s) Oral daily  folic acid 1 milliGRAM(s) Oral daily  hydrALAZINE 100 milliGRAM(s) Oral every 8 hours  influenza  Vaccine (HIGH DOSE) 0.7 milliLiter(s) IntraMuscular once  levETIRAcetam 500 milliGRAM(s) Oral two times a day  polyethylene glycol 3350 17 Gram(s) Oral every 12 hours  senna 2 Tablet(s) Oral at bedtime  simvastatin 20 milliGRAM(s) Oral at bedtime  sodium bicarbonate 1300 milliGRAM(s) Oral two times a day    MEDICATIONS  (PRN):  acetaminophen  Suppository .. 650 milliGRAM(s) Rectal every 8 hours PRN Temp greater or equal to 38C (100.4F)  bisacodyl Suppository 10 milliGRAM(s) Rectal once PRN Constipation  melatonin 3 milliGRAM(s) Oral at bedtime PRN Insomnia  metoprolol tartrate Injectable 5 milliGRAM(s) IV Push every 6 hours PRN sbp>160 dbp >110

## 2022-12-26 NOTE — PROGRESS NOTE ADULT - SUBJECTIVE AND OBJECTIVE BOX
[facility] NEPHROLOGY SERVICES, [facility]  NEPHROLOGY AND HYPERTENSION  [address]  [address]  [place] [address]  [phone]    MD YECENIA CRAVEN MD ANDREY GONCHARUK, MD MADHU KORRAPATI, MD YELENA ROSENBERG, MD BIN SANCHEZ, MD VICTORINA MICHELLE MD          Patient events noted  Comfortable no distress sister at bedside     MEDICATIONS  (STANDING):  amLODIPine   Tablet 10 milliGRAM(s) Oral daily  apixaban 10 milliGRAM(s) Oral two times a day  aspirin  chewable 81 milliGRAM(s) Oral daily  cloNIDine Patch 0.3 mG/24Hr(s) 1 patch Transdermal every 7 days  cyanocobalamin 1000 MICROGram(s) Oral daily  folic acid 1 milliGRAM(s) Oral daily  hydrALAZINE 100 milliGRAM(s) Oral every 8 hours  influenza  Vaccine (HIGH DOSE) 0.7 milliLiter(s) IntraMuscular once  levETIRAcetam 500 milliGRAM(s) Oral two times a day  simvastatin 20 milliGRAM(s) Oral at bedtime  sodium bicarbonate 1300 milliGRAM(s) Oral two times a day    MEDICATIONS  (PRN):  acetaminophen  Suppository .. 650 milliGRAM(s) Rectal every 8 hours PRN Temp greater or equal to 38C (100.4F)  melatonin 3 milliGRAM(s) Oral at bedtime PRN Insomnia  metoprolol tartrate Injectable 5 milliGRAM(s) IV Push every 6 hours PRN sbp>160 dbp >110      22 @ 07:01  -  22 @ 07:00  --------------------------------------------------------  IN: 120 mL / OUT: 1100 mL / NET: -980 mL    22 @ 07:01  -  22 @ 22:02  --------------------------------------------------------  IN: 240 mL / OUT: 0 mL / NET: 240 mL      PHYSICAL EXAM:      T(C): 36.8 (22 @ 17:02), Max: 36.9 (22 @ 23:36)  HR: 70 (22 @ 17:02) (60 - 74)  BP: 162/74 (22 @ 17:02) (136/72 - 175/75)  RR: 18 (22 @ 17:02) (17 - 18)  SpO2: 96% (22 @ 17:02) (96% - 98%)  Wt(kg): --  Lungs clear  Heart S1S2  Abd soft NT ND  Extremities:   tr edema                                    10.2   7.80  )-----------( 329      ( 26 Dec 2022 09:15 )             32.1         143  |  116<H>  |  52<H>  ----------------------------<  144<H>  4.5   |  17<L>  |  5.91<H>    Ca    7.2<L>      26 Dec 2022 09:15    Neutrophil Cytoplasmic Antibody (22 @ 07:51)    Cytoplasmic (c-ANCA) Antibody: Negative    Perinuclear (p-ANCA) Antibody: Negative    Atypical ANCA: Indeterminate Method interference due to CYNDEE fluorescence      Immunoelectrophoresis, Serum (22 @ 07:51)    Protein Total, Serum: 5.1 g/dL    Total Protein, Serum: 5.1 g/dL    Quantitative Ig mg/dL    Quantitative IgA: 341 mg/dL    Quantitative IgM: 62 mg/dL    MARY Kappa: 14.70 mg/dL    MARY Lambda: 7.10 mg/dL    [place]/Lambda Free Light Chain Ratio, Serum: 2.07 Ratio      Creatinine Trend: 5.91<--, 5.76<--, 5.89<--, 6.11<--, 6.15<--, 6.02<--    Basic Metabolic Panel in AM (22 @ 08:11)    Creatinine, Serum: 1.82 mg/dL        Assessment   AMADOU on CKD 3 ( Cr 1.8 ) HTN, DM and proteinuria   Disparate renal size; likely underlying renovascular disease   UTI, Klebsiella, ; risk fo infectious AIN  Nephrotic syndrome, elevated urine protein   R/O occult GN unrelated to DM, HTN (Infectious GN; FSGS, MGN; MPGN, vasculitic, CTD related)  Metabolic acidosis    Plan  Hold diuresis  Empiric antibiotic  Overall conservative management as per family          Luis Enrique Oakes MD

## 2022-12-27 ENCOUNTER — TRANSCRIPTION ENCOUNTER (OUTPATIENT)
Age: 72
End: 2022-12-27

## 2022-12-27 LAB
% ALBUMIN: 47.8 % — SIGNIFICANT CHANGE UP
% ALPHA 1: 7 % — SIGNIFICANT CHANGE UP
% ALPHA 2: 14.5 % — SIGNIFICANT CHANGE UP
% BETA: 13.1 % — SIGNIFICANT CHANGE UP
% GAMMA: 17.6 % — SIGNIFICANT CHANGE UP
ALBUMIN SERPL ELPH-MCNC: 2.4 G/DL — LOW (ref 3.6–5.5)
ALBUMIN/GLOB SERPL ELPH: 0.9 RATIO — SIGNIFICANT CHANGE UP
ALPHA1 GLOB SERPL ELPH-MCNC: 0.4 G/DL — SIGNIFICANT CHANGE UP (ref 0.1–0.4)
ALPHA2 GLOB SERPL ELPH-MCNC: 0.7 G/DL — SIGNIFICANT CHANGE UP (ref 0.5–1)
ANA TITR SER: NEGATIVE — SIGNIFICANT CHANGE UP
ANION GAP SERPL CALC-SCNC: 10 MMOL/L — SIGNIFICANT CHANGE UP (ref 5–17)
B-GLOBULIN SERPL ELPH-MCNC: 0.7 G/DL — SIGNIFICANT CHANGE UP (ref 0.5–1)
BUN SERPL-MCNC: 51 MG/DL — HIGH (ref 7–23)
CALCIUM SERPL-MCNC: 7.7 MG/DL — LOW (ref 8.5–10.1)
CHLORIDE SERPL-SCNC: 114 MMOL/L — HIGH (ref 96–108)
CO2 SERPL-SCNC: 18 MMOL/L — LOW (ref 22–31)
CREAT SERPL-MCNC: 5.76 MG/DL — HIGH (ref 0.5–1.3)
EGFR: 7 ML/MIN/1.73M2 — LOW
GAMMA GLOBULIN: 0.9 G/DL — SIGNIFICANT CHANGE UP (ref 0.6–1.6)
GLUCOSE SERPL-MCNC: 161 MG/DL — HIGH (ref 70–99)
HCT VFR BLD CALC: 32.4 % — LOW (ref 34.5–45)
HGB BLD-MCNC: 10.2 G/DL — LOW (ref 11.5–15.5)
INTERPRETATION SERPL IFE-IMP: SIGNIFICANT CHANGE UP
INTERPRETATION SERPL IFE-IMP: SIGNIFICANT CHANGE UP
MAGNESIUM SERPL-MCNC: 1.8 MG/DL — SIGNIFICANT CHANGE UP (ref 1.6–2.6)
MCHC RBC-ENTMCNC: 28.7 PG — SIGNIFICANT CHANGE UP (ref 27–34)
MCHC RBC-ENTMCNC: 31.5 G/DL — LOW (ref 32–36)
MCV RBC AUTO: 91.3 FL — SIGNIFICANT CHANGE UP (ref 80–100)
NRBC # BLD: 0 /100 WBCS — SIGNIFICANT CHANGE UP (ref 0–0)
PHOSPHATE SERPL-MCNC: 4.9 MG/DL — HIGH (ref 2.5–4.5)
PLATELET # BLD AUTO: 350 K/UL — SIGNIFICANT CHANGE UP (ref 150–400)
POTASSIUM SERPL-MCNC: 4.5 MMOL/L — SIGNIFICANT CHANGE UP (ref 3.5–5.3)
POTASSIUM SERPL-SCNC: 4.5 MMOL/L — SIGNIFICANT CHANGE UP (ref 3.5–5.3)
PROT PATTERN SERPL ELPH-IMP: SIGNIFICANT CHANGE UP
RBC # BLD: 3.55 M/UL — LOW (ref 3.8–5.2)
RBC # FLD: 14.9 % — HIGH (ref 10.3–14.5)
SODIUM SERPL-SCNC: 142 MMOL/L — SIGNIFICANT CHANGE UP (ref 135–145)
WBC # BLD: 7.56 K/UL — SIGNIFICANT CHANGE UP (ref 3.8–10.5)
WBC # FLD AUTO: 7.56 K/UL — SIGNIFICANT CHANGE UP (ref 3.8–10.5)

## 2022-12-27 PROCEDURE — 99232 SBSQ HOSP IP/OBS MODERATE 35: CPT

## 2022-12-27 RX ADMIN — PREGABALIN 1000 MICROGRAM(S): 225 CAPSULE ORAL at 12:36

## 2022-12-27 RX ADMIN — SIMVASTATIN 20 MILLIGRAM(S): 20 TABLET, FILM COATED ORAL at 21:24

## 2022-12-27 RX ADMIN — APIXABAN 10 MILLIGRAM(S): 2.5 TABLET, FILM COATED ORAL at 05:30

## 2022-12-27 RX ADMIN — Medication 100 MILLIGRAM(S): at 21:21

## 2022-12-27 RX ADMIN — Medication 100 MILLIGRAM(S): at 05:30

## 2022-12-27 RX ADMIN — Medication 1 PATCH: at 07:53

## 2022-12-27 RX ADMIN — Medication 1300 MILLIGRAM(S): at 18:21

## 2022-12-27 RX ADMIN — Medication 81 MILLIGRAM(S): at 12:09

## 2022-12-27 RX ADMIN — Medication 1300 MILLIGRAM(S): at 05:30

## 2022-12-27 RX ADMIN — LEVETIRACETAM 500 MILLIGRAM(S): 250 TABLET, FILM COATED ORAL at 05:40

## 2022-12-27 RX ADMIN — AMLODIPINE BESYLATE 10 MILLIGRAM(S): 2.5 TABLET ORAL at 05:30

## 2022-12-27 RX ADMIN — Medication 100 MILLIGRAM(S): at 13:56

## 2022-12-27 RX ADMIN — Medication 1 PATCH: at 20:10

## 2022-12-27 RX ADMIN — APIXABAN 10 MILLIGRAM(S): 2.5 TABLET, FILM COATED ORAL at 18:21

## 2022-12-27 RX ADMIN — LEVETIRACETAM 500 MILLIGRAM(S): 250 TABLET, FILM COATED ORAL at 18:21

## 2022-12-27 RX ADMIN — Medication 1 MILLIGRAM(S): at 12:10

## 2022-12-27 NOTE — PROGRESS NOTE ADULT - ASSESSMENT
73 yo female w/ pmhx of dementia- nonverbal at baseline, CVA w/ resdiual right sided weakness, HTN, seizures, gout admitted to [facility] for AMADOU (creatinine baseline 1.82, now 6.05) and lower extremity edema    GENERAL MEDICINE  # lower extremity edema  - Likely from nephrotic syndrome. CHF is ruled out. no more diuretics. improved.     NEPHROLOGY  # AMADOU superimposed on CKD.   not much improvement in creatinine. renal on board. patient on sodium bicarbonate for metabolic acidosis.     VASCULAR  # lower extremity DVT distal popliteal vein.   Cont eliquis. watch for any active gross bleeding.     NEPHROLOGY  # dementia  # seizure  - keppra 500 mg po bid    CARDIOLOGY  # Hypertension.   - controlled Cont Increased hydralazine again. cont other meds. Monitor.     INFECTIOUS DISEASE  # sepsis with klebsiella pneumonia UTI. looks recurrent given past UTIs. Finished zosyn    Urinary retention. start voiding trial today. Discussed with nurse. .     Agitation. better.    Anemia. acute on chronic. no active gross bleeding. cont folic acid for deficiency. cont vitamin B12.     Hypokalemia. Likely from diuretic use. Repleted.    Hypernatremia. Cont free water.     Faecal impaction. improved.     Fibroid. outpatient gyn follow up is recommended.     CODE: FULL  Discussed with Gali on phone.

## 2022-12-27 NOTE — DISCHARGE NOTE NURSING/CASE MANAGEMENT/SOCIAL WORK - NSDCPEELIQUISCOMP_GEN_ALL_CORE
Apixaban/Eliquis is used to treat and prevent blood clots. If you are not able to swallow the tablets whole, they may be crushed and mixed in water, apple juice, or applesauce and promptly taken within four hours. Never skip a dose of Apixaban/Eliquis. If you forget to take your Apixaban/Eliquis, take a dose as soon as you remember. If it is almost time for your next Apixaban/Eliquis dose, wait until then and take a regular dose. DO NOT take an extra pill to ‘catch up’.  NEVER TAKE A DOUBLE DOSE. Notify your doctor that you missed a dose. Take Apixaban/Eliquis at the same time each morning and evening. Apixaban/Eliquis may be taken with other medication or food. 23:20

## 2022-12-27 NOTE — PROGRESS NOTE ADULT - NSPROGADDITIONALINFOA_GEN_ALL_CORE
at this time following renal funciton for improvement.   will need nephro weigh on timing for discharge

## 2022-12-27 NOTE — PROGRESS NOTE ADULT - SUBJECTIVE AND OBJECTIVE BOX
patient seen and examned  no fever spikes   Patient non verbal.   was on wrist restraints last night  s/p Midline   Review of Systems:  nonverbal    Objective:  Vitals  T(C): 36.5 (12-27-22 @ 05:04), Max: 36.8 (12-26-22 @ 17:02)  HR: 74 (12-27-22 @ 05:04) (70 - 78)  BP: 150/68 (12-27-22 @ 05:04) (143/68 - 162/74)  RR: 18 (12-27-22 @ 05:04) (18 - 18)  SpO2: 97% (12-27-22 @ 05:04) (96% - 98%)    Physical Exam:  General: comfortable, no acute distress, well nourished  HEENT: Atraumatic, no LAD, trachea midline, PERRLA  Cardiovascular: normal s1s2, no murmurs, gallops or fricition rubs  Pulmonary: clear to ausculation Bilaterally, no wheezing , rhonchi  Gastrointestinal: soft non tender non distended, no masses felt, no organomegally  Muscloskeletal:  No clubbing, cyanosis. + legs edema improved. not able to participate in ROM exam. + midline left arm   NERVOUS SYSTEM: Limited exam due to no patient participation. no facial droop observed.   Psychiatry: somnolent. non verbal.  Neurological: CN II-12 intact. No focal weakness  SKIN: no rash, lesions or ulcers    Labs:                          10.2   7.80  )-----------( 329      ( 26 Dec 2022 09:15 )             32.1     12-26    143  |  116<H>  |  52<H>  ----------------------------<  144<H>  4.5   |  17<L>  |  5.91<H>    Ca    7.2<L>      26 Dec 2022 09:15              Active Medications  MEDICATIONS  (STANDING):  amLODIPine   Tablet 10 milliGRAM(s) Oral daily  apixaban 10 milliGRAM(s) Oral two times a day  aspirin  chewable 81 milliGRAM(s) Oral daily  cloNIDine Patch 0.3 mG/24Hr(s) 1 patch Transdermal every 7 days  cyanocobalamin 1000 MICROGram(s) Oral daily  folic acid 1 milliGRAM(s) Oral daily  hydrALAZINE 100 milliGRAM(s) Oral every 8 hours  influenza  Vaccine (HIGH DOSE) 0.7 milliLiter(s) IntraMuscular once  levETIRAcetam 500 milliGRAM(s) Oral two times a day  simvastatin 20 milliGRAM(s) Oral at bedtime  sodium bicarbonate 1300 milliGRAM(s) Oral two times a day    MEDICATIONS  (PRN):  acetaminophen  Suppository .. 650 milliGRAM(s) Rectal every 8 hours PRN Temp greater or equal to 38C (100.4F)  melatonin 3 milliGRAM(s) Oral at bedtime PRN Insomnia  metoprolol tartrate Injectable 5 milliGRAM(s) IV Push every 6 hours PRN sbp>160 dbp >110

## 2022-12-27 NOTE — DISCHARGE NOTE NURSING/CASE MANAGEMENT/SOCIAL WORK - PATIENT PORTAL LINK FT
You can access the FollowMyHealth Patient Portal offered by [facility] by registering at the following website: [email or web address]. By joining [employer]’s FollowMyHealth portal, you will also be able to view your health information using other applications (apps) compatible with our system.

## 2022-12-27 NOTE — DISCHARGE NOTE NURSING/CASE MANAGEMENT/SOCIAL WORK - NSDCPEFALRISK_GEN_ALL_CORE
For information on Fall & Injury Prevention, visit: [email or web address] OR  [email or web address] OR  https://www.cdc.gov/steadi/patient.html

## 2022-12-27 NOTE — PROGRESS NOTE ADULT - SUBJECTIVE AND OBJECTIVE BOX
[facility] NEPHROLOGY SERVICES, [facility]  NEPHROLOGY AND HYPERTENSION  [address]  [address]  [place] [address]  [phone]    MD YECENIA CRAVEN MD ANDREY GONCHARUK, MD MADHU KORRAPATI, MD YELENA ROSENBERG, MD BINNY KOSHY, MD CHRISTOPHER CAPUTO, MD VICTORINA MICHELLE MD          Patient events noted  No distress feels well   Family at bedside    MEDICATIONS  (STANDING):  amLODIPine   Tablet 10 milliGRAM(s) Oral daily  apixaban 10 milliGRAM(s) Oral two times a day  aspirin  chewable 81 milliGRAM(s) Oral daily  cloNIDine Patch 0.3 mG/24Hr(s) 1 patch Transdermal every 7 days  cyanocobalamin 1000 MICROGram(s) Oral daily  folic acid 1 milliGRAM(s) Oral daily  hydrALAZINE 100 milliGRAM(s) Oral every 8 hours  influenza  Vaccine (HIGH DOSE) 0.7 milliLiter(s) IntraMuscular once  levETIRAcetam 500 milliGRAM(s) Oral two times a day  simvastatin 20 milliGRAM(s) Oral at bedtime  sodium bicarbonate 1300 milliGRAM(s) Oral two times a day    MEDICATIONS  (PRN):  acetaminophen  Suppository .. 650 milliGRAM(s) Rectal every 8 hours PRN Temp greater or equal to 38C (100.4F)  melatonin 3 milliGRAM(s) Oral at bedtime PRN Insomnia  metoprolol tartrate Injectable 5 milliGRAM(s) IV Push every 6 hours PRN sbp>160 dbp >110      22 @ 07:01  -  22 @ 07:00  --------------------------------------------------------  IN: 240 mL / OUT: 0 mL / NET: 240 mL      PHYSICAL EXAM:      T(C): 36.3 (22 @ 11:45), Max: 36.8 (22 @ 17:02)  HR: 78 (22 @ 11:45) (70 - 78)  BP: 156/69 (22 @ 11:45) (150/68 - 162/74)  RR: 16 (22 @ 11:45) (16 - 18)  SpO2: 97% (22 @ 11:45) (96% - 98%)  Wt(kg): --  Lungs clear  Heart S1S2  Abd soft NT ND  Extremities:   tr edema                                    10.2   7.56  )-----------( 350      ( 27 Dec 2022 11:15 )             32.4         142  |  114<H>  |  51<H>  ----------------------------<  161<H>  4.5   |  18<L>  |  5.76<H>    Ca    7.7<L>      27 Dec 2022 11:15  Phos  4.9       Mg     1.8         Neutrophil Cytoplasmic Antibody (22 @ 07:51)    Cytoplasmic (c-ANCA) Antibody: Negative    Perinuclear (p-ANCA) Antibody: Negative    Atypical ANCA: Indeterminate Method interference due to CYNDEE fluorescence    Anti-Nuclear Antibody in AM (22 @ 07:51)    Anti Nuclear Factor Titer: Negative: Antinuclear AB (CYNDEE), IFA Method    Acute Hepatitis Panel (22 @ 07:51)    Hepatitis C Virus Interpretation: Nonreact: Hepatitis C AB  S/CO Ratio                        Interpretation  < 1.00                                   Non-Reactive  1.00 - 4.99                         Weakly-Reactive  >= 5.00                                Reactive      Basic Metabolic Panel in AM (22 @ 08:11)    Creatinine, Serum: 1.82 mg/dL      < from: US Renal (22 @ 01:23) >    IMPRESSION:  Atrophic echogenic kidneys suggesting chronic medical renal disease.    Nonobstructing left renal stone.    No hydronephrosis of either kidney.    Trend Bun/Cr  22 @ 11:15  BUN/CR -  51<H> / 5.76<H>  22 @ 09:15  BUN/CR -  52<H> / 5.91<H>  22 @ 08:15  BUN/CR -  48<H> / 5.76<H>  22 @ 06:00  BUN/CR -  50<H> / 5.89<H>  22 @ 07:40  BUN/CR -  54<H> / 6.11<H>  22 @ 07:51  BUN/CR -  55<H> / 6.15<H>  22 @ 03:30  BUN/CR -  54<H> / 6.02<H>  22 @ 17:18  BUN/CR -  54<H> / 6.05<H>  22 @ 08:11  BUN/CR -  19 / 1.82<H>  02-10-22 @ 08:08  BUN/CR -  20 / 1.76<H>  22 @ 07:37  BUN/CR -  26<H> / 1.93<H>  22 @ 07:13  BUN/CR -  34<H> / 2.12<H>    Protein/Creatinine Ratio, Urine (. @ 08:40)    Total Protein, Random Urine: 403: Test Repeated mg/dL    Protein/Creatinine Ratio Calculation: 6.8: Test Repeated Ratio    Creatinine, Random Urine: 59: Reference Ranges have NOT been established for random urine analytes due  to variability in fluid intake and concentration. mg/dL    Immunoelectrophoresis, Serum (22 @ 07:51)    Protein Total, Serum: 5.1 g/dL    Total Protein, Serum: 5.1 g/dL    Quantitative Ig mg/dL    Quantitative IgA: 341 mg/dL    Quantitative IgM: 62 mg/dL    MARY Kappa: 14.70 mg/dL    MARY Lambda: 7.10 mg/dL    [place]/Lambda Free Light Chain Ratio, Serum: 2.07 Ratio        Creatinine Trend: 5.76<--, 5.91<--, 5.76<--, 5.89<--, 6.11<--, 6.15<--      Assessment   AMADOU on CKD 3 ( Cr 1.8 ) HTN, DM and proteinuria   Disparate renal size; likely underlying renovascular disease   UTI, Klebsiella, ; risk fo infectious AIN; hx  nephrolithiasis   Nephrotic syndrome, elevated urine protein  No overt serological evidence of CTD and vasculitic GN;   Metabolic acidosis    Plan  Hold diuresis  Complete antibiotic course;   Overall conservative management as per family Luis Enrique Oakes MD [facility] NEPHROLOGY SERVICES, [facility]  NEPHROLOGY AND HYPERTENSION  [address]  [address]  [place] [address]  [phone]    MD YECENIA CRAVEN MD ANDREY GONCHARUK, MD MADHU KORRAPATI, MD YELENA ROSENBERG, MD BINNY KOSHY, MD CHRISTOPHER CAPUTO, MD VICTORINA MICHELLE MD          Patient events noted  No distress feels well   Family at bedside    MEDICATIONS  (STANDING):  amLODIPine   Tablet 10 milliGRAM(s) Oral daily  apixaban 10 milliGRAM(s) Oral two times a day  aspirin  chewable 81 milliGRAM(s) Oral daily  cloNIDine Patch 0.3 mG/24Hr(s) 1 patch Transdermal every 7 days  cyanocobalamin 1000 MICROGram(s) Oral daily  folic acid 1 milliGRAM(s) Oral daily  hydrALAZINE 100 milliGRAM(s) Oral every 8 hours  influenza  Vaccine (HIGH DOSE) 0.7 milliLiter(s) IntraMuscular once  levETIRAcetam 500 milliGRAM(s) Oral two times a day  simvastatin 20 milliGRAM(s) Oral at bedtime  sodium bicarbonate 1300 milliGRAM(s) Oral two times a day    MEDICATIONS  (PRN):  acetaminophen  Suppository .. 650 milliGRAM(s) Rectal every 8 hours PRN Temp greater or equal to 38C (100.4F)  melatonin 3 milliGRAM(s) Oral at bedtime PRN Insomnia  metoprolol tartrate Injectable 5 milliGRAM(s) IV Push every 6 hours PRN sbp>160 dbp >110      22 @ 07:01  -  22 @ 07:00  --------------------------------------------------------  IN: 240 mL / OUT: 0 mL / NET: 240 mL      PHYSICAL EXAM:      T(C): 36.3 (22 @ 11:45), Max: 36.8 (22 @ 17:02)  HR: 78 (22 @ 11:45) (70 - 78)  BP: 156/69 (22 @ 11:45) (150/68 - 162/74)  RR: 16 (22 @ 11:45) (16 - 18)  SpO2: 97% (22 @ 11:45) (96% - 98%)  Wt(kg): --  Lungs clear  Heart S1S2  Abd soft NT ND  Extremities:   tr edema                                    10.2   7.56  )-----------( 350      ( 27 Dec 2022 11:15 )             32.4         142  |  114<H>  |  51<H>  ----------------------------<  161<H>  4.5   |  18<L>  |  5.76<H>    Ca    7.7<L>      27 Dec 2022 11:15  Phos  4.9       Mg     1.8         Neutrophil Cytoplasmic Antibody (22 @ 07:51)    Cytoplasmic (c-ANCA) Antibody: Negative    Perinuclear (p-ANCA) Antibody: Negative    Atypical ANCA: Indeterminate Method interference due to CYNDEE fluorescence    Anti-Nuclear Antibody in AM (22 @ 07:51)    Anti Nuclear Factor Titer: Negative: Antinuclear AB (CYNDEE), IFA Method    Acute Hepatitis Panel (22 @ 07:51)    Hepatitis C Virus Interpretation: Nonreact: Hepatitis C AB  S/CO Ratio                        Interpretation  < 1.00                                   Non-Reactive  1.00 - 4.99                         Weakly-Reactive  >= 5.00                                Reactive      Basic Metabolic Panel in AM (22 @ 08:11)    Creatinine, Serum: 1.82 mg/dL      < from: US Renal (22 @ 01:23) >    IMPRESSION:  Atrophic echogenic kidneys suggesting chronic medical renal disease.    Nonobstructing left renal stone.    No hydronephrosis of either kidney.    Trend Bun/Cr  22 @ 11:15  BUN/CR -  51<H> / 5.76<H>  22 @ 09:15  BUN/CR -  52<H> / 5.91<H>  22 @ 08:15  BUN/CR -  48<H> / 5.76<H>  22 @ 06:00  BUN/CR -  50<H> / 5.89<H>  22 @ 07:40  BUN/CR -  54<H> / 6.11<H>  22 @ 07:51  BUN/CR -  55<H> / 6.15<H>  22 @ 03:30  BUN/CR -  54<H> / 6.02<H>  22 @ 17:18  BUN/CR -  54<H> / 6.05<H>  22 @ 08:11  BUN/CR -  19 / 1.82<H>  02-10-22 @ 08:08  BUN/CR -  20 / 1.76<H>  22 @ 07:37  BUN/CR -  26<H> / 1.93<H>  22 @ 07:13  BUN/CR -  34<H> / 2.12<H>    Protein/Creatinine Ratio, Urine (22 @ 08:40)    Total Protein, Random Urine: 403: Test Repeated mg/dL    Protein/Creatinine Ratio Calculation: 6.8: Test Repeated Ratio    Creatinine, Random Urine: 59: Reference Ranges have NOT been established for random urine analytes due  to variability in fluid intake and concentration. mg/dL    Immunoelectrophoresis, Serum (22 @ 07:51)    Protein Total, Serum: 5.1 g/dL    Total Protein, Serum: 5.1 g/dL    Quantitative Ig mg/dL    Quantitative IgA: 341 mg/dL    Quantitative IgM: 62 mg/dL    MARY Kappa: 14.70 mg/dL    MARY Lambda: 7.10 mg/dL    [place]/Lambda Free Light Chain Ratio, Serum: 2.07 Ratio      Comprehensive Metabolic Panel in AM (22 @ 07:40)      Albumin, Serum: 1.7 g/dL        Creatinine Trend: 5.76<--, 5.91<--, 5.76<--, 5.89<--, 6.11<--, 6.15<--      Assessment   AMADOU on CKD 3-4  ( Cr 1.8 ) HTN, DM and proteinuria   Disparate renal size; likely underlying renovascular disease   UTI, Klebsiella, ; risk fo infectious AIN; hx  nephrolithiasis   Nephrotic syndrome, elevated urine protein  No overt serological evidence of CTD and vasculitic GN;       Plan  Hold diuresis  Complete antibiotic course;   No immediate indications for HD  Await paraprotein screen  No objection to discharge home, visiting health care follow up with routine laboratory assessment and referral to my office as warranted.   Considering age, comorbid status and associated poor quality of life, I would not pursue invasive diagnostic testing or offer hemodialysis should indications arise.      Luis Enrique Oakes MD

## 2022-12-27 NOTE — DISCHARGE NOTE NURSING/CASE MANAGEMENT/SOCIAL WORK - NSDCVIVACCINE_GEN_ALL_CORE_FT
COVID-19, mRNA, LNP-S, PF, 100 mcg/ 0.5 mL dose (Moderna); 05-Jan-2022 14:11; Catherine Crawley (RN); [employer] [id number] (Exp. Date: 12-Jan-2022); IntraMuscular; Deltoid Left.; 0.25 milliLiter(s);   influenza, injectable, quadrivalent, preservative free; 11-Oct-2017 11:36; Denia Duron (RN); [employer]; 572kt; IntraMuscular; Deltoid Left.; 0.5 milliLiter(s); VIS (VIS Published: 07-Aug-2015, VIS Presented: 11-Oct-2017);

## 2022-12-27 NOTE — PROGRESS NOTE ADULT - PROVIDER SPECIALTY LIST ADULT
Hospitalist
Nephrology
Hospitalist
Internal Medicine
Internal Medicine
Nephrology
Nephrology
Hospitalist

## 2022-12-28 ENCOUNTER — TRANSCRIPTION ENCOUNTER (OUTPATIENT)
Age: 72
End: 2022-12-28

## 2022-12-28 VITALS
HEART RATE: 84 BPM | RESPIRATION RATE: 18 BRPM | OXYGEN SATURATION: 98 % | TEMPERATURE: 98 F | SYSTOLIC BLOOD PRESSURE: 150 MMHG | DIASTOLIC BLOOD PRESSURE: 82 MMHG

## 2022-12-28 LAB
ANION GAP SERPL CALC-SCNC: 8 MMOL/L — SIGNIFICANT CHANGE UP (ref 5–17)
BUN SERPL-MCNC: 49 MG/DL — HIGH (ref 7–23)
CALCIUM SERPL-MCNC: 7 MG/DL — LOW (ref 8.5–10.1)
CHLORIDE SERPL-SCNC: 116 MMOL/L — HIGH (ref 96–108)
CO2 SERPL-SCNC: 18 MMOL/L — LOW (ref 22–31)
CREAT SERPL-MCNC: 5.76 MG/DL — HIGH (ref 0.5–1.3)
CULTURE RESULTS: SIGNIFICANT CHANGE UP
CULTURE RESULTS: SIGNIFICANT CHANGE UP
EGFR: 7 ML/MIN/1.73M2 — LOW
GBM IGG SER-ACNC: <0.2 — SIGNIFICANT CHANGE UP (ref 0–0.9)
GLUCOSE SERPL-MCNC: 85 MG/DL — SIGNIFICANT CHANGE UP (ref 70–99)
POTASSIUM SERPL-MCNC: 4.9 MMOL/L — SIGNIFICANT CHANGE UP (ref 3.5–5.3)
POTASSIUM SERPL-SCNC: 4.9 MMOL/L — SIGNIFICANT CHANGE UP (ref 3.5–5.3)
SODIUM SERPL-SCNC: 142 MMOL/L — SIGNIFICANT CHANGE UP (ref 135–145)
SPECIMEN SOURCE: SIGNIFICANT CHANGE UP
SPECIMEN SOURCE: SIGNIFICANT CHANGE UP

## 2022-12-28 PROCEDURE — 99239 HOSP IP/OBS DSCHRG MGMT >30: CPT

## 2022-12-28 RX ORDER — APIXABAN 2.5 MG/1
1 TABLET, FILM COATED ORAL
Qty: 90 | Refills: 0
Start: 2022-12-28 | End: 2023-03-27

## 2022-12-28 RX ORDER — PREGABALIN 225 MG/1
1 CAPSULE ORAL
Qty: 14 | Refills: 0
Start: 2022-12-28 | End: 2023-01-10

## 2022-12-28 RX ORDER — SODIUM BICARBONATE 1 MEQ/ML
2 SYRINGE (ML) INTRAVENOUS
Qty: 120 | Refills: 0
Start: 2022-12-28 | End: 2023-01-26

## 2022-12-28 RX ORDER — FOLIC ACID 0.8 MG
1 TABLET ORAL
Qty: 30 | Refills: 0
Start: 2022-12-28 | End: 2023-01-26

## 2022-12-28 RX ORDER — PREGABALIN 225 MG/1
1 CAPSULE ORAL
Qty: 0 | Refills: 0 | DISCHARGE
Start: 2022-12-28

## 2022-12-28 RX ADMIN — Medication 1 MILLIGRAM(S): at 11:44

## 2022-12-28 RX ADMIN — PREGABALIN 1000 MICROGRAM(S): 225 CAPSULE ORAL at 11:44

## 2022-12-28 RX ADMIN — Medication 100 MILLIGRAM(S): at 05:20

## 2022-12-28 RX ADMIN — LEVETIRACETAM 500 MILLIGRAM(S): 250 TABLET, FILM COATED ORAL at 05:20

## 2022-12-28 RX ADMIN — Medication 1300 MILLIGRAM(S): at 05:20

## 2022-12-28 RX ADMIN — AMLODIPINE BESYLATE 10 MILLIGRAM(S): 2.5 TABLET ORAL at 05:20

## 2022-12-28 RX ADMIN — Medication 1 PATCH: at 07:39

## 2022-12-28 RX ADMIN — APIXABAN 10 MILLIGRAM(S): 2.5 TABLET, FILM COATED ORAL at 05:20

## 2022-12-28 RX ADMIN — Medication 81 MILLIGRAM(S): at 11:44

## 2022-12-28 NOTE — DISCHARGE NOTE PROVIDER - CARE PROVIDER_API CALL
Luis Enrique Oakes)  Internal Medicine; Nephrology  [address], [address] [place] [id number]  Phone: [phone]  Fax: [phone]  Follow Up Time: 2 weeks

## 2022-12-28 NOTE — DISCHARGE NOTE PROVIDER - NSDCCPCAREPLAN_GEN_ALL_CORE_FT
PRINCIPAL DISCHARGE DIAGNOSIS  Diagnosis: AMADOU (acute kidney injury)  Assessment and Plan of Treatment:   AMADOU marlo due to AIN and Nephrotic syndrome  baseline cr at 1.8  stabilized to 5.76  +lower extremity edema  CHF is ruled out. no more diuretics. improved.   PLEASE continue sodium bicarbonate for metabolic acidosis  Please followup with Nephrology in two weeks  please obtain a followup Chemistry panel Monday  Please do watch for fluid overload respiratory distress or low to urine outpt  Lower extremity DVT distal popliteal vein.   Cont eliquis. watch for any active gross bleeding.   dementia   seizure  - keppra 500 mg po bid  Hypertension.   - controlled Cont Increased hydralazine again. cont other meds. Monitor.   sepsis with klebsiella pneumonia UTI. looks recurrent given past UTIs. Finished zosyn  Urinary retention: you passed voiding trial  Fibroid. outpatient gyn follow up is recommended.         SECONDARY DISCHARGE DIAGNOSES  Diagnosis: Acute deep vein thrombosis of leg  Assessment and Plan of Treatment:

## 2022-12-28 NOTE — DISCHARGE NOTE PROVIDER - NSDCMRMEDTOKEN_GEN_ALL_CORE_FT
amLODIPine 10 mg oral tablet: 1 tab(s) orally once a day  aspirin 81 mg oral delayed release tablet: 1 tab(s) orally once a day  cloNIDine 0.1 mg oral tablet: 1 tab(s) orally every 8 hours  cyanocobalamin 1000 mcg oral tablet: 1 tab(s) orally once a day  cyanocobalamin 1000 mcg oral tablet: 1 tab(s) orally once a day  folic acid 1 mg oral tablet: 1 tab(s) orally once a day  hydrALAZINE 25 mg oral tablet: 1 tab(s) orally 3 times a day  Keppra 500 mg oral tablet: 1 tab(s) orally 2 times a day  metoprolol tartrate 50 mg oral tablet: 1 tab(s) orally 2 times a day  simvastatin 20 mg oral tablet: 1 tab(s) orally once a day (at bedtime)  sodium bicarbonate 650 mg oral tablet: 2 tab(s) orally 2 times a day

## 2022-12-28 NOTE — DISCHARGE NOTE PROVIDER - ATTENDING DISCHARGE PHYSICAL EXAMINATION:
Objective:    Vitals:  T(C): 36.8 (12-28-22 @ 11:16), Max: 36.8 (12-27-22 @ 17:36)  HR: 88 (12-28-22 @ 11:16) (78 - 88)  BP: 142/80 (12-28-22 @ 11:16) (142/80 - 156/69)  RR: 18 (12-28-22 @ 11:16) (16 - 18)  SpO2: 98% (12-28-22 @ 11:16) (97% - 98%)    Physical Exam:  General: comfortable, no acute distress, well nourished  HEENT: Atraumatic, no LAD, trachea midline, PERRLA  Cardiovascular: normal s1s2, no murmurs, gallops or fricition rubs  Pulmonary: clear to ausculation Bilaterally, no wheezing , rhonchi  Gastrointestinal: soft non tender non distended, no masses felt, no organomegally  Muscloskeletal: no lower extremity edema, intact bilateral lower extremity pulses  Neurological: CN II-12 intact. No focal weakness  Psychiatrical: normal mood, cooperative  SKIN: no rash, lesions or ulcers

## 2022-12-28 NOTE — CHART NOTE - NSCHARTNOTEFT_GEN_A_CORE
GOC are clear at this time time.  Please reconsult palliative should GOC change or symptoms arise.  Thank you.
Hospitalist Medicine NP Note.    Patient requiring Peripheral IV access for medical management. Under US guidance identified Right UE vein, prox  to AC and successfully placed 20g x 1.88 inch Angiocath into vessel. Placement confirmed s/p with ultrasound and catheter determined to be in patent lumen of vein. Pt tolerated well w/o complication.    Mary Jo Espinoza NP- C.
I was called and informed by radiologist that patient has acute DVT in left lower extremity involving popliteal vein.  I have ordered Heparin drip per DVT/PE protocol. May ultimately need DOAC.
OT encountered pts sister Amaya and YOHANSANTIAGO Manrique at bedside. Pts sister Amaya was able to provide PLOF and social history.
Hospitalist Medicine NP     FATOU MCBRIDE    Notified by RN patient with critical lab result H/H 6.8/ 21.0    Interventions taken: CBC Stat  Type and screen stat  1 PRBC to transfuse  labs retic count, serum iron, ferritin, TIBC, Folate, vitamin B12 level.  Dr. Ortez aware.    Mary Jo Espinoza, CECI-C
Hospitalist Medicine NP    PROCEDURE NOTE:  Mid Line removal  Site: left upper arm    Requested by PMD to remove the Midline. Single lumen 15 cm removed.  Explained the procedure. Catheter removed and tip intact. No hematoma, mild bleeding noted. Bleeding stopped DSD applied. Patient tolerated procedure well.  RN aware.    Mary Jo Espinoza, CECI- C.

## 2022-12-28 NOTE — DISCHARGE NOTE PROVIDER - HOSPITAL COURSE
73 yo female w/ pmhx of dementia- nonverbal at baseline, CVA w/ resdiual right sided weakness, HTN, seizures, gout admitted to [facility] for AMADOU (creatinine baseline 1.82, now 6.05) and lower extremity edema    AMADOU lilely due to AIN and Nephrotic syndrome  baseline cr at 1.8  stabilized to 5.76  +lower extremity edema   CHF is ruled out. no more diuretics. improved.   ot much improvement in creatinine. renal on board. patient on sodium bicarbonate for metabolic acidosis.     lower extremity DVT distal popliteal vein.   Cont eliquis. watch for any active gross bleeding.      dementia   seizure  - keppra 500 mg po bid    Hypertension.   - controlled Cont Increased hydralazine again. cont other meds. Monitor.     sepsis with klebsiella pneumonia UTI. looks recurrent given past UTIs. Finished zosyn    Urinary retention. start voiding trial today. Discussed with nurse. .       Fibroid. outpatient gyn follow up is recommended.

## 2022-12-29 LAB — M PROTEIN 24H UR ELPH-MRATE: SIGNIFICANT CHANGE UP

## 2023-01-03 DIAGNOSIS — R32 UNSPECIFIED URINARY INCONTINENCE: ICD-10-CM

## 2023-01-03 DIAGNOSIS — D63.1 ANEMIA IN CHRONIC KIDNEY DISEASE: ICD-10-CM

## 2023-01-03 DIAGNOSIS — E87.0 HYPEROSMOLALITY AND HYPERNATREMIA: ICD-10-CM

## 2023-01-03 DIAGNOSIS — E87.20 ACIDOSIS, UNSPECIFIED: ICD-10-CM

## 2023-01-03 DIAGNOSIS — H53.9 UNSPECIFIED VISUAL DISTURBANCE: ICD-10-CM

## 2023-01-03 DIAGNOSIS — F03.90 UNSPECIFIED DEMENTIA, UNSPECIFIED SEVERITY, WITHOUT BEHAVIORAL DISTURBANCE, PSYCHOTIC DISTURBANCE, MOOD DISTURBANCE, AND ANXIETY: ICD-10-CM

## 2023-01-03 DIAGNOSIS — I82.433 ACUTE EMBOLISM AND THROMBOSIS OF POPLITEAL VEIN, BILATERAL: ICD-10-CM

## 2023-01-03 DIAGNOSIS — M19.09 PRIMARY OSTEOARTHRITIS, OTHER SPECIFIED SITE: ICD-10-CM

## 2023-01-03 DIAGNOSIS — I12.9 HYPERTENSIVE CHRONIC KIDNEY DISEASE WITH STAGE 1 THROUGH STAGE 4 CHRONIC KIDNEY DISEASE, OR UNSPECIFIED CHRONIC KIDNEY DISEASE: ICD-10-CM

## 2023-01-03 DIAGNOSIS — M19.011 PRIMARY OSTEOARTHRITIS, RIGHT SHOULDER: ICD-10-CM

## 2023-01-03 DIAGNOSIS — I69.351 HEMIPLEGIA AND HEMIPARESIS FOLLOWING CEREBRAL INFARCTION AFFECTING RIGHT DOMINANT SIDE: ICD-10-CM

## 2023-01-03 DIAGNOSIS — M10.9 GOUT, UNSPECIFIED: ICD-10-CM

## 2023-01-03 DIAGNOSIS — K56.41 FECAL IMPACTION: ICD-10-CM

## 2023-01-03 DIAGNOSIS — E53.8 DEFICIENCY OF OTHER SPECIFIED B GROUP VITAMINS: ICD-10-CM

## 2023-01-03 DIAGNOSIS — E87.6 HYPOKALEMIA: ICD-10-CM

## 2023-01-03 DIAGNOSIS — D21.9 BENIGN NEOPLASM OF CONNECTIVE AND OTHER SOFT TISSUE, UNSPECIFIED: ICD-10-CM

## 2023-01-03 DIAGNOSIS — E11.22 TYPE 2 DIABETES MELLITUS WITH DIABETIC CHRONIC KIDNEY DISEASE: ICD-10-CM

## 2023-01-03 DIAGNOSIS — N18.30 CHRONIC KIDNEY DISEASE, STAGE 3 UNSPECIFIED: ICD-10-CM

## 2023-01-03 DIAGNOSIS — M17.0 BILATERAL PRIMARY OSTEOARTHRITIS OF KNEE: ICD-10-CM

## 2023-01-03 DIAGNOSIS — N17.9 ACUTE KIDNEY FAILURE, UNSPECIFIED: ICD-10-CM

## 2023-01-03 DIAGNOSIS — N39.0 URINARY TRACT INFECTION, SITE NOT SPECIFIED: ICD-10-CM

## 2023-01-03 DIAGNOSIS — G40.909 EPILEPSY, UNSPECIFIED, NOT INTRACTABLE, WITHOUT STATUS EPILEPTICUS: ICD-10-CM

## 2023-01-03 DIAGNOSIS — A41.59 OTHER GRAM-NEGATIVE SEPSIS: ICD-10-CM

## 2023-01-03 DIAGNOSIS — J45.909 UNSPECIFIED ASTHMA, UNCOMPLICATED: ICD-10-CM

## 2023-01-03 DIAGNOSIS — M19.012 PRIMARY OSTEOARTHRITIS, LEFT SHOULDER: ICD-10-CM

## 2023-01-09 NOTE — PROGRESS NOTE ADULT - SUBJECTIVE AND OBJECTIVE BOX
Forms/Letter Request    Type of form/letter: forms recieved from [facility]   placed on providers desk for response     Have you been seen for this request:  Do we have the form/letter:  When is form/letter needed by:    How would you like the form/letter returned: fax to [phone]  Patient Notified form requests are processed in 3-5 business days    Could we send this information to you in [facility] or would you prefer to receive a phone call?:       Subjective Complaints:  Historian:    69 y o woman admitted for change in mental status .H/O HTN ,DM ,KIDNEY FAILURE .     REVIEW OF SYSTEMS:  Eyes:  Good vision, no reported pain  ENT:  No sore throat, pain, runny nose, dysphagia  CV:  No pain, palpitatioins, hypo/hypertension  Resp:  No dyspnea, cough, tachypnea, wheezing  GI:  No pain, nausea, vomiting, diarrhea, constipatiion  Muscle:  No pain, weakness  Neuro:  No weakness, tingling, memory problems  Psych:  No fatigue, insomnia, mood problems, depression  Endocrine:  No polyuria, polydypsia, cold/heat intolerance    Vital Signs Last 24 Hrs  T(C): 37.2 (02 Aug 2020 12:06), Max: 37.6 (02 Aug 2020 05:31)  T(F): 98.9 (02 Aug 2020 12:06), Max: 99.7 (02 Aug 2020 05:31)  HR: 86 (02 Aug 2020 12:06) (82 - 107)  BP: 146/74 (02 Aug 2020 12:06) (127/67 - 154/79)  BP(mean): --  RR: 19 (02 Aug 2020 12:06) (18 - 19)  SpO2: 97% (02 Aug 2020 12:06) (97% - 100%)    GENERAL PHYSICAL EXAM:  General:  Appears stated age, well-groomed, well-nourished, no distress  HEENT:  NC/AT, patent nares w/ pink mucosa, OP clear w/o lesions, PERRL, EOMI, conjunctivae clear, no thyromegaly, nodules, adenopathy, no JVD  Chest:  Full & symmetric excursion, no increased effort, breath sounds clear  Cardiovascular:  Regular rhythm, S1, S2, no murmur/rub/S3/S4, no carotid/femoral/abdominal bruit, radial/pedal pulses 2+, no edema  Abdomen:  Soft, non-tender, non-distended, normoactive bowel sounds, no HSM  Extremities:  Gait & station:   Digits:   Nails:   Joints, Bones, Muscles:   ROM:   Stability:  Skin:  No rash/erythema/ecchymoses/petechiae/wounds/abscess/warm/dry  Musculoskeletal:  Full ROM in all joints w/o swelling/tenderness/effusion    NEUROLOGICAL EXAM:  HENT:  Normocephalic head; atraumatic head.  Neck supple.  ENT: normal looking.  Mental State:    Awake ,oriented to self only   Cranial Nerves:  II-XII:   Pupils round and reactive to light and accommodation.  Extraocular movements full.    Facial symmetry intact.  Tongue midline.  Motor Functions:  Motor strength is 2/5 throughout     Sensory Functions: unreliable    Reflexes:  Deep tendon reflexes normoactive to biceps, absent at both knees and ankles   Cerebellar Testing:   unreliable   Gait : in need of assistance to stand     LABS:                        7.8    9.43  )-----------( 263      ( 02 Aug 2020 10:15 )             23.3         143  |  112<H>  |  7   ----------------------------<  118<H>  3.3<L>   |  26  |  0.87    Ca    7.5<L>      02 Aug 2020 10:17  Phos  2.5       Mg     1.5         TPro  5.5<L>  /  Alb  2.1<L>  /  TBili  0.4  /  DBili  x   /  AST  41<H>  /  ALT  29  /  AlkPhos  90          Urinalysis Basic - ( 01 Aug 2020 16:32 )    Color: Yellow / Appearance: very cloudy / S.010 / pH: x  Gluc: x / Ketone: Negative  / Bili: Negative / Urobili: Negative mg/dL   Blood: x / Protein: 100 mg/dL / Nitrite: Negative   Leuk Esterase: Moderate / RBC: 0-2 /HPF / WBC >50   Sq Epi: x / Non Sq Epi: x / Bacteria: Moderate        RADIOLOGY & ADDITIONAL STUDIES:    POCT  Blood Glucose: 15:43 ( @ 15:44)  Indwelling Urethral Catheter:     Connect To:  Straight Drainage/[place]    Indication:  Urinary Retention / Obstruction ( @ 16:08)  Urine Microscopic-Add On (NC): 15:00 ( @ 16:36)  POCT  Blood Glucose: 21:42 ( @ 21:43)  POCT  Blood Glucose: 07:30 ( @ 07:31)  Complete Blood Count: STAT ( @ 09:24)  Magnesium, Serum: Routine ( @ 09:24)  Phosphorus Level, Serum: Routine ( @ 09:24)  Comprehensive Metabolic Panel: Routine ( @ 09:24)  POCT  Blood Glucose: 11:02 ( @ 11:10)  POCT  Blood Glucose: 15:29 ( @ 15:30)   DX CHANGE IN MENTAL STATUS --SEIZURE     Recommendations:    EEG.   DVT prophylaxis as ordered.  Medications:  CONTINUE MEDS

## 2023-01-16 ENCOUNTER — EMERGENCY (EMERGENCY)
Facility: HOSPITAL | Age: 73
LOS: 0 days | Discharge: HOME | End: 2023-01-16
Attending: STUDENT IN AN ORGANIZED HEALTH CARE EDUCATION/TRAINING PROGRAM
Payer: MEDICARE

## 2023-01-16 VITALS
HEART RATE: 65 BPM | OXYGEN SATURATION: 99 % | DIASTOLIC BLOOD PRESSURE: 78 MMHG | RESPIRATION RATE: 16 BRPM | SYSTOLIC BLOOD PRESSURE: 145 MMHG

## 2023-01-16 VITALS
DIASTOLIC BLOOD PRESSURE: 77 MMHG | HEART RATE: 55 BPM | RESPIRATION RATE: 19 BRPM | OXYGEN SATURATION: 98 % | TEMPERATURE: 99 F | HEIGHT: 63 IN | SYSTOLIC BLOOD PRESSURE: 190 MMHG | WEIGHT: 154.98 LBS

## 2023-01-16 DIAGNOSIS — Z79.82 LONG TERM (CURRENT) USE OF ASPIRIN: ICD-10-CM

## 2023-01-16 DIAGNOSIS — M79.89 OTHER SPECIFIED SOFT TISSUE DISORDERS: ICD-10-CM

## 2023-01-16 DIAGNOSIS — Z86.73 PERSONAL HISTORY OF TRANSIENT ISCHEMIC ATTACK (TIA), AND CEREBRAL INFARCTION WITHOUT RESIDUAL DEFICITS: ICD-10-CM

## 2023-01-16 DIAGNOSIS — Z79.01 LONG TERM (CURRENT) USE OF ANTICOAGULANTS: ICD-10-CM

## 2023-01-16 DIAGNOSIS — Z86.718 PERSONAL HISTORY OF OTHER VENOUS THROMBOSIS AND EMBOLISM: ICD-10-CM

## 2023-01-16 DIAGNOSIS — R00.0 TACHYCARDIA, UNSPECIFIED: ICD-10-CM

## 2023-01-16 DIAGNOSIS — N20.0 CALCULUS OF KIDNEY: Chronic | ICD-10-CM

## 2023-01-16 DIAGNOSIS — F03.90 UNSPECIFIED DEMENTIA, UNSPECIFIED SEVERITY, WITHOUT BEHAVIORAL DISTURBANCE, PSYCHOTIC DISTURBANCE, MOOD DISTURBANCE, AND ANXIETY: ICD-10-CM

## 2023-01-16 DIAGNOSIS — N18.9 CHRONIC KIDNEY DISEASE, UNSPECIFIED: ICD-10-CM

## 2023-01-16 DIAGNOSIS — I12.9 HYPERTENSIVE CHRONIC KIDNEY DISEASE WITH STAGE 1 THROUGH STAGE 4 CHRONIC KIDNEY DISEASE, OR UNSPECIFIED CHRONIC KIDNEY DISEASE: ICD-10-CM

## 2023-01-16 DIAGNOSIS — E11.22 TYPE 2 DIABETES MELLITUS WITH DIABETIC CHRONIC KIDNEY DISEASE: ICD-10-CM

## 2023-01-16 LAB
ALBUMIN SERPL ELPH-MCNC: 2.5 G/DL — LOW (ref 3.3–5)
ALP SERPL-CCNC: 80 U/L — SIGNIFICANT CHANGE UP (ref 40–120)
ALT FLD-CCNC: 12 U/L — SIGNIFICANT CHANGE UP (ref 12–78)
ANION GAP SERPL CALC-SCNC: 7 MMOL/L — SIGNIFICANT CHANGE UP (ref 5–17)
AST SERPL-CCNC: 24 U/L — SIGNIFICANT CHANGE UP (ref 15–37)
BASOPHILS # BLD AUTO: 0.05 K/UL — SIGNIFICANT CHANGE UP (ref 0–0.2)
BASOPHILS NFR BLD AUTO: 1.1 % — SIGNIFICANT CHANGE UP (ref 0–2)
BILIRUB SERPL-MCNC: 0.4 MG/DL — SIGNIFICANT CHANGE UP (ref 0.2–1.2)
BUN SERPL-MCNC: 43 MG/DL — HIGH (ref 7–23)
CALCIUM SERPL-MCNC: 8.2 MG/DL — LOW (ref 8.5–10.1)
CHLORIDE SERPL-SCNC: 113 MMOL/L — HIGH (ref 96–108)
CO2 SERPL-SCNC: 26 MMOL/L — SIGNIFICANT CHANGE UP (ref 22–31)
CREAT SERPL-MCNC: 6.32 MG/DL — HIGH (ref 0.5–1.3)
EGFR: 7 ML/MIN/1.73M2 — LOW
EOSINOPHIL # BLD AUTO: 0.13 K/UL — SIGNIFICANT CHANGE UP (ref 0–0.5)
EOSINOPHIL NFR BLD AUTO: 2.8 % — SIGNIFICANT CHANGE UP (ref 0–6)
GLUCOSE SERPL-MCNC: 108 MG/DL — HIGH (ref 70–99)
HCT VFR BLD CALC: 33.8 % — LOW (ref 34.5–45)
HGB BLD-MCNC: 10.5 G/DL — LOW (ref 11.5–15.5)
IMM GRANULOCYTES NFR BLD AUTO: 0.2 % — SIGNIFICANT CHANGE UP (ref 0–0.9)
LYMPHOCYTES # BLD AUTO: 1.73 K/UL — SIGNIFICANT CHANGE UP (ref 1–3.3)
LYMPHOCYTES # BLD AUTO: 37.4 % — SIGNIFICANT CHANGE UP (ref 13–44)
MCHC RBC-ENTMCNC: 28.9 PG — SIGNIFICANT CHANGE UP (ref 27–34)
MCHC RBC-ENTMCNC: 31.1 G/DL — LOW (ref 32–36)
MCV RBC AUTO: 93.1 FL — SIGNIFICANT CHANGE UP (ref 80–100)
MONOCYTES # BLD AUTO: 0.46 K/UL — SIGNIFICANT CHANGE UP (ref 0–0.9)
MONOCYTES NFR BLD AUTO: 10 % — SIGNIFICANT CHANGE UP (ref 2–14)
NEUTROPHILS # BLD AUTO: 2.24 K/UL — SIGNIFICANT CHANGE UP (ref 1.8–7.4)
NEUTROPHILS NFR BLD AUTO: 48.5 % — SIGNIFICANT CHANGE UP (ref 43–77)
NRBC # BLD: 0 /100 WBCS — SIGNIFICANT CHANGE UP (ref 0–0)
NT-PROBNP SERPL-SCNC: 5104 PG/ML — HIGH (ref 0–125)
PLATELET # BLD AUTO: 387 K/UL — SIGNIFICANT CHANGE UP (ref 150–400)
POTASSIUM SERPL-MCNC: 5.4 MMOL/L — HIGH (ref 3.5–5.3)
POTASSIUM SERPL-SCNC: 5.4 MMOL/L — HIGH (ref 3.5–5.3)
PROT SERPL-MCNC: 7.7 GM/DL — SIGNIFICANT CHANGE UP (ref 6–8.3)
RBC # BLD: 3.63 M/UL — LOW (ref 3.8–5.2)
RBC # FLD: 13.9 % — SIGNIFICANT CHANGE UP (ref 10.3–14.5)
SODIUM SERPL-SCNC: 146 MMOL/L — HIGH (ref 135–145)
WBC # BLD: 4.62 K/UL — SIGNIFICANT CHANGE UP (ref 3.8–10.5)
WBC # FLD AUTO: 4.62 K/UL — SIGNIFICANT CHANGE UP (ref 3.8–10.5)

## 2023-01-16 PROCEDURE — 99285 EMERGENCY DEPT VISIT HI MDM: CPT

## 2023-01-16 PROCEDURE — 71045 X-RAY EXAM CHEST 1 VIEW: CPT | Mod: 26

## 2023-01-16 PROCEDURE — 93970 EXTREMITY STUDY: CPT | Mod: 26

## 2023-01-16 NOTE — ED PROVIDER NOTE - CLINICAL SUMMARY MEDICAL DECISION MAKING FREE TEXT BOX
72y Female with PMHx of HTN, DM, CKD, ?CHF, L DVT on Eliquis, CVA, dementia nonverbal at baseline presents to the ER for lower extremities. Worsening of LE swelling the last 2 days. Admitted for similar symptoms and discharged two weeks ago. Denies fever, chills, cough, vomiting, diarrhea, dec appetite. Vital signs stable, in no acute distress, bilateral LE 2+ pitting edema, no calf tenderness elicited. FINISH MDM 72y Female with PMHx of HTN, DM, CKD, ?CHF, L DVT on Eliquis, CVA, dementia nonverbal at baseline presents to the ER for lower extremities. Worsening of LE swelling the last 2 days. Admitted for similar symptoms and discharged two weeks ago. Denies fever, chills, cough, vomiting, diarrhea, dec appetite. Vital signs stable, in no acute distress, bilateral LE 2+ pitting edema, no calf tenderness elicited. AMADOU discharged with Cr 5.7, now 6.3, potassium 5.4, pro-BNP 5000, no respiratory distress noted, labs similar to previous admission, Dr. Violette Lazo consulted, made medication adjustments, patients family would like to bring her home for comfort care. Will dc with strict return precautions.

## 2023-01-16 NOTE — ED PROVIDER NOTE - PATIENT PORTAL LINK FT
You can access the FollowMyHealth Patient Portal offered by [facility] by registering at the following website: [email or web address]. By joining [facility]’s FollowMyHealth portal, you will also be able to view your health information using other applications (apps) compatible with our system.

## 2023-01-16 NOTE — ED PROVIDER NOTE - ATTENDING APP SHARED VISIT CONTRIBUTION OF CARE
Jose Daniel: Pt seen w/ PA, 72F PMH HTN, DM, CVA, ? CHF, LLE DVT on Eliquis, CKD, dementia BIBEMS d/t worsening BLE swelling. Pt admitted to [facility], d/c on 12/28 s/p admission for similar CC. Pt w/o fever, SOB. Pt lives at home w/ family and 24hr HHA. Agree w/ planned w/u and dispo.

## 2023-01-16 NOTE — ED PROVIDER NOTE - PROGRESS NOTE DETAILS
HALLIE Rahman: patient labs similar to previous admitting labs, Dr Gibson consulted as he saw patient last time to determine if treatment for worsening renal function requires treatment. Dr Gibson not recommending further testing, biopsy or dialysis, medication adjustments made by Dr. gibson and given to family - will dc amlodipine, double lasix x 3 days and inc hydralazine. Sister and daughter aware of changes and would like to bring patient home for comfort care.

## 2023-01-16 NOTE — ED PROVIDER NOTE - NSFOLLOWUPINSTRUCTIONS_ED_ALL_ED_FT
Today you were seen in the ER for lower extremity swelling.     Please see separate paper for medication adjustments by Nephrologist Dr. Oakes -->Increase Lasix to 40 mg every 12 hours for 3 days. Discontinue Amlodipine as can exacerbate lower extremity edema. Discontinue sodium bicarbonate Increase Hydralazine to 50 mg every 8 hours.     Follow a Low sodium, Low potassium diet.     Please see below for a copy of your results.     Advance activity as tolerated.      Continue all other medications previously prescribed  as directed unless otherwise instructed.      SEEK IMMEDIATE MEDICAL CARE IF YOU HAVE ANY OF THE FOLLOWING SYMPTOMS: shortness of breath, change in mental status, chest pain, lightheadedness/dizziness/fainting, worsening lower extremity swelling or worsening of symptoms including not being able to conduct normal physical activity.    Follow up with your primary care physician in 48-72 hours- bring copies of your results.

## 2023-01-16 NOTE — CONSULT NOTE ADULT - SUBJECTIVE AND OBJECTIVE BOX
[facility] NEPHROLOGY SERVICES, [facility]  NEPHROLOGY AND HYPERTENSION  [address]  [address]  [place] [address]  [phone]    MD YECENIA CRAVEN MD YELENA ROSENBERG, MD BINNY KOSHY, MD CHRISTOPHER CAPUTO, MD EDWARD BOVER, MD      "Patient is a 72y old  Female who presents with a chief complaint of edema  HPI:   Patient known to me from previous admission; CKD 5 advanced. Plan for supportive care, no HD as patient with poor quality of life, multiple comorbidities.   Family at bedside. Patient appears comfortable, no distress.       PAST MEDICAL & SURGICAL HISTORY:  Hypertension      Diabetes      Asthma      OA (osteoarthritis)  bautista knees, low back  and  bautista shoulders      Gout      Seizure disorder      Urinary incontinence      Vision changes  lt eye      CVA (cerebral infarction)  right side weakness      Kidney stone        FAMILY HISTORY:  No pertinent family history in first degree relatives      Allergies    No Known Allergies    Intolerances      Home Medications:  amLODIPine 10 mg oral tablet: 1 tab(s) orally once a day (20 Dec 2022 21:31)  aspirin 81 mg oral delayed release tablet: 1 tab(s) orally once a day (20 Dec 2022 21:31)  cloNIDine 0.1 mg oral tablet: 1 tab(s) orally every 8 hours (20 Dec 2022 21:31)  cyanocobalamin 1000 mcg oral tablet: 1 tab(s) orally once a day (28 Dec 2022 11:15)  hydrALAZINE 25 mg oral tablet: 1 tab(s) orally 3 times a day (20 Dec 2022 21:31)  Keppra 500 mg oral tablet: 1 tab(s) orally 2 times a day (20 Dec 2022 21:31)  metoprolol tartrate 50 mg oral tablet: 1 tab(s) orally 2 times a day (20 Dec 2022 21:31)  simvastatin 20 mg oral tablet: 1 tab(s) orally once a day (at bedtime) (20 Dec 2022 21:31)    MEDICATIONS  (STANDING):    MEDICATIONS  (PRN):    Vital Signs Last 24 Hrs  T(C): 37.1 (2023 13:25), Max: 37.1 (2023 13:25)  T(F): 98.7 (2023 13:25), Max: 98.7 (2023 13:25)  HR: 55 (2023 13:25) (55 - 55)  BP: 190/77 (2023 13:25) (190/77 - 190/77)  BP(mean): --  RR: 19 (2023 13:25) (19 - 19)  SpO2: 98% (2023 13:25) (98% - 98%)    Parameters below as of 2023 13:25  Patient On (Oxygen Delivery Method): room air        Daily Height in cm: 160.02 (2023 13:25)    Daily     CAPILLARY BLOOD GLUCOSE        PHYSICAL EXAM:      T(C): 37.1 (23 @ 13:25), Max: 37.1 (23 @ 13:25)  HR: 55 (23 @ 13:25) (55 - 55)  BP: 190/77 (23 @ 13:25) (190/77 - 190/77)  RR: 19 (23 @ 13:25) (19 - 19)  SpO2: 98% (23 @ 13:25) (98% - 98%)  Wt(kg): --  Lungs clear  Heart S1S2  Abd soft NT ND  Extremities:   1-2 pedal  edema                  146<H>  |  113<H>  |  43<H>  ----------------------------<  108<H>  5.4<H>   |  26  |  6.32<H>    Ca    8.2<L>      2023 15:15    TPro  7.7  /  Alb  2.5<L>  /  TBili  0.4  /  DBili  x   /  AST  24  /  ALT  12  /  AlkPhos  80                            10.5   4.62  )-----------( 387      ( 2023 15:15 )             33.8     Creatinine Trend: 6.32<--, 5.76<--, 5.76<--, 5.91<--, 5.76<--, 5.89<--          Neutrophil Cytoplasmic Antibody (22 @ 07:51)    Cytoplasmic (c-ANCA) Antibody: Negative    Perinuclear (p-ANCA) Antibody: Negative    Atypical ANCA: Indeterminate Method interference due to CYNDEE fluorescence    Anti-Nuclear Antibody in AM (22 @ 07:51)    Anti Nuclear Factor Titer: Negative: Antinuclear AB (CYNDEE), IFA Method    Acute Hepatitis Panel (22 @ 07:51)    Hepatitis C Virus Interpretation: Nonreact: Hepatitis C AB  S/CO Ratio                        Interpretation  < 1.00                                   Non-Reactive  1.00 - 4.99                         Weakly-Reactive  >= 5.00                                Reactive      Basic Metabolic Panel in AM (22 @ 08:11)    Creatinine, Serum: 1.82 mg/dL      < from: US Renal (22 @ 01:23) >    IMPRESSION:  Atrophic echogenic kidneys suggesting chronic medical renal disease.    Nonobstructing left renal stone.    No hydronephrosis of either kidney.    Trend Bun/Cr  22 @ 11:15  BUN/CR -  51<H> / 5.76<H>  22 @ 09:15  BUN/CR -  52<H> / 5.91<H>  22 @ 08:15  BUN/CR -  48<H> / 5.76<H>  22 @ 06:00  BUN/CR -  50<H> / 5.89<H>  22 @ 07:40  BUN/CR -  54<H> / 6.11<H>  22 @ 07:51  BUN/CR -  55<H> / 6.15<H>  22 @ 03:30  BUN/CR -  54<H> / 6.02<H>  22 @ 17:18  BUN/CR -  54<H> / 6.05<H>  22 @ 08:11  BUN/CR -  19 / 1.82<H>  02-10-22 @ 08:08  BUN/CR -  20 / 1.76<H>  22 @ 07:37  BUN/CR -  26<H> / 1.93<H>  22 @ 07:13  BUN/CR -  34<H> / 2.12<H>    Protein/Creatinine Ratio, Urine (22 @ 08:40)    Total Protein, Random Urine: 403: Test Repeated mg/dL    Protein/Creatinine Ratio Calculation: 6.8: Test Repeated Ratio    Creatinine, Random Urine: 59: Reference Ranges have NOT been established for random urine analytes due  to variability in fluid intake and concentration. mg/dL    Immunoelectrophoresis, Serum (.22 @ 07:51)    Protein Total, Serum: 5.1 g/dL    Total Protein, Serum: 5.1 g/dL    Albumin, Serum: 2.4 g/dL    Albumin/Globulin Ratio: 0.9 Ratio    Alpha 1: 0.4 g/dL    Alpha 2: 0.7 g/dL    Beta Globulin: 0.7 g/dL    Gamma Globulin: 0.9 g/dL    Serum Protein Electrophoresis Interp: Hypoalbuminemia    % Albumin: 47.8 %    % Alpha 1: 7.0 %    % Alpha 2: 14.5 %    % Beta: 13.1 %    % Gamma: 17.6 %    Immunofixation, Serum: No Monoclonal Band Identified    Reference Range: None Detected    Quantitative Ig mg/dL    Quantitative IgA: 341 mg/dL    Quantitative IgM: 62 mg/dL    MARY Kappa: 14.70 mg/dL    MARY Lambda: 7.10 mg/dL    [place]/Lambda Free Light Chain Ratio, Serum: 2.07 Ratio          Albumin, Serum: 1.7 g/dL        Creatinine Trend: 5.76<--, 5.91<--, 5.76<--, 5.89<--, 6.11<--, 6.15<--      Assessment   AMADOU on CKD 3-4  ( Cr 1.8 ) HTN, DM and nephrotic range  proteinuria.  Edema related to advanced renal disease, proteinuria and hypoalbuminemia, nephrotic state.   Disparate renal size; likely underlying renovascular disease   History of  UTI, Klebsiella, ; risk fo infectious AIN; hx  nephrolithiasis   No overt serological evidence of CTD and vasculitic GN;   No overt CHF on exam      Plan  Increase Lasix to 40 mg q 12 for 3 days.   Discontinue Amlodipine as can exacerbate lower extremity edema  Discontinue sodium bicarbonate   Increase Hydralazine to 50 mg q 8   Low Na, Low K diet.   Considering age, comorbid status and associated poor quality of life, I would not pursue invasive diagnostic testing or offer hemodialysis should indications arise.  Discussed with family at bedside.   No objection to discharge home with outpatient follow up with health care provider.   Instructed to call with any questions.        Luis Enrique Oakes MD [facility] NEPHROLOGY SERVICES, [facility]  NEPHROLOGY AND HYPERTENSION  [address]  [address]  [place] [address]  [phone]    MD YECENIA CRAVEN MD YELENA ROSENBERG, MD BINNY KOSHY, MD CHRISTOPHER CAPUTO, MD EDWARD BOVER, MD      "Patient is a 72y old  Female who presents with a chief complaint of edema  HPI:   Patient known to me from previous admission; CKD 5 advanced. Plan for supportive care, no HD as patient with poor quality of life, multiple comorbidities.   Family at bedside. Patient appears comfortable, no distress.       PAST MEDICAL & SURGICAL HISTORY:  Hypertension      Diabetes      Asthma      OA (osteoarthritis)  bautista knees, low back  and  bautista shoulders      Gout      Seizure disorder      Urinary incontinence      Vision changes  lt eye      CVA (cerebral infarction)  right side weakness      Kidney stone        FAMILY HISTORY:  No pertinent family history in first degree relatives      Allergies    No Known Allergies    Intolerances      Home Medications:  amLODIPine 10 mg oral tablet: 1 tab(s) orally once a day (20 Dec 2022 21:31)  aspirin 81 mg oral delayed release tablet: 1 tab(s) orally once a day (20 Dec 2022 21:31)  cloNIDine 0.1 mg oral tablet: 1 tab(s) orally every 8 hours (20 Dec 2022 21:31)  cyanocobalamin 1000 mcg oral tablet: 1 tab(s) orally once a day (28 Dec 2022 11:15)  hydrALAZINE 25 mg oral tablet: 1 tab(s) orally 3 times a day (20 Dec 2022 21:31)  Keppra 500 mg oral tablet: 1 tab(s) orally 2 times a day (20 Dec 2022 21:31)  metoprolol tartrate 50 mg oral tablet: 1 tab(s) orally 2 times a day (20 Dec 2022 21:31)  simvastatin 20 mg oral tablet: 1 tab(s) orally once a day (at bedtime) (20 Dec 2022 21:31)    MEDICATIONS  (STANDING):    MEDICATIONS  (PRN):    Vital Signs Last 24 Hrs  T(C): 37.1 (2023 13:25), Max: 37.1 (2023 13:25)  T(F): 98.7 (2023 13:25), Max: 98.7 (2023 13:25)  HR: 55 (2023 13:25) (55 - 55)  BP: 190/77 (2023 13:25) (190/77 - 190/77)  BP(mean): --  RR: 19 (2023 13:25) (19 - 19)  SpO2: 98% (2023 13:25) (98% - 98%)    Parameters below as of 2023 13:25  Patient On (Oxygen Delivery Method): room air        Daily Height in cm: 160.02 (2023 13:25)    Daily     CAPILLARY BLOOD GLUCOSE        PHYSICAL EXAM:      T(C): 37.1 (23 @ 13:25), Max: 37.1 (23 @ 13:25)  HR: 55 (23 @ 13:25) (55 - 55)  BP: 190/77 (23 @ 13:25) (190/77 - 190/77)  RR: 19 (23 @ 13:25) (19 - 19)  SpO2: 98% (23 @ 13:25) (98% - 98%)  Wt(kg): --  Lungs clear  Heart S1S2  Abd soft NT ND  Extremities:   1-2 pedal  edema                  146<H>  |  113<H>  |  43<H>  ----------------------------<  108<H>  5.4<H>   |  26  |  6.32<H>    Ca    8.2<L>      2023 15:15    TPro  7.7  /  Alb  2.5<L>  /  TBili  0.4  /  DBili  x   /  AST  24  /  ALT  12  /  AlkPhos  80                            10.5   4.62  )-----------( 387      ( 2023 15:15 )             33.8     Creatinine Trend: 6.32<--, 5.76<--, 5.76<--, 5.91<--, 5.76<--, 5.89<--          Neutrophil Cytoplasmic Antibody (22 @ 07:51)    Cytoplasmic (c-ANCA) Antibody: Negative    Perinuclear (p-ANCA) Antibody: Negative    Atypical ANCA: Indeterminate Method interference due to CYNDEE fluorescence    Anti-Nuclear Antibody in AM (22 @ 07:51)    Anti Nuclear Factor Titer: Negative: Antinuclear AB (CYNDEE), IFA Method    Acute Hepatitis Panel (22 @ 07:51)    Hepatitis C Virus Interpretation: Nonreact: Hepatitis C AB  S/CO Ratio                        Interpretation  < 1.00                                   Non-Reactive  1.00 - 4.99                         Weakly-Reactive  >= 5.00                                Reactive      Basic Metabolic Panel in AM (22 @ 08:11)    Creatinine, Serum: 1.82 mg/dL      < from: US Renal (22 @ 01:23) >    IMPRESSION:  Atrophic echogenic kidneys suggesting chronic medical renal disease.    Nonobstructing left renal stone.    No hydronephrosis of either kidney.    Trend Bun/Cr  22 @ 11:15  BUN/CR -  51<H> / 5.76<H>  22 @ 09:15  BUN/CR -  52<H> / 5.91<H>  22 @ 08:15  BUN/CR -  48<H> / 5.76<H>  22 @ 06:00  BUN/CR -  50<H> / 5.89<H>  22 @ 07:40  BUN/CR -  54<H> / 6.11<H>  22 @ 07:51  BUN/CR -  55<H> / 6.15<H>  22 @ 03:30  BUN/CR -  54<H> / 6.02<H>  22 @ 17:18  BUN/CR -  54<H> / 6.05<H>  22 @ 08:11  BUN/CR -  19 / 1.82<H>  02-10-22 @ 08:08  BUN/CR -  20 / 1.76<H>  22 @ 07:37  BUN/CR -  26<H> / 1.93<H>  22 @ 07:13  BUN/CR -  34<H> / 2.12<H>    Protein/Creatinine Ratio, Urine (22 @ 08:40)    Total Protein, Random Urine: 403: Test Repeated mg/dL    Protein/Creatinine Ratio Calculation: 6.8: Test Repeated Ratio    Creatinine, Random Urine: 59: Reference Ranges have NOT been established for random urine analytes due  to variability in fluid intake and concentration. mg/dL    Immunoelectrophoresis, Serum (12.22 @ 07:51)    Protein Total, Serum: 5.1 g/dL    Total Protein, Serum: 5.1 g/dL    Albumin, Serum: 2.4 g/dL    Albumin/Globulin Ratio: 0.9 Ratio    Alpha 1: 0.4 g/dL    Alpha 2: 0.7 g/dL    Beta Globulin: 0.7 g/dL    Gamma Globulin: 0.9 g/dL    Serum Protein Electrophoresis Interp: Hypoalbuminemia    % Albumin: 47.8 %    % Alpha 1: 7.0 %    % Alpha 2: 14.5 %    % Beta: 13.1 %    % Gamma: 17.6 %    Immunofixation, Serum: No Monoclonal Band Identified    Reference Range: None Detected    Quantitative Ig mg/dL    Quantitative IgA: 341 mg/dL    Quantitative IgM: 62 mg/dL    MARY Kappa: 14.70 mg/dL    MARY Lambda: 7.10 mg/dL    [place]/Lambda Free Light Chain Ratio, Serum: 2.07 Ratio          Albumin, Serum: 1.7 g/dL        Creatinine Trend: 5.76<--, 5.91<--, 5.76<--, 5.89<--, 6.11<--, 6.15<--      Assessment   AMADOU on CKD 3-4  ( Cr 1.8 ) HTN, DM and nephrotic range  proteinuria.  Edema related to advanced renal disease, proteinuria and hypoalbuminemia, nephrotic state.   Disparate renal size; likely underlying renovascular disease   History of  UTI, Klebsiella, ; risk fo infectious AIN; hx  nephrolithiasis   No overt serological evidence of CTD and vasculitic GN;   No overt CHF on exam      Plan  Increase Lasix to 40 mg q 12 for 3 days.   Discontinue Amlodipine and sodium bicarbonate as can exacerbate lower extremity edema.  Increase Hydralazine to 50 mg q 8.  Low Na, Low K diet.   Considering age, comorbid status and associated poor quality of life, she is a poor candidate for hemodialysis.  I would not pursue invasive diagnostic testing or offer hemodialysis should indications arise.   Discussed with family at bedside, she is in agreement.    No objection to discharge home with outpatient follow up with health care provider.           Luis Enrique Oakes MD

## 2023-01-16 NOTE — ED ADULT NURSE NOTE - NS ED NURSE LEVEL OF CONSCIOUSNESS ORIENTATION
[facility]    [address] [place] [address]    Phone:  [phone]    Fax:  [phone]       Thank You for choosing us for your health care visit. We are glad to serve you and happy to provide you with this summary of your visit. Please help us to ensure we have accurate records. If you find anything that needs to be changed, please let our staff know as soon as possible.          Your Demographic Information     Patient Name Sex Mackenzie Evans Female 1979       Ethnic Group Patient Race    Not of  or  Origin White      Your Visit Details     Date & Time Provider Department    2017 10:45 AM Dewayne Flores DPM [facility]      Your Upcoming Appointment*(Max 10)     2017  1:50 PM CST   Complete Ophthalmology Exam with Zarina Hodge MD   [facility] ([facility])    [address] [place] [address]   [phone]            2017  1:30 PM CDT   Follow-up Visit with Cyndi Marrufo MD   [facility] [address] ([facility])    [id number]  [facility] [address]   [phone]            2018 10:00 AM CST   Complete Physical Exam with Corry Cummings MD   [facility]  ([facility])    [address] [place] [address]   [phone]              Your Vitals Were     Temp Height Weight LMP BMI Smoking Status    98.7 °F (37.1 °C) (Temporal Artery) 5' 8\" (1.727 m) 156 lb (70.8 kg) 2017 (Exact Date) 23.72 kg/m2 Never Smoker      Medications Prescribed or Re-Ordered Today     None      Your Current Medications Are        Disp Refills Start End    nitrofurantoin (MACRODANTIN) 100 MG capsule 30 capsule 2 2017     Sig: Take one capsule by mouth prn  after intercourse    Class: Eprescribe    Cosign for Ordering: Accepted by Corry Cummings MD on 2/1/2017 10:40 AM    norgestimate-ethinyl estradiol (ORTHO-CYCLEN) 0.25-35 MG-MCG per tablet 84 tablet 4 2/1/2017     Sig - Route: Take 1 tablet by mouth daily. - Oral    Class: Eprescribe    Cosign for Ordering: Accepted by Corry Cummings MD on 2/1/2017 10:40 AM    LORATADINE 10 MG PO TABS  0 4/22/2008     Sig - Route: 1 TABLET DAILY - Oral    Class: Historical Med      Allergies     Cephalexin [Keflex] RASH    Fluress Other (See Comments)    Possible allergy??, patient \"passed out\" from eye drop    Penicillins     Amoxicillin RASH      Immunizations History as of 2/1/2017     Name Date    Influenza 11/27/2012    Td:Adult type tetanus/diphtheria 1/31/2008      Problem List as of 2/1/2017     Family history of glaucoma    Refractive error    Cup to disc asymmetry    Family history of macular degeneration            Patient Instructions     None       Disoriented/Nonverbal

## 2023-01-16 NOTE — ED PROVIDER NOTE - TOBACCO USE
Bilateral pectoralis block done in pre procedure. Patient tolerated well, vital signs stable. Exparel and bupivicaine injected.  
Never smoker

## 2023-01-16 NOTE — ED ADULT TRIAGE NOTE - CHIEF COMPLAINT QUOTE
BIBA- from home  Noticed ankle swelling and left cheek this morning  HX CVA, seizures, Dm, HTN, alzheimer  EMS

## 2023-01-16 NOTE — ED PROVIDER NOTE - OBJECTIVE STATEMENT
72y Female with PMHx of HTN, DM, CKD, ?CHF, L DVT on Eliquis, CVA, dementia nonverbal at baseline presents to the ER for lower extremities. Per sister, patient has had worsening of LE swelling the last 2 days. States that patient was admitted for similar symptoms and discharged two weeks ago - family unsure if it is her heart or kidneys. Denies fever, chills, cough, vomiting, diarrhea, dec appetite. Patient ambulates with assistance occasionally. Has 24 hr nursing care.

## 2023-03-29 NOTE — PROGRESS NOTE ADULT - ASSESSMENT
Subjective Complaints:  Historian:             Vital Signs Last 24 Hrs  T(C): 37.1 (26 Jan 2020 16:05), Max: 37.3 (26 Jan 2020 11:38)  T(F): 98.8 (26 Jan 2020 16:05), Max: 99.1 (26 Jan 2020 11:38)  HR: 62 (26 Jan 2020 21:19) (58 - 81)  BP: 146/100 (26 Jan 2020 21:19) (115/50 - 150/77)  BP(mean): --  RR: 17 (26 Jan 2020 16:05) (17 - 18)  SpO2: 100% (26 Jan 2020 16:05) (93% - 100%)    GENERAL PHYSICAL EXAM:  General:  Appears stated age, well-groomed, well-nourished, no distress  HEENT:  NC/AT, patent nares w/ pink mucosa, OP clear w/o lesions, PERRL, EOMI, conjunctivae clear, no thyromegaly, nodules, adenopathy, no JVD  Chest:  Full & symmetric excursion, no increased effort, breath sounds clear  Cardiovascular:  Regular rhythm, S1, S2, no murmur/rub/S3/S4, no carotid/femoral/abdominal bruit, radial/pedal pulses 2+, no edema  Abdomen:  Soft, non-tender, non-distended, normoactive bowel sounds, no HSM  Extremities:  Gait & station:   Digits:   Nails:   Joints, Bones, Muscles:   ROM:   Stability:  Skin:  No rash/erythema/ecchymoses/petechiae/wounds/abscess/warm/dry  Musculoskeletal:  Full ROM in all joints w/o swelling/tenderness/effusion        LABS:    01-26    144  |  111<H>  |  20  ----------------------------<  160<H>  4.0   |  25  |  1.06    Ca    9.0      26 Jan 2020 06:34            RADIOLOGY & ADDITIONAL STUDIES:        Neurology Progress Note:      Mental Status: awake open eyes  folows commands       Cranial Nerves: 2 /12intact      Motor:   arm 3/5 legs moves to stimuli         Sensory: intact      Cerebellar: defrd      Gait:unsteady       Assesment/Plan:  r cva dementia  arm 3/5 no seziuire  for sub acute rehab  will folow with patient

## 2023-04-03 ENCOUNTER — INPATIENT (INPATIENT)
Facility: HOSPITAL | Age: 73
LOS: 4 days | Discharge: HOME HEALTH | End: 2023-04-08
Attending: INTERNAL MEDICINE | Admitting: INTERNAL MEDICINE
Payer: MEDICARE

## 2023-04-03 VITALS
RESPIRATION RATE: 16 BRPM | OXYGEN SATURATION: 100 % | DIASTOLIC BLOOD PRESSURE: 71 MMHG | SYSTOLIC BLOOD PRESSURE: 185 MMHG | TEMPERATURE: 97 F | WEIGHT: 130.07 LBS | HEART RATE: 62 BPM

## 2023-04-03 DIAGNOSIS — N20.0 CALCULUS OF KIDNEY: Chronic | ICD-10-CM

## 2023-04-03 LAB
ALBUMIN SERPL ELPH-MCNC: 2.8 G/DL — LOW (ref 3.3–5)
ALP SERPL-CCNC: 60 U/L — SIGNIFICANT CHANGE UP (ref 40–120)
ALT FLD-CCNC: 8 U/L — LOW (ref 12–78)
ANION GAP SERPL CALC-SCNC: 11 MMOL/L — SIGNIFICANT CHANGE UP (ref 5–17)
APPEARANCE UR: ABNORMAL
APTT BLD: 23.2 SEC — LOW (ref 27.5–35.5)
AST SERPL-CCNC: 12 U/L — LOW (ref 15–37)
BACTERIA # UR AUTO: ABNORMAL
BASOPHILS # BLD AUTO: 0.04 K/UL — SIGNIFICANT CHANGE UP (ref 0–0.2)
BASOPHILS NFR BLD AUTO: 0.6 % — SIGNIFICANT CHANGE UP (ref 0–2)
BILIRUB SERPL-MCNC: 0.5 MG/DL — SIGNIFICANT CHANGE UP (ref 0.2–1.2)
BILIRUB UR-MCNC: NEGATIVE — SIGNIFICANT CHANGE UP
BUN SERPL-MCNC: 84 MG/DL — HIGH (ref 7–23)
CALCIUM SERPL-MCNC: 7.8 MG/DL — LOW (ref 8.5–10.1)
CHLORIDE SERPL-SCNC: 117 MMOL/L — HIGH (ref 96–108)
CO2 SERPL-SCNC: 18 MMOL/L — LOW (ref 22–31)
COLOR SPEC: YELLOW — SIGNIFICANT CHANGE UP
COMMENT - URINE: SIGNIFICANT CHANGE UP
CREAT SERPL-MCNC: 7.22 MG/DL — HIGH (ref 0.5–1.3)
DIFF PNL FLD: ABNORMAL
EGFR: 6 ML/MIN/1.73M2 — LOW
EOSINOPHIL # BLD AUTO: 0.01 K/UL — SIGNIFICANT CHANGE UP (ref 0–0.5)
EOSINOPHIL NFR BLD AUTO: 0.2 % — SIGNIFICANT CHANGE UP (ref 0–6)
EPI CELLS # UR: SIGNIFICANT CHANGE UP
FLUAV AG NPH QL: SIGNIFICANT CHANGE UP
FLUBV AG NPH QL: SIGNIFICANT CHANGE UP
GLUCOSE BLDC GLUCOMTR-MCNC: 130 MG/DL — HIGH (ref 70–99)
GLUCOSE SERPL-MCNC: 137 MG/DL — HIGH (ref 70–99)
GLUCOSE UR QL: NEGATIVE MG/DL — SIGNIFICANT CHANGE UP
HCT VFR BLD CALC: 29.5 % — LOW (ref 34.5–45)
HGB BLD-MCNC: 9.1 G/DL — LOW (ref 11.5–15.5)
IMM GRANULOCYTES NFR BLD AUTO: 0.3 % — SIGNIFICANT CHANGE UP (ref 0–0.9)
INR BLD: 0.96 RATIO — SIGNIFICANT CHANGE UP (ref 0.88–1.16)
KETONES UR-MCNC: NEGATIVE — SIGNIFICANT CHANGE UP
LACTATE SERPL-SCNC: 1.9 MMOL/L — SIGNIFICANT CHANGE UP (ref 0.7–2)
LEUKOCYTE ESTERASE UR-ACNC: ABNORMAL
LYMPHOCYTES # BLD AUTO: 1.23 K/UL — SIGNIFICANT CHANGE UP (ref 1–3.3)
LYMPHOCYTES # BLD AUTO: 18.8 % — SIGNIFICANT CHANGE UP (ref 13–44)
MCHC RBC-ENTMCNC: 29.4 PG — SIGNIFICANT CHANGE UP (ref 27–34)
MCHC RBC-ENTMCNC: 30.8 G/DL — LOW (ref 32–36)
MCV RBC AUTO: 95.5 FL — SIGNIFICANT CHANGE UP (ref 80–100)
MONOCYTES # BLD AUTO: 0.25 K/UL — SIGNIFICANT CHANGE UP (ref 0–0.9)
MONOCYTES NFR BLD AUTO: 3.8 % — SIGNIFICANT CHANGE UP (ref 2–14)
NEUTROPHILS # BLD AUTO: 5 K/UL — SIGNIFICANT CHANGE UP (ref 1.8–7.4)
NEUTROPHILS NFR BLD AUTO: 76.3 % — SIGNIFICANT CHANGE UP (ref 43–77)
NITRITE UR-MCNC: NEGATIVE — SIGNIFICANT CHANGE UP
NRBC # BLD: 0 /100 WBCS — SIGNIFICANT CHANGE UP (ref 0–0)
PH UR: 6.5 — SIGNIFICANT CHANGE UP (ref 5–8)
PLATELET # BLD AUTO: 428 K/UL — HIGH (ref 150–400)
POTASSIUM SERPL-MCNC: 5.1 MMOL/L — SIGNIFICANT CHANGE UP (ref 3.5–5.3)
POTASSIUM SERPL-SCNC: 5.1 MMOL/L — SIGNIFICANT CHANGE UP (ref 3.5–5.3)
PROT SERPL-MCNC: 8.1 GM/DL — SIGNIFICANT CHANGE UP (ref 6–8.3)
PROT UR-MCNC: 500 MG/DL
PROTHROM AB SERPL-ACNC: 11.5 SEC — SIGNIFICANT CHANGE UP (ref 10.5–13.4)
RBC # BLD: 3.09 M/UL — LOW (ref 3.8–5.2)
RBC # FLD: 14.1 % — SIGNIFICANT CHANGE UP (ref 10.3–14.5)
RBC CASTS # UR COMP ASSIST: SIGNIFICANT CHANGE UP /HPF (ref 0–4)
SARS-COV-2 RNA SPEC QL NAA+PROBE: SIGNIFICANT CHANGE UP
SODIUM SERPL-SCNC: 146 MMOL/L — HIGH (ref 135–145)
SP GR SPEC: 1.01 — SIGNIFICANT CHANGE UP (ref 1.01–1.02)
TROPONIN I, HIGH SENSITIVITY RESULT: 41.5 NG/L — SIGNIFICANT CHANGE UP
UROBILINOGEN FLD QL: NEGATIVE MG/DL — SIGNIFICANT CHANGE UP
WBC # BLD: 6.55 K/UL — SIGNIFICANT CHANGE UP (ref 3.8–10.5)
WBC # FLD AUTO: 6.55 K/UL — SIGNIFICANT CHANGE UP (ref 3.8–10.5)
WBC UR QL: >50

## 2023-04-03 PROCEDURE — 99285 EMERGENCY DEPT VISIT HI MDM: CPT

## 2023-04-03 PROCEDURE — 71045 X-RAY EXAM CHEST 1 VIEW: CPT | Mod: 26

## 2023-04-03 PROCEDURE — 93010 ELECTROCARDIOGRAM REPORT: CPT | Mod: 76

## 2023-04-03 PROCEDURE — 70450 CT HEAD/BRAIN W/O DYE: CPT | Mod: 26,MC

## 2023-04-03 PROCEDURE — 99222 1ST HOSP IP/OBS MODERATE 55: CPT

## 2023-04-03 RX ORDER — LANOLIN ALCOHOL/MO/W.PET/CERES
3 CREAM (GRAM) TOPICAL AT BEDTIME
Refills: 0 | Status: DISCONTINUED | OUTPATIENT
Start: 2023-04-03 | End: 2023-04-04

## 2023-04-03 RX ORDER — ONDANSETRON 8 MG/1
4 TABLET, FILM COATED ORAL EVERY 8 HOURS
Refills: 0 | Status: DISCONTINUED | OUTPATIENT
Start: 2023-04-03 | End: 2023-04-08

## 2023-04-03 RX ORDER — CEFTRIAXONE 500 MG/1
1000 INJECTION, POWDER, FOR SOLUTION INTRAMUSCULAR; INTRAVENOUS EVERY 24 HOURS
Refills: 0 | Status: COMPLETED | OUTPATIENT
Start: 2023-04-04 | End: 2023-04-06

## 2023-04-03 RX ORDER — FLUCONAZOLE 150 MG/1
100 TABLET ORAL ONCE
Refills: 0 | Status: COMPLETED | OUTPATIENT
Start: 2023-04-03 | End: 2023-04-03

## 2023-04-03 RX ORDER — PREGABALIN 225 MG/1
1000 CAPSULE ORAL DAILY
Refills: 0 | Status: DISCONTINUED | OUTPATIENT
Start: 2023-04-03 | End: 2023-04-04

## 2023-04-03 RX ORDER — FLUCONAZOLE 150 MG/1
100 TABLET ORAL EVERY 24 HOURS
Refills: 0 | Status: DISCONTINUED | OUTPATIENT
Start: 2023-04-04 | End: 2023-04-04

## 2023-04-03 RX ORDER — LEVETIRACETAM 250 MG/1
500 TABLET, FILM COATED ORAL ONCE
Refills: 0 | Status: COMPLETED | OUTPATIENT
Start: 2023-04-03 | End: 2023-04-03

## 2023-04-03 RX ORDER — SODIUM CHLORIDE 9 MG/ML
1000 INJECTION INTRAMUSCULAR; INTRAVENOUS; SUBCUTANEOUS ONCE
Refills: 0 | Status: COMPLETED | OUTPATIENT
Start: 2023-04-03 | End: 2023-04-03

## 2023-04-03 RX ORDER — HYDRALAZINE HCL 50 MG
10 TABLET ORAL ONCE
Refills: 0 | Status: COMPLETED | OUTPATIENT
Start: 2023-04-03 | End: 2023-04-03

## 2023-04-03 RX ORDER — APIXABAN 2.5 MG/1
5 TABLET, FILM COATED ORAL
Refills: 0 | Status: DISCONTINUED | OUTPATIENT
Start: 2023-04-03 | End: 2023-04-08

## 2023-04-03 RX ORDER — ASPIRIN/CALCIUM CARB/MAGNESIUM 324 MG
81 TABLET ORAL DAILY
Refills: 0 | Status: DISCONTINUED | OUTPATIENT
Start: 2023-04-03 | End: 2023-04-08

## 2023-04-03 RX ORDER — FOLIC ACID 0.8 MG
1 TABLET ORAL DAILY
Refills: 0 | Status: DISCONTINUED | OUTPATIENT
Start: 2023-04-03 | End: 2023-04-04

## 2023-04-03 RX ORDER — SIMVASTATIN 20 MG/1
20 TABLET, FILM COATED ORAL AT BEDTIME
Refills: 0 | Status: DISCONTINUED | OUTPATIENT
Start: 2023-04-03 | End: 2023-04-06

## 2023-04-03 RX ORDER — AMLODIPINE BESYLATE 2.5 MG/1
10 TABLET ORAL DAILY
Refills: 0 | Status: DISCONTINUED | OUTPATIENT
Start: 2023-04-03 | End: 2023-04-03

## 2023-04-03 RX ORDER — LEVETIRACETAM 250 MG/1
500 TABLET, FILM COATED ORAL
Refills: 0 | Status: DISCONTINUED | OUTPATIENT
Start: 2023-04-03 | End: 2023-04-04

## 2023-04-03 RX ORDER — HYDRALAZINE HCL 50 MG
50 TABLET ORAL THREE TIMES A DAY
Refills: 0 | Status: DISCONTINUED | OUTPATIENT
Start: 2023-04-03 | End: 2023-04-04

## 2023-04-03 RX ORDER — METOPROLOL TARTRATE 50 MG
50 TABLET ORAL
Refills: 0 | Status: DISCONTINUED | OUTPATIENT
Start: 2023-04-03 | End: 2023-04-04

## 2023-04-03 RX ORDER — FLUCONAZOLE 150 MG/1
TABLET ORAL
Refills: 0 | Status: DISCONTINUED | OUTPATIENT
Start: 2023-04-03 | End: 2023-04-04

## 2023-04-03 RX ORDER — HYDRALAZINE HCL 50 MG
5 TABLET ORAL ONCE
Refills: 0 | Status: COMPLETED | OUTPATIENT
Start: 2023-04-03 | End: 2023-04-03

## 2023-04-03 RX ORDER — CEFTRIAXONE 500 MG/1
1000 INJECTION, POWDER, FOR SOLUTION INTRAMUSCULAR; INTRAVENOUS ONCE
Refills: 0 | Status: COMPLETED | OUTPATIENT
Start: 2023-04-03 | End: 2023-04-03

## 2023-04-03 RX ORDER — ACETAMINOPHEN 500 MG
650 TABLET ORAL EVERY 6 HOURS
Refills: 0 | Status: DISCONTINUED | OUTPATIENT
Start: 2023-04-03 | End: 2023-04-08

## 2023-04-03 RX ORDER — HYDRALAZINE HCL 50 MG
25 TABLET ORAL THREE TIMES A DAY
Refills: 0 | Status: DISCONTINUED | OUTPATIENT
Start: 2023-04-03 | End: 2023-04-03

## 2023-04-03 RX ORDER — APIXABAN 2.5 MG/1
2.5 TABLET, FILM COATED ORAL
Refills: 0 | Status: DISCONTINUED | OUTPATIENT
Start: 2023-04-03 | End: 2023-04-03

## 2023-04-03 RX ADMIN — SODIUM CHLORIDE 1000 MILLILITER(S): 9 INJECTION INTRAMUSCULAR; INTRAVENOUS; SUBCUTANEOUS at 17:14

## 2023-04-03 RX ADMIN — Medication 10 MILLIGRAM(S): at 18:09

## 2023-04-03 RX ADMIN — LEVETIRACETAM 420 MILLIGRAM(S): 250 TABLET, FILM COATED ORAL at 20:06

## 2023-04-03 RX ADMIN — SODIUM CHLORIDE 1000 MILLILITER(S): 9 INJECTION INTRAMUSCULAR; INTRAVENOUS; SUBCUTANEOUS at 17:44

## 2023-04-03 RX ADMIN — CEFTRIAXONE 100 MILLIGRAM(S): 500 INJECTION, POWDER, FOR SOLUTION INTRAMUSCULAR; INTRAVENOUS at 17:19

## 2023-04-03 RX ADMIN — Medication 5 MILLIGRAM(S): at 22:04

## 2023-04-03 RX ADMIN — FLUCONAZOLE 100 MILLIGRAM(S): 150 TABLET ORAL at 23:01

## 2023-04-03 RX ADMIN — APIXABAN 5 MILLIGRAM(S): 2.5 TABLET, FILM COATED ORAL at 20:07

## 2023-04-03 RX ADMIN — Medication 10 MILLIGRAM(S): at 19:41

## 2023-04-03 RX ADMIN — CEFTRIAXONE 1000 MILLIGRAM(S): 500 INJECTION, POWDER, FOR SOLUTION INTRAMUSCULAR; INTRAVENOUS at 18:19

## 2023-04-03 NOTE — RAPID RESPONSE TEAM SUMMARY - NSSITUATIONBACKGROUNDRRT_GEN_ALL_CORE
71 y/o female w/ PMHx of Dementia- minimally verbal at baseline, CVA w/ residual right sided weakness, HTN, seizures, gout, chronic lower extremity edema, CKD stage 5, DVT on Eliquis presents to the ED due to unresponsiveness. Per aide, while seated pt eyes suddenly rolled back and she became unresponsive, and was drooling, which lasted till after arrival in the ED. BP at the time was 93/44; daughter Lauren who is HCP states that pt's BP has been fluctuating and dropping too much in the last few weeks. In the am, SBP in the 180s-190s and after am medications, it drops very low. No other complaints noted. RRT called for unresponsiveness. Per RN, Patient unresponsive to sternal rub. Upon arrival at bedside, patient lying in bed, lethargic, slightly opens eyes to verbal stimuli, however shuts them tightly when attempting to examine pupils, minimally responsive to painful stimuli. Lungs- CTA bilaterally. Heart- S1/S2 normal. Abd- soft, nontender, nondistended. Extremities- right sided weakness noted. VS as follow 187/70, HR 80, RR 16 and SpO2 100% on RA.    73 y/o female w/ PMHx of Dementia- minimally verbal at baseline, CVA w/ residual right sided weakness, HTN, seizures, gout, chronic lower extremity edema, CKD stage 5, DVT on Eliquis presents to the ED due to unresponsiveness. Per aide, while seated pt eyes suddenly rolled back and she became unresponsive, and was drooling, which lasted till after arrival in the ED. BP at the time was 93/44; daughter Lauren who is HCP states that pt's BP has been fluctuating and dropping too much in the last few weeks. In the am, SBP in the 180s-190s and after am medications, it drops very low. No other complaints noted.   RRT called for unresponsiveness. Per RN, Patient unresponsive to sternal rub. Upon arrival at bedside, patient lying in bed, lethargic, slightly opens eyes to verbal stimuli, however shuts them tightly when attempting to examine pupils, minimally responsive to painful stimuli. Lungs- CTA bilaterally. Heart- S1/S2 normal. Abd- soft, nontender, nondistended. Extremities- right sided weakness noted. VS as follow /70, HR 80, RR 16 and SpO2 100% on RA.

## 2023-04-03 NOTE — ED ADULT NURSE NOTE - CHIEF COMPLAINT QUOTE
As per home health aide, patient was unresponsive at 11am today and BP dropped to 93/44. Nonverbal at baseline. Hx of seizure, DM, HTN. .

## 2023-04-03 NOTE — ED ADULT NURSE NOTE - OBJECTIVE STATEMENT
Received report from ADA Vernon. Pt alert and nonverbal, hx of dementia. IV to right AC #22 flushing without difficulty. Respirations appear equal and unlabored and pt in NAD at this time.

## 2023-04-03 NOTE — ED PROVIDER NOTE - PHYSICAL EXAMINATION
GEN: Awake, alert, interactive, NAD.  HEAD AND NECK: NC/AT. Airway patent. Neck supple.   EYES:  Clear b/l. EOMI. PERRL.   ENT: Moist mucus membranes.   CARDIAC: Regular rate, regular rhythm. No evident pedal edema.    RESP/CHEST: Normal respiratory effort with no use of accessory muscles or retractions. Clear throughout on auscultation.  ABD: Soft, non-distended, non-tender. No rebound, no guarding.   BACK: No midline spinal TTP. No CVAT.   EXTREMITIES: Moving all extremities with no apparent deformities.   SKIN: Warm, dry, intact normal color. No rash.   NEURO: AOx1, CN II-XII grossly intact, no focal deficits. GEN: Awake, alert, interactive, NAD.  HEAD AND NECK: NC/AT. Airway patent. Neck supple.   EYES:  Clear b/l. EOMI. PERRL.   ENT: Moist mucus membranes.   CARDIAC: Regular rate, regular rhythm. No evident pedal edema.    RESP/CHEST: Normal respiratory effort with no use of accessory muscles or retractions. Clear throughout on auscultation.  ABD: Soft, non-distended, non-tender. No rebound, no guarding.   BACK: No midline spinal TTP. No CVAT.   EXTREMITIES: Moving all extremities with no apparent deformities.   SKIN: Warm, dry, intact normal color. No rash.   NEURO: AOx1, CN II-XII grossly intact, no focal deficits. Squeezes eyes closed w/ attempts to open.

## 2023-04-03 NOTE — ED ADULT TRIAGE NOTE - CHIEF COMPLAINT QUOTE
As per aide, patient was unresponsive at 11am today and BP was dropped 93/44. Nonverbal at baseline. Hx of seizure, DM, HTN.  As per home health aide, patient was unresponsive at 11am today and BP dropped to 93/44. Nonverbal at baseline. Hx of seizure, DM, HTN. .

## 2023-04-03 NOTE — H&P ADULT - NSHPPHYSICALEXAM_GEN_ALL_CORE
PHYSICAL EXAM:    Vital Signs Last 24 Hrs  T(C): 36.7 (03 Apr 2023 19:09), Max: 36.7 (03 Apr 2023 19:09)  T(F): 98 (03 Apr 2023 19:09), Max: 98 (03 Apr 2023 19:09)  HR: 68 (03 Apr 2023 19:37) (60 - 73)  BP: 179/57 (03 Apr 2023 19:37) (179/57 - 212/73)  BP(mean): --  RR: 13 (03 Apr 2023 19:37) (13 - 17)  SpO2: 100% (03 Apr 2023 19:37) (98% - 100%)    Parameters below as of 03 Apr 2023 19:37  Patient On (Oxygen Delivery Method): room air        GENERAL: Pt lying in bed comfortably, lethargic, opens eyes to verbal stimuli  HEENT:  Atraumatic, PERRL, conjunctiva and sclera clear, MMM  NECK: Supple  CHEST/LUNG: Clear to auscultation bilaterally  HEART: Regular rate and rhythm  ABDOMEN: Bowel sounds present; Soft, Nontender, Nondistended.   EXTREMITIES:  2+ Peripheral Pulses, +1 pitting edema  NEUROLOGICAL:  lethargic, does open eyes to verbal stimuli  MSK: functional paraplegia due to CVA  SKIN: warm, dry

## 2023-04-03 NOTE — H&P ADULT - HISTORY OF PRESENT ILLNESS
73 y/o female w/ PMHx of Dementia- minimally verbal at baseline, CVA w/ residual right sided weakness, HTN, seizures, gout, chronic lower extremity edema, CKD stage 5, DVT on Eliquis presents to the ED due to unresponsiveness. Per aide, while seated pt eyes suddenly rolled back and she became unresponsive, and was drooling, which lasted till after arrival in the ED. BP at the time was 93/44; daughter Lauren who is HCP states that pt's BP has been fluctuating and dropping too much in the last few weeks. In the am SBP in the 180s-190s and after am medications, it drops very low. No other complaints noted   Improved

## 2023-04-03 NOTE — ED PROVIDER NOTE - CLINICAL SUMMARY MEDICAL DECISION MAKING FREE TEXT BOX
Sepsis + CT brain w/u, anticipate admission. 72F PMH HTN, DM, asthma, gout, OA, CVA, seizure disorder, dementia BIBEMS d/t AMS, unresponsive onset today. AF, VSS other than + elevated BP. Exam as noted in PE. Plan for sepsis w/u + CT brain w/u, anticipate admission.  W/u significant for: UA w/ + infection, BUN/CR elevated 84/7.2 (prior values 43/6.3 Jan 2023). Given IVF, 1st dose IV abx in ED. Will admit to medicine (d/w Dr Akbar). Pt, family updated to results, admission. They understand / agree w/ this plan.

## 2023-04-03 NOTE — H&P ADULT - NSHPLABSRESULTS_GEN_ALL_CORE
T(C): 36.7 (23 @ 19:09), Max: 36.7 (23 @ 19:09)  HR: 68 (23 @ 19:09) (60 - 73)  BP: 180/56 (23 @ 19:09) (180/56 - 212/73)  RR: 15 (23 @ 19:09) (13 - 17)  SpO2: 98% (23 @ 19:09) (98% - 100%)                        9.1    6.55  )-----------( 428      ( 2023 16:11 )             29.5     -    146<H>  |  117<H>  |  84<H>  ----------------------------<  137<H>  5.1   |  18<L>  |  7.22<H>    Ca    7.8<L>      2023 16:11    TPro  8.1  /  Alb  2.8<L>  /  TBili  0.5  /  DBili  x   /  AST  12<L>  /  ALT  8<L>  /  AlkPhos  60  04-03    LIVER FUNCTIONS - ( 2023 16:11 )  Alb: 2.8 g/dL / Pro: 8.1 gm/dL / ALK PHOS: 60 U/L / ALT: 8 U/L / AST: 12 U/L / GGT: x           PT/INR - ( 2023 16:11 )   PT: 11.5 sec;   INR: 0.96 ratio         PTT - ( 2023 16:11 )  PTT:23.2 sec  Urinalysis Basic - ( 2023 16:44 )    Color: Yellow / Appearance: Slightly Turbid / S.010 / pH: x  Gluc: x / Ketone: Negative  / Bili: Negative / Urobili: Negative mg/dL   Blood: x / Protein: 500 mg/dL / Nitrite: Negative   Leuk Esterase: Moderate / RBC: 0-2 /HPF / WBC >50   Sq Epi: x / Non Sq Epi: Occasional / Bacteria: TNTC    < from: CT Head No Cont (23 @ 16:33) >    IMPRESSION:  1. Limited by nonstandard positioning the patient's head in the gantry.  2. Old right frontal and right cerebellar infarcts. Atherosclerotic   changes.  3. No acute intracranial pathology. No significant change compared with   prior study dated 2021. If altered mental status persists, consider   further evaluation via MR imaging to include DWIand ADC mapping   techniques, provided there are no contraindications.    < end of copied text >    EKG - Sinus larisa at 59 bpm, nonsp T changes    acetaminophen     Tablet .. 650 milliGRAM(s) Oral every 6 hours PRN  aluminum hydroxide/magnesium hydroxide/simethicone Suspension 30 milliLiter(s) Oral every 4 hours PRN  apixaban 5 milliGRAM(s) Oral two times a day  aspirin enteric coated 81 milliGRAM(s) Oral daily  cloNIDine 0.1 milliGRAM(s) Oral every 8 hours  cyanocobalamin 1000 MICROGram(s) Oral daily  folic acid 1 milliGRAM(s) Oral daily  hydrALAZINE 50 milliGRAM(s) Oral three times a day  hydrALAZINE Injectable 10 milliGRAM(s) IV Push once  levETIRAcetam 500 milliGRAM(s) Oral two times a day  melatonin 3 milliGRAM(s) Oral at bedtime PRN  metoprolol tartrate 50 milliGRAM(s) Oral two times a day  ondansetron Injectable 4 milliGRAM(s) IV Push every 8 hours PRN  simvastatin 20 milliGRAM(s) Oral at bedtime

## 2023-04-03 NOTE — ED PROVIDER NOTE - OBJECTIVE STATEMENT
72F PMH HTN, DM, asthma, gout, OA, CVA, seizure disorder, dementia (AAOx1 at baseline, minimally verbal) BIBEMS from home accompanied by sister and HHA d/t AMS, unresponsive since 11a/12p today. Per HHA / sister - pt in typical state of health this AM, as HHA was feeding pt, noted pt not responding / drooling / eyes rolled back. Pt has remained w/ decreased responsiveness since this time. Denies fever, coughing, dyspnea, vomiting, diarrhea, foul-smelling urine. 72F PMH HTN, DM, asthma, gout, OA, CVA, seizure disorder, dementia (AAOx1 at baseline, minimally verbal) BIBEMS from home accompanied by sister and HHA d/t AMS, unresponsive since 11a/12p today. Per HHA / sister - pt in typical state of health this AM. As HHA was preparing to feed pt, noted pt not responding / drooling / eyes rolled back. Pt has remained w/ decreased responsiveness since this time. Denies fever, coughing, dyspnea, vomiting, diarrhea, foul-smelling urine. Per triage note, pt w/ borderline hypotension on scene.

## 2023-04-03 NOTE — H&P ADULT - ASSESSMENT
71 y/o female w/ PMHx of Dementia- minimally verbal at baseline, CVA w/ residual right sided weakness, HTN, seizures, gout, chronic lower extremity edema, CKD stage 5, DVT on Eliquis presents to the ED due to unresponsiveness, pt being admitted for syncope, acute UTI.    # Syncope  - Likely vasovagal due to drop in BP  - CT no acute findings  - possible seizure; f/u EEG  - f/u Keppra levels, c/w Keppra    # Acute UTI  - c/w Rocephin  - f/u urine culture    # Accelerated HTN  - resume home meds  - family to bring in BP cuff used at home to make sure it correlates    #CKD stage 5  - Per daughter Lauren, she's the HCP would like the option of HD  - Per Nephrology note, pt poor candidate for HD which I advised her off  - Dr Oakes to see in pt in am    # CVA  - c/w ASA and statin    # DVT   - c/w Eliquis    Per daughter who states she's the HCP, pt FC, she seemed caught off guard, would consider revisiting topic        71 y/o female w/ PMHx of Dementia- minimally verbal at baseline, CVA w/ residual right sided weakness, HTN, seizures, gout, chronic lower extremity edema, CKD stage 5, DVT on Eliquis presents to the ED due to unresponsiveness, pt being admitted for syncope, acute UTI.    # Syncope  - Likely vasovagal due to drop in BP  - CT no acute findings  - possible seizure; f/u EEG  - f/u Keppra levels, c/w Keppra    # Acute UTI  - c/w Rocephin and diflucan  - f/u urine culture    # Accelerated HTN  - resume home meds  - family to bring in BP cuff used at home to make sure it correlates    #CKD stage 5  - Per daughter Lauren, she's the HCP would like the option of HD  - Per Nephrology note, pt poor candidate for HD which I advised her off  - Dr Oakes to see in pt in am    # CVA  - c/w ASA and statin    # DVT   - c/w Eliquis    Per daughter who states she's the HCP, pt FC, she seemed caught off guard, would consider revisiting topic

## 2023-04-04 LAB
ANION GAP SERPL CALC-SCNC: 9 MMOL/L — SIGNIFICANT CHANGE UP (ref 5–17)
BASOPHILS # BLD AUTO: 0.04 K/UL — SIGNIFICANT CHANGE UP (ref 0–0.2)
BASOPHILS NFR BLD AUTO: 0.5 % — SIGNIFICANT CHANGE UP (ref 0–2)
BUN SERPL-MCNC: 85 MG/DL — HIGH (ref 7–23)
CALCIUM SERPL-MCNC: 8 MG/DL — LOW (ref 8.5–10.1)
CHLORIDE SERPL-SCNC: 119 MMOL/L — HIGH (ref 96–108)
CO2 SERPL-SCNC: 18 MMOL/L — LOW (ref 22–31)
CREAT SERPL-MCNC: 6.98 MG/DL — HIGH (ref 0.5–1.3)
EGFR: 6 ML/MIN/1.73M2 — LOW
EOSINOPHIL # BLD AUTO: 0.01 K/UL — SIGNIFICANT CHANGE UP (ref 0–0.5)
EOSINOPHIL NFR BLD AUTO: 0.1 % — SIGNIFICANT CHANGE UP (ref 0–6)
GLUCOSE SERPL-MCNC: 112 MG/DL — HIGH (ref 70–99)
HCT VFR BLD CALC: 27.4 % — LOW (ref 34.5–45)
HGB BLD-MCNC: 8.4 G/DL — LOW (ref 11.5–15.5)
IMM GRANULOCYTES NFR BLD AUTO: 0.4 % — SIGNIFICANT CHANGE UP (ref 0–0.9)
LYMPHOCYTES # BLD AUTO: 1.91 K/UL — SIGNIFICANT CHANGE UP (ref 1–3.3)
LYMPHOCYTES # BLD AUTO: 25.4 % — SIGNIFICANT CHANGE UP (ref 13–44)
MAGNESIUM SERPL-MCNC: 2 MG/DL — SIGNIFICANT CHANGE UP (ref 1.6–2.6)
MCHC RBC-ENTMCNC: 29 PG — SIGNIFICANT CHANGE UP (ref 27–34)
MCHC RBC-ENTMCNC: 30.7 G/DL — LOW (ref 32–36)
MCV RBC AUTO: 94.5 FL — SIGNIFICANT CHANGE UP (ref 80–100)
MONOCYTES # BLD AUTO: 0.55 K/UL — SIGNIFICANT CHANGE UP (ref 0–0.9)
MONOCYTES NFR BLD AUTO: 7.3 % — SIGNIFICANT CHANGE UP (ref 2–14)
NEUTROPHILS # BLD AUTO: 4.99 K/UL — SIGNIFICANT CHANGE UP (ref 1.8–7.4)
NEUTROPHILS NFR BLD AUTO: 66.3 % — SIGNIFICANT CHANGE UP (ref 43–77)
NRBC # BLD: 0 /100 WBCS — SIGNIFICANT CHANGE UP (ref 0–0)
PLATELET # BLD AUTO: 398 K/UL — SIGNIFICANT CHANGE UP (ref 150–400)
POTASSIUM SERPL-MCNC: 4.4 MMOL/L — SIGNIFICANT CHANGE UP (ref 3.5–5.3)
POTASSIUM SERPL-SCNC: 4.4 MMOL/L — SIGNIFICANT CHANGE UP (ref 3.5–5.3)
RBC # BLD: 2.9 M/UL — LOW (ref 3.8–5.2)
RBC # FLD: 13.9 % — SIGNIFICANT CHANGE UP (ref 10.3–14.5)
SODIUM SERPL-SCNC: 146 MMOL/L — HIGH (ref 135–145)
WBC # BLD: 7.53 K/UL — SIGNIFICANT CHANGE UP (ref 3.8–10.5)
WBC # FLD AUTO: 7.53 K/UL — SIGNIFICANT CHANGE UP (ref 3.8–10.5)

## 2023-04-04 PROCEDURE — 99233 SBSQ HOSP IP/OBS HIGH 50: CPT

## 2023-04-04 RX ORDER — METOPROLOL TARTRATE 50 MG
50 TABLET ORAL EVERY 12 HOURS
Refills: 0 | Status: DISCONTINUED | OUTPATIENT
Start: 2023-04-04 | End: 2023-04-07

## 2023-04-04 RX ORDER — LEVETIRACETAM 250 MG/1
500 TABLET, FILM COATED ORAL EVERY 12 HOURS
Refills: 0 | Status: DISCONTINUED | OUTPATIENT
Start: 2023-04-04 | End: 2023-04-05

## 2023-04-04 RX ORDER — METOPROLOL TARTRATE 50 MG
5 TABLET ORAL EVERY 12 HOURS
Refills: 0 | Status: DISCONTINUED | OUTPATIENT
Start: 2023-04-04 | End: 2023-04-04

## 2023-04-04 RX ORDER — HYDRALAZINE HCL 50 MG
10 TABLET ORAL EVERY 4 HOURS
Refills: 0 | Status: DISCONTINUED | OUTPATIENT
Start: 2023-04-04 | End: 2023-04-08

## 2023-04-04 RX ORDER — LABETALOL HCL 100 MG
10 TABLET ORAL ONCE
Refills: 0 | Status: COMPLETED | OUTPATIENT
Start: 2023-04-04 | End: 2023-04-04

## 2023-04-04 RX ORDER — HYDRALAZINE HCL 50 MG
10 TABLET ORAL ONCE
Refills: 0 | Status: COMPLETED | OUTPATIENT
Start: 2023-04-04 | End: 2023-04-04

## 2023-04-04 RX ORDER — HYDRALAZINE HCL 50 MG
25 TABLET ORAL EVERY 6 HOURS
Refills: 0 | Status: DISCONTINUED | OUTPATIENT
Start: 2023-04-04 | End: 2023-04-05

## 2023-04-04 RX ORDER — LEVETIRACETAM 250 MG/1
500 TABLET, FILM COATED ORAL
Refills: 0 | Status: DISCONTINUED | OUTPATIENT
Start: 2023-04-04 | End: 2023-04-04

## 2023-04-04 RX ORDER — AMLODIPINE BESYLATE 2.5 MG/1
10 TABLET ORAL DAILY
Refills: 0 | Status: DISCONTINUED | OUTPATIENT
Start: 2023-04-04 | End: 2023-04-08

## 2023-04-04 RX ADMIN — Medication 10 MILLIGRAM(S): at 13:45

## 2023-04-04 RX ADMIN — AMLODIPINE BESYLATE 10 MILLIGRAM(S): 2.5 TABLET ORAL at 17:57

## 2023-04-04 RX ADMIN — Medication 1 PATCH: at 18:08

## 2023-04-04 RX ADMIN — LEVETIRACETAM 420 MILLIGRAM(S): 250 TABLET, FILM COATED ORAL at 17:29

## 2023-04-04 RX ADMIN — Medication 25 MILLIGRAM(S): at 17:57

## 2023-04-04 RX ADMIN — APIXABAN 5 MILLIGRAM(S): 2.5 TABLET, FILM COATED ORAL at 17:23

## 2023-04-04 RX ADMIN — Medication 10 MILLIGRAM(S): at 09:41

## 2023-04-04 RX ADMIN — Medication 25 MILLIGRAM(S): at 23:57

## 2023-04-04 RX ADMIN — CEFTRIAXONE 100 MILLIGRAM(S): 500 INJECTION, POWDER, FOR SOLUTION INTRAMUSCULAR; INTRAVENOUS at 01:50

## 2023-04-04 RX ADMIN — SIMVASTATIN 20 MILLIGRAM(S): 20 TABLET, FILM COATED ORAL at 23:48

## 2023-04-04 RX ADMIN — Medication 10 MILLIGRAM(S): at 02:40

## 2023-04-04 RX ADMIN — Medication 5 MILLIGRAM(S): at 17:24

## 2023-04-04 RX ADMIN — Medication 1 PATCH: at 08:16

## 2023-04-04 NOTE — CONSULT NOTE ADULT - SUBJECTIVE AND OBJECTIVE BOX
[facility], [facility]  NEPHROLOGY AND HYPERTENSION  [address]  [address]  [place] [address]  [phone]    MD YECENIA CRAVEN MD YELENA ROSENBERG, MD BINNY KOSHY, MD CHRISTOPHER CAPUTO, MD EDWARD BOVER, MD      Information from chart:  "Patient is a 72y old  Female who presents with a chief complaint of Syncope, UTI (2023 17:33)    HPI:          73 y/o female w/ PMHx of Dementia- minimally verbal at baseline, CVA w/ residual right sided weakness, HTN, seizures, gout, chronic lower extremity edema, CKD stage 5, DVT on Eliquis presents to the ED due to unresponsiveness. Per aide, while seated pt eyes suddenly rolled back and she became unresponsive, and was drooling, which lasted till after arrival in the ED. BP at the time was 93/44; daughter Lauren who is HCP states that pt's BP has been fluctuating and dropping too much in the last few weeks. In the am SBP in the 180s-190s and after am medications, it drops very low. No other complaints noted   (2023 19:15)   "    PAST MEDICAL & SURGICAL HISTORY:  Hypertension      Diabetes      Asthma      OA (osteoarthritis)  bautista knees, low back  and  bautista shoulders      Gout      Seizure disorder      Urinary incontinence      Vision changes  lt eye      CVA (cerebral infarction)  right side weakness      Kidney stone        FAMILY HISTORY:  No pertinent family history in first degree relatives      Allergies    No Known Allergies    Intolerances      Home Medications:  amLODIPine 10 mg oral tablet: 1 tab(s) orally once a day (2023 18:00)  aspirin 81 mg oral delayed release tablet: 1 tab(s) orally once a day (2023 18:00)  cloNIDine 0.1 mg oral tablet: 1 tab(s) orally every 8 hours (2023 18:00)  cyanocobalamin 1000 mcg oral tablet: 1 tab(s) orally once a day (2023 18:00)  hydrALAZINE 50 mg oral tablet: 1 orally 3 times a day (2023 19:12)  Keppra 500 mg oral tablet: 1 tab(s) orally 2 times a day (2023 18:00)  metoprolol tartrate 50 mg oral tablet: 1 tab(s) orally 2 times a day (2023 18:00)  simvastatin 20 mg oral tablet: 1 tab(s) orally once a day (at bedtime) (2023 18:00)    MEDICATIONS  (STANDING):  amLODIPine   Tablet 10 milliGRAM(s) Oral daily  apixaban 5 milliGRAM(s) Oral two times a day  aspirin enteric coated 81 milliGRAM(s) Oral daily  cefTRIAXone   IVPB 1000 milliGRAM(s) IV Intermittent every 24 hours  cloNIDine Patch 0.1 mG/24Hr(s) 1 patch Transdermal every 7 days  hydrALAZINE 25 milliGRAM(s) Oral every 6 hours  levETIRAcetam  IVPB 500 milliGRAM(s) IV Intermittent every 12 hours  metoprolol tartrate 50 milliGRAM(s) Oral every 12 hours  simvastatin 20 milliGRAM(s) Oral at bedtime    MEDICATIONS  (PRN):  acetaminophen     Tablet .. 650 milliGRAM(s) Oral every 6 hours PRN Temp greater or equal to 38C (100.4F), Mild Pain (1 - 3)  hydrALAZINE Injectable 10 milliGRAM(s) IV Push every 4 hours PRN for SBP > 180 or DBP > 110  ondansetron Injectable 4 milliGRAM(s) IV Push every 8 hours PRN Nausea and/or Vomiting    Vital Signs Last 24 Hrs  T(C): 36.9 (2023 20:49), Max: 36.9 (2023 20:49)  T(F): 98.4 (2023 20:49), Max: 98.4 (2023 20:49)  HR: 86 (2023 20:49) (59 - 86)  BP: 173/71 (2023 20:49) (171/77 - 216/75)  BP(mean): --  RR: 17 (2023 20:49) (12 - 18)  SpO2: 98% (2023 20:49) (97% - 100%)    Parameters below as of 2023 04:42  Patient On (Oxygen Delivery Method): room air        Daily     Daily     CAPILLARY BLOOD GLUCOSE      POCT Blood Glucose.: 130 mg/dL (2023 22:00)    PHYSICAL EXAM:      T(C): 36.9 (23 @ 20:49), Max: 36.9 (23 @ 20:49)  HR: 86 (23 @ 20:49) (59 - 86)  BP: 173/71 (23 @ 20:49) (171/77 - 216/75)  RR: 17 (23 @ 20:49) (12 - 18)  SpO2: 98% (23 @ 20:49) (97% - 100%)  Wt(kg): --  Lungs clear  Heart S1S2  Abd soft NT ND  Extremities:   tr edema                  146<H>  |  119<H>  |  85<H>  ----------------------------<  112<H>  4.4   |  18<L>  |  6.98<H>    Ca    8.0<L>      2023 07:53  Mg     2.0         TPro  8.1  /  Alb  2.8<L>  /  TBili  0.5  /  DBili  x   /  AST  12<L>  /  ALT  8<L>  /  AlkPhos  60                            8.4    7.53  )-----------( 398      ( 2023 07:53 )             27.4     Creatinine Trend: 6.98<--, 7.22<--  Urinalysis Basic - ( 2023 16:44 )    Color: Yellow / Appearance: Slightly Turbid / S.010 / pH: x  Gluc: x / Ketone: Negative  / Bili: Negative / Urobili: Negative mg/dL   Blood: x / Protein: 500 mg/dL / Nitrite: Negative   Leuk Esterase: Moderate / RBC: 0-2 /HPF / WBC >50   Sq Epi: x / Non Sq Epi: Occasional / Bacteria: TNTC            Assessment   CKD 5; stable last 3 months;   UTI associated change in MS  HTN urgency     Plan  IV abx supportive care  BP medication adjustments   Considering overall comorbid status and quality of life, would defer HD if indications arise.    Luis Enrique Oakes MD

## 2023-04-05 LAB
-  AMIKACIN: SIGNIFICANT CHANGE UP
-  AMOXICILLIN/CLAVULANIC ACID: SIGNIFICANT CHANGE UP
-  AMPICILLIN/SULBACTAM: SIGNIFICANT CHANGE UP
-  AMPICILLIN: SIGNIFICANT CHANGE UP
-  AZTREONAM: SIGNIFICANT CHANGE UP
-  CEFAZOLIN: SIGNIFICANT CHANGE UP
-  CEFEPIME: SIGNIFICANT CHANGE UP
-  CEFOXITIN: SIGNIFICANT CHANGE UP
-  CEFTRIAXONE: SIGNIFICANT CHANGE UP
-  CEFUROXIME: SIGNIFICANT CHANGE UP
-  CIPROFLOXACIN: SIGNIFICANT CHANGE UP
-  ERTAPENEM: SIGNIFICANT CHANGE UP
-  GENTAMICIN: SIGNIFICANT CHANGE UP
-  IMIPENEM: SIGNIFICANT CHANGE UP
-  LEVOFLOXACIN: SIGNIFICANT CHANGE UP
-  MEROPENEM: SIGNIFICANT CHANGE UP
-  NITROFURANTOIN: SIGNIFICANT CHANGE UP
-  PIPERACILLIN/TAZOBACTAM: SIGNIFICANT CHANGE UP
-  TOBRAMYCIN: SIGNIFICANT CHANGE UP
-  TRIMETHOPRIM/SULFAMETHOXAZOLE: SIGNIFICANT CHANGE UP
ANION GAP SERPL CALC-SCNC: 9 MMOL/L — SIGNIFICANT CHANGE UP (ref 5–17)
BUN SERPL-MCNC: 86 MG/DL — HIGH (ref 7–23)
CALCIUM SERPL-MCNC: 7.9 MG/DL — LOW (ref 8.5–10.1)
CHLORIDE SERPL-SCNC: 123 MMOL/L — HIGH (ref 96–108)
CO2 SERPL-SCNC: 17 MMOL/L — LOW (ref 22–31)
CREAT SERPL-MCNC: 7.03 MG/DL — HIGH (ref 0.5–1.3)
CULTURE RESULTS: SIGNIFICANT CHANGE UP
EGFR: 6 ML/MIN/1.73M2 — LOW
GLUCOSE SERPL-MCNC: 104 MG/DL — HIGH (ref 70–99)
HCT VFR BLD CALC: 23.4 % — LOW (ref 34.5–45)
HGB BLD-MCNC: 7.2 G/DL — LOW (ref 11.5–15.5)
MCHC RBC-ENTMCNC: 29.4 PG — SIGNIFICANT CHANGE UP (ref 27–34)
MCHC RBC-ENTMCNC: 30.8 G/DL — LOW (ref 32–36)
MCV RBC AUTO: 95.5 FL — SIGNIFICANT CHANGE UP (ref 80–100)
METHOD TYPE: SIGNIFICANT CHANGE UP
NRBC # BLD: 0 /100 WBCS — SIGNIFICANT CHANGE UP (ref 0–0)
ORGANISM # SPEC MICROSCOPIC CNT: SIGNIFICANT CHANGE UP
ORGANISM # SPEC MICROSCOPIC CNT: SIGNIFICANT CHANGE UP
PLATELET # BLD AUTO: SIGNIFICANT CHANGE UP K/UL (ref 150–400)
POTASSIUM SERPL-MCNC: 4.5 MMOL/L — SIGNIFICANT CHANGE UP (ref 3.5–5.3)
POTASSIUM SERPL-SCNC: 4.5 MMOL/L — SIGNIFICANT CHANGE UP (ref 3.5–5.3)
RBC # BLD: 2.45 M/UL — LOW (ref 3.8–5.2)
RBC # FLD: 14.1 % — SIGNIFICANT CHANGE UP (ref 10.3–14.5)
SODIUM SERPL-SCNC: 149 MMOL/L — HIGH (ref 135–145)
SPECIMEN SOURCE: SIGNIFICANT CHANGE UP
WBC # BLD: 7.14 K/UL — SIGNIFICANT CHANGE UP (ref 3.8–10.5)
WBC # FLD AUTO: 7.14 K/UL — SIGNIFICANT CHANGE UP (ref 3.8–10.5)

## 2023-04-05 PROCEDURE — 95816 EEG AWAKE AND DROWSY: CPT | Mod: 26

## 2023-04-05 PROCEDURE — 99233 SBSQ HOSP IP/OBS HIGH 50: CPT

## 2023-04-05 RX ORDER — LEVETIRACETAM 250 MG/1
500 TABLET, FILM COATED ORAL ONCE
Refills: 0 | Status: COMPLETED | OUTPATIENT
Start: 2023-04-05 | End: 2023-04-05

## 2023-04-05 RX ORDER — HYDRALAZINE HCL 50 MG
50 TABLET ORAL EVERY 8 HOURS
Refills: 0 | Status: DISCONTINUED | OUTPATIENT
Start: 2023-04-05 | End: 2023-04-06

## 2023-04-05 RX ORDER — CHOLECALCIFEROL (VITAMIN D3) 125 MCG
2000 CAPSULE ORAL DAILY
Refills: 0 | Status: DISCONTINUED | OUTPATIENT
Start: 2023-04-05 | End: 2023-04-08

## 2023-04-05 RX ORDER — LEVETIRACETAM 250 MG/1
500 TABLET, FILM COATED ORAL
Refills: 0 | Status: DISCONTINUED | OUTPATIENT
Start: 2023-04-05 | End: 2023-04-07

## 2023-04-05 RX ORDER — ERYTHROPOIETIN 10000 [IU]/ML
10000 INJECTION, SOLUTION INTRAVENOUS; SUBCUTANEOUS ONCE
Refills: 0 | Status: COMPLETED | OUTPATIENT
Start: 2023-04-05 | End: 2023-04-05

## 2023-04-05 RX ORDER — IRON SUCROSE 20 MG/ML
200 INJECTION, SOLUTION INTRAVENOUS ONCE
Refills: 0 | Status: COMPLETED | OUTPATIENT
Start: 2023-04-05 | End: 2023-04-05

## 2023-04-05 RX ADMIN — APIXABAN 5 MILLIGRAM(S): 2.5 TABLET, FILM COATED ORAL at 09:02

## 2023-04-05 RX ADMIN — IRON SUCROSE 200 MILLIGRAM(S): 20 INJECTION, SOLUTION INTRAVENOUS at 11:37

## 2023-04-05 RX ADMIN — ERYTHROPOIETIN 10000 UNIT(S): 10000 INJECTION, SOLUTION INTRAVENOUS; SUBCUTANEOUS at 11:37

## 2023-04-05 RX ADMIN — LEVETIRACETAM 500 MILLIGRAM(S): 250 TABLET, FILM COATED ORAL at 11:37

## 2023-04-05 RX ADMIN — Medication 1 PATCH: at 21:33

## 2023-04-05 RX ADMIN — Medication 81 MILLIGRAM(S): at 11:40

## 2023-04-05 RX ADMIN — Medication 25 MILLIGRAM(S): at 09:01

## 2023-04-05 RX ADMIN — LEVETIRACETAM 420 MILLIGRAM(S): 250 TABLET, FILM COATED ORAL at 22:46

## 2023-04-05 RX ADMIN — Medication 10 MILLIGRAM(S): at 21:52

## 2023-04-05 RX ADMIN — CEFTRIAXONE 100 MILLIGRAM(S): 500 INJECTION, POWDER, FOR SOLUTION INTRAMUSCULAR; INTRAVENOUS at 00:58

## 2023-04-05 RX ADMIN — APIXABAN 5 MILLIGRAM(S): 2.5 TABLET, FILM COATED ORAL at 17:11

## 2023-04-05 RX ADMIN — Medication 1 PATCH: at 08:24

## 2023-04-05 RX ADMIN — Medication 50 MILLIGRAM(S): at 09:02

## 2023-04-05 RX ADMIN — AMLODIPINE BESYLATE 10 MILLIGRAM(S): 2.5 TABLET ORAL at 09:01

## 2023-04-05 RX ADMIN — Medication 50 MILLIGRAM(S): at 13:25

## 2023-04-05 RX ADMIN — Medication 2000 UNIT(S): at 11:36

## 2023-04-05 RX ADMIN — Medication 50 MILLIGRAM(S): at 17:11

## 2023-04-05 NOTE — DIETITIAN INITIAL EVALUATION ADULT - ETIOLOGY
inadequate/increased protein-energy intake in setting of hx of CKD, CVA, DM, seizure disorder, debility

## 2023-04-05 NOTE — SWALLOW BEDSIDE ASSESSMENT ADULT - COMMENTS
CXR 4/3/2023INTERPRETATION:  AP semierect chest on April 3, 2023 at 4:45 PM. Patient has sepsis.Heart magnified by technique.  There is a small infiltrate off the lower right hilum at this time which is new since January 16 this year.Posttraumatic deformity left upper humerus again noted.  IMPRESSION: Small right perihilar infiltrate at this time.    CT head no contrast 4/3/2023 IMPRESSION:1. Limited by nonstandard positioning the patient's head in the gantry.2. Old right frontal and right cerebellar infarcts. Atherosclerotic changes.3. No acute intracranial pathology. No significant change compared with prior study dated 12/27/2021.

## 2023-04-05 NOTE — SWALLOW BEDSIDE ASSESSMENT ADULT - H & P REVIEW
"71 y/o female w/ PMHx of Dementia- minimally verbal at baseline, CVA w/ residual right sided weakness, HTN, seizures, gout, chronic lower extremity edema, CKD stage 5, DVT on Eliquis presents to the ED due to unresponsiveness. Per aide, while seated pt eyes suddenly rolled back and she became unresponsive, and was drooling, which lasted till after arrival in the ED. BP at the time was 93/44; daughter Lauren who is HCP states that pt's BP has been fluctuating and dropping too much in the last few weeks. In the am SBP in the 180s-190s and after am medications, it drops very low. No other complaints noted"/yes

## 2023-04-05 NOTE — PHYSICAL THERAPY INITIAL EVALUATION ADULT - ADDITIONAL COMMENTS
Pt is non verbal. Per care coordination note Pt lives in private house with 8 steps to enter. Sister and son lives in house, has 24/7 aide. Prior level of function not available.

## 2023-04-05 NOTE — EEG REPORT - NS EEG TEXT BOX
FATOU MCBRIDE [id number] 72y (1950)F  Admitting MD: Dr. Vipin Ortez    Study Date: 04-05-23    --------------------------------------------------------------------------------------------------  History:  CC/ HPI Patient is a 72y old  Female with history of seizure disorder, dementia, who presents with a chief complaint of Syncope, UTI (04 Apr 2023 21:42)    amLODIPine   Tablet 10 milliGRAM(s) Oral daily  apixaban 5 milliGRAM(s) Oral two times a day  aspirin enteric coated 81 milliGRAM(s) Oral daily  cefTRIAXone   IVPB 1000 milliGRAM(s) IV Intermittent every 24 hours  cholecalciferol 2000 Unit(s) Oral daily  cloNIDine Patch 0.1 mG/24Hr(s) 1 patch Transdermal every 7 days  epoetin laila-epbx (RETACRIT) Injectable 69016 Unit(s) SubCutaneous once  hydrALAZINE 50 milliGRAM(s) Oral every 8 hours  iron sucrose Injectable 200 milliGRAM(s) IV Push once  levETIRAcetam 500 milliGRAM(s) Oral two times a day  metoprolol tartrate 50 milliGRAM(s) Oral every 12 hours  simvastatin 20 milliGRAM(s) Oral at bedtime    --------------------------------------------------------------------------------------------------  Study Interpretation:    [[[Abbreviation Key:  PDR=alpha rhythm/posterior dominant rhythm. A-P=anterior posterior gradient.  Amplitude: ‘very low’:<20; ‘low’:20-50; ‘medium’:; ‘high’:>200uV.  Persistence for periodic/rhythmic patterns (% of epoch) ‘rare’:<1%; ‘occasional’:1-10%; ‘frequent’:10-50%; ‘abundant’:50-90%; ‘continuous’:>90%.  Persistence for sporadic discharges: ‘rare’:<1/hr; ‘occasional’:1/min-1/hr; ‘frequent’:>1/min; ‘abundant’:>1/10 sec.  GRDA=generalized rhythmic delta activity; FIRDA=frontal intermittent GRDA; LRDA=lateralized rhythmic delta activity; TIRDA=temporal intermittent rhythmic delta activity;  LPD=PLED=lateralized periodic discharges; GPD=generalized periodic discharges; BiPDs=BiPLEDs=bilateral independent periodic epileptiform discharges; SIRPID=stimulus induced rhythmic, periodic, or ictal appearing discharges; BIRDs=brief potentially ictal rhythmic discharges >4 Hz, lasting .5-10s; PFA (paroxysmal bursts >13 Hz or =8 Hz).  Modifiers: +F=with fast component; +S=with spike component; +R=with rhythmic component.  S-B=burst suppression pattern.  Max=maximal. N1-drowsy; N2-stage II sleep; N3-slow wave sleep. SSS/BETS=small sharp spikes/benign epileptiform transients of sleep. HV=hyperventilation; PS=photic stimulation]]]    FINDINGS:  The background was continuous, spontaneously variable and reactive.  During wakefulness, the posteriorly dominant rhythm consisted of symmetric, well modulated 8 Hz activity, with an amplitude to 40 uV, that attenuated to eye opening.  Low amplitude central beta was noted in wakefulness.    Background Slowing:  Generalized slowing: moderate generalized theta/delta slowing was present.  Focal slowing: none was present.    Sleep Background:  -Drowsiness was characterized by fragmentation, attenuation, and slowing of the background activity.    -N2 sleep transients were not recorded.    Epileptiform Activity:   No interictal epileptiform discharges were present.    Events:  No clinical events were recorded.  No seizures were recorded.    Activation Procedures:   -Hyperventilation was not performed.    -Photic stimulation was performed and did not elicit any abnormalities.      Artifacts:  Intermittent myogenic and external motion artifacts were noted.    ECG:  The heart rate on single channel ECG at baseline was predominantly near BPM = 60   -----------------------------------------------------------------------------------------------------    EEG Classification / Summary:  Abnormal EEG study, awake / drowsy due to:  -Moderate generalized slowing  -----------------------------------------------------------------------------------------------------    Clinical Impression:  This study is indicative of a moderate, diffuse, and nonspecific cerebral dysfunction. No seizures or epileptiform abnormalities were captured.    -------------------------------------------------------------------------------------------------------  [facility] EEG Reading Room Ph#: [phone]  Epilepsy Answering Service after 5PM and before 8:30AM: Ph#: [phone]    Diamond Caal MD  PGY-6 Pediatric Epilepsy    ***THIS IS A PRELIMINARY FELLOW REPORT PENDING REVIEW WITH ATTENDING EPILEPTOLOGIST***  	   FATOU MCBRIDE [id number] 72y (1950)F  Admitting MD: Dr. Vipin Ortez    Study Date: 04-05-23  Duration: 22 min  --------------------------------------------------------------------------------------------------  History:  CC/ HPI Patient is a 72y old  Female with history of seizure disorder, dementia, who presents with a chief complaint of Syncope, UTI (04 Apr 2023 21:42)    amLODIPine   Tablet 10 milliGRAM(s) Oral daily  apixaban 5 milliGRAM(s) Oral two times a day  aspirin enteric coated 81 milliGRAM(s) Oral daily  cefTRIAXone   IVPB 1000 milliGRAM(s) IV Intermittent every 24 hours  cholecalciferol 2000 Unit(s) Oral daily  cloNIDine Patch 0.1 mG/24Hr(s) 1 patch Transdermal every 7 days  epoetin laila-epbx (RETACRIT) Injectable 88032 Unit(s) SubCutaneous once  hydrALAZINE 50 milliGRAM(s) Oral every 8 hours  iron sucrose Injectable 200 milliGRAM(s) IV Push once  levETIRAcetam 500 milliGRAM(s) Oral two times a day  metoprolol tartrate 50 milliGRAM(s) Oral every 12 hours  simvastatin 20 milliGRAM(s) Oral at bedtime    --------------------------------------------------------------------------------------------------  Study Interpretation:    [[[Abbreviation Key:  PDR=alpha rhythm/posterior dominant rhythm. A-P=anterior posterior gradient.  Amplitude: ‘very low’:<20; ‘low’:20-50; ‘medium’:; ‘high’:>200uV.  Persistence for periodic/rhythmic patterns (% of epoch) ‘rare’:<1%; ‘occasional’:1-10%; ‘frequent’:10-50%; ‘abundant’:50-90%; ‘continuous’:>90%.  Persistence for sporadic discharges: ‘rare’:<1/hr; ‘occasional’:1/min-1/hr; ‘frequent’:>1/min; ‘abundant’:>1/10 sec.  GRDA=generalized rhythmic delta activity; FIRDA=frontal intermittent GRDA; LRDA=lateralized rhythmic delta activity; TIRDA=temporal intermittent rhythmic delta activity;  LPD=PLED=lateralized periodic discharges; GPD=generalized periodic discharges; BiPDs=BiPLEDs=bilateral independent periodic epileptiform discharges; SIRPID=stimulus induced rhythmic, periodic, or ictal appearing discharges; BIRDs=brief potentially ictal rhythmic discharges >4 Hz, lasting .5-10s; PFA (paroxysmal bursts >13 Hz or =8 Hz).  Modifiers: +F=with fast component; +S=with spike component; +R=with rhythmic component.  S-B=burst suppression pattern.  Max=maximal. N1-drowsy; N2-stage II sleep; N3-slow wave sleep. SSS/BETS=small sharp spikes/benign epileptiform transients of sleep. HV=hyperventilation; PS=photic stimulation]]]    FINDINGS:    The background was continuous and spontaneously variable.  During wakefulness, the predominant background consisted of diffuse theta and delta slowing. There were fragments of a 7-Hz posterior dominant rhythm.    Background Slowing:  Generalized slowing: As above  Focal slowing: No consistent focal slowing    Sleep Background:  -Drowsiness was characterized by fragmentation, attenuation, and slowing of the background activity.    -Stage 2 sleep was not captured.    Epileptiform Activity:   None    Events:  No clinical events were recorded.  No seizures were recorded.    Activation Procedures:   -Hyperventilation was not performed.    -Photic stimulation was performed and did not elicit any abnormalities.      Artifacts:  Intermittent myogenic and external motion artifacts were present.    EKG:  Single-lead EKG showed regular rhythm at 60-70 bpm with occasional premature complexes.    -----------------------------------------------------------------------------------------------------    EEG Classification / Summary:  Abnormal routine EEG in the awake and drowsy states.  -Moderate diffuse or multifocal slowing  -No epileptiform abnormalities were captured.    -----------------------------------------------------------------------------------------------------    Clinical Impression:  -Moderate diffuse or multifocal cerebral dysfunction is nonspecific in etiology    -------------------------------------------------------------------------------------------------------  [facility] EEG Reading Room Ph#: [phone]  Epilepsy Answering Service after 5PM and before 8:30AM: Ph#: [phone]    Daimond Caal MD  PGY-6 Pediatric Epilepsy    Monika Sepulveda MD  Attending Physician, [facility] Epilepsy [place]

## 2023-04-05 NOTE — DIETITIAN INITIAL EVALUATION ADULT - OTHER CALCULATIONS
Height (cm): 160 (04-05)  Weight (kg): 54.2 (04-04)  BMI (kg/m2): 21.2 (04-05)  IBW: 52.1 kg    % IBW: 104%   I/O: 0/250(-250)

## 2023-04-05 NOTE — DIETITIAN INITIAL EVALUATION ADULT - REASON INDICATOR FOR ASSESSMENT
RN consult for pressure ulcer stage 2 or greater, however as per documentation pt c healed wound/ulcers.

## 2023-04-05 NOTE — DIETITIAN INITIAL EVALUATION ADULT - OTHER INFO
Problem dc include syncope, UTI, accelerated HTN, CKD, poor candidate for HD, CVA, dysphagia, hypernatremia, anemia of chronic disease, pt is full code.  No meds for DM PTA noted.

## 2023-04-05 NOTE — PHYSICAL THERAPY INITIAL EVALUATION ADULT - PERTINENT HX OF CURRENT PROBLEM, REHAB EVAL
Per H&P, Pt is a 73 y/o female w/ PMHx of Dementia- minimally verbal at baseline, CVA w/ residual right sided weakness, HTN, seizures, gout, chronic lower extremity edema, CKD stage 5, DVT on Eliquis presents to the ED due to unresponsiveness. Per aide, while seated pt eyes suddenly rolled back and she became unresponsive, and was drooling,

## 2023-04-05 NOTE — DIETITIAN INITIAL EVALUATION ADULT - ORAL INTAKE PTA/DIET HISTORY
Pt is non-verbal, confusion noted, pt's HHA who takes care of her was @ bedside.  Pt lived c family PTA.  As per HHA, pt ate regular foods @ home and had good appetite.  Pt wears dentures, currently without dentures in place.  Pt ate puree diet well today.

## 2023-04-05 NOTE — SWALLOW BEDSIDE ASSESSMENT ADULT - SLP GENERAL OBSERVATIONS
pt seen bedside, in 2C on RA alert and ?oriented to self. pt nonverbal essentially non communicative except facial grimace, eye contact and orally defensive mouth closed-?to communicate refusal and [facility] pt turned to the left despite repositioning and ?upper and lower extremities contracted. noted nonpurposeful hand/arm movement.

## 2023-04-05 NOTE — DIETITIAN INITIAL EVALUATION ADULT - PERTINENT LABORATORY DATA
04-05    149<H>  |  123<H>  |  86<H>  ----------------------------<  104<H>  4.5   |  17<L>  |  7.03<H>    GFR 6<L>    Ca    7.9<L>      05 Apr 2023 07:30  Mg     2.0     04-04    TPro  8.1  /  Alb  2.8<L>  /  TBili  0.5  /  DBili  x   /  AST  12<L>  /  ALT  8<L>  /  AlkPhos  60  04-03  A1C with Estimated Average Glucose Result: 5.2 % (12-25-22 @ 10:08)

## 2023-04-05 NOTE — DIETITIAN INITIAL EVALUATION ADULT - FACTORS AFF FOOD INTAKE
04/05, swallow evaluation c recommendation for puree c moderately thick liquid/change in mental status/difficulty chewing/difficulty feeding self/difficulty swallowing

## 2023-04-05 NOTE — SWALLOW BEDSIDE ASSESSMENT ADULT - SWALLOW EVAL: DIAGNOSIS
oropharyngeal dysphagia marked by decreased cognition, fair oral opening, prolonged bolus manipulation and transport posterior - ?at times oral holding, and +swallow trigger with hyolaryngeal elevation excursion to palpation. intermittent cough with mild thick liquid which could be suggestive of airway penetration

## 2023-04-05 NOTE — DIETITIAN NUTRITION RISK NOTIFICATION - TREATMENT: THE FOLLOWING DIET HAS BEEN RECOMMENDED
Diet, Pureed:   Moderately Thick Liquids (MODTHICKLIQS)  No Concentrated Potassium  Low Sodium  No Concentrated Phosphorus (04-05-23 @ 16:07) [Pending Verification By Attending]  Diet, Pureed:   Moderately Thick Liquids (MODTHICKLIQS) (04-05-23 @ 12:54) [Active]

## 2023-04-05 NOTE — SWALLOW BEDSIDE ASSESSMENT ADULT - ORAL PREPARATORY PHASE
decreased oral opening/Refuses to accept bolus into oral cavity Reduced oral grading Refuses to accept bolus into oral cavity

## 2023-04-06 LAB
ANION GAP SERPL CALC-SCNC: 7 MMOL/L — SIGNIFICANT CHANGE UP (ref 5–17)
BUN SERPL-MCNC: 86 MG/DL — HIGH (ref 7–23)
CALCIUM SERPL-MCNC: 7.4 MG/DL — LOW (ref 8.5–10.1)
CHLORIDE SERPL-SCNC: 127 MMOL/L — HIGH (ref 96–108)
CO2 SERPL-SCNC: 18 MMOL/L — LOW (ref 22–31)
CREAT SERPL-MCNC: 7.17 MG/DL — HIGH (ref 0.5–1.3)
EGFR: 6 ML/MIN/1.73M2 — LOW
GLUCOSE SERPL-MCNC: 80 MG/DL — SIGNIFICANT CHANGE UP (ref 70–99)
HCT VFR BLD CALC: 24.7 % — LOW (ref 34.5–45)
HGB BLD-MCNC: 7.5 G/DL — LOW (ref 11.5–15.5)
LEVETIRACETAM SERPL-MCNC: 87 UG/ML — HIGH (ref 10–40)
MCHC RBC-ENTMCNC: 29.6 PG — SIGNIFICANT CHANGE UP (ref 27–34)
MCHC RBC-ENTMCNC: 30.4 G/DL — LOW (ref 32–36)
MCV RBC AUTO: 97.6 FL — SIGNIFICANT CHANGE UP (ref 80–100)
NRBC # BLD: 0 /100 WBCS — SIGNIFICANT CHANGE UP (ref 0–0)
PLATELET # BLD AUTO: 328 K/UL — SIGNIFICANT CHANGE UP (ref 150–400)
POTASSIUM SERPL-MCNC: 4.5 MMOL/L — SIGNIFICANT CHANGE UP (ref 3.5–5.3)
POTASSIUM SERPL-SCNC: 4.5 MMOL/L — SIGNIFICANT CHANGE UP (ref 3.5–5.3)
RBC # BLD: 2.53 M/UL — LOW (ref 3.8–5.2)
RBC # FLD: 14.2 % — SIGNIFICANT CHANGE UP (ref 10.3–14.5)
SODIUM SERPL-SCNC: 152 MMOL/L — HIGH (ref 135–145)
WBC # BLD: 7.04 K/UL — SIGNIFICANT CHANGE UP (ref 3.8–10.5)
WBC # FLD AUTO: 7.04 K/UL — SIGNIFICANT CHANGE UP (ref 3.8–10.5)

## 2023-04-06 PROCEDURE — 99233 SBSQ HOSP IP/OBS HIGH 50: CPT

## 2023-04-06 RX ORDER — ATORVASTATIN CALCIUM 80 MG/1
40 TABLET, FILM COATED ORAL AT BEDTIME
Refills: 0 | Status: DISCONTINUED | OUTPATIENT
Start: 2023-04-06 | End: 2023-04-08

## 2023-04-06 RX ORDER — HYDRALAZINE HCL 50 MG
75 TABLET ORAL EVERY 8 HOURS
Refills: 0 | Status: DISCONTINUED | OUTPATIENT
Start: 2023-04-06 | End: 2023-04-07

## 2023-04-06 RX ORDER — SODIUM CHLORIDE 9 MG/ML
1000 INJECTION, SOLUTION INTRAVENOUS
Refills: 0 | Status: DISCONTINUED | OUTPATIENT
Start: 2023-04-06 | End: 2023-04-07

## 2023-04-06 RX ADMIN — Medication 75 MILLIGRAM(S): at 13:44

## 2023-04-06 RX ADMIN — SODIUM CHLORIDE 75 MILLILITER(S): 9 INJECTION, SOLUTION INTRAVENOUS at 22:09

## 2023-04-06 RX ADMIN — SODIUM CHLORIDE 75 MILLILITER(S): 9 INJECTION, SOLUTION INTRAVENOUS at 11:14

## 2023-04-06 RX ADMIN — CEFTRIAXONE 100 MILLIGRAM(S): 500 INJECTION, POWDER, FOR SOLUTION INTRAMUSCULAR; INTRAVENOUS at 01:04

## 2023-04-06 RX ADMIN — Medication 1 PATCH: at 09:03

## 2023-04-06 RX ADMIN — APIXABAN 5 MILLIGRAM(S): 2.5 TABLET, FILM COATED ORAL at 05:51

## 2023-04-06 RX ADMIN — LEVETIRACETAM 500 MILLIGRAM(S): 250 TABLET, FILM COATED ORAL at 12:34

## 2023-04-06 RX ADMIN — Medication 2000 UNIT(S): at 12:35

## 2023-04-06 RX ADMIN — Medication 81 MILLIGRAM(S): at 12:35

## 2023-04-06 RX ADMIN — LEVETIRACETAM 500 MILLIGRAM(S): 250 TABLET, FILM COATED ORAL at 22:08

## 2023-04-06 RX ADMIN — Medication 75 MILLIGRAM(S): at 22:09

## 2023-04-06 RX ADMIN — ATORVASTATIN CALCIUM 40 MILLIGRAM(S): 80 TABLET, FILM COATED ORAL at 22:08

## 2023-04-06 RX ADMIN — APIXABAN 5 MILLIGRAM(S): 2.5 TABLET, FILM COATED ORAL at 18:15

## 2023-04-06 RX ADMIN — Medication 1 PATCH: at 21:07

## 2023-04-06 RX ADMIN — AMLODIPINE BESYLATE 10 MILLIGRAM(S): 2.5 TABLET ORAL at 05:51

## 2023-04-06 RX ADMIN — Medication 50 MILLIGRAM(S): at 18:15

## 2023-04-06 RX ADMIN — Medication 50 MILLIGRAM(S): at 05:52

## 2023-04-06 RX ADMIN — Medication 50 MILLIGRAM(S): at 05:51

## 2023-04-07 LAB
ANION GAP SERPL CALC-SCNC: 9 MMOL/L — SIGNIFICANT CHANGE UP (ref 5–17)
BUN SERPL-MCNC: 79 MG/DL — HIGH (ref 7–23)
CALCIUM SERPL-MCNC: 7.7 MG/DL — LOW (ref 8.5–10.1)
CHLORIDE SERPL-SCNC: 118 MMOL/L — HIGH (ref 96–108)
CO2 SERPL-SCNC: 18 MMOL/L — LOW (ref 22–31)
CREAT SERPL-MCNC: 6.8 MG/DL — HIGH (ref 0.5–1.3)
EGFR: 6 ML/MIN/1.73M2 — LOW
GLUCOSE SERPL-MCNC: 126 MG/DL — HIGH (ref 70–99)
HCT VFR BLD CALC: 28.1 % — LOW (ref 34.5–45)
HGB BLD-MCNC: 8.5 G/DL — LOW (ref 11.5–15.5)
MCHC RBC-ENTMCNC: 29.6 PG — SIGNIFICANT CHANGE UP (ref 27–34)
MCHC RBC-ENTMCNC: 30.2 G/DL — LOW (ref 32–36)
MCV RBC AUTO: 97.9 FL — SIGNIFICANT CHANGE UP (ref 80–100)
NRBC # BLD: 0 /100 WBCS — SIGNIFICANT CHANGE UP (ref 0–0)
PLATELET # BLD AUTO: 360 K/UL — SIGNIFICANT CHANGE UP (ref 150–400)
POTASSIUM SERPL-MCNC: 4.6 MMOL/L — SIGNIFICANT CHANGE UP (ref 3.5–5.3)
POTASSIUM SERPL-SCNC: 4.6 MMOL/L — SIGNIFICANT CHANGE UP (ref 3.5–5.3)
RBC # BLD: 2.87 M/UL — LOW (ref 3.8–5.2)
RBC # FLD: 14.2 % — SIGNIFICANT CHANGE UP (ref 10.3–14.5)
SODIUM SERPL-SCNC: 145 MMOL/L — SIGNIFICANT CHANGE UP (ref 135–145)
WBC # BLD: 7.23 K/UL — SIGNIFICANT CHANGE UP (ref 3.8–10.5)
WBC # FLD AUTO: 7.23 K/UL — SIGNIFICANT CHANGE UP (ref 3.8–10.5)

## 2023-04-07 PROCEDURE — 99233 SBSQ HOSP IP/OBS HIGH 50: CPT

## 2023-04-07 RX ORDER — LEVETIRACETAM 250 MG/1
500 TABLET, FILM COATED ORAL DAILY
Refills: 0 | Status: DISCONTINUED | OUTPATIENT
Start: 2023-04-08 | End: 2023-04-08

## 2023-04-07 RX ORDER — SODIUM CHLORIDE 9 MG/ML
1000 INJECTION, SOLUTION INTRAVENOUS
Refills: 0 | Status: DISCONTINUED | OUTPATIENT
Start: 2023-04-07 | End: 2023-04-08

## 2023-04-07 RX ORDER — HYDRALAZINE HCL 50 MG
100 TABLET ORAL EVERY 8 HOURS
Refills: 0 | Status: DISCONTINUED | OUTPATIENT
Start: 2023-04-07 | End: 2023-04-08

## 2023-04-07 RX ORDER — LABETALOL HCL 100 MG
100 TABLET ORAL EVERY 8 HOURS
Refills: 0 | Status: DISCONTINUED | OUTPATIENT
Start: 2023-04-07 | End: 2023-04-08

## 2023-04-07 RX ADMIN — Medication 81 MILLIGRAM(S): at 11:27

## 2023-04-07 RX ADMIN — Medication 100 MILLIGRAM(S): at 21:32

## 2023-04-07 RX ADMIN — APIXABAN 5 MILLIGRAM(S): 2.5 TABLET, FILM COATED ORAL at 05:06

## 2023-04-07 RX ADMIN — APIXABAN 5 MILLIGRAM(S): 2.5 TABLET, FILM COATED ORAL at 17:19

## 2023-04-07 RX ADMIN — Medication 100 MILLIGRAM(S): at 14:03

## 2023-04-07 RX ADMIN — ATORVASTATIN CALCIUM 40 MILLIGRAM(S): 80 TABLET, FILM COATED ORAL at 21:32

## 2023-04-07 RX ADMIN — Medication 10 MILLIGRAM(S): at 06:32

## 2023-04-07 RX ADMIN — Medication 1 PATCH: at 07:53

## 2023-04-07 RX ADMIN — Medication 1 PATCH: at 18:01

## 2023-04-07 RX ADMIN — Medication 100 MILLIGRAM(S): at 14:02

## 2023-04-07 RX ADMIN — LEVETIRACETAM 500 MILLIGRAM(S): 250 TABLET, FILM COATED ORAL at 09:42

## 2023-04-07 RX ADMIN — Medication 2000 UNIT(S): at 11:27

## 2023-04-07 RX ADMIN — Medication 50 MILLIGRAM(S): at 05:06

## 2023-04-07 RX ADMIN — AMLODIPINE BESYLATE 10 MILLIGRAM(S): 2.5 TABLET ORAL at 05:06

## 2023-04-07 RX ADMIN — Medication 75 MILLIGRAM(S): at 05:06

## 2023-04-07 RX ADMIN — SODIUM CHLORIDE 50 MILLILITER(S): 9 INJECTION, SOLUTION INTRAVENOUS at 12:32

## 2023-04-07 NOTE — PROGRESS NOTE ADULT - ASSESSMENT
73 y/o female w/ PMHx of Dementia- minimally verbal at baseline, CVA w/ residual right sided weakness, HTN, seizures, gout, chronic lower extremity edema, CKD stage 5, DVT on Eliquis presents to the ED due to unresponsiveness,  admitted for syncope, acute UTI.    # Syncope  - Likely vasovagal due to drop in BP. no arrhythmias on my tele review.   - CT no acute findings  - Unlikely seizure episode. EEG with no epileptiform activity.   - Keppra level is 87. Decrease keppra to 250 mg BID for hx of seizure. patient's daughter advised to have follow up with PCP about further outpatient keppra dose adjustment and she verbalized agreement.     #Acute Metabolic Encephalopathy is ruled out.     # Klebsiella UTI - Finished antibiotic course.     # Accelerated HTN  - uncontrolled again. increase hydralazine and change metoprolol to labetalol with holding parameters. Monitor.     #CKD stage 5  - Per daughter Lauren, she's the HCP would like the option of HD  - Per Nephrology note, pt poor candidate for HD which I advised her off  - Discussed with Dr. Oakes.     # CVA  - c/w ASA,  lipitor.     # DVT   - c/w Eliquis. Discussed with sister to follow up with PCP about how long patient should be on AC    Hypernatremia. hypovolemic likely. improved D5 IVF. Decrease its rate today to avoid volume overload in setting of CKD. Discussed with nephrology service.     Anemia of chronic disease. Hb stable. No active gross bleeding.     Dysphagia. Aspiration precautions. Pureed diet.     Moderate PCM: cont current diet as recommended by nutritional service.   Full code.   Patient has 24/7 MLTC  
73 y/o female w/ PMHx of Dementia- minimally verbal at baseline, CVA w/ residual right sided weakness, HTN, seizures, gout, chronic lower extremity edema, CKD stage 5, DVT on Eliquis presents to the ED due to unresponsiveness,  admitted for syncope, acute UTI.    # Syncope  - Likely vasovagal due to drop in BP  - CT no acute findings  - Unlikely seizure episode. EEG with no epileptiform activity.   - Change iv to oral keppra. seizure precautions.     #Acute Metabolic Encepahlopathy is ruled out.     # Klebsiella UTI - c/w Rocephin x 3 days. pending sensitivity.     # Accelerated HTN  - uncontrolled. Increased hydralazine. Monitor.     #CKD stage 5  - Per daughter Lauren, she's the HCP would like the option of HD  - Per Nephrology note, pt poor candidate for HD which I advised her off  - Discussed with Dr. Oakes.     # CVA  - c/w ASA and statin    # DVT   - c/w Eliquis. Discussed with sister to follow up with PCP about how long patient should be on AC    Hypernatremia. hypovolemic likely. Give free water. Follow BMP in am.     Anemia of chronic disease. worsening hemoglobin. clumped sample today. Follow CBC now and in am    Dysphagia. aspiration precautions. Discussed with swallow service>>pureed diet.     Full code.     
73 y/o female w/ PMHx of Dementia- minimally verbal at baseline, CVA w/ residual right sided weakness, HTN, seizures, gout, chronic lower extremity edema, CKD stage 5, DVT on Eliquis presents to the ED due to unresponsiveness,  admitted for syncope, acute UTI.    # Syncope  - Likely vasovagal due to drop in BP  - CT no acute findings  - possible seizure; f/u EEG  - f/u Keppra levels, c/w Keppra    #Metabolic Encepahlopathy - appears to be improved.  Per family, pt at baseline mental status.  Advance diet.  S&S evaluation.  OOB with PT.      # Acute UTI - c/w Rocephin - f/u urine culture    # Accelerated HTN  - resume home meds.  Restart po meds.  Continue Catapres, Hydralazine for now.      #CKD stage 5  - Per daughter Lauren, she's the HCP would like the option of HD  - Per Nephrology note, pt poor candidate for HD which I advised her off  - Seen with Dr. Oakes.     # CVA  - c/w ASA and statin    # DVT   - c/w Eliquis    Per daughter who states she's the HCP, pt FC, she seemed caught off guard, would consider revisiting topic          
73 y/o female w/ PMHx of Dementia- minimally verbal at baseline, CVA w/ residual right sided weakness, HTN, seizures, gout, chronic lower extremity edema, CKD stage 5, DVT on Eliquis presents to the ED due to unresponsiveness,  admitted for syncope, acute UTI.    # Syncope  - Likely vasovagal due to drop in BP  - CT no acute findings  - Unlikely seizure episode. EEG with no epileptiform activity.   - Cont keppra. Seizure precautions.     #Acute Metabolic Encephalopathy is ruled out.     # Klebsiella UTI - Finished antibiotic course.     # Accelerated HTN  - uncontrolled. Increased hydralazine again. Monitor.     #CKD stage 5  - Per daughter Lauren, she's the HCP would like the option of HD  - Per Nephrology note, pt poor candidate for HD which I advised her off  - Discussed with Dr. Oakes.     # CVA  - c/w ASA, Zocor changed to lipitor.     # DVT   - c/w Eliquis. Discussed with sister to follow up with PCP about how long patient should be on AC    Hypernatremia. hypovolemic likely. worse despite addition of free water. Start Dextrose IVF. Follow BMP in am.     Anemia of chronic disease. Hb stable. No active gross bleeding.     Dysphagia. Aspiration precautions. Pureed diet.     Full code.   Patient has 24/7 MLTC

## 2023-04-07 NOTE — PROGRESS NOTE ADULT - NUTRITIONAL ASSESSMENT
This patient has been assessed with a concern for Malnutrition and has been determined to have a diagnosis/diagnoses of Moderate protein-calorie malnutrition.    This patient is being managed with:   Diet Pureed-  Moderately Thick Liquids (MODTHICKLIQS)  No Concentrated Potassium  Low Sodium  No Concentrated Phosphorus  Entered: Apr 5 2023  4:07PM  
This patient has been assessed with a concern for Malnutrition and has been determined to have a diagnosis/diagnoses of Moderate protein-calorie malnutrition.    This patient is being managed with:   Diet Pureed-  Moderately Thick Liquids (MODTHICKLIQS)  No Concentrated Potassium  Low Sodium  No Concentrated Phosphorus  Entered: Apr 5 2023  4:07PM    Diet Pureed-  Moderately Thick Liquids (MODTHICKLIQS)  Entered: Apr 5 2023 12:55PM    The following pending diet order is being considered for treatment of Moderate protein-calorie malnutrition:null

## 2023-04-08 ENCOUNTER — TRANSCRIPTION ENCOUNTER (OUTPATIENT)
Age: 73
End: 2023-04-08

## 2023-04-08 VITALS
RESPIRATION RATE: 17 BRPM | TEMPERATURE: 98 F | DIASTOLIC BLOOD PRESSURE: 84 MMHG | SYSTOLIC BLOOD PRESSURE: 140 MMHG | HEART RATE: 87 BPM | OXYGEN SATURATION: 100 %

## 2023-04-08 PROCEDURE — 99239 HOSP IP/OBS DSCHRG MGMT >30: CPT

## 2023-04-08 RX ORDER — SIMVASTATIN 20 MG/1
1 TABLET, FILM COATED ORAL
Qty: 0 | Refills: 0 | DISCHARGE

## 2023-04-08 RX ORDER — HYDRALAZINE HCL 50 MG
1 TABLET ORAL
Refills: 0 | DISCHARGE

## 2023-04-08 RX ORDER — LEVETIRACETAM 250 MG/1
1 TABLET, FILM COATED ORAL
Qty: 0 | Refills: 0 | DISCHARGE
Start: 2023-04-08

## 2023-04-08 RX ORDER — APIXABAN 2.5 MG/1
1 TABLET, FILM COATED ORAL
Qty: 0 | Refills: 0 | DISCHARGE
Start: 2023-04-08

## 2023-04-08 RX ORDER — HYDRALAZINE HCL 50 MG
1 TABLET ORAL
Qty: 90 | Refills: 0
Start: 2023-04-08

## 2023-04-08 RX ORDER — METOPROLOL TARTRATE 50 MG
1 TABLET ORAL
Qty: 0 | Refills: 0 | DISCHARGE

## 2023-04-08 RX ORDER — LABETALOL HCL 100 MG
1 TABLET ORAL
Qty: 90 | Refills: 0
Start: 2023-04-08

## 2023-04-08 RX ORDER — ATORVASTATIN CALCIUM 80 MG/1
1 TABLET, FILM COATED ORAL
Qty: 30 | Refills: 0
Start: 2023-04-08

## 2023-04-08 RX ORDER — CHOLECALCIFEROL (VITAMIN D3) 125 MCG
2000 CAPSULE ORAL
Qty: 0 | Refills: 0 | DISCHARGE
Start: 2023-04-08

## 2023-04-08 RX ORDER — LEVETIRACETAM 250 MG/1
1 TABLET, FILM COATED ORAL
Qty: 0 | Refills: 0 | DISCHARGE

## 2023-04-08 RX ADMIN — LEVETIRACETAM 500 MILLIGRAM(S): 250 TABLET, FILM COATED ORAL at 12:02

## 2023-04-08 RX ADMIN — Medication 100 MILLIGRAM(S): at 13:03

## 2023-04-08 RX ADMIN — Medication 2000 UNIT(S): at 12:02

## 2023-04-08 RX ADMIN — APIXABAN 5 MILLIGRAM(S): 2.5 TABLET, FILM COATED ORAL at 05:37

## 2023-04-08 RX ADMIN — Medication 100 MILLIGRAM(S): at 05:37

## 2023-04-08 RX ADMIN — AMLODIPINE BESYLATE 10 MILLIGRAM(S): 2.5 TABLET ORAL at 05:43

## 2023-04-08 RX ADMIN — SODIUM CHLORIDE 50 MILLILITER(S): 9 INJECTION, SOLUTION INTRAVENOUS at 08:40

## 2023-04-08 RX ADMIN — Medication 1 PATCH: at 07:38

## 2023-04-08 RX ADMIN — Medication 100 MILLIGRAM(S): at 13:02

## 2023-04-08 RX ADMIN — Medication 81 MILLIGRAM(S): at 12:02

## 2023-04-08 NOTE — DISCHARGE NOTE PROVIDER - DETAILS OF MALNUTRITION DIAGNOSIS/DIAGNOSES
This patient has been assessed with a concern for Malnutrition and was treated during this hospitalization for the following Nutrition diagnosis/diagnoses:     -  04/05/2023: Moderate protein-calorie malnutrition

## 2023-04-08 NOTE — PROGRESS NOTE ADULT - SUBJECTIVE AND OBJECTIVE BOX
Patient: FATOU MCBRIDE [id number] 72y Female                            Hospitalist Attending Note    Seen with Dr. Oakes, pt's sister Vanessa and HHA at bedside.  They report she is near baseline.  Per RN, pt had some coughing with liquids.      ____________________PHYSICAL EXAM:  GENERAL:  NAD, arousable, confused.    HEENT: NCAT  CARDIOVASCULAR:  S1, S2  LUNGS: CTAB  ABDOMEN:  soft, (-) tenderness, (-) distension, (+) bowel sounds, (-) guarding, (-) rebound (-) rigidity  EXTREMITIES:  no cyanosis / clubbing / edema.   NEURO: strength symmetric, sensation grossly intact.   ____________________     VITALS:  Vital Signs Last 24 Hrs  T(C): 36.5 (2023 15:57), Max: 36.8 (2023 21:01)  T(F): 97.7 (2023 15:57), Max: 98.3 (2023 21:01)  HR: 72 (2023 17:16) (59 - 82)  BP: 209/93 (2023 17:16) (163/56 - 213/79)  BP(mean): --  RR: 18 (2023 15:57) (12 - 18)  SpO2: 97% (2023 15:57) (97% - 100%)    Parameters below as of 2023 04:42  Patient On (Oxygen Delivery Method): room air     Daily     Daily   CAPILLARY BLOOD GLUCOSE      POCT Blood Glucose.: 130 mg/dL (2023 22:00)    I&O's Summary      HISTORY:  PAST MEDICAL & SURGICAL HISTORY:  Hypertension      Diabetes      Asthma      OA (osteoarthritis)  bautista knees, low back  and  bautista shoulders      Gout      Seizure disorder      Urinary incontinence      Vision changes  lt eye      CVA (cerebral infarction)  right side weakness      Kidney stone      Allergies    No Known Allergies    Intolerances       LABS:                        8.4    7.53  )-----------( 398      ( 2023 07:53 )             27.4     04-04    146<H>  |  119<H>  |  85<H>  ----------------------------<  112<H>  4.4   |  18<L>  |  6.98<H>    Ca    8.0<L>      2023 07:53  Mg     2.0     04-04    TPro  8.1  /  Alb  2.8<L>  /  TBili  0.5  /  DBili  x   /  AST  12<L>  /  ALT  8<L>  /  AlkPhos  60  04-03    PT/INR - ( 2023 16:11 )   PT: 11.5 sec;   INR: 0.96 ratio         PTT - ( 2023 16:11 )  PTT:23.2 sec  LIVER FUNCTIONS - ( 2023 16:11 )  Alb: 2.8 g/dL / Pro: 8.1 gm/dL / ALK PHOS: 60 U/L / ALT: 8 U/L / AST: 12 U/L / GGT: x           Urinalysis Basic - ( 2023 16:44 )    Color: Yellow / Appearance: Slightly Turbid / S.010 / pH: x  Gluc: x / Ketone: Negative  / Bili: Negative / Urobili: Negative mg/dL   Blood: x / Protein: 500 mg/dL / Nitrite: Negative   Leuk Esterase: Moderate / RBC: 0-2 /HPF / WBC >50   Sq Epi: x / Non Sq Epi: Occasional / Bacteria: TNTC              MEDICATIONS:  MEDICATIONS  (STANDING):  apixaban 5 milliGRAM(s) Oral two times a day  aspirin enteric coated 81 milliGRAM(s) Oral daily  cefTRIAXone   IVPB 1000 milliGRAM(s) IV Intermittent every 24 hours  cloNIDine Patch 0.1 mG/24Hr(s) 1 patch Transdermal every 7 days  levETIRAcetam  IVPB 500 milliGRAM(s) IV Intermittent every 12 hours  metoprolol tartrate Injectable 5 milliGRAM(s) IV Push every 12 hours  simvastatin 20 milliGRAM(s) Oral at bedtime    MEDICATIONS  (PRN):  acetaminophen     Tablet .. 650 milliGRAM(s) Oral every 6 hours PRN Temp greater or equal to 38C (100.4F), Mild Pain (1 - 3)  hydrALAZINE Injectable 10 milliGRAM(s) IV Push every 4 hours PRN for SBP > 180 or DBP > 110  ondansetron Injectable 4 milliGRAM(s) IV Push every 8 hours PRN Nausea and/or Vomiting  
CHIEF COMPLAINT: Follow up of syncope and UTI  no fever  non verbal  no n/v/d reported  no active gross bleeding reported by sister and aid at bedside.   passed swallow eval  oral fluid intake poor per nurse.   No new focal weakness reported.       PHYSICAL EXAM:    GENERAL: elderly and mentation at baseline per aid   CHEST/LUNG:  No wheezing, no crackles   HEART: Regular rate and rhythm; + SM  ABDOMEN: Soft, Nontender, Nondistended; Bowel sounds present  EXTREMITIES:  No clubbing, cyanosis, or edema  NERVOUS SYSTEM:  limited exam because she does not follow commands.   Psychiatry: AA but not verbal.       OBJECTIVE DATA:       Vital Signs Last 24 Hrs  T(C): 36.4 (2023 11:00), Max: 37.4 (2023 21:40)  T(F): 97.6 (2023 11:00), Max: 99.4 (2023 21:40)  HR: 65 (2023 11:00) (65 - 77)  BP: 159/61 (2023 11:00) (153/66 - 196/68)  BP(mean): --  RR: 18 (2023 11:00) (17 - 18)  SpO2: 98% (2023 11:00) (96% - 98%)    Parameters below as of 2023 11:00  Patient On (Oxygen Delivery Method): room air               Daily Height in cm: 160.02 (2023 15:38)    Daily Weight in k.1 (2023 04:46)  LABS:                        7.5    7.04  )-----------( 328      ( 2023 10:10 )             24.7             -    152<H>  |  127<H>  |  86<H>  ----------------------------<  80  4.5   |  18<L>  |  7.17<H>    Ca    7.4<L>      2023 07:11        Culture - Urine (collected )  Source: Clean Catch Clean Catch (Midstream)  Final Report ():    >100,000 CFU/ml Klebsiella pneumoniae  Organism: Klebsiella pneumoniae ()  Organism: Klebsiella pneumoniae ()    Sensitivities:      Method Type: MELISA      -  Amikacin: S <=16      -  Amoxicillin/Clavulanic Acid: S <=8/4      -  Ampicillin: R >16 These ampicillin results predict results for amoxicillin      -  Ampicillin/Sulbactam: S <=4/2 Enterobacter, Klebsiella aerogenes, Citrobacter, and Serratia may develop resistance during prolonged therapy (3-4 days)      -  Aztreonam: S <=4      -  Cefazolin: S <=2 For uncomplicated UTI with K. pneumoniae, E. coli, or P. mirablis: MELISA <=16 is sensitive and MELISA >=32 is resistant. This also predicts results for oral agents cefaclor, cefdinir, cefpodoxime, cefprozil, cefuroxime axetil, cephalexin and locarbef for uncomplicated UTI. Note that some isolates may be susceptible to these agents while testing resistant to cefazolin.      -  Cefepime: S <=2      -  Cefoxitin: S <=8      -  Ceftriaxone: S <=1 Enterobacter, Klebsiella aerogenes, Citrobacter, and Serratia may develop resistance during prolonged therapy      -  Cefuroxime: S <=4      -  Ciprofloxacin: S <=0.25      -  Ertapenem: S <=0.5      -  Gentamicin: S <=2      -  Imipenem: S <=1      -  Levofloxacin: S <=0.5      -  Meropenem: S <=1      -  Nitrofurantoin: S <=32 Should not be used to treat pyelonephritis      -  Piperacillin/Tazobactam: S <=8      -  Tobramycin: S <=2      -  Trimethoprim/Sulfamethoxazole: S <=0.5/9.5    Culture - Blood (collected )  Source: .Blood Blood-Peripheral  Preliminary Report ():    No growth to date.    Culture - Blood (collected )  Source: .Blood Blood-Peripheral  Preliminary Report ():    No growth to date.    MEDICATIONS  (STANDING):  amLODIPine   Tablet 10 milliGRAM(s) Oral daily  apixaban 5 milliGRAM(s) Oral two times a day  aspirin enteric coated 81 milliGRAM(s) Oral daily  atorvastatin 40 milliGRAM(s) Oral at bedtime  cholecalciferol 2000 Unit(s) Oral daily  cloNIDine Patch 0.1 mG/24Hr(s) 1 patch Transdermal every 7 days  dextrose 5%. 1000 milliLiter(s) (75 mL/Hr) IV Continuous <Continuous>  hydrALAZINE 75 milliGRAM(s) Oral every 8 hours  levETIRAcetam 500 milliGRAM(s) Oral two times a day  metoprolol tartrate 50 milliGRAM(s) Oral every 12 hours    MEDICATIONS  (PRN):  acetaminophen     Tablet .. 650 milliGRAM(s) Oral every 6 hours PRN Temp greater or equal to 38C (100.4F), Mild Pain (1 - 3)  hydrALAZINE Injectable 10 milliGRAM(s) IV Push every 4 hours PRN for SBP > 180 or DBP > 110  ondansetron Injectable 4 milliGRAM(s) IV Push every 8 hours PRN Nausea and/or Vomiting              
NEPHROLOGY PROGRESS NOTE    CHIEF COMPLAINT:  CKD    HPI:  Renal function about same.  Lost IV access.      EXAM:  T(F): 97.6 (04-06-23 @ 11:00)  HR: 65 (04-06-23 @ 11:00)  BP: 159/61 (04-06-23 @ 11:00)  RR: 18 (04-06-23 @ 11:00)  SpO2: 98% (04-06-23 @ 11:00)    Somnolent  Normal respiratory effort, lungs clear bilaterally  Heart RRR with no murmur, no peripheral edema         LABS                             7.5    7.04  )-----------( 328      ( 06 Apr 2023 10:10 )             24.7          04-06    152<H>  |  127<H>  |  86<H>  ----------------------------<  80  4.5   |  18<L>  |  7.17<H>    Ca    7.4<L>      06 Apr 2023 07:11      Assessment   CKD 5; stable last 3 months;   UTI associated change in MS  Chronic HTN    Plan  IV D5W if access can be re established  IV abx  BP medication adjustments   Considering overall comorbid status and quality of life, would defer HD if indications arise        
[facility] NEPHROLOGY SERVICES, [facility]  NEPHROLOGY AND HYPERTENSION  [address]  [address]  [place] [address]  [phone]    MD YECENIA CRAVEN, MD MIRIAM LAROSE MD YELENA ROSENBERG, MD BIN SANCHEZ, MD VICTORINA MICHELLE MD          Patient events noted  No distress more alert     MEDICATIONS  (STANDING):  amLODIPine   Tablet 10 milliGRAM(s) Oral daily  apixaban 5 milliGRAM(s) Oral two times a day  aspirin enteric coated 81 milliGRAM(s) Oral daily  cefTRIAXone   IVPB 1000 milliGRAM(s) IV Intermittent every 24 hours  cholecalciferol 2000 Unit(s) Oral daily  cloNIDine Patch 0.1 mG/24Hr(s) 1 patch Transdermal every 7 days  hydrALAZINE 50 milliGRAM(s) Oral every 8 hours  levETIRAcetam 500 milliGRAM(s) Oral two times a day  metoprolol tartrate 50 milliGRAM(s) Oral every 12 hours  simvastatin 20 milliGRAM(s) Oral at bedtime    MEDICATIONS  (PRN):  acetaminophen     Tablet .. 650 milliGRAM(s) Oral every 6 hours PRN Temp greater or equal to 38C (100.4F), Mild Pain (1 - 3)  hydrALAZINE Injectable 10 milliGRAM(s) IV Push every 4 hours PRN for SBP > 180 or DBP > 110  ondansetron Injectable 4 milliGRAM(s) IV Push every 8 hours PRN Nausea and/or Vomiting      04-04-23 @ 07:01  -  04-05-23 @ 07:00  --------------------------------------------------------  IN: 0 mL / OUT: 250 mL / NET: -250 mL    04-05-23 @ 07:01  -  04-05-23 @ 23:56  --------------------------------------------------------  IN: 360 mL / OUT: 0 mL / NET: 360 mL      PHYSICAL EXAM:      T(C): 36.8 (04-05-23 @ 23:51), Max: 37.4 (04-05-23 @ 21:40)  HR: 63 (04-05-23 @ 23:51) (63 - 95)  BP: 138/62 (04-05-23 @ 23:51) (138/62 - 198/77)  RR: 18 (04-05-23 @ 23:51) (17 - 18)  SpO2: 99% (04-05-23 @ 23:51) (94% - 100%)  Wt(kg): --  Lungs clear  Heart S1S2  Abd soft NT ND  Extremities:   tr edema                                    7.2    7.14  )-----------( CLUMPS    ( 05 Apr 2023 07:30 )             23.4     04-05    149<H>  |  123<H>  |  86<H>  ----------------------------<  104<H>  4.5   |  17<L>  |  7.03<H>    Ca    7.9<L>      05 Apr 2023 07:30  Mg     2.0     04-04          Creatinine Trend: 7.03<--, 6.98<--, 7.22<--          Assessment   CKD 5; stable last 3 months;   UTI associated change in MS  HTN urgency   Stable    Plan  IV abx supportive care  BP medication adjustments   Considering overall comorbid status and quality of life, would defer HD if indications arise.    Luis Enrique Oakes MD
Subjective: NAD. Good appetite according to daughter      MEDICATIONS  (STANDING):  amLODIPine   Tablet 10 milliGRAM(s) Oral daily  apixaban 5 milliGRAM(s) Oral two times a day  aspirin enteric coated 81 milliGRAM(s) Oral daily  atorvastatin 40 milliGRAM(s) Oral at bedtime  cholecalciferol 2000 Unit(s) Oral daily  cloNIDine Patch 0.1 mG/24Hr(s) 1 patch Transdermal every 7 days  dextrose 5%. 1000 milliLiter(s) (50 mL/Hr) IV Continuous <Continuous>  hydrALAZINE 100 milliGRAM(s) Oral every 8 hours  labetalol 100 milliGRAM(s) Oral every 8 hours    MEDICATIONS  (PRN):  acetaminophen     Tablet .. 650 milliGRAM(s) Oral every 6 hours PRN Temp greater or equal to 38C (100.4F), Mild Pain (1 - 3)  hydrALAZINE Injectable 10 milliGRAM(s) IV Push every 4 hours PRN for SBP > 180 or DBP > 110  ondansetron Injectable 4 milliGRAM(s) IV Push every 8 hours PRN Nausea and/or Vomiting          T(C): 36.6 (04-07-23 @ 10:56), Max: 37 (04-06-23 @ 22:06)  HR: 65 (04-07-23 @ 06:23) (65 - 67)  BP: 158/68 (04-07-23 @ 10:56) (158/68 - 204/80)  RR: 18 (04-07-23 @ 10:56) (16 - 18)  SpO2: 99% (04-07-23 @ 10:56) (97% - 99%)  Wt(kg): --        I&O's Detail    06 Apr 2023 07:01  -  07 Apr 2023 07:00  --------------------------------------------------------  IN:    dextrose 5%: 525 mL    Oral Fluid: 370 mL  Total IN: 895 mL    OUT:    Voided (mL): 1325 mL  Total OUT: 1325 mL    Total NET: -430 mL      07 Apr 2023 07:01  -  07 Apr 2023 13:51  --------------------------------------------------------  IN:    Oral Fluid: 60 mL  Total IN: 60 mL    OUT:  Total OUT: 0 mL    Total NET: 60 mL               PHYSICAL EXAM:    GENERAL: NAD  CHEST/LUNG: Clear  HEART: S1S2  ABDOMEN: Soft  EXTREMITIES:  trace edema.       LABS:  CBC Full  -  ( 07 Apr 2023 06:18 )  WBC Count : 7.23 K/uL  RBC Count : 2.87 M/uL  Hemoglobin : 8.5 g/dL  Hematocrit : 28.1 %  Platelet Count - Automated : 360 K/uL  Mean Cell Volume : 97.9 fl  Mean Cell Hemoglobin : 29.6 pg  Mean Cell Hemoglobin Concentration : 30.2 g/dL  Auto Neutrophil # : x  Auto Lymphocyte # : x  Auto Monocyte # : x  Auto Eosinophil # : x  Auto Basophil # : x  Auto Neutrophil % : x  Auto Lymphocyte % : x  Auto Monocyte % : x  Auto Eosinophil % : x  Auto Basophil % : x    04-07    145  |  118<H>  |  79<H>  ----------------------------<  126<H>  4.6   |  18<L>  |  6.80<H>    Ca    7.7<L>      07 Apr 2023 06:18        Assessment :  CKD 5; stable last 3 months;   UTI associated encephalopathy  Chronic HTN    Plan :  IV D5W if access can be re established  IV abx  BP medication adjustments   Considering overall comorbid status and quality of life, would defer HD if indications arise            
Subjective: lethargic. NAD      MEDICATIONS  (STANDING):  amLODIPine   Tablet 10 milliGRAM(s) Oral daily  apixaban 5 milliGRAM(s) Oral two times a day  aspirin enteric coated 81 milliGRAM(s) Oral daily  atorvastatin 40 milliGRAM(s) Oral at bedtime  cholecalciferol 2000 Unit(s) Oral daily  cloNIDine Patch 0.1 mG/24Hr(s) 1 patch Transdermal every 7 days  dextrose 5%. 1000 milliLiter(s) (50 mL/Hr) IV Continuous <Continuous>  hydrALAZINE 100 milliGRAM(s) Oral every 8 hours  labetalol 100 milliGRAM(s) Oral every 8 hours  levETIRAcetam 500 milliGRAM(s) Oral daily    MEDICATIONS  (PRN):  acetaminophen     Tablet .. 650 milliGRAM(s) Oral every 6 hours PRN Temp greater or equal to 38C (100.4F), Mild Pain (1 - 3)  hydrALAZINE Injectable 10 milliGRAM(s) IV Push every 4 hours PRN for SBP > 180 or DBP > 110  ondansetron Injectable 4 milliGRAM(s) IV Push every 8 hours PRN Nausea and/or Vomiting          T(C): 36.5 (04-08-23 @ 04:55), Max: 36.9 (04-07-23 @ 23:55)  HR: 94 (04-08-23 @ 04:55) (67 - 94)  BP: 159/76 (04-08-23 @ 04:55) (132/76 - 169/78)  RR: 18 (04-08-23 @ 04:55) (18 - 18)  SpO2: 94% (04-08-23 @ 04:55) (94% - 100%)  Wt(kg): --        I&O's Detail    07 Apr 2023 07:01  -  08 Apr 2023 07:00  --------------------------------------------------------  IN:    dextrose 5%: 500 mL    Oral Fluid: 560 mL  Total IN: 1060 mL    OUT:    Voided (mL): 550 mL  Total OUT: 550 mL    Total NET: 510 mL               PHYSICAL EXAM:    CHEST/LUNG: dec BS  HEART: S1S2  ABDOMEN: Soft  EXTREMITIES:  min edema.       LABS:  CBC Full  -  ( 07 Apr 2023 06:18 )  WBC Count : 7.23 K/uL  RBC Count : 2.87 M/uL  Hemoglobin : 8.5 g/dL  Hematocrit : 28.1 %  Platelet Count - Automated : 360 K/uL  Mean Cell Volume : 97.9 fl  Mean Cell Hemoglobin : 29.6 pg  Mean Cell Hemoglobin Concentration : 30.2 g/dL  Auto Neutrophil # : x  Auto Lymphocyte # : x  Auto Monocyte # : x  Auto Eosinophil # : x  Auto Basophil # : x  Auto Neutrophil % : x  Auto Lymphocyte % : x  Auto Monocyte % : x  Auto Eosinophil % : x  Auto Basophil % : x    04-07    145  |  118<H>  |  79<H>  ----------------------------<  126<H>  4.6   |  18<L>  |  6.80<H>    Ca    7.7<L>      07 Apr 2023 06:18        Assessment :  CKD 5; stable last 3 months;   UTI associated encephalopathy  Chronic HTN    Plan :  IV D5W if access can be re established  IV abx  Considering overall comorbid status and quality of life, would defer HD if indications arise  Comfort care.               
CHIEF COMPLAINT: Follow up of syncope  no fever  non verbal  no n/v/d reported  no active gross bleeding reported by sister and aid at bedside.       PHYSICAL EXAM:    GENERAL: elderly and mentation at baseline per aid   CHEST/LUNG:  No wheezing, no crackles   HEART: Regular rate and rhythm; + SM  ABDOMEN: Soft, Nontender, Nondistended; Bowel sounds present  EXTREMITIES:  No clubbing, cyanosis, or edema  NERVOUS SYSTEM:  limited exam because she does not follow commands.   Psychiatry: AA but not verbal.       OBJECTIVE DATA:   Vital Signs Last 24 Hrs  T(C): 36.4 (2023 13:02), Max: 36.9 (2023 20:49)  T(F): 97.5 (2023 13:02), Max: 98.4 (2023 20:49)  HR: 70 (2023 13:02) (70 - 95)  BP: 187/78 (2023 13:02) (167/72 - 216/75)  BP(mean): --  RR: 18 (2023 13:02) (17 - 18)  SpO2: 94% (2023 13:02) (94% - 100%)               Daily     Daily Weight in k.3 (2023 05:19)  LABS:                        7.2    7.14  )-----------( CLUMPS    ( 2023 07:30 )             23.4             04-05    149<H>  |  123<H>  |  86<H>  ----------------------------<  104<H>  4.5   |  17<L>  |  7.03<H>    Ca    7.9<L>      2023 07:30  Mg     2.0     04-04    TPro  8.1  /  Alb  2.8<L>  /  TBili  0.5  /  DBili  x   /  AST  12<L>  /  ALT  8<L>  /  AlkPhos  60  04-03              PT/INR - ( 2023 16:11 )   PT: 11.5 sec;   INR: 0.96 ratio         PTT - ( 2023 16:11 )  PTT:23.2 sec  Urinalysis Basic - ( 2023 16:44 )    Color: Yellow / Appearance: Slightly Turbid / S.010 / pH: x  Gluc: x / Ketone: Negative  / Bili: Negative / Urobili: Negative mg/dL   Blood: x / Protein: 500 mg/dL / Nitrite: Negative   Leuk Esterase: Moderate / RBC: 0-2 /HPF / WBC >50   Sq Epi: x / Non Sq Epi: Occasional / Bacteria: TNTC            CAPILLARY BLOOD GLUCOSE      POCT Blood Glucose.: 130 mg/dL (2023 22:00)      Culture - Urine  Source: Clean Catch Clean Catch (Midstream)  Preliminary Report ():    >100,000 CFU/ml Klebsiella pneumoniae    Culture - Blood  Source: .Blood Blood-Peripheral  Preliminary Report ():    No growth to date.    Culture - Blood  Source: .Blood Blood-Peripheral  Preliminary Report ():    No growth to date.     Interval Radiology studies: reviewed by me    < from: Xray Chest 1 View- PORTABLE-Urgent (23 @ 17:10) >  IMPRESSION: Small right perihilar infiltrate at this time.    < end of copied text >    tele reviewed by me showed no significant arrhythmias today.     MEDICATIONS  (STANDING):  amLODIPine   Tablet 10 milliGRAM(s) Oral daily  apixaban 5 milliGRAM(s) Oral two times a day  aspirin enteric coated 81 milliGRAM(s) Oral daily  cefTRIAXone   IVPB 1000 milliGRAM(s) IV Intermittent every 24 hours  cholecalciferol 2000 Unit(s) Oral daily  cloNIDine Patch 0.1 mG/24Hr(s) 1 patch Transdermal every 7 days  hydrALAZINE 50 milliGRAM(s) Oral every 8 hours  levETIRAcetam 500 milliGRAM(s) Oral two times a day  metoprolol tartrate 50 milliGRAM(s) Oral every 12 hours  simvastatin 20 milliGRAM(s) Oral at bedtime    MEDICATIONS  (PRN):  acetaminophen     Tablet .. 650 milliGRAM(s) Oral every 6 hours PRN Temp greater or equal to 38C (100.4F), Mild Pain (1 - 3)  hydrALAZINE Injectable 10 milliGRAM(s) IV Push every 4 hours PRN for SBP > 180 or DBP > 110  ondansetron Injectable 4 milliGRAM(s) IV Push every 8 hours PRN Nausea and/or Vomiting      
CHIEF COMPLAINT: Follow up of syncope and UTI  no fever  non verbal  no n/v/d reported  daughter at bedside.   no new overnight symptoms reported       PHYSICAL EXAM:    GENERAL: elderly and mentation at baseline per aid   CHEST/LUNG:  No wheezing, no crackles   HEART: Regular rate and rhythm; + SM  ABDOMEN: Soft, Nontender, Nondistended; Bowel sounds present  EXTREMITIES:  No clubbing, cyanosis, or edema  NERVOUS SYSTEM:  limited exam because she does not follow commands.   Psychiatry: AA but not verbal.       OBJECTIVE DATA:       Vital Signs Last 24 Hrs  T(C): 36.6 (2023 10:56), Max: 37 (2023 22:06)  T(F): 97.9 (2023 10:56), Max: 98.6 (2023 22:06)  HR: 65 (2023 06:23) (65 - 67)  BP: 158/68 (2023 10:56) (158/68 - 204/80)  BP(mean): --  RR: 18 (2023 10:56) (16 - 18)  SpO2: 99% (2023 10:56) (97% - 99%)    Parameters below as of 2023 10:56  Patient On (Oxygen Delivery Method): room air               Daily     Daily Weight in k.9 (2023 05:00)  LABS:                        8.5    7.23  )-----------( 360      ( 2023 06:18 )             28.1             04-07    145  |  118<H>  |  79<H>  ----------------------------<  126<H>  4.6   |  18<L>  |  6.80<H>    Ca    7.7<L>      2023 06:18                         CAPILLARY BLOOD GLUCOSE          Culture - Urine (collected )  Source: Clean Catch Clean Catch (Midstream)  Final Report ():    >100,000 CFU/ml Klebsiella pneumoniae  Organism: Klebsiella pneumoniae ()  Organism: Klebsiella pneumoniae ()    Sensitivities:      Method Type: MELISA      -  Amikacin: S <=16      -  Amoxicillin/Clavulanic Acid: S <=8/4      -  Ampicillin: R >16 These ampicillin results predict results for amoxicillin      -  Ampicillin/Sulbactam: S <=4/2 Enterobacter, Klebsiella aerogenes, Citrobacter, and Serratia may develop resistance during prolonged therapy (3-4 days)      -  Aztreonam: S <=4      -  Cefazolin: S <=2 For uncomplicated UTI with K. pneumoniae, E. coli, or P. mirablis: MELISA <=16 is sensitive and MELISA >=32 is resistant. This also predicts results for oral agents cefaclor, cefdinir, cefpodoxime, cefprozil, cefuroxime axetil, cephalexin and locarbef for uncomplicated UTI. Note that some isolates may be susceptible to these agents while testing resistant to cefazolin.      -  Cefepime: S <=2      -  Cefoxitin: S <=8      -  Ceftriaxone: S <=1 Enterobacter, Klebsiella aerogenes, Citrobacter, and Serratia may develop resistance during prolonged therapy      -  Cefuroxime: S <=4      -  Ciprofloxacin: S <=0.25      -  Ertapenem: S <=0.5      -  Gentamicin: S <=2      -  Imipenem: S <=1      -  Levofloxacin: S <=0.5      -  Meropenem: S <=1      -  Nitrofurantoin: S <=32 Should not be used to treat pyelonephritis      -  Piperacillin/Tazobactam: S <=8      -  Tobramycin: S <=2      -  Trimethoprim/Sulfamethoxazole: S <=0.5/9.5    Culture - Blood (collected )  Source: .Blood Blood-Peripheral  Preliminary Report ():    No growth to date.    Culture - Blood (collected )  Source: .Blood Blood-Peripheral  Preliminary Report ():    No growth to date.          MEDICATIONS  (STANDING):  amLODIPine   Tablet 10 milliGRAM(s) Oral daily  apixaban 5 milliGRAM(s) Oral two times a day  aspirin enteric coated 81 milliGRAM(s) Oral daily  atorvastatin 40 milliGRAM(s) Oral at bedtime  cholecalciferol 2000 Unit(s) Oral daily  cloNIDine Patch 0.1 mG/24Hr(s) 1 patch Transdermal every 7 days  dextrose 5%. 1000 milliLiter(s) (50 mL/Hr) IV Continuous <Continuous>  hydrALAZINE 100 milliGRAM(s) Oral every 8 hours  labetalol 100 milliGRAM(s) Oral every 8 hours    MEDICATIONS  (PRN):  acetaminophen     Tablet .. 650 milliGRAM(s) Oral every 6 hours PRN Temp greater or equal to 38C (100.4F), Mild Pain (1 - 3)  hydrALAZINE Injectable 10 milliGRAM(s) IV Push every 4 hours PRN for SBP > 180 or DBP > 110  ondansetron Injectable 4 milliGRAM(s) IV Push every 8 hours PRN Nausea and/or Vomiting

## 2023-04-08 NOTE — DISCHARGE NOTE PROVIDER - NSDCCPCAREPLAN_GEN_ALL_CORE_FT
PRINCIPAL DISCHARGE DIAGNOSIS  Diagnosis: UTI (urinary tract infection)  Assessment and Plan of Treatment:

## 2023-04-08 NOTE — DISCHARGE NOTE NURSING/CASE MANAGEMENT/SOCIAL WORK - PATIENT PORTAL LINK FT
You can access the FollowMyHealth Patient Portal offered by [facility] by registering at the following website: [email or web address]. By joining [facility]’s FollowMyHealth portal, you will also be able to view your health information using other applications (apps) compatible with our system.

## 2023-04-08 NOTE — DISCHARGE NOTE NURSING/CASE MANAGEMENT/SOCIAL WORK - NSDCPEFALRISK_GEN_ALL_CORE
For information on Fall & Injury Prevention, visit: [email or web address] OR  [email or web address] OR  [email or web address]://www.cdc.gov/steadi/patient.html

## 2023-04-08 NOTE — DISCHARGE NOTE PROVIDER - NSDCFUADDINST_GEN_ALL_CORE_FT
1) It is important to see your primary physician within next 7-10 days to perform a comprehensive medical review.  Call their offices for an appointment as soon as you leave the hospital.  If you do not have a primary physician or unable to reach your PCP, contact the [facility] Physician Referral Service at [phone].  Your medical issues appear to be stable at this time, but if your symptoms recur or worsen, contact your physicians and/or return to the hospital if necessary.  If you encounter any issues or questions with your medication, call your physicians before stopping the medication.    2) Please access [facility] Patient portal (as instructed on the discharge paperwork) to access your medical records at any time after discharge.

## 2023-04-08 NOTE — DISCHARGE NOTE PROVIDER - NSDCMRMEDTOKEN_GEN_ALL_CORE_FT
amLODIPine 10 mg oral tablet: 1 tab(s) orally once a day  apixaban 5 mg oral tablet: 1 tab(s) orally 2 times a day  aspirin 81 mg oral delayed release tablet: 1 tab(s) orally once a day  atorvastatin 40 mg oral tablet: 1 tab(s) orally once a day (at bedtime)  cholecalciferol oral tablet: 2000 unit(s) orally once a day  cloNIDine 0.1 mg oral tablet: 1 tab(s) orally every 8 hours  cyanocobalamin 1000 mcg oral tablet: 1 tab(s) orally once a day  folic acid 1 mg oral tablet: 1 tab(s) orally once a day  hydrALAZINE 100 mg oral tablet: 1 tab(s) orally every 8 hours  labetalol 100 mg oral tablet: 1 tab(s) orally every 8 hours MDD: 300 mg  levETIRAcetam 500 mg oral tablet: 1 tab(s) orally once a day

## 2023-04-08 NOTE — DISCHARGE NOTE PROVIDER - HOSPITAL COURSE
73 y/o female w/ PMHx of Dementia- minimally verbal at baseline, CVA w/ residual right sided weakness, HTN, seizures, gout, chronic lower extremity edema, CKD stage 5, DVT on Eliquis presents to the ED due to unresponsiveness,  admitted for syncope, acute UTI.    # Syncope  - Likely vasovagal due to drop in BP. no arrhythmias on my tele review.   - CT no acute findings  - Unlikely seizure episode. EEG with no epileptiform activity.   - Elevated keppra level. Decreased keppra to 500mg once a day for hx of seizure. patient's daughter advised to have follow up with PCP about further outpatient keppra dose adjustment and she verbalized agreement.     #Acute Metabolic Encephalopathy is ruled out.     # Klebsiella UTI - Finished antibiotic course.     # Accelerated HTN  - better controlled. cont current meds and monitor.     #CKD stage 5  - Per daughter Lauren, she's the HCP would like the option of HD  - Per Nephrology note, pt poor candidate for HD which I advised her off  - Discussed with Dr. Oakes.     # CVA  - c/w ASA,  lipitor.     # DVT   - c/w Eliquis. Discussed with sister to follow up with PCP about how long patient should be on AC    Hypernatremia. hypovolemic likely. improved D5 IVF.     Anemia of chronic disease. Hb stable. No active gross bleeding.     Dysphagia. Aspiration precautions. Pureed diet.     Moderate PCM: cont current diet as recommended by nutritional service.   Full code.   Patient has 24/7 MLTC    Seen and examined by me today. Vitals stable.    DC time spent by me face to face excluding billable procedures 38 mins

## 2023-04-08 NOTE — PROGRESS NOTE ADULT - REASON FOR ADMISSION
Syncope, UTI

## 2023-04-18 ENCOUNTER — INPATIENT (INPATIENT)
Facility: HOSPITAL | Age: 73
LOS: 2 days | Discharge: HOME | End: 2023-04-21
Attending: INTERNAL MEDICINE | Admitting: INTERNAL MEDICINE
Payer: MEDICARE

## 2023-04-18 VITALS
SYSTOLIC BLOOD PRESSURE: 148 MMHG | TEMPERATURE: 98 F | HEIGHT: 63 IN | OXYGEN SATURATION: 100 % | HEART RATE: 72 BPM | WEIGHT: 160.06 LBS | RESPIRATION RATE: 12 BRPM | DIASTOLIC BLOOD PRESSURE: 70 MMHG

## 2023-04-18 DIAGNOSIS — N20.0 CALCULUS OF KIDNEY: Chronic | ICD-10-CM

## 2023-04-18 LAB
ALBUMIN SERPL ELPH-MCNC: 2.2 G/DL — LOW (ref 3.3–5)
ALP SERPL-CCNC: 58 U/L — SIGNIFICANT CHANGE UP (ref 40–120)
ALT FLD-CCNC: 12 U/L — SIGNIFICANT CHANGE UP (ref 12–78)
ANION GAP SERPL CALC-SCNC: 10 MMOL/L — SIGNIFICANT CHANGE UP (ref 5–17)
APPEARANCE UR: CLEAR — SIGNIFICANT CHANGE UP
APTT BLD: 27.8 SEC — SIGNIFICANT CHANGE UP (ref 27.5–35.5)
AST SERPL-CCNC: 14 U/L — LOW (ref 15–37)
BACTERIA # UR AUTO: ABNORMAL
BASOPHILS # BLD AUTO: 0 K/UL — SIGNIFICANT CHANGE UP (ref 0–0.2)
BASOPHILS NFR BLD AUTO: 0 % — SIGNIFICANT CHANGE UP (ref 0–2)
BILIRUB SERPL-MCNC: 0.5 MG/DL — SIGNIFICANT CHANGE UP (ref 0.2–1.2)
BILIRUB UR-MCNC: NEGATIVE — SIGNIFICANT CHANGE UP
BLD GP AB SCN SERPL QL: SIGNIFICANT CHANGE UP
BUN SERPL-MCNC: 74 MG/DL — HIGH (ref 7–23)
BURR CELLS BLD QL SMEAR: PRESENT — SIGNIFICANT CHANGE UP
CALCIUM SERPL-MCNC: 7.1 MG/DL — LOW (ref 8.5–10.1)
CHLORIDE SERPL-SCNC: 112 MMOL/L — HIGH (ref 96–108)
CO2 SERPL-SCNC: 15 MMOL/L — LOW (ref 22–31)
COLOR SPEC: YELLOW — SIGNIFICANT CHANGE UP
CREAT SERPL-MCNC: 6.72 MG/DL — HIGH (ref 0.5–1.3)
DIFF PNL FLD: ABNORMAL
EGFR: 6 ML/MIN/1.73M2 — LOW
EOSINOPHIL # BLD AUTO: 0.39 K/UL — SIGNIFICANT CHANGE UP (ref 0–0.5)
EOSINOPHIL NFR BLD AUTO: 5 % — SIGNIFICANT CHANGE UP (ref 0–6)
EPI CELLS # UR: SIGNIFICANT CHANGE UP
FLUAV AG NPH QL: SIGNIFICANT CHANGE UP
FLUBV AG NPH QL: SIGNIFICANT CHANGE UP
GLUCOSE SERPL-MCNC: 94 MG/DL — SIGNIFICANT CHANGE UP (ref 70–99)
GLUCOSE UR QL: NEGATIVE MG/DL — SIGNIFICANT CHANGE UP
HCT VFR BLD CALC: 21.5 % — LOW (ref 34.5–45)
HGB BLD-MCNC: 6.6 G/DL — CRITICAL LOW (ref 11.5–15.5)
HYPOCHROMIA BLD QL: SIGNIFICANT CHANGE UP
INR BLD: 1.07 RATIO — SIGNIFICANT CHANGE UP (ref 0.88–1.16)
KETONES UR-MCNC: NEGATIVE — SIGNIFICANT CHANGE UP
LEUKOCYTE ESTERASE UR-ACNC: ABNORMAL
LYMPHOCYTES # BLD AUTO: 1.02 K/UL — SIGNIFICANT CHANGE UP (ref 1–3.3)
LYMPHOCYTES # BLD AUTO: 13 % — SIGNIFICANT CHANGE UP (ref 13–44)
MACROCYTES BLD QL: SLIGHT — SIGNIFICANT CHANGE UP
MAGNESIUM SERPL-MCNC: 1.9 MG/DL — SIGNIFICANT CHANGE UP (ref 1.6–2.6)
MANUAL SMEAR VERIFICATION: YES — SIGNIFICANT CHANGE UP
MCHC RBC-ENTMCNC: 28.9 PG — SIGNIFICANT CHANGE UP (ref 27–34)
MCHC RBC-ENTMCNC: 30.7 G/DL — LOW (ref 32–36)
MCV RBC AUTO: 94.3 FL — SIGNIFICANT CHANGE UP (ref 80–100)
METAMYELOCYTES # FLD: 1 % — HIGH (ref 0–0)
MONOCYTES # BLD AUTO: 0.63 K/UL — SIGNIFICANT CHANGE UP (ref 0–0.9)
MONOCYTES NFR BLD AUTO: 8 % — SIGNIFICANT CHANGE UP (ref 2–14)
NEUTROPHILS # BLD AUTO: 5.75 K/UL — SIGNIFICANT CHANGE UP (ref 1.8–7.4)
NEUTROPHILS NFR BLD AUTO: 72 % — SIGNIFICANT CHANGE UP (ref 43–77)
NEUTS BAND # BLD: 1 % — SIGNIFICANT CHANGE UP (ref 0–8)
NITRITE UR-MCNC: NEGATIVE — SIGNIFICANT CHANGE UP
NRBC # BLD: 0 /100 — SIGNIFICANT CHANGE UP (ref 0–0)
NRBC # BLD: SIGNIFICANT CHANGE UP /100 WBCS (ref 0–0)
NT-PROBNP SERPL-SCNC: HIGH PG/ML (ref 0–125)
OB PNL STL: POSITIVE
PH UR: 6 — SIGNIFICANT CHANGE UP (ref 5–8)
PHOSPHATE SERPL-MCNC: 6.5 MG/DL — HIGH (ref 2.5–4.5)
PLAT MORPH BLD: NORMAL — SIGNIFICANT CHANGE UP
PLATELET # BLD AUTO: 322 K/UL — SIGNIFICANT CHANGE UP (ref 150–400)
POTASSIUM SERPL-MCNC: 4.4 MMOL/L — SIGNIFICANT CHANGE UP (ref 3.5–5.3)
POTASSIUM SERPL-SCNC: 4.4 MMOL/L — SIGNIFICANT CHANGE UP (ref 3.5–5.3)
PROT SERPL-MCNC: 5.9 GM/DL — LOW (ref 6–8.3)
PROT UR-MCNC: 100 MG/DL
PROTHROM AB SERPL-ACNC: 12.8 SEC — SIGNIFICANT CHANGE UP (ref 10.5–13.4)
RBC # BLD: 2.28 M/UL — LOW (ref 3.8–5.2)
RBC # FLD: 14.6 % — HIGH (ref 10.3–14.5)
RBC BLD AUTO: ABNORMAL
RBC CASTS # UR COMP ASSIST: SIGNIFICANT CHANGE UP /HPF (ref 0–4)
SARS-COV-2 RNA SPEC QL NAA+PROBE: SIGNIFICANT CHANGE UP
SCHISTOCYTES BLD QL AUTO: SLIGHT — SIGNIFICANT CHANGE UP
SMUDGE CELLS # BLD: PRESENT — SIGNIFICANT CHANGE UP
SODIUM SERPL-SCNC: 137 MMOL/L — SIGNIFICANT CHANGE UP (ref 135–145)
SP GR SPEC: 1.01 — SIGNIFICANT CHANGE UP (ref 1.01–1.02)
UROBILINOGEN FLD QL: NEGATIVE MG/DL — SIGNIFICANT CHANGE UP
WBC # BLD: 7.87 K/UL — SIGNIFICANT CHANGE UP (ref 3.8–10.5)
WBC # FLD AUTO: 7.87 K/UL — SIGNIFICANT CHANGE UP (ref 3.8–10.5)
WBC UR QL: ABNORMAL

## 2023-04-18 PROCEDURE — 71045 X-RAY EXAM CHEST 1 VIEW: CPT | Mod: 26

## 2023-04-18 PROCEDURE — 99222 1ST HOSP IP/OBS MODERATE 55: CPT

## 2023-04-18 PROCEDURE — 99285 EMERGENCY DEPT VISIT HI MDM: CPT

## 2023-04-18 PROCEDURE — 93010 ELECTROCARDIOGRAM REPORT: CPT

## 2023-04-18 RX ORDER — ATORVASTATIN CALCIUM 80 MG/1
40 TABLET, FILM COATED ORAL AT BEDTIME
Refills: 0 | Status: DISCONTINUED | OUTPATIENT
Start: 2023-04-18 | End: 2023-04-21

## 2023-04-18 RX ORDER — AMLODIPINE BESYLATE 2.5 MG/1
10 TABLET ORAL DAILY
Refills: 0 | Status: DISCONTINUED | OUTPATIENT
Start: 2023-04-18 | End: 2023-04-21

## 2023-04-18 RX ORDER — LANOLIN ALCOHOL/MO/W.PET/CERES
3 CREAM (GRAM) TOPICAL AT BEDTIME
Refills: 0 | Status: DISCONTINUED | OUTPATIENT
Start: 2023-04-18 | End: 2023-04-21

## 2023-04-18 RX ORDER — SODIUM BICARBONATE 1 MEQ/ML
650 SYRINGE (ML) INTRAVENOUS EVERY 12 HOURS
Refills: 0 | Status: DISCONTINUED | OUTPATIENT
Start: 2023-04-18 | End: 2023-04-21

## 2023-04-18 RX ORDER — HYDRALAZINE HCL 50 MG
100 TABLET ORAL EVERY 8 HOURS
Refills: 0 | Status: DISCONTINUED | OUTPATIENT
Start: 2023-04-18 | End: 2023-04-21

## 2023-04-18 RX ORDER — ACETAMINOPHEN 500 MG
650 TABLET ORAL EVERY 6 HOURS
Refills: 0 | Status: DISCONTINUED | OUTPATIENT
Start: 2023-04-18 | End: 2023-04-21

## 2023-04-18 RX ORDER — ONDANSETRON 8 MG/1
4 TABLET, FILM COATED ORAL EVERY 8 HOURS
Refills: 0 | Status: DISCONTINUED | OUTPATIENT
Start: 2023-04-18 | End: 2023-04-21

## 2023-04-18 RX ORDER — HYDRALAZINE HCL 50 MG
5 TABLET ORAL EVERY 6 HOURS
Refills: 0 | Status: DISCONTINUED | OUTPATIENT
Start: 2023-04-18 | End: 2023-04-21

## 2023-04-18 RX ORDER — FUROSEMIDE 40 MG
40 TABLET ORAL ONCE
Refills: 0 | Status: DISCONTINUED | OUTPATIENT
Start: 2023-04-18 | End: 2023-04-18

## 2023-04-18 RX ORDER — LEVETIRACETAM 250 MG/1
500 TABLET, FILM COATED ORAL DAILY
Refills: 0 | Status: DISCONTINUED | OUTPATIENT
Start: 2023-04-18 | End: 2023-04-19

## 2023-04-18 RX ORDER — HYDRALAZINE HCL 50 MG
10 TABLET ORAL EVERY 6 HOURS
Refills: 0 | Status: DISCONTINUED | OUTPATIENT
Start: 2023-04-18 | End: 2023-04-21

## 2023-04-18 RX ORDER — FOLIC ACID 0.8 MG
1 TABLET ORAL DAILY
Refills: 0 | Status: DISCONTINUED | OUTPATIENT
Start: 2023-04-18 | End: 2023-04-21

## 2023-04-18 RX ORDER — LABETALOL HCL 100 MG
100 TABLET ORAL EVERY 8 HOURS
Refills: 0 | Status: DISCONTINUED | OUTPATIENT
Start: 2023-04-18 | End: 2023-04-21

## 2023-04-18 RX ORDER — ENOXAPARIN SODIUM 100 MG/ML
70 INJECTION SUBCUTANEOUS EVERY 24 HOURS
Refills: 0 | Status: DISCONTINUED | OUTPATIENT
Start: 2023-04-18 | End: 2023-04-19

## 2023-04-18 RX ORDER — PREGABALIN 225 MG/1
1000 CAPSULE ORAL DAILY
Refills: 0 | Status: DISCONTINUED | OUTPATIENT
Start: 2023-04-18 | End: 2023-04-21

## 2023-04-18 RX ADMIN — Medication 10 MILLIGRAM(S): at 23:00

## 2023-04-18 NOTE — H&P ADULT - HISTORY OF PRESENT ILLNESS
71 y/o female w/ PMHx of Dementia- minimally verbal at baseline, CVA w/ residual right sided weakness, HTN, seizures, gout, chronic lower extremity edema, CKD stage 5, DVT on Eliquis recently admitted for UTI and syncope presents to the ED due increasing lethargy and generalized edema       73 y/o female w/ PMHx of Dementia- minimally verbal at baseline, CVA w/ residual right sided weakness, HTN, seizures, gout, chronic lower extremity edema, CKD stage 5, DVT on Eliquis recently admitted for UTI and syncope presents to the ED due increasing lethargy and generalized edema.  Family reported pt had recently lasix reduced from 3 times daily and family concerned about arm/leg swelling increasing. Denies any sign of SOB.    ED Course  - Initial vitals stable  - Labs sig for Hb 6.6, guaiac + ve dark brown stools BUN/Cr 74/6.72, UA bacteriuria, CXR- CTA  - 1 Unit PRBC ordered

## 2023-04-18 NOTE — ED PROVIDER NOTE - OBJECTIVE STATEMENT
72 year old female with PMH of CVA, HTN, gout, OA and seizure hx with Dementia - late stage, bedbound at baseline with assistance to standing and walker presenting to ED due to increased leg and arm swelling and decreased energy levels not walking as usual otherwise family noted no recent trauma but had recently lasix reduced from 3 times daily and family concerned about arm/leg swelling increasing. Denies any sign of SOB.

## 2023-04-18 NOTE — ED PROVIDER NOTE - CONSTITUTIONAL MANNER
Anesthesia Post Evaluation    Patient: Mamadou Hester    Procedure(s) Performed: Procedure(s) (LRB):  STRABISMUS SURGERY (Bilateral)    Final Anesthesia Type: general  Patient location during evaluation: PACU  Patient participation: Yes- Able to Participate  Level of consciousness: awake and alert and oriented  Post-procedure vital signs: reviewed and stable  Pain management: adequate  Airway patency: patent  PONV status at discharge: No PONV  Anesthetic complications: no      Cardiovascular status: blood pressure returned to baseline  Respiratory status: unassisted  Hydration status: euvolemic  Follow-up not needed.          Vitals Value Taken Time   /66 4/17/2019 10:02 AM   Temp 37 °C (98.6 °F) 4/17/2019  8:55 AM   Pulse 85 4/17/2019 10:03 AM   Resp 14 4/17/2019  9:45 AM   SpO2 99 % 4/17/2019 10:03 AM   Vitals shown include unvalidated device data.      No case tracking events are documented in the log.      Pain/Sonido Score: Presence of Pain: denies (4/17/2019 10:00 AM)  Sonido Score: 10 (4/17/2019 10:00 AM)        
appropriate for situation

## 2023-04-18 NOTE — ED ADULT NURSE REASSESSMENT NOTE - NS ED NURSE REASSESS COMMENT FT1
Safety checks compld + maintd. T+P Q encouraged with 2 staff assit. IV sites checked and noted #22g. new 20g placed for blood transfusion +remains WDL. Skin care and complete care rendered. Fall +skin precs in place. Call bell within reach and safety measures in place. Primafit in placed at this time. Skin audit completed and found bilat stage 2-3 gluteal folds with stage 1 coccyx. Awaiting blood to completed blood transfusion as ordered.

## 2023-04-18 NOTE — H&P ADULT - NSHPLABSRESULTS_GEN_ALL_CORE
T(C): 37.4 (23 @ 22:52), Max: 37.4 (23 @ 22:52)  HR: 84 (23 @ 22:52) (70 - 94)  BP: 182/97 (23 @ 22:52) (131/101 - 185/84)  RR: 18 (23 @ 22:52) (11 - 18)  SpO2: 100% (23 @ 22:52) (100% - 100%)                        6.6    7.87  )-----------( 322      ( 2023 14:40 )             21.5         137  |  112<H>  |  74<H>  ----------------------------<  94  4.4   |  15<L>  |  6.72<H>    Ca    7.1<L>      2023 14:40  Phos  6.5       Mg     1.9         TPro  5.9<L>  /  Alb  2.2<L>  /  TBili  0.5  /  DBili  x   /  AST  14<L>  /  ALT  12  /  AlkPhos  58  18    LIVER FUNCTIONS - ( 2023 14:40 )  Alb: 2.2 g/dL / Pro: 5.9 gm/dL / ALK PHOS: 58 U/L / ALT: 12 U/L / AST: 14 U/L / GGT: x           PT/INR - ( 2023 14:40 )   PT: 12.8 sec;   INR: 1.07 ratio         PTT - ( 2023 14:40 )  PTT:27.8 sec  Urinalysis Basic - ( 2023 21:00 )    Color: Yellow / Appearance: Clear / S.010 / pH: x  Gluc: x / Ketone: Negative  / Bili: Negative / Urobili: Negative mg/dL   Blood: x / Protein: 100 mg/dL / Nitrite: Negative   Leuk Esterase: Moderate / RBC: 0-2 /HPF / WBC 6-10   Sq Epi: x / Non Sq Epi: x / Bacteria: Many      acetaminophen     Tablet .. 650 milliGRAM(s) Oral every 6 hours PRN  aluminum hydroxide/magnesium hydroxide/simethicone Suspension 30 milliLiter(s) Oral every 4 hours PRN  amLODIPine   Tablet 10 milliGRAM(s) Oral daily  atorvastatin 40 milliGRAM(s) Oral at bedtime  cloNIDine 0.1 milliGRAM(s) Oral every 8 hours  cyanocobalamin 1000 MICROGram(s) Oral daily  enoxaparin Injectable 70 milliGRAM(s) SubCutaneous every 24 hours  folic acid 1 milliGRAM(s) Oral daily  hydrALAZINE 100 milliGRAM(s) Oral every 8 hours  hydrALAZINE Injectable 10 milliGRAM(s) IV Push every 6 hours PRN  hydrALAZINE Injectable 5 milliGRAM(s) IV Push every 6 hours PRN  labetalol 100 milliGRAM(s) Oral every 8 hours  levETIRAcetam  Solution 500 milliGRAM(s) Oral daily  melatonin 3 milliGRAM(s) Oral at bedtime PRN  ondansetron Injectable 4 milliGRAM(s) IV Push every 8 hours PRN  sodium bicarbonate 650 milliGRAM(s) Oral every 12 hours T(C): 37.4 (23 @ 22:52), Max: 37.4 (23 @ 22:52)  HR: 84 (23 @ 22:52) (70 - 94)  BP: 182/97 (23 @ 22:52) (131/101 - 185/84)  RR: 18 (23 @ 22:52) (11 - 18)  SpO2: 100% (23 @ 22:52) (100% - 100%)                        6.6    7.87  )-----------( 322      ( 2023 14:40 )             21.5         137  |  112<H>  |  74<H>  ----------------------------<  94  4.4   |  15<L>  |  6.72<H>    Ca    7.1<L>      2023 14:40  Phos  6.5       Mg     1.9         TPro  5.9<L>  /  Alb  2.2<L>  /  TBili  0.5  /  DBili  x   /  AST  14<L>  /  ALT  12  /  AlkPhos  58  18    LIVER FUNCTIONS - ( 2023 14:40 )  Alb: 2.2 g/dL / Pro: 5.9 gm/dL / ALK PHOS: 58 U/L / ALT: 12 U/L / AST: 14 U/L / GGT: x           PT/INR - ( 2023 14:40 )   PT: 12.8 sec;   INR: 1.07 ratio         PTT - ( 2023 14:40 )  PTT:27.8 sec  Urinalysis Basic - ( 2023 21:00 )    Color: Yellow / Appearance: Clear / S.010 / pH: x  Gluc: x / Ketone: Negative  / Bili: Negative / Urobili: Negative mg/dL   Blood: x / Protein: 100 mg/dL / Nitrite: Negative   Leuk Esterase: Moderate / RBC: 0-2 /HPF / WBC 6-10   Sq Epi: x / Non Sq Epi: x / Bacteria: Many      acetaminophen     Tablet .. 650 milliGRAM(s) Oral every 6 hours PRN  aluminum hydroxide/magnesium hydroxide/simethicone Suspension 30 milliLiter(s) Oral every 4 hours PRN  amLODIPine   Tablet 10 milliGRAM(s) Oral daily  atorvastatin 40 milliGRAM(s) Oral at bedtime  cloNIDine 0.1 milliGRAM(s) Oral every 8 hours  cyanocobalamin 1000 MICROGram(s) Oral daily  folic acid 1 milliGRAM(s) Oral daily  hydrALAZINE 100 milliGRAM(s) Oral every 8 hours  hydrALAZINE Injectable 10 milliGRAM(s) IV Push every 6 hours PRN  hydrALAZINE Injectable 5 milliGRAM(s) IV Push every 6 hours PRN  labetalol 100 milliGRAM(s) Oral every 8 hours  levETIRAcetam  Solution 500 milliGRAM(s) Oral daily  melatonin 3 milliGRAM(s) Oral at bedtime PRN  ondansetron Injectable 4 milliGRAM(s) IV Push every 8 hours PRN  sodium bicarbonate 650 milliGRAM(s) Oral every 12 hours

## 2023-04-18 NOTE — ED PROVIDER NOTE - CLINICAL SUMMARY MEDICAL DECISION MAKING FREE TEXT BOX
pt with new anemia noted will admit for further care to medicine team, pt consented for transfusion - will give 2 units PRBCs and admit for further care with Dr Akbar and medicine team.

## 2023-04-18 NOTE — CONSULT NOTE ADULT - SUBJECTIVE AND OBJECTIVE BOX
Patient chart reviewed, full consult to follow.   Suspected anemia CKD;   Supportive care; not a candidate for HD.  Thank you for the courtesy of this consultation. [facility] NEPHROLOGY SERVICES, [facility]  NEPHROLOGY AND HYPERTENSION  [address]  [address]  [place] [address]  [phone]    MD YECENIA CRAVEN MD YELENA ROSENBERG, MD BINNY KOSHY, MD CHRISTOPHER CAPUTO, MD EDWARD BOVER, MD      Information from chart:  "Patient is a 72y old  Female who presents with a chief complaint of Weakness, Anemia (2023 22:29)    HPI:        71 y/o female w/ PMHx of Dementia- minimally verbal at baseline, CVA w/ residual right sided weakness, HTN, seizures, gout, chronic lower extremity edema, CKD stage 5, DVT on Eliquis recently admitted for UTI and syncope presents to the ED due increasing lethargy and generalized edema.  Family reported pt had recently lasix reduced from 3 times daily and family concerned about arm/leg swelling increasing. Denies any sign of SOB.    ED Course  - Initial vitals stable  - Labs sig for Hb 6.6, guaiac + ve dark brown stools BUN/Cr 74/6.72, UA bacteriuria, CXR- CTA  - 1 Unit PRBC ordered (2023 22:29)   "    PAST MEDICAL & SURGICAL HISTORY:  Hypertension      Diabetes      Asthma      OA (osteoarthritis)  bautista knees, low back  and  bautista shoulders      Gout      Seizure disorder      Urinary incontinence      Vision changes  lt eye      CVA (cerebral infarction)  right side weakness      Kidney stone        FAMILY HISTORY:  No pertinent family history in first degree relatives      Allergies    No Known Allergies    Intolerances      Home Medications:  amLODIPine 10 mg oral tablet: 1 tab(s) orally once a day (2023 18:00)  apixaban 5 mg oral tablet: 1 tab(s) orally 2 times a day (2023 10:39)  aspirin 81 mg oral delayed release tablet: 1 tab(s) orally once a day (2023 18:00)  cholecalciferol oral tablet: 2000 unit(s) orally once a day (2023 10:39)  cloNIDine 0.1 mg oral tablet: 1 tab(s) orally every 8 hours (2023 18:00)  cyanocobalamin 1000 mcg oral tablet: 1 tab(s) orally once a day (2023 18:00)  levETIRAcetam 500 mg oral tablet: 1 tab(s) orally once a day (2023 10:39)    MEDICATIONS  (STANDING):  amLODIPine   Tablet 10 milliGRAM(s) Oral daily  atorvastatin 40 milliGRAM(s) Oral at bedtime  cloNIDine 0.1 milliGRAM(s) Oral every 8 hours  cyanocobalamin 1000 MICROGram(s) Oral daily  enoxaparin Injectable 70 milliGRAM(s) SubCutaneous every 24 hours  folic acid 1 milliGRAM(s) Oral daily  hydrALAZINE 100 milliGRAM(s) Oral every 8 hours  labetalol 100 milliGRAM(s) Oral every 8 hours  levETIRAcetam  Solution 500 milliGRAM(s) Oral daily  sodium bicarbonate 650 milliGRAM(s) Oral every 12 hours    MEDICATIONS  (PRN):  acetaminophen     Tablet .. 650 milliGRAM(s) Oral every 6 hours PRN Temp greater or equal to 38C (100.4F), Mild Pain (1 - 3)  aluminum hydroxide/magnesium hydroxide/simethicone Suspension 30 milliLiter(s) Oral every 4 hours PRN Dyspepsia  hydrALAZINE Injectable 10 milliGRAM(s) IV Push every 6 hours PRN SBP > 180  hydrALAZINE Injectable 5 milliGRAM(s) IV Push every 6 hours PRN SBP > 160  melatonin 3 milliGRAM(s) Oral at bedtime PRN Insomnia  ondansetron Injectable 4 milliGRAM(s) IV Push every 8 hours PRN Nausea and/or Vomiting    Vital Signs Last 24 Hrs  T(C): 37.4 (2023 22:52), Max: 37.4 (2023 22:52)  T(F): 99.3 (2023 22:52), Max: 99.3 (2023 22:52)  HR: 84 (2023 22:52) (70 - 94)  BP: 182/97 (2023 22:52) (131/101 - 185/84)  BP(mean): --  RR: 18 (2023 22:52) (11 - 18)  SpO2: 100% (2023 22:52) (100% - 100%)    Parameters below as of 2023 22:52  Patient On (Oxygen Delivery Method): room air        Daily Height in cm: 160.02 (2023 11:59)    Daily     CAPILLARY BLOOD GLUCOSE      POCT Blood Glucose.: 107 mg/dL (2023 12:04)    PHYSICAL EXAM:      T(C): 37.4 (23 @ 22:52), Max: 37.4 (23 @ 22:52)  HR: 84 (23 @ 22:52) (70 - 94)  BP: 182/97 (23 @ 22:52) (131/101 - 185/84)  RR: 18 (23 @ 22:52) (11 - 18)  SpO2: 100% (23 @ 22:52) (100% - 100%)  Wt(kg): --  Lungs clear  Heart S1S2  Abd soft NT ND  Extremities:   tr edema                  137  |  112<H>  |  74<H>  ----------------------------<  94  4.4   |  15<L>  |  6.72<H>    Ca    7.1<L>      2023 14:40  Phos  6.5       Mg     1.9         TPro  5.9<L>  /  Alb  2.2<L>  /  TBili  0.5  /  DBili  x   /  AST  14<L>  /  ALT  12  /  AlkPhos  58                            6.6    7.87  )-----------( 322      ( 2023 14:40 )             21.5     Creatinine Trend: 6.72<--, 6.80<--, 7.17<--, 7.03<--, 6.98<--, 7.22<--  Urinalysis Basic - ( 2023 21:00 )    Color: Yellow / Appearance: Clear / S.010 / pH: x  Gluc: x / Ketone: Negative  / Bili: Negative / Urobili: Negative mg/dL   Blood: x / Protein: 100 mg/dL / Nitrite: Negative   Leuk Esterase: Moderate / RBC: 0-2 /HPF / WBC 6-10   Sq Epi: x / Non Sq Epi: x / Bacteria: Many            Assessment   CKD 5   Anemia CKD suspected    Plan  PRBC   No HD as per family     Luis Enrique Oakes MD

## 2023-04-18 NOTE — H&P ADULT - ASSESSMENT
73 y/o female w/ PMHx of Dementia- minimally verbal at baseline, CVA w/ residual right sided weakness, HTN, seizures, gout, chronic lower extremity edema, CKD stage 5, DVT on Eliquis recently admitted for UTI and syncope presents to the ED due increasing lethargy and generalized edema, pt being admitted for anemia.    # Anemia  - Pt guaiac +ve but w/ brown stools, no active bleeding  - likely due to chronic kidney disease, but would monitor closely as pt on full dose A/C  - 1 unit PRBC ordered    # Mild Edema  - anasarca related to hypoalbuminemia  - pt not fluid overloaded    # HTN  - resume home meds  - family to bring in BP cuff used at home to make sure it correlates    # Dysphasia  - per RN pt failed dysphagia screen  - swallow eval in am    # Seizure  - c/w Keppra    #CKD stage 5  - Dr Oakes following    # DVT   - c/w Eliquis--full dose Lovenox                71 y/o female w/ PMHx of Dementia- minimally verbal at baseline, CVA w/ residual right sided weakness, HTN, seizures, gout, chronic lower extremity edema, CKD stage 5, DVT on Eliquis recently admitted for UTI and syncope presents to the ED due increasing lethargy and generalized edema, pt being admitted for anemia.    # Anemia  - Pt guaiac +ve but w/ brown stools, no active bleeding  - likely due to chronic kidney disease, but would monitor closely as pt on full dose A/C  - 1 unit PRBC ordered    # Mild Edema  - anasarca related to hypoalbuminemia  - pt not fluid overloaded    # HTN  - resume home meds  - family to bring in BP cuff used at home to make sure it correlates    # Dysphasia  - per RN pt failed dysphagia screen  - swallow eval in am    # Seizure  - c/w Keppra    #CKD stage 5  - Dr Oakes following  - pt not candidate for HD    # DVT   - c/w Eliquis--full dose Lovenox                73 y/o female w/ PMHx of Dementia- minimally verbal at baseline, CVA w/ residual right sided weakness, HTN, seizures, gout, chronic lower extremity edema, CKD stage 5, DVT on Eliquis recently admitted for UTI and syncope presents to the ED due increasing lethargy and generalized edema, pt being admitted for anemia.    # Anemia  - Pt guaiac +ve but w/ brown stools, no active bleeding  - likely due to chronic kidney disease, however unable to verify if pt recently bled  - 1 unit PRBC ordered    # Mild Edema  - anasarca related to hypoalbuminemia  - pt not fluid overloaded    # HTN  - resume home meds  - family to bring in BP cuff used at home to make sure it correlates    # Dysphasia  - per RN pt failed dysphagia screen  - swallow eval in am    # Seizure  - c/w Keppra    #CKD stage 5  - Dr Oakes following  - pt not candidate for HD    # DVT   - SCDs for now; though not actively bleeding, pt could have recently bled.                 71 y/o female w/ PMHx of Dementia- minimally verbal at baseline, CVA w/ residual right sided weakness, HTN, seizures, gout, chronic lower extremity edema, CKD stage 5, DVT on Eliquis recently admitted for UTI and syncope presents to the ED due increasing lethargy and generalized edema, pt being admitted for anemia.    # Anemia  - Pt guaiac +ve but w/ brown stools, no active bleeding  - likely due to chronic kidney disease, however unable to verify if pt recently bled  - 1 unit PRBC ordered    #CKD stage 5  - Dr Oakes following  - pt not candidate for HD  - edema likely combination anasarca related to hypoalbuminemia, protein malnutrition and renal failure  - c/w lasix      # HTN  - resume home meds  - family to bring in BP cuff used at home to make sure it correlates    # Dysphasia  - per RN pt failed dysphagia screen  - swallow eval in am    # Seizure  - c/w Keppra    # DVT   - SCDs for now; though not actively bleeding, pt could have recently bled, unable to verify w/ family

## 2023-04-18 NOTE — ED ADULT NURSE NOTE - OBJECTIVE STATEMENT
as per pt's daughter, "she was taken off her water pills and she is swollen to her feet and hands", pt noted to have bilateral swelling to hands, pt noted to have bilateral swelling to feet

## 2023-04-18 NOTE — ED ADULT TRIAGE NOTE - CHIEF COMPLAINT QUOTE
BIBA for bilateral ankle and bilateral hand swelling x 5 days, home health aide with patient states she usually walks with a walker and assistance but hasn't been walking, home health aide states she usually is more alert, emt states she pulls away and gives resistance when they were moving the patient, emt fs [id number]

## 2023-04-18 NOTE — H&P ADULT - NSHPPHYSICALEXAM_GEN_ALL_CORE
PHYSICAL EXAM:    Vital Signs Last 24 Hrs  T(C): 37.4 (18 Apr 2023 22:52), Max: 37.4 (18 Apr 2023 22:52)  T(F): 99.3 (18 Apr 2023 22:52), Max: 99.3 (18 Apr 2023 22:52)  HR: 84 (18 Apr 2023 22:52) (70 - 94)  BP: 182/97 (18 Apr 2023 22:52) (131/101 - 185/84)  BP(mean): --  RR: 18 (18 Apr 2023 22:52) (11 - 18)  SpO2: 100% (18 Apr 2023 22:52) (100% - 100%)    Parameters below as of 18 Apr 2023 22:52  Patient On (Oxygen Delivery Method): room air        GENERAL: Pt lying in bed comfortably in NAD  HEENT:  Atraumatic, EOMI, PERRL, conjunctiva and sclera clear, MMM  NECK: Supple,  CHEST/LUNG: Clear to auscultation bilaterally  HEART: Regular rate and rhythm  ABDOMEN: Bowel sounds present; Soft, Nontender, Nondistended.   EXTREMITIES:  2+ Peripheral Pulses, brisk capillary refill. Mild edema  NEUROLOGICAL:  Alert, not following commands, exam limited   MSK: no overt deformity  SKIN: warm, dry PHYSICAL EXAM:    Vital Signs Last 24 Hrs  T(C): 37.4 (18 Apr 2023 22:52), Max: 37.4 (18 Apr 2023 22:52)  T(F): 99.3 (18 Apr 2023 22:52), Max: 99.3 (18 Apr 2023 22:52)  HR: 84 (18 Apr 2023 22:52) (70 - 94)  BP: 182/97 (18 Apr 2023 22:52) (131/101 - 185/84)  BP(mean): --  RR: 18 (18 Apr 2023 22:52) (11 - 18)  SpO2: 100% (18 Apr 2023 22:52) (100% - 100%)    Parameters below as of 18 Apr 2023 22:52  Patient On (Oxygen Delivery Method): room air        GENERAL: Pt lying in bed comfortably in NAD  HEENT:  Atraumatic, EOMI, PERRL, conjunctiva and sclera clear, MMM  NECK: Supple,  CHEST/LUNG: Clear to auscultation bilaterally  HEART: Regular rate and rhythm  ABDOMEN: Bowel sounds present; Soft, Nontender, Nondistended.   EXTREMITIES:  2+ Peripheral Pulses, brisk capillary refill. 2+ pitting edema  NEUROLOGICAL:  Alert, not following commands, exam limited   MSK: no overt deformity  SKIN: warm, dry

## 2023-04-18 NOTE — ED ADULT NURSE REASSESSMENT NOTE - NS ED NURSE REASSESS COMMENT FT1
patient transferred with transport and this RN to 1E. Blood transfusion in progress and verified with ADA Acharya upon arrival to unit.

## 2023-04-18 NOTE — ED PROVIDER NOTE - NSICDXPASTSURGICALHX_GEN_ALL_CORE_FT
Is This A New Presentation, Or A Follow-Up?: Follow Up Cyst No chest pain or swelling.  Meds reviewed with pt.  Reinforced DASH diet and medication compliance   PAST SURGICAL HISTORY:  Kidney stone

## 2023-04-18 NOTE — ED ADULT NURSE NOTE - ED STAT RN HANDOFF DETAILS
Report received from RN at this time. Assessment available on [facility]. will continue to monitor

## 2023-04-19 LAB
ANION GAP SERPL CALC-SCNC: 15 MMOL/L — SIGNIFICANT CHANGE UP (ref 5–17)
BUN SERPL-MCNC: 74 MG/DL — HIGH (ref 7–23)
CALCIUM SERPL-MCNC: 7.3 MG/DL — LOW (ref 8.5–10.1)
CHLORIDE SERPL-SCNC: 110 MMOL/L — HIGH (ref 96–108)
CO2 SERPL-SCNC: 14 MMOL/L — LOW (ref 22–31)
CREAT SERPL-MCNC: 6.58 MG/DL — HIGH (ref 0.5–1.3)
EGFR: 6 ML/MIN/1.73M2 — LOW
GLUCOSE SERPL-MCNC: 89 MG/DL — SIGNIFICANT CHANGE UP (ref 70–99)
HCT VFR BLD CALC: 38.9 % — SIGNIFICANT CHANGE UP (ref 34.5–45)
HGB BLD-MCNC: 12.6 G/DL — SIGNIFICANT CHANGE UP (ref 11.5–15.5)
MCHC RBC-ENTMCNC: 29.3 PG — SIGNIFICANT CHANGE UP (ref 27–34)
MCHC RBC-ENTMCNC: 32.4 G/DL — SIGNIFICANT CHANGE UP (ref 32–36)
MCV RBC AUTO: 90.5 FL — SIGNIFICANT CHANGE UP (ref 80–100)
NRBC # BLD: 0 /100 WBCS — SIGNIFICANT CHANGE UP (ref 0–0)
PLATELET # BLD AUTO: 341 K/UL — SIGNIFICANT CHANGE UP (ref 150–400)
POTASSIUM SERPL-MCNC: 4.3 MMOL/L — SIGNIFICANT CHANGE UP (ref 3.5–5.3)
POTASSIUM SERPL-SCNC: 4.3 MMOL/L — SIGNIFICANT CHANGE UP (ref 3.5–5.3)
RBC # BLD: 4.3 M/UL — SIGNIFICANT CHANGE UP (ref 3.8–5.2)
RBC # FLD: 14.4 % — SIGNIFICANT CHANGE UP (ref 10.3–14.5)
SODIUM SERPL-SCNC: 139 MMOL/L — SIGNIFICANT CHANGE UP (ref 135–145)
WBC # BLD: 7.32 K/UL — SIGNIFICANT CHANGE UP (ref 3.8–10.5)
WBC # FLD AUTO: 7.32 K/UL — SIGNIFICANT CHANGE UP (ref 3.8–10.5)

## 2023-04-19 PROCEDURE — 93970 EXTREMITY STUDY: CPT | Mod: 26

## 2023-04-19 PROCEDURE — 99232 SBSQ HOSP IP/OBS MODERATE 35: CPT

## 2023-04-19 RX ORDER — SODIUM CHLORIDE 9 MG/ML
1000 INJECTION, SOLUTION INTRAVENOUS
Refills: 0 | Status: DISCONTINUED | OUTPATIENT
Start: 2023-04-19 | End: 2023-04-19

## 2023-04-19 RX ORDER — ERYTHROPOIETIN 10000 [IU]/ML
10000 INJECTION, SOLUTION INTRAVENOUS; SUBCUTANEOUS ONCE
Refills: 0 | Status: COMPLETED | OUTPATIENT
Start: 2023-04-19 | End: 2023-04-19

## 2023-04-19 RX ORDER — LEVETIRACETAM 250 MG/1
500 TABLET, FILM COATED ORAL EVERY 24 HOURS
Refills: 0 | Status: DISCONTINUED | OUTPATIENT
Start: 2023-04-19 | End: 2023-04-21

## 2023-04-19 RX ORDER — METOPROLOL TARTRATE 50 MG
5 TABLET ORAL ONCE
Refills: 0 | Status: COMPLETED | OUTPATIENT
Start: 2023-04-19 | End: 2023-04-19

## 2023-04-19 RX ORDER — FUROSEMIDE 40 MG
40 TABLET ORAL DAILY
Refills: 0 | Status: DISCONTINUED | OUTPATIENT
Start: 2023-04-19 | End: 2023-04-21

## 2023-04-19 RX ORDER — IRON SUCROSE 20 MG/ML
200 INJECTION, SOLUTION INTRAVENOUS ONCE
Refills: 0 | Status: COMPLETED | OUTPATIENT
Start: 2023-04-19 | End: 2023-04-19

## 2023-04-19 RX ADMIN — LEVETIRACETAM 420 MILLIGRAM(S): 250 TABLET, FILM COATED ORAL at 18:35

## 2023-04-19 RX ADMIN — Medication 5 MILLIGRAM(S): at 18:34

## 2023-04-19 RX ADMIN — IRON SUCROSE 200 MILLIGRAM(S): 20 INJECTION, SOLUTION INTRAVENOUS at 14:05

## 2023-04-19 RX ADMIN — Medication 5 MILLIGRAM(S): at 00:23

## 2023-04-19 RX ADMIN — Medication 10 MILLIGRAM(S): at 05:06

## 2023-04-19 RX ADMIN — ERYTHROPOIETIN 10000 UNIT(S): 10000 INJECTION, SOLUTION INTRAVENOUS; SUBCUTANEOUS at 14:04

## 2023-04-19 RX ADMIN — Medication 10 MILLIGRAM(S): at 02:16

## 2023-04-19 RX ADMIN — Medication 5 MILLIGRAM(S): at 08:55

## 2023-04-19 RX ADMIN — Medication 10 MILLIGRAM(S): at 14:04

## 2023-04-19 RX ADMIN — SODIUM CHLORIDE 75 MILLILITER(S): 9 INJECTION, SOLUTION INTRAVENOUS at 14:04

## 2023-04-19 NOTE — SWALLOW BEDSIDE ASSESSMENT ADULT - NS ASR SWALLOW FINDINGS DISCUS
private aide/Nursing/Patient/Family private aide was provided with info for carry over of thick liquids at home in writing; also instructed regarding feeding strategies as pt is dependent/Nursing/Patient/Family

## 2023-04-19 NOTE — SWALLOW BEDSIDE ASSESSMENT ADULT - COMMENTS
initiation of pharyngeal swallow is not delayed; Alternating food with thick liquids is effective strategy ; there are no changes in breathing evident bolus holding again facilitated by thick liquids spoon trial PMHx of Dementia- minimally verbal at baseline, CVA w/ residual right sided weakness, HTN, seizures, gout, chronic lower extremity edema, CKD stage 5, DVT   4/18 CXR no gross pathology  4/19 care mgr Plan is for discharge home with resumption of [facility] services. longer manipulation in mouth and facilitated by thick liquids spoon bolus initiation of pharyngeal swallow is timely ; Alternating food with thick liquids is an effective strategy to move food bolus ; there are no changes in breathing evident given both cup sips and spoon amounts of thick liquids longer manipulation in mouth ; this was facilitated by giving thick liquids via spoon bolus bolus holding again facilitated by thick liquids spoon trials; pt did manipulate bolus and completed all swallows

## 2023-04-19 NOTE — SWALLOW BEDSIDE ASSESSMENT ADULT - SWALLOW EVAL: ORAL MUSCULATURE
minimal visualization of oral cavity allowed/unable to assess due to poor participation/comprehension

## 2023-04-19 NOTE — PATIENT PROFILE ADULT - MEDICATIONS/VISITS
Initiate Treatment: tretinoin 0.025 % topical cream QHS\\nApply a pea sized amount at bedtime. Detail Level: Zone Render In Strict Bullet Format?: No no

## 2023-04-19 NOTE — PATIENT PROFILE ADULT - FALL HARM RISK - ATTEMPT OOB
[facility]  Internal Medicine Residency Program  Inpatient Daily Progress Note  ______________________________________________________________________________    Patient: Panchito Salinas  YOB: 1967   MRN: [id number]    Acct: [de-identified]     Admit date: 4/5/2019  Today's date: 05/03/19  Number of days in the hospital: 28       Admitting Diagnosis: Septic shock ([address]    Subjective:   Patient seen and examined at bedside. Alert and oriented. William fever of 101.8 yesterday. Afebrile overnight. Repeat blood culture ordered by nephrology. Patient is not on any antibiotics. \  Patient is still on heparin drip. INR pending this morning. Yesterday and was 1.5. Received 2.5 mg of Coumadin last night. Output from stoma 1250 ml in last 24 hrs. Metamucil and Pedialyte added yesterday. Creatinine stable. 2.01.     Objective:   Vital Sign:  /65   Pulse 94   Temp 100.6 °F (38.1 °C) (Temporal)   Resp 20   Ht 5' 1.02\" (1.55 m)   Wt 156 lb 1.4 oz (70.8 kg)   SpO2 100%   BMI 29.47 kg/m²       Physical Exam:  General appearance:   alert, well appearing, and in no distress  Mental Status: alert, oriented to person, place, and time  Neurologic:  alert, oriented, normal speech, no focal findings or movement disorder noted  Lungs:  clear to auscultation, no wheezes, rales or rhonchi, symmetric air entry  Heart[de-identified] normal rate, regular rhythm, normal S1, S2, no murmurs, rubs, clicks or gallops  Abdomen:  soft, nontender, nondistended, no masses or organomegaly. 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  Extremities: peripheral pulses normal, no pedal edema, no clubbing or cyanosis   Skin: normal coloration and turgor, no rashes, no suspicious skin lesions noted    Medications:  Scheduled Medications   warfarin  2.5 mg Oral Daily    psyllium  1 packet Oral TID    nicotine  1 patch Transdermal Daily    ipratropium  2 puff Inhalation 4x daily    septic shock (HCC)    Protein-calorie malnutrition (HCC)    Gastroenteritis    Haemophilus influenzae septicemia (HCC)    Pleural effusion, bilateral    Splenic infarct    Leukocytosis    Mottled skin    Acute renal failure (HCC)    Altered mental status    PMB (postmenopausal bleeding)    Thrombocytosis (HCC)  Resolved Problems:    * No resolved hospital problems. *           Continue to monitor patient off antibiotics.  Repeat BCx did not show any growth so far. · Transvaginal ultrasound was normal.  ObGyn plans to follow the patient outpatient. · Patient stable from cardiology stand point. · Continue Midodrine for low BP  · Pain control with Morphine and Roxicodone PRN  · Continue aspirin.   · Hypercoagulable workup negative. · Started nystatin for oral thrush.    · Continue renal diet  · Started Imodium for ostomy out put   Imodium 2 mg 3 times a day with meals. Started patient on Metamucil as well. Continue Pedialyte. · INR pending this morning. Still on heparin gtt. · Dietitian consult regarding counseling and dietary habits for stoma. · Continue Bumex 1 mg orally daily  · BMP every week for 2 weeks and then follow up in 3-4 weeks with nephrology as outpatient. · Discharge planning.  The patient will need a new preset for [facility]. The patient has been cleared by Nephrology and ID for discharge. Minoo Montaño MD  Internal Medicine [address] [facility], [place], [place]  PGY-2    Attending Physician Statement  I have discussed the care of Laine Platt, including pertinent history and exam findings,  with the resident. I have seen and examined the patient and the key elements of all parts of the encounter have been performed by me. I agree with the assessment, plan and orders as documented by the resident with additions .   Oral exam shows pressure ulcer on dorsum of tongue - ent eval  Adjusted imodium   inr 2   Dc heparin gtt    Treatment plan Discussed with nursing staff in detail , all questions answered . Electronically signed by Sagar Hatfield MD on   5/3/19 at 7:05 PM    Please note that this chart was generated using voice recognition Dragon dictation software. Although every effort was made to ensure the accuracy of this automated transcription, some errors in transcription may have occurred. Yes

## 2023-04-19 NOTE — SWALLOW BEDSIDE ASSESSMENT ADULT - SWALLOW EVAL: RECOMMENDED FEEDING/EATING TECHNIQUES
allow for swallow between intakes/check mouth frequently for oral residue/pocketing/maintain upright posture during/after eating for 30 mins/no straws

## 2023-04-19 NOTE — PROGRESS NOTE ADULT - SUBJECTIVE AND OBJECTIVE BOX
Patient is a 72y old  Female who presents with a chief complaint of Weakness, Anemia (2023 22:29)    INTERVAL HPI/OVERNIGHT EVENTS: no events     MEDICATIONS  (STANDING):  amLODIPine   Tablet 10 milliGRAM(s) Oral daily  atorvastatin 40 milliGRAM(s) Oral at bedtime  cloNIDine 0.1 milliGRAM(s) Oral every 8 hours  cyanocobalamin 1000 MICROGram(s) Oral daily  dextrose 5% + sodium chloride 0.45%. 1000 milliLiter(s) (75 mL/Hr) IV Continuous <Continuous>  folic acid 1 milliGRAM(s) Oral daily  furosemide    Tablet 40 milliGRAM(s) Oral daily  hydrALAZINE 100 milliGRAM(s) Oral every 8 hours  labetalol 100 milliGRAM(s) Oral every 8 hours  levETIRAcetam  IVPB 500 milliGRAM(s) IV Intermittent every 24 hours  sodium bicarbonate 650 milliGRAM(s) Oral every 12 hours    MEDICATIONS  (PRN):  acetaminophen     Tablet .. 650 milliGRAM(s) Oral every 6 hours PRN Temp greater or equal to 38C (100.4F), Mild Pain (1 - 3)  aluminum hydroxide/magnesium hydroxide/simethicone Suspension 30 milliLiter(s) Oral every 4 hours PRN Dyspepsia  hydrALAZINE Injectable 10 milliGRAM(s) IV Push every 6 hours PRN SBP > 180  hydrALAZINE Injectable 5 milliGRAM(s) IV Push every 6 hours PRN SBP > 160  melatonin 3 milliGRAM(s) Oral at bedtime PRN Insomnia  ondansetron Injectable 4 milliGRAM(s) IV Push every 8 hours PRN Nausea and/or Vomiting    Allergies    No Known Allergies    Intolerances      REVIEW OF SYSTEMS:  All other systems reviewed and are negative    Vital Signs Last 24 Hrs  T(C): 36.5 (2023 11:13), Max: 37.4 (2023 22:52)  T(F): 97.7 (2023 11:13), Max: 99.3 (2023 22:52)  HR: 94 (2023 13:56) (70 - 97)  BP: 191/84 (2023 13:56) (131/101 - 195/70)  BP(mean): --  RR: 18 (2023 11:13) (11 - 18)  SpO2: 100% (2023 11:13) (95% - 100%)    Parameters below as of 2023 08:33  Patient On (Oxygen Delivery Method): room air      Daily     Daily   I&O's Summary    2023 07:01  -  2023 15:28  --------------------------------------------------------  IN: 0 mL / OUT: 600 mL / NET: -600 mL      CAPILLARY BLOOD GLUCOSE        PHYSICAL EXAM:  GENERAL: Pt lying in bed comfortably in NAD  HEENT:  Atraumatic, EOMI, PERRL, conjunctiva and sclera clear, MMM  NECK: Supple,  CHEST/LUNG: Clear to auscultation bilaterally  HEART: Regular rate and rhythm  ABDOMEN: Bowel sounds present; Soft, Nontender, Nondistended.   EXTREMITIES:  2+ Peripheral Pulses, brisk capillary refill. 2+ pitting edema  NEUROLOGICAL:  Alert, not following commands, exam limited   MSK: no overt deformity  SKIN: warm, dry    Labs                          12.6   7.32  )-----------( 341      ( 2023 11:00 )             38.9     04-19    139  |  110<H>  |  74<H>  ----------------------------<  89  4.3   |  14<L>  |  6.58<H>    Ca    7.3<L>      2023 11:00  Phos  6.5     04-18  Mg     1.9     04-18    TPro  5.9<L>  /  Alb  2.2<L>  /  TBili  0.5  /  DBili  x   /  AST  14<L>  /  ALT  12  /  AlkPhos  58  04-18    PT/INR - ( 2023 14:40 )   PT: 12.8 sec;   INR: 1.07 ratio         PTT - ( 2023 14:40 )  PTT:27.8 sec      Urinalysis Basic - ( 2023 21:00 )    Color: Yellow / Appearance: Clear / S.010 / pH: x  Gluc: x / Ketone: Negative  / Bili: Negative / Urobili: Negative mg/dL   Blood: x / Protein: 100 mg/dL / Nitrite: Negative   Leuk Esterase: Moderate / RBC: 0-2 /HPF / WBC 6-10   Sq Epi: x / Non Sq Epi: x / Bacteria: Many                  DVT prophylaxis: > Lovenox 40mg SQ daily  > Heparin   > SCD's

## 2023-04-19 NOTE — SWALLOW BEDSIDE ASSESSMENT ADULT - ADDITIONAL RECOMMENDATIONS
SMALLER MORE FREQUENT MEALS (5-6 X DAY); ALTERNATE FOODS WITH THICK LIQUIDS 2:1; FEED ONLY AWARE AND ACCEPTING PO; ALLOW WATER ONLY,  VIA SPOON AMOUNTS , FOR QUALITY OF LIFE PURPOSES,  IF PT /FAMILY WISHES

## 2023-04-19 NOTE — SWALLOW BEDSIDE ASSESSMENT ADULT - PHARYNGEAL PHASE
Decreased laryngeal elevation Decreased laryngeal elevation/Cough post oral intake/Delayed throat clear post oral intake

## 2023-04-19 NOTE — PROVIDER CONTACT NOTE (OTHER) - SITUATION
patient noted with elevated blood pressure. PRN hydralazine given x1
Elevated Blood pressure s/p IV Hydralazine given IVP.

## 2023-04-19 NOTE — PROGRESS NOTE ADULT - ASSESSMENT
73 y/o female w/ PMHx of Dementia- minimally verbal at baseline, CVA w/ residual right sided weakness, HTN, seizures, gout, chronic lower extremity edema, CKD stage 5, DVT on Eliquis recently admitted for UTI and syncope presents to the ED due increasing lethargy and generalized edema, pt being admitted for anemia.    # Anemia  - Pt guaiac +ve but w/ brown stools, no active bleeding  - likely due to chronic kidney disease, however unable to verify if pt recently bled  - s/p 2 unit PRBC , with repeat to 12    #CKD stage 5  - Dr Oakes following  - pt not candidate for HD  - edema likely combination anasarca related to hypoalbuminemia, protein malnutrition and renal failure  - c/w lasix      # HTN  - resume home meds  - family to bring in BP cuff used at home to make sure it correlates    # Dysphasia  -swallow eval passed     # Seizure  - c/w Keppra    # DVT   - SCDs for now; though not actively bleeding, pt could have recently bled,    Will recheck us duplex to see if dvt still tehre

## 2023-04-19 NOTE — SWALLOW BEDSIDE ASSESSMENT ADULT - SLP GENERAL OBSERVATIONS
Never smoker
alert and non-verbal, non-vocal; willingly accepted all trials from both aide and SLP; unable to follow directions but remained aware of eating/swallowing context

## 2023-04-19 NOTE — SWALLOW BEDSIDE ASSESSMENT ADULT - NS SPL SWALLOW CLINIC TRIAL FT
consistent protective cough with both single sip and spoon amounts of thin liquids consistent protective cough with cup drinking/large bolus ; this is reduced given SPOON amounts (small bolus ) of thin liquids; aide confirmed this is representative of baseline

## 2023-04-19 NOTE — PROGRESS NOTE ADULT - SUBJECTIVE AND OBJECTIVE BOX
[facility] NEPHROLOGY SERVICES, [facility]  NEPHROLOGY AND HYPERTENSION  [address]  [address]  [place] [address]  [phone]    MD YECENIA CRAVEN MD YELENA ROSENBERG, MD BINNY KOSHY, MD CHRISTOPHER CAPUTO, MD EDWARD BOVER, MD          Patient events noted    MEDICATIONS  (STANDING):  amLODIPine   Tablet 10 milliGRAM(s) Oral daily  atorvastatin 40 milliGRAM(s) Oral at bedtime  cloNIDine 0.1 milliGRAM(s) Oral every 8 hours  cyanocobalamin 1000 MICROGram(s) Oral daily  dextrose 5% + sodium chloride 0.45%. 1000 milliLiter(s) (75 mL/Hr) IV Continuous <Continuous>  folic acid 1 milliGRAM(s) Oral daily  furosemide    Tablet 40 milliGRAM(s) Oral daily  hydrALAZINE 100 milliGRAM(s) Oral every 8 hours  labetalol 100 milliGRAM(s) Oral every 8 hours  levETIRAcetam  IVPB 500 milliGRAM(s) IV Intermittent every 24 hours  sodium bicarbonate 650 milliGRAM(s) Oral every 12 hours    MEDICATIONS  (PRN):  acetaminophen     Tablet .. 650 milliGRAM(s) Oral every 6 hours PRN Temp greater or equal to 38C (100.4F), Mild Pain (1 - 3)  aluminum hydroxide/magnesium hydroxide/simethicone Suspension 30 milliLiter(s) Oral every 4 hours PRN Dyspepsia  hydrALAZINE Injectable 10 milliGRAM(s) IV Push every 6 hours PRN SBP > 180  hydrALAZINE Injectable 5 milliGRAM(s) IV Push every 6 hours PRN SBP > 160  melatonin 3 milliGRAM(s) Oral at bedtime PRN Insomnia  ondansetron Injectable 4 milliGRAM(s) IV Push every 8 hours PRN Nausea and/or Vomiting      04-19-23 @ 07:01  -  04-19-23 @ 22:17  --------------------------------------------------------  IN: 0 mL / OUT: 600 mL / NET: -600 mL      PHYSICAL EXAM:      T(C): 36.5 (04-19-23 @ 17:21), Max: 37.4 (04-18-23 @ 22:52)  HR: 85 (04-19-23 @ 19:38) (78 - 97)  BP: 174/76 (04-19-23 @ 19:38) (146/70 - 204/84)  RR: 18 (04-19-23 @ 17:21) (14 - 18)  SpO2: 99% (04-19-23 @ 17:21) (95% - 100%)  Wt(kg): --  Lungs clear  Heart S1S2  Abd soft NT ND  Extremities:   tr edema                                    12.6   7.32  )-----------( 341      ( 19 Apr 2023 11:00 )             38.9     04-19    139  |  110<H>  |  74<H>  ----------------------------<  89  4.3   |  14<L>  |  6.58<H>    Ca    7.3<L>      19 Apr 2023 11:00  Phos  6.5     04-18  Mg     1.9     04-18    TPro  5.9<L>  /  Alb  2.2<L>  /  TBili  0.5  /  DBili  x   /  AST  14<L>  /  ALT  12  /  AlkPhos  58  04-18      LIVER FUNCTIONS - ( 18 Apr 2023 14:40 )  Alb: 2.2 g/dL / Pro: 5.9 gm/dL / ALK PHOS: 58 U/L / ALT: 12 U/L / AST: 14 U/L / GGT: x           Creatinine Trend: 6.58<--, 6.72<--, 6.80<--, 7.17<--, 7.03<--, 6.98<--      Assessment   CKD 5; acute on chronic anemia     Plan:  Supportive care  Not a candidate for HD intervention    Luis Enrique Oakes MD [facility] NEPHROLOGY SERVICES, [facility]  NEPHROLOGY AND HYPERTENSION  [address]  [address]  [place] [address]  [phone]    MD YECENIA CRAVEN MD YELENA ROSENBERG, MD BINNY KOSHY, MD CHRISTOPHER CAPUTO, MD EDWARD BOVER, MD          Patient events noted    MEDICATIONS  (STANDING):  amLODIPine   Tablet 10 milliGRAM(s) Oral daily  atorvastatin 40 milliGRAM(s) Oral at bedtime  cloNIDine 0.1 milliGRAM(s) Oral every 8 hours  cyanocobalamin 1000 MICROGram(s) Oral daily  dextrose 5% + sodium chloride 0.45%. 1000 milliLiter(s) (75 mL/Hr) IV Continuous <Continuous>  folic acid 1 milliGRAM(s) Oral daily  furosemide    Tablet 40 milliGRAM(s) Oral daily  hydrALAZINE 100 milliGRAM(s) Oral every 8 hours  labetalol 100 milliGRAM(s) Oral every 8 hours  levETIRAcetam  IVPB 500 milliGRAM(s) IV Intermittent every 24 hours  sodium bicarbonate 650 milliGRAM(s) Oral every 12 hours    MEDICATIONS  (PRN):  acetaminophen     Tablet .. 650 milliGRAM(s) Oral every 6 hours PRN Temp greater or equal to 38C (100.4F), Mild Pain (1 - 3)  aluminum hydroxide/magnesium hydroxide/simethicone Suspension 30 milliLiter(s) Oral every 4 hours PRN Dyspepsia  hydrALAZINE Injectable 10 milliGRAM(s) IV Push every 6 hours PRN SBP > 180  hydrALAZINE Injectable 5 milliGRAM(s) IV Push every 6 hours PRN SBP > 160  melatonin 3 milliGRAM(s) Oral at bedtime PRN Insomnia  ondansetron Injectable 4 milliGRAM(s) IV Push every 8 hours PRN Nausea and/or Vomiting      04-19-23 @ 07:01  -  04-19-23 @ 22:17  --------------------------------------------------------  IN: 0 mL / OUT: 600 mL / NET: -600 mL      PHYSICAL EXAM:      T(C): 36.5 (04-19-23 @ 17:21), Max: 37.4 (04-18-23 @ 22:52)  HR: 85 (04-19-23 @ 19:38) (78 - 97)  BP: 174/76 (04-19-23 @ 19:38) (146/70 - 204/84)  RR: 18 (04-19-23 @ 17:21) (14 - 18)  SpO2: 99% (04-19-23 @ 17:21) (95% - 100%)  Wt(kg): --  Lungs clear  Heart S1S2  Abd soft NT ND  Extremities:   tr edema                                    12.6   7.32  )-----------( 341      ( 19 Apr 2023 11:00 )             38.9     04-19    139  |  110<H>  |  74<H>  ----------------------------<  89  4.3   |  14<L>  |  6.58<H>    Ca    7.3<L>      19 Apr 2023 11:00  Phos  6.5     04-18  Mg     1.9     04-18    TPro  5.9<L>  /  Alb  2.2<L>  /  TBili  0.5  /  DBili  x   /  AST  14<L>  /  ALT  12  /  AlkPhos  58  04-18      LIVER FUNCTIONS - ( 18 Apr 2023 14:40 )  Alb: 2.2 g/dL / Pro: 5.9 gm/dL / ALK PHOS: 58 U/L / ALT: 12 U/L / AST: 14 U/L / GGT: x           Creatinine Trend: 6.58<--, 6.72<--, 6.80<--, 7.17<--, 7.03<--, 6.98<--      Assessment   CKD 5; acute on chronic anemia   HTN urgency    Plan:  Supportive care  BP medication adjustments   Not a candidate for HD intervention    Luis Enrique Oakes MD

## 2023-04-19 NOTE — SWALLOW BEDSIDE ASSESSMENT ADULT - SWALLOW EVAL: DIAGNOSIS
Oropharyngeal dysphagia associated with cognitive deficits and hx stroke; lethargy and reduced po intake Oropharyngeal dysphagia associated with cognitive deficits in setting of  lethargy ; hx stroke;  Airway protection deficits are mitigated given modified textures and swallow techniques

## 2023-04-19 NOTE — PATIENT PROFILE ADULT - FALL HARM RISK - HARM RISK INTERVENTIONS
Assistance with ambulation/Assistance OOB with selected safe patient handling equipment/Communicate Risk of Fall with Harm to all staff/Discuss with provider need for PT consult/Monitor for mental status changes/Monitor gait and stability/Move patient closer to nurses' station/Reinforce activity limits and safety measures with patient and family/Reorient to person, place and time as needed/Tailored Fall Risk Interventions/Toileting schedule using arm’s reach rule for commode and bathroom/Use of alarms - bed, chair and/or voice tab/Visual Cue: Yellow wristband and red socks/Bed in lowest position, wheels locked, appropriate side rails in place/Call bell, personal items and telephone in reach/Instruct patient to call for assistance before getting out of bed or chair/Non-slip footwear when patient is out of bed/[place] to call system/Physically safe environment - no spills, clutter or unnecessary equipment/Purposeful Proactive Rounding/Room/bathroom lighting operational, light cord in reach

## 2023-04-19 NOTE — SWALLOW BEDSIDE ASSESSMENT ADULT - ORAL PHASE
Delayed oral transit time Within functional limits Stasis in lateral sulci Delayed oral transit time/Stasis in lateral sulci

## 2023-04-19 NOTE — SWALLOW BEDSIDE ASSESSMENT ADULT - SWALLOW EVAL: RECOMMENDED DIET
MIDLY THICK LIQUIDS MINCED/MOIST TEXTURE WITH MILDLY THICK LIQUIDS; SINGLE SIPS FROM CUP OR SPOON AMOUNTS;  NO STRAWS

## 2023-04-19 NOTE — SWALLOW BEDSIDE ASSESSMENT ADULT - ORAL PREPARATORY PHASE
Within functional limits Reduced oral grading few teeth per private aide/Reduced oral grading/Decreased mastication ability Reduced oral grading/Bolus falls into anterior sulcus

## 2023-04-19 NOTE — SWALLOW BEDSIDE ASSESSMENT ADULT - DIET PRIOR TO ADMI
private aide reports ground and pureed foods with thin liquids via straw ; Also reports frequent coughing when drinking liquids and slow pace of eating private aide reports ground and pureed foods with thin liquids via straw at home ; Also reports frequent coughing when drinking liquids and slow pace of eating

## 2023-04-20 LAB
ANION GAP SERPL CALC-SCNC: 12 MMOL/L — SIGNIFICANT CHANGE UP (ref 5–17)
BUN SERPL-MCNC: 72 MG/DL — HIGH (ref 7–23)
CALCIUM SERPL-MCNC: 7.3 MG/DL — LOW (ref 8.5–10.1)
CHLORIDE SERPL-SCNC: 111 MMOL/L — HIGH (ref 96–108)
CO2 SERPL-SCNC: 14 MMOL/L — LOW (ref 22–31)
CREAT SERPL-MCNC: 6.57 MG/DL — HIGH (ref 0.5–1.3)
EGFR: 6 ML/MIN/1.73M2 — LOW
GLUCOSE SERPL-MCNC: 74 MG/DL — SIGNIFICANT CHANGE UP (ref 70–99)
HCT VFR BLD CALC: 33.4 % — LOW (ref 34.5–45)
HGB BLD-MCNC: 10.9 G/DL — LOW (ref 11.5–15.5)
MCHC RBC-ENTMCNC: 29.6 PG — SIGNIFICANT CHANGE UP (ref 27–34)
MCHC RBC-ENTMCNC: 32.6 G/DL — SIGNIFICANT CHANGE UP (ref 32–36)
MCV RBC AUTO: 90.8 FL — SIGNIFICANT CHANGE UP (ref 80–100)
NRBC # BLD: 0 /100 WBCS — SIGNIFICANT CHANGE UP (ref 0–0)
PLATELET # BLD AUTO: 328 K/UL — SIGNIFICANT CHANGE UP (ref 150–400)
POTASSIUM SERPL-MCNC: 3.9 MMOL/L — SIGNIFICANT CHANGE UP (ref 3.5–5.3)
POTASSIUM SERPL-SCNC: 3.9 MMOL/L — SIGNIFICANT CHANGE UP (ref 3.5–5.3)
RBC # BLD: 3.68 M/UL — LOW (ref 3.8–5.2)
RBC # FLD: 14.6 % — HIGH (ref 10.3–14.5)
SODIUM SERPL-SCNC: 137 MMOL/L — SIGNIFICANT CHANGE UP (ref 135–145)
WBC # BLD: 7.86 K/UL — SIGNIFICANT CHANGE UP (ref 3.8–10.5)
WBC # FLD AUTO: 7.86 K/UL — SIGNIFICANT CHANGE UP (ref 3.8–10.5)

## 2023-04-20 PROCEDURE — 99232 SBSQ HOSP IP/OBS MODERATE 35: CPT

## 2023-04-20 RX ADMIN — Medication 10 MILLIGRAM(S): at 02:43

## 2023-04-20 RX ADMIN — Medication 100 MILLIGRAM(S): at 22:49

## 2023-04-20 RX ADMIN — Medication 100 MILLIGRAM(S): at 11:56

## 2023-04-20 RX ADMIN — LEVETIRACETAM 420 MILLIGRAM(S): 250 TABLET, FILM COATED ORAL at 18:26

## 2023-04-20 RX ADMIN — ATORVASTATIN CALCIUM 40 MILLIGRAM(S): 80 TABLET, FILM COATED ORAL at 22:49

## 2023-04-20 RX ADMIN — PREGABALIN 1000 MICROGRAM(S): 225 CAPSULE ORAL at 11:56

## 2023-04-20 RX ADMIN — Medication 5 MILLIGRAM(S): at 05:24

## 2023-04-20 RX ADMIN — Medication 10 MILLIGRAM(S): at 18:30

## 2023-04-20 RX ADMIN — Medication 0.1 MILLIGRAM(S): at 11:56

## 2023-04-20 RX ADMIN — Medication 100 MILLIGRAM(S): at 11:55

## 2023-04-20 RX ADMIN — Medication 100 MILLIGRAM(S): at 22:48

## 2023-04-20 RX ADMIN — Medication 1 MILLIGRAM(S): at 11:55

## 2023-04-20 RX ADMIN — Medication 0.1 MILLIGRAM(S): at 22:48

## 2023-04-20 RX ADMIN — AMLODIPINE BESYLATE 10 MILLIGRAM(S): 2.5 TABLET ORAL at 11:56

## 2023-04-20 NOTE — DIETITIAN INITIAL EVALUATION ADULT - OTHER INFO
Pt confused and unable to provide history.    s/p swallow evaluation 04/19 with recommendations for minced and moist with mild thick liquids.    Per previous RD note (04/05/23) pt weighed 54.2kg. Admission weight this admission (04/18) was 72.6kg- pt underwent diuresis during LOS and had severe LE edema on admission- no new weights to trend.    Pt with CKD5- per nephrology pt not candidate for HD.    RD remains available.

## 2023-04-20 NOTE — PROGRESS NOTE ADULT - SUBJECTIVE AND OBJECTIVE BOX
Patient is a 72y old  Female who presents with a chief complaint of ANEMIA;  GENERALIZED WEAKNESS     (2023 10:24)    INTERVAL HPI/OVERNIGHT EVENTS:    MEDICATIONS  (STANDING):  amLODIPine   Tablet 10 milliGRAM(s) Oral daily  atorvastatin 40 milliGRAM(s) Oral at bedtime  cloNIDine 0.1 milliGRAM(s) Oral every 8 hours  cyanocobalamin 1000 MICROGram(s) Oral daily  folic acid 1 milliGRAM(s) Oral daily  furosemide    Tablet 40 milliGRAM(s) Oral daily  hydrALAZINE 100 milliGRAM(s) Oral every 8 hours  labetalol 100 milliGRAM(s) Oral every 8 hours  levETIRAcetam  IVPB 500 milliGRAM(s) IV Intermittent every 24 hours  sodium bicarbonate 650 milliGRAM(s) Oral every 12 hours    MEDICATIONS  (PRN):  acetaminophen     Tablet .. 650 milliGRAM(s) Oral every 6 hours PRN Temp greater or equal to 38C (100.4F), Mild Pain (1 - 3)  aluminum hydroxide/magnesium hydroxide/simethicone Suspension 30 milliLiter(s) Oral every 4 hours PRN Dyspepsia  hydrALAZINE Injectable 10 milliGRAM(s) IV Push every 6 hours PRN SBP > 180  hydrALAZINE Injectable 5 milliGRAM(s) IV Push every 6 hours PRN SBP > 160  melatonin 3 milliGRAM(s) Oral at bedtime PRN Insomnia  ondansetron Injectable 4 milliGRAM(s) IV Push every 8 hours PRN Nausea and/or Vomiting    Allergies    No Known Allergies    Intolerances      REVIEW OF SYSTEMS:  All other systems reviewed and are negative    Vital Signs Last 24 Hrs  T(C): 36.4 (2023 11:00), Max: 37.1 (2023 23:32)  T(F): 97.6 (2023 11:00), Max: 98.8 (2023 23:32)  HR: 91 (2023 11:00) (84 - 94)  BP: 185/76 (2023 11:00) (174/76 - 204/84)  BP(mean): --  RR: 18 (2023 11:00) (18 - 18)  SpO2: 99% (2023 11:00) (96% - 99%)      Daily     Daily   I&O's Summary    2023 07:01  -  2023 07:00  --------------------------------------------------------  IN: 0 mL / OUT: 600 mL / NET: -600 mL      CAPILLARY BLOOD GLUCOSE        PHYSICAL EXAM:  GENERAL: NAD,    HEAD:  Atraumatic, Normocephalic  EYES: EOMI, PERRLA, conjunctiva and sclera clear  ENMT: No tonsillar erythema, exudates, or enlargement; Moist mucous membranes, Good dentition, No lesions  NECK: Supple, No JVD, Normal thyroid  NERVOUS SYSTEM:  awake alert moving all ext , non verbal at baseline   CHEST/LUNG: Clear to percussion bilaterally; No rales, rhonchi, wheezing, or rubs  HEART: Regular rate and rhythm; No murmurs, rubs, or gallops  ABDOMEN: Soft, Nontender, Nondistended; Bowel sounds present  EXTREMITIES:  2+ Peripheral Pulses, No clubbing, cyanosis, or edema  LYMPH: No lymphadenopathy noted  SKIN: No rashes or lesions    Labs                          10.9   7.86  )-----------( 328      ( 2023 07:04 )             33.4     04-20    137  |  111<H>  |  72<H>  ----------------------------<  74  3.9   |  14<L>  |  6.57<H>    Ca    7.3<L>      2023 07:04  Phos  6.5     04-18  Mg     1.9     04-18    TPro  5.9<L>  /  Alb  2.2<L>  /  TBili  0.5  /  DBili  x   /  AST  14<L>  /  ALT  12  /  AlkPhos  58  04-18    PT/INR - ( 2023 14:40 )   PT: 12.8 sec;   INR: 1.07 ratio         PTT - ( 2023 14:40 )  PTT:27.8 sec      Urinalysis Basic - ( 2023 21:00 )    Color: Yellow / Appearance: Clear / S.010 / pH: x  Gluc: x / Ketone: Negative  / Bili: Negative / Urobili: Negative mg/dL   Blood: x / Protein: 100 mg/dL / Nitrite: Negative   Leuk Esterase: Moderate / RBC: 0-2 /HPF / WBC 6-10   Sq Epi: x / Non Sq Epi: x / Bacteria: Many                  DVT prophylaxis: > Lovenox 40mg SQ daily  > Heparin   > SCD's

## 2023-04-20 NOTE — DIETITIAN INITIAL EVALUATION ADULT - PERTINENT MEDS FT
MEDICATIONS  (STANDING):  amLODIPine   Tablet 10 milliGRAM(s) Oral daily  atorvastatin 40 milliGRAM(s) Oral at bedtime  cloNIDine 0.1 milliGRAM(s) Oral every 8 hours  cyanocobalamin 1000 MICROGram(s) Oral daily  folic acid 1 milliGRAM(s) Oral daily  furosemide    Tablet 40 milliGRAM(s) Oral daily  hydrALAZINE 100 milliGRAM(s) Oral every 8 hours  labetalol 100 milliGRAM(s) Oral every 8 hours  levETIRAcetam  IVPB 500 milliGRAM(s) IV Intermittent every 24 hours  sodium bicarbonate 650 milliGRAM(s) Oral every 12 hours    MEDICATIONS  (PRN):  acetaminophen     Tablet .. 650 milliGRAM(s) Oral every 6 hours PRN Temp greater or equal to 38C (100.4F), Mild Pain (1 - 3)  aluminum hydroxide/magnesium hydroxide/simethicone Suspension 30 milliLiter(s) Oral every 4 hours PRN Dyspepsia  hydrALAZINE Injectable 10 milliGRAM(s) IV Push every 6 hours PRN SBP > 180  hydrALAZINE Injectable 5 milliGRAM(s) IV Push every 6 hours PRN SBP > 160  melatonin 3 milliGRAM(s) Oral at bedtime PRN Insomnia  ondansetron Injectable 4 milliGRAM(s) IV Push every 8 hours PRN Nausea and/or Vomiting

## 2023-04-20 NOTE — DIETITIAN INITIAL EVALUATION ADULT - NSFNSPHYEXAMSKINFT_GEN_A_CORE
Pressure injuries as per flow sheets:    1. left buttocks- stage II  2. right buttocks- stage II  3. sacrum- stage I

## 2023-04-20 NOTE — DIETITIAN INITIAL EVALUATION ADULT - ENERGY INTAKE
RN reports pt with good PO intake with dinner yesterday (04/19) with family at bedside feeding pt. Pt refused breakfast this morning (04/20) despite encouragement and total assistance from staff.

## 2023-04-20 NOTE — DIETITIAN INITIAL EVALUATION ADULT - ORAL INTAKE PTA/DIET HISTORY
Per SLP note (04/19) pt was consuming ground and pureed foods at home with thin liquids through a straw.

## 2023-04-20 NOTE — DIETITIAN INITIAL EVALUATION ADULT - PERTINENT LABORATORY DATA
04-20    137  |  111<H>  |  72<H>  ----------------------------<  74  3.9   |  14<L>  |  6.57<H>    Ca    7.3<L>      20 Apr 2023 07:04  Phos  6.5     04-18  Mg     1.9     04-18    TPro  5.9<L>  /  Alb  2.2<L>  /  TBili  0.5  /  DBili  x   /  AST  14<L>  /  ALT  12  /  AlkPhos  58  04-18  A1C with Estimated Average Glucose Result: 5.2 % (12-25-22 @ 10:08)

## 2023-04-20 NOTE — PROGRESS NOTE ADULT - ASSESSMENT
73 y/o female w/ PMHx of Dementia- minimally verbal at baseline, CVA w/ residual right sided weakness, HTN, seizures, gout, chronic lower extremity edema, CKD stage 5, DVT on Eliquis recently admitted for UTI and syncope presents to the ED due increasing lethargy and generalized edema, pt being admitted for anemia.    # Anemia  - Pt guaiac +ve but w/ brown stools, no active bleeding, not a good candidate for colonoscopy or other invasive procedures   - likely due to chronic kidney disease, however unable to verify if pt recently bled  - s/p 2 unit PRBC , with repeat to 12, monitor trend    #CKD stage 5  - Dr Oakes following  - pt not candidate for HD  - edema likely combination anasarca related to hypoalbuminemia, protein malnutrition and renal failure  - c/w lasix      # HTN  - resume home meds  - hard to control as pt refuses po meds at time , iv added     # Dysphasia  -swallow eval passed     # Seizure  - c/w Keppra    # DVT   - SCDs for now; though not actively bleeding, pt could have recently bled,    duplex negative for DVT, will monitor off eliquis for now , risk and benefits discussed w family

## 2023-04-21 ENCOUNTER — TRANSCRIPTION ENCOUNTER (OUTPATIENT)
Age: 73
End: 2023-04-21

## 2023-04-21 VITALS
SYSTOLIC BLOOD PRESSURE: 122 MMHG | DIASTOLIC BLOOD PRESSURE: 67 MMHG | HEART RATE: 78 BPM | OXYGEN SATURATION: 98 % | RESPIRATION RATE: 18 BRPM | TEMPERATURE: 98 F

## 2023-04-21 LAB
ANION GAP SERPL CALC-SCNC: 10 MMOL/L — SIGNIFICANT CHANGE UP (ref 5–17)
BUN SERPL-MCNC: 71 MG/DL — HIGH (ref 7–23)
CALCIUM SERPL-MCNC: 7.1 MG/DL — LOW (ref 8.5–10.1)
CHLORIDE SERPL-SCNC: 114 MMOL/L — HIGH (ref 96–108)
CO2 SERPL-SCNC: 15 MMOL/L — LOW (ref 22–31)
CREAT SERPL-MCNC: 6.5 MG/DL — HIGH (ref 0.5–1.3)
EGFR: 6 ML/MIN/1.73M2 — LOW
GLUCOSE SERPL-MCNC: 90 MG/DL — SIGNIFICANT CHANGE UP (ref 70–99)
HCT VFR BLD CALC: 37 % — SIGNIFICANT CHANGE UP (ref 34.5–45)
HGB BLD-MCNC: 11.9 G/DL — SIGNIFICANT CHANGE UP (ref 11.5–15.5)
MCHC RBC-ENTMCNC: 29 PG — SIGNIFICANT CHANGE UP (ref 27–34)
MCHC RBC-ENTMCNC: 32.2 G/DL — SIGNIFICANT CHANGE UP (ref 32–36)
MCV RBC AUTO: 90.2 FL — SIGNIFICANT CHANGE UP (ref 80–100)
NRBC # BLD: 0 /100 WBCS — SIGNIFICANT CHANGE UP (ref 0–0)
PLATELET # BLD AUTO: 317 K/UL — SIGNIFICANT CHANGE UP (ref 150–400)
POTASSIUM SERPL-MCNC: 3.8 MMOL/L — SIGNIFICANT CHANGE UP (ref 3.5–5.3)
POTASSIUM SERPL-SCNC: 3.8 MMOL/L — SIGNIFICANT CHANGE UP (ref 3.5–5.3)
RBC # BLD: 4.1 M/UL — SIGNIFICANT CHANGE UP (ref 3.8–5.2)
RBC # FLD: 14.6 % — HIGH (ref 10.3–14.5)
SODIUM SERPL-SCNC: 139 MMOL/L — SIGNIFICANT CHANGE UP (ref 135–145)
WBC # BLD: 6.54 K/UL — SIGNIFICANT CHANGE UP (ref 3.8–10.5)
WBC # FLD AUTO: 6.54 K/UL — SIGNIFICANT CHANGE UP (ref 3.8–10.5)

## 2023-04-21 PROCEDURE — 99239 HOSP IP/OBS DSCHRG MGMT >30: CPT

## 2023-04-21 RX ORDER — FUROSEMIDE 40 MG
1 TABLET ORAL
Qty: 30 | Refills: 0
Start: 2023-04-21

## 2023-04-21 RX ADMIN — Medication 0.1 MILLIGRAM(S): at 05:36

## 2023-04-21 RX ADMIN — AMLODIPINE BESYLATE 10 MILLIGRAM(S): 2.5 TABLET ORAL at 05:36

## 2023-04-21 RX ADMIN — Medication 100 MILLIGRAM(S): at 16:41

## 2023-04-21 RX ADMIN — Medication 40 MILLIGRAM(S): at 05:36

## 2023-04-21 RX ADMIN — Medication 650 MILLIGRAM(S): at 05:36

## 2023-04-21 RX ADMIN — PREGABALIN 1000 MICROGRAM(S): 225 CAPSULE ORAL at 16:40

## 2023-04-21 RX ADMIN — Medication 1 MILLIGRAM(S): at 16:40

## 2023-04-21 RX ADMIN — Medication 100 MILLIGRAM(S): at 05:36

## 2023-04-21 RX ADMIN — Medication 0.1 MILLIGRAM(S): at 16:40

## 2023-04-21 NOTE — DISCHARGE NOTE NURSING/CASE MANAGEMENT/SOCIAL WORK - NSDCVIVACCINE_GEN_ALL_CORE_FT
COVID-19, mRNA, LNP-S, PF, 100 mcg/ 0.5 mL dose (Moderna); 05-Jan-2022 14:11; Catherine Crawley (RN); [employer] [id number] (Exp. Date: 12-Jan-2022); IntraMuscular; Deltoid Left.; 0.25 milliLiter(s);   influenza, injectable, quadrivalent, preservative free; 11-Oct-2017 11:36; Denia Duron (RN); [employer]; [id number]; IntraMuscular; Deltoid Left.; 0.5 milliLiter(s); VIS (VIS Published: 07-Aug-2015, VIS Presented: 11-Oct-2017);

## 2023-04-21 NOTE — DISCHARGE NOTE NURSING/CASE MANAGEMENT/SOCIAL WORK - NSDCPEFALRISK_GEN_ALL_CORE
For information on Fall & Injury Prevention, visit: [email or web address] OR  [email or web address] OR  https://www.cdc.gov/steadi/patient.html

## 2023-04-21 NOTE — DISCHARGE NOTE PROVIDER - NSDCCPCAREPLAN_GEN_ALL_CORE_FT
PRINCIPAL DISCHARGE DIAGNOSIS  Diagnosis: Anemia  Assessment and Plan of Treatment: improved, stop eliquis as patient doesnt have a dvt and risks outweigh benefits at this point  follow up with your primary care doctor      SECONDARY DISCHARGE DIAGNOSES  Diagnosis: Generalized weakness  Assessment and Plan of Treatment:

## 2023-04-21 NOTE — PHYSICAL THERAPY INITIAL EVALUATION ADULT - ADDITIONAL COMMENTS
Per patient's daughter at bedside, patient lives in a house with 11 steps + 10 steps, patient unable to negotiate steps. Patient has 24/7 aide, also has a wheelchair, hospital bed, mechanical lift (not used due to patient being able to assist with bed mobility and transfers). Prior to hospitalization in early April, patient was able to ambulate with moderate physical assistance or rolling walker for short household distances.

## 2023-04-21 NOTE — DISCHARGE NOTE PROVIDER - INSTRUCTIONS
Diet, Minced and Moist:   60 gm Protein (PRO60G)  Mildly Thick Liquids (MILDTHICKLIQS)  Low Sodium  No Concentrated Phosphorus (04-20-23 @ 11:10) [Active]

## 2023-04-21 NOTE — DISCHARGE NOTE PROVIDER - NSDCMRMEDTOKEN_GEN_ALL_CORE_FT
amLODIPine 10 mg oral tablet: 1 tab(s) orally once a day  aspirin 81 mg oral delayed release tablet: 1 tab(s) orally once a day  atorvastatin 40 mg oral tablet: 1 tab(s) orally once a day (at bedtime)  cholecalciferol oral tablet: 2000 unit(s) orally once a day  cloNIDine 0.1 mg oral tablet: 1 tab(s) orally every 8 hours  cyanocobalamin 1000 mcg oral tablet: 1 tab(s) orally once a day  folic acid 1 mg oral tablet: 1 tab(s) orally once a day  hydrALAZINE 100 mg oral tablet: 1 tab(s) orally every 8 hours  labetalol 100 mg oral tablet: 1 tab(s) orally every 8 hours MDD: 300 mg  levETIRAcetam 500 mg oral tablet: 1 tab(s) orally once a day   amLODIPine 10 mg oral tablet: 1 tab(s) orally once a day  aspirin 81 mg oral delayed release tablet: 1 tab(s) orally once a day  atorvastatin 40 mg oral tablet: 1 tab(s) orally once a day (at bedtime)  cholecalciferol oral tablet: 2000 unit(s) orally once a day  cloNIDine 0.1 mg oral tablet: 1 tab(s) orally every 8 hours  cyanocobalamin 1000 mcg oral tablet: 1 tab(s) orally once a day  folic acid 1 mg oral tablet: 1 tab(s) orally once a day  furosemide 40 mg oral tablet: 1 tab(s) orally once a day  hydrALAZINE 100 mg oral tablet: 1 tab(s) orally every 8 hours  labetalol 100 mg oral tablet: 1 tab(s) orally every 8 hours MDD: 300 mg  levETIRAcetam 500 mg oral tablet: 1 tab(s) orally once a day

## 2023-04-21 NOTE — DISCHARGE NOTE PROVIDER - HOSPITAL COURSE
[History reviewed] : History reviewed. [Medications and Allergies reviewed] : Medications and allergies reviewed. 71 y/o female w/ PMHx of Dementia- minimally verbal at baseline, CVA w/ residual right sided weakness, HTN, seizures, gout, chronic lower extremity edema, CKD stage 5, DVT on Eliquis recently admitted for UTI and syncope presents to the ED due increasing lethargy and generalized edema, pt being admitted for anemia.    # Anemia  - Pt guaiac +ve but w/ brown stools, no active bleeding, not a good candidate for colonoscopy or other invasive procedures   - likely due to chronic kidney disease, however unable to verify if pt recently bled  - s/p 2 unit PRBC , with repeat to 12, hgb stable no bleeding     #CKD stage 5  - Dr Oakes following  - pt not candidate for HD  - edema likely combination anasarca related to hypoalbuminemia, protein malnutrition and renal failure  - c/w lasix      # HTN  - resume home meds  - hard to control as pt refuses po meds at time      # Dysphasia  -swallow eval passed     # Seizure  - c/w Keppra    #functional quadriplegia  c/w supportive care     # DVT      duplex negative for DVT, will monitor off eliquis for now , risk and benefits discussed w family     Patient is not a good candidate for PT as she does not follow commands and is non verbal       pt seen and examined 45 min spent on dc planning     Lab test review, Radiology Review, Vitals review, Consultant review and discussion, Physical examination, IDR, Assessment and plan; Plan discussion with patient and family

## 2023-04-21 NOTE — DISCHARGE NOTE NURSING/CASE MANAGEMENT/SOCIAL WORK - PATIENT PORTAL LINK FT
You can access the FollowMyHealth Patient Portal offered by [facility] by registering at the following website: [email or web address] By joining [email or web address] FollowMyHealth portal, you will also be able to view your health information using other applications (apps) compatible with our system.

## 2023-04-21 NOTE — DISCHARGE NOTE NURSING/CASE MANAGEMENT/SOCIAL WORK - NSTRANSFERBELONGINGSDISPO_GEN_A_NUR
patient came to hospital with only sweatpants; no other belongings noted at bedside/with patient patient came to hospital with only sweatpants; no other belongings noted at bedside/given to family/with patient

## 2023-04-28 NOTE — CHART NOTE - NSCHARTNOTEFT_GEN_A_CORE
Pt admitted c catheter associated UTI (recurrent) & AMADOU from dehydration; sepsis 2/2 UTI. PMHx includes dementia (pt is non-verbal), CVA, seizure disorder, HTN, HLD, T2DM. Pt s/p cystoscopy, lithotripsy, stone extraction, insertion of left ureteral stent *8/24).     Factors impacting intake: [ ] none [ ] nausea  [ ] vomiting [ ] diarrhea [ ] constipation  [ ]chewing problems [ ] swallowing issues  [ X] other: AMS, lethargy, inability to self-feed    Diet Prescription: Diet, Consistent Carbohydrate/No Snacks:   Pureed  Supplement Feeding Modality:  Oral  Glucerna Shake Cans or Servings Per Day:  1       Frequency:  Two Times a day (08-24-20 @ 22:02)    Intake: improving % most meals c total assistance; drinking some of supplement    Current Weight:   56.5 kg (8/28); admission wt 56.3 kg (8/14)  % Weight Change: stable x 2 weeks    Nutrition focused physical exam conducted:    Subcutaneous fat loss: [severe ] Orbital fat pads region, [unable ]Buccal fat region, [moderate ]Triceps region,  [moderate ]Ribs region.    Muscle wasting: [ moderate]Temples region, [moderate ]Clavicle region, [moderate ]Shoulder region, [unable ]Scapula region, [unable ]Interosseous region,  [moderate ]thigh region, [moderate ]Calf region    Physical Appearance:  no edema noted    Pertinent Medications: MEDICATIONS  (STANDING):  amLODIPine   Tablet 10 milliGRAM(s) Oral daily  BACItracin   Ointment 1 Application(s) Topical daily  ciprofloxacin     Tablet 500 milliGRAM(s) Oral every 12 hours  cloNIDine 0.1 milliGRAM(s) Oral two times a day  heparin   Injectable 5000 Unit(s) SubCutaneous every 12 hours  hydrALAZINE 25 milliGRAM(s) Oral every 8 hours  levETIRAcetam 500 milliGRAM(s) Oral two times a day  metoprolol tartrate 50 milliGRAM(s) Oral two times a day  polyethylene glycol 3350 17 Gram(s) Oral two times a day  potassium chloride    Tablet ER 10 milliEquivalent(s) Oral three times a day  senna 2 Tablet(s) Oral at bedtime  simvastatin 20 milliGRAM(s) Oral at bedtime  vitamin A &amp; D Ointment 1 Application(s) Topical two times a day    MEDICATIONS  (PRN):  acetaminophen   Tablet .. 650 milliGRAM(s) Oral every 6 hours PRN Temp greater or equal to 38C (100.4F), Mild Pain (1 - 3)    Pertinent Labs: 08-29 Na141 mmol/L Glu 147 mg/dL<H> K+ 3.6 mmol/L Cr  0.76 mg/dL BUN 9 mg/dL 08-29 Phos 3.2 mg/dL 08-24 Alb 2.5 g/dL<L>; 08-28 POCT: 182    Skin:   left buttocks stage II pressure ulcers noted    Estimated Needs:   [X ] no change since previous assessment  (8/15)  [ ] recalculated:     Previous Nutrition Diagnosis:   [ ] Severe Malnutrition - Acute   Nutrition Diagnosis is [ ] ongoing  [ ] resolved [ ] not applicable     New Nutrition Diagnosis: [ ] not applicable      Interventions:   Recommend  [ ] Change Diet To:  [ ] Nutrition Supplement  [ ] Nutrition Support  [ ] Other:     Monitoring and Evaluation:   [ ] PO intake [ x ] Tolerance to diet prescription [ x ] weights [ x ] labs[ x ] follow up per protocol  [ ] other: Pt admitted c catheter associated UTI (recurrent) & AMADOU from dehydration; sepsis 2/2 UTI. PMHx includes dementia (pt is non-verbal), CVA, seizure disorder, HTN, HLD, T2DM. Pt s/p cystoscopy, lithotripsy, stone extraction, insertion of left ureteral stent (8/24).     Factors impacting intake: [ ] none [ ] nausea  [ ] vomiting [ ] diarrhea [ ] constipation  [ ]chewing problems [ ] swallowing issues  [ X] other: AMS, lethargy, inability to self-feed    Diet Prescription: Diet, Consistent Carbohydrate/No Snacks:   Pureed  Supplement Feeding Modality:  Oral  Glucerna Shake Cans or Servings Per Day:  1       Frequency:  Two Times a day (08-24-20 @ 22:02)    Intake: improving % most meals c total assistance; drinking some of supplement    Current Weight:   56.5 kg (8/28); admission wt 56.3 kg (8/14)  % Weight Change: stable x 2 weeks    Nutrition focused physical exam conducted:    Subcutaneous fat loss: [severe ] Orbital fat pads region, [unable ]Buccal fat region, [moderate ]Triceps region,  [moderate ]Ribs region.    Muscle wasting: [ moderate]Temples region, [moderate ]Clavicle region, [moderate ]Shoulder region, [unable ]Scapula region, [unable ]Interosseous region,  [moderate ]thigh region, [moderate ]Calf region    Physical Appearance:  no edema noted    Pertinent Medications: MEDICATIONS  (STANDING):  amLODIPine   Tablet 10 milliGRAM(s) Oral daily  BACItracin   Ointment 1 Application(s) Topical daily  ciprofloxacin     Tablet 500 milliGRAM(s) Oral every 12 hours  cloNIDine 0.1 milliGRAM(s) Oral two times a day  heparin   Injectable 5000 Unit(s) SubCutaneous every 12 hours  hydrALAZINE 25 milliGRAM(s) Oral every 8 hours  levETIRAcetam 500 milliGRAM(s) Oral two times a day  metoprolol tartrate 50 milliGRAM(s) Oral two times a day  polyethylene glycol 3350 17 Gram(s) Oral two times a day  potassium chloride    Tablet ER 10 milliEquivalent(s) Oral three times a day  senna 2 Tablet(s) Oral at bedtime  simvastatin 20 milliGRAM(s) Oral at bedtime  vitamin A &amp; D Ointment 1 Application(s) Topical two times a day    MEDICATIONS  (PRN):  acetaminophen   Tablet .. 650 milliGRAM(s) Oral every 6 hours PRN Temp greater or equal to 38C (100.4F), Mild Pain (1 - 3)    Pertinent Labs: 08-29 Na141 mmol/L Glu 147 mg/dL<H> K+ 3.6 mmol/L Cr  0.76 mg/dL BUN 9 mg/dL 08-29 Phos 3.2 mg/dL 08-24 Alb 2.5 g/dL<L>; 08-28 POCT: 182    Skin:   left buttocks stage II pressure ulcers noted    Estimated Needs:   [X ] no change since previous assessment  (8/15)  [ ] recalculated:     Previous Nutrition Diagnosis:   [ ] Severe Malnutrition - Acute   Etiology:  Inadequate energy/protein intake + increased energy/protein needs related to pressure ulcers, UTI, AMADOU, Sepsis  Signs & Symptoms:  Physical findings of moderate fat depletions & muscle wasting as noted    GOAL:  Provide nutrition/hydrations as medically appropriate & within family's wishes for tx    Nutrition Diagnosis is [X ] ongoing  [ ] resolved [ ] not applicable     New Nutrition Diagnosis: [X ] not applicable    Interventions:   continue current diet rx as noted  Recommend  [ ] Change Diet To:  [ ] Nutrition Supplement  [ ] Nutrition Support  [ ] Other:     Monitoring and Evaluation:   [X] PO intake [ x ] Tolerance to diet prescription [ x ] weights [ x ] labs[ x ] follow up per protocol  [ ] other: 57.8

## 2023-05-03 DIAGNOSIS — I69.351 HEMIPLEGIA AND HEMIPARESIS FOLLOWING CEREBRAL INFARCTION AFFECTING RIGHT DOMINANT SIDE: ICD-10-CM

## 2023-05-03 DIAGNOSIS — Z79.82 LONG TERM (CURRENT) USE OF ASPIRIN: ICD-10-CM

## 2023-05-03 DIAGNOSIS — F03.90 UNSPECIFIED DEMENTIA WITHOUT BEHAVIORAL DISTURBANCE: ICD-10-CM

## 2023-05-03 DIAGNOSIS — E46 UNSPECIFIED PROTEIN-CALORIE MALNUTRITION: ICD-10-CM

## 2023-05-03 DIAGNOSIS — Z86.718 PERSONAL HISTORY OF OTHER VENOUS THROMBOSIS AND EMBOLISM: ICD-10-CM

## 2023-05-03 DIAGNOSIS — E11.22 TYPE 2 DIABETES MELLITUS WITH DIABETIC CHRONIC KIDNEY DISEASE: ICD-10-CM

## 2023-05-03 DIAGNOSIS — R53.1 WEAKNESS: ICD-10-CM

## 2023-05-03 DIAGNOSIS — M10.9 GOUT, UNSPECIFIED: ICD-10-CM

## 2023-05-03 DIAGNOSIS — N18.5 CHRONIC KIDNEY DISEASE, STAGE 5: ICD-10-CM

## 2023-05-03 DIAGNOSIS — G40.909 EPILEPSY, UNSPECIFIED, NOT INTRACTABLE, WITHOUT STATUS EPILEPTICUS: ICD-10-CM

## 2023-05-03 DIAGNOSIS — D63.1 ANEMIA IN CHRONIC KIDNEY DISEASE: ICD-10-CM

## 2023-05-03 DIAGNOSIS — R53.2 FUNCTIONAL QUADRIPLEGIA: ICD-10-CM

## 2023-05-03 DIAGNOSIS — R47.02 DYSPHASIA: ICD-10-CM

## 2023-05-03 DIAGNOSIS — I16.0 HYPERTENSIVE URGENCY: ICD-10-CM

## 2023-05-03 DIAGNOSIS — Z79.01 LONG TERM (CURRENT) USE OF ANTICOAGULANTS: ICD-10-CM

## 2023-05-03 DIAGNOSIS — E88.09 OTHER DISORDERS OF PLASMA-PROTEIN METABOLISM, NOT ELSEWHERE CLASSIFIED: ICD-10-CM

## 2023-05-03 DIAGNOSIS — I12.0 HYPERTENSIVE CHRONIC KIDNEY DISEASE WITH STAGE 5 CHRONIC KIDNEY DISEASE OR END STAGE RENAL DISEASE: ICD-10-CM

## 2023-05-22 NOTE — ED PROVIDER NOTE - CPE EDP ENMT NORM
Patient had surgery recently and is having some issues she would like to discuss. Please call this patient. normal...

## 2023-08-13 NOTE — PHYSICAL THERAPY INITIAL EVALUATION ADULT - IMPAIRMENTS CONTRIBUTING TO GAIT DEVIATIONS, PT EVAL
This RN's extension left with [facility]  for report at this time.    Dr. Trujillo aware pt with complaint of lower back pain.   impaired balance/decreased strength

## 2023-09-15 NOTE — PATIENT PROFILE ADULT - NSPROEDALEARNER_GEN_A_NUR
- Upon discharge,  please call to schedule a follow up with Dr. Arnold in 1-2 weeks.  - Upon discharge, please call to make a follow up appointment with your primary care provider to discuss your recent hospitalization/operation.  - Keep dressings dry for 48 hours after which you may remove dressing and cleanse with soap and water in the shower (no scrubbing). Run water and soap over incision sites and pat dry (no scrubbing). No submerging your incision sites in water (i.e. no swimming or baths) for 2-3 weeks and avoid exposing the area to jets/streams of water.   - You can resume your normal activities as tolerated, but avoid heavy (>15lb.) lifting and strenuous exercise for 4-6 weeks.    -You were prescribed Tramadol pain medications, take these only as needed for severe pain.  Your pain should subside over the next few days.  While taking narcotic pain medications, you should not drive or operate heavy machinery.   - Narcotic pain medicine tends to cause constipation, you have been prescribed colace 3 times a day to help prevent that. Hold the colace if you start to have loose stools.  - If you experience fevers, chills, increasing abdominal pain, nausea, vomiting, inability to pass stool or gas, bleeding, or any other acute symptoms, please call your doctor and report to the emergency room immediately for further management.
pt confused, unable to provide accurate answer/s

## 2023-10-11 NOTE — DIETITIAN INITIAL EVALUATION ADULT. - NAME AND PHONE
Patient slept off and on overnight.  Pain consistently at 5 but patient refused more pain medication.  Site had no signs of bleeding or infection.      Problem: Pain  Goal: #Acceptable pain level achieved/maintained at rest using NRS/Faces  Description: This goal is used for patients who can self-report.  Acceptable means the level is at or below the identified comfort/function goal.  Outcome: Outcome Met, Continue evaluating goal progress toward completion      Sandee Castillo RD

## 2023-11-06 NOTE — ED PROVIDER NOTE - CROS ED GI ALL NEG
in no acute distress,  well developed, well nourished,  ambulating without difficulty,  normal communication ability
negative...

## 2023-12-27 NOTE — PATIENT PROFILE ADULT - FUNCTIONAL ASSESSMENT - DAILY ACTIVITY 2.
No protocol for requested medication     Medication: Resperidone  Last office visit date: 8/30/23  Pharmacy: [facility]Y #[id number] - [place], [place] - [address]    Order pended, routed to clinician for review.     
1 = Total assistance

## 2024-02-07 NOTE — ED ADULT NURSE NOTE - NEURO BEHAVIOR
Chief Complaint   Patient presents with    Mass     Spot on right side of face          Health Maintenance Due   Topic Date Due    HIV screen  Never done    Respiratory Syncytial Virus (RSV) Pregnant or age 60 yrs+ (1 - 1-dose 60+ series) Never done    Colorectal Cancer Screen  03/12/2022    Flu vaccine (1) 08/01/2023    COVID-19 Vaccine (3 - 2023-24 season) 09/01/2023         \"Have you been to the ER, urgent care clinic since your last visit?  Hospitalized since your last visit?\"    NO    “Have you seen or consulted any other health care providers outside of [facility] since your last visit?”    NO    “Have you had a colorectal cancer screening such as a colonoscopy/FIT/Cologuard?    NO                 General Appearance:  Well-developed, well-nourished, no acute distress.             HEENT:      Ears:  The TMs and ear canals were clear.  Eyes:  The pupillary responses were normal.  Extraocular muscle function intact.  Lids and conjunctiva not injected.  Funduscopic exam revealed sharp disc margins.  Nares: Clear w/o edema or erythema  Pharynx:  Clear with teeth in good repair.  No masses were noted.      Neck:  Supple without thyromegaly or adenopathy.  No JVD noted.  No carotid                bruits.   Lungs:  Clear to auscultation and percussion.  Cardiac:  Regular rate and rhythm without murmur.  PMI not displaced.  No gallop, rub or click.  Abdominal: Soft, non-tender, no hepata-spleenomegally or masses  Extremities:  No clubbing, cyanosis or edema.  Skin:  No rash or unusual mole changes noted.  Large seborrheic keratosis right side of his face  Lymph Nodes:  None felt in the cervical, supraclavicular, axillary or inguinal region.  Neurological: . DTR’s 2+ and symmetric.  Station and gait normal.   Hematologic:   No purpura or petechiae        Assessment/Plan:         1. Seborrheic keratoses    2. Labile blood pressure        Impressions/Plan:  Impression  1.  Seborrheic keratosis right side of his face I will set him up for dermatology evaluation and hopefully be able to freeze this  2.  Labile elevated blood pressure which he says is normal by his wife checks at home.  Follow-up with Dr. Dunham as needed.    Orders Placed This Encounter   Procedures    External Referral To Dermatology     Referral Priority:   Routine     Referral Type:   Eval and Treat     Referral Reason:   Specialty Services Required     Referred to Provider:   Nima Smiley MD     Requested Specialty:   Dermatology     Number of Visits Requested:   1          No follow-ups on file.     No results found for any visits on 02/07/24.      Vikash Carrero MD    The patient was given after the visit summary the patient  calm

## 2024-03-27 NOTE — PHYSICAL THERAPY INITIAL EVALUATION ADULT - SITTING BALANCE: STATIC
Pt is requesting a refill for the following medications      atorvastatin (LIPITOR) 10 MG tablet     metFORMIN (GLUCOPHAGE) 500 MG tablet     TRULICITY 1.5 MG/0.5ML SC injection     olmesartan (BENICAR) 5 MG tablet     Scripts need to be sent to [facility]  [phone]  
normal balance

## 2024-03-28 NOTE — PATIENT PROFILE ADULT - FUNCTIONAL ASSESSMENT - DAILY ACTIVITY SECTION LABEL
Can you please put in recall for hvf (bean visual reyes) and visit with Dr. Neal in 12/2024?  Hx of pituitary tumor s/p surgery with some deficits.  .

## 2024-03-29 NOTE — DISCHARGE NOTE ADULT - NSFTFHOME1RD_GEN_ALL_CORE
What type of discharge? Emergency Department  Risk of Hospital admission or ED visit: 37%  Is a TCM episode required? No  When should the patient follow up with PCP? 7 days of discharge.    Aracelis Garza RN  [facility]     Stroke/TBI (neurological/cognitive impairment)

## 2024-03-31 NOTE — PATIENT PROFILE ADULT - BRADEN SCORE
on the discharge service for the patient. I have reviewed and made amendments to the documentation where necessary. 14

## 2024-05-13 NOTE — PATIENT PROFILE ADULT - FUNCTIONAL SCREEN CURRENT LEVEL: COMMUNICATION, MLM
2 = difficulty understanding and speaking (not related to language barrier)
(1) Other Medications/None

## 2024-05-28 NOTE — ED ADULT NURSE NOTE - CHIEF COMPLAINT QUOTE
No patient BIBA as per EMS family found the patient nonverbal in the morning, unresponsive  , patient responding to tactile stimuli by opening the eyes ,and moving upper extremities

## 2024-07-11 NOTE — ED ADULT NURSE NOTE - NS TRANSFER PATIENT BELONGINGS
Detail Level: Simple Price (Do Not Change): 0.00 Instructions: This plan will send the code FBSE to the PM system.  DO NOT or CHANGE the price. Clothing

## 2024-09-03 NOTE — PROGRESS NOTE ADULT - TIME-BASED BILLING (NON-CRITICAL CARE)
Time-based billing (NON-critical care)
show

## 2024-09-19 NOTE — DIETITIAN INITIAL EVALUATION ADULT - LOSS OF SUBCUTANEOUS FAT
Orbital.../Triceps.../Fat overlying ribcage.../Buccal...
Initiate Treatment: .\\n- fluocinonide 0.05 % topical cream, Apply to affected areas on scalp twice daily as needed for flares. One week on and one week off. Repeat as needed
Detail Level: Zone

## 2024-10-03 NOTE — CONSULT NOTE ADULT - SUBJECTIVE AND OBJECTIVE BOX
Patient ID: Dyan Arredondo is a 53 y.o. female.    Chief Complaint: General Illness (Entered automatically based on patient selection in [employer].)          274}  The patient initiated a request through [employer] on 10/3/2024 for evaluation and management with a chief complaint of General Illness (Entered automatically based on patient selection in [employer].)     I evaluated the questionnaire submission on 10/03/2024 .    Total Time (in minutes): 7     [facility] P[facility] Evisit [facility]-Medication    10/3/2024  9:32 AM CDT - Filed by Patient   What do you need help with? Birth Control   Do you agree to participate in an E-Visit? Yes   If you have any of the following symptoms, please present to your local emergency room or call 911:  I acknowledge   Medication requests for narcotics will not be addressed via an E-Visit.  Please schedule an appointment. I acknowledge   Are you pregnant, could you be pregnant, or are you breast feeding? None of the above   Do you want to address a new or existing medication? I would like to address a medication I currently take   What is the main issue you would like addressed today? I need my birth control prescription refilled today.  I just found out that my primary doctor retired recently.  I do not have a new primary, yet, to send this request to.   Would you like to change or continue your medication? Continue medication   What medication would you like to continue?  Tri-lo-camilo Tablet   Are you taking it as prescribed? Yes    What medical condition is the  medication intended to treat? Birth control   Is the medication helping your condition? Yes   Are you having any side effects from the medication? No   Provide any additional information you feel is important. I need the prescription today.   Please attach any relevant images or files    Are you able to take your vital signs? No          Active Problem List with Overview Notes    Diagnosis Date Noted    Alcohol abuse 03/28/2023      Counseled on importance of reduction with goal of cessation in order to avoid adverse outcomes such as liver disease      Essential hypertension 03/28/2023     Continue current medication      Severe obesity (BMI 35.0-35.9 with comorbidity) 03/28/2023     Pt advised that alcohol reduction and cessation will likely result in some weight loss       Decreased range of motion (ROM) of right knee 01/12/2023    Decreased strength of lower extremity 01/12/2023    High risk HPV infection 12/18/2020     Re-test for HPV. If again positive, will refer back to OB-GYN      Numbness of foot 05/01/2018    GERD (gastroesophageal reflux disease) 04/04/2018    Cold sore 04/04/2018      Recent Labs Obtained:  Lab Results   Component Value Date    WBC 7.64 02/16/2024    HGB 13.4 02/16/2024    HCT 42.7 02/16/2024    MCV 97 02/16/2024     02/16/2024     02/16/2024    K 4.4 02/16/2024    GLU 83 02/16/2024    CREATININE 0.7 02/16/2024    EGFRNORACEVR >60.0 02/16/2024    HGBA1C 4.9 02/16/2024    TSH 2.896 02/16/2024      Review of patient's allergies indicates:   Allergen Reactions    Amoxicillin trihydrate Rash     Rash and hives     Erythromycin base Rash     Rash and hives     Omnipaque 140 [iohexol] Rash     Rash and hives     Penicillins Rash     Rash and hives     Tetracyclines Hives and Rash     TETRACYCLINE Hydrochloride     Clindamycin Hives       Encounter Diagnosis   Name Primary?    Encounter for contraceptive management, unspecified type         No orders of the defined types were placed in this encounter.     Medications Ordered This Encounter   Medications    norgestimate-ethinyl estradioL (TRI-LO-GIOVANNI) 0.18/0.215/0.25 mg-25 mcg tablet     Sig: Take 1 tablet by mouth once daily.     Dispense:  84 tablet     Refill:  1        E-Visit Time Tracking:    Day 1 Time (in minutes): 7    Total Time (in minutes): 7      274}           Patient is a 69y old  Female who presents with a chief complaint of fatigue (2020 17:25)      HPI:  Pt is a 68 y/o female w/ PMH of HTN, epilepsy, multiple strokes and dementia pt reported by daughter to have fallen last week(6 days ago) no injury but since pt has been eating less, more fatigued, pt w/o specific complians, no fever, chills, sob,cp, palpitations, n/v/d/c, no travels.  pt normally ambulates w/assistance but over last week doesnt want to try (2020 03:17)    Orginally called rrt for syncope. During eval, noticed that left side was flacid which was change from baseline. code stroke called. M1 right sided occlusion found, transferred to ICU.  left side paralysis resolved.   Allergies    No Known Allergies    Intolerances        MEDICATIONS  (STANDING):  amLODIPine   Tablet 10 milliGRAM(s) Oral daily  aspirin enteric coated 81 milliGRAM(s) Oral daily  cloNIDine 0.1 milliGRAM(s) Oral two times a day  clopidogrel Tablet 75 milliGRAM(s) Oral daily  enoxaparin Injectable 40 milliGRAM(s) SubCutaneous daily  hydrALAZINE 50 milliGRAM(s) Oral three times a day  levETIRAcetam 750 milliGRAM(s) Oral two times a day  metoprolol tartrate 50 milliGRAM(s) Oral two times a day  simvastatin 20 milliGRAM(s) Oral at bedtime  traZODone 50 milliGRAM(s) Oral at bedtime    MEDICATIONS  (PRN):      Daily     Daily Weight in k.8 (2020 19:53)    Drug Dosing Weight  Height (cm): 160.02 (2020 19:35)  Weight (kg): 57.2 (2020 04:49)  BMI (kg/m2): 22.3 (2020 04:49)  BSA (m2): 1.59 (2020 04:49)    PAST MEDICAL & SURGICAL HISTORY:  CVA (cerebral infarction): right side weakness  Vision changes: lt eye  Urinary incontinence  Seizure disorder  Gout  OA (osteoarthritis): bautista knees, low back  and  bautista shoulders  Asthma  Diabetes  Hypertension  Kidney stone      FAMILY HISTORY:  No pertinent family history in first degree relatives      SOCIAL HISTORY: unable to assess 2/2 nonverbal    ADVANCE DIRECTIVES:    REVIEW OF SYSTEMS:    unable to assess 2/2 nonverbal          ICU Vital Signs Last 24 Hrs  T(C): 36.8 (2020 19:53), Max: 37 (2020 12:12)  T(F): 98.2 (2020 19:53), Max: 98.6 (2020 12:12)  HR: 78 (2020 20:30) (55 - 78)  BP: 117/64 (2020 20:30) (117/64 - 197/73)  BP(mean): 76 (2020 20:30) (70 - 97)  ABP: --  ABP(mean): --  RR: 14 (2020 20:30) (14 - 18)  SpO2: 96% (2020 20:30) (96% - 100%)          I&O's Detail    2020 07:01  -  2020 07:00  --------------------------------------------------------  IN:    Oral Fluid: 360 mL  Total IN: 360 mL    OUT:  Total OUT: 0 mL    Total NET: 360 mL      2020 07:01  -  2020 21:00  --------------------------------------------------------  IN:    Oral Fluid: 120 mL  Total IN: 120 mL    OUT:  Total OUT: 0 mL    Total NET: 120 mL          PHYSICAL EXAM:    GENERAL: NAD, well-groomed, well-developed  HEAD:  Atraumatic, Normocephalic  EYES: EOMI, PERRLA, conjunctiva and sclera clear  ENMT: No tonsillar erythema, exudates, or enlargement; Moist mucous membranes, Good dentition, No lesions  NECK:   NERVOUS SYSTEM:  decreased ms. 0/5 lue and LLE on exam.  4/5 right side  CHEST/LUNG: Clear to percussion bilaterally; No rales, rhonchi, wheezing, or rubs  HEART: Regular rate and rhythm; No murmurs, rubs, or gallops  ABDOMEN: Soft, Nontender, Nondistended; Bowel sounds present  EXTREMITIES:  2+ Peripheral Pulses, No clubbing, cyanosis, or edema  LYMPH: No lymphadenopathy noted  SKIN: No rashes or lesions    LABS:  CBC Full  -  ( 2020 19:06 )  WBC Count : 7.51 K/uL  RBC Count : 4.38 M/uL  Hemoglobin : 12.7 g/dL  Hematocrit : 38.7 %  Platelet Count - Automated : 265 K/uL  Mean Cell Volume : 88.4 fl  Mean Cell Hemoglobin : 29.0 pg  Mean Cell Hemoglobin Concentration : 32.8 gm/dL  Auto Neutrophil # : 3.25 K/uL  Auto Lymphocyte # : 3.43 K/uL  Auto Monocyte # : 0.75 K/uL  Auto Eosinophil # : 0.00 K/uL  Auto Basophil # : 0.06 K/uL  Auto Neutrophil % : 43.2 %  Auto Lymphocyte % : 45.7 %  Auto Monocyte % : 10.0 %  Auto Eosinophil % : 0.0 %  Auto Basophil % : 0.8 %        138  |  106  |  28<H>  ----------------------------<  207<H>  3.7   |  24  |  1.48<H>    Ca    8.8      2020 19:06    TPro  7.4  /  Alb  3.4  /  TBili  0.5  /  DBili  x   /  AST  13<L>  /  ALT  17  /  AlkPhos  68      CAPILLARY BLOOD GLUCOSE      POCT Blood Glucose.: 178 mg/dL (2020 18:37)    PT/INR - ( 2020 19:06 )   PT: 12.6 sec;   INR: 1.12 ratio         PTT - ( 2020 19:06 )  PTT:32.6 sec    CARDIAC MARKERS ( 2020 19:06 )  <.015 ng/mL / x     / 50 U/L / x     / 1.4 ng/mL      Troponin I, Serum: <.015 ng/mL ( @ 19:06)  Lactate, Blood: 2.3 mmol/L ( @ 19:06)  bloo    RADIOLOGY:  < from: CT Angio Head w/ IV Cont (20 @ 19:10) >  IMPRESSION:    Head CT: No acute intracranial hemorrhage, mass effect, or shift of the midline structures.    Multiple similar-appearing chronic infarcts and microvascular disease, as discussed.    CTA neck: No large vessel occlusion or major stenosis.    CTA head:    1. 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 Evidence of some collateralization of the distal right MCA branches, which appears sparse when compared to the contralateral side.    2. Similar-appearing multifocal areas of stenoses involving the bilateral anterior cerebral arteries.    3. Atherosclerosis involving the posterior circulation with mild focal stenosis of the mid basilar segment.      CRITICAL CARE TIME SPENT:60 min

## 2024-11-26 NOTE — ED ADULT TRIAGE NOTE - BMI (KG/M2)
30
Oral Minoxidil Pregnancy And Lactation Text: This medication should only be used when clearly needed if you are pregnant, attempting to become pregnant or breast feeding.
Quality 226: Preventive Care And Screening: Tobacco Use: Screening And Cessation Intervention: Patient screened for tobacco use and is an ex/non-smoker
Detail Level: Detailed

## 2024-12-02 NOTE — ED ADULT NURSE NOTE - NS PRO PASSIVE SMOKE EXP
FAMILY MEDICINE OFFICE VISIT      Patient: Nighat Crump Date of Service:  24   : 1968 MRN: [id number]       SUBJECTIVE:   CHIEF COMPLAINT:   Chief Complaint   Patient presents with    Office Visit     Blockage in legs, feeling pressure during urination and frequency       HPI:    Nighat Crump is a 56 year old who presents today for concerns of a UTI in the setting of chronic urinary incontinence.    Patient also requests a second opinion regarding vascular surgery for management of her lower extremity edema in the setting of peripheral arterial disease.    Patient does have a scheduled appointment for urinary incontinence with a specialist within the coming month.  Patient has been following with to vascular specialists and was planning on undergoing angiogram; however, patient would like a second opinion about the plan of care.    Patient denies any recent nausea, vomiting, diarrhea, fever, chills/malaise.    Patient Active Problem List   Diagnosis    Adjustment reaction with anxiety and depression    Gastroesophageal reflux disease without esophagitis    Chronic insomnia    Chronic bilateral low back pain with left-sided sciatica    Muscle spasms of neck    DDD (degenerative disc disease), lumbosacral    Facet arthropathy    Myofascial pain    Abnormal stress test    Hyperlipidemia    Chest pain, unspecified type    S/P CABG x 4    Cerebral infarction due to occlusion of left middle cerebral artery  (CMD)    Asthma (CMD)    Depressive disorder, not elsewhere classified    Encounter for therapeutic drug monitoring    HTN (hypertension)    Lumbar spondylosis    Lipomatosis    Pituitary disorder  (CMD)    Obstructive sleep apnea syndrome    Right flaccid hemiplegia  (CMD)    Vitamin B12 deficiency    Chronic ischemic heart disease    BMI 40.0-44.9, adult  (CMD)    PAD (peripheral artery disease) (CMD)    Vitamin D deficiency    Cerebrovascular accident (CVA)  (CMD)    Right hemiparesis  (CMD)     Vaccination refused by patient    Healthcare maintenance    Increased bowel frequency    Class 3 severe obesity due to excess calories with serious comorbidity and body mass index (BMI) of 40.0 to 44.9 in adult (CMD)    Leg swelling    Mixed stress and urge urinary incontinence    Acute cystitis without hematuria       Current Outpatient Medications   Medication Sig Dispense Refill    cephalexin 500 MG tablet Take 1 tablet by mouth in the morning and 1 tablet in the evening. Do all this for 7 days. 14 tablet 0    isosorbide mononitrate (IMDUR) 30 MG 24 hr tablet Take 1 tablet by mouth daily. Indications: Stable Angina Pectoris 90 tablet 1    triamterene-hydroCHLOROthiazide (MAXZIDE-25) 37.5-25 MG per tablet Take 1 tablet by mouth daily.      topiramate (TOPAMAX) 50 MG tablet Take 50 mg by mouth.      escitalopram (LEXAPRO) 20 MG tablet Take 1 tablet by mouth daily. 90 tablet 0    Aspirin 81 MG Cap Take 1 capsule by mouth daily. 90 capsule 3    metoPROLOL succinate (TOPROL-XL) 25 MG 24 hr tablet Take 1 tablet by mouth in the morning and 1 tablet in the evening. 180 tablet 0    cilostazol (PLETAL) 100 MG tablet Take 1 tablet by mouth in the morning and 1 tablet in the evening. 180 tablet 3    rosuvastatin (CRESTOR) 40 MG tablet Take 1 tablet by mouth daily. 90 tablet 3    cariprazine (VRAYLAR) 3 MG capsule Take 3 mg by mouth daily.      nitroGLYCERIN (NITROSTAT) 0.4 MG sublingual tablet PLACE 1 TABLET UNDER THE TONGUE EVERY 5 MINUTES AS NEEDED FOR CHEST PAIN. DO NOT EXCEED 3 DOSES IN 24 HOURS. 90 tablet 0    potassium chloride (KLOR-CON) 10 MEQ ER tablet TAKE 2 TABLETS BY MOUTH DAILY 180 tablet 3    furosemide (LASIX) 20 MG tablet TAKE 2 TABLETS BY MOUTH DAILY 180 tablet 3    ALPRAZolam (XANAX) 1 MG tablet Take 1 tablet by mouth 3 times daily as needed for Sleep.      zolpidem (AMBIEN CR) 12.5 MG CR tablet Take 1 tablet by mouth at bedtime as needed for insomnia 30 tablet 5     No current facility-administered  medications for this visit.       Review of Systems   Constitutional: Negative.  Negative for activity change, appetite change, chills, fatigue, fever and unexpected weight change.   HENT:  Negative for congestion, drooling and tinnitus.    Eyes: Negative.    Respiratory: Negative.  Negative for cough, shortness of breath, wheezing and stridor.    Cardiovascular:  Positive for leg swelling. Negative for chest pain.   Gastrointestinal: Negative.  Negative for constipation, diarrhea, nausea and vomiting.   Genitourinary:  Positive for dysuria and frequency. Negative for difficulty urinating, flank pain and urgency.   Musculoskeletal: Negative.    Neurological:  Negative for dizziness, tremors, weakness and light-headedness.   Psychiatric/Behavioral: Negative.     All other systems reviewed and are negative.       Visit Vitals  BP (!) 148/76 (BP Location: LUE - Left upper extremity, Patient Position: Sitting, Cuff Size: Regular)   Pulse 69   Temp 97 °F (36.1 °C) (Temporal)   Wt 116.3 kg (256 lb 8 oz)   LMP  (LMP Unknown)   SpO2 99%   BMI 42.68 kg/m²         Physical Exam  Vitals reviewed.   Constitutional:       General: She is not in acute distress.     Appearance: Normal appearance. She is obese. She is not ill-appearing, toxic-appearing or diaphoretic.      Comments: Patient utilizing wheelchair for ambulation.   HENT:      Head: Normocephalic and atraumatic.      Right Ear: External ear normal.      Left Ear: External ear normal.      Nose: Nose normal. No congestion or rhinorrhea.   Eyes:      General: No scleral icterus.        Right eye: No discharge.         Left eye: No discharge.      Extraocular Movements: Extraocular movements intact.      Conjunctiva/sclera: Conjunctivae normal.      Pupils: Pupils are equal, round, and reactive to light.   Cardiovascular:      Rate and Rhythm: Normal rate and regular rhythm.      Pulses: Normal pulses.      Heart sounds: Normal heart sounds. No murmur heard.     No  friction rub. No gallop.   Pulmonary:      Effort: Pulmonary effort is normal. No respiratory distress.      Breath sounds: Normal breath sounds. No stridor. No wheezing, rhonchi or rales.   Chest:      Chest wall: No tenderness.   Abdominal:      General: There is no distension.   Musculoskeletal:      Cervical back: Normal range of motion. No rigidity or tenderness.      Left lower leg: Edema present.   Skin:     Coloration: Skin is not pale.      Findings: No erythema or rash.   Neurological:      General: No focal deficit present.      Mental Status: She is alert and oriented to person, place, and time. Mental status is at baseline.   Psychiatric:         Mood and Affect: Mood normal.         Behavior: Behavior normal.         Thought Content: Thought content normal.         Judgment: Judgment normal.            ASSESSMENT AND PLAN:   The following was ordered/assessed during today's call:   Problem List Items Addressed This Visit       PAD (peripheral artery disease) (CMD) - Primary     -Patient to set up appointment with new vascular surgeon for second opinion.         Relevant Orders    SERVICE TO SURGERY VASCULAR    Acute cystitis without hematuria     - Will order course of Keflex given patient's urine results.            Medical compliance with plan discussed and risk of noncompliance reviewed.  Patient education completed on disease process, etiology and prognosis.  Return to clinic as clinically indicated was discussed with patient who verbalized understanding of and agreement with the plan.      Return in about 2 weeks (around 12/13/2024).     Glen Bear MD  12/2/2024                     No

## 2025-03-03 NOTE — PROGRESS NOTE ADULT - PROVIDER SPECIALTY LIST ADULT
Infectious Disease
Urology
Urology
Infectious Disease
Internal Medicine
Urology
Infectious Disease
Detail Level: Detailed
Infectious Disease
Infectious Disease
Internal Medicine

## 2025-05-19 NOTE — ED ADULT NURSE NOTE - CARDIO ASSESSMENT
TAKE THE PROTONIX/PANTOPRAZOLE TWICE DAILY - ABOUT 30 MINUTES BEFORE YOU EAT AND WAIT TO TAKE OTHER MEDICATIONS FOR ABOUT AN HOUR    Try taking Tylenol (2 tablets or 1000 mg) before bed to help with pain and sleep    Mammogram:   Radiology- We have placed an order for an imaging study. Please call [phone] to schedule with them. If it is for something simple like a chest x-ray, you can go across the street during their regular business hours and have it done today   
---

## 2025-07-30 NOTE — PATIENT PROFILE ADULT - FALL HARM RISK
[Follow-up Visit ___] : a follow-up visit  for [unfilled] bones(Osteoporosis,prev fx,steroid use,metastatic bone ca)